# Patient Record
Sex: MALE | Race: WHITE | NOT HISPANIC OR LATINO | Employment: FULL TIME
[De-identification: names, ages, dates, MRNs, and addresses within clinical notes are randomized per-mention and may not be internally consistent; named-entity substitution may affect disease eponyms.]

---

## 2020-03-01 ENCOUNTER — HEALTH MAINTENANCE LETTER (OUTPATIENT)
Age: 45
End: 2020-03-01

## 2020-12-14 ENCOUNTER — HEALTH MAINTENANCE LETTER (OUTPATIENT)
Age: 45
End: 2020-12-14

## 2021-04-17 ENCOUNTER — HEALTH MAINTENANCE LETTER (OUTPATIENT)
Age: 46
End: 2021-04-17

## 2021-10-02 ENCOUNTER — HEALTH MAINTENANCE LETTER (OUTPATIENT)
Age: 46
End: 2021-10-02

## 2022-01-08 ENCOUNTER — HOSPITAL ENCOUNTER (INPATIENT)
Facility: CLINIC | Age: 47
LOS: 20 days | Discharge: OTHER INSTITUTION | End: 2022-01-28
Attending: EMERGENCY MEDICINE | Admitting: PSYCHIATRY & NEUROLOGY
Payer: COMMERCIAL

## 2022-01-08 ENCOUNTER — APPOINTMENT (OUTPATIENT)
Dept: CT IMAGING | Facility: CLINIC | Age: 47
End: 2022-01-08
Attending: EMERGENCY MEDICINE
Payer: COMMERCIAL

## 2022-01-08 ENCOUNTER — APPOINTMENT (OUTPATIENT)
Dept: CT IMAGING | Facility: CLINIC | Age: 47
End: 2022-01-08
Attending: STUDENT IN AN ORGANIZED HEALTH CARE EDUCATION/TRAINING PROGRAM
Payer: COMMERCIAL

## 2022-01-08 ENCOUNTER — APPOINTMENT (OUTPATIENT)
Dept: GENERAL RADIOLOGY | Facility: CLINIC | Age: 47
End: 2022-01-08
Attending: EMERGENCY MEDICINE
Payer: COMMERCIAL

## 2022-01-08 DIAGNOSIS — Z11.52 ENCOUNTER FOR SCREENING LABORATORY TESTING FOR SEVERE ACUTE RESPIRATORY SYNDROME CORONAVIRUS 2 (SARS-COV-2): ICD-10-CM

## 2022-01-08 DIAGNOSIS — I15.9 SECONDARY HYPERTENSION: ICD-10-CM

## 2022-01-08 DIAGNOSIS — I63.531 ACUTE ISCHEMIC RIGHT POSTERIOR CEREBRAL ARTERY (PCA) STROKE (H): Primary | ICD-10-CM

## 2022-01-08 DIAGNOSIS — I63.9 ISCHEMIC CEREBROVASCULAR ACCIDENT (CVA) (H): ICD-10-CM

## 2022-01-08 LAB
ALBUMIN SERPL-MCNC: 3.9 G/DL (ref 3.4–5)
ALP SERPL-CCNC: 94 U/L (ref 40–150)
ALT SERPL W P-5'-P-CCNC: 31 U/L (ref 0–70)
ANION GAP SERPL CALCULATED.3IONS-SCNC: 7 MMOL/L (ref 3–14)
APTT PPP: 28 SECONDS (ref 22–38)
AST SERPL W P-5'-P-CCNC: 26 U/L (ref 0–45)
BASOPHILS # BLD AUTO: 0 10E3/UL (ref 0–0.2)
BASOPHILS NFR BLD AUTO: 0 %
BILIRUB SERPL-MCNC: 0.8 MG/DL (ref 0.2–1.3)
BUN SERPL-MCNC: 19 MG/DL (ref 7–30)
CALCIUM SERPL-MCNC: 9.8 MG/DL (ref 8.5–10.1)
CHLORIDE BLD-SCNC: 104 MMOL/L (ref 94–109)
CO2 SERPL-SCNC: 30 MMOL/L (ref 20–32)
CREAT BLD-MCNC: 1.5 MG/DL (ref 0.7–1.3)
CREAT SERPL-MCNC: 1.37 MG/DL (ref 0.66–1.25)
EOSINOPHIL # BLD AUTO: 0.1 10E3/UL (ref 0–0.7)
EOSINOPHIL NFR BLD AUTO: 0 %
ERYTHROCYTE [DISTWIDTH] IN BLOOD BY AUTOMATED COUNT: 13.4 % (ref 10–15)
GFR SERPL CREATININE-BSD FRML MDRD: 57 ML/MIN/1.73M2
GFR SERPL CREATININE-BSD FRML MDRD: 64 ML/MIN/1.73M2
GLUCOSE BLD-MCNC: 222 MG/DL (ref 70–99)
HBA1C MFR BLD: 7.8 % (ref 0–5.6)
HCT VFR BLD AUTO: 49.6 % (ref 40–53)
HGB BLD-MCNC: 17.2 G/DL (ref 13.3–17.7)
HOLD SPECIMEN: NORMAL
HOLD SPECIMEN: NORMAL
IMM GRANULOCYTES # BLD: 0 10E3/UL
IMM GRANULOCYTES NFR BLD: 0 %
INR BLD: 1 (ref 2–3)
INR PPP: 0.95 (ref 0.85–1.15)
LYMPHOCYTES # BLD AUTO: 1 10E3/UL (ref 0.8–5.3)
LYMPHOCYTES NFR BLD AUTO: 8 %
MAGNESIUM SERPL-MCNC: 2 MG/DL (ref 1.6–2.3)
MCH RBC QN AUTO: 31 PG (ref 26.5–33)
MCHC RBC AUTO-ENTMCNC: 34.7 G/DL (ref 31.5–36.5)
MCV RBC AUTO: 90 FL (ref 78–100)
MONOCYTES # BLD AUTO: 0.5 10E3/UL (ref 0–1.3)
MONOCYTES NFR BLD AUTO: 4 %
NEUTROPHILS # BLD AUTO: 10.7 10E3/UL (ref 1.6–8.3)
NEUTROPHILS NFR BLD AUTO: 88 %
NRBC # BLD AUTO: 0 10E3/UL
NRBC BLD AUTO-RTO: 0 /100
PLATELET # BLD AUTO: 252 10E3/UL (ref 150–450)
POTASSIUM BLD-SCNC: 2.7 MMOL/L (ref 3.4–5.3)
PROT SERPL-MCNC: 8.5 G/DL (ref 6.8–8.8)
RADIOLOGIST FLAGS: ABNORMAL
RADIOLOGIST FLAGS: ABNORMAL
RBC # BLD AUTO: 5.54 10E6/UL (ref 4.4–5.9)
SARS-COV-2 RNA RESP QL NAA+PROBE: NEGATIVE
SODIUM SERPL-SCNC: 141 MMOL/L (ref 133–144)
TROPONIN I SERPL HS-MCNC: 147 NG/L
WBC # BLD AUTO: 12.4 10E3/UL (ref 4–11)

## 2022-01-08 PROCEDURE — 999N000176 HC STATISTIC STROKE CODE W/O ACCESS

## 2022-01-08 PROCEDURE — 258N000003 HC RX IP 258 OP 636: Performed by: EMERGENCY MEDICINE

## 2022-01-08 PROCEDURE — 250N000011 HC RX IP 250 OP 636: Performed by: EMERGENCY MEDICINE

## 2022-01-08 PROCEDURE — 84484 ASSAY OF TROPONIN QUANT: CPT | Performed by: EMERGENCY MEDICINE

## 2022-01-08 PROCEDURE — 99291 CRITICAL CARE FIRST HOUR: CPT | Mod: 25

## 2022-01-08 PROCEDURE — 70498 CT ANGIOGRAPHY NECK: CPT | Mod: 26 | Performed by: RADIOLOGY

## 2022-01-08 PROCEDURE — 70496 CT ANGIOGRAPHY HEAD: CPT

## 2022-01-08 PROCEDURE — C9803 HOPD COVID-19 SPEC COLLECT: HCPCS

## 2022-01-08 PROCEDURE — 85610 PROTHROMBIN TIME: CPT

## 2022-01-08 PROCEDURE — 99285 EMERGENCY DEPT VISIT HI MDM: CPT | Mod: 25

## 2022-01-08 PROCEDURE — 96361 HYDRATE IV INFUSION ADD-ON: CPT

## 2022-01-08 PROCEDURE — 96375 TX/PRO/DX INJ NEW DRUG ADDON: CPT

## 2022-01-08 PROCEDURE — 96374 THER/PROPH/DIAG INJ IV PUSH: CPT

## 2022-01-08 PROCEDURE — 83036 HEMOGLOBIN GLYCOSYLATED A1C: CPT | Performed by: STUDENT IN AN ORGANIZED HEALTH CARE EDUCATION/TRAINING PROGRAM

## 2022-01-08 PROCEDURE — 85730 THROMBOPLASTIN TIME PARTIAL: CPT | Performed by: EMERGENCY MEDICINE

## 2022-01-08 PROCEDURE — U0005 INFEC AGEN DETEC AMPLI PROBE: HCPCS | Performed by: EMERGENCY MEDICINE

## 2022-01-08 PROCEDURE — 0042T CT HEAD PERFUSION WITH CONTRAST: CPT | Mod: GC | Performed by: RADIOLOGY

## 2022-01-08 PROCEDURE — 258N000003 HC RX IP 258 OP 636: Performed by: STUDENT IN AN ORGANIZED HEALTH CARE EDUCATION/TRAINING PROGRAM

## 2022-01-08 PROCEDURE — 82040 ASSAY OF SERUM ALBUMIN: CPT | Performed by: EMERGENCY MEDICINE

## 2022-01-08 PROCEDURE — 83735 ASSAY OF MAGNESIUM: CPT | Performed by: EMERGENCY MEDICINE

## 2022-01-08 PROCEDURE — 80061 LIPID PANEL: CPT | Performed by: STUDENT IN AN ORGANIZED HEALTH CARE EDUCATION/TRAINING PROGRAM

## 2022-01-08 PROCEDURE — 82565 ASSAY OF CREATININE: CPT

## 2022-01-08 PROCEDURE — 71046 X-RAY EXAM CHEST 2 VIEWS: CPT | Mod: 26 | Performed by: STUDENT IN AN ORGANIZED HEALTH CARE EDUCATION/TRAINING PROGRAM

## 2022-01-08 PROCEDURE — 85025 COMPLETE CBC W/AUTO DIFF WBC: CPT | Performed by: EMERGENCY MEDICINE

## 2022-01-08 PROCEDURE — 36415 COLL VENOUS BLD VENIPUNCTURE: CPT | Performed by: STUDENT IN AN ORGANIZED HEALTH CARE EDUCATION/TRAINING PROGRAM

## 2022-01-08 PROCEDURE — 93010 ELECTROCARDIOGRAM REPORT: CPT | Performed by: EMERGENCY MEDICINE

## 2022-01-08 PROCEDURE — 0042T CT HEAD PERFUSION WITH CONTRAST: CPT

## 2022-01-08 PROCEDURE — 36415 COLL VENOUS BLD VENIPUNCTURE: CPT | Performed by: EMERGENCY MEDICINE

## 2022-01-08 PROCEDURE — 70496 CT ANGIOGRAPHY HEAD: CPT | Mod: 26 | Performed by: RADIOLOGY

## 2022-01-08 PROCEDURE — 85610 PROTHROMBIN TIME: CPT | Performed by: EMERGENCY MEDICINE

## 2022-01-08 PROCEDURE — 71046 X-RAY EXAM CHEST 2 VIEWS: CPT

## 2022-01-08 PROCEDURE — 70498 CT ANGIOGRAPHY NECK: CPT

## 2022-01-08 PROCEDURE — 84132 ASSAY OF SERUM POTASSIUM: CPT

## 2022-01-08 PROCEDURE — 70450 CT HEAD/BRAIN W/O DYE: CPT

## 2022-01-08 PROCEDURE — 120N000002 HC R&B MED SURG/OB UMMC

## 2022-01-08 PROCEDURE — 70450 CT HEAD/BRAIN W/O DYE: CPT | Mod: 26 | Performed by: RADIOLOGY

## 2022-01-08 PROCEDURE — 250N000013 HC RX MED GY IP 250 OP 250 PS 637: Performed by: EMERGENCY MEDICINE

## 2022-01-08 PROCEDURE — 84484 ASSAY OF TROPONIN QUANT: CPT | Performed by: STUDENT IN AN ORGANIZED HEALTH CARE EDUCATION/TRAINING PROGRAM

## 2022-01-08 PROCEDURE — 99291 CRITICAL CARE FIRST HOUR: CPT | Mod: 25 | Performed by: EMERGENCY MEDICINE

## 2022-01-08 PROCEDURE — 80053 COMPREHEN METABOLIC PANEL: CPT | Performed by: EMERGENCY MEDICINE

## 2022-01-08 PROCEDURE — 93005 ELECTROCARDIOGRAM TRACING: CPT

## 2022-01-08 PROCEDURE — 96376 TX/PRO/DX INJ SAME DRUG ADON: CPT

## 2022-01-08 RX ORDER — ACETAMINOPHEN 325 MG/1
650 TABLET ORAL EVERY 4 HOURS PRN
Status: DISCONTINUED | OUTPATIENT
Start: 2022-01-08 | End: 2022-01-28 | Stop reason: HOSPADM

## 2022-01-08 RX ORDER — LABETALOL HYDROCHLORIDE 5 MG/ML
10 INJECTION, SOLUTION INTRAVENOUS
Status: COMPLETED | OUTPATIENT
Start: 2022-01-08 | End: 2022-01-08

## 2022-01-08 RX ORDER — DEXTROSE MONOHYDRATE 25 G/50ML
25-50 INJECTION, SOLUTION INTRAVENOUS
Status: DISCONTINUED | OUTPATIENT
Start: 2022-01-08 | End: 2022-01-28 | Stop reason: HOSPADM

## 2022-01-08 RX ORDER — NICOTINE POLACRILEX 4 MG
15-30 LOZENGE BUCCAL
Status: DISCONTINUED | OUTPATIENT
Start: 2022-01-08 | End: 2022-01-28 | Stop reason: HOSPADM

## 2022-01-08 RX ORDER — LABETALOL HYDROCHLORIDE 5 MG/ML
10-20 INJECTION, SOLUTION INTRAVENOUS EVERY 10 MIN PRN
Status: DISCONTINUED | OUTPATIENT
Start: 2022-01-08 | End: 2022-01-11

## 2022-01-08 RX ORDER — ASPIRIN 81 MG/1
81 TABLET ORAL DAILY
Status: DISCONTINUED | OUTPATIENT
Start: 2022-01-09 | End: 2022-01-25

## 2022-01-08 RX ORDER — AMOXICILLIN 250 MG
1-2 CAPSULE ORAL 2 TIMES DAILY PRN
Status: DISCONTINUED | OUTPATIENT
Start: 2022-01-08 | End: 2022-01-28 | Stop reason: HOSPADM

## 2022-01-08 RX ORDER — SODIUM CHLORIDE, SODIUM LACTATE, POTASSIUM CHLORIDE, CALCIUM CHLORIDE 600; 310; 30; 20 MG/100ML; MG/100ML; MG/100ML; MG/100ML
INJECTION, SOLUTION INTRAVENOUS CONTINUOUS
Status: DISCONTINUED | OUTPATIENT
Start: 2022-01-08 | End: 2022-01-09

## 2022-01-08 RX ORDER — POLYETHYLENE GLYCOL 3350 17 G/17G
17 POWDER, FOR SOLUTION ORAL DAILY PRN
Status: DISCONTINUED | OUTPATIENT
Start: 2022-01-08 | End: 2022-01-28 | Stop reason: HOSPADM

## 2022-01-08 RX ORDER — POTASSIUM CHLORIDE 750 MG/1
40 TABLET, EXTENDED RELEASE ORAL ONCE
Status: COMPLETED | OUTPATIENT
Start: 2022-01-08 | End: 2022-01-08

## 2022-01-08 RX ORDER — SODIUM CHLORIDE 9 MG/ML
INJECTION, SOLUTION INTRAVENOUS CONTINUOUS
Status: DISCONTINUED | OUTPATIENT
Start: 2022-01-08 | End: 2022-01-16

## 2022-01-08 RX ORDER — HYDRALAZINE HYDROCHLORIDE 20 MG/ML
10 INJECTION INTRAMUSCULAR; INTRAVENOUS
Status: COMPLETED | OUTPATIENT
Start: 2022-01-08 | End: 2022-01-08

## 2022-01-08 RX ORDER — HYDRALAZINE HYDROCHLORIDE 20 MG/ML
10-20 INJECTION INTRAMUSCULAR; INTRAVENOUS
Status: DISCONTINUED | OUTPATIENT
Start: 2022-01-08 | End: 2022-01-11

## 2022-01-08 RX ORDER — POTASSIUM CHLORIDE 7.45 MG/ML
10 INJECTION INTRAVENOUS
Status: DISCONTINUED | OUTPATIENT
Start: 2022-01-08 | End: 2022-01-08

## 2022-01-08 RX ORDER — ASPIRIN 81 MG/1
81 TABLET, CHEWABLE ORAL DAILY
Status: DISCONTINUED | OUTPATIENT
Start: 2022-01-09 | End: 2022-01-25

## 2022-01-08 RX ORDER — IOPAMIDOL 755 MG/ML
115 INJECTION, SOLUTION INTRAVASCULAR ONCE
Status: COMPLETED | OUTPATIENT
Start: 2022-01-08 | End: 2022-01-08

## 2022-01-08 RX ORDER — LIDOCAINE 40 MG/G
CREAM TOPICAL
Status: DISCONTINUED | OUTPATIENT
Start: 2022-01-08 | End: 2022-01-28 | Stop reason: HOSPADM

## 2022-01-08 RX ORDER — ASPIRIN 325 MG
325 TABLET ORAL ONCE
Status: COMPLETED | OUTPATIENT
Start: 2022-01-08 | End: 2022-01-08

## 2022-01-08 RX ADMIN — SODIUM CHLORIDE, POTASSIUM CHLORIDE, SODIUM LACTATE AND CALCIUM CHLORIDE: 600; 310; 30; 20 INJECTION, SOLUTION INTRAVENOUS at 23:43

## 2022-01-08 RX ADMIN — IOPAMIDOL 115 ML: 755 INJECTION, SOLUTION INTRAVENOUS at 19:09

## 2022-01-08 RX ADMIN — SODIUM CHLORIDE: 9 INJECTION, SOLUTION INTRAVENOUS at 20:57

## 2022-01-08 RX ADMIN — POTASSIUM CHLORIDE 40 MEQ: 750 TABLET, EXTENDED RELEASE ORAL at 19:31

## 2022-01-08 RX ADMIN — HYDRALAZINE HYDROCHLORIDE 10 MG: 20 INJECTION INTRAMUSCULAR; INTRAVENOUS at 19:31

## 2022-01-08 RX ADMIN — ASPIRIN 325 MG ORAL TABLET 325 MG: 325 PILL ORAL at 19:31

## 2022-01-08 RX ADMIN — POTASSIUM CHLORIDE 10 MEQ: 7.46 INJECTION, SOLUTION INTRAVENOUS at 22:46

## 2022-01-08 RX ADMIN — SODIUM CHLORIDE 1000 ML: 9 INJECTION, SOLUTION INTRAVENOUS at 20:45

## 2022-01-08 RX ADMIN — POTASSIUM CHLORIDE 10 MEQ: 7.46 INJECTION, SOLUTION INTRAVENOUS at 20:58

## 2022-01-08 RX ADMIN — LABETALOL HYDROCHLORIDE 10 MG: 5 INJECTION, SOLUTION INTRAVENOUS at 22:46

## 2022-01-08 ASSESSMENT — ACTIVITIES OF DAILY LIVING (ADL)
ADLS_ACUITY_SCORE: 12

## 2022-01-08 ASSESSMENT — VISUAL ACUITY
OU: NORMAL ACUITY
OU: NORMAL ACUITY

## 2022-01-09 ENCOUNTER — APPOINTMENT (OUTPATIENT)
Dept: SPEECH THERAPY | Facility: CLINIC | Age: 47
End: 2022-01-09
Attending: STUDENT IN AN ORGANIZED HEALTH CARE EDUCATION/TRAINING PROGRAM
Payer: COMMERCIAL

## 2022-01-09 ENCOUNTER — APPOINTMENT (OUTPATIENT)
Dept: MRI IMAGING | Facility: CLINIC | Age: 47
End: 2022-01-09
Attending: PSYCHIATRY & NEUROLOGY
Payer: COMMERCIAL

## 2022-01-09 ENCOUNTER — APPOINTMENT (OUTPATIENT)
Dept: OCCUPATIONAL THERAPY | Facility: CLINIC | Age: 47
End: 2022-01-09
Attending: STUDENT IN AN ORGANIZED HEALTH CARE EDUCATION/TRAINING PROGRAM
Payer: COMMERCIAL

## 2022-01-09 ENCOUNTER — APPOINTMENT (OUTPATIENT)
Dept: CARDIOLOGY | Facility: CLINIC | Age: 47
End: 2022-01-09
Attending: STUDENT IN AN ORGANIZED HEALTH CARE EDUCATION/TRAINING PROGRAM
Payer: COMMERCIAL

## 2022-01-09 ENCOUNTER — APPOINTMENT (OUTPATIENT)
Dept: MRI IMAGING | Facility: CLINIC | Age: 47
End: 2022-01-09
Payer: COMMERCIAL

## 2022-01-09 LAB
ALBUMIN UR-MCNC: 20 MG/DL
ANION GAP SERPL CALCULATED.3IONS-SCNC: 9 MMOL/L (ref 3–14)
APPEARANCE UR: CLEAR
BILIRUB UR QL STRIP: NEGATIVE
BUN SERPL-MCNC: 14 MG/DL (ref 7–30)
CALCIUM SERPL-MCNC: 9 MG/DL (ref 8.5–10.1)
CHLORIDE BLD-SCNC: 107 MMOL/L (ref 94–109)
CHOLEST SERPL-MCNC: 169 MG/DL
CO2 SERPL-SCNC: 27 MMOL/L (ref 20–32)
COLOR UR AUTO: ABNORMAL
CREAT SERPL-MCNC: 1.17 MG/DL (ref 0.66–1.25)
ERYTHROCYTE [DISTWIDTH] IN BLOOD BY AUTOMATED COUNT: 14 % (ref 10–15)
GFR SERPL CREATININE-BSD FRML MDRD: 77 ML/MIN/1.73M2
GLUCOSE BLD-MCNC: 146 MG/DL (ref 70–99)
GLUCOSE BLDC GLUCOMTR-MCNC: 127 MG/DL (ref 70–99)
GLUCOSE BLDC GLUCOMTR-MCNC: 141 MG/DL (ref 70–99)
GLUCOSE BLDC GLUCOMTR-MCNC: 149 MG/DL (ref 70–99)
GLUCOSE BLDC GLUCOMTR-MCNC: 152 MG/DL (ref 70–99)
GLUCOSE BLDC GLUCOMTR-MCNC: 215 MG/DL (ref 70–99)
GLUCOSE UR STRIP-MCNC: 300 MG/DL
HCT VFR BLD AUTO: 42.5 % (ref 40–53)
HDLC SERPL-MCNC: 70 MG/DL
HGB BLD-MCNC: 14.5 G/DL (ref 13.3–17.7)
HGB UR QL STRIP: ABNORMAL
KETONES UR STRIP-MCNC: 40 MG/DL
LDLC SERPL CALC-MCNC: 75 MG/DL
LEUKOCYTE ESTERASE UR QL STRIP: NEGATIVE
LVEF ECHO: NORMAL
MCH RBC QN AUTO: 31.1 PG (ref 26.5–33)
MCHC RBC AUTO-ENTMCNC: 34.1 G/DL (ref 31.5–36.5)
MCV RBC AUTO: 91 FL (ref 78–100)
MUCOUS THREADS #/AREA URNS LPF: PRESENT /LPF
NITRATE UR QL: NEGATIVE
NONHDLC SERPL-MCNC: 99 MG/DL
PH UR STRIP: 7 [PH] (ref 5–7)
PLATELET # BLD AUTO: 232 10E3/UL (ref 150–450)
POTASSIUM BLD-SCNC: 2.6 MMOL/L (ref 3.4–5.3)
POTASSIUM BLD-SCNC: 2.7 MMOL/L (ref 3.4–5.3)
POTASSIUM BLD-SCNC: 3 MMOL/L (ref 3.4–5.3)
RBC # BLD AUTO: 4.66 10E6/UL (ref 4.4–5.9)
RBC URINE: <1 /HPF
SODIUM SERPL-SCNC: 143 MMOL/L (ref 133–144)
SP GR UR STRIP: 1.01 (ref 1–1.03)
TRIGL SERPL-MCNC: 118 MG/DL
TROPONIN I SERPL HS-MCNC: 1322 NG/L
TROPONIN I SERPL HS-MCNC: 467 NG/L
UROBILINOGEN UR STRIP-MCNC: NORMAL MG/DL
WBC # BLD AUTO: 12.2 10E3/UL (ref 4–11)
WBC URINE: 0 /HPF

## 2022-01-09 PROCEDURE — 70551 MRI BRAIN STEM W/O DYE: CPT

## 2022-01-09 PROCEDURE — 92610 EVALUATE SWALLOWING FUNCTION: CPT | Mod: GN

## 2022-01-09 PROCEDURE — 70544 MR ANGIOGRAPHY HEAD W/O DYE: CPT | Mod: 26 | Performed by: RADIOLOGY

## 2022-01-09 PROCEDURE — 250N000013 HC RX MED GY IP 250 OP 250 PS 637: Performed by: STUDENT IN AN ORGANIZED HEALTH CARE EDUCATION/TRAINING PROGRAM

## 2022-01-09 PROCEDURE — 250N000011 HC RX IP 250 OP 636: Performed by: STUDENT IN AN ORGANIZED HEALTH CARE EDUCATION/TRAINING PROGRAM

## 2022-01-09 PROCEDURE — 120N000002 HC R&B MED SURG/OB UMMC

## 2022-01-09 PROCEDURE — 36415 COLL VENOUS BLD VENIPUNCTURE: CPT | Performed by: STUDENT IN AN ORGANIZED HEALTH CARE EDUCATION/TRAINING PROGRAM

## 2022-01-09 PROCEDURE — 250N000013 HC RX MED GY IP 250 OP 250 PS 637

## 2022-01-09 PROCEDURE — 99223 1ST HOSP IP/OBS HIGH 75: CPT | Mod: GC | Performed by: PSYCHIATRY & NEUROLOGY

## 2022-01-09 PROCEDURE — 70544 MR ANGIOGRAPHY HEAD W/O DYE: CPT

## 2022-01-09 PROCEDURE — 70551 MRI BRAIN STEM W/O DYE: CPT | Mod: 26 | Performed by: RADIOLOGY

## 2022-01-09 PROCEDURE — 99356 PR PROLONGED SERV,INPATIENT,1ST HR: CPT | Performed by: PSYCHIATRY & NEUROLOGY

## 2022-01-09 PROCEDURE — 258N000003 HC RX IP 258 OP 636: Performed by: STUDENT IN AN ORGANIZED HEALTH CARE EDUCATION/TRAINING PROGRAM

## 2022-01-09 PROCEDURE — 81003 URINALYSIS AUTO W/O SCOPE: CPT | Performed by: STUDENT IN AN ORGANIZED HEALTH CARE EDUCATION/TRAINING PROGRAM

## 2022-01-09 PROCEDURE — 93306 TTE W/DOPPLER COMPLETE: CPT | Mod: 26 | Performed by: INTERNAL MEDICINE

## 2022-01-09 PROCEDURE — 36415 COLL VENOUS BLD VENIPUNCTURE: CPT

## 2022-01-09 PROCEDURE — 82374 ASSAY BLOOD CARBON DIOXIDE: CPT | Performed by: STUDENT IN AN ORGANIZED HEALTH CARE EDUCATION/TRAINING PROGRAM

## 2022-01-09 PROCEDURE — 84132 ASSAY OF SERUM POTASSIUM: CPT

## 2022-01-09 PROCEDURE — 84484 ASSAY OF TROPONIN QUANT: CPT

## 2022-01-09 PROCEDURE — 97165 OT EVAL LOW COMPLEX 30 MIN: CPT | Mod: GO | Performed by: OCCUPATIONAL THERAPIST

## 2022-01-09 PROCEDURE — 85027 COMPLETE CBC AUTOMATED: CPT | Performed by: STUDENT IN AN ORGANIZED HEALTH CARE EDUCATION/TRAINING PROGRAM

## 2022-01-09 PROCEDURE — 97530 THERAPEUTIC ACTIVITIES: CPT | Mod: GO | Performed by: OCCUPATIONAL THERAPIST

## 2022-01-09 PROCEDURE — 999N000208 ECHOCARDIOGRAM COMPLETE

## 2022-01-09 PROCEDURE — 97535 SELF CARE MNGMENT TRAINING: CPT | Mod: GO | Performed by: OCCUPATIONAL THERAPIST

## 2022-01-09 PROCEDURE — 250N000012 HC RX MED GY IP 250 OP 636 PS 637: Performed by: STUDENT IN AN ORGANIZED HEALTH CARE EDUCATION/TRAINING PROGRAM

## 2022-01-09 PROCEDURE — 93306 TTE W/DOPPLER COMPLETE: CPT

## 2022-01-09 PROCEDURE — 258N000001 HC RX 258: Performed by: INTERNAL MEDICINE

## 2022-01-09 PROCEDURE — 92526 ORAL FUNCTION THERAPY: CPT | Mod: GN

## 2022-01-09 RX ORDER — NICOTINE POLACRILEX 4 MG
15-30 LOZENGE BUCCAL
Status: DISCONTINUED | OUTPATIENT
Start: 2022-01-09 | End: 2022-01-09

## 2022-01-09 RX ORDER — POTASSIUM CHLORIDE 750 MG/1
40 TABLET, EXTENDED RELEASE ORAL ONCE
Status: COMPLETED | OUTPATIENT
Start: 2022-01-09 | End: 2022-01-09

## 2022-01-09 RX ORDER — DEXTROSE MONOHYDRATE 25 G/50ML
25-50 INJECTION, SOLUTION INTRAVENOUS
Status: DISCONTINUED | OUTPATIENT
Start: 2022-01-09 | End: 2022-01-09

## 2022-01-09 RX ORDER — ATORVASTATIN CALCIUM 40 MG/1
40 TABLET, FILM COATED ORAL EVERY EVENING
Status: DISCONTINUED | OUTPATIENT
Start: 2022-01-09 | End: 2022-01-28 | Stop reason: HOSPADM

## 2022-01-09 RX ORDER — LISINOPRIL 10 MG/1
10 TABLET ORAL DAILY
Status: DISCONTINUED | OUTPATIENT
Start: 2022-01-09 | End: 2022-01-11

## 2022-01-09 RX ORDER — ACYCLOVIR 200 MG/1
10 CAPSULE ORAL ONCE
Status: COMPLETED | OUTPATIENT
Start: 2022-01-09 | End: 2022-01-09

## 2022-01-09 RX ORDER — POTASSIUM CHLORIDE 750 MG/1
40 TABLET, EXTENDED RELEASE ORAL ONCE
Status: DISCONTINUED | OUTPATIENT
Start: 2022-01-09 | End: 2022-01-11

## 2022-01-09 RX ADMIN — SODIUM CHLORIDE 10 ML: 9 INJECTION, SOLUTION INTRAMUSCULAR; INTRAVENOUS; SUBCUTANEOUS at 09:30

## 2022-01-09 RX ADMIN — INSULIN ASPART 1 UNITS: 100 INJECTION, SOLUTION INTRAVENOUS; SUBCUTANEOUS at 13:10

## 2022-01-09 RX ADMIN — ATORVASTATIN CALCIUM 40 MG: 40 TABLET, FILM COATED ORAL at 21:19

## 2022-01-09 RX ADMIN — ACETAMINOPHEN 650 MG: 325 TABLET, FILM COATED ORAL at 13:09

## 2022-01-09 RX ADMIN — INSULIN ASPART 1 UNITS: 100 INJECTION, SOLUTION INTRAVENOUS; SUBCUTANEOUS at 09:11

## 2022-01-09 RX ADMIN — INSULIN ASPART 1 UNITS: 100 INJECTION, SOLUTION INTRAVENOUS; SUBCUTANEOUS at 16:38

## 2022-01-09 RX ADMIN — HYDRALAZINE HYDROCHLORIDE 10 MG: 20 INJECTION INTRAMUSCULAR; INTRAVENOUS at 10:59

## 2022-01-09 RX ADMIN — SODIUM CHLORIDE, POTASSIUM CHLORIDE, SODIUM LACTATE AND CALCIUM CHLORIDE: 600; 310; 30; 20 INJECTION, SOLUTION INTRAVENOUS at 10:56

## 2022-01-09 RX ADMIN — POTASSIUM CHLORIDE 40 MEQ: 750 TABLET, EXTENDED RELEASE ORAL at 09:33

## 2022-01-09 RX ADMIN — LISINOPRIL 10 MG: 5 TABLET ORAL at 15:27

## 2022-01-09 ASSESSMENT — ACTIVITIES OF DAILY LIVING (ADL)
ADLS_ACUITY_SCORE: 12
ADLS_ACUITY_SCORE: 12
ADLS_ACUITY_SCORE: 3
ADLS_ACUITY_SCORE: 12
ADLS_ACUITY_SCORE: 3
ADLS_ACUITY_SCORE: 12
ADLS_ACUITY_SCORE: 3
ADLS_ACUITY_SCORE: 12
ADLS_ACUITY_SCORE: 3
ADLS_ACUITY_SCORE: 3
ADLS_ACUITY_SCORE: 12
ADLS_ACUITY_SCORE: 3
ADLS_ACUITY_SCORE: 12
ADLS_ACUITY_SCORE: 3
ADLS_ACUITY_SCORE: 3
ADLS_ACUITY_SCORE: 12
ADLS_ACUITY_SCORE: 12
PREVIOUS_RESPONSIBILITIES: MEAL PREP;HOUSEKEEPING;LAUNDRY;SHOPPING;MEDICATION MANAGEMENT;FINANCES;DRIVING;WORK
ADLS_ACUITY_SCORE: 12
ADLS_ACUITY_SCORE: 3

## 2022-01-09 NOTE — ED PROVIDER NOTES
History     Chief Complaint   Patient presents with     Stroke Symptoms     HPI  Miriam Hall is a 46 year old male with a past medical history of hypertension who presents to the emergency department with a chief complaint of left-sided weakness.  The patient reports that his last known normal was at noon today, at which time he went to take a nap.  He reports that he woke up around 2 PM today and noticed weakness in his left arm and leg.  He has noted some associated loss of sensation as well.  No associated speech problems.  Patient's blood pressure significantly elevated per EMS. Blood glucose reportedly 190 per EMS.  The patient states he does not take any blood thinners, he occasionally takes an 81 mg aspirin, but denies having taken this today.    I have reviewed the Medications, Allergies, Past Medical and Surgical History, and Social History in the [employer] system.    Past Medical History:   Diagnosis Date     Hypertension      Past Surgical History:   Procedure Laterality Date     TONSILLECTOMY       No current facility-administered medications for this encounter.     Current Outpatient Medications   Medication     lisinopril-hydrochlorothiazide (PRINZIDE,ZESTORETIC) 20-25 MG per tablet     Allergies   Allergen Reactions     Pcn [Penicillin G] Hives and Itching     Past medical history, past surgical history, medications, and allergies were reviewed with the patient. Additional pertinent items: None    Social History     Socioeconomic History     Marital status: Unknown     Spouse name: Not on file     Number of children: Not on file     Years of education: Not on file     Highest education level: Not on file   Occupational History     Not on file   Tobacco Use     Smoking status: Never Smoker     Smokeless tobacco: Never Used   Substance and Sexual Activity     Alcohol use: Yes     Alcohol/week: 1.7 standard drinks     Types: 2 Cans of beer per week     Comment: 1 weeklyt     Drug use: No     Sexual  activity: Not on file   Other Topics Concern     Parent/sibling w/ CABG, MI or angioplasty before 65F 55M? Not Asked   Social History Narrative     Not on file     Social Determinants of Health     Financial Resource Strain: Not on file   Food Insecurity: Not on file   Transportation Needs: Not on file   Physical Activity: Not on file   Stress: Not on file   Social Connections: Not on file   Intimate Partner Violence: Not on file   Housing Stability: Not on file     Social history was reviewed with the patient. Additional pertinent items: None    Review of Systems  General: No fevers or chills  Skin: No rash or diaphoresis  Eyes: No eye redness or discharge  Ears/Nose/Throat: No rhinorrhea or nasal congestion  Respiratory: No cough or SOB  Cardiovascular: No chest pain or palpitations  Gastrointestinal: No nausea, vomiting, or diarrhea  Genitourinary: No urinary frequency, hematuria, or dysuria  Musculoskeletal: No arthralgias or myalgias  Neurologic: See HPI  Hematologic/Lymphatic/Immunologic: No leg swelling, no easy bruising/bleeding  Endocrine: No polyuria/polydypsia    A complete review of systems was performed with pertinent positives and negatives noted in the HPI, and all other systems negative.    Physical Exam   Pulse: 100  Temp: 98.8  F (37.1  C)  Resp: 24  Weight: 81.2 kg (179 lb)  SpO2: 100 %      General: Well nourished, well developed  HEENT: Anicteric. NCAT, MMM  Neck: no jugular venous distension, supple, nl ROM  Cardiac: Regular rate and rhythm. No murmurs, rubs, or gallops. Normal S1, S2.  Intact peripheral pulses  Pulm: CTAB, no stridor, wheezes, rales, rhonchi  Abd: Soft, nontender, nondistended.  No masses palpated.    Skin: Warm and dry to the touch.  No rash  Extremities: No LE edema, no cyanosis, w/w/p  Neuro: Alert and oriented x 4, no facial droop, patient with partial gaze palsy, difficulty looking to the left, strength 5/5 in right upper and lower extremities, there is left lower  extremity drift, patient with significant weakness to left arm with minimal effort against gravity, sensation intact throughout, however it is subjectively altered to the left upper and lower extremities, no nystagmus, PERRL.  Speech is clear without aphasia or dysarthria.      ED Course        Procedures               EKG Interpretation:      Interpreted by Payton Mendoza MD  Time reviewed:1914   Symptoms at time of EKG: weakness   Rhythm: Sinus tachycardia  Rate: Tachycardic, 106 bpm  Axis: Left  Ectopy: none  Conduction: normal  ST Segments/ T Waves: Lateral T wave inversions  Q Waves: none  Comparison to prior: No old EKG available    Clinical Impression: abnormal EKG       Critical Care Addendum    My initial assessment, based on my review of prehospital provider report, review of nursing observations, review of vital signs, focused history and physical exam, established that Miriam Hall has focal neurologic abnormalities and severe hypertension, which requires immediate intervention, and therefore he is critically ill.     After the initial assessment, the care team initiated multiple lab tests, initiated IV fluid administration, initiated medication therapy with Antihypertensives and consulted with Neurology/stroke team to provide stabilization care. Due to the critical nature of this patient, I reassessed nursing observations, vital signs, physical exam, review of cardiac rhythm monitor, interpretation of labs and imaging, mental status and neurologic status multiple times prior to his disposition.     Time also spent performing documentation, reviewing test results, discussion with consultants and coordination of care.     Critical care time (excluding teaching time and procedures): 35 minutes.      The patient has stroke symptoms:         ED Stroke specific documentation           NIHSS PDF     Patient last known well time: 12:00   ED Provider first to bedside at: on arrival  CT Results received  at: 1925    Thrombolytics:   Not given due to unclear or unfavorable risk-benefit profile for extended window thrombolysis beyond the conventional 4.5 hour time window.    If treating with thrombolytics: Ensure SBP<180 and DBP<105 prior to treatment with thrombolytics.  Administering thrombolytics after treatment with IV labetalol, hydralazine, or nicardipine is reasonable once BP control is established.    Endovascular Retrieval:  Proximal vessel occlusion present, but endovascular treatment not initiated due to time since symptom onset. This was d/w neurology    National Institutes of Health Stroke Scale (Baseline)  Time Performed: on arrival     Score    Level of consciousness: (0)   Alert, keenly responsive    LOC questions: (0)   Answers both questions correctly    LOC commands: (0)   Performs both tasks correctly    Best gaze: (1)   Partial gaze palsy    Visual: (1)   Partial hemianopia    Facial palsy: (0)   Normal symmetrical movements    Motor arm (left): (3)   No effort against gravity    Motor arm (right): (0)   No drift    Motor leg (left): (1)   Drift    Motor leg (right): (0)   No drift    Limb ataxia: (0)   Absent    Sensory: (1)   Mild to moderate sensory loss    Best language: (0)   Normal- no aphasia    Dysarthria: (0)   Normal    Extinction and inattention: (0)   No abnormality        Total Score:  7        Stroke Mimics were considered (including migraine headache, seizure disorder, hypoglycemia (or hyperglycemia), head or spinal trauma, CNS infection, Toxin ingestion and shock state (e.g. sepsis) .                 Labs Ordered and Resulted from Time of ED Arrival to Time of ED Departure   TROPONIN I - Abnormal       Result Value    Troponin I High Sensitivity 147 (*)    COMPREHENSIVE METABOLIC PANEL - Abnormal    Sodium 141      Potassium 2.7 (*)     Chloride 104      Carbon Dioxide (CO2) 30      Anion Gap 7      Urea Nitrogen 19      Creatinine 1.37 (*)     Calcium 9.8      Glucose 222 (*)      Alkaline Phosphatase 94      AST 26      ALT 31      Protein Total 8.5      Albumin 3.9      Bilirubin Total 0.8      GFR Estimate 64     ISTAT INR POCT - Abnormal    INR POCT 1.0 (*)    ISTAT CREATININE POCT - Abnormal    Creatinine POCT 1.5 (*)     GFR, ESTIMATED POCT 57 (*)    CBC WITH PLATELETS AND DIFFERENTIAL - Abnormal    WBC Count 12.4 (*)     RBC Count 5.54      Hemoglobin 17.2      Hematocrit 49.6      MCV 90      MCH 31.0      MCHC 34.7      RDW 13.4      Platelet Count 252      % Neutrophils 88      % Lymphocytes 8      % Monocytes 4      % Eosinophils 0      % Basophils 0      % Immature Granulocytes 0      NRBCs per 100 WBC 0      Absolute Neutrophils 10.7 (*)     Absolute Lymphocytes 1.0      Absolute Monocytes 0.5      Absolute Eosinophils 0.1      Absolute Basophils 0.0      Absolute Immature Granulocytes 0.0      Absolute NRBCs 0.0     INR - Normal    INR 0.95     PARTIAL THROMBOPLASTIN TIME - Normal    aPTT 28     MAGNESIUM - Normal    Magnesium 2.0     COVID-19 VIRUS (CORONAVIRUS) BY PCR   GLUCOSE MONITOR NURSING POCT   ROUTINE UA WITH MICROSCOPIC REFLEX TO CULTURE   COVID-19 VIRUS (CORONAVIRUS) BY PCR   ISTAT CREATININE POCT            Results for orders placed or performed during the hospital encounter of 01/08/22 (from the past 24 hour(s))   iStat INR, POCT   Result Value Ref Range    INR POCT 1.0 (L) 2.0 - 3.0   Asymptomatic COVID-19 Virus (Coronavirus) by PCR Nasopharyngeal    Specimen: Nasopharyngeal; Swab   Result Value Ref Range    SARS CoV2 PCR Negative Negative, Testing sent to reference lab. Results will be returned via unsolicited result    Narrative    Testing was performed using the Xpert Xpress SARS-CoV-2 Assay on the  Cepheid Gene-Xpert Instrument Systems. Additional information about  this Emergency Use Authorization (EUA) assay can be found via the Lab  Guide. This test should be ordered for the detection of SARS-CoV-2 in  individuals who meet SARS-CoV-2 clinical and/or  epidemiological  criteria. Test performance is unknown in asymptomatic patients. This  test is for in vitro diagnostic use under the FDA EUA for  laboratories certified under CLIA to perform high complexity testing.  This test has not been FDA cleared or approved. A negative result  does not rule out the presence of PCR inhibitors in the specimen or  target RNA in concentration below the limit of detection for the  assay. The possibility of a false negative should be considered if  the patient's recent exposure or clinical presentation suggests  COVID-19. This test was validated by the [facility]. This laboratory is certified under  the Clinical Laboratory Improvement Amendments of 1988 (CLIA-88) as  qualified to perform high complexity laboratory testing.     Creatinine POCT   Result Value Ref Range    Creatinine POCT 1.5 (H) 0.7 - 1.3 mg/dL    GFR, ESTIMATED POCT 57 (L) >60 mL/min/1.73m2   Extra Tube ([place] Draw)    Narrative    The following orders were created for panel order Extra Tube ([place] Draw).  Procedure                               Abnormality         Status                     ---------                               -----------         ------                     Extra Red Top Tube[[id number]]                               Final result               Extra Purple Top Tube[[id number]]                            Final result                 Please view results for these tests on the individual orders.   Extra Red Top Tube   Result Value Ref Range    Hold Specimen JIC    Extra Purple Top Tube   Result Value Ref Range    Hold Specimen JIC    CBC with Platelets & Differential    Narrative    The following orders were created for panel order CBC with Platelets & Differential.  Procedure                               Abnormality         Status                     ---------                               -----------         ------                     CBC with  platelets and d...[[id number]]  Abnormal            Final result                 Please view results for these tests on the individual orders.   INR   Result Value Ref Range    INR 0.95 0.85 - 1.15   Partial thromboplastin time   Result Value Ref Range    aPTT 28 22 - 38 Seconds   Troponin I   Result Value Ref Range    Troponin I High Sensitivity 147 (HH) <79 ng/L   Comprehensive metabolic panel   Result Value Ref Range    Sodium 141 133 - 144 mmol/L    Potassium 2.7 (L) 3.4 - 5.3 mmol/L    Chloride 104 94 - 109 mmol/L    Carbon Dioxide (CO2) 30 20 - 32 mmol/L    Anion Gap 7 3 - 14 mmol/L    Urea Nitrogen 19 7 - 30 mg/dL    Creatinine 1.37 (H) 0.66 - 1.25 mg/dL    Calcium 9.8 8.5 - 10.1 mg/dL    Glucose 222 (H) 70 - 99 mg/dL    Alkaline Phosphatase 94 40 - 150 U/L    AST 26 0 - 45 U/L    ALT 31 0 - 70 U/L    Protein Total 8.5 6.8 - 8.8 g/dL    Albumin 3.9 3.4 - 5.0 g/dL    Bilirubin Total 0.8 0.2 - 1.3 mg/dL    GFR Estimate 64 >60 mL/min/1.73m2   Magnesium   Result Value Ref Range    Magnesium 2.0 1.6 - 2.3 mg/dL   CBC with platelets and differential   Result Value Ref Range    WBC Count 12.4 (H) 4.0 - 11.0 10e3/uL    RBC Count 5.54 4.40 - 5.90 10e6/uL    Hemoglobin 17.2 13.3 - 17.7 g/dL    Hematocrit 49.6 40.0 - 53.0 %    MCV 90 78 - 100 fL    MCH 31.0 26.5 - 33.0 pg    MCHC 34.7 31.5 - 36.5 g/dL    RDW 13.4 10.0 - 15.0 %    Platelet Count 252 150 - 450 10e3/uL    % Neutrophils 88 %    % Lymphocytes 8 %    % Monocytes 4 %    % Eosinophils 0 %    % Basophils 0 %    % Immature Granulocytes 0 %    NRBCs per 100 WBC 0 <1 /100    Absolute Neutrophils 10.7 (H) 1.6 - 8.3 10e3/uL    Absolute Lymphocytes 1.0 0.8 - 5.3 10e3/uL    Absolute Monocytes 0.5 0.0 - 1.3 10e3/uL    Absolute Eosinophils 0.1 0.0 - 0.7 10e3/uL    Absolute Basophils 0.0 0.0 - 0.2 10e3/uL    Absolute Immature Granulocytes 0.0 <=0.4 10e3/uL    Absolute NRBCs 0.0 10e3/uL   CT Head w/o Contrast   Result Value Ref Range    Radiologist flags Right PCA stroke  (Urgent)     Narrative    CT HEAD W/O CONTRAST 1/8/2022 7:09 PM    History: Code Stroke to evaluate for potential thrombolysis and  thrombectomy.  PLEASE READ IMMEDIATELY.     Comparison: None    Technique: Using multidetector thin collimation helical acquisition  technique, axial, coronal and sagittal CT images from the skull base  to the vertex were obtained without intravenous contrast.   (topogram) image(s) also obtained and reviewed.    Findings:     Loss of gray-white differentiation in the posterior right parietal and  occipital lobes in the right posterior cerebral artery distribution..  There is also nonspecific scattered periventricular white matter  hypodensity suggestive of additional chronic small vessel ischemic  disease. There is no intracranial hemorrhage. No significant mass  effect or midline shift. The ventricles are proportionate to the  cerebral sulci. The basal cisterns are clear    No acute calvarial or skull base fracture. Polypoid thickening of the  right maxillary and left maxillary sinuses. Mastoid air cells are  clear.      Impression    Impression:   1. Loss of gray-white differentiation in the right parieto-occipital  lobes of the posterior cerebral artery circulation. This is concerning  for acute to subacute infarct.  2. Scattered additional hypodensity in the periventricular white  matter suggests chronic small vessel ischemic disease.    [Urgent Result: Right PCA stroke]    Finding was identified on 1/8/2022 7:19 PM.     Dr. Ford was contacted by Dr. Durand at 1/8/2022 7:29 PM and  verbalized understanding of the urgent finding.      I have personally reviewed the examination and initial interpretation  and I agree with the findings.    KRYSTIAN CERRATO MD         SYSTEM ID:  [id number]   CTA Head Neck with Contrast   Result Value Ref Range    Radiologist flags Right PCA occlusion (Urgent)     Narrative    CTA  HEAD NECK WITH CONTRAST 1/8/2022 7:09 PM    CT angiogram of the  neck   CT angiogram of the base of the brain with contrast  Reconstruction on the 3D workstation    Provided History:  Code Stroke to evaluate for potential thrombolysis  and thrombectomy.  PLEASE READ IMMEDIATELY.    Comparison:  Same day CT.      Technique:  HEAD and NECK CTA: During rapid bolus intravenous injection of  nonionic contrast material, axial images were obtained using thin  collimation multidetector helical technique from the base of the upper  aortic arch through the [place] of [place]. This CT angiogram data was  reconstructed at thin intervals with mild overlap. Images were sent to  the 3D workstation, and 3D reconstructions were obtained. The axial  source images, multiplanar reformations, 3D reconstructions in both  maximum intensity projection display and volume rendered models were  reviewed, with reconstructions performed by the technologist.    Contrast: ISOVUE 370    Findings:    Head CTA demonstrates occlusion of the proximal (P1) segment of the  right PCA (series 6 image 251 through 255). There is a short segment  stenotic focus within the right M1/M2 junction with attenuated flow  distal to this focus. There are otherwise no focal stenosis,  occlusion, or aneurysm of the major intracranial arteries. The  anterior commuting artery is patent. The left posterior communicating  artery appears patent although small caliber. The right posterior  communicating artery is not seen. Left dominant vertebrobasilar  system.    Neck CTA demonstrates no stenosis of the major cervical arteries. The  origins of the great vessels from the aortic arch are patent. Normal  anatomic variant common trunk of the brachiocephalic and left common  carotid arteries. The normal distal right internal carotid artery  measures 5 mm. The normal distal left internal carotid artery measures  5 mm.     No mass within the visualized portions of the cervical soft tissues or  lung apices. Patulous esophagus.    Mild degenerative  changes of the cervical spine. No high-grade spinal  canal narrowing. Polypoid thickening of the maxillary sinuses.      Impression    Impression:    1. Occlusion of the proximal right PCA.  2. High-grade stenosis of the occiput M2 branch of the right MCA with  attenuated flow distal.  3. Neck CTA demonstrates no stenosis of the major cervical arteries.    [Urgent Result: Right PCA occlusion]    Finding was identified on 1/8/2022 7:29 PM.     Dr. Mendoza was contacted by Dr. Durand at 1/8/2022 7:31 PM and  verbalized understanding of the urgent finding.     I have personally reviewed the examination and initial interpretation  and I agree with the findings.    KRYSTIAN CERRATO MD         SYSTEM ID:  [id number]   EKG 12-lead, tracing only   Result Value Ref Range    Systolic Blood Pressure  mmHg    Diastolic Blood Pressure  mmHg    Ventricular Rate 106 BPM    Atrial Rate 106 BPM    KS Interval 168 ms    QRS Duration 114 ms     ms    QTc 494 ms    P Axis 39 degrees    R AXIS -32 degrees    T Axis 101 degrees    Interpretation ECG       Sinus tachycardia  Left axis deviation  T wave abnormality, consider lateral ischemia  Abnormal ECG     CT Head Perfusion w Contrast    Narrative    CT HEAD PERFUSION WITH CONTRAST 1/8/2022 7:11 PM    CT Brain Perfusion study with contrast    History:  Evaluate mismatch between penumbra and core infarct     Comparison: Same day CT and CTA     Technique:     CT BRAIN PERFUSION: Dynamic perfusion CT of the brain was performed at  multiple levels; perfusion was measured and imaged during rapid bolus  intravenous injection of nonionic iodinated contrast medium. Images  were post-processed, reconstructed, and reviewed.     Contrast: ISOVUE 370    Findings:     In the right parieto-occipital region corresponding to hypodensity on  same day CTA head:  CBF: 33cc  Tmax: 61 cc  Mismatch 28cc  Mismatch ratio 1.8      Impression    Impression:   The right parietal occipital PCA hypodense  region shows:  1. Mismatched volume of 28 cc  2. Mismatch ratio of 1.8.    I have personally reviewed the examination and initial interpretation  and I agree with the findings.    KRYSTIAN CERRATO MD         SYSTEM ID:  [id number]   XR Chest 2 Views    Narrative    Exam: XR CHEST 2 VW, 1/8/2022 8:04 PM    Indication: neuro    Comparison: None    Findings:     Frontal and lateral view of the chest. No focal airspace opacities. No  pneumothorax. No pleural effusion. No acute osseous abnormalities.  Mild interstitial prominence. Prominence of cardiomediastinal  silhouette.      Impression    Impression: Mild interstitial prominence which may be seen with  pulmonary edema. No focal airspace opacity.     I have personally reviewed the examination and initial interpretation  and I agree with the findings.    DON ROD MD         SYSTEM ID:  [id number]       Labs, vital signs, and imaging studies were reviewed by me.    Medications   labetalol (NORMODYNE/TRANDATE) injection 10 mg (has no administration in time range)   potassium chloride 10 mEq in 100 mL sterile water intermittent infusion (premix) (10 mEq Intravenous New Bag 1/8/22 2058)   0.9% sodium chloride BOLUS (1,000 mLs Intravenous New Bag 1/8/22 2045)     Followed by   sodium chloride 0.9% infusion ( Intravenous New Bag 1/8/22 2057)   iopamidol (ISOVUE-370) solution 115 mL (115 mLs Intravenous Given 1/8/22 1909)   sodium chloride (PF) 0.9% PF flush 100 mL (100 mLs Intravenous Given 1/8/22 1910)   hydrALAZINE (APRESOLINE) injection 10 mg (10 mg Intravenous Given 1/8/22 1931)   potassium chloride ER (KLOR-CON M) CR tablet 40 mEq (40 mEq Oral Given 1/8/22 1931)   aspirin (ASA) tablet 325 mg (325 mg Oral Given 1/8/22 1931)       Assessments & Plan (with Medical Decision Making)   Miriam Hall is a 46 year old male who presents with left-sided weakness and numbness.  Concern for CVA (ischemic versus hemorrhagic), stroke code activated on arrival.  However,  differential diagnosis would also include seizure, atypical migraine, hypertensive emergency, intracranial mass.  Labs, EKG, chest x-ray, head CT, CT angiogram of the head and neck were ordered to further evaluate the patient.    Patient is outside of the window for TPA.  This was discussed with stroke team on arrival.  However, they agree with plan for stat head CT and CTA    EKG shows lateral T wave inversions    Laboratory work-up is remarkable for elevated troponin at 147, elevated creatinine at 1.37, potassium 2.7, glucose 222, white blood cell count 12.4    Head CT reveals loss of gray-white differentiation in the right parieto-occipital lobe in the posterior cerebral artery circulation area, concerning for acute to subacute infarct as well as scattered additional hypodensities in the periventricular white matter consistent with chronic small vessel ischemic disease.  CT angiogram shows occlusion of the proximal right PCA as well as high-grade stenosis of the M2 branch of the right MCA with attenuated flow distally. This was discussed with radiology.  This was also discussed with stroke team, they report that given concern for subacute stroke based on head CT, patient is outside of the window for any sort of neuro intervention.  They recommend permissive hypertension with antihypertensives for any systolic blood pressures over 220.  Patient did require antihypertensive treatment with hydralazine.  They also recommend full dose aspirin be initiated.    I have reviewed the nursing notes.    I have reviewed the findings, diagnosis, plan and need for follow up with the patient.    Patient discussed with stroke neurology team, to be admitted to their service for further management. Plan was discussed with patient who understands and agrees with plan.     New Prescriptions    No medications on file       Final diagnoses:   Ischemic cerebrovascular accident (CVA) (H)   Acute ischemic right posterior cerebral artery  (PCA) stroke (H)     Payton Mendoza MD  1/8/2022   [facility] EMERGENCY DEPARTMENT     Pyaton Mendoza MD  01/08/22 2124

## 2022-01-09 NOTE — ED NOTES
"Pt found on floor of room when I came in to change his potassium. The pt said he tried to get out of bed to use the urinal and was not aware that his left side was so weak. I assisted pt back into bed and asked him if he had any pain. The pt said he had, \"Absolutely no pain or injuries\". No trauma was visible after I examined the pt from head to toe. I reminded him that his left side was almost completely without use and he promised he would not get out of the bed again.     The cardiac monitor had been pulled off of the pt but no one was aware of the pt condition. I replaced the monitor and pulse ox once the pt was in bed.   "

## 2022-01-09 NOTE — ED TRIAGE NOTES
Arrived by [facility] after waking form nap at 1400 with left sided deficits on upper and lower extremities. Pt A nd O x 3,  on arrival.

## 2022-01-09 NOTE — PROGRESS NOTES
01/09/22 1400   Quick Adds   Type of Visit Initial Occupational Therapy Evaluation   Living Environment   People in home alone   Transportation Anticipated car, drives self   Self-Care   Usual Activity Tolerance excellent   Current Activity Tolerance poor   Equipment Currently Used at Home none   Instrumental Activities of Daily Living (IADL)   Previous Responsibilities meal prep;housekeeping;laundry;shopping;medication management;finances;driving;work   Disability/Function   Hearing Difficulty or Deaf no   Wear Glasses or Blind no   Concentrating, Remembering or Making Decisions Difficulty no   Difficulty Communicating no   Difficulty Eating/Swallowing no   Walking or Climbing Stairs Difficulty no   Dressing/Bathing Difficulty no   Toileting issues no   Doing Errands Independently Difficulty (such as shopping) no   Fall history within last six months no   Change in Functional Status Since Onset of Current Illness/Injury yes   General Information   Referring Physician Gita Blackwood   Patient/Family Therapy Goal Statement (OT) get better, return to PLOF   Additional Occupational Profile Info/Pertinent History of Current Problem Miriam Hall is a 46 year old male with PMHx HTN who presented on 1/8/2022 with symptoms concerning for stroke. The pt reports that he was in his normal state of health this morning and then decided to take a nap around noon today. However, on waking from his nap around 2:00PM, he found that he was having trouble moving and feeling the L side of his body. Unfortunately, the pt was unable to get to a phone and ended up crawling on his floor until he could finally get the attention of one of his neighbors, who then was able to call EMS   Existing Precautions/Restrictions fall   General Observations and Info pt motivated in therapy, tearing up in session today.    Cognitive Status Examination   Orientation Status orientation to person, place and time   Affect/Mental Status (Cognitive) WFL    Follows Commands WFL   Cognitive Status Comments further cognitive testing required given CVA   Visual Perception   Visual Impairment/Limitations peripheral vision impaired left  (pt with L field cut from approx 10 degrees L of midline. )   Impact of Vision Impairment on Function (Vision) pt with R sided gaze.    Sensory   Sensory Comments pt with lack of tactile, pain and proprioceptive sensation in L UE   Range of Motion Comprehensive   General Range of Motion other (see comments)   Comment, General Range of Motion AROM in L UE severely deficient, PROM L UE WFL.    Strength Comprehensive (MMT)   General Manual Muscle Testing (MMT) Assessment other (see comments)   Comment, General Manual Muscle Testing (MMT) Assessment L biceps 2/5, finger flexors 2/5, trap 2/5. all other MM groups in L UE grossly 1/5 or less, no functional strength at this time in L UE, R UE WFL.    Coordination   Coordination Comments L UE gross and fine motor control NWFL, R UE WFL.    Bed Mobility   Comment (Bed Mobility) pt able to maintain upright sitting with HOB elevated, able to reposition self with minimal assist.    Activities of Daily Living   BADL Assessment feeding   Eating/Self Feeding   [place] Level (Feeding) minimum assist (75% patient effort)   Comment (Feeding) needing assist for container opening   Clinical Impression   Criteria for Skilled Therapeutic Interventions Met (OT) yes   OT Diagnosis decreased ADL I   Assessment of Occupational Performance 5 or more Performance Deficits   Identified Performance Deficits dressing, bathing, cooking, work, leisure, medication management, driving.    Planned Therapy Interventions (OT) ADL retraining;IADL retraining;cognition;fine motor coordination training;motor coordination training;neuromuscular re-education;prosthetic fitting/training;ROM;strengthening;stretching;transfer training;visual perception;home program guidelines;progressive activity/exercise;risk factor education    Clinical Decision Making Complexity (OT) low complexity   Therapy Frequency (OT) 6x/week   Predicted Duration of Therapy 2 weeks   Risk & Benefits of therapy have been explained evaluation/treatment results reviewed;care plan/treatment goals reviewed;risks/benefits reviewed;current/potential barriers reviewed;participants voiced agreement with care plan;participants included;patient   Comment-Clinical Impression Pt presents to OT with hemiplegia, hemiopsia, decreased motor control and decreased sensorimotor input all leading to decreased ADL I. pt to benefit form skilled OT intervention to address the above problem list.    OT Discharge Planning    OT Discharge Recommendation (DC Rec) Transitional Care Facility;Acute Rehab Center-Motivated patient will benefit from intensive, interdisciplinary therapy.  Anticipate will be able to tolerate 3 hours of therapy per day   OT Rationale for DC Rec pt will require intensive rehab to regain functional I.    OT Brief overview of current status  pt dependent LE dressing today.    Total Evaluation Time (Minutes)   Total Evaluation Time (Minutes) 5

## 2022-01-09 NOTE — PROGRESS NOTES
[facility]    Stroke Progress Note    Interval Events- Regular diet  - added lisinopril and statin    HPI Summary  Miriam Hall is a 46 year old male with PMHx HTN who presented on 1/8/2022 with symptoms concerning for stroke. The pt reports that he was in his normal state of health this morning and then decided to take a nap around noon today. However, on waking from his nap around 2:00PM, he found that he was having trouble moving and feeling the L side of his body. Unfortunately, the pt was unable to get to a phone and ended up crawling on his floor until he could finally get the attention of one of his neighbors, who then was able to call EMS     EMS arrived to find the pt continuing to have decreased sensation and weakness on his L side. They noted a , /145, and . On arrival in the ED, the pt remained afebrile, but was tachycardic () and quite hypertensive (/146). A stroke code was called for initial NIHSS 9, for a partial L gaze palsy, L-sided complete hemianopia, L arm > leg weakness, decreased sensation in the L arm/leg to sensation (pinprick intact in arm but decreased in leg), mild dysarthria, L-sided extinction, and possible L neglect. The pt also seemed to be having trouble with proprioception in the L arm, though did recognize it to be his own. Pt was immediately taken to CT where he underwent a CTH, CTA Head/Neck and CT Perfusion study, revealing a well-established R parieto-occipital stroke with an LVO of the R P1 segment. As the stroke already appeared well-established and the pt was outside the time window, no tPA was given. No thrombectomy was performed due to the location of the LVO in the PCA. The pt was loaded with ASA 324mg once with plans to allow for permissive HTN and admission to the Stroke service for further work-up     The patient denies any history of smoking or drug use. Only rarely uses EtOH. He  would prefer that no one be contact at this time concerning his diagnosis or medical condition.     TPA Treatment   Not given due to established infarct on imaging and unclear or unfavorable risk-benefit profile for extended window thrombolysis beyond the conventional 4.5 hour time window.     Endovascular Treatment  Proximal vessel occlusion present, but endovascular treatment not initiated due to location of the thrombus in the posterior cerebral artery    Stroke Evaluation Summarized    MRI/Head CT MRI brain  1. Findings from CT and CTA performed yesterday are confirmed with stroke in the distribution of the right posterior cerebral artery.  This involves the medial right occipital lobe and medial right temporal lobe with extension into the body of the thalamus.  2. Additional punctate foci of diffusion restriction in the left putamen.  3. Moderate leukoaraiosis.    Intracranial Vasculature MRA  Redemonstration of occlusion of the right PCA near its  origin. Additional short segment high-grade stenosis of a right-sided M2 MCA branch. Findings are not likely not significantly changed from CT performed yesterday.   Cervical Vasculature CTA  1. Occlusion of the proximal right PCA.  2. High-grade stenosis of the occiput M2 branch of the right MCA with  attenuated flow distal.  3. Neck CTA demonstrates no stenosis of the major cervical arteries.     Echocardiogram The visual ejection fraction is 50-55%. No regional wall motion abnormalities  are seen.  Moderate to severe concentric wall thickening consistent with left ventricular  hypertrophy is present.  Global right ventricular function is normal.  There was no shunt at the atrial septal level as assessed by agitated saline  bubble study at rest and with Valsalva maneuver.  Previous study not available for comparison.   EKG/Telemetry EKG with sinus tachycardia   Other Testing Hypercoagulability profile has not been sent yet     LDL  1/8/2022: 75 mg/dL   A1C   1/8/2022: 7.8 %   Troponin 1/8/2022: 467 ng/L ()       Impression     # Acute ischemic stroke of Right P1 segement of the PCA  # R P1 occlusion and R M2 Stenosis  # Large vessel atherosclerosis vs ESUS  Miriam Hall is a 46 year old male with PMHx HTN who presented on 1/8/2022 with L gaze palsy, L complete hemianopia, mild dysarthria, L arm > leg weakness, L arm/leg decreased sensation, and L side extinction after waking up from a nap earlier this morning. On arrival in the ED, was found to have a well-established large vessel occlusion of the R P1 segment of the PCA and R M2 high grade stenosis suggesting large vessel intracranial atherosclerotic disease. MRI with medial right occipital lobe and medial right temporal lobe with extension into the body of the thalamus. However, the remaining vessels are not suggestive of Large vessel intracranial athero. ECHO  Rest of his intracranial vessels were without disease. ECHO without low EF, atrial size not concerning for afib, no valvular pathology. He does have raised blood pressure however the location is cortical. Possible to have ESUS with possible B/L infarcts pending further work up.      - Neurochecks Q 4 hours  - Permissive HTN; labetalol PRN for SBP > 220  - Avoid hypotonic IV fluids  - s/p ASA 324mg daily  - Daily aspirin 81 mg for secondary stroke prevention  - Statin: atorvastatin 40 mg daily  - MRI Brain Stroke Protocol as above  - Telemetry, EKG  - Bedside Glucose Monitoring  - A1c 7.8, LDL 75, Troponin x 3  - PT/OT/SLP  - PM&R  - Stroke Education  - Depression Screen  - Apnea Screen  - Euthermia, Euglycemia  - On regular diet with thin liquids     #HTN  - Started on lisinopril with newly diagnosed DM  - Lisinopril 10 mg daily, may up titrate    # DM  Newly diagnosed DM with Hgb A1C 7.8.  - Continue to monitor glucose  - LSSI  - Hypoglycemia protocol     #Hypokalemia   Pt presented with K 2.7, today 2.6, Mg within normal limits, replaced 40 PO  -  Replacement prn     #Possible DISHA  Pt presented with Cr 1.37 improved with IVF to 1.17  - Daily BMP     #Elevated Troponin   Pt arrives with Trop 147 and rising. EKG exhibited sinus tachycardia, but no concerning signs of ischemia  - Trend troponin to peak     #Leukocytosis  Pt presented with WBC 12.4. Most likely is a stress response given lack of fever. Will monitor for now, but will consider an infectious work-up if pt fevers or leukocytosis fails to resolve  - Daily CBC      Prophylaxis            For VTE Prevention:  - pneumatic compression device     For Acid Suppression:  - GI prophylaxis is not indicated     Code Status  Full Code    The patient was discussed with Stroke Staff Dr. Barber Anaya MD  Neurology Resident  Pager:  [phone]  ______________________________________________________    Clinically Significant Risk Factors Present on Admission        # Hypokalemia: K = 2.6 mmol/L (Ref range: 3.4 - 5.3 mmol/L) on admission, will replace as needed            Medications   Scheduled Meds    aspirin  81 mg Oral Daily    Or     aspirin  81 mg Oral or NG Tube Daily     atorvastatin  40 mg Oral QPM     insulin aspart  1-4 Units Subcutaneous Q4H     lisinopril  10 mg Oral Daily     sodium chloride (PF)  3 mL Intracatheter Q8H       Infusion Meds    lactated ringers 100 mL/hr at 01/09/22 1449     - MEDICATION INSTRUCTIONS -       - MEDICATION INSTRUCTIONS -       sodium chloride Stopped (01/08/22 8432)       PRN Meds  acetaminophen, glucose **OR** dextrose **OR** glucagon, labetalol **OR** hydrALAZINE, lidocaine 4%, lidocaine (buffered or not buffered), - MEDICATION INSTRUCTIONS -, - MEDICATION INSTRUCTIONS -, polyethylene glycol, senna-docusate, sodium chloride (PF)       PHYSICAL EXAMINATION  Temp:  [98.8  F (37.1  C)] 98.8  F (37.1  C)  Pulse:  [] 106  Resp:  [12-27] 15  BP: (163-250)/() 176/94  SpO2:  [97 %-100 %] 98 %      Neurologic  Mental Status:  alert, oriented x 3, follows  commands, speech clear and fluent, naming and repetition normal  Cranial Nerves:  PERRL, Left complete hemianopia, Left gaze palsy but able to cross the midline, facial sensation intact and symmetric, facial movements symmetric, hearing not formally tested but intact to conversation, palate elevation symmetric and uvula midline, shoulder shrug strong bilaterally, tongue protrusion midline  Motor:  normal muscle tone and bulk, no abnormal movements, able to move all limbs spontaneously, RUE and RLE are 5/5 throughout. In the LUE, has 3+/5 strength with shoulder abduction but 5/5 with elbow flexion/extension and . In LLE has only very subtle drift with 5s leg raise and does not touch the bed  Reflexes: Deferred  Sensory:  Decreased sensation to light touch in LUE and LLE with extinction on bilateral testing. Decreased sensation to pinprick in the LLE but intact in the LUE. Pt also appeared to have proprioceptive loss in the LUE, having a difficult time moving his L hand when not within his line of sight  Coordination:  normal finger-to-nose and heel-to-shin bilaterally without dysmetria  Station/Gait:  deferred    Stroke Scales    NIHSS  Interval baseline (01/08/22 2017)   Interval Comments     1a. Level of Consciousness 0-->Alert, keenly responsive   1b. LOC Questions 0-->Answers both questions correctly   1c. LOC Commands 0-->Performs both tasks correctly   2.   Best Gaze 1-->Partial gaze palsy, gaze is abnormal in one or both eyes, but forced deviation or total gaze paresis is not present   3.   Visual 2-->Complete hemianopia   4.   Facial Palsy 0-->Normal symmetrical movements   5a. Motor Arm, Left 2-->Some effort against gravity, limb cannot get to or maintain (if cued) 90 (or 45) degrees, drifts down to bed, but has some effort against gravity   5b. Motor Arm, Right 0-->No drift, limb holds 90 (or 45) degrees for full 10 secs   6a. Motor Leg, Left 2-->Some effort against gravity, leg falls to bed by 5 secs,  but has some effort against gravity   6b. Motor Leg, right 0-->No drift, leg holds 30 degree position for full 5 secs   7.   Limb Ataxia 0-->Absent   8.   Sensory 1-->Mild-to-moderate sensory loss, patient feels pinprick is less sharp or is dull on the affected side, or there is a loss of superficial pain with pinprick, but patient is aware of being touched   9.   Best Language 0-->No aphasia, normal   10. Dysarthria 0-->Normal   11. Extinction and Inattention  1-->Visual, tactile, auditory, spatial, or personal inattention or extinction to bilateral simultaneous stimulation in one of the sensory modalities   Total 9 (01/09/22 1815)       Imaging  I personally reviewed all imaging; relevant findings per HPI.     Lab Results Data   CBC  Recent Labs   Lab 01/09/22 0742 01/08/22 1843   WBC 12.2* 12.4*   RBC 4.66 5.54   HGB 14.5 17.2   HCT 42.5 49.6    252     Basic Metabolic Panel    Recent Labs   Lab 01/09/22  1637 01/09/22  1259 01/09/22  0906 01/09/22  0742 01/09/22  0653 01/08/22  2328 01/08/22  1843 01/08/22  1835   0000   NA  --   --   --  143  --   --  141  --   --    POTASSIUM  --   --   --  2.6*  --  3.0* 2.7*  --   --    CHLORIDE  --   --   --  107  --   --  104  --   --    CO2  --   --   --  27  --   --  30  --   --    BUN  --   --   --  14  --   --  19  --   --    CR  --   --   --  1.17  --   --  1.37* 1.5*  --    * 149* 152* 146*   < >  --  222*  --    < >   VALERIE  --   --   --  9.0  --   --  9.8  --   --     < > = values in this interval not displayed.     Liver Panel  Recent Labs   Lab 01/08/22 1843   PROTTOTAL 8.5   ALBUMIN 3.9   BILITOTAL 0.8   ALKPHOS 94   AST 26   ALT 31     INR    Recent Labs   Lab Test 01/08/22 1843 01/08/22  1834   INR 0.95 1.0*      Lipid Profile    Recent Labs   Lab Test 01/08/22  1843 05/16/14  1506   CHOL 169 131   HDL 70 53   LDL 75 70   TRIG 118 38     A1C    Recent Labs   Lab Test 01/08/22  1843   A1C 7.8*     Troponin I  No results for input(s): TROPONIN,  GHTROP in the last 168 hours.

## 2022-01-09 NOTE — PROGRESS NOTES
Stroke Code Nurse-Responder Note    Arrival Time to Stroke Code: 1830    Stroke Code Team interventions:   - BP management, permissive hypertension, aspirin. Outside tPA window    ED/Bedside Nurse providing handoff: CHELSEA Meredith    Time left for CT: 1837    Time arrived to next location (ED/Unit/IR): 1911    ED/Bedside Nurse given handoff (name/time): CHELSEA Meredith RN      Stroke code de-escalated at 1958.

## 2022-01-09 NOTE — PHARMACY
Pharmacy Stroke Code Response  Pharmacist responded as part of the Stroke Code Team activation to patient care area ED.  The Stroke Team determined that the patient was not a candidate for IV tenecteplase therapy and the pharmacy team was dismissed at 1841.    Ryne Dockery, PharmD, BCIDP  Ascom: [phone]

## 2022-01-09 NOTE — ED NOTES
Bed: ED03  Expected date: 1/8/22  Expected time: 6:29 PM  Means of arrival: Ambulance  Comments:  Usman 441    45 y/o Male    STROKE ALERT  Last time known well  @ 1200  LEFT upper and lower deficits  /145    O2 sat 95  Glucose 190

## 2022-01-09 NOTE — PROGRESS NOTES
01/09/22 1045   General Information   Onset of Illness/Injury or Date of Surgery 01/08/22   Referring Physician Gita Laguna MD   Patient/Family Therapy Goal Statement (SLP) None stated   Pertinent History of Current Problem Pt is a 46 year old male with PMHx HTN who presented on 1/8/2022 with L gaze palsy, L complete hemianopia, mild dysarthria, L arm > leg weakness, L arm/leg decreased sensation, and L side extinction after waking up from a nap earlier this morning. On arrival in the ED, was found to have a well-established large vessel occlusion of the R P1 segment of the PCA, producing a R parieto-occipital stroke of as yet undetermined etiology as pt has no known Afib and there is no sign of atherosclerotic disease within his imaged vessels. In terms of stroke risk factors, pt only endorses HTN. Clinical swallow eval completed at 1045 on 1/9 per MD order.    General Observations Alert, very pleasant and agreeable   Past History of Dysphagia None per pt report   Type of Evaluation   Type of Evaluation Swallow Evaluation   Oral Motor   Oral Musculature anomalies present   Structural Abnormalities none present   Mucosal Quality good   Dentition (Oral Motor)   Dentition (Oral Motor) natural dentition   Facial Symmetry (Oral Motor)   Left Side Facial Asymmetry minimal impairment   Comment, Facial Symmetry (Oral Motor) sluggish movement of L face, reduced sensation on L   Lip Function (Oral Motor)   Lip Range of Motion (Oral Motor) retraction impairment  (sluggish L movement)   Lip Sensitivity (Oral Motor) left lower lip decreased   Tongue Function (Oral Motor)   Tongue ROM (Oral Motor) WNL   Jaw Function (Oral Motor)   Jaw Function (Oral Motor) WNL   Cough/Swallow/Gag Reflex (Oral Motor)   Comment, Cough/Swallow/Gag Reflex (Oral Motor) adequate   Vocal Quality/Secretion Management (Oral Motor)   Vocal Quality (Oral Motor) WNL   Comment, Vocal Quality/Secretion Management (Oral Motor) Mild dysarthria but speech  100% intelligible to unfamiliar listener   General Swallowing Observations   Current Diet/Method of Nutritional Intake (General Swallowing Observations, NIS) NPO   Respiratory Support (General Swallowing Observations) none   Swallowing Evaluation Clinical swallow evaluation   Clinical Swallow Evaluation   Feeding Assistance frequent cues/help required   Clinical Swallow Evaluation Textures Trialed thin liquids;pureed;solid foods   Clinical Swallow Eval: Thin Liquid Texture Trial   Mode of Presentation, Thin Liquids cup;straw;self-fed   Volume of Liquid or Food Presented 6oz thin water   Oral Phase of Swallow WFL  (no anterior loss)   Pharyngeal Phase of Swallow coughing/choking  (x1 on initial sip when head was tipped back)   Diagnostic Statement Oral phase functional without anterior loss. On initial sip, pt had head tipped back and he demonstrated coughing after sip of water. When head was neutral, no s/sx of aspiration for remainder of thin liquid trials   Clinical Swallow Evaluation: Puree Solid Texture Trial   Mode of Presentation, Puree spoon;self-fed;fed by clinician   Volume of Puree Presented 3oz pudding   Oral Phase, Puree   (residue on L lip)   Pharyngeal Phase, Puree intact   Diagnostic Statement Pt did not feel residue on L lip until cued, no overt s/sx of aspiration   Clinical Swallow Evaluation: Solid Food Texture Trial   Mode of Presentation self-fed   Volume Presented 1 cracker   Oral Phase effortful AP movement  (but functional)   Pharyngeal Phase intact   Diagnostic Statement Again, pt did not feel when there was oral residue on L lip, but no s/sx of aspiration when swallowing   Esophageal Phase of Swallow   Patient reports or presents with symptoms of esophageal dysphagia No   Swallowing Recommendations   Diet Consistency Recommendations regular diet;thin liquids (level 0)   Supervision Level for Intake distant supervision needed   Mode of Delivery Recommendations bolus size, small;food  moistened;slow rate of intake;place food on right side of mouth   Monitoring/Assistance Required (Eating/Swallowing) stop eating activities when fatigue is present;monitor for cough or change in vocal quality with intake   Recommended Feeding/Eating Techniques (Swallow Eval) maintain upright sitting position for eating   Medication Administration Recommendations, Swallowing (SLP) as tolerated   Instrumental Assessment Recommendations instrumental evaluation not recommended at this time   General Therapy Interventions   Planned Therapy Interventions Dysphagia Treatment   Dysphagia treatment Instruction of safe swallow strategies;Compensatory strategies for swallowing   SLP Therapy Assessment/Plan   Criteria for Skilled Therapeutic Interventions Met (SLP Eval) yes;treatment indicated   SLP Diagnosis Mild oral dysphagia and subjective mild dysarthria s/p stroke   Rehab Potential (SLP Eval) good, to achieve stated therapy goals   Therapy Frequency (SLP Eval) 5 times/wk   Predicted Duration of Therapy Intervention (SLP Eval) 1 week   Comment, Therapy Assessment/Plan (SLP)   Clinical swallow eval completed per MD order. Pt presents with mild oral dysphagia s/p CVA. Oral mech exam remarkable for sluggish L facial movement on command, reduced sensation to L side of face, and mild dysarthria. Assessed with thin liquids, puree, and higher texture solids. Oral phase notable for residue on L lip with solids which pt required cues to clear. Pharyngeal phase characterized by coughing on initial sip of thin liquids when head was tipped back, but no further s/sx of aspiration when head was in neutral position. Recommend regular diet/thin liquids with intermittent supervision. Ensure the pt is upright for all PO. SLP will follow for diet tolerance and dysarthria eval/tx as necessary.     Therapy Plan Review/Discharge Plan (SLP)   Therapy Plan Review (SLP) evaluation/treatment results reviewed;care plan/treatment goals  reviewed;risks/benefits reviewed;current/potential barriers reviewed;participants voiced agreement with care plan;participants included;patient   Demonstrates Need for Referral to Another Service (SLP) clinical nutrition services/dietitian;occupational therapist;physical therapist   SLP Discharge Planning    SLP Discharge Recommendation (DC Rec) [facility] patient will benefit from intensive, interdisciplinary therapy.  Anticipate will be able to tolerate 3 hours of therapy per day;home with home care speech therapy   SLP Rationale for DC Rec Dysphagia, dysarthria   SLP Brief overview of current status  Recommend regular diet/thin liquids with intermittent supervision. Ensure the pt is upright for all PO. SLP will follow for diet tolerance and dysarthria eval/tx as necessary.    Total Evaluation Time   Total Evaluation Time (Minutes) 23

## 2022-01-09 NOTE — H&P
[facility]    Stroke Admission Note    Chief Complaint: Left Hemibody Weakness      HPI  Miriam Hall is a 46 year old male with PMHx HTN who presented on 1/8/2022 with symptoms concerning for stroke. The pt reports that he was in his normal state of health this morning and then decided to take a nap around noon today. However, on waking from his nap around 2:00PM, he found that he was having trouble moving and feeling the L side of his body. Unfortunately, the pt was unable to get to a phone and ended up crawling on his floor until he could finally get the attention of one of his neighbors, who then was able to call EMS    EMS arrived to find the pt continuing to have decreased sensation and weakness on his L side. They noted a , /145, and . On arrival in the ED, the pt remained afebrile, but was tachycardic () and quite hypertensive (/146). A stroke code was called for initial NIHSS 9, for a partial L gaze palsy, L-sided complete hemianopia, L arm > leg weakness, decreased sensation in the L arm/leg to sensation (pinprick intact in arm but decreased in leg), mild dysarthria, L-sided extinction, and possible L neglect. The pt also seemed to be having trouble with proprioception in the L arm, though did recognize it to be his own. Pt was immediately taken to CT where he underwent a CTH, CTA Head/Neck and CT Perfusion study, revealing a well-established R parieto-occipital stroke with an LVO of the R P1 segment. As the stroke already appeared well-established and the pt was outside the time window, no tPA was given. No thrombectomy was performed due to the location of the LVO in the PCA. The pt was loaded with ASA 324mg once with plans to allow for permissive HTN and admission to the Stroke service for further work-up    The patient denies any history of smoking or drug use. Only rarely uses EtOH. He would prefer that no one be  contact at this time concerning his diagnosis or medical condition.    TPA Treatment   Not given due to established infarct on imaging and unclear or unfavorable risk-benefit profile for extended window thrombolysis beyond the conventional 4.5 hour time window.    Endovascular Treatment  Proximal vessel occlusion present, but endovascular treatment not initiated due to location of the thrombus in the posterior cerebral artery    Impression   Acute ischemic stroke of Right P1 segement of the PCA due to undetermined etiology   Miriam Hall is a 46 year old male with PMHx HTN who presented on 1/8/2022 with L gaze palsy, L complete hemianopia, mild dysarthria, L arm > leg weakness, L arm/leg decreased sensation, and L side extinction after waking up from a nap earlier this morning. On arrival in the ED, was found to have a well-established large vessel occlusion of the R P1 segment of the PCA, producing a R parieto-occipital stroke of as yet undetermined etiology as pt has no known Afib and there is no sign of atherosclerotic disease within his imaged vessels. In terms of stroke risk factors, pt only endorses HTN.    #Right PCA Ischemic Stroke of undetermined etiology  - Admit to Neurology  - Neurochecks Q 4 hours  - Permissive HTN; labetalol PRN for SBP > 220  - Avoid hypotonic IV fluids  - s/p ASA 324mg once  - Daily aspirin 81 mg for secondary stroke prevention  - Statin: to be determined pending lipid panel  - MRI Brain Stroke Protocol  - Telemetry, EKG  - Bedside Glucose Monitoring  - A1c, Lipid Panel, Troponin x 3  - PT/OT/SLP  - PM&R  - Stroke Education  - Depression Screen  - Apnea Screen  - Euthermia, Euglycemia    #HTN  - holding PTA Lisinopril-hydrochlorothiazide (20-25mg) 1 tablet daily in setting of permissive HTN    #Hypokalemia   Pt presented with K 2.7  - Replacement prn     #Possible DISHA  Pt presented with Cr 1.37. Has limited available data, but Cr from 2014 ranges from 1.00 - 1.26. Pt may  therefore be in DISHA, but also possible his kidney function has worsened over time  - LR 100ml/hr  - Daily BNP    #Hyperglycemia  Pt arrives with Glucose 222, but has no hx of DM2  - HgbA1c  - LDSSI  - Hypoglycemia protocol    #Elevated Troponin   Pt arrives with Trop 147. EKG exhibited sinus tachycardia, but no concerning signs of ischemia  - Trend troponin to peak    #Leukocytosis  Pt presented with WBC 12.4. Most likely is a stress response given lack of fever. Will monitor for now, but will consider an infectious work-up if pt fevers or leukocytosis fails to resolve  - Daily CBC      Prophylaxis            For VTE Prevention:  - pneumatic compression device    For Acid Suppression:  - GI prophylaxis is not indicated    Code Status  Full Code    During initial physical assessment, the plan of care was discussed and developed with patient.  Plan of care includes: admission to the hospital for further stroke work-up and management.    Patient was admitted via [facility] ED ([place])    The patient will be admitted to the Stroke Neurology Service    The patient was discussed with the Stroke Staff is Dr. Blandon.    Gita Laguna MD  Neurology Resident  ___________________________________________________    Nutrition: NPO pending bedside swallow assessment  Orders Placed This Encounter      NPO for Medical/Clinical Reasons Except for: No Exceptions    Clinically Significant Risk Factors Present on Admission        # Hypokalemia: K = 2.7 mmol/L (Ref range: 3.4 - 5.3 mmol/L) on admission, will replace as needed           Past Medical History   Past Medical History:   Diagnosis Date     Hypertension      Past Surgical History   Past Surgical History:   Procedure Laterality Date     TONSILLECTOMY       Medications   Home Meds  Prior to Admission medications    Medication Sig Start Date End Date Taking? Authorizing Provider   lisinopril-hydrochlorothiazide (PRINZIDE,ZESTORETIC) 20-25 MG per tablet Take 1  tablet by mouth daily 5/21/14  Yes Duong Vigil MD       Scheduled Meds    sodium chloride 0.9%  1,000 mL Intravenous Once     potassium chloride  10 mEq Intravenous Q1H       Infusion Meds    sodium chloride 125 mL/hr at 01/08/22 2057       PRN Meds  labetalol    Allergies   Allergies   Allergen Reactions     Pcn [Penicillin G] Hives and Itching     Family History   Family History   Problem Relation Age of Onset     Breast Cancer Mother      Diabetes Mother      Lipids Mother      Hypertension Mother      Hypertension Father      Heart Disease Father      Respiratory Father      Heart Disease Maternal Grandfather         CHF     Social History   Social History     Tobacco Use     Smoking status: Never Smoker     Smokeless tobacco: Never Used   Substance Use Topics     Alcohol use: Yes     Alcohol/week: 1.7 standard drinks     Types: 2 Cans of beer per week     Comment: 1 weeklyt     Drug use: No       Review of Systems   The 10 point Review of Systems is negative other than noted in the HPI        PHYSICAL EXAMINATION  Temp:  [98.8  F (37.1  C)] 98.8  F (37.1  C)  Pulse:  [100-118] 100  Resp:  [13-27] 13  BP: (163-250)/(101-152) 163/101  SpO2:  [97 %-100 %] 98 %    General:  patient lying in bed without any acute distress    HEENT:  normocephalic/atraumatic  Cardio:  tachycardic , regular rhythm  Pulmonary:  no respiratory distress  Abdomen:  soft, non-distended  Extremities:  no edema  Skin:  intact, warm/dry     Neurologic  Mental Status:  alert, oriented x 3, follows commands, speech clear and fluent with only very subtle and occasional dysarthria (did not hear at first, but did on re-examination though possibly due to very dry mouth and possibly pt attempting to hold back tears), naming and repetition normal  Cranial Nerves:  PERRL, Left complete hemianopia, Left gaze palsy but able to cross the midline, facial sensation intact and symmetric, facial movements symmetric, hearing not formally tested but  intact to conversation, palate elevation symmetric and uvula midline, shoulder shrug strong bilaterally, tongue protrusion midline  Motor:  normal muscle tone and bulk, no abnormal movements, able to move all limbs spontaneously, RUE and RLE are 5/5 throughout. In the LUE, has 3+/5 strength with shoulder abduction but 5/5 with elbow flexion/extension and . In LLE has only very subtle drift with 5s leg raise and does not touch the bed  Reflexes: Deferred  Sensory:  Decreased sensation to light touch in LUE and LLE with extinction on bilateral testing. Decreased sensation to pinprick in the LLE but intact in the LUE. Pt also appeared to have proprioceptive loss in the LUE, having a difficult time moving his L hand when not within his line of sight  Coordination:  normal finger-to-nose and heel-to-shin bilaterally without dysmetria  Station/Gait:  deferred    Dysphagia Screen  Dysarthria or facial droop present - Maintain NPO, consult SLP    Modified Mary Score (Pre-morbid)  0-No deficits    Stroke Scales    NIHSS  Interval baseline (01/08/22 2017)   Interval Comments     1a. Level of Consciousness 0-->Alert, keenly responsive   1b. LOC Questions 0-->Answers both questions correctly   1c. LOC Commands 0-->Performs both tasks correctly   2.   Best Gaze 1-->Partial gaze palsy, gaze is abnormal in one or both eyes, but forced deviation or total gaze paresis is not present   3.   Visual 2-->Complete hemianopia   4.   Facial Palsy 0-->Normal symmetrical movements   5a. Motor Arm, Left 2-->Some effort against gravity, limb cannot get to or maintain (if cued) 90 (or 45) degrees, drifts down to bed, but has some effort against gravity   5b. Motor Arm, Right 0-->No drift, limb holds 90 (or 45) degrees for full 10 secs   6a. Motor Leg, Left 1-->Drift, leg falls by the end of the 5-sec period but does not hit bed   6b. Motor Leg, right 0-->No drift, leg holds 30 degree position for full 5 secs   7.   Limb Ataxia 0-->Absent    8.   Sensory 1-->Mild-to-moderate sensory loss, patient feels pinprick is less sharp or is dull on the affected side, or there is a loss of superficial pain with pinprick, but patient is aware of being touched   9.   Best Language 0-->No aphasia, normal   10. Dysarthria 1-->Mild-to-moderate dysarthria, patient slurs at least some words and, at worst, can be understood with some difficulty   11. Extinction and Inattention  1-->Visual, tactile, auditory, spatial, or personal inattention or extinction to bilateral simultaneous stimulation in one of the sensory modalities   Total 9 (01/08/22 2017)       Imaging  I personally reviewed all imaging; relevant findings per the HPI.    Lab Results Data   CBC  Recent Labs   Lab 01/08/22  1843   WBC 12.4*   RBC 5.54   HGB 17.2   HCT 49.6        Basic Metabolic Panel   Recent Labs   Lab 01/08/22  1843 01/08/22  1835     --    POTASSIUM 2.7*  --    CHLORIDE 104  --    CO2 30  --    BUN 19  --    CR 1.37* 1.5*   *  --    VALERIE 9.8  --      Liver Panel  Recent Labs   Lab 01/08/22  1843   PROTTOTAL 8.5   ALBUMIN 3.9   BILITOTAL 0.8   ALKPHOS 94   AST 26   ALT 31     INR    Recent Labs   Lab Test 01/08/22  1843 01/08/22  1834   INR 0.95 1.0*      Lipid Profile    Recent Labs   Lab Test 05/16/14  1506   CHOL 131   HDL 53   LDL 70   TRIG 38     A1C  No lab results found.  Troponin I  No results for input(s): TROPONIN, GHTROP in the last 168 hours.       Stroke Code / Stroke Consult Data Data    Stroke code activated 01/08/22   1827   First stroke provider response 01/08/22   1832   Last known normal 01/08/22   1200   Time of discovery   (or onset of symptoms) 01/08/22   1400   Head CT read by Stroke Neuro Dr/Provider 01/08/22   1917   Was stroke code de-escalated? Yes 01/08/22    patient is outside emergent treatment time parameters

## 2022-01-10 ENCOUNTER — APPOINTMENT (OUTPATIENT)
Dept: SPEECH THERAPY | Facility: CLINIC | Age: 47
End: 2022-01-10
Payer: COMMERCIAL

## 2022-01-10 ENCOUNTER — APPOINTMENT (OUTPATIENT)
Dept: PHYSICAL THERAPY | Facility: CLINIC | Age: 47
End: 2022-01-10
Attending: STUDENT IN AN ORGANIZED HEALTH CARE EDUCATION/TRAINING PROGRAM
Payer: COMMERCIAL

## 2022-01-10 ENCOUNTER — APPOINTMENT (OUTPATIENT)
Dept: EDUCATION SERVICES | Facility: CLINIC | Age: 47
End: 2022-01-10
Attending: STUDENT IN AN ORGANIZED HEALTH CARE EDUCATION/TRAINING PROGRAM
Payer: COMMERCIAL

## 2022-01-10 LAB
ANION GAP SERPL CALCULATED.3IONS-SCNC: 13 MMOL/L (ref 3–14)
BUN SERPL-MCNC: 16 MG/DL (ref 7–30)
CALCIUM SERPL-MCNC: 9.2 MG/DL (ref 8.5–10.1)
CHLORIDE BLD-SCNC: 107 MMOL/L (ref 94–109)
CO2 SERPL-SCNC: 24 MMOL/L (ref 20–32)
CREAT SERPL-MCNC: 1.28 MG/DL (ref 0.66–1.25)
ERYTHROCYTE [DISTWIDTH] IN BLOOD BY AUTOMATED COUNT: 14.2 % (ref 10–15)
GFR SERPL CREATININE-BSD FRML MDRD: 69 ML/MIN/1.73M2
GLUCOSE BLD-MCNC: 142 MG/DL (ref 70–99)
GLUCOSE BLDC GLUCOMTR-MCNC: 132 MG/DL (ref 70–99)
GLUCOSE BLDC GLUCOMTR-MCNC: 132 MG/DL (ref 70–99)
GLUCOSE BLDC GLUCOMTR-MCNC: 159 MG/DL (ref 70–99)
GLUCOSE BLDC GLUCOMTR-MCNC: 171 MG/DL (ref 70–99)
GLUCOSE BLDC GLUCOMTR-MCNC: 177 MG/DL (ref 70–99)
HCT VFR BLD AUTO: 46.3 % (ref 40–53)
HGB BLD-MCNC: 15.6 G/DL (ref 13.3–17.7)
LACTATE SERPL-SCNC: 1 MMOL/L (ref 0.7–2)
MCH RBC QN AUTO: 31.3 PG (ref 26.5–33)
MCHC RBC AUTO-ENTMCNC: 33.7 G/DL (ref 31.5–36.5)
MCV RBC AUTO: 93 FL (ref 78–100)
PLATELET # BLD AUTO: 243 10E3/UL (ref 150–450)
POTASSIUM BLD-SCNC: 2.8 MMOL/L (ref 3.4–5.3)
POTASSIUM BLD-SCNC: 3.2 MMOL/L (ref 3.4–5.3)
RBC # BLD AUTO: 4.99 10E6/UL (ref 4.4–5.9)
SODIUM SERPL-SCNC: 144 MMOL/L (ref 133–144)
TROPONIN I SERPL HS-MCNC: 2115 NG/L
TROPONIN I SERPL HS-MCNC: 2158 NG/L
WBC # BLD AUTO: 12.1 10E3/UL (ref 4–11)

## 2022-01-10 PROCEDURE — 92526 ORAL FUNCTION THERAPY: CPT | Mod: GN

## 2022-01-10 PROCEDURE — 97530 THERAPEUTIC ACTIVITIES: CPT | Mod: GP

## 2022-01-10 PROCEDURE — 97161 PT EVAL LOW COMPLEX 20 MIN: CPT | Mod: GP

## 2022-01-10 PROCEDURE — 80048 BASIC METABOLIC PNL TOTAL CA: CPT | Performed by: STUDENT IN AN ORGANIZED HEALTH CARE EDUCATION/TRAINING PROGRAM

## 2022-01-10 PROCEDURE — 250N000013 HC RX MED GY IP 250 OP 250 PS 637

## 2022-01-10 PROCEDURE — 83605 ASSAY OF LACTIC ACID: CPT | Performed by: PSYCHIATRY & NEUROLOGY

## 2022-01-10 PROCEDURE — 36415 COLL VENOUS BLD VENIPUNCTURE: CPT | Performed by: STUDENT IN AN ORGANIZED HEALTH CARE EDUCATION/TRAINING PROGRAM

## 2022-01-10 PROCEDURE — 84132 ASSAY OF SERUM POTASSIUM: CPT | Performed by: PSYCHIATRY & NEUROLOGY

## 2022-01-10 PROCEDURE — 99223 1ST HOSP IP/OBS HIGH 75: CPT | Mod: GC | Performed by: PHYSICAL MEDICINE & REHABILITATION

## 2022-01-10 PROCEDURE — 99207 PR NO CHARGE LOS: CPT | Performed by: INTERNAL MEDICINE

## 2022-01-10 PROCEDURE — 250N000013 HC RX MED GY IP 250 OP 250 PS 637: Performed by: STUDENT IN AN ORGANIZED HEALTH CARE EDUCATION/TRAINING PROGRAM

## 2022-01-10 PROCEDURE — 120N000002 HC R&B MED SURG/OB UMMC

## 2022-01-10 PROCEDURE — 250N000013 HC RX MED GY IP 250 OP 250 PS 637: Performed by: PSYCHIATRY & NEUROLOGY

## 2022-01-10 PROCEDURE — 999N000127 HC STATISTIC PERIPHERAL IV START W US GUIDANCE

## 2022-01-10 PROCEDURE — 85027 COMPLETE CBC AUTOMATED: CPT | Performed by: STUDENT IN AN ORGANIZED HEALTH CARE EDUCATION/TRAINING PROGRAM

## 2022-01-10 PROCEDURE — 84484 ASSAY OF TROPONIN QUANT: CPT | Performed by: STUDENT IN AN ORGANIZED HEALTH CARE EDUCATION/TRAINING PROGRAM

## 2022-01-10 PROCEDURE — 36415 COLL VENOUS BLD VENIPUNCTURE: CPT | Performed by: PSYCHIATRY & NEUROLOGY

## 2022-01-10 PROCEDURE — 99232 SBSQ HOSP IP/OBS MODERATE 35: CPT | Mod: GC | Performed by: INTERNAL MEDICINE

## 2022-01-10 PROCEDURE — 258N000003 HC RX IP 258 OP 636: Performed by: STUDENT IN AN ORGANIZED HEALTH CARE EDUCATION/TRAINING PROGRAM

## 2022-01-10 PROCEDURE — 99232 SBSQ HOSP IP/OBS MODERATE 35: CPT | Mod: GC | Performed by: PSYCHIATRY & NEUROLOGY

## 2022-01-10 RX ORDER — POTASSIUM CHLORIDE 1.5 G/1.58G
40 POWDER, FOR SOLUTION ORAL ONCE
Status: COMPLETED | OUTPATIENT
Start: 2022-01-10 | End: 2022-01-10

## 2022-01-10 RX ORDER — ASPIRIN 81 MG/1
81 TABLET ORAL EVERY 6 HOURS PRN
Status: ON HOLD | COMMUNITY
End: 2022-01-11

## 2022-01-10 RX ADMIN — ACETAMINOPHEN 650 MG: 325 TABLET, FILM COATED ORAL at 08:08

## 2022-01-10 RX ADMIN — LISINOPRIL 10 MG: 5 TABLET ORAL at 08:09

## 2022-01-10 RX ADMIN — SODIUM CHLORIDE: 9 INJECTION, SOLUTION INTRAVENOUS at 15:24

## 2022-01-10 RX ADMIN — ASPIRIN 81 MG CHEWABLE TABLET 81 MG: 81 TABLET CHEWABLE at 08:04

## 2022-01-10 RX ADMIN — POTASSIUM CHLORIDE 40 MEQ: 1.5 POWDER, FOR SOLUTION ORAL at 08:25

## 2022-01-10 RX ADMIN — ATORVASTATIN CALCIUM 40 MG: 40 TABLET, FILM COATED ORAL at 20:38

## 2022-01-10 ASSESSMENT — ACTIVITIES OF DAILY LIVING (ADL)
ADLS_ACUITY_SCORE: 3
ADLS_ACUITY_SCORE: 19
ADLS_ACUITY_SCORE: 19
ADLS_ACUITY_SCORE: 3
ADLS_ACUITY_SCORE: 19
ADLS_ACUITY_SCORE: 3
ADLS_ACUITY_SCORE: 19
ADLS_ACUITY_SCORE: 19
ADLS_ACUITY_SCORE: 3
ADLS_ACUITY_SCORE: 19
ADLS_ACUITY_SCORE: 3
ADLS_ACUITY_SCORE: 3
ADLS_ACUITY_SCORE: 19
ADLS_ACUITY_SCORE: 3
DEPENDENT_IADLS:: INDEPENDENT
ADLS_ACUITY_SCORE: 19

## 2022-01-10 NOTE — PLAN OF CARE
Status: Pt admitted 01/08 with L sided weakness and L homonymous hemiapnopsia, found to have R PCA infarct   Vitals: Permissive hypertension, tachycardia intermittent, OVSS on RA. On CCM  Neuros: A&O x4. Left complete hemianopsia, Left gaze palsy, Left neglect, slight Left facial droop. Strength R side 5/5, L side 2/5, decreased sensation to L side  IV: R PIV infusing with NS at 125mL/hr  Labs/Electrolytes: Troponin values were high in ED. Blood glucose checks  Resp/trach: LS clear  Diet: Mod consistent Carb diet. NPO at midnight  Bowel status: Last BM 01/09 per pt  : Voiding adequately via urinal, incontinence once upon arrival to unit  Skin: Left hip abrasion and bruise. Left shoulder bruise and abrasion. Right elbow abrasion. Right arm with bruise at previous IV site.  Pain: Denies pain  Activity: A2 & lift. 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 Cont with current POC      Arrived from:  ED  Belongings/meds:  Key and shirt only. No cellphone per ED RN  2 RN Skin Assessment Completed by:  CHELSEA Saleem, and CHELSEA Neri  Non-intact findings documented (yes/no/NA): Left hip abrasion and bruise. Left shoulder bruise and abrasion. Right elbow abrasion. Right arm with bruise at previous IV site

## 2022-01-10 NOTE — CONSULTS
[facility]   PM&R Inpatient Consultation  _______________________________________________________  Patient: Miriam Hall   : 1975   MRN: [id number]   _______________________________________________________    DATE OF ENCOUNTER:  1/10/2022   REFERRING PROVIDER:  Gita Laguna MD    REASON FOR CONSULT:  Evaluate rehabilitation needs and appropriateness for acute rehabilitation.    _________________________________________________    ASSESSMENT:  Miriam Hall is a 46 year old man with a PMH of hypertension who sustained a right parieto-occipital stroke on 22 currently undergoing workup.  He has left hemiparesis, dysarthria, left gaze palsy, left hemianopsia, and left neglect resulting in severe debility.  He is cognitively intact and participatory in therapies, but his limitations to rehabilitation admission include anticipated functional trajectory, limited support and safety of home environment, and workup in progress.    RECOMMENDATIONS:    # Rehabilitation Recommendations:   - Continue to monitor early participation and evolution of function in rehabilitation  - If discharge is imminent, he would need at least TCU level care  - He will likely need 24/7 supervision with eventual home discharge    We will continue to follow along with you to provide further recommendations.    Patient was staffed with Dr. Emerson.    HISTORY OF PRESENT ILLNESS:    Miriam Hall is a 46 year old male with a PMH of hypertension who presented on 22 with left sided weakness and numbness concerning for a stroke.    Last known well was before taking a 2 hour nap prior to symptom onset, though he wasn't able to contact EMS until a few hours later.  Initial exam was notable for /145 and .  Stroke code noted NIHSS 9, left gaze palsy, left hemianopia, dysarthria, possible left neglect, and L hemiparesis and decreased sensation.  CT showed a right parieto-occipital  stroke.  tPA was not given as he was outside of the time window.  Thrombectomy was not performed due to the position of the thromus in the posterior cerebral artery.  LVEF showed 50-55%, with no valvular pathology.  He was found to have newly diagnosed DM with Hgb A1C of 7.8.    Currently, he is still hypertensive.  He is aware of his visual field deficits to the left and that he tends to lean to his right side.  His left sided weakness and numbness have not changed.      # Current Function:   - PT:  Min assist standing, min assist transfers, min assist balance.  Recommend ARU.  - OT:  Mental status within functional limits.  Left field cut, right sided gaze.  Eats with min assist.  Recommends ARU / TCU.  - SLP:  Mild dysarthria, mild oral dysphagia.  Recommend regular diet / thin lquids with supervision.  Recommend ARU.    # Prior Functional History:  - Activities of daily living:  Previously independent  - Cognition:    Previously independent  - Mobility:    Previously independent  - Assistive devices:  None    Social History:  - Abode:  Lives alone in a second story apartment, stairs, no elevator  - Family support:  He has a 70 year old mother who lives nearby, but he doesn't know her living situation  - Vocational History: Working full time, walks to work  _________________________________________________  Past Medical History:  Past Medical History:   Diagnosis Date     Hypertension      Family History:  Family History   Problem Relation Age of Onset     Breast Cancer Mother      Diabetes Mother      Lipids Mother      Hypertension Mother      Hypertension Father      Heart Disease Father      Respiratory Father      Heart Disease Maternal Grandfather         CHF      Medications:  Current Outpatient Medications   Medication Instructions     aspirin 81 mg, Oral, EVERY 6 HOURS PRN     lisinopril-hydrochlorothiazide (PRINZIDE,ZESTORETIC) 20-25 MG per tablet 1 tablet, Oral, DAILY      Allergies:  Allergies  "  Allergen Reactions     Pcn [Penicillin G] Hives and Itching     Review of Systems:  Negative 10 point review of systems unless noted in the HPI.  _________________________________________________    OBJECTIVE:    Physical Exam:  BP (!) 211/107   Pulse 98   Temp 98.8  F (37.1  C)   Resp 18   Wt 81.1 kg (178 lb 12.7 oz)   SpO2 97%   BMI 27.39 kg/m    Estimated body mass index is 27.39 kg/m  as calculated from the following:    Height as of 5/13/14: 1.721 m (5' 7.75\").    Weight as of this encounter: 81.1 kg (178 lb 12.7 oz).  GEN:  Sitting upright in bed, leaning to the right  HEENT: NCAT, moist mucous membranes  CV:  Warm and well perfused extremities  PULM:  Breathing comfortably on room air  ABD:  Soft, nontender  EXT:  No edema  SKIN:  No bruising  PSYCH: Well related, normal affect, full thought content, good insight  NEURO/MSK: Eyes midline, pupils equal and reactive, bilateral left visual gaze palsy with complete left hemianopsia, facial sensation symmetric, midline palate, midline tongue.  5/5 right upper and lower extremity strength.  0/5 left shoulder flexion, 1/5 elbow flexion, 1/5 elbow extension, 1/5 wrist extension, 2/5 finger flexion.  Left lower extremity 0/5.  No hyperreflexia.  _________________________________________________  Pertinent Labs/Imaging:  MRI Brain 01/09/22  Impression:  1. Findings from CT and CTA performed yesterday are confirmed with stroke in the distribution of the right posterior cerebral artery. This involves the medial right occipital lobe and medial right temporal lobe with extension into the body of the thalamus.  2. Additional punctate foci of diffusion restriction in the left putamen.  3. Moderate leukoaraiosis.     MRA Brain 01/09/22  Impression:  Redemonstration of occlusion of the right PCA near its origin. Additional short segment high-grade stenosis of a right-sided M2 MCA branch. Findings are not likely not significantly changed from CT performed " yesterday.    --------------------------------------------------------------------------------------  Duong Sims MD, 1/10/2022  PM&R NETTIE [facility] 3  --------------------------------------------------------------------------------------

## 2022-01-10 NOTE — CONSULTS
[facility]    Cardiology Consult Note-Cards 1      Date of Admission:  1/8/2022  Consult Requested by: Neurology     Reason for Consult: elevated troponin    Assessment & Plan: HVSL   Miriam Hall is a 46 year old male admitted on 1/8/2022. He has HTN and is currently admitted for right posterior cerebral artery ischemic CVA. Cardiology consulted for rising troponin. TTE on admission showed moderate to severe LVH, low normal LV systolic function (LVEF 50-55%) with no wall motion abnormalities. SBP has been in 180-210 with MAP in 120-170 mmHg range. Rising troponin likely represents myocardial injury/demand ischemia due to hypertension and physiologic stress of CVA on top of significant LVH (greater myocardial mass).    #Hypertensive Urgency  #Demand mediated myocardial injury  #Left ventricular hypertrophy, moderate-severe    Recommendations:   - considered maintaining SBP < 180 mmHg when able, understanding that permissive HTN is part of   - trend troponin to peak  - no role for invasive cardiovascular testing at this time  - consider anatomic testing with coronary CTA +/- FFR vs functional testing with stress echo/MRI upon discharge  - to be staffed by attending in morning     The patient's care was discussed with the Primary team.    Brooks Beebe MD, MSc  Cardiovascular Disease Fellow  [facility]  Pager: [phone]      ______________________________________________________________________    Chief Complaint   weakness    History is obtained from the patient and electronic health record    History of Present Illness   Miriam Hall is a 46 year old male with HTN who presented on 1/8/2022 with symptoms concerning for stroke. The pt reports that he was in his normal state of health this morning and then decided to take a nap around noon today. However, on waking from his nap around 2:00PM, he found that he was having trouble moving  and feeling the L side of his body. Unfortunately, the pt was unable to get to a phone and ended up crawling on his floor until he could finally get the attention of one of his neighbors, who then was able to call EMS     EMS arrived to find the pt continuing to have decreased sensation and weakness on his L side. They noted a , /145, and . On arrival in the ED, the pt remained afebrile, but was tachycardic () and quite hypertensive (/146). A stroke code was called for initial NIHSS 9. Troponin obtained on admission per protocol, found to be 467 ng/L. Troponin 24 hr later found to be 1322 ng/L. Cardiology consulted for further management. Notably, SBP has been in 180's-210's with MAP in 120's-170's. No symptoms of chest pain or shortness of breath reported.     At baseline, patient able to walk up at least 2 flights of stairs without chest discomfort or significant dyspnea.  Lives in a one-story walk up.     Review of Systems   The 10 point Review of Systems is negative other than noted in the HPI or here.     Past Medical History    I have reviewed this patient's medical history and updated it with pertinent information if needed.   Past Medical History:   Diagnosis Date     Hypertension        Past Surgical History   I have reviewed this patient's surgical history and updated it with pertinent information if needed.  Past Surgical History:   Procedure Laterality Date     TONSILLECTOMY         Social History   I have reviewed this patient's social history and updated it with pertinent information if needed.  Social History     Tobacco Use     Smoking status: Never Smoker     Smokeless tobacco: Never Used   Substance Use Topics     Alcohol use: Yes     Alcohol/week: 1.7 standard drinks     Types: 2 Cans of beer per week     Comment: 1 weeklyt     Drug use: No     Family History   I have reviewed this patient's family history and updated it with pertinent information if needed.   I have  reviewed this patient's family history and updated it with pertinent information if needed.  Family History   Problem Relation Age of Onset     Breast Cancer Mother      Diabetes Mother      Lipids Mother      Hypertension Mother      Hypertension Father      Heart Disease Father      Respiratory Father      Heart Disease Maternal Grandfather         CHF       Medications   (Not in a hospital admission)    Current Facility-Administered Medications   Medication     acetaminophen (TYLENOL) tablet 650 mg     aspirin EC tablet 81 mg    Or     aspirin (ASA) chewable tablet 81 mg     atorvastatin (LIPITOR) tablet 40 mg     glucose gel 15-30 g    Or     dextrose 50 % injection 25-50 mL    Or     glucagon injection 1 mg     labetalol (NORMODYNE/TRANDATE) injection 10-20 mg    Or     hydrALAZINE (APRESOLINE) injection 10-20 mg     insulin aspart (NovoLOG) injection (RAPID ACTING)     insulin aspart (NovoLOG) injection (RAPID ACTING)     lidocaine (LMX4) cream     lidocaine 1 % 0.1-1 mL     lisinopril (ZESTRIL) tablet 10 mg     medication instruction - No oral meds if patient didn't pass dysphagia screen     Medication Instructions - Avoid dextrose in IV solutions.     polyethylene glycol (MIRALAX) Packet 17 g     potassium chloride ER (KLOR-CON M) CR tablet 40 mEq     senna-docusate (SENOKOT-S/PERICOLACE) 8.6-50 MG per tablet 1-2 tablet     sodium chloride (PF) 0.9% PF flush 3 mL     sodium chloride (PF) 0.9% PF flush 3 mL     sodium chloride 0.9% infusion     Current Outpatient Medications   Medication Sig     lisinopril-hydrochlorothiazide (PRINZIDE,ZESTORETIC) 20-25 MG per tablet Take 1 tablet by mouth daily       Allergies   Allergies   Allergen Reactions     Pcn [Penicillin G] Hives and Itching       Physical Exam   Vital Signs:     BP: (!) 184/107 Pulse: 106   Resp: 15        Weight: 179 lbs 0 oz     General Appearance: AOx3, NAD  Eyes: PERRLA EOMI  HEENT: neck supple  Respiratory: CTAB   Cardiovascular: tachycardiac,  regular rhythm  GI: soft nt/nd  Lymph/Hematologic: no petechiae  Genitourinary: deferred  Skin: warm well perfused  Musculoskeletal: LUE and LLE weakness; full range of motion in right upper extremity and right lower extremity  Neurologic: Left-sided facial droop, mild dysarthria, able to move fingers, unable to move left arm or left leg  Psychiatric: Normal mood and affect        Data   I personally reviewed the EKG tracing showing sinus tachycardia, LVH, repolarization abnormality.    Results for orders placed or performed during the hospital encounter of 22 (from the past 24 hour(s))   Glucose by meter   Result Value Ref Range    GLUCOSE BY METER POCT 141 (H) 70 - 99 mg/dL   CBC with platelets   Result Value Ref Range    WBC Count 12.2 (H) 4.0 - 11.0 10e3/uL    RBC Count 4.66 4.40 - 5.90 10e6/uL    Hemoglobin 14.5 13.3 - 17.7 g/dL    Hematocrit 42.5 40.0 - 53.0 %    MCV 91 78 - 100 fL    MCH 31.1 26.5 - 33.0 pg    MCHC 34.1 31.5 - 36.5 g/dL    RDW 14.0 10.0 - 15.0 %    Platelet Count 232 150 - 450 10e3/uL   Basic metabolic panel   Result Value Ref Range    Sodium 143 133 - 144 mmol/L    Potassium 2.6 (LL) 3.4 - 5.3 mmol/L    Chloride 107 94 - 109 mmol/L    Carbon Dioxide (CO2) 27 20 - 32 mmol/L    Anion Gap 9 3 - 14 mmol/L    Urea Nitrogen 14 7 - 30 mg/dL    Creatinine 1.17 0.66 - 1.25 mg/dL    Calcium 9.0 8.5 - 10.1 mg/dL    Glucose 146 (H) 70 - 99 mg/dL    GFR Estimate 77 >60 mL/min/1.73m2   Glucose by meter   Result Value Ref Range    GLUCOSE BY METER POCT 152 (H) 70 - 99 mg/dL   Echocardiogram Complete w Bubble Study - For age < 60 yrs   Result Value Ref Range    LVEF  50-55%     Narrative    [id number]  [id number]  [id number]  [id number]^EUGENE^THOMAS     [facility],[place]  Echocardiography Laboratory  [address] [place] [id number]     Name: CARLYLE TAVERAS  MRN: [id number]  : 1975  Study Date: 2022 09:29 AM  Age: 46 yrs  Gender: Male  Patient Location:  [facility]  Reason For Study: Cerebrovascular Incident  Ordering Physician: THOMAS KNIGHT  Performed By: Doris Marcelino RDCS     BSA: 1.9 m2  Height: 68 in  Weight: 179 lb  HR: 101  BP: 213/130 mmHg  ______________________________________________________________________________  Procedure  Bubble Echocardiogram with two-dimensional, color and spectral Doppler  performed.  ______________________________________________________________________________  Interpretation Summary  The visual ejection fraction is 50-55%. No regional wall motion abnormalities  are seen.  Moderate to severe concentric wall thickening consistent with left ventricular  hypertrophy is present.  Global right ventricular function is normal.  There was no shunt at the atrial septal level as assessed by agitated saline  bubble study at rest and with Valsalva maneuver.  Previous study not available for comparison.     ______________________________________________________________________________  Left Ventricle  Left ventricular size is normal. Moderate to severe concentric wall thickening  consistent with left ventricular hypertrophy is present. Left ventricular  diastolic function is indeterminate. The visual ejection fraction is 50-55%.  No regional wall motion abnormalities are seen.     Right Ventricle  The right ventricle is normal size. Global right ventricular function is  normal.     Atria  Both atria appear normal. There was no shunt at the atrial septal level as  assessed by agitated saline bubble study at rest and with Valsalva maneuver.     Mitral Valve  The mitral valve is normal. Trace mitral insufficiency is present.     Aortic Valve  Aortic valve is normal in structure and function.     Tricuspid Valve  The tricuspid valve is normal.     Pulmonic Valve  The pulmonic valve is normal.     Vessels  The aorta root is normal. The thoracic aorta is normal. The inferior vena cava  was normal in size with preserved respiratory variability.      Pericardium  No pericardial effusion is present.     Compared to Previous Study  Previous study not available for comparison.  ______________________________________________________________________________  MMode/2D Measurements & Calculations     IVSd: 1.4 cm  LVIDd: 5.4 cm  LVIDs: 4.0 cm  LVPWd: 1.6 cm  FS: 26.9 %  LV mass(C)d: 371.9 grams  LV mass(C)dI: 190.7 grams/m2  asc Aorta Diam: 3.7 cm  LVOT diam: 2.1 cm  LVOT area: 3.5 cm2  LA Volume (BP): 57.5 ml  LA Volume Index (BP): 29.5 ml/m2  RWT: 0.59     ______________________________________________________________________________  Report approved by: Janie HUANG 01/09/2022 11:23 AM         MR Brain w/o Contrast    Narrative    MRI brain without contrast    Provided History:  Neuro deficit, acute, stroke suspected.  ICD-10: Concern for stroke    Comparison:  CTA performed yesterday      Technique:   Axial and sagittal diffusion-weighted (with ADC map), axial FLAIR, and  axial susceptibility-weighted images of the brain were obtained  without the administration of intravenous contrast.    Findings:   Brain MRI: Axial diffusion weighted images demonstrate diffusion  restriction in the distribution of the right posterior cerebral  artery, this involves the medial occipital lobe and extends into the  medial temporal lobe as into the body of the thalamus.. Additional  punctate area of diffusion restriction is present within the left  putamen. Stroke has associated prominent T2 signal. There is scattered  nonspecific mild periventricular T2-hyperintensity, likely related to  chronic small vessel ischemic disease. Susceptibility weighted images  demonstrate a few punctate foci of blooming artifact within the deep  white matter of the left frontal lobe as well as within the medial  right thalamus..       Impression    Impression:  1. Findings from CT and CTA performed yesterday are confirmed with  stroke in the distribution of the right posterior cerebral  artery.  This involves the medial right occipital lobe and medial right  temporal lobe with extension into the body of the thalamus.  2. Additional punctate foci of diffusion restriction in the left  putamen.  3. Moderate leukoaraiosis.     I have personally reviewed the examination and initial interpretation  and I agree with the findings.    KRYSTIAN CERRATO MD         SYSTEM ID:  [id number]   MRA Brain ([place]) wo Contrast    Narrative    MRA BRAIN ([place]) WO CONTRAST 1/9/2022 12:39 PM    History: Neuro deficit, acute, stroke suspected; re-eval R M2 stenosis  vs. thrombus  ICD-10: Stroke    Comparison:  CTA 1/8/2022    Technique:    MRA Head:  Using a 3D time-of-flight image acquisition technique, MRA  of the major arteries at the base of the brain was obtained without  intravenous contrast. Three-dimensional reconstructions of the head  MRA were created, which were reviewed by the radiologist.    Findings:    Head MRA demonstrates occlusion of the origin of the right PCA similar  to CTA performed yesterday. There is short segment high-grade stenosis  of a right-sided M2 branch of the right middle cerebral artery, which  is unchanged. There is no other evidence for stenosis or aneurysm of a  major intracranial artery. Includes the right M2 segment.    Findings associated with stroke in the distribution of the right PCA  are dictated on dedicated brain MRI. Question of developing cortical  laminar necrosis posteriorly within the right occipital lobe.      Impression    Impression:  Redemonstration of occlusion of the right PCA near its  origin. Additional short segment high-grade stenosis of a right-sided  M2 MCA branch. Findings are not likely not significantly changed from  CT performed yesterday.    I have personally reviewed the examination and initial interpretation  and I agree with the findings.    KRYSTIAN CERRATO MD         SYSTEM ID:  [id number]   Glucose by meter   Result Value Ref  Range    GLUCOSE BY METER POCT 149 (H) 70 - 99 mg/dL   UA with Microscopic reflex to Culture    Specimen: Urine, Clean Catch   Result Value Ref Range    Color Urine Straw Colorless, Straw, Light Yellow, Yellow    Appearance Urine Clear Clear    Glucose Urine 300  (A) Negative mg/dL    Bilirubin Urine Negative Negative    Ketones Urine 40  (A) Negative mg/dL    Specific Gravity Urine 1.013 1.003 - 1.035    Blood Urine Small (A) Negative    pH Urine 7.0 5.0 - 7.0    Protein Albumin Urine 20  (A) Negative mg/dL    Urobilinogen Urine Normal Normal, 2.0 mg/dL    Nitrite Urine Negative Negative    Leukocyte Esterase Urine Negative Negative    Mucus Urine Present (A) None Seen /LPF    RBC Urine <1 <=2 /HPF    WBC Urine 0 <=5 /HPF    Narrative    Urine Culture not indicated   Glucose by meter   Result Value Ref Range    GLUCOSE BY METER POCT 215 (H) 70 - 99 mg/dL   Potassium   Result Value Ref Range    Potassium 2.7 (L) 3.4 - 5.3 mmol/L   Troponin I   Result Value Ref Range    Troponin I High Sensitivity 1,322 (HH) <79 ng/L   Glucose by meter   Result Value Ref Range    GLUCOSE BY METER POCT 127 (H) 70 - 99 mg/dL   EKG 12-lead, complete   Result Value Ref Range    Systolic Blood Pressure  mmHg    Diastolic Blood Pressure  mmHg    Ventricular Rate 105 BPM    Atrial Rate 105 BPM    NY Interval 172 ms    QRS Duration 120 ms     ms    QTc 478 ms    P Axis 59 degrees    R AXIS -27 degrees    T Axis 71 degrees    Interpretation ECG       Sinus tachycardia  Possible Left atrial enlargement  Non-specific intra-ventricular conduction delay  Borderline ECG

## 2022-01-10 NOTE — CONSULTS
Care Management Initial Consult    General Information  Assessment completed with: Patient,    Type of CM/SW Visit: Initial Assessment    Primary Care Provider verified and updated as needed: No   Readmission within the last 30 days: no previous admission in last 30 days      Reason for Consult: discharge planning  Advance Care Planning: Advance Care Planning Reviewed: other (comment)  Pt is receptive to receiving HCD. HCD to be provided once pt moves upstairs. Pt reports he is unable to complete it here because he does not have the contact information of the people who would be his spokesperson.       Communication Assessment  Patient's communication style:      Hearing Difficulty or Deaf: no   Wear Glasses or Blind: no    Cognitive  Cognitive/Neuro/Behavioral: WDL  Level of Consciousness: alert  Arousal Level: opens eyes spontaneously  Orientation: oriented x 4  Mood/Behavior: flat affect  Best Language: 0 - No aphasia  Speech: spontaneous,logical    Living Environment:   People in home: alone     Current living Arrangements: apartment      Able to return to prior arrangements: no       Family/Social Support:  Care provided by: self  Provides care for: no one  Marital Status: Single  Parent(s)          Description of Support System:           Current Resources:   Patient receiving home care services:       Community Resources:    Equipment currently used at home: none  Supplies currently used at home:      Employment/Financial:  Employment Status: employed full-time     Employment/ Comments: Discussed FMLA benefits. Pt will update SW if he needs help reaching out to his employer about his hospitalization.  Financial Concerns: No concerns identified   Referral to Financial Counselor: No       Lifestyle & Psychosocial Needs:  Social Determinants of Health     Tobacco Use: Not on file   Alcohol Use: Not on file   Financial Resource Strain: Not on file   Food Insecurity: Not on file   Transportation Needs: Not  on file   Physical Activity: Not on file   Stress: Not on file   Social Connections: Not on file   Intimate Partner Violence: Not on file   Depression: Not on file   Housing Stability: Not on file       Functional Status:  Prior to admission patient needed assistance:   Dependent ADLs:: Independent  Dependent IADLs:: Independent  Assesssment of Functional Status: Needs placement in a SNF/TCF for rehabilitation    Mental Health Status:  Mental Health Status: No Current Concerns       Chemical Dependency Status:  Chemical Dependency Status: No Current Concerns             Values/Beliefs:  Spiritual, Cultural Beliefs, Mosque Practices, Values that affect care: no               Additional Information:  Chart reviewed. Case discussed with bedside RN and medical provider.    Writer introduced self, discussed role and went over writer's availability. Pt lives by himself in his apartment. Pt reports he has a mother who lives in [place] and an ex-girlfriend who lives in [place]. Pt denies any financial, safety, or abuse concerns at this time.    Discussed anticipated recommendations of ARU rehab at discharge. Discussed how it is different from TCU and benefits of ARU over TCU. Discussed various ARUs in [facility]. Pt declined preference other than being in network. A preemptive referral made to  ARU with additional referrals to be made if needed.    Referrals have been made to the following facilities and their status is as follows:    [facility]  [address] [place] [address]  [id number]  P: [phone]  F: [phone]  - Writer spoke with Landon in admissions. MRASIA provided. ARU admissions to follow.  ________________    ML Adair, [facility]  ED/Observation   [facility]  Phone: [phone]  Pager: [phone]  Fax: [phone]    On-call pager, [phone], 4:00pm to midnight

## 2022-01-10 NOTE — PROGRESS NOTES
[facility]    Cardiology Progress Note- Consults        Date of Admission:  1/8/2022     Assessment & Plan: HVSL   Miriam Hall is a 46 year old male admitted on 1/8/2022. He has HTN and is currently admitted for right posterior cerebral artery ischemic CVA. Cardiology consulted for rising troponin. No chest pain. EKG with evidence of early repolarization. TTE on admission showed moderate to severe LVH, low normal LV systolic function (LVEF 50-55%) with no wall motion abnormalities. SBP has been in 180-210 with MAP in 120-170 mmHg range. Unlikely that this troponin represents ACS. Rising troponin likely represents myocardial injury/demand ischemia due to hypertension and physiologic stress of CVA on top of significant LVH (greater myocardial mass). It is expected that the troponin will be more elevated in patients with higher LV mass.    #Hypertensive Urgency  #Demand mediated myocardial injury  #Left ventricular hypertrophy, moderate-severe     Recommendations:   - considered maintaining SBP < 180 mmHg when able, understanding that permissive HTN is part of stroke management  - trend troponin to peak  - no role for invasive cardiovascular testing at this time  - Please recheck EKG with any new chest pain  - consider anatomic testing with coronary CTA +/- FFR vs functional testing with stress echo/MRI upon discharge    The patient's care was discussed with the Attending Physician, Dr. Castro.    Supa Evans MD  [facility]  Please see sign in/sign out for up to date coverage information  ______________________________________________________________________    Interval History   No events. Denies any chest pain. No shortness of breath or orthopnea    Data reviewed today: I reviewed all medications, new labs and imaging results over the last 24 hours. I personally reviewed the EKG tracing showing LVH with early  repolarization     Physical Exam   Vital Signs:     BP: (!) 211/107 Pulse: 98   Resp: 18 SpO2: 97 %      Weight: 178 lbs 12.69 oz  General Appearance: NAD  Respiratory: CTAB  Cardiovascular: RRR, no m/r/g  GI: Soft, non-tender, non-distended  Skin: No rashes visualized  Other: NA    Data   Recent Labs   Lab 01/10/22  0747 01/10/22  0637 01/09/22  2119 01/09/22  1854 01/09/22  0906 01/09/22  0742 01/08/22  2328 01/08/22  1843 01/08/22  1835 01/08/22  1834   WBC  --  12.1*  --   --   --  12.2*  --  12.4*  --   --    HGB  --  15.6  --   --   --  14.5  --  17.2  --   --    MCV  --  93  --   --   --  91  --  90  --   --    PLT  --  243  --   --   --  232  --  252  --   --    INR  --   --   --   --   --   --   --  0.95  --  1.0*   NA  --  144  --   --   --  143  --  141  --   --    POTASSIUM  --  2.8*  --  2.7*  --  2.6*   < > 2.7*  --   --    CHLORIDE  --  107  --   --   --  107  --  104  --   --    CO2  --  24  --   --   --  27  --  30  --   --    BUN  --  16  --   --   --  14  --  19  --   --    CR  --  1.28*  --   --   --  1.17  --  1.37*   < >  --    ANIONGAP  --  13  --   --   --  9  --  7  --   --    VALERIE  --  9.2  --   --   --  9.0  --  9.8  --   --    * 142* 127*  --    < > 146*   < > 222*   < >  --    ALBUMIN  --   --   --   --   --   --   --  3.9  --   --    PROTTOTAL  --   --   --   --   --   --   --  8.5  --   --    BILITOTAL  --   --   --   --   --   --   --  0.8  --   --    ALKPHOS  --   --   --   --   --   --   --  94  --   --    ALT  --   --   --   --   --   --   --  31  --   --    AST  --   --   --   --   --   --   --  26  --   --     < > = values in this interval not displayed.     Attending Attestation: Patient seen and examined with Dr Hima Sahni MD. The history and physical findings are accurate as recorded. My additional findings, if any, have been incorporated into the body of the note. All labs, imaging studies, ECG and telemetry data have been reviewed personally. The assessment and plan  outlined reflect our joint decision making.

## 2022-01-10 NOTE — CONSULTS
01/10/22 0954 Klisch, Christine M, RN     Stroke Education Note     The following information has been reviewed with the patient:     1. Warning signs of stroke     2. Calling 911 if having warning signs of stroke     3. All modifiable risk factors: hypertension, CAD, atrial fib, diabetes, hypercholesterolemia, smoking, substance abuse, diet, physical inactivity, obesity, sleep apnea.     4. Patient's risk factors for stroke which include: HTN     5. Follow-up plan for after discharge     6. Discharge medications which include: aspirin, lipitor     In addition, the above information was given to the patient in writing as a part of the [facility] Stroke Class Handout.     Learner's response to risk factors / lifestyle modification education: Taking steps      Christine M Klisch, RN

## 2022-01-10 NOTE — PROGRESS NOTES
"ED PT Eval     01/10/22 1300   Quick Adds   Type of Visit Initial PT Evaluation   Living Environment   People in home alone   Current Living Arrangements apartment   Home Accessibility stairs to enter home   Number of Stairs, Main Entrance other (see comments)  (flight to second level)   Stair Railings, Main Entrance railings safe and in good condition   Transportation Anticipated car, drives self   Living Environment Comments Patient lives alone in second story apartment. Once inside apt no further stairs.    Self-Care   Usual Activity Tolerance excellent   Current Activity Tolerance fair   Equipment Currently Used at Home none   Activity/Exercise/Self-Care Comment Patient fully IND prior to admission working full time    Disability/Function   Hearing Difficulty or Deaf no   Wear Glasses or Blind no   Concentrating, Remembering or Making Decisions Difficulty no   Difficulty Communicating no   Difficulty Eating/Swallowing no   Walking or Climbing Stairs Difficulty no   Dressing/Bathing Difficulty no   Toileting issues no   Doing Errands Independently Difficulty (such as shopping) no   Fall history within last six months no   Change in Functional Status Since Onset of Current Illness/Injury yes   General Information   Onset of Illness/Injury or Date of Surgery 01/08/22   Referring Physician Gita Laguna MD   Patient/Family Therapy Goals Statement (PT) To maximize IND   Pertinent History of Current Problem (include personal factors and/or comorbidities that impact the POC) Per EMR \"Miriam Hall is a 46 year old male with PMHx HTN who presented on 1/8/2022 with L gaze palsy, L complete hemianopia, mild dysarthria, L arm > leg weakness, L arm/leg decreased sensation, and L side extinction after waking up from a nap earlier this morning. On arrival in the ED, was found to have a well-established large vessel occlusion of the R P1 segment of the PCA and R M2 high grade stenosis suggesting large vessel intracranial " "atherosclerotic disease\"   Existing Precautions/Restrictions fall   General Observations activity: up with assist   Cognition   Affect/Mental Status (Cognition) WFL   Follows Commands (Cognition) WFL   Posture    Posture Comments R lateral lean in sitting   Range of Motion (ROM)   ROM Quick Adds ROM WFL   Strength   Manual Muscle Testing Quick Adds MMT: Hip;MMT: Knee;MMT: Ankle   MMT: Hip, Rehab Eval   Hip Flexion - Left Side (1/5) trace, left   Hip Flexion - Right Side (5/5) normal,right   Hip Extension - Left Side (1/5) trace, left   Hip Extension - Right Side (5/5) normal,right   MMT: Knee, Rehab Eval   Knee Flexion - Left Side (0/5) zero, left   Knee Extension - Left Side (0/5) zero, left   Knee Flexion - Right Side (5/5) normal,right   Knee Extension - Right Side (5/5) normal,right   MMT: Ankle, Rehab Eval   Ankle Plantarflexion - Right Side 5/5) normal, right   Ankle Plantarflexion - Left Side (0/5) zero, left   Ankle Dorsiflexion - Left Side (0/5) zero, left   Ankle Dorsiflexion - Right Side 5/5) normal,right   Bed Mobility   Bed Mobility supine-sit;sit-supine   Supine-Sit [place] (Bed Mobility) minimum assist (75% patient effort)   Sit-Supine [place] (Bed Mobility) moderate assist (50% patient effort)   Comment (Bed Mobility) assist for L LE management   Transfers   Transfers sit-stand transfer   Sit-Stand Transfer   Sit-Stand [place] (Transfers) minimum assist (75% patient effort)   Sit/Stand Transfer Comments min A for balance support, UE assist on bedrail on first trial   Balance   Balance Comments min A static stance, unable to progress to dynamic standing balance. R lateral lean in sitting   Sensory Examination   Sensory Perception Comments L LE sensation grossly impaired, patient identifies light touch but states that it causes entire LE \"tingling\"   Muscle Tone   Muscle Tone Comments L UE/LE hypotonic   Clinical Impression   Criteria for Skilled Therapeutic Intervention yes, treatment " indicated   PT Diagnosis (PT) impaired functional mobility   Influenced by the following impairments decreased activity tolerance, impaired L sided strength   Functional limitations due to impairments bed mobility, transfers, gait, stairs   Clinical Presentation Stable/Uncomplicated   Clinical Presentation Rationale clinical judgement   Clinical Decision Making (Complexity) low complexity   Therapy Frequency (PT) Daily   Predicted Duration of Therapy Intervention (days/wks) 2 weeks   Planned Therapy Interventions (PT) balance training;bed mobility training;gait training;ROM (range of motion);stair training;strengthening;stretching;transfer training   Risk & Benefits of therapy have been explained evaluation/treatment results reviewed;care plan/treatment goals reviewed;risks/benefits reviewed;current/potential barriers reviewed;participants voiced agreement with care plan;participants included;patient   PT Discharge Planning    PT Discharge Recommendation (DC Rec) [facility]-Mot[facility] patient will benefit from intensive, interdisciplinary therapy.  Anticipate will be able to tolerate 3 hours of therapy per day   PT Rationale for DC Rec At this time, patient highly motivated and with significant functional impairments. Recommend ARU to maximize functional IND. PT to continue to follow while in house.   PT Brief overview of current status  Ax2 for all mobility, poor safety insight, significant R sided weakness   Total Evaluation Time   Total Evaluation Time (Minutes) 10     Vision: Bilateral L visual field loss, impaired tracking to L side (R side intact) but able to gaze R when performing VOR testing although VOR slightly saccadic.         Escobar Hodge, PT, DPT  Pager #[phone]

## 2022-01-10 NOTE — PHARMACY-CONSULT NOTE
Pharmacy Consult to evaluate for medication related stroke core measures    Miriam Hall, 46 year old male admitted for stroke workup on 1/8/2022.    Thrombolytic was not given because of Time from onset contraindications    VTE Prophylaxis SCDs /PCDs placed on 1/8/22, as appropriate prior to end of hospital day 2.    Antithrombotic: aspirin started on 1/8/22, as appropriate by end of hospital day 2. Continue antithrombotic therapy on discharge to meet quality measures, unless contraindicated.    Anticoagulation if history of A-fib/flutter: Patient does not have history of A-fib/flutter - anticoagulation not required for medication related stroke core measures.     LDL Cholesterol Calculated   Date Value Ref Range Status   01/08/2022 75 <=100 mg/dL Final   05/16/2014 70 0 - 99 mg/dL Final       Patient currently receiving Lipitor (atorvastatin) continue statin on discharge to meet quality measures, unless contraindicated.    Recommendations: None at this time    Thank you for the consult.    Michael Ceballos [facility] 1/10/2022 3:27 PM

## 2022-01-10 NOTE — PROGRESS NOTES
[facility]    Stroke Progress Note    Interval Events- CARISA ordered for tomorrow AM  - NPO at midnight    HPI Summary  Miriam Hall is a 46 year old male with PMHx HTN who presented on 1/8/2022 with symptoms concerning for stroke. The pt reports that he was in his normal state of health this morning and then decided to take a nap around noon today. However, on waking from his nap around 2:00PM, he found that he was having trouble moving and feeling the L side of his body. Unfortunately, the pt was unable to get to a phone and ended up crawling on his floor until he could finally get the attention of one of his neighbors, who then was able to call EMS     EMS arrived to find the pt continuing to have decreased sensation and weakness on his L side. They noted a , /145, and . On arrival in the ED, the pt remained afebrile, but was tachycardic () and quite hypertensive (/146). A stroke code was called for initial NIHSS 9, for a partial L gaze palsy, L-sided complete hemianopia, L arm > leg weakness, decreased sensation in the L arm/leg to sensation (pinprick intact in arm but decreased in leg), mild dysarthria, L-sided extinction, and possible L neglect. The pt also seemed to be having trouble with proprioception in the L arm, though did recognize it to be his own. Pt was immediately taken to CT where he underwent a CTH, CTA Head/Neck and CT Perfusion study, revealing a well-established R parieto-occipital stroke with an LVO of the R P1 segment. As the stroke already appeared well-established and the pt was outside the time window, no tPA was given. No thrombectomy was performed due to the location of the LVO in the PCA. The pt was loaded with ASA 324mg once with plans to allow for permissive HTN and admission to the Stroke service for further work-up     The patient denies any history of smoking or drug use. Only rarely uses EtOH. He  would prefer that no one be contact at this time concerning his diagnosis or medical condition.     TPA Treatment   Not given due to established infarct on imaging and unclear or unfavorable risk-benefit profile for extended window thrombolysis beyond the conventional 4.5 hour time window.     Endovascular Treatment  Proximal vessel occlusion present, but endovascular treatment not initiated due to location of the thrombus in the posterior cerebral artery    Stroke Evaluation Summarized    MRI/Head CT MRI brain  1. Findings from CT and CTA performed yesterday are confirmed with stroke in the distribution of the right posterior cerebral artery.  This involves the medial right occipital lobe and medial right temporal lobe with extension into the body of the thalamus.  2. Additional punctate foci of diffusion restriction in the left putamen.  3. Moderate leukoaraiosis.    Intracranial Vasculature MRA  Redemonstration of occlusion of the right PCA near its  origin. Additional short segment high-grade stenosis of a right-sided M2 MCA branch. Findings are not likely not significantly changed from CT performed yesterday.   Cervical Vasculature CTA  1. Occlusion of the proximal right PCA.  2. High-grade stenosis of the occiput M2 branch of the right MCA with  attenuated flow distal.  3. Neck CTA demonstrates no stenosis of the major cervical arteries.     Echocardiogram The visual ejection fraction is 50-55%.   No regional wall motion abnormalities are seen.  Moderate to severe concentric wall thickening consistent with left ventricular hypertrophy is present.  Global right ventricular function is normal.  There was no shunt at the atrial septal level as assessed by agitated saline bubble study at rest and with Valsalva maneuver.  Previous study not available for comparison.   EKG/Telemetry EKG with sinus tachycardia   Other Testing Hypercoagulability profile has not been sent yet     LDL  1/8/2022: 75 mg/dL   A1C   1/8/2022: 7.8 %   Troponin 1/10/2022: 2,115 ng/L ()       Impression     # Acute ischemic stroke of Right P1 segement of the PCA  # R P1 occlusion and R M2 Stenosis  # Large vessel atherosclerosis vs ESUS  Miriam Hall is a 46 year old male with PMHx HTN who presented on 1/8/2022 with L gaze palsy, L complete hemianopia, mild dysarthria, L arm > leg weakness, L arm/leg decreased sensation, and L side extinction after waking up from a nap earlier this morning. On arrival in the ED, was found to have a well-established large vessel occlusion of the R P1 segment of the PCA and R M2 high grade stenosis suggesting large vessel intracranial atherosclerotic disease. MRI with medial right occipital lobe and medial right temporal lobe with extension into the body of the thalamus. However, the remaining vessels are not suggestive of Large vessel intracranial athero. Rest of his intracranial vessels were without disease. ECHO without low EF, atrial size not concerning for afib, no valvular pathology. He does have raised blood pressure however the location is cortical. Possible to have ESUS with possible B/L infarcts pending further work up.   - Permissive HTN; labetalol PRN for SBP > 220  - Avoid hypotonic IV fluids  - s/p ASA 324mg daily  - Aspirin 81 mg PO daily for secondary stroke prevention  - Atorvastatin 40 mg daily for HLD  - Bedside Glucose Monitoring  - A1c 7.8, LDL 75  - PT/OT/SLP  - PM&R  - Stroke Education  - Depression Screen  - Apnea Screen  - Euthermia, Euglycemia  - On regular diet with thin liquids     #HTN  - Started on lisinopril with newly diagnosed DM  - Lisinopril 10 mg daily, may up titrate    # DM  Newly diagnosed DM with Hgb A1C 7.8.  - Continue to monitor glucose  - LSSI  - Hypoglycemia protocol     #Hypokalemia   Pt presented with K 2.7, today 2.6, Mg within normal limits, replaced 40 PO  - Replacement prn     #Possible DISHA  Pt presented with Cr 1.37 improved with IVF to 1.17  - Daily  BMP     #Elevated Troponin   Pt arrives with Trop 147 and rising. EKG exhibited sinus tachycardia, but no concerning signs of ischemia  - Trend troponin to peak     #Leukocytosis  Pt presented with WBC 12.4. Most likely is a stress response given lack of fever. Will monitor for now, but will consider an infectious work-up if pt fevers or leukocytosis fails to resolve  - Daily CBC      Prophylaxis            For VTE Prevention:  - pneumatic compression device     For Acid Suppression:  - GI prophylaxis is not indicated     Code Status  Full Code    The patient was discussed with Stroke Staff Dr. Emmett Laura,   Neurology Resident PGY1  Pager:  [phone]  ______________________________________________________    Clinically Significant Risk Factors Present on Admission                 Medications   Scheduled Meds    aspirin  81 mg Oral Daily    Or     aspirin  81 mg Oral or NG Tube Daily     atorvastatin  40 mg Oral QPM     insulin aspart  1-3 Units Subcutaneous TID AC     insulin aspart  1-3 Units Subcutaneous At Bedtime     lisinopril  10 mg Oral Daily     potassium chloride  40 mEq Oral Once     sodium chloride (PF)  3 mL Intracatheter Q8H       Infusion Meds    - MEDICATION INSTRUCTIONS -       - MEDICATION INSTRUCTIONS -       sodium chloride 125 mL/hr at 01/10/22 1524       PRN Meds  acetaminophen, glucose **OR** dextrose **OR** glucagon, labetalol **OR** hydrALAZINE, lidocaine 4%, lidocaine (buffered or not buffered), - MEDICATION INSTRUCTIONS -, - MEDICATION INSTRUCTIONS -, polyethylene glycol, senna-docusate, sodium chloride (PF)       PHYSICAL EXAMINATION  Temp:  [99  F (37.2  C)-99.2  F (37.3  C)] 99.2  F (37.3  C)  Pulse:  [] 97  Resp:  [16-18] 18  BP: (184-215)/(107-128) 205/118  SpO2:  [97 %-98 %] 98 %      Neurologic  Mental Status:  alert, oriented x 3, follows commands, speech clear and fluent, naming and repetition normal  Cranial Nerves:  PERRL, Left complete hemianopia, Left gaze palsy  but able to cross the midline, facial sensation intact and symmetric, facial movements symmetric, hearing not formally tested but intact to conversation, palate elevation symmetric and uvula midline, shoulder shrug strong bilaterally, tongue protrusion midline  Motor:  normal muscle tone and bulk, no abnormal movements, able to move all limbs spontaneously, RUE and RLE are 5/5 throughout. In the LUE, has 3+/5 strength with shoulder abduction but 5/5 with elbow flexion/extension and . In LLE has only very subtle drift with 5s leg raise and does not touch the bed  Reflexes: Deferred  Sensory:  Decreased sensation to light touch in LUE and LLE with extinction on bilateral testing. Decreased sensation to pinprick in the LLE but intact in the LUE. Pt also appeared to have proprioceptive loss in the LUE, having a difficult time moving his L hand when not within his line of sight  Coordination:  normal finger-to-nose and heel-to-shin bilaterally without dysmetria  Station/Gait:  deferred    Stroke Scales    NIHSS  Interval baseline (01/08/22 2017)   Interval Comments     1a. Level of Consciousness 0-->Alert, keenly responsive   1b. LOC Questions 0-->Answers both questions correctly   1c. LOC Commands 0-->Performs both tasks correctly   2.   Best Gaze 1-->Partial gaze palsy, gaze is abnormal in one or both eyes, but forced deviation or total gaze paresis is not present   3.   Visual 2-->Complete hemianopia   4.   Facial Palsy 0-->Normal symmetrical movements   5a. Motor Arm, Left 2-->Some effort against gravity, limb cannot get to or maintain (if cued) 90 (or 45) degrees, drifts down to bed, but has some effort against gravity   5b. Motor Arm, Right 0-->No drift, limb holds 90 (or 45) degrees for full 10 secs   6a. Motor Leg, Left 2-->Some effort against gravity, leg falls to bed by 5 secs, but has some effort against gravity   6b. Motor Leg, right 0-->No drift, leg holds 30 degree position for full 5 secs   7.   Limb  Ataxia 0-->Absent   8.   Sensory 1-->Mild-to-moderate sensory loss, patient feels pinprick is less sharp or is dull on the affected side, or there is a loss of superficial pain with pinprick, but patient is aware of being touched   9.   Best Language 0-->No aphasia, normal   10. Dysarthria 0-->Normal   11. Extinction and Inattention  1-->Visual, tactile, auditory, spatial, or personal inattention or extinction to bilateral simultaneous stimulation in one of the sensory modalities   Total 9 (01/09/22 1815)       Imaging  I personally reviewed all imaging; relevant findings per HPI.     Lab Results Data   CBC  Recent Labs   Lab 01/10/22  0637 01/09/22  0742 01/08/22  1843   WBC 12.1* 12.2* 12.4*   RBC 4.99 4.66 5.54   HGB 15.6 14.5 17.2   HCT 46.3 42.5 49.6    232 252     Basic Metabolic Panel    Recent Labs   Lab 01/10/22  1658 01/10/22  1545 01/10/22  1203 01/10/22  1119 01/10/22  0747 01/10/22  0637 01/09/22  2119 01/09/22  1854 01/09/22  0906 01/09/22  0742 01/08/22  2328 01/08/22  1843   NA  --   --   --   --   --  144  --   --   --  143  --  141   POTASSIUM  --   --  3.2*  --   --  2.8*  --  2.7*  --  2.6*   < > 2.7*   CHLORIDE  --   --   --   --   --  107  --   --   --  107  --  104   CO2  --   --   --   --   --  24  --   --   --  27  --  30   BUN  --   --   --   --   --  16  --   --   --  14  --  19   CR  --   --   --   --   --  1.28*  --   --   --  1.17  --  1.37*   * 159*  --  177*   < > 142*   < >  --    < > 146*   < > 222*   VALERIE  --   --   --   --   --  9.2  --   --   --  9.0  --  9.8    < > = values in this interval not displayed.     Liver Panel  Recent Labs   Lab 01/08/22  1843   PROTTOTAL 8.5   ALBUMIN 3.9   BILITOTAL 0.8   ALKPHOS 94   AST 26   ALT 31     INR    Recent Labs   Lab Test 01/08/22  1843 01/08/22  1834   INR 0.95 1.0*      Lipid Profile    Recent Labs   Lab Test 01/08/22  1843 05/16/14  1506   CHOL 169 131   HDL 70 53   LDL 75 70   TRIG 118 38     A1C    Recent Labs   Lab  Test 01/08/22  1843   A1C 7.8*     Troponin I  No results for input(s): TROPONIN, GHTROP in the last 168 hours.

## 2022-01-11 LAB
ANION GAP SERPL CALCULATED.3IONS-SCNC: 8 MMOL/L (ref 3–14)
ATRIAL RATE - MUSE: 105 BPM
ATRIAL RATE - MUSE: 106 BPM
ATRIAL RATE - MUSE: 91 BPM
BUN SERPL-MCNC: 20 MG/DL (ref 7–30)
CALCIUM SERPL-MCNC: 9.1 MG/DL (ref 8.5–10.1)
CHLORIDE BLD-SCNC: 107 MMOL/L (ref 94–109)
CO2 SERPL-SCNC: 27 MMOL/L (ref 20–32)
CREAT SERPL-MCNC: 1.3 MG/DL (ref 0.66–1.25)
DIASTOLIC BLOOD PRESSURE - MUSE: NORMAL MMHG
ERYTHROCYTE [DISTWIDTH] IN BLOOD BY AUTOMATED COUNT: 14 % (ref 10–15)
GFR SERPL CREATININE-BSD FRML MDRD: 68 ML/MIN/1.73M2
GLUCOSE BLD-MCNC: 128 MG/DL (ref 70–99)
GLUCOSE BLDC GLUCOMTR-MCNC: 126 MG/DL (ref 70–99)
GLUCOSE BLDC GLUCOMTR-MCNC: 136 MG/DL (ref 70–99)
GLUCOSE BLDC GLUCOMTR-MCNC: 160 MG/DL (ref 70–99)
GLUCOSE BLDC GLUCOMTR-MCNC: 169 MG/DL (ref 70–99)
GLUCOSE BLDC GLUCOMTR-MCNC: 232 MG/DL (ref 70–99)
HCT VFR BLD AUTO: 44.8 % (ref 40–53)
HGB BLD-MCNC: 14.9 G/DL (ref 13.3–17.7)
INTERPRETATION ECG - MUSE: NORMAL
MCH RBC QN AUTO: 30.7 PG (ref 26.5–33)
MCHC RBC AUTO-ENTMCNC: 33.3 G/DL (ref 31.5–36.5)
MCV RBC AUTO: 92 FL (ref 78–100)
P AXIS - MUSE: 39 DEGREES
P AXIS - MUSE: 53 DEGREES
P AXIS - MUSE: 59 DEGREES
PLATELET # BLD AUTO: 221 10E3/UL (ref 150–450)
POTASSIUM BLD-SCNC: 2.9 MMOL/L (ref 3.4–5.3)
PR INTERVAL - MUSE: 148 MS
PR INTERVAL - MUSE: 168 MS
PR INTERVAL - MUSE: 172 MS
QRS DURATION - MUSE: 102 MS
QRS DURATION - MUSE: 114 MS
QRS DURATION - MUSE: 120 MS
QT - MUSE: 362 MS
QT - MUSE: 372 MS
QT - MUSE: 384 MS
QTC - MUSE: 472 MS
QTC - MUSE: 478 MS
QTC - MUSE: 494 MS
R AXIS - MUSE: -27 DEGREES
R AXIS - MUSE: -32 DEGREES
R AXIS - MUSE: -56 DEGREES
RBC # BLD AUTO: 4.85 10E6/UL (ref 4.4–5.9)
SODIUM SERPL-SCNC: 142 MMOL/L (ref 133–144)
SYSTOLIC BLOOD PRESSURE - MUSE: NORMAL MMHG
T AXIS - MUSE: 101 DEGREES
T AXIS - MUSE: 71 DEGREES
T AXIS - MUSE: 98 DEGREES
VENTRICULAR RATE- MUSE: 105 BPM
VENTRICULAR RATE- MUSE: 106 BPM
VENTRICULAR RATE- MUSE: 91 BPM
WBC # BLD AUTO: 10 10E3/UL (ref 4–11)

## 2022-01-11 PROCEDURE — 120N000002 HC R&B MED SURG/OB UMMC

## 2022-01-11 PROCEDURE — 80048 BASIC METABOLIC PNL TOTAL CA: CPT | Performed by: STUDENT IN AN ORGANIZED HEALTH CARE EDUCATION/TRAINING PROGRAM

## 2022-01-11 PROCEDURE — 250N000011 HC RX IP 250 OP 636: Performed by: STUDENT IN AN ORGANIZED HEALTH CARE EDUCATION/TRAINING PROGRAM

## 2022-01-11 PROCEDURE — 250N000013 HC RX MED GY IP 250 OP 250 PS 637: Performed by: PSYCHIATRY & NEUROLOGY

## 2022-01-11 PROCEDURE — 999N000128 HC STATISTIC PERIPHERAL IV START W/O US GUIDANCE

## 2022-01-11 PROCEDURE — 250N000013 HC RX MED GY IP 250 OP 250 PS 637

## 2022-01-11 PROCEDURE — 36415 COLL VENOUS BLD VENIPUNCTURE: CPT | Performed by: STUDENT IN AN ORGANIZED HEALTH CARE EDUCATION/TRAINING PROGRAM

## 2022-01-11 PROCEDURE — 99232 SBSQ HOSP IP/OBS MODERATE 35: CPT | Mod: GC | Performed by: PSYCHIATRY & NEUROLOGY

## 2022-01-11 PROCEDURE — 250N000013 HC RX MED GY IP 250 OP 250 PS 637: Performed by: STUDENT IN AN ORGANIZED HEALTH CARE EDUCATION/TRAINING PROGRAM

## 2022-01-11 PROCEDURE — 250N000011 HC RX IP 250 OP 636

## 2022-01-11 PROCEDURE — 258N000003 HC RX IP 258 OP 636: Performed by: STUDENT IN AN ORGANIZED HEALTH CARE EDUCATION/TRAINING PROGRAM

## 2022-01-11 PROCEDURE — 85027 COMPLETE CBC AUTOMATED: CPT | Performed by: STUDENT IN AN ORGANIZED HEALTH CARE EDUCATION/TRAINING PROGRAM

## 2022-01-11 RX ORDER — LABETALOL HYDROCHLORIDE 5 MG/ML
10-20 INJECTION, SOLUTION INTRAVENOUS EVERY 4 HOURS PRN
Status: DISCONTINUED | OUTPATIENT
Start: 2022-01-11 | End: 2022-01-11

## 2022-01-11 RX ORDER — POTASSIUM CHLORIDE 1.5 G/1.58G
40 POWDER, FOR SOLUTION ORAL ONCE
Status: DISCONTINUED | OUTPATIENT
Start: 2022-01-11 | End: 2022-01-11

## 2022-01-11 RX ORDER — AMLODIPINE BESYLATE 5 MG/1
5 TABLET ORAL DAILY
Status: DISCONTINUED | OUTPATIENT
Start: 2022-01-11 | End: 2022-01-11

## 2022-01-11 RX ORDER — LABETALOL HYDROCHLORIDE 5 MG/ML
10-20 INJECTION, SOLUTION INTRAVENOUS EVERY 4 HOURS PRN
Status: DISCONTINUED | OUTPATIENT
Start: 2022-01-11 | End: 2022-01-23

## 2022-01-11 RX ORDER — AMLODIPINE BESYLATE 10 MG/1
10 TABLET ORAL DAILY
Status: DISCONTINUED | OUTPATIENT
Start: 2022-01-12 | End: 2022-01-17

## 2022-01-11 RX ORDER — HYDRALAZINE HYDROCHLORIDE 20 MG/ML
10-20 INJECTION INTRAMUSCULAR; INTRAVENOUS EVERY 4 HOURS PRN
Status: DISCONTINUED | OUTPATIENT
Start: 2022-01-11 | End: 2022-01-23

## 2022-01-11 RX ORDER — CARBOXYMETHYLCELLULOSE SODIUM 5 MG/ML
1 SOLUTION/ DROPS OPHTHALMIC
Status: DISCONTINUED | OUTPATIENT
Start: 2022-01-11 | End: 2022-01-28 | Stop reason: HOSPADM

## 2022-01-11 RX ORDER — POTASSIUM CHLORIDE 750 MG/1
40 TABLET, EXTENDED RELEASE ORAL ONCE
Status: COMPLETED | OUTPATIENT
Start: 2022-01-11 | End: 2022-01-11

## 2022-01-11 RX ORDER — LISINOPRIL 20 MG/1
20 TABLET ORAL DAILY
Status: DISCONTINUED | OUTPATIENT
Start: 2022-01-11 | End: 2022-01-11 | Stop reason: SINTOL

## 2022-01-11 RX ORDER — HYDRALAZINE HYDROCHLORIDE 20 MG/ML
10-20 INJECTION INTRAMUSCULAR; INTRAVENOUS
Status: DISCONTINUED | OUTPATIENT
Start: 2022-01-11 | End: 2022-01-11

## 2022-01-11 RX ORDER — HYDRALAZINE HYDROCHLORIDE 20 MG/ML
10-20 INJECTION INTRAMUSCULAR; INTRAVENOUS EVERY 4 HOURS PRN
Status: DISCONTINUED | OUTPATIENT
Start: 2022-01-11 | End: 2022-01-11

## 2022-01-11 RX ORDER — LABETALOL HYDROCHLORIDE 5 MG/ML
10-20 INJECTION, SOLUTION INTRAVENOUS EVERY 10 MIN PRN
Status: DISCONTINUED | OUTPATIENT
Start: 2022-01-11 | End: 2022-01-11

## 2022-01-11 RX ADMIN — ATORVASTATIN CALCIUM 40 MG: 40 TABLET, FILM COATED ORAL at 21:02

## 2022-01-11 RX ADMIN — LABETALOL HYDROCHLORIDE 20 MG: 5 INJECTION, SOLUTION INTRAVENOUS at 18:13

## 2022-01-11 RX ADMIN — ACETAMINOPHEN 650 MG: 325 TABLET, FILM COATED ORAL at 00:36

## 2022-01-11 RX ADMIN — LABETALOL HYDROCHLORIDE 20 MG: 5 INJECTION, SOLUTION INTRAVENOUS at 11:17

## 2022-01-11 RX ADMIN — HYDRALAZINE HYDROCHLORIDE 10 MG: 20 INJECTION INTRAMUSCULAR; INTRAVENOUS at 22:33

## 2022-01-11 RX ADMIN — SODIUM CHLORIDE: 9 INJECTION, SOLUTION INTRAVENOUS at 16:09

## 2022-01-11 RX ADMIN — LABETALOL HYDROCHLORIDE 10 MG: 5 INJECTION, SOLUTION INTRAVENOUS at 00:31

## 2022-01-11 RX ADMIN — AMLODIPINE BESYLATE 5 MG: 5 TABLET ORAL at 09:37

## 2022-01-11 RX ADMIN — LISINOPRIL 20 MG: 20 TABLET ORAL at 08:08

## 2022-01-11 RX ADMIN — ACETAMINOPHEN 650 MG: 325 TABLET, FILM COATED ORAL at 12:14

## 2022-01-11 RX ADMIN — HYDRALAZINE HYDROCHLORIDE 20 MG: 20 INJECTION INTRAMUSCULAR; INTRAVENOUS at 23:19

## 2022-01-11 RX ADMIN — POTASSIUM CHLORIDE 40 MEQ: 750 TABLET, EXTENDED RELEASE ORAL at 08:18

## 2022-01-11 RX ADMIN — SODIUM CHLORIDE: 9 INJECTION, SOLUTION INTRAVENOUS at 08:30

## 2022-01-11 RX ADMIN — ACETAMINOPHEN 650 MG: 325 TABLET, FILM COATED ORAL at 08:09

## 2022-01-11 RX ADMIN — HYDRALAZINE HYDROCHLORIDE 20 MG: 20 INJECTION INTRAMUSCULAR; INTRAVENOUS at 13:24

## 2022-01-11 RX ADMIN — ACETAMINOPHEN 650 MG: 325 TABLET, FILM COATED ORAL at 17:55

## 2022-01-11 RX ADMIN — ASPIRIN 81 MG CHEWABLE TABLET 81 MG: 81 TABLET CHEWABLE at 08:08

## 2022-01-11 ASSESSMENT — ACTIVITIES OF DAILY LIVING (ADL)
ADLS_ACUITY_SCORE: 19

## 2022-01-11 NOTE — PROGRESS NOTES
Care Management Follow Up    Length of Stay (days): 3    Expected Discharge Date: 01/14/2022     Concerns to be Addressed: Discharge planning      Patient plan of care discussed at interdisciplinary rounds: Yes    Anticipated Discharge Disposition:  ARU      Anticipated Discharge Services: Transportation services   Anticipated Discharge DME:  NA    Patient/family educated on Medicare website which has current facility and service quality ratings:  Yes  Education Provided on the Discharge Plan:  Yes  Patient/Family in Agreement with the Plan:  Yes    Referrals Placed by CM/SW: See below   Private pay costs discussed: Not applicable    Additional Information:  Per team rounds, pt will be ready for discharge to ARU by the end of the week (1/14). MARLENA called and spoke to Octavia in admissions at [facility] and updated her. Octavia will review pt then update SW on acceptance.      Addendum: Per previous MARLENA note, pt interested in filling out a HCD. MARLENA met w/ pt in his room and provided him w/ a HCD short and long version. SW provided education about HCDs and how to get them notarized here at the hospital. Pt voiced understanding. MARLENA updated pt about his discharge plan and informed him that [facility] is reviewing him. SW provided education about ARU. Pt asked about transportation to ARU. MARLENA shared that [place]. Pt was receptive and did not have any further questions for MARLENA at this time.     Debra BULL, GEGE Barrientos   Phone: [phone]  Pager: [phone]

## 2022-01-11 NOTE — PLAN OF CARE
Status: Pt admitted 01/08 with L sided weakness and L homonymous hemiapnopsia, found to have R PCA infarct   Vitals: Permissive hypertension, tachycardia intermittent, OVSS on RA. On CCM. Labetalol given x1 for SBP >220  Neuros: A&O x4. Complete left field hemianopsia, L eye field near complete cut, Left gaze palsy, Left side neglect, slight Left facial droop. Strength R side 5/5, L side 1/5, absent pain sensation to LLE and LUE, decreased sensation to left face  IV: R PIV SL  Labs/Electrolytes: Troponin values were high in ED. Blood glucose checks   Resp/trach: LS clear  Diet: Mod consistent Carb diet. NPO at midnight  Bowel status: Last BM 01/09 per pt  : Voiding adequately via urinal  Skin: Left hip abrasion and bruise. Left shoulder bruise and abrasion. Right elbow abrasion. Right arm with bruise at previous IV site.  Pain: c/o HA x1  Activity: A2 & lift. T&R  Plan: NPO for CARISA around 10:30 AM. Cont with current POC

## 2022-01-11 NOTE — PROGRESS NOTES
[facility]    Stroke Progress Note    Interval Events  - CARISA canceled due to SBP > 200's  - Started Amlodipine 5 mg PO daily  - Decreased PRN Labetalol/Hydralazine threshold/goal from <220 to <180- Stopped Lisinopril due to increased Crt  - Repleted K with KCl 40 mEq PO once    HPI Summary  Miriam Hall is a 46 year old male with PMHx HTN who presented on 1/8/2022 with symptoms concerning for stroke. The pt reports that he was in his normal state of health that morning and then decided to take a nap around noon. On waking from his nap around 2:00PM, he found that he was having trouble moving and feeling the L side of his body. Unfortunately, the pt was unable to get to a phone and ended up crawling on his floor until he could finally get the attention of one of his neighbors, who then was able to call EMS     EMS arrived to find the pt continuing to have decreased sensation and weakness on his L side. They noted a , /145, and . On arrival in the ED, the pt remained afebrile, but was tachycardic () and quite hypertensive (/146). A stroke code was called for initial NIHSS 9, for a partial L gaze palsy, L-sided complete hemianopia, L arm > leg weakness, decreased sensation in the L arm/leg to sensation (pinprick intact in arm but decreased in leg), mild dysarthria, L-sided extinction, and possible L neglect. The pt also seemed to be having trouble with proprioception in the L arm, though did recognize it to be his own. Pt was immediately taken to CT where he underwent a CTH, CTA Head/Neck and CT Perfusion study, revealing a well-established R parieto-occipital stroke with an LVO of the R P1 segment. As the stroke already appeared well-established and the pt was outside the time window, no tPA was given. No thrombectomy was performed due to the location of the LVO in the PCA. The pt was loaded with ASA 324mg once with plans to allow  for permissive HTN and admission to the Stroke service for further work-up     The patient denies any history of smoking or drug use. Only rarely uses EtOH. He would prefer that no one be contact at this time concerning his diagnosis or medical condition.     TPA Treatment   Not given due to established infarct on imaging and unclear or unfavorable risk-benefit profile for extended window thrombolysis beyond the conventional 4.5 hour time window.     Endovascular Treatment  Proximal vessel occlusion present, but endovascular treatment not initiated due to location of the thrombus in the posterior cerebral artery    Stroke Evaluation Summarized    MRI/Head CT MRI brain  1. Findings from CT and CTA performed yesterday are confirmed with stroke in the distribution of the right posterior cerebral artery.  This involves the medial right occipital lobe and medial right temporal lobe with extension into the body of the thalamus.  2. Additional punctate foci of diffusion restriction in the left putamen.  3. Moderate leukoaraiosis.    Intracranial Vasculature MRA  Redemonstration of occlusion of the right PCA near its  origin. Additional short segment high-grade stenosis of a right-sided M2 MCA branch. Findings are not likely not significantly changed from CT performed yesterday.   Cervical Vasculature CTA  1. Occlusion of the proximal right PCA.  2. High-grade stenosis of the occiput M2 branch of the right MCA with  attenuated flow distal.  3. Neck CTA demonstrates no stenosis of the major cervical arteries.     Echocardiogram The visual ejection fraction is 50-55%.   No regional wall motion abnormalities are seen.  Moderate to severe concentric wall thickening consistent with left ventricular hypertrophy is present.  Global right ventricular function is normal.  There was no shunt at the atrial septal level as assessed by agitated saline bubble study at rest and with Valsalva maneuver.  Previous study not available for  comparison.   EKG/Telemetry EKG with sinus tachycardia   Other Testing Hypercoagulability profile has not been sent yet     LDL  1/8/2022: 75 mg/dL   A1C  1/8/2022: 7.8 %   Troponin 1/10/2022: 2,115 ng/L ()       Impression   # Acute ischemic stroke of Right P1 segement of the PCA  # R P1 occlusion and R M2 Stenosis  # Large vessel atherosclerosis vs ESUS  Miriam Hall is a 46 year old male with PMHx HTN who presented on 1/8/2022 with L gaze palsy, L complete hemianopia, mild dysarthria, L arm > leg weakness, L arm/leg decreased sensation, and L side extinction after waking up from a nap earlier this morning. On arrival in the ED, was found to have a well-established large vessel occlusion of the R P1 segment of the PCA and R M2 high grade stenosis suggesting large vessel intracranial atherosclerotic disease. MRI with medial right occipital lobe and medial right temporal lobe with extension into the body of the thalamus. However, the remaining vessels are not suggestive of Large vessel intracranial athero. Rest of his intracranial vessels were without disease. ECHO without low EF, atrial size not concerning for afib, no valvular pathology. He does have raised blood pressure however the location is cortical. Possible to have ESUS with possible B/L infarcts pending further work up.   - Aspirin 81 mg PO daily for secondary stroke prevention  - PT/OT/SLP Rec: ARU  - PM&R  - Stroke Education  - Depression Screen  - Apnea Screen  - Euthermia, Euglycemia  - On regular diet with thin liquids    #HLD  LDL 75. Long-term Goal b/w 40-70.   - Atorvastatin 40 mg PO daily    #HTN  Inpatient BP Goal < 180 per Cardiology recs (1/10). Stopped Lisinopril and started Amlodipine due to increasing Crt on Lisinopril.  - Amlodipine 5 mg PO daily  - Hydralazine/Labetalol 10-20 mg IV Q1H PRN for BP > 180    # DM  Newly diagnosed DM with Hgb A1C 7.8.  - Continue to monitor glucose  - LSSI  - Hypoglycemia protocol     #Hypokalemia   Pt  presented with K 2.7, today 2.6, Mg within normal limits, replaced 40 PO  - Replacement prn     #Possible DISHA  Pt presented with Cr 1.37 improved with IVF to 1.17  - Daily BMP     #Leukocytosis (resolved)  Pt presented with WBC 12.4. Most likely is a stress response given lack of fever. Mild fever overnight but WBC decreasing. Will continue to monitor.  - Daily CBC    #Elevated Troponin (resolved)  Pt arrives with Trop 147 and rising. EKG exhibited sinus tachycardia, but no concerning signs of ischemia. Troponin peaked 1/10.     Prophylaxis            For VTE Prevention:  - SCD     For Acid Suppression:  - GI prophylaxis is not indicated     Code Status  Full Code    The patient was discussed with Stroke Staff Dr. Emmett Laura,   Neurology Resident PGY1  Pager:  [phone]  ______________________________________________________    Clinically Significant Risk Factors Present on Admission                 Medications   Scheduled Meds    aspirin  81 mg Oral Daily    Or     aspirin  81 mg Oral or NG Tube Daily     atorvastatin  40 mg Oral QPM     insulin aspart  1-3 Units Subcutaneous TID AC     insulin aspart  1-3 Units Subcutaneous At Bedtime     lisinopril  10 mg Oral Daily     potassium chloride  40 mEq Oral Once     sodium chloride (PF)  3 mL Intracatheter Q8H       Infusion Meds    - MEDICATION INSTRUCTIONS -       - MEDICATION INSTRUCTIONS -       sodium chloride 125 mL/hr at 01/10/22 1524       PRN Meds  acetaminophen, glucose **OR** dextrose **OR** glucagon, labetalol **OR** hydrALAZINE, lidocaine 4%, lidocaine (buffered or not buffered), - MEDICATION INSTRUCTIONS -, - MEDICATION INSTRUCTIONS -, polyethylene glycol, senna-docusate, sodium chloride (PF)       PHYSICAL EXAMINATION  Temp:  [97.6  F (36.4  C)-100.2  F (37.9  C)] 97.6  F (36.4  C)  Pulse:  [] 87  Resp:  [12-18] 18  BP: (187-221)/() 187/113  SpO2:  [97 %-99 %] 97 %      Neurologic  Mental Status:  alert, oriented x 3, follows  commands, speech clear and fluent, naming and repetition normal  Cranial Nerves:  PERRL, Left complete hemianopia, Left gaze palsy but able to cross the midline, facial sensation intact and symmetric, facial movements symmetric, hearing not formally tested but intact to conversation, palate elevation symmetric and uvula midline, shoulder shrug strong bilaterally, tongue protrusion midline  Motor:  normal muscle tone and bulk, no abnormal movements, able to move all limbs spontaneously, RUE and RLE are 5/5 throughout. In the LUE, has 3+/5 strength with shoulder abduction but 5/5 with elbow flexion/extension and . In LLE has only very subtle drift with 5s leg raise and does not touch the bed  Reflexes: Deferred  Sensory:  Decreased sensation to light touch in LUE and LLE with extinction on bilateral testing. Decreased sensation to pinprick in the LLE but intact in the LUE. Pt also appeared to have proprioceptive loss in the LUE, having a difficult time moving his L hand when not within his line of sight  Coordination:  normal finger-to-nose and heel-to-shin bilaterally without dysmetria  Station/Gait:  deferred    Stroke Scales    NIHSS  Interval baseline (01/08/22 2017)   Interval Comments     1a. Level of Consciousness 0-->Alert, keenly responsive   1b. LOC Questions 0-->Answers both questions correctly   1c. LOC Commands 0-->Performs both tasks correctly   2.   Best Gaze 1-->Partial gaze palsy, gaze is abnormal in one or both eyes, but forced deviation or total gaze paresis is not present   3.   Visual 2-->Complete hemianopia   4.   Facial Palsy 0-->Normal symmetrical movements   5a. Motor Arm, Left 2-->Some effort against gravity, limb cannot get to or maintain (if cued) 90 (or 45) degrees, drifts down to bed, but has some effort against gravity   5b. Motor Arm, Right 0-->No drift, limb holds 90 (or 45) degrees for full 10 secs   6a. Motor Leg, Left 2-->Some effort against gravity, leg falls to bed by 5 secs,  but has some effort against gravity   6b. Motor Leg, right 0-->No drift, leg holds 30 degree position for full 5 secs   7.   Limb Ataxia 0-->Absent   8.   Sensory 1-->Mild-to-moderate sensory loss, patient feels pinprick is less sharp or is dull on the affected side, or there is a loss of superficial pain with pinprick, but patient is aware of being touched   9.   Best Language 0-->No aphasia, normal   10. Dysarthria 0-->Normal   11. Extinction and Inattention  1-->Visual, tactile, auditory, spatial, or personal inattention or extinction to bilateral simultaneous stimulation in one of the sensory modalities   Total 9 (01/09/22 1815)       Imaging  I personally reviewed all imaging; relevant findings per HPI.     Lab Results Data   CBC  Recent Labs   Lab 01/11/22  0636 01/10/22  0637 01/09/22  0742   WBC 10.0 12.1* 12.2*   RBC 4.85 4.99 4.66   HGB 14.9 15.6 14.5   HCT 44.8 46.3 42.5    243 232     Basic Metabolic Panel    Recent Labs   Lab 01/11/22  0636 01/10/22  2314 01/10/22  1658 01/10/22  1545 01/10/22  1203 01/10/22  0747 01/10/22  0637 01/09/22  0906 01/09/22  0742     --   --   --   --   --  144  --  143   POTASSIUM 2.9*  --   --   --  3.2*  --  2.8*   < > 2.6*   CHLORIDE 107  --   --   --   --   --  107  --  107   CO2 27  --   --   --   --   --  24  --  27   BUN 20  --   --   --   --   --  16  --  14   CR 1.30*  --   --   --   --   --  1.28*  --  1.17   * 171* 132*   < >  --    < > 142*   < > 146*   VALERIE 9.1  --   --   --   --   --  9.2  --  9.0    < > = values in this interval not displayed.     Liver Panel  Recent Labs   Lab 01/08/22  1843   PROTTOTAL 8.5   ALBUMIN 3.9   BILITOTAL 0.8   ALKPHOS 94   AST 26   ALT 31     INR    Recent Labs   Lab Test 01/08/22  1843 01/08/22  1834   INR 0.95 1.0*      Lipid Profile    Recent Labs   Lab Test 01/08/22  1843 05/16/14  1506   CHOL 169 131   HDL 70 53   LDL 75 70   TRIG 118 38     A1C    Recent Labs   Lab Test 01/08/22  1843   A1C 7.8*      Troponin I  No results for input(s): TROPONIN, GHTROP in the last 168 hours.

## 2022-01-11 NOTE — PLAN OF CARE
"BP (!) 160/86   Pulse 79   Temp 97.7  F (36.5  C) (Axillary)   Resp 18   Ht 1.702 m (5' 7\")   Wt 81.1 kg (178 lb 12.7 oz)   SpO2 97%   BMI 28.00 kg/m      Status: Pt admitted 01/08 with L sided weakness and L homonymous hemiapnopsia, found to have R PCA infarct   Vitals: Hypertensive /127 this morning, gave labetalol with no effect. Gave hydralazine and BP came down to 160/86, OVSS on RA. On tele monitoring- SR  Neuros: A&O x4. Left complete hemianopsia, Left gaze palsy, Left neglect, slight left facial droop. Strength R side 5/5, L side 2/5, decreased sensation to L side  IV: R PIV infusing NS at 125mL/hr  Resp/trach: LS clear  Diet: Mod CHO diet   Bowel status: No BM  : Voiding in bedside urinal- adequate amount   Skin: Left hip bruise. Left shoulder bruise and abrasion. Right elbow abrasion.  Pain: Tylenol given x2 for back pain   Activity: Turns and repositions with A2  "

## 2022-01-12 ENCOUNTER — APPOINTMENT (OUTPATIENT)
Dept: CT IMAGING | Facility: CLINIC | Age: 47
End: 2022-01-12
Attending: COUNSELOR
Payer: COMMERCIAL

## 2022-01-12 ENCOUNTER — APPOINTMENT (OUTPATIENT)
Dept: NEUROLOGY | Facility: CLINIC | Age: 47
End: 2022-01-12
Payer: COMMERCIAL

## 2022-01-12 ENCOUNTER — APPOINTMENT (OUTPATIENT)
Dept: GENERAL RADIOLOGY | Facility: CLINIC | Age: 47
End: 2022-01-12
Payer: COMMERCIAL

## 2022-01-12 ENCOUNTER — APPOINTMENT (OUTPATIENT)
Dept: MRI IMAGING | Facility: CLINIC | Age: 47
End: 2022-01-12
Payer: COMMERCIAL

## 2022-01-12 LAB
ANION GAP SERPL CALCULATED.3IONS-SCNC: 10 MMOL/L (ref 3–14)
ANION GAP SERPL CALCULATED.3IONS-SCNC: 7 MMOL/L (ref 3–14)
BASE EXCESS BLDV CALC-SCNC: 1.9 MMOL/L (ref -7.7–1.9)
BUN SERPL-MCNC: 15 MG/DL (ref 7–30)
BUN SERPL-MCNC: 15 MG/DL (ref 7–30)
CALCIUM SERPL-MCNC: 8.7 MG/DL (ref 8.5–10.1)
CALCIUM SERPL-MCNC: 8.7 MG/DL (ref 8.5–10.1)
CHLORIDE BLD-SCNC: 106 MMOL/L (ref 94–109)
CHLORIDE BLD-SCNC: 109 MMOL/L (ref 94–109)
CO2 SERPL-SCNC: 23 MMOL/L (ref 20–32)
CO2 SERPL-SCNC: 26 MMOL/L (ref 20–32)
CREAT SERPL-MCNC: 1.09 MG/DL (ref 0.66–1.25)
CREAT SERPL-MCNC: 1.15 MG/DL (ref 0.66–1.25)
ERYTHROCYTE [DISTWIDTH] IN BLOOD BY AUTOMATED COUNT: 14.1 % (ref 10–15)
ERYTHROCYTE [DISTWIDTH] IN BLOOD BY AUTOMATED COUNT: 14.4 % (ref 10–15)
GFR SERPL CREATININE-BSD FRML MDRD: 79 ML/MIN/1.73M2
GFR SERPL CREATININE-BSD FRML MDRD: 84 ML/MIN/1.73M2
GLUCOSE BLD-MCNC: 188 MG/DL (ref 70–99)
GLUCOSE BLD-MCNC: 205 MG/DL (ref 70–99)
GLUCOSE BLDC GLUCOMTR-MCNC: 130 MG/DL (ref 70–99)
GLUCOSE BLDC GLUCOMTR-MCNC: 149 MG/DL (ref 70–99)
GLUCOSE BLDC GLUCOMTR-MCNC: 152 MG/DL (ref 70–99)
GLUCOSE BLDC GLUCOMTR-MCNC: 166 MG/DL (ref 70–99)
GLUCOSE BLDC GLUCOMTR-MCNC: 191 MG/DL (ref 70–99)
HCO3 BLDV-SCNC: 27 MMOL/L (ref 21–28)
HCT VFR BLD AUTO: 45.5 % (ref 40–53)
HCT VFR BLD AUTO: 46.5 % (ref 40–53)
HGB BLD-MCNC: 15.2 G/DL (ref 13.3–17.7)
HGB BLD-MCNC: 15.5 G/DL (ref 13.3–17.7)
LACTATE SERPL-SCNC: 1.4 MMOL/L (ref 0.7–2)
MAGNESIUM SERPL-MCNC: 1.8 MG/DL (ref 1.6–2.3)
MCH RBC QN AUTO: 30.8 PG (ref 26.5–33)
MCH RBC QN AUTO: 31 PG (ref 26.5–33)
MCHC RBC AUTO-ENTMCNC: 33.3 G/DL (ref 31.5–36.5)
MCHC RBC AUTO-ENTMCNC: 33.4 G/DL (ref 31.5–36.5)
MCV RBC AUTO: 92 FL (ref 78–100)
MCV RBC AUTO: 93 FL (ref 78–100)
O2/TOTAL GAS SETTING VFR VENT: 96 %
PCO2 BLDV: 41 MM HG (ref 40–50)
PH BLDV: 7.42 [PH] (ref 7.32–7.43)
PLATELET # BLD AUTO: 234 10E3/UL (ref 150–450)
PLATELET # BLD AUTO: 249 10E3/UL (ref 150–450)
PO2 BLDV: 52 MM HG (ref 25–47)
POTASSIUM BLD-SCNC: 3 MMOL/L (ref 3.4–5.3)
POTASSIUM BLD-SCNC: 3 MMOL/L (ref 3.4–5.3)
POTASSIUM BLD-SCNC: 3.1 MMOL/L (ref 3.4–5.3)
POTASSIUM BLD-SCNC: 3.6 MMOL/L (ref 3.4–5.3)
RBC # BLD AUTO: 4.9 10E6/UL (ref 4.4–5.9)
RBC # BLD AUTO: 5.04 10E6/UL (ref 4.4–5.9)
SODIUM SERPL-SCNC: 139 MMOL/L (ref 133–144)
SODIUM SERPL-SCNC: 142 MMOL/L (ref 133–144)
WBC # BLD AUTO: 12.3 10E3/UL (ref 4–11)
WBC # BLD AUTO: 13.9 10E3/UL (ref 4–11)

## 2022-01-12 PROCEDURE — 250N000011 HC RX IP 250 OP 636: Performed by: COUNSELOR

## 2022-01-12 PROCEDURE — 85027 COMPLETE CBC AUTOMATED: CPT | Performed by: COUNSELOR

## 2022-01-12 PROCEDURE — 70498 CT ANGIOGRAPHY NECK: CPT | Mod: 26 | Performed by: RADIOLOGY

## 2022-01-12 PROCEDURE — 84132 ASSAY OF SERUM POTASSIUM: CPT

## 2022-01-12 PROCEDURE — 84132 ASSAY OF SERUM POTASSIUM: CPT | Performed by: PSYCHIATRY & NEUROLOGY

## 2022-01-12 PROCEDURE — 70450 CT HEAD/BRAIN W/O DYE: CPT | Mod: 26 | Performed by: RADIOLOGY

## 2022-01-12 PROCEDURE — 36415 COLL VENOUS BLD VENIPUNCTURE: CPT | Performed by: STUDENT IN AN ORGANIZED HEALTH CARE EDUCATION/TRAINING PROGRAM

## 2022-01-12 PROCEDURE — 71045 X-RAY EXAM CHEST 1 VIEW: CPT

## 2022-01-12 PROCEDURE — 0042T CT HEAD PERFUSION WITH CONTRAST: CPT

## 2022-01-12 PROCEDURE — 36415 COLL VENOUS BLD VENIPUNCTURE: CPT | Performed by: COUNSELOR

## 2022-01-12 PROCEDURE — 250N000009 HC RX 250: Performed by: COUNSELOR

## 2022-01-12 PROCEDURE — 82803 BLOOD GASES ANY COMBINATION: CPT | Performed by: COUNSELOR

## 2022-01-12 PROCEDURE — 0042T CT HEAD PERFUSION WITH CONTRAST: CPT | Performed by: RADIOLOGY

## 2022-01-12 PROCEDURE — 999N000127 HC STATISTIC PERIPHERAL IV START W US GUIDANCE

## 2022-01-12 PROCEDURE — 258N000003 HC RX IP 258 OP 636: Performed by: STUDENT IN AN ORGANIZED HEALTH CARE EDUCATION/TRAINING PROGRAM

## 2022-01-12 PROCEDURE — 95711 VEEG 2-12 HR UNMONITORED: CPT

## 2022-01-12 PROCEDURE — 999N000175 HC STATISTIC STROKE CODE W/ ACCESS

## 2022-01-12 PROCEDURE — 70544 MR ANGIOGRAPHY HEAD W/O DYE: CPT

## 2022-01-12 PROCEDURE — 99233 SBSQ HOSP IP/OBS HIGH 50: CPT | Mod: GC | Performed by: PSYCHIATRY & NEUROLOGY

## 2022-01-12 PROCEDURE — 70496 CT ANGIOGRAPHY HEAD: CPT | Mod: 26 | Performed by: RADIOLOGY

## 2022-01-12 PROCEDURE — 87040 BLOOD CULTURE FOR BACTERIA: CPT

## 2022-01-12 PROCEDURE — 95720 EEG PHY/QHP EA INCR W/VEEG: CPT | Mod: GC | Performed by: PSYCHIATRY & NEUROLOGY

## 2022-01-12 PROCEDURE — 85027 COMPLETE CBC AUTOMATED: CPT | Performed by: STUDENT IN AN ORGANIZED HEALTH CARE EDUCATION/TRAINING PROGRAM

## 2022-01-12 PROCEDURE — 36415 COLL VENOUS BLD VENIPUNCTURE: CPT

## 2022-01-12 PROCEDURE — 82310 ASSAY OF CALCIUM: CPT | Performed by: STUDENT IN AN ORGANIZED HEALTH CARE EDUCATION/TRAINING PROGRAM

## 2022-01-12 PROCEDURE — 70551 MRI BRAIN STEM W/O DYE: CPT | Mod: 26 | Performed by: RADIOLOGY

## 2022-01-12 PROCEDURE — 71045 X-RAY EXAM CHEST 1 VIEW: CPT | Mod: 26

## 2022-01-12 PROCEDURE — 70544 MR ANGIOGRAPHY HEAD W/O DYE: CPT | Mod: 26 | Performed by: RADIOLOGY

## 2022-01-12 PROCEDURE — 250N000013 HC RX MED GY IP 250 OP 250 PS 637: Performed by: STUDENT IN AN ORGANIZED HEALTH CARE EDUCATION/TRAINING PROGRAM

## 2022-01-12 PROCEDURE — 70498 CT ANGIOGRAPHY NECK: CPT

## 2022-01-12 PROCEDURE — 70551 MRI BRAIN STEM W/O DYE: CPT

## 2022-01-12 PROCEDURE — 36415 COLL VENOUS BLD VENIPUNCTURE: CPT | Performed by: PSYCHIATRY & NEUROLOGY

## 2022-01-12 PROCEDURE — 250N000011 HC RX IP 250 OP 636: Performed by: PSYCHIATRY & NEUROLOGY

## 2022-01-12 PROCEDURE — 250N000013 HC RX MED GY IP 250 OP 250 PS 637: Performed by: PSYCHIATRY & NEUROLOGY

## 2022-01-12 PROCEDURE — 83605 ASSAY OF LACTIC ACID: CPT | Performed by: PSYCHIATRY & NEUROLOGY

## 2022-01-12 PROCEDURE — 82374 ASSAY BLOOD CARBON DIOXIDE: CPT | Performed by: COUNSELOR

## 2022-01-12 PROCEDURE — 120N000002 HC R&B MED SURG/OB UMMC

## 2022-01-12 PROCEDURE — 250N000009 HC RX 250: Performed by: PSYCHIATRY & NEUROLOGY

## 2022-01-12 PROCEDURE — 83735 ASSAY OF MAGNESIUM: CPT

## 2022-01-12 PROCEDURE — 70450 CT HEAD/BRAIN W/O DYE: CPT

## 2022-01-12 PROCEDURE — 70496 CT ANGIOGRAPHY HEAD: CPT

## 2022-01-12 PROCEDURE — 95714 VEEG EA 12-26 HR UNMNTR: CPT

## 2022-01-12 PROCEDURE — 250N000013 HC RX MED GY IP 250 OP 250 PS 637

## 2022-01-12 PROCEDURE — 99207 PR NO CHARGE LOS: CPT | Performed by: INTERNAL MEDICINE

## 2022-01-12 PROCEDURE — 250N000013 HC RX MED GY IP 250 OP 250 PS 637: Performed by: COUNSELOR

## 2022-01-12 RX ORDER — POTASSIUM CHLORIDE 750 MG/1
40 TABLET, EXTENDED RELEASE ORAL ONCE
Status: DISCONTINUED | OUTPATIENT
Start: 2022-01-12 | End: 2022-01-14

## 2022-01-12 RX ORDER — LEVETIRACETAM 5 MG/ML
500 INJECTION INTRAVASCULAR EVERY 12 HOURS
Status: DISCONTINUED | OUTPATIENT
Start: 2022-01-12 | End: 2022-01-12

## 2022-01-12 RX ORDER — POTASSIUM CHLORIDE 7.45 MG/ML
10 INJECTION INTRAVENOUS
Status: COMPLETED | OUTPATIENT
Start: 2022-01-12 | End: 2022-01-12

## 2022-01-12 RX ORDER — CARVEDILOL 6.25 MG/1
6.25 TABLET ORAL ONCE
Status: DISCONTINUED | OUTPATIENT
Start: 2022-01-12 | End: 2022-01-15

## 2022-01-12 RX ORDER — METOPROLOL TARTRATE 1 MG/ML
5 INJECTION, SOLUTION INTRAVENOUS EVERY 5 MIN PRN
Status: COMPLETED | OUTPATIENT
Start: 2022-01-12 | End: 2022-01-12

## 2022-01-12 RX ORDER — POTASSIUM CHLORIDE 750 MG/1
20 TABLET, EXTENDED RELEASE ORAL ONCE
Status: COMPLETED | OUTPATIENT
Start: 2022-01-13 | End: 2022-01-13

## 2022-01-12 RX ORDER — LABETALOL HYDROCHLORIDE 5 MG/ML
20 INJECTION, SOLUTION INTRAVENOUS ONCE
Status: COMPLETED | OUTPATIENT
Start: 2022-01-12 | End: 2022-01-12

## 2022-01-12 RX ORDER — POTASSIUM CHLORIDE 750 MG/1
40 TABLET, EXTENDED RELEASE ORAL ONCE
Status: COMPLETED | OUTPATIENT
Start: 2022-01-12 | End: 2022-01-12

## 2022-01-12 RX ORDER — LACOSAMIDE 50 MG/1
100 TABLET ORAL 2 TIMES DAILY
Status: DISCONTINUED | OUTPATIENT
Start: 2022-01-12 | End: 2022-01-12

## 2022-01-12 RX ORDER — LEVETIRACETAM 15 MG/ML
1500 INJECTION INTRAVASCULAR ONCE
Status: COMPLETED | OUTPATIENT
Start: 2022-01-12 | End: 2022-01-12

## 2022-01-12 RX ORDER — IOPAMIDOL 755 MG/ML
116 INJECTION, SOLUTION INTRAVASCULAR ONCE
Status: COMPLETED | OUTPATIENT
Start: 2022-01-12 | End: 2022-01-12

## 2022-01-12 RX ORDER — CARVEDILOL 6.25 MG/1
6.25 TABLET ORAL 2 TIMES DAILY WITH MEALS
Status: DISCONTINUED | OUTPATIENT
Start: 2022-01-12 | End: 2022-01-18

## 2022-01-12 RX ADMIN — SODIUM CHLORIDE, PRESERVATIVE FREE 100 ML: 5 INJECTION INTRAVENOUS at 01:20

## 2022-01-12 RX ADMIN — HYDRALAZINE HYDROCHLORIDE 10 MG: 20 INJECTION INTRAMUSCULAR; INTRAVENOUS at 05:21

## 2022-01-12 RX ADMIN — ASPIRIN 81 MG CHEWABLE TABLET 81 MG: 81 TABLET CHEWABLE at 10:32

## 2022-01-12 RX ADMIN — SODIUM CHLORIDE: 9 INJECTION, SOLUTION INTRAVENOUS at 09:26

## 2022-01-12 RX ADMIN — POTASSIUM CHLORIDE 40 MEQ: 750 TABLET, EXTENDED RELEASE ORAL at 22:11

## 2022-01-12 RX ADMIN — LABETALOL HYDROCHLORIDE 10 MG: 5 INJECTION, SOLUTION INTRAVENOUS at 06:07

## 2022-01-12 RX ADMIN — IOPAMIDOL 116 ML: 755 INJECTION, SOLUTION INTRAVENOUS at 01:20

## 2022-01-12 RX ADMIN — METOPROLOL TARTRATE 5 MG: 5 INJECTION INTRAVENOUS at 03:14

## 2022-01-12 RX ADMIN — CARVEDILOL 6.25 MG: 3.12 TABLET, FILM COATED ORAL at 19:11

## 2022-01-12 RX ADMIN — POTASSIUM CHLORIDE 10 MEQ: 7.46 INJECTION, SOLUTION INTRAVENOUS at 12:51

## 2022-01-12 RX ADMIN — LABETALOL HYDROCHLORIDE 20 MG: 5 INJECTION, SOLUTION INTRAVENOUS at 00:56

## 2022-01-12 RX ADMIN — HYDRALAZINE HYDROCHLORIDE 20 MG: 20 INJECTION INTRAMUSCULAR; INTRAVENOUS at 01:26

## 2022-01-12 RX ADMIN — POTASSIUM CHLORIDE 10 MEQ: 7.46 INJECTION, SOLUTION INTRAVENOUS at 11:32

## 2022-01-12 RX ADMIN — LEVETIRACETAM 1500 MG: 15 INJECTION INTRAVENOUS at 02:07

## 2022-01-12 RX ADMIN — ATORVASTATIN CALCIUM 40 MG: 40 TABLET, FILM COATED ORAL at 19:11

## 2022-01-12 RX ADMIN — POTASSIUM CHLORIDE 10 MEQ: 7.46 INJECTION, SOLUTION INTRAVENOUS at 13:53

## 2022-01-12 RX ADMIN — HYDRALAZINE HYDROCHLORIDE 20 MG: 20 INJECTION INTRAMUSCULAR; INTRAVENOUS at 05:41

## 2022-01-12 RX ADMIN — LEVETIRACETAM 500 MG: 5 INJECTION INTRAVENOUS at 09:26

## 2022-01-12 RX ADMIN — LABETALOL HYDROCHLORIDE 20 MG: 5 INJECTION, SOLUTION INTRAVENOUS at 00:15

## 2022-01-12 RX ADMIN — POTASSIUM CHLORIDE 10 MEQ: 7.46 INJECTION, SOLUTION INTRAVENOUS at 16:55

## 2022-01-12 RX ADMIN — HYDRALAZINE HYDROCHLORIDE 10 MG: 20 INJECTION INTRAMUSCULAR; INTRAVENOUS at 01:15

## 2022-01-12 RX ADMIN — AMLODIPINE BESYLATE 10 MG: 10 TABLET ORAL at 10:32

## 2022-01-12 RX ADMIN — METOPROLOL TARTRATE 5 MG: 5 INJECTION INTRAVENOUS at 04:43

## 2022-01-12 ASSESSMENT — ACTIVITIES OF DAILY LIVING (ADL)
ADLS_ACUITY_SCORE: 19
ADLS_ACUITY_SCORE: 22
ADLS_ACUITY_SCORE: 19
ADLS_ACUITY_SCORE: 22
ADLS_ACUITY_SCORE: 21
ADLS_ACUITY_SCORE: 22
ADLS_ACUITY_SCORE: 19
ADLS_ACUITY_SCORE: 23
ADLS_ACUITY_SCORE: 17
ADLS_ACUITY_SCORE: 19
ADLS_ACUITY_SCORE: 17
ADLS_ACUITY_SCORE: 19
ADLS_ACUITY_SCORE: 19
ADLS_ACUITY_SCORE: 22
ADLS_ACUITY_SCORE: 22
ADLS_ACUITY_SCORE: 19
ADLS_ACUITY_SCORE: 22
ADLS_ACUITY_SCORE: 22
ADLS_ACUITY_SCORE: 21
ADLS_ACUITY_SCORE: 17
ADLS_ACUITY_SCORE: 19
ADLS_ACUITY_SCORE: 19

## 2022-01-12 ASSESSMENT — VISUAL ACUITY
OU: NORMAL ACUITY

## 2022-01-12 NOTE — PLAN OF CARE
Pt here for L PCA stroke, vs ex HTN within parameters, neuros include: difficult to assess and he waxes and wanes, @ start of shift he was somnolent, but later became more awake but still needing constant stiumulation to do assessment. Pt participation is limited, only participates intermittently, L side 1/5, R side 5/5, sluggish pupils, didn't do well with taking pills whole. IV running NS @ 125ml/hr, NPO, K+ replaced, up AO2 w/lift, no BM this shift, male external cathter in place. Pt on EEG for less than 2hrs, pt pulled leads off, MRI sent down for later this evening, on CCM with NSR. Last repositioned @ 1445. Continue to care per orders.        MRI called and said they can take him now, they set up transport for 1500. K+ was running but stopped, all EKG leads and external catheters were disconnected in preparation, pt on hovermat.

## 2022-01-12 NOTE — PROGRESS NOTES
[facility]    Stroke Progress Note    Interval Events  - CARISA canceled due to overnight changes  - Today, increased weakness in L-side extremities. Mental status waxing/waning.  - Started overnight EEG due to concern for seizures  - Repeat MRI & MRA Brain  - Added Coreg 6.25 mg PO BID      HPI Summary  Miriam Hall is a 46 year old male with PMHx HTN who presented on 1/8/2022 with symptoms concerning for stroke. The pt reports that he was in his normal state of health that morning and then decided to take a nap around noon. On waking from his nap around 2:00PM, he found that he was having trouble moving and feeling the L side of his body. Unfortunately, the pt was unable to get to a phone and ended up crawling on his floor until he could finally get the attention of one of his neighbors, who then was able to call EMS     EMS arrived to find the pt continuing to have decreased sensation and weakness on his L side. They noted a , /145, and . On arrival in the ED, the pt remained afebrile, but was tachycardic () and quite hypertensive (/146). A stroke code was called for initial NIHSS 9, for a partial L gaze palsy, L-sided complete hemianopia, L arm > leg weakness, decreased sensation in the L arm/leg to sensation (pinprick intact in arm but decreased in leg), mild dysarthria, L-sided extinction, and possible L neglect. The pt also seemed to be having trouble with proprioception in the L arm, though did recognize it to be his own. Pt was immediately taken to CT where he underwent a CTH, CTA Head/Neck and CT Perfusion study, revealing a well-established R parieto-occipital stroke with an LVO of the R P1 segment. As the stroke already appeared well-established and the pt was outside the time window, no tPA was given. No thrombectomy was performed due to the location of the LVO in the PCA. The pt was loaded with ASA 324mg once with plans  to allow for permissive HTN and admission to the Stroke service for further work-up     The patient denies any history of smoking or drug use. Only rarely uses EtOH. He would prefer that no one be contact at this time concerning his diagnosis or medical condition.     TPA Treatment   Not given due to established infarct on imaging and unclear or unfavorable risk-benefit profile for extended window thrombolysis beyond the conventional 4.5 hour time window.     Endovascular Treatment  Proximal vessel occlusion present, but endovascular treatment not initiated due to location of the thrombus in the posterior cerebral artery    Stroke Evaluation Summarized    MRI/Head CT MRI brain  1. Findings from CT and CTA performed yesterday are confirmed with stroke in the distribution of the right posterior cerebral artery.  This involves the medial right occipital lobe and medial right temporal lobe with extension into the body of the thalamus.  2. Additional punctate foci of diffusion restriction in the left putamen.  3. Moderate leukoaraiosis.    Intracranial Vasculature MRA  Redemonstration of occlusion of the right PCA near its  origin. Additional short segment high-grade stenosis of a right-sided M2 MCA branch. Findings are not likely not significantly changed from CT performed yesterday.   Cervical Vasculature CTA  1. Occlusion of the proximal right PCA.  2. High-grade stenosis of the occiput M2 branch of the right MCA with  attenuated flow distal.  3. Neck CTA demonstrates no stenosis of the major cervical arteries.     Echocardiogram The visual ejection fraction is 50-55%.   No regional wall motion abnormalities are seen.  Moderate to severe concentric wall thickening consistent with left ventricular hypertrophy is present.  Global right ventricular function is normal.  There was no shunt at the atrial septal level as assessed by agitated saline bubble study at rest and with Valsalva maneuver.  Previous study not  available for comparison.   EKG/Telemetry EKG with sinus tachycardia   Other Testing Hypercoagulability profile has not been sent yet     LDL  1/8/2022: 75 mg/dL   A1C  1/8/2022: 7.8 %   Troponin 1/10/2022: 2,115 ng/L ()       Impression   # Acute ischemic stroke of Right P1 segement of the PCA  # R P1 occlusion and R M2 Stenosis  # Large vessel atherosclerosis vs ESUS  Miriam Hall is a 46 year old male with PMHx HTN who presented on 1/8/2022 with L gaze palsy, L complete hemianopia, mild dysarthria, L arm > leg weakness, L arm/leg decreased sensation, and L side extinction after waking up from a nap earlier this morning. On arrival in the ED, was found to have a well-established large vessel occlusion of the R P1 segment of the PCA and R M2 high grade stenosis suggesting large vessel intracranial atherosclerotic disease. MRI with medial right occipital lobe and medial right temporal lobe with extension into the body of the thalamus. However, the remaining vessels are not suggestive of Large vessel intracranial athero. Rest of his intracranial vessels were without disease. ECHO without low EF, atrial size not concerning for afib, no valvular pathology. He does have raised blood pressure however the location is cortical. Possible to have ESUS with possible B/L infarcts pending further work up.   - Aspirin 81 mg PO daily for secondary stroke prevention  - PT/OT/SLP Rec: ARU  - PM&R  - Stroke Education  - Depression Screen  - Apnea Screen  - Euthermia, Euglycemia  - On regular diet with thin liquids    #HLD  LDL 75. Long-term Goal b/w 40-70.   - Atorvastatin 40 mg PO daily    #HTN  Inpatient BP Goal < 180 per Cardiology recs (1/10). Stopped Lisinopril and started Amlodipine due to increasing Crt on Lisinopril.  - Amlodipine 5 mg PO daily  - Hydralazine/Labetalol 10-20 mg IV Q1H PRN for BP > 180    # DM  Newly diagnosed DM with Hgb A1C 7.8.  - Continue to monitor glucose  - LSSI  - Hypoglycemia  protocol     #Hypokalemia   Pt presented with K 2.7, today 2.6, Mg within normal limits, replaced 40 PO  - Replacement prn     #Possible DISHA  Pt presented with Cr 1.37 improved with IVF to 1.17  - Daily BMP     #Leukocytosis (resolved)  Pt presented with WBC 12.4. Most likely is a stress response given lack of fever. Mild fever overnight but WBC decreasing. Will continue to monitor.  - Daily CBC    #Elevated Troponin (resolved)  Pt arrives with Trop 147 and rising. EKG exhibited sinus tachycardia, but no concerning signs of ischemia. Troponin peaked 1/10.     Prophylaxis            For VTE Prevention:  - SCD     For Acid Suppression:  - GI prophylaxis is not indicated     Code Status  Full Code    The patient was discussed with Stroke Staff, Dr. Blandon.    Nelson Laura DO  Neurology Resident PGY1  Pager:  [phone]  ______________________________________________________    Clinically Significant Risk Factors Present on Admission                 Medications   Scheduled Meds    amLODIPine  10 mg Oral Daily     aspirin  81 mg Oral Daily    Or     aspirin  81 mg Oral or NG Tube Daily     atorvastatin  40 mg Oral QPM     carvedilol  6.25 mg Oral Once     carvedilol  6.25 mg Oral BID w/meals     insulin aspart  1-3 Units Subcutaneous TID AC     insulin aspart  1-3 Units Subcutaneous At Bedtime     levETIRAcetam  500 mg Intravenous Q12H     sodium chloride (PF)  3 mL Intracatheter Q8H       Infusion Meds    - MEDICATION INSTRUCTIONS -       - MEDICATION INSTRUCTIONS -       sodium chloride 125 mL/hr at 01/12/22 0100       PRN Meds  acetaminophen, artificial tears ophthalmic solution, glucose **OR** dextrose **OR** glucagon, hydrALAZINE, labetalol, lidocaine 4%, lidocaine (buffered or not buffered), - MEDICATION INSTRUCTIONS -, - MEDICATION INSTRUCTIONS -, polyethylene glycol, senna-docusate, sodium chloride (PF)       PHYSICAL EXAMINATION  Temp:  [97.7  F (36.5  C)-99.8  F (37.7  C)] 99.8  F (37.7  C)  Pulse:  []  95  Resp:  [16-20] 18  BP: (160-219)/() 176/90  Cuff Mean (mmHg):  [119-140] 124  SpO2:  [93 %-98 %] 95 %      Neurologic  Mental Status:  waxing/waning alert & orientation; In AM, very somnolent requiring repeated chest rubs & shouting to awaken, oriented to self only with heavily slurred speech & word-finding difficulty; later that same AM he was awake, alert & oriented x 3, following all commands, speech clear and fluent  Cranial Nerves:  PERRL, Left complete hemianopia, Left gaze palsy but able to cross the midline, facial sensation intact and symmetric, facial movements symmetric, hearing not formally tested but intact to conversation, palate elevation symmetric and uvula midline, shoulder shrug strong bilaterally, tongue protrusion midline  Motor:  normal muscle tone and bulk, no abnormal movements, able to move all limbs spontaneously, RUE and RLE are 5/5 throughout. In the LUE & LLE, 0/5 strength Reflexes: Deferred  Sensory:  Decreased sensation to light touch in LUE and LLE with extinction on bilateral testing. Decreased sensation to pinprick in the LLE but intact in the LUE. Difficult time moving his L hand when not within his line of sight. On hand-clapping, he reaches and touches his left hand with his right.  Coordination:  normal finger-to-nose with RUE; unable to assess LUE  Station/Gait:  deferred    Stroke Scales    NIHSS  Interval baseline (01/12/22 0324)   Interval Comments     1a. Level of Consciousness 1-->Not alert, but arousable by minor stimulation to obey, answer, or respond   1b. LOC Questions 2-->Answers neither question correctly   1c. LOC Commands 1-->Performs one task correctly   2.   Best Gaze 1-->Partial gaze palsy, gaze is abnormal in one or both eyes, but forced deviation or total gaze paresis is not present   3.   Visual 2-->Complete hemianopia   4.   Facial Palsy 1-->Minor paralysis (flattened nasolabial fold, asymmetry on smiling)   5a. Motor Arm, Left 4-->No movement   5b.  Motor Arm, Right 0-->No drift, limb holds 90 (or 45) degrees for full 10 secs   6a. Motor Leg, Left 4-->No movement   6b. Motor Leg, right 1-->Drift, leg falls by the end of the 5-sec period but does not hit bed   7.   Limb Ataxia 0-->Absent   8.   Sensory 1-->Mild-to-moderate sensory loss, patient feels pinprick is less sharp or is dull on the affected side, or there is a loss of superficial pain with pinprick, but patient is aware of being touched   9.   Best Language 2-->Severe aphasia, all communication is through fragmentary expression, great need for inference, questioning, and guessing by the listener. Range of information that can be exchanged is limited, listener carries burden of. . . (see row details)   10. Dysarthria 0-->Normal   11. Extinction and Inattention  1-->Visual, tactile, auditory, spatial, or personal inattention or extinction to bilateral simultaneous stimulation in one of the sensory modalities   Total 21 (01/12/22 0324)       Imaging  I personally reviewed all imaging; relevant findings per HPI.     Lab Results Data   CBC  Recent Labs   Lab 01/12/22  0714 01/12/22  0132 01/11/22  0636   WBC 12.3* 13.9* 10.0   RBC 4.90 5.04 4.85   HGB 15.2 15.5 14.9   HCT 45.5 46.5 44.8    234 221     Basic Metabolic Panel    Recent Labs   Lab 01/12/22  0136 01/12/22  0132 01/11/22  2225 01/11/22  0806 01/11/22  0636 01/10/22  1545 01/10/22  1203 01/10/22  0747 01/10/22  0637   NA  --  139  --   --  142  --   --   --  144   POTASSIUM  --  3.0*  --   --  2.9*  --  3.2*  --  2.8*   CHLORIDE  --  106  --   --  107  --   --   --  107   CO2  --  26  --   --  27  --   --   --  24   BUN  --  15  --   --  20  --   --   --  16   CR  --  1.09  --   --  1.30*  --   --   --  1.28*   * 205* 136*   < > 128*   < >  --    < > 142*   VALERIE  --  8.7  --   --  9.1  --   --   --  9.2    < > = values in this interval not displayed.     Liver Panel  Recent Labs   Lab 01/08/22  1843   PROTTOTAL 8.5   ALBUMIN 3.9    BILITOTAL 0.8   ALKPHOS 94   AST 26   ALT 31     INR    Recent Labs   Lab Test 01/08/22  1843 01/08/22  1834   INR 0.95 1.0*      Lipid Profile    Recent Labs   Lab Test 01/08/22  1843 05/16/14  1506   CHOL 169 131   HDL 70 53   LDL 75 70   TRIG 118 38     A1C    Recent Labs   Lab Test 01/08/22  1843   A1C 7.8*     Troponin I  No results for input(s): TROPONIN, GHTROP in the last 168 hours.

## 2022-01-12 NOTE — PROGRESS NOTES
Stroke Code Nurse-Responder Note    Arrival Time to Stroke Code: 0030    Stroke Code Team interventions: CT/CTA, antihypertensives (de-escalated at 0201 by Noemí Adorno)    ED/Bedside Nurse providing handoff: Marycarmen Orr ([id number])    Time left for CT: 0035    Time arrived to next location (ED/Unit/IR): 0054    ED/Bedside Nurse given handoff (name/time): Marycarmen Orr ([id number])    Ernesto Daly, RN

## 2022-01-12 NOTE — SIGNIFICANT EVENT
At 2350, RN found patient with the following neuro changes: disoriented to time, place, and situation. He had mild-mod aphasia with WF. Increased weakness on left side, reactive only to pressure pain. Patient was confused, arousable to voice and touch. Difficulty following commands. Provider was notified in person at 2353 and at bedside by 2354. Last Known Well was 2319, time of hydralalize administration as charted in MAR. Provider placed order for STAT Head CT and STAT dose of labetalol to help control BP. Patient was placed on hovermat, CT was called to confirm receipt and readiness, and labetalol was given. Float float RN was called to bring pt to CT with ZOLL for cardiac monitoring - bedside RN was informed by him that patient could not go to CT with BP above set parameters, which was SBP <180, and that he would come up to bedside when parameters were met. STAT IV was ordered for contrast. BP not reactive to labetalol. At this time, bedside RN and Dr. Adorno agreed to call stroke code to speed up the process. Stroke code was called at 0025. 18g IV was placed, patient was sent to CT. When patient returned, additional doses of labetalol and hydralazine were given to keep patient under parameters. Stroke code was deescalated when CT scans came back showing no change in previous stroke. Keppra load administered for possible seizure. First dose of IV metoprolol given at 0314, vitals taken q15x2, on CCM. BP minimally responsive after 30 minutes, end /103. 2nd dose of IV metoprolol given at 0443. Patients neuros remain unchanged ex increased lethargy and refusing to participate in most assessments.

## 2022-01-12 NOTE — CONSULTS
"[facility]    Stroke Consult Note    Reason for Consult: Stroke Code     Chief Complaint: Stroke Symptoms      HPI  Miriam Hall is a 46 year old male with past medical history of HTN who is here since 1/8/22 with R PCA infarct with left sided weakness and neglect. Around 2350 he had significant change in mental status he was disoriented with concern for aphasia. His lkn was 2320 when getting prn blood pressure medication, on recent neuro checks he has been alert and oriented x 4/4 and able to follow commands.     When called to bedside patient was markedly different than previously documented exam, oriented to self only. When asked other questions, says \"why can't I say\", \"why don't I know.\" Appearing like he is trying to respond and frustrated that he is unable to.  When given options still unable to state hospital or 2022. Unable to name simple objects. He was able to follow some simple commands like wiggle toes and give a thumbs up, then more difficulty with some commands like show me 2 fingers or touch your right ear. He also had 0/5 strength in his left arm and left leg (in contrast to drift as noted per day team.) He grimaced to noxious stim only on the left while still with antigravity strength in the right.     Imaging Findings  CT, CTA, CTP were complete which showed expected evolution of right PCA infarct. No large vessel occlusion or new thrombus to explain change in mental status.     Thrombolytic Treatment   Not likely a new stroke, although distal left MCA infarct is not ruled out based on CT alone     Endovascular Treatment  Not initiated due to absence of proximal vessel occlusion    Impression   Patient with change in neuro exam, disoriented with expressive aphasia and worsened left sided weakness. CT with just mild petechial hemorrhage in Rt PCA infarct. Differential is broad including hypertensive encephalopathy or press. Small left mca " infarct is possible although no occlusion seen on CTA. Seizure is possible, other possibilities delirium related to recent stroke or infectious/metabolic encephalopathy.     Recommendations  -loaded with keppra 1500 mg and started 500 mg keppra bid   -repeat MRI in AM if symptoms persist   -broad infectious/metabolic work up sent with UA, cbc, bmp, vbg, lactate sent   -multiple additional labetalol, and hydralazine doses given. Plan to start coreg after SLP eval.   -too sleepy to take PO meds, will need SLP eval       Discussed with stroke fellow Dr. Ruelas , staff stroke attending is Dr. Kay. To be staffed with Dr. Christine in the AM.     Noemí Adorno,   Neurology PGY-4   [id number]    ______________________________________________________     Past Medical History   Past Medical History:   Diagnosis Date     Hypertension      Past Surgical History   Past Surgical History:   Procedure Laterality Date     TONSILLECTOMY       Medications   Home Meds  Prior to Admission medications    Medication Sig Start Date End Date Taking? Authorizing Provider   lisinopril-hydrochlorothiazide (PRINZIDE,ZESTORETIC) 20-25 MG per tablet Take 1 tablet by mouth daily  Patient not taking: Reported on 1/10/2022 5/21/14   Duong Vigil MD       Scheduled Meds    amLODIPine  10 mg Oral Daily     aspirin  81 mg Oral Daily    Or     aspirin  81 mg Oral or NG Tube Daily     atorvastatin  40 mg Oral QPM     carvedilol  6.25 mg Oral Once     carvedilol  6.25 mg Oral BID w/meals     insulin aspart  1-3 Units Subcutaneous TID AC     insulin aspart  1-3 Units Subcutaneous At Bedtime     levETIRAcetam  1,500 mg Intravenous Once     levETIRAcetam  500 mg Intravenous Q12H     sodium chloride (PF)  3 mL Intracatheter Q8H       Infusion Meds    - MEDICATION INSTRUCTIONS -       - MEDICATION INSTRUCTIONS -       sodium chloride Stopped (01/12/22 0022)       PRN Meds  acetaminophen, artificial tears ophthalmic solution, glucose **OR** dextrose  "**OR** glucagon, hydrALAZINE, labetalol, lidocaine 4%, lidocaine (buffered or not buffered), - MEDICATION INSTRUCTIONS -, - MEDICATION INSTRUCTIONS -, polyethylene glycol, senna-docusate, sodium chloride (PF)    Allergies   Allergies   Allergen Reactions     Pcn [Penicillin G] Hives and Itching     Family History   Family History   Problem Relation Age of Onset     Breast Cancer Mother      Diabetes Mother      Lipids Mother      Hypertension Mother      Hypertension Father      Heart Disease Father      Respiratory Father      Heart Disease Maternal Grandfather         CHF     Social History   Social History     Tobacco Use     Smoking status: Never Smoker     Smokeless tobacco: Never Used   Substance Use Topics     Alcohol use: Yes     Alcohol/week: 1.7 standard drinks     Types: 2 Cans of beer per week     Comment: 1 weeklyt     Drug use: No       Review of Systems   Review of systems not obtained due to patient factors - mental status       PHYSICAL EXAMINATION  .Physical Examination   Vitals: BP (!) 174/101 (BP Location: Left arm)   Pulse 98   Temp 99.2  F (37.3  C) (Axillary)   Resp 18   Ht 1.702 m (5' 7\")   Wt 81.1 kg (178 lb 12.7 oz)   SpO2 97%   BMI 28.00 kg/m    General: Patient lying in bed, NAD  Mental status: Sleepy, oriented to self but not place, time, year, or situation. Language is limited with short sentence responses, naming impaired and repetition impaired.  Cranial nerves: PERRL, conjugate gaze, EOMI with difficulty with left gaze, visual fields with left field cut, face with new left droop, tongue protrusion/uvula midline, no dysarthria.   Motor: Normal muscle bulk and tone. No abnormal movements. 0/5 in left arm and left leg. Full strength in right arm and right leg.   Sensory: Intact to only noxious on the left  Gait: deferred         Dysphagia Screen  Dysarthria or facial droop present - Maintain NPO, consult SLP    Stroke Scales    NIHSS  Interval baseline (01/12/22 0324)   Interval " Comments     1a. Level of Consciousness 1-->Not alert, but arousable by minor stimulation to obey, answer, or respond   1b. LOC Questions 2-->Answers neither question correctly   1c. LOC Commands 1-->Performs one task correctly   2.   Best Gaze 1-->Partial gaze palsy, gaze is abnormal in one or both eyes, but forced deviation or total gaze paresis is not present   3.   Visual 2-->Complete hemianopia   4.   Facial Palsy 1-->Minor paralysis (flattened nasolabial fold, asymmetry on smiling)   5a. Motor Arm, Left 4-->No movement   5b. Motor Arm, Right 0-->No drift, limb holds 90 (or 45) degrees for full 10 secs   6a. Motor Leg, Left 4-->No movement   6b. Motor Leg, right 1-->Drift, leg falls by the end of the 5-sec period but does not hit bed   7.   Limb Ataxia 0-->Absent   8.   Sensory 1-->Mild-to-moderate sensory loss, patient feels pinprick is less sharp or is dull on the affected side, or there is a loss of superficial pain with pinprick, but patient is aware of being touched   9.   Best Language 2-->Severe aphasia, all communication is through fragmentary expression, great need for inference, questioning, and guessing by the listener. Range of information that can be exchanged is limited, listener carries burden of. . . (see row details)   10. Dysarthria 0-->Normal   11. Extinction and Inattention  1-->Visual, tactile, auditory, spatial, or personal inattention or extinction to bilateral simultaneous stimulation in one of the sensory modalities   Total 21 (01/12/22 0324)       Modified Mary Score (Pre-morbid)  2-Slight disability; unable to carry out all previous activities, but able to look after own affairs    Imaging  I personally reviewed all imaging; relevant findings per HPI.     Lab Results Data   CBC  Recent Labs   Lab 01/12/22  0132 01/11/22  0636 01/10/22  0637   WBC 13.9* 10.0 12.1*   RBC 5.04 4.85 4.99   HGB 15.5 14.9 15.6   HCT 46.5 44.8 46.3    221 243     Basic Metabolic Panel    Recent Labs    Lab 01/12/22  0136 01/12/22  0132 01/11/22  2225 01/11/22  0806 01/11/22  0636 01/10/22  1545 01/10/22  1203 01/10/22  0747 01/10/22  0637   NA  --  139  --   --  142  --   --   --  144   POTASSIUM  --  3.0*  --   --  2.9*  --  3.2*  --  2.8*   CHLORIDE  --  106  --   --  107  --   --   --  107   CO2  --  26  --   --  27  --   --   --  24   BUN  --  15  --   --  20  --   --   --  16   CR  --  1.09  --   --  1.30*  --   --   --  1.28*   * 205* 136*   < > 128*   < >  --    < > 142*   VALERIE  --  8.7  --   --  9.1  --   --   --  9.2    < > = values in this interval not displayed.     Liver Panel  Recent Labs   Lab 01/08/22  1843   PROTTOTAL 8.5   ALBUMIN 3.9   BILITOTAL 0.8   ALKPHOS 94   AST 26   ALT 31     INR    Recent Labs   Lab Test 01/08/22  1843 01/08/22  1834   INR 0.95 1.0*      Lipid Profile    Recent Labs   Lab Test 01/08/22  1843 05/16/14  1506   CHOL 169 131   HDL 70 53   LDL 75 70   TRIG 118 38     A1C    Recent Labs   Lab Test 01/08/22  1843   A1C 7.8*     Troponin I  No results for input(s): TROPONIN, GHTROP in the last 168 hours.       Stroke Code Data Data   Stroke Code Data  (for stroke code without tele)  Stroke code activated 01/12/22   0026   First stroke provider response 01/12/22   0027   Last known normal 01/11/22   2320   Time of discovery   (or onset of symptoms) 01/08/22   1400   Head CT read by Stroke Neuro Dr/Provider 01/12/22   0130 (there was delay in CTA processing, images were not uploaded over a longer time period than normal)   Was stroke code de-escalated? Yes 01/12/22 0201  symptoms not likely caused by stroke

## 2022-01-12 NOTE — PROGRESS NOTES
Preliminary EEG report:    First hour of Video EEG was reviewed.  Background activity consists of diffuse theta-delta slowing.  There is more prominent delta slowing with moderate to high amplitude sharply contoured delta activity over the right hemisphere.  At times delta activity becomes rhythmic and evolves and spread to the left hemisphere, concerning for possible subclinical seiuzres.      Full report to come.     Leyla Laird MD  [phone]

## 2022-01-13 ENCOUNTER — APPOINTMENT (OUTPATIENT)
Dept: SPEECH THERAPY | Facility: CLINIC | Age: 47
End: 2022-01-13
Attending: COUNSELOR
Payer: COMMERCIAL

## 2022-01-13 ENCOUNTER — APPOINTMENT (OUTPATIENT)
Dept: NEUROLOGY | Facility: CLINIC | Age: 47
End: 2022-01-13
Payer: COMMERCIAL

## 2022-01-13 ENCOUNTER — TELEPHONE (OUTPATIENT)
Dept: NEUROLOGY | Facility: CLINIC | Age: 47
End: 2022-01-13

## 2022-01-13 LAB
ALBUMIN UR-MCNC: 20 MG/DL
ANION GAP SERPL CALCULATED.3IONS-SCNC: 10 MMOL/L (ref 3–14)
APPEARANCE UR: CLEAR
BACTERIA #/AREA URNS HPF: ABNORMAL /HPF
BILIRUB UR QL STRIP: NEGATIVE
BUN SERPL-MCNC: 19 MG/DL (ref 7–30)
CALCIUM SERPL-MCNC: 8.8 MG/DL (ref 8.5–10.1)
CHLORIDE BLD-SCNC: 111 MMOL/L (ref 94–109)
CO2 SERPL-SCNC: 21 MMOL/L (ref 20–32)
COLOR UR AUTO: ABNORMAL
CREAT SERPL-MCNC: 1.09 MG/DL (ref 0.66–1.25)
ERYTHROCYTE [DISTWIDTH] IN BLOOD BY AUTOMATED COUNT: 14.6 % (ref 10–15)
GFR SERPL CREATININE-BSD FRML MDRD: 84 ML/MIN/1.73M2
GLUCOSE BLD-MCNC: 162 MG/DL (ref 70–99)
GLUCOSE BLDC GLUCOMTR-MCNC: 142 MG/DL (ref 70–99)
GLUCOSE BLDC GLUCOMTR-MCNC: 143 MG/DL (ref 70–99)
GLUCOSE BLDC GLUCOMTR-MCNC: 148 MG/DL (ref 70–99)
GLUCOSE BLDC GLUCOMTR-MCNC: 155 MG/DL (ref 70–99)
GLUCOSE BLDC GLUCOMTR-MCNC: 180 MG/DL (ref 70–99)
GLUCOSE UR STRIP-MCNC: 500 MG/DL
HCT VFR BLD AUTO: 47 % (ref 40–53)
HGB BLD-MCNC: 15.2 G/DL (ref 13.3–17.7)
HGB UR QL STRIP: NEGATIVE
KETONES UR STRIP-MCNC: 40 MG/DL
LACTATE SERPL-SCNC: 1.4 MMOL/L (ref 0.7–2)
LEUKOCYTE ESTERASE UR QL STRIP: NEGATIVE
MCH RBC QN AUTO: 30.4 PG (ref 26.5–33)
MCHC RBC AUTO-ENTMCNC: 32.3 G/DL (ref 31.5–36.5)
MCV RBC AUTO: 94 FL (ref 78–100)
NITRATE UR QL: NEGATIVE
PH UR STRIP: 5.5 [PH] (ref 5–7)
PLATELET # BLD AUTO: 234 10E3/UL (ref 150–450)
POTASSIUM BLD-SCNC: 3.4 MMOL/L (ref 3.4–5.3)
POTASSIUM BLD-SCNC: 3.6 MMOL/L (ref 3.4–5.3)
RBC # BLD AUTO: 5 10E6/UL (ref 4.4–5.9)
RBC URINE: 0 /HPF
SODIUM SERPL-SCNC: 142 MMOL/L (ref 133–144)
SP GR UR STRIP: 1.01 (ref 1–1.03)
UROBILINOGEN UR STRIP-MCNC: NORMAL MG/DL
WBC # BLD AUTO: 11.4 10E3/UL (ref 4–11)
WBC URINE: 0 /HPF

## 2022-01-13 PROCEDURE — 85027 COMPLETE CBC AUTOMATED: CPT | Performed by: STUDENT IN AN ORGANIZED HEALTH CARE EDUCATION/TRAINING PROGRAM

## 2022-01-13 PROCEDURE — 84132 ASSAY OF SERUM POTASSIUM: CPT | Performed by: PSYCHIATRY & NEUROLOGY

## 2022-01-13 PROCEDURE — 95718 EEG PHYS/QHP 2-12 HR W/VEEG: CPT | Mod: GC | Performed by: PSYCHIATRY & NEUROLOGY

## 2022-01-13 PROCEDURE — 250N000013 HC RX MED GY IP 250 OP 250 PS 637: Performed by: STUDENT IN AN ORGANIZED HEALTH CARE EDUCATION/TRAINING PROGRAM

## 2022-01-13 PROCEDURE — 250N000013 HC RX MED GY IP 250 OP 250 PS 637: Performed by: COUNSELOR

## 2022-01-13 PROCEDURE — 99232 SBSQ HOSP IP/OBS MODERATE 35: CPT | Mod: 25 | Performed by: PSYCHIATRY & NEUROLOGY

## 2022-01-13 PROCEDURE — 36415 COLL VENOUS BLD VENIPUNCTURE: CPT | Performed by: STUDENT IN AN ORGANIZED HEALTH CARE EDUCATION/TRAINING PROGRAM

## 2022-01-13 PROCEDURE — 92526 ORAL FUNCTION THERAPY: CPT | Mod: GN

## 2022-01-13 PROCEDURE — 258N000003 HC RX IP 258 OP 636: Performed by: STUDENT IN AN ORGANIZED HEALTH CARE EDUCATION/TRAINING PROGRAM

## 2022-01-13 PROCEDURE — 82374 ASSAY BLOOD CARBON DIOXIDE: CPT | Performed by: STUDENT IN AN ORGANIZED HEALTH CARE EDUCATION/TRAINING PROGRAM

## 2022-01-13 PROCEDURE — 250N000013 HC RX MED GY IP 250 OP 250 PS 637: Performed by: PSYCHIATRY & NEUROLOGY

## 2022-01-13 PROCEDURE — 250N000009 HC RX 250: Performed by: STUDENT IN AN ORGANIZED HEALTH CARE EDUCATION/TRAINING PROGRAM

## 2022-01-13 PROCEDURE — 120N000003 HC R&B IMCU UMMC

## 2022-01-13 PROCEDURE — 81001 URINALYSIS AUTO W/SCOPE: CPT | Performed by: COUNSELOR

## 2022-01-13 PROCEDURE — 36415 COLL VENOUS BLD VENIPUNCTURE: CPT | Performed by: PSYCHIATRY & NEUROLOGY

## 2022-01-13 PROCEDURE — 83605 ASSAY OF LACTIC ACID: CPT | Performed by: COUNSELOR

## 2022-01-13 PROCEDURE — 250N000013 HC RX MED GY IP 250 OP 250 PS 637

## 2022-01-13 PROCEDURE — 250N000011 HC RX IP 250 OP 636: Performed by: COUNSELOR

## 2022-01-13 PROCEDURE — 95711 VEEG 2-12 HR UNMONITORED: CPT

## 2022-01-13 RX ORDER — LISINOPRIL 10 MG/1
10 TABLET ORAL DAILY
Status: DISCONTINUED | OUTPATIENT
Start: 2022-01-13 | End: 2022-01-17

## 2022-01-13 RX ORDER — NYSTATIN 100000 U/G
CREAM TOPICAL 2 TIMES DAILY
Status: DISCONTINUED | OUTPATIENT
Start: 2022-01-14 | End: 2022-01-28 | Stop reason: HOSPADM

## 2022-01-13 RX ADMIN — SODIUM CHLORIDE: 9 INJECTION, SOLUTION INTRAVENOUS at 13:52

## 2022-01-13 RX ADMIN — LABETALOL HYDROCHLORIDE 20 MG: 5 INJECTION, SOLUTION INTRAVENOUS at 17:20

## 2022-01-13 RX ADMIN — SODIUM CHLORIDE: 9 INJECTION, SOLUTION INTRAVENOUS at 06:01

## 2022-01-13 RX ADMIN — LISINOPRIL 10 MG: 10 TABLET ORAL at 12:00

## 2022-01-13 RX ADMIN — AMLODIPINE BESYLATE 10 MG: 10 TABLET ORAL at 08:53

## 2022-01-13 RX ADMIN — LABETALOL HYDROCHLORIDE 10 MG: 5 INJECTION, SOLUTION INTRAVENOUS at 04:35

## 2022-01-13 RX ADMIN — NICARDIPINE HYDROCHLORIDE 0.5 MG/HR: 0.2 INJECTION, SOLUTION INTRAVENOUS at 19:19

## 2022-01-13 RX ADMIN — CARVEDILOL 6.25 MG: 3.12 TABLET, FILM COATED ORAL at 17:23

## 2022-01-13 RX ADMIN — LABETALOL HYDROCHLORIDE 20 MG: 5 INJECTION, SOLUTION INTRAVENOUS at 04:55

## 2022-01-13 RX ADMIN — LABETALOL HYDROCHLORIDE 10 MG: 5 INJECTION, SOLUTION INTRAVENOUS at 09:31

## 2022-01-13 RX ADMIN — HYDRALAZINE HYDROCHLORIDE 20 MG: 20 INJECTION INTRAMUSCULAR; INTRAVENOUS at 17:35

## 2022-01-13 RX ADMIN — LABETALOL HYDROCHLORIDE 20 MG: 5 INJECTION, SOLUTION INTRAVENOUS at 09:43

## 2022-01-13 RX ADMIN — POTASSIUM CHLORIDE 20 MEQ: 750 TABLET, EXTENDED RELEASE ORAL at 00:17

## 2022-01-13 RX ADMIN — VALPROATE SODIUM 500 MG: 100 INJECTION, SOLUTION INTRAVENOUS at 17:23

## 2022-01-13 RX ADMIN — ASPIRIN 81 MG CHEWABLE TABLET 81 MG: 81 TABLET CHEWABLE at 08:53

## 2022-01-13 RX ADMIN — VALPROATE SODIUM 500 MG: 100 INJECTION, SOLUTION INTRAVENOUS at 11:57

## 2022-01-13 RX ADMIN — CARVEDILOL 6.25 MG: 3.12 TABLET, FILM COATED ORAL at 08:54

## 2022-01-13 ASSESSMENT — ACTIVITIES OF DAILY LIVING (ADL)
ADLS_ACUITY_SCORE: 23
ADLS_ACUITY_SCORE: 25
ADLS_ACUITY_SCORE: 25
ADLS_ACUITY_SCORE: 23
ADLS_ACUITY_SCORE: 25
ADLS_ACUITY_SCORE: 23
ADLS_ACUITY_SCORE: 25
ADLS_ACUITY_SCORE: 23
ADLS_ACUITY_SCORE: 23
ADLS_ACUITY_SCORE: 25
ADLS_ACUITY_SCORE: 23
ADLS_ACUITY_SCORE: 25
ADLS_ACUITY_SCORE: 17
ADLS_ACUITY_SCORE: 25
ADLS_ACUITY_SCORE: 17
ADLS_ACUITY_SCORE: 23
ADLS_ACUITY_SCORE: 23
ADLS_ACUITY_SCORE: 17

## 2022-01-13 NOTE — PLAN OF CARE
Status: Patient here due to R PCA infarct  Vitals: HTN within parameters, on CCM  Neuros: Lethargic to somnolent and oriented x4 at start of shift. Alert and oriented x4 at 2000. Slow to respond. L side 1/5, R side 5/5, sluggish pupils, does not track. L field cut. N/T to L side  IV: PIV infusing NS @ 125mL/hr  Labs/Electrolytes: Potassium replaced today, K+ was 3.0 @ 1920. Replacement protocol continued, redraw at 0200  Resp/trach: WNL  Diet: NPO, able to swallow sips of water and take PO meds as was more alert this evening  Bowel status: No BM this shift  : Voiding with male external catheter in place  Skin: No new deficits. EEG leads in place  Pain: Denied  Activity: Up A2/lift. Turn and repo Q2h  Plan: Continue to monitor and follow POC  Updates this shift: MRI completed this shift

## 2022-01-13 NOTE — PROGRESS NOTES
Plan to transfer pt to higher level care (6B) d/t HTN and pt requiring cardiac drip. Continue to care per orders.

## 2022-01-13 NOTE — TELEPHONE ENCOUNTER
Referred through [facility] text. Following for SHADI. Waiting until out pt because he is currently medically sick. Study has not been introduced.

## 2022-01-13 NOTE — PLAN OF CARE
Status: Patient here due to R PCA infarct  Vitals: VSS on RA ex HTN above parameters of  and . Labetolol 10 mg x1 and 20 mg x1 given overnight to maintain blood pressure parameters. Last BP @ 179/102. CCM  Neuros: Alert to Lethargic overnight. A&Ox4. Slow to respond at times. L side 1/5, R side 4/5, sluggish pupils, does not track. L field cut. N/T to L side  IV: PIV infusing NS @ 125mL/hr.  Labs/Electrolytes: Potassium replaced overnight. Redraw @ 0134 for 3.6.  Resp/trach: WNL  Diet: NPO, able to swallow sips of water and take PO meds as was more alert  Bowel status: BS+, No BM this shift.  : Voiding with male external catheter in place.  Skin: No new deficits. Some EEG leads coming off, needing to be put back on in AM.  Pain: Denied  Activity: Up A2/lift. Turn and repo Q2h.  Plan: Continue to monitor and follow POC. Need UA.

## 2022-01-13 NOTE — PROVIDER NOTIFICATION
RN paged neuro stroke team (spoke w/) regarding pt's HTN. RN has exhausted all PRN meds and with the addition of scheduled lisinopril pt's BP remains outside of parameters. RN also addressed that PRNs have been exhausted for 2 full shifts, pt remains calm and still and denies pain. Dr Puente says he'll address the issue with attending and determine if transferring to higher level of care is necessary. Continue to care per orders.

## 2022-01-13 NOTE — PLAN OF CARE
Pt here with R PCA infarct, vs ex HTN, neuros include: waxes and wanes with LOC, L side 1/5, R side 4/5, pt intermittently follows commands, soft but clear/logical speech, sluggish pupils, L droop. PIV running NS @ 125ml/hr, external male catheter continued to be removed by pt so RN kept it off, large incontinent BM. Pt up AO2 w/lift, last repositioned @ 1400 and cleaned up. UA sent down this afternoon, pt on level 5 diet with thins and 1:1 but pt not appropriate after multiple attempts, however takes his pills whole with water. On CCM with NSR, no indications of pain, no family or updates to family made this shift. Continue to care per orders.

## 2022-01-13 NOTE — PROGRESS NOTES
[facility]    Stroke Progress Note    Interval Events  - Patient still very hypertensive overnight, requiring a total of 30 mg of labetalol.  - Pulled EEG leads overnight.  - Did not get CARISA.       HPI Summary  Miriam Hall is a 46 year old male with PMHx HTN who presented on 1/8/2022 with symptoms concerning for stroke. The pt reports that he was in his normal state of health that morning and then decided to take a nap around noon. On waking from his nap around 2:00PM, he found that he was having trouble moving and feeling the L side of his body. Unfortunately, the pt was unable to get to a phone and ended up crawling on his floor until he could finally get the attention of one of his neighbors, who then was able to call EMS     EMS arrived to find the pt continuing to have decreased sensation and weakness on his L side. They noted a , /145, and . On arrival in the ED, the pt remained afebrile, but was tachycardic () and quite hypertensive (/146). A stroke code was called for initial NIHSS 9, for a partial L gaze palsy, L-sided complete hemianopia, L arm > leg weakness, decreased sensation in the L arm/leg to sensation (pinprick intact in arm but decreased in leg), mild dysarthria, L-sided extinction, and possible L neglect. The pt also seemed to be having trouble with proprioception in the L arm, though did recognize it to be his own. Pt was immediately taken to CT where he underwent a CTH, CTA Head/Neck and CT Perfusion study, revealing a well-established R parieto-occipital stroke with an LVO of the R P1 segment. As the stroke already appeared well-established and the pt was outside the time window, no tPA was given. No thrombectomy was performed due to the location of the LVO in the PCA. The pt was loaded with ASA 324mg once with plans to allow for permissive HTN and admission to the Stroke service for further work-up     The  patient denies any history of smoking or drug use. Only rarely uses EtOH. He would prefer that no one be contact at this time concerning his diagnosis or medical condition.     TPA Treatment   Not given due to established infarct on imaging and unclear or unfavorable risk-benefit profile for extended window thrombolysis beyond the conventional 4.5 hour time window.     Endovascular Treatment  Proximal vessel occlusion present, but endovascular treatment not initiated due to location of the thrombus in the posterior cerebral artery    Stroke Evaluation Summarized    MRI/Head CT MRI brain  1. Findings from CT and CTA performed yesterday are confirmed with stroke in the distribution of the right posterior cerebral artery.  This involves the medial right occipital lobe and medial right temporal lobe with extension into the body of the thalamus.  2. Additional punctate foci of diffusion restriction in the left putamen.  3. Moderate leukoaraiosis.    Intracranial Vasculature MRA  Redemonstration of occlusion of the right PCA near its  origin. Additional short segment high-grade stenosis of a right-sided M2 MCA branch. Findings are not likely not significantly changed from CT performed yesterday.   Cervical Vasculature CTA  1. Occlusion of the proximal right PCA.  2. High-grade stenosis of the occiput M2 branch of the right MCA with  attenuated flow distal.  3. Neck CTA demonstrates no stenosis of the major cervical arteries.     Echocardiogram The visual ejection fraction is 50-55%.   No regional wall motion abnormalities are seen.  Moderate to severe concentric wall thickening consistent with left ventricular hypertrophy is present.  Global right ventricular function is normal.  There was no shunt at the atrial septal level as assessed by agitated saline bubble study at rest and with Valsalva maneuver.  Previous study not available for comparison.   EKG/Telemetry EKG with sinus tachycardia   Other Testing  Hypercoagulability profile has not been sent yet     LDL  1/8/2022: 75 mg/dL   A1C  1/8/2022: 7.8 %   Troponin No lab value available in past 48 hrs       Impression   # Acute ischemic stroke of Right P1 segement of the PCA  # R P1 occlusion and R M2 Stenosis  # Large vessel atherosclerosis vs ESUS  Miriam Hall is a 46 year old male with PMHx HTN who presented on 1/8/2022 with L gaze palsy, L complete hemianopia, mild dysarthria, L arm > leg weakness, L arm/leg decreased sensation, and L side extinction after waking up from a nap earlier this morning. On arrival in the ED, was found to have a well-established large vessel occlusion of the R P1 segment of the PCA and R M2 high grade stenosis suggesting large vessel intracranial atherosclerotic disease. MRI with medial right occipital lobe and medial right temporal lobe with extension into the body of the thalamus. However, the remaining vessels are not suggestive of Large vessel intracranial athero. Rest of his intracranial vessels were without disease. ECHO without low EF, atrial size not concerning for afib, no valvular pathology. Possible to have ESUS with possible B/L infarcts pending further work up.   - Aspirin 81 mg PO daily for secondary stroke prevention  - PT/OT/SLP Rec: ARU  - PM&R  - Stroke Education  - Depression Screen  - Apnea Screen  - Euthermia, Euglycemia  - On regular diet with thin liquids  - CARISA once BP under better control     #Concern for seizures  Patient with ongoing waxing and waning level of consciousness over the past few days. Hooked up to EEG on 1/12 with no evidence of seizures per report from Dr. Porter and pt pulling off leads overnight so EEG disconnected on 1/13.   - Start valproic acid 500 mg q8h IV -- this can be changed in the coming days to PO once patient's level of consciousness improves     #HLD  LDL 75. Long-term Goal b/w 40-70.   - Atorvastatin 40 mg PO daily    #HTN, uncontrolled   Inpatient BP Goal < 180 per  Cardiology recs (1/10). Stopped Lisinopril and started Amlodipine due to increasing Crt on Lisinopril. Now that creatinine is back at baseline, will resume lisinopril.   - Amlodipine 5 mg PO daily  - Restart lisinopril 10 mg PO daily   - Hydralazine/Labetalol 10-20 mg IV Q1H PRN for BP > 180    # DM  Newly diagnosed DM with Hgb A1C 7.8.  - Continue to monitor glucose  - LSSI  - Hypoglycemia protocol     #Hypokalemia   - Replacement prn     #DISHA, resolved  Pt presented with Cr 1.37 improved with IVF to 1.17. Now at baseline.   - Daily BMP     #Leukocytosis, resolved  Pt presented with WBC 12.4. Most likely is a stress response given lack of fever. Mild fever overnight but WBC decreasing. Will continue to monitor.  - Daily CBC    #Elevated troponin, resolved  Pt arrives with Trop 147 and rising. EKG exhibited sinus tachycardia, but no concerning signs of ischemia. Troponin peaked 1/10.     Prophylaxis            For VTE Prevention:  - SCD     For Acid Suppression:  - GI prophylaxis is not indicated     Code Status  Full Code    The patient was discussed with Stroke Staff, Dr. Christine.    Jose Puente MD  Neurology Resident PGY2  Pager:  [phone]  ______________________________________________________    Clinically Significant Risk Factors Present on Admission                 Medications   Scheduled Meds    amLODIPine  10 mg Oral Daily     aspirin  81 mg Oral Daily    Or     aspirin  81 mg Oral or NG Tube Daily     atorvastatin  40 mg Oral QPM     carvedilol  6.25 mg Oral Once     carvedilol  6.25 mg Oral BID w/meals     insulin aspart  1-3 Units Subcutaneous TID AC     insulin aspart  1-3 Units Subcutaneous At Bedtime     potassium chloride  40 mEq Oral Once     sodium chloride (PF)  3 mL Intracatheter Q8H       Infusion Meds    - MEDICATION INSTRUCTIONS -       - MEDICATION INSTRUCTIONS -       sodium chloride 125 mL/hr at 01/13/22 0601       PRN Meds  acetaminophen, artificial tears ophthalmic solution, glucose **OR**  dextrose **OR** glucagon, hydrALAZINE, labetalol, lidocaine 4%, lidocaine (buffered or not buffered), - MEDICATION INSTRUCTIONS -, - MEDICATION INSTRUCTIONS -, melatonin, polyethylene glycol, senna-docusate, sodium chloride (PF)       PHYSICAL EXAMINATION  Temp:  [98.2  F (36.8  C)-100.4  F (38  C)] 99.5  F (37.5  C)  Pulse:  [] 91  Resp:  [16-18] 18  BP: (162-247)/() 198/108  SpO2:  [91 %-98 %] 95 %      Neurologic  Mental Status:  lethargic, arouses minimally to sternal rub and does not respond to questions or follow commands.  Cranial Nerves:  PER, left gaze preference but able to cross the midline, L sided facial droop, remainder unable to be tested due to his lethargy  Motor:  no abnormal movements. No spontaneous movement of L hemibody. RUE and RLE moving freely.   Sensory:  localizes noxious in 4/4 extremities  Coordination:  deferred  Station/Gait:  deferred    Stroke Scales    NIHSS  Interval baseline (01/12/22 0324)   Interval Comments     1a. Level of Consciousness 1-->Not alert, but arousable by minor stimulation to obey, answer, or respond   1b. LOC Questions 2-->Answers neither question correctly   1c. LOC Commands 1-->Performs one task correctly   2.   Best Gaze 1-->Partial gaze palsy, gaze is abnormal in one or both eyes, but forced deviation or total gaze paresis is not present   3.   Visual 2-->Complete hemianopia   4.   Facial Palsy 1-->Minor paralysis (flattened nasolabial fold, asymmetry on smiling)   5a. Motor Arm, Left 4-->No movement   5b. Motor Arm, Right 0-->No drift, limb holds 90 (or 45) degrees for full 10 secs   6a. Motor Leg, Left 4-->No movement   6b. Motor Leg, right 1-->Drift, leg falls by the end of the 5-sec period but does not hit bed   7.   Limb Ataxia 0-->Absent   8.   Sensory 1-->Mild-to-moderate sensory loss, patient feels pinprick is less sharp or is dull on the affected side, or there is a loss of superficial pain with pinprick, but patient is aware of being  touched   9.   Best Language 2-->Severe aphasia, all communication is through fragmentary expression, great need for inference, questioning, and guessing by the listener. Range of information that can be exchanged is limited, listener carries burden of. . . (see row details)   10. Dysarthria 0-->Normal   11. Extinction and Inattention  1-->Visual, tactile, auditory, spatial, or personal inattention or extinction to bilateral simultaneous stimulation in one of the sensory modalities   Total 21 (01/12/22 0324)       Imaging  I personally reviewed all imaging; relevant findings per HPI.     Lab Results Data   CBC  Recent Labs   Lab 01/12/22  0714 01/12/22  0132 01/11/22  0636   WBC 12.3* 13.9* 10.0   RBC 4.90 5.04 4.85   HGB 15.2 15.5 14.9   HCT 45.5 46.5 44.8    234 221     Basic Metabolic Panel    Recent Labs   Lab 01/13/22  0600 01/13/22  0224 01/13/22  0134 01/12/22  1929 01/12/22  1920 01/12/22  1137 01/12/22  1023 01/12/22  0753 01/12/22  0714 01/12/22  0136 01/12/22  0132 01/11/22  0806 01/11/22  0636   NA  --   --   --   --   --   --   --   --  142  --  139  --  142   POTASSIUM  --   --  3.6  --  3.0*  --  3.6  --  3.1*  --  3.0*  --  2.9*   CHLORIDE  --   --   --   --   --   --   --   --  109  --  106  --  107   CO2  --   --   --   --   --   --   --   --  23  --  26  --  27   BUN  --   --   --   --   --   --   --   --  15  --  15  --  20   CR  --   --   --   --   --   --   --   --  1.15  --  1.09  --  1.30*   * 155*  --  130*  --    < >  --    < > 188*   < > 205*   < > 128*   VALERIE  --   --   --   --   --   --   --   --  8.7  --  8.7  --  9.1    < > = values in this interval not displayed.     Liver Panel  Recent Labs   Lab 01/08/22 1843   PROTTOTAL 8.5   ALBUMIN 3.9   BILITOTAL 0.8   ALKPHOS 94   AST 26   ALT 31     INR    Recent Labs   Lab Test 01/08/22 1843 01/08/22  1834   INR 0.95 1.0*      Lipid Profile    Recent Labs   Lab Test 01/08/22 1843 05/16/14  1506   CHOL 169 131   HDL 70 53   LDL  75 70   TRIG 118 38     A1C    Recent Labs   Lab Test 01/08/22  1843   A1C 7.8*     Troponin I  No results for input(s): TROPONIN, GHTROP in the last 168 hours.

## 2022-01-14 ENCOUNTER — APPOINTMENT (OUTPATIENT)
Dept: PHYSICAL THERAPY | Facility: CLINIC | Age: 47
End: 2022-01-14
Payer: COMMERCIAL

## 2022-01-14 LAB
ANION GAP SERPL CALCULATED.3IONS-SCNC: 10 MMOL/L (ref 3–14)
BUN SERPL-MCNC: 20 MG/DL (ref 7–30)
CALCIUM SERPL-MCNC: 8.5 MG/DL (ref 8.5–10.1)
CHLORIDE BLD-SCNC: 109 MMOL/L (ref 94–109)
CO2 SERPL-SCNC: 22 MMOL/L (ref 20–32)
CREAT SERPL-MCNC: 0.98 MG/DL (ref 0.66–1.25)
ERYTHROCYTE [DISTWIDTH] IN BLOOD BY AUTOMATED COUNT: 14.3 % (ref 10–15)
GFR SERPL CREATININE-BSD FRML MDRD: >90 ML/MIN/1.73M2
GLUCOSE BLD-MCNC: 165 MG/DL (ref 70–99)
GLUCOSE BLDC GLUCOMTR-MCNC: 138 MG/DL (ref 70–99)
GLUCOSE BLDC GLUCOMTR-MCNC: 142 MG/DL (ref 70–99)
GLUCOSE BLDC GLUCOMTR-MCNC: 160 MG/DL (ref 70–99)
GLUCOSE BLDC GLUCOMTR-MCNC: 166 MG/DL (ref 70–99)
GLUCOSE BLDC GLUCOMTR-MCNC: 167 MG/DL (ref 70–99)
HCT VFR BLD AUTO: 46 % (ref 40–53)
HGB BLD-MCNC: 15.4 G/DL (ref 13.3–17.7)
LACTATE SERPL-SCNC: 0.9 MMOL/L (ref 0.7–2)
MCH RBC QN AUTO: 30.7 PG (ref 26.5–33)
MCHC RBC AUTO-ENTMCNC: 33.5 G/DL (ref 31.5–36.5)
MCV RBC AUTO: 92 FL (ref 78–100)
PLATELET # BLD AUTO: 247 10E3/UL (ref 150–450)
POTASSIUM BLD-SCNC: 3 MMOL/L (ref 3.4–5.3)
POTASSIUM BLD-SCNC: 3.1 MMOL/L (ref 3.4–5.3)
RBC # BLD AUTO: 5.01 10E6/UL (ref 4.4–5.9)
SODIUM SERPL-SCNC: 141 MMOL/L (ref 133–144)
WBC # BLD AUTO: 12.8 10E3/UL (ref 4–11)

## 2022-01-14 PROCEDURE — 83605 ASSAY OF LACTIC ACID: CPT | Performed by: PSYCHIATRY & NEUROLOGY

## 2022-01-14 PROCEDURE — 97530 THERAPEUTIC ACTIVITIES: CPT | Mod: GP | Performed by: PHYSICAL THERAPIST

## 2022-01-14 PROCEDURE — 258N000003 HC RX IP 258 OP 636: Performed by: STUDENT IN AN ORGANIZED HEALTH CARE EDUCATION/TRAINING PROGRAM

## 2022-01-14 PROCEDURE — 999N000127 HC STATISTIC PERIPHERAL IV START W US GUIDANCE

## 2022-01-14 PROCEDURE — 99232 SBSQ HOSP IP/OBS MODERATE 35: CPT | Mod: GC | Performed by: PSYCHIATRY & NEUROLOGY

## 2022-01-14 PROCEDURE — 250N000013 HC RX MED GY IP 250 OP 250 PS 637: Performed by: PSYCHIATRY & NEUROLOGY

## 2022-01-14 PROCEDURE — 120N000003 HC R&B IMCU UMMC

## 2022-01-14 PROCEDURE — 250N000013 HC RX MED GY IP 250 OP 250 PS 637: Performed by: COUNSELOR

## 2022-01-14 PROCEDURE — 250N000013 HC RX MED GY IP 250 OP 250 PS 637: Performed by: STUDENT IN AN ORGANIZED HEALTH CARE EDUCATION/TRAINING PROGRAM

## 2022-01-14 PROCEDURE — 80048 BASIC METABOLIC PNL TOTAL CA: CPT | Performed by: STUDENT IN AN ORGANIZED HEALTH CARE EDUCATION/TRAINING PROGRAM

## 2022-01-14 PROCEDURE — 84132 ASSAY OF SERUM POTASSIUM: CPT | Performed by: PSYCHIATRY & NEUROLOGY

## 2022-01-14 PROCEDURE — 250N000013 HC RX MED GY IP 250 OP 250 PS 637

## 2022-01-14 PROCEDURE — 250N000009 HC RX 250: Performed by: STUDENT IN AN ORGANIZED HEALTH CARE EDUCATION/TRAINING PROGRAM

## 2022-01-14 PROCEDURE — 36415 COLL VENOUS BLD VENIPUNCTURE: CPT | Performed by: STUDENT IN AN ORGANIZED HEALTH CARE EDUCATION/TRAINING PROGRAM

## 2022-01-14 PROCEDURE — 36415 COLL VENOUS BLD VENIPUNCTURE: CPT | Performed by: PSYCHIATRY & NEUROLOGY

## 2022-01-14 PROCEDURE — 85027 COMPLETE CBC AUTOMATED: CPT | Performed by: STUDENT IN AN ORGANIZED HEALTH CARE EDUCATION/TRAINING PROGRAM

## 2022-01-14 PROCEDURE — 250N000011 HC RX IP 250 OP 636: Performed by: PSYCHIATRY & NEUROLOGY

## 2022-01-14 RX ORDER — POTASSIUM CHLORIDE 750 MG/1
40 TABLET, EXTENDED RELEASE ORAL ONCE
Status: COMPLETED | OUTPATIENT
Start: 2022-01-14 | End: 2022-01-14

## 2022-01-14 RX ORDER — POTASSIUM CHLORIDE 7.45 MG/ML
10 INJECTION INTRAVENOUS
Status: COMPLETED | OUTPATIENT
Start: 2022-01-14 | End: 2022-01-14

## 2022-01-14 RX ADMIN — Medication 12.5 MG: at 17:36

## 2022-01-14 RX ADMIN — NYSTATIN: 100000 CREAM TOPICAL at 02:21

## 2022-01-14 RX ADMIN — NYSTATIN: 100000 CREAM TOPICAL at 21:09

## 2022-01-14 RX ADMIN — VALPROATE SODIUM 500 MG: 100 INJECTION, SOLUTION INTRAVENOUS at 17:37

## 2022-01-14 RX ADMIN — NICARDIPINE HYDROCHLORIDE 4.5 MG/HR: 0.2 INJECTION, SOLUTION INTRAVENOUS at 20:05

## 2022-01-14 RX ADMIN — POTASSIUM CHLORIDE 10 MEQ: 7.46 INJECTION, SOLUTION INTRAVENOUS at 17:03

## 2022-01-14 RX ADMIN — POTASSIUM CHLORIDE 10 MEQ: 7.46 INJECTION, SOLUTION INTRAVENOUS at 16:03

## 2022-01-14 RX ADMIN — LISINOPRIL 10 MG: 10 TABLET ORAL at 09:54

## 2022-01-14 RX ADMIN — AMLODIPINE BESYLATE 10 MG: 10 TABLET ORAL at 09:54

## 2022-01-14 RX ADMIN — POTASSIUM CHLORIDE 10 MEQ: 7.46 INJECTION, SOLUTION INTRAVENOUS at 11:24

## 2022-01-14 RX ADMIN — POTASSIUM CHLORIDE 40 MEQ: 750 TABLET, EXTENDED RELEASE ORAL at 20:55

## 2022-01-14 RX ADMIN — SODIUM CHLORIDE: 9 INJECTION, SOLUTION INTRAVENOUS at 21:23

## 2022-01-14 RX ADMIN — ASPIRIN 81 MG CHEWABLE TABLET 81 MG: 81 TABLET CHEWABLE at 09:55

## 2022-01-14 RX ADMIN — MICONAZOLE NITRATE: 2 POWDER TOPICAL at 08:28

## 2022-01-14 RX ADMIN — CARVEDILOL 6.25 MG: 3.12 TABLET, FILM COATED ORAL at 17:36

## 2022-01-14 RX ADMIN — NICARDIPINE HYDROCHLORIDE 7 MG/HR: 0.2 INJECTION, SOLUTION INTRAVENOUS at 04:23

## 2022-01-14 RX ADMIN — MICONAZOLE NITRATE: 2 POWDER TOPICAL at 21:09

## 2022-01-14 RX ADMIN — POTASSIUM CHLORIDE 10 MEQ: 7.46 INJECTION, SOLUTION INTRAVENOUS at 18:06

## 2022-01-14 RX ADMIN — VALPROATE SODIUM 500 MG: 100 INJECTION, SOLUTION INTRAVENOUS at 10:51

## 2022-01-14 RX ADMIN — CARVEDILOL 6.25 MG: 3.12 TABLET, FILM COATED ORAL at 09:54

## 2022-01-14 RX ADMIN — VALPROATE SODIUM 500 MG: 100 INJECTION, SOLUTION INTRAVENOUS at 02:23

## 2022-01-14 RX ADMIN — POTASSIUM CHLORIDE 10 MEQ: 7.46 INJECTION, SOLUTION INTRAVENOUS at 14:09

## 2022-01-14 RX ADMIN — MICONAZOLE NITRATE: 2 POWDER TOPICAL at 02:21

## 2022-01-14 RX ADMIN — NICARDIPINE HYDROCHLORIDE 4.5 MG/HR: 0.2 INJECTION, SOLUTION INTRAVENOUS at 11:29

## 2022-01-14 RX ADMIN — NYSTATIN: 100000 CREAM TOPICAL at 08:28

## 2022-01-14 RX ADMIN — POTASSIUM CHLORIDE 10 MEQ: 7.46 INJECTION, SOLUTION INTRAVENOUS at 15:07

## 2022-01-14 ASSESSMENT — MIFFLIN-ST. JEOR: SCORE: 1658.27

## 2022-01-14 ASSESSMENT — ACTIVITIES OF DAILY LIVING (ADL)
ADLS_ACUITY_SCORE: 23
ADLS_ACUITY_SCORE: 19
ADLS_ACUITY_SCORE: 19
ADLS_ACUITY_SCORE: 25
ADLS_ACUITY_SCORE: 23
ADLS_ACUITY_SCORE: 25
ADLS_ACUITY_SCORE: 19
ADLS_ACUITY_SCORE: 23
ADLS_ACUITY_SCORE: 25
ADLS_ACUITY_SCORE: 23
ADLS_ACUITY_SCORE: 19
ADLS_ACUITY_SCORE: 25
ADLS_ACUITY_SCORE: 25
ADLS_ACUITY_SCORE: 23
ADLS_ACUITY_SCORE: 25

## 2022-01-14 NOTE — PLAN OF CARE
Neuro: Lethargic, oriented to self and situation. Slow to respond. Left-sided deficit (0/5). Sluggish pupils, does not track. L field cut. Follows commands intermittently.    Cardiac: SR 70-80s. BP 170s most of the shift, titrated nicardipine to achieve SBP <180.  Respiratory: Sating >98% on RA.  GI/: Incontinent of bowel and bladder. Adequate urine output with primofit. No BM this shift.   Diet/appetite: Poor appetite. Level V minced and moist diet.  Activity:  Assist of 2 with lift.   Pain: At acceptable level on current regimen.   Skin: Redness in groin.   LDA's:  -L Upper forearm PIV, Nicardipine at 7mg/hr.   -L Lower forearm PIV    Plan: Titrate SBP to <180.  Continue with POC. Notify primary team with changes.

## 2022-01-14 NOTE — PROGRESS NOTES
Transfer  Transferred from: 6A  Via:bed  Reason for transfer: Pt appropriate for 6B- worsened patient condition  Family: Aware of transfer  Belongings: Received with pt  Chart: Received with pt  Medications: Meds received from old unit with pt  Code Status verified on armband: yes  2 RN Skin Assessment Completed By: Aide ANDERSON RN, Stephania HERNANDEZ RN - redness and moisture in groin. Small scab right elbow.   Med rec completed: yes  Bed surface reassessed with algorithm and charted: yes  New bed surface ordered: no  Suction/Ambu bag/Flowmeter at bedside: yes    Report received from: MARLINE Del Angel RN  Pt status: BP within parameters. Other VSS.

## 2022-01-14 NOTE — PROGRESS NOTES
"CLINICAL NUTRITION SERVICES - ASSESSMENT NOTE     Nutrition Prescription    RECOMMENDATIONS FOR MDs/PROVIDERS TO ORDER:  Consider FT placement given minimal nutrition since admit. RD starting sonu cts, minimal goals 1065 Kcals, ~55 gm protein to meet 60% of low end nutrition goals.      Malnutrition Status:    Patient does not meet two of the established criteria necessary for diagnosing malnutrition but is at risk for malnutrition    Recommendations already ordered by Registered Dietitian (RD):  1. Sonu cts 1/15-1/17  2. Ensure Enlive TID    Future/Additional Recommendations:  If patient requires FT placement for enteral nutrition support, see recs below:  Use dosing weight 71 kg  Formula: Osmolite 1.5 Sonu @ goal of  55ml/hr  (1320ml/day)  will provide: 1980 kcals, 83 g PRO, 1005 ml free H20, 268 g CHO, and 0 g fiber daily.  Modular: 2 packet Prosource modular, daily for total provisions of 2060 Kcals (29 Kcal/kg), 105 gm pro (1.5 gm/kg)  - Initiate @ 10 ml/hr and advance by 10 ml q8hr pending pt's tolerance.  - Do not start or advance TF rate unless Mg++ >1.5, K+ is >3, and phos >1.9  - Recommend 30 ml q4hr fluid flushes for tube patency. Additional fluids and/or adjustments per MD.    - Order multivitamin/mineral (15 ml/day via FT) to help ensure micronutrient needs being met with suspected hypermetabolic demands and potential interruptions to TF infusions.   - Consider additional 100 mg Thiamine x 5-7 days to prevent lyte depletion with risk for refeeding syndrome    - HOB >30-45 degrees if FT is gastric.              -Send a nutrition consult for \"Registered Dietitian to Order TF per Medical Nutrition Therapy Guidelines\" if desire RD to order TFs.       REASON FOR ASSESSMENT  Miriam Hall is a/an 46 year old male assessed by the dietitian for Nutrition Risk Monitoring \"Unsure weight loss\" per MST screen 1/13    CLINICAL HISTORY  Hx of HTN. Presents to [facility] 1/8 with concern for stroke; found to have a R PCA " "infarct. Also noted newly dx T2DM this admit with A1C 7.8%. [place] [facility] [facility] due to worsened pt condition.     NUTRITION HISTORY  Limited nutrition hx obtained d/t pt condition. Lethargic, slow to answer questions. Regular diet PTA. Reports planned wt loss after reaching wt of 205 lbs.     CURRENT NUTRITION ORDERS  Diet: Level 5: Minced & Moist Dysphagia Diet   Intake/Tolerance: Intakes documented as ~50% of meals. Lethargic and limited ability to take meals PO per RN report. Pt reports no food intake yet today. Does not feel hungry. No recent room-service orders per review.     LABS    Na 141 (WNL) but monitor trends    K+ 3 (L), variable    Glucose trends 138-180 (H)    Urinalysis positive for ketones but likely 2/2 poor glycemic control, new dx DM noted     MEDICATIONS    Low sliding scale insulin     ANTHROPOMETRICS  Height: 170.2 cm (5' 7\")  Admit wt 81.2 kg (179 lbs) 1/8  Most Recent Weight: 82.5 kg (181 lb 12.8 oz)  IBW: 67.3 kg  123%IBW   BMI: Overweight BMI 25-29.9    Weight History:   No recent wt hx available. Current wt stable from admit wt.  Wt Readings from Last 15 Encounters:   01/14/22 82.5 kg (181 lb 12.8 oz)   05/13/14 86.2 kg (190 lb)   04/25/14 89.5 kg (197 lb 6.4 oz)   10/18/11 89.8 kg (198 lb)   07/29/11 91.9 kg (202 lb 9.6 oz)   04/22/11 88 kg (194 lb)       Dosing Weight: 71 kg [adjusted based on lowest wt 81.1 kg on 1/10 and IBW 67.3 kg]    ASSESSED NUTRITION NEEDS  Estimated Energy Needs: 4824-0691 kcals/day (25 - 30 kcals/kg)  Justification: Maintenance  Estimated Protein Needs:  grams protein/day (1.2 - 1.5 grams of pro/kg)  Justification: Hypercatabolism with acute illness  Estimated Fluid Needs: 3141-9698 mL/day (1 mL/kcal)   Justification: Maintenance    PHYSICAL FINDINGS  See malnutrition section below.    MALNUTRITION  % Intake: </= 50% for >/= 5 days (severe)  % Weight Loss: None noted  Subcutaneous Fat Loss: None observed  Muscle Loss: None observed  Fluid " Accumulation/Edema: None noted  Malnutrition Diagnosis: Patient does not meet two of the established criteria necessary for diagnosing malnutrition but is at risk for malnutrition    NUTRITION DIAGNOSIS  Inadequate protein-energy intake related to decreased PO intake with AMS and dysphagia s/p CVA as evidenced by 50% intake per food record flowsheet, multiple missed meals.    INTERVENTIONS  Implementation  Collaboration with other nutrition professionals - Sonu cts 1/15-1/17  Collaboration with other providers - Bedside RN  Medical food supplement therapy - Begin ONS     Goals  Patient to consume % of nutritionally adequate meal trays TID, or the equivalent with supplements/snacks.     Monitoring/Evaluation  Progress toward goals will be monitored and evaluated per protocol.  Navi Gordon RDN, LD, CNSC  6B RD pager: [phone] [Preferred]  6B work-room RD phone: *[phone]

## 2022-01-14 NOTE — PLAN OF CARE
Status: Patient here due to R PCA infarct  Vitals: HTN, PRN labetolol given x1, hydralazine given x1. CCM  Neuros: Lethargic to somnolent and oriented x4. Slow to respond. L side 0/5, R side 5/5, sluggish pupils, does not track. L field cut. N/T to L side  IV: PIV infusing NS @ 125mL/hr  Labs/Electrolytes: WNL  Resp/trach: WNL  Diet: Level 5 minced and moist with thin liquids and 1:1 assistance, able to take PO meds whole  Bowel status: No BM this shift  : Voiding with incontinence  Skin: No new deficits  Pain: Denied  Activity: Up A2/lift. Turn and repo Q2h  Updates this shift: Transferred to  at approximately 1845

## 2022-01-14 NOTE — PROGRESS NOTES
[facility]    Stroke Progress Note    Interval Events  - Patient still very hypertensive overnight, requiring a total of 30 mg of labetalol.  - Re-consulted Cardiology to help manage BP.  - Started Chlorthalidone 12.5 mg PO daily per Cardiology.    HPI Summary  Miriam Hall is a 46 year old male with PMHx HTN who presented on 1/8/2022 with symptoms concerning for stroke. The pt reports that he was in his normal state of health that morning and then decided to take a nap around noon. On waking from his nap around 2:00PM, he found that he was having trouble moving and feeling the L side of his body. Unfortunately, the pt was unable to get to a phone and ended up crawling on his floor until he could finally get the attention of one of his neighbors, who then was able to call EMS     EMS arrived to find the pt continuing to have decreased sensation and weakness on his L side. They noted a , /145, and . On arrival in the ED, the pt remained afebrile, but was tachycardic () and quite hypertensive (/146). A stroke code was called for initial NIHSS 9, for a partial L gaze palsy, L-sided complete hemianopia, L arm > leg weakness, decreased sensation in the L arm/leg to sensation (pinprick intact in arm but decreased in leg), mild dysarthria, L-sided extinction, and possible L neglect. The pt also seemed to be having trouble with proprioception in the L arm, though did recognize it to be his own. Pt was immediately taken to CT where he underwent a CTH, CTA Head/Neck and CT Perfusion study, revealing a well-established R parieto-occipital stroke with an LVO of the R P1 segment. As the stroke already appeared well-established and the pt was outside the time window, no tPA was given. No thrombectomy was performed due to the location of the LVO in the PCA. The pt was loaded with ASA 324mg once with plans to allow for permissive HTN and admission  to the Stroke service for further work-up     The patient denies any history of smoking or drug use. Only rarely uses EtOH. He would prefer that no one be contact at this time concerning his diagnosis or medical condition.     TPA Treatment   Not given due to established infarct on imaging and unclear or unfavorable risk-benefit profile for extended window thrombolysis beyond the conventional 4.5 hour time window.     Endovascular Treatment  Proximal vessel occlusion present, but endovascular treatment not initiated due to location of the thrombus in the posterior cerebral artery    Stroke Evaluation Summarized    MRI/Head CT MRI brain  1. Findings from CT and CTA performed yesterday are confirmed with stroke in the distribution of the right posterior cerebral artery.  This involves the medial right occipital lobe and medial right temporal lobe with extension into the body of the thalamus.  2. Additional punctate foci of diffusion restriction in the left putamen.  3. Moderate leukoaraiosis.    Intracranial Vasculature MRA  Redemonstration of occlusion of the right PCA near its  origin. Additional short segment high-grade stenosis of a right-sided M2 MCA branch. Findings are not likely not significantly changed from CT performed yesterday.   Cervical Vasculature CTA  1. Occlusion of the proximal right PCA.  2. High-grade stenosis of the occiput M2 branch of the right MCA with  attenuated flow distal.  3. Neck CTA demonstrates no stenosis of the major cervical arteries.     Echocardiogram The visual ejection fraction is 50-55%.   No regional wall motion abnormalities are seen.  Moderate to severe concentric wall thickening consistent with left ventricular hypertrophy is present.  Global right ventricular function is normal.  There was no shunt at the atrial septal level as assessed by agitated saline bubble study at rest and with Valsalva maneuver.  Previous study not available for comparison.   EKG/Telemetry EKG with  sinus tachycardia   Other Testing Hypercoagulability profile has not been sent yet     LDL  1/8/2022: 75 mg/dL   A1C  1/8/2022: 7.8 %   Troponin No lab value available in past 48 hrs       Impression   # Acute ischemic stroke of Right P1 segement of the PCA  # R P1 occlusion and R M2 Stenosis  # Large vessel atherosclerosis vs ESUS  Miriam Hall is a 46 year old male with PMHx HTN who presented on 1/8/2022 with L gaze palsy, L complete hemianopia, mild dysarthria, L arm > leg weakness, L arm/leg decreased sensation, and L side extinction after waking up from a nap earlier this morning. On arrival in the ED, was found to have a well-established large vessel occlusion of the R P1 segment of the PCA and R M2 high grade stenosis suggesting large vessel intracranial atherosclerotic disease. MRI with medial right occipital lobe and medial right temporal lobe with extension into the body of the thalamus. However, the remaining vessels are not suggestive of Large vessel intracranial athero. Rest of his intracranial vessels were without disease. ECHO without low EF, atrial size not concerning for afib, no valvular pathology. Possible to have ESUS with possible B/L infarcts pending further work up.   - Aspirin 81 mg PO daily for secondary stroke prevention  - PT/OT/SLP Rec: ARU  - PM&R  - Stroke Education  - Depression Screen  - Apnea Screen  - Euthermia, Euglycemia  - On regular diet with thin liquids  - CARISA once BP under better control     #Concern for seizures  Patient with ongoing waxing and waning level of consciousness over the past few days. Hooked up to EEG on 1/12 with no evidence of seizures per report from Dr. Porter and pt pulling off leads overnight so EEG disconnected on 1/13.   - Valproic acid 500 mg q8h IV -- this can be changed in the coming days to PO once patient's level of consciousness improves     #HLD  LDL 75. Long-term Goal b/w 40-70.   - Atorvastatin 40 mg PO daily    #HTN, uncontrolled   Inpatient  BP Goal < 180 per Cardiology recs (1/10). Stopped Lisinopril and started Amlodipine due to increasing Crt on Lisinopril. Now that creatinine is back at baseline, will resume lisinopril.   - Amlodipine 5 mg PO daily  - Restart lisinopril 10 mg PO daily   - Hydralazine/Labetalol 10-20 mg IV Q1H PRN for BP > 180  - Re-consulted Cardiology, appreciated recs  - Per phone con w/Cardiology, started Chlorthalidone 12.5 mg PO daily    # DM  Newly diagnosed DM with Hgb A1C 7.8.  - Continue to monitor glucose  - LSSI  - Hypoglycemia protocol    #Hypokalemia   - RN Managed Standard Replacement protocol    Chronic/Resolved Issues:      #DISHA, resolved  Pt presented with Cr 1.37 improved with IVF to 1.17. Now at baseline.   - Daily BMP     #Leukocytosis, resolved  Pt presented with WBC 12.4. Most likely is a stress response given lack of fever. Mild fever overnight but WBC decreasing. Will continue to monitor.  - Daily CBC    #Elevated troponin, resolved  Pt arrives with Trop 147 and rising. EKG exhibited sinus tachycardia, but no concerning signs of ischemia. Troponin peaked 1/10.     Prophylaxis            For VTE Prevention:  - SCD     For Acid Suppression:  - GI prophylaxis is not indicated     Code Status  Full Code    The patient was discussed with Stroke Staff, Dr. Christine.    Nelson Laura DO  Neurology Resident PGY1  ASCOM: *[id number]  ______________________________________________________    Clinically Significant Risk Factors Present on Admission                 Medications   Scheduled Meds    amLODIPine  10 mg Oral Daily     aspirin  81 mg Oral Daily    Or     aspirin  81 mg Oral or NG Tube Daily     atorvastatin  40 mg Oral QPM     carvedilol  6.25 mg Oral Once     carvedilol  6.25 mg Oral BID w/meals     insulin aspart  1-3 Units Subcutaneous TID AC     insulin aspart  1-3 Units Subcutaneous At Bedtime     lisinopril  10 mg Oral or NG Tube Daily     miconazole   Topical BID     nystatin   Topical BID     potassium chloride   40 mEq Oral Once     sodium chloride (PF)  3 mL Intracatheter Q8H     valproate (DEPACON) in NS intermittent infusion  500 mg Intravenous Q8H       Infusion Meds    - MEDICATION INSTRUCTIONS -       - MEDICATION INSTRUCTIONS -       niCARdipine 4.5 mg/hr (01/14/22 0552)     sodium chloride 125 mL/hr at 01/13/22 1352       PRN Meds  acetaminophen, artificial tears ophthalmic solution, glucose **OR** dextrose **OR** glucagon, hydrALAZINE, labetalol, lidocaine 4%, lidocaine (buffered or not buffered), - MEDICATION INSTRUCTIONS -, - MEDICATION INSTRUCTIONS -, melatonin, polyethylene glycol, senna-docusate, sodium chloride (PF)       PHYSICAL EXAMINATION  Temp:  [97.8  F (36.6  C)-100  F (37.8  C)] 98.5  F (36.9  C)  Pulse:  [] 97  Resp:  [16-18] 18  BP: (130-200)/() 167/86  Cuff Mean (mmHg):  [113-119] 113  SpO2:  [94 %-98 %] 95 %      Neurologic  Mental Status:  lethargic, arouses minimally to sternal rub and does not respond to questions or follow commands, waxing/waning wakefulness & cooperation  Cranial Nerves:  PER, left gaze preference but able to cross the midline, L sided facial droop, remainder unable to be tested due to his lethargy  Motor:  no abnormal movements. No spontaneous movement of L hemibody. RUE and RLE moving freely.   Sensory:  localizes noxious in 4/4 extremities  Coordination:  deferred  Station/Gait:  deferred    Stroke Scales    NIHSS  Interval baseline (01/12/22 0324)   Interval Comments     1a. Level of Consciousness 1-->Not alert, but arousable by minor stimulation to obey, answer, or respond   1b. LOC Questions 2-->Answers neither question correctly   1c. LOC Commands 1-->Performs one task correctly   2.   Best Gaze 1-->Partial gaze palsy, gaze is abnormal in one or both eyes, but forced deviation or total gaze paresis is not present   3.   Visual 2-->Complete hemianopia   4.   Facial Palsy 1-->Minor paralysis (flattened nasolabial fold, asymmetry on smiling)   5a. Motor Arm,  Left 4-->No movement   5b. Motor Arm, Right 0-->No drift, limb holds 90 (or 45) degrees for full 10 secs   6a. Motor Leg, Left 4-->No movement   6b. Motor Leg, right 1-->Drift, leg falls by the end of the 5-sec period but does not hit bed   7.   Limb Ataxia 0-->Absent   8.   Sensory 1-->Mild-to-moderate sensory loss, patient feels pinprick is less sharp or is dull on the affected side, or there is a loss of superficial pain with pinprick, but patient is aware of being touched   9.   Best Language 2-->Severe aphasia, all communication is through fragmentary expression, great need for inference, questioning, and guessing by the listener. Range of information that can be exchanged is limited, listener carries burden of. . . (see row details)   10. Dysarthria 0-->Normal   11. Extinction and Inattention  1-->Visual, tactile, auditory, spatial, or personal inattention or extinction to bilateral simultaneous stimulation in one of the sensory modalities   Total 21 (01/12/22 0324)       Imaging  I personally reviewed all imaging; relevant findings per HPI.     Lab Results Data   CBC  Recent Labs   Lab 01/14/22  0542 01/13/22  0812 01/12/22  0714   WBC 12.8* 11.4* 12.3*   RBC 5.01 5.00 4.90   HGB 15.4 15.2 15.2   HCT 46.0 47.0 45.5    234 249     Basic Metabolic Panel    Recent Labs   Lab 01/14/22  0542 01/14/22  0234 01/13/22  2254 01/13/22  1143 01/13/22  0812 01/13/22  0224 01/13/22  0134 01/12/22  0753 01/12/22  0714     --   --   --  142  --   --   --  142   POTASSIUM 3.0*  --   --   --  3.4  --  3.6   < > 3.1*   CHLORIDE 109  --   --   --  111*  --   --   --  109   CO2 22  --   --   --  21  --   --   --  23   BUN 20  --   --   --  19  --   --   --  15   CR 0.98  --   --   --  1.09  --   --   --  1.15   * 167* 180*   < > 162*   < >  --    < > 188*   VALERIE 8.5  --   --   --  8.8  --   --   --  8.7    < > = values in this interval not displayed.     Liver Panel  Recent Labs   Lab 01/08/22  1238    PROTTOTAL 8.5   ALBUMIN 3.9   BILITOTAL 0.8   ALKPHOS 94   AST 26   ALT 31     INR    Recent Labs   Lab Test 01/08/22  1843 01/08/22  1834   INR 0.95 1.0*      Lipid Profile    Recent Labs   Lab Test 01/08/22  1843 05/16/14  1506   CHOL 169 131   HDL 70 53   LDL 75 70   TRIG 118 38     A1C    Recent Labs   Lab Test 01/08/22  1843   A1C 7.8*     Troponin I  No results for input(s): TROPONIN, GHTROP in the last 168 hours.

## 2022-01-14 NOTE — PLAN OF CARE
"Assumed care 1900 from to 0730.    Vital signs:  Temp: 97.8  F (36.6  C) Temp src: Axillary BP: (!) 162/95 Pulse: 103   Resp: 18 SpO2: 97 % O2 Device: None (Room air)   Height: 170.2 cm (5' 7\") Weight: 82.5 kg (181 lb 12.8 oz)  Estimated body mass index is 28.47 kg/m  as calculated from the following:    Height as of this encounter: 1.702 m (5' 7\").    Weight as of this encounter: 82.5 kg (181 lb 12.8 oz).    Pain: At an acceptable level on current regimen. Denying pain.  Cardiovascular: NSR. Nicardipine drip running at 4.5 mg/hr. BPs in the 160s/90s. Goal SBP < 180.  Neuro: Oriented to self only. Waxing and waning arousability. Intermittently obeys commands. Left sided hemiparesis and facial droop. Sluggish pupils, not able to track.   Respiratory: Sating > 95% on RA.  GI: BM x 1. Tolerating minced moist level 5 diet with no nausea/vomiting.   : Adequate UO via condom cath.   Activity: Up with lift assist. Frequent repositioning.  Skin: No new deficits noted. Miconazole and nystatin ordered for redness in groin.  LDAs:   Lower R PIV is running nicardipine at 4.5 mg/hr; checked q1/2 hours, blood return noted  Upper R PIV is running NS at 125 mL/hr   Condom cath    Triggered sepsis protocol, lactic was 0.9.    Plan: Continue with POC. Notify primary team with changes.     "

## 2022-01-15 ENCOUNTER — APPOINTMENT (OUTPATIENT)
Dept: NEUROLOGY | Facility: CLINIC | Age: 47
End: 2022-01-15
Payer: COMMERCIAL

## 2022-01-15 ENCOUNTER — APPOINTMENT (OUTPATIENT)
Dept: GENERAL RADIOLOGY | Facility: CLINIC | Age: 47
End: 2022-01-15
Payer: COMMERCIAL

## 2022-01-15 LAB
ANION GAP SERPL CALCULATED.3IONS-SCNC: 8 MMOL/L (ref 3–14)
BUN SERPL-MCNC: 23 MG/DL (ref 7–30)
CALCIUM SERPL-MCNC: 8.8 MG/DL (ref 8.5–10.1)
CHLORIDE BLD-SCNC: 111 MMOL/L (ref 94–109)
CO2 SERPL-SCNC: 23 MMOL/L (ref 20–32)
CREAT SERPL-MCNC: 1.11 MG/DL (ref 0.66–1.25)
ERYTHROCYTE [DISTWIDTH] IN BLOOD BY AUTOMATED COUNT: 14 % (ref 10–15)
GFR SERPL CREATININE-BSD FRML MDRD: 82 ML/MIN/1.73M2
GLUCOSE BLD-MCNC: 135 MG/DL (ref 70–99)
GLUCOSE BLDC GLUCOMTR-MCNC: 126 MG/DL (ref 70–99)
GLUCOSE BLDC GLUCOMTR-MCNC: 128 MG/DL (ref 70–99)
GLUCOSE BLDC GLUCOMTR-MCNC: 128 MG/DL (ref 70–99)
GLUCOSE BLDC GLUCOMTR-MCNC: 129 MG/DL (ref 70–99)
GLUCOSE BLDC GLUCOMTR-MCNC: 141 MG/DL (ref 70–99)
HCT VFR BLD AUTO: 43.7 % (ref 40–53)
HGB BLD-MCNC: 14.4 G/DL (ref 13.3–17.7)
MCH RBC QN AUTO: 30.4 PG (ref 26.5–33)
MCHC RBC AUTO-ENTMCNC: 33 G/DL (ref 31.5–36.5)
MCV RBC AUTO: 92 FL (ref 78–100)
PLATELET # BLD AUTO: 253 10E3/UL (ref 150–450)
POTASSIUM BLD-SCNC: 3 MMOL/L (ref 3.4–5.3)
POTASSIUM BLD-SCNC: 3.4 MMOL/L (ref 3.4–5.3)
RBC # BLD AUTO: 4.74 10E6/UL (ref 4.4–5.9)
SODIUM SERPL-SCNC: 142 MMOL/L (ref 133–144)
WBC # BLD AUTO: 9.1 10E3/UL (ref 4–11)

## 2022-01-15 PROCEDURE — 36415 COLL VENOUS BLD VENIPUNCTURE: CPT | Performed by: PSYCHIATRY & NEUROLOGY

## 2022-01-15 PROCEDURE — 95720 EEG PHY/QHP EA INCR W/VEEG: CPT | Performed by: PSYCHIATRY & NEUROLOGY

## 2022-01-15 PROCEDURE — 250N000013 HC RX MED GY IP 250 OP 250 PS 637

## 2022-01-15 PROCEDURE — 258N000003 HC RX IP 258 OP 636: Performed by: STUDENT IN AN ORGANIZED HEALTH CARE EDUCATION/TRAINING PROGRAM

## 2022-01-15 PROCEDURE — 999N000065 XR ABDOMEN PORT 1 VIEWS

## 2022-01-15 PROCEDURE — 84132 ASSAY OF SERUM POTASSIUM: CPT | Performed by: PSYCHIATRY & NEUROLOGY

## 2022-01-15 PROCEDURE — 999N000128 HC STATISTIC PERIPHERAL IV START W/O US GUIDANCE

## 2022-01-15 PROCEDURE — 250N000009 HC RX 250: Performed by: PSYCHIATRY & NEUROLOGY

## 2022-01-15 PROCEDURE — 250N000009 HC RX 250: Performed by: STUDENT IN AN ORGANIZED HEALTH CARE EDUCATION/TRAINING PROGRAM

## 2022-01-15 PROCEDURE — 250N000013 HC RX MED GY IP 250 OP 250 PS 637: Performed by: STUDENT IN AN ORGANIZED HEALTH CARE EDUCATION/TRAINING PROGRAM

## 2022-01-15 PROCEDURE — 120N000003 HC R&B IMCU UMMC

## 2022-01-15 PROCEDURE — 250N000013 HC RX MED GY IP 250 OP 250 PS 637: Performed by: COUNSELOR

## 2022-01-15 PROCEDURE — 99232 SBSQ HOSP IP/OBS MODERATE 35: CPT | Mod: GC | Performed by: INTERNAL MEDICINE

## 2022-01-15 PROCEDURE — 99232 SBSQ HOSP IP/OBS MODERATE 35: CPT | Mod: 25 | Performed by: PSYCHIATRY & NEUROLOGY

## 2022-01-15 PROCEDURE — 250N000013 HC RX MED GY IP 250 OP 250 PS 637: Performed by: PSYCHIATRY & NEUROLOGY

## 2022-01-15 PROCEDURE — 36415 COLL VENOUS BLD VENIPUNCTURE: CPT | Performed by: STUDENT IN AN ORGANIZED HEALTH CARE EDUCATION/TRAINING PROGRAM

## 2022-01-15 PROCEDURE — 85027 COMPLETE CBC AUTOMATED: CPT | Performed by: STUDENT IN AN ORGANIZED HEALTH CARE EDUCATION/TRAINING PROGRAM

## 2022-01-15 PROCEDURE — 74018 RADEX ABDOMEN 1 VIEW: CPT | Mod: 26 | Performed by: RADIOLOGY

## 2022-01-15 PROCEDURE — 95714 VEEG EA 12-26 HR UNMNTR: CPT

## 2022-01-15 PROCEDURE — 80048 BASIC METABOLIC PNL TOTAL CA: CPT | Performed by: STUDENT IN AN ORGANIZED HEALTH CARE EDUCATION/TRAINING PROGRAM

## 2022-01-15 RX ORDER — POTASSIUM CHLORIDE 20MEQ/15ML
40 LIQUID (ML) ORAL ONCE
Status: COMPLETED | OUTPATIENT
Start: 2022-01-15 | End: 2022-01-15

## 2022-01-15 RX ORDER — POTASSIUM CHLORIDE 20MEQ/15ML
20 LIQUID (ML) ORAL ONCE
Status: COMPLETED | OUTPATIENT
Start: 2022-01-15 | End: 2022-01-15

## 2022-01-15 RX ORDER — CHLORTHALIDONE 25 MG/1
25 TABLET ORAL DAILY
Status: DISCONTINUED | OUTPATIENT
Start: 2022-01-16 | End: 2022-01-28 | Stop reason: HOSPADM

## 2022-01-15 RX ADMIN — POTASSIUM CHLORIDE 40 MEQ: 40 SOLUTION ORAL at 15:12

## 2022-01-15 RX ADMIN — ASPIRIN 81 MG CHEWABLE TABLET 81 MG: 81 TABLET CHEWABLE at 07:54

## 2022-01-15 RX ADMIN — Medication 12.5 MG: at 07:54

## 2022-01-15 RX ADMIN — ATORVASTATIN CALCIUM 40 MG: 40 TABLET, FILM COATED ORAL at 20:44

## 2022-01-15 RX ADMIN — VALPROATE SODIUM 500 MG: 100 INJECTION, SOLUTION INTRAVENOUS at 01:14

## 2022-01-15 RX ADMIN — MICONAZOLE NITRATE: 2 POWDER TOPICAL at 07:55

## 2022-01-15 RX ADMIN — VALPROATE SODIUM 500 MG: 100 INJECTION, SOLUTION INTRAVENOUS at 09:14

## 2022-01-15 RX ADMIN — POTASSIUM CHLORIDE 20 MEQ: 20 SOLUTION ORAL at 16:30

## 2022-01-15 RX ADMIN — NYSTATIN: 100000 CREAM TOPICAL at 20:44

## 2022-01-15 RX ADMIN — MICONAZOLE NITRATE: 2 POWDER TOPICAL at 20:44

## 2022-01-15 RX ADMIN — LISINOPRIL 10 MG: 10 TABLET ORAL at 07:54

## 2022-01-15 RX ADMIN — NYSTATIN: 100000 CREAM TOPICAL at 07:55

## 2022-01-15 RX ADMIN — CARVEDILOL 6.25 MG: 3.12 TABLET, FILM COATED ORAL at 17:40

## 2022-01-15 RX ADMIN — SODIUM CHLORIDE: 9 INJECTION, SOLUTION INTRAVENOUS at 06:38

## 2022-01-15 RX ADMIN — SODIUM CHLORIDE: 9 INJECTION, SOLUTION INTRAVENOUS at 16:34

## 2022-01-15 RX ADMIN — CARVEDILOL 6.25 MG: 3.12 TABLET, FILM COATED ORAL at 07:54

## 2022-01-15 RX ADMIN — VALPROATE SODIUM 500 MG: 100 INJECTION, SOLUTION INTRAVENOUS at 16:30

## 2022-01-15 RX ADMIN — NICARDIPINE HYDROCHLORIDE 2.5 MG/HR: 0.2 INJECTION, SOLUTION INTRAVENOUS at 21:09

## 2022-01-15 RX ADMIN — AMLODIPINE BESYLATE 10 MG: 10 TABLET ORAL at 07:54

## 2022-01-15 RX ADMIN — NICARDIPINE HYDROCHLORIDE 3.5 MG/HR: 0.2 INJECTION, SOLUTION INTRAVENOUS at 05:15

## 2022-01-15 ASSESSMENT — ACTIVITIES OF DAILY LIVING (ADL)
ADLS_ACUITY_SCORE: 17
ADLS_ACUITY_SCORE: 23
ADLS_ACUITY_SCORE: 17
ADLS_ACUITY_SCORE: 23
ADLS_ACUITY_SCORE: 23
ADLS_ACUITY_SCORE: 17
ADLS_ACUITY_SCORE: 25
ADLS_ACUITY_SCORE: 23
ADLS_ACUITY_SCORE: 17
ADLS_ACUITY_SCORE: 23
ADLS_ACUITY_SCORE: 17
ADLS_ACUITY_SCORE: 23
ADLS_ACUITY_SCORE: 25

## 2022-01-15 ASSESSMENT — MIFFLIN-ST. JEOR: SCORE: 1654.64

## 2022-01-15 NOTE — PLAN OF CARE
Neuro: Drowsy for majority of this shift. Intermittently alert and opening eyes spontaneously, otherwise arouses to voice and/or touch. Mentation waxes & wanes. LAUREANO orientation. Inconsistently follows commands. L-sided hemiparesis. Not withdrawing to painful stimuli on L side, MD made aware overnight. L pupil sluggish, R pupil brisk. Pupils equal and round.    Cardiac: SR/ST. Nicardipine gtt titrated to keep SBP <180. Blood return checked Q2hrs. -160's. VSS.   Respiratory: RA. Oxygen saturation >95%.   GI/: Adequate urine output with primofit. No BM overnight. Incontinent of urine and stool.   Diet/appetite: Minced and moist diet with thin liquids. Calorie counts. No appetite this evening.   Activity:  Lift assist. Reposition T7olidn overnight.   Pain: At acceptable level on current regimen.   Skin: No new deficits noted.  LDA's: PIVx2 and primofit.     Plan: Continue with POC. Notify primary team with changes.

## 2022-01-15 NOTE — PLAN OF CARE
Neuro: pts mentation status waxes and wanes. Lethargic this am, LAUREANO orientation. This evening patient more alert, aware he is at [facility] and the year 2022. Intermittently following commands. Left sided hemiparesis and facial droop. Sluggish pupils, not able to track.   Cardiac: SR/ST. SBP's 140-160s this shift, titrated nicardipine gtt for goal SBP<180. Nicardipine gtt infusing at 4.5 mg/hr through right PIV; checked Q2Hrs for blood return.    Respiratory: Sating > 94% on RA.  GI/: Adequate urine output per condom cath. Incontinent of large loose BM X1  Diet/appetite: moist and minced diet. Poor appetite. Sonu counts to start 1/15 0000.   Activity:  Assist of 2 w/ lift.  Pain: At acceptable level on current regimen. No s/s of pain.   Skin: No new deficits noted.  LDA's: two right pivs.     Plan: spoke with Dr brandt regarding patients nutrition since pt mentation waxes/ wanes. At times difficult to get PO meds down. Pt w/ poor intake. Recommended NG or Nj to be placed. Continue with POC. Notify primary team with changes.

## 2022-01-15 NOTE — PROGRESS NOTES
"CLINICAL NUTRITION SERVICES - BRIEF NOTE      Nutrition Prescription     RECOMMENDATIONS FOR MDs/PROVIDERS TO ORDER:  If team would like to start tube feeding- please consult RD with order \"RD to assess and order TF per MNT guidelines\"      Recommendations already ordered by Registered Dietitian (RD):  Calorie count started today  Patient is receiving supplements     Future/Additional Recommendations:  If patient requires FT placement for enteral nutrition support, see recs below:  Use dosing weight 71 kg  Formula: Osmolite 1.5 Sonu @ goal of  55ml/hr  (1320ml/day)  will provide: 1980 kcals, 83 g PRO, 1005 ml free H20, 268 g CHO, and 0 g fiber daily.  Modular: 2 packet Prosource modular, daily for total provisions of 2060 Kcals (29 Kcal/kg), 105 gm pro (1.5 gm/kg)  - Initiate @ 10 ml/hr and advance by 10 ml q8hr pending pt's tolerance.  - Do not start or advance TF rate unless Mg++ >1.5, K+ is >3, and phos >1.9  - Recommend 30 ml q4hr fluid flushes for tube patency. Additional fluids and/or adjustments per MD.    - Order multivitamin/mineral (15 ml/day via FT) to help ensure micronutrient needs being met with suspected hypermetabolic demands and potential interruptions to TF infusions.   - Consider additional 100 mg Thiamine x 5-7 days to prevent lyte depletion with risk for refeeding syndrome    - HOB >30-45 degrees if FT is gastric.       *Please see full assessment note from 1/14/22    New Findings:  Received consult: Concern for malnutrition due to patient refusing PO meds/intake 2/2 encephalopathy.    Patient fully assessed on 1/14/22, did not meet two criteria for the diagnosis of malnutrition but is at risk.     Diet order Minced and moist level 5    Interventions  Collaboration with other providers- paged team     RD to follow per protocol.  Tasha Webb RD, LD  Weekend pager: [phone]              "

## 2022-01-15 NOTE — PROGRESS NOTES
[facility]    Stroke Progress Note    Interval Events  - NAEO/N  - Patient still confused with waxing/waning alertness.  - BP improving. Cardiology following for management.  - Re-ordered EEG due to persistent encephalopathy.  - Re-ordered NG tube at bedside due to refusing PO mends/intake    HPI Summary  Miriam Hall is a 46 year old male with PMHx HTN who presented on 1/8/2022 with symptoms concerning for stroke. The pt reports that he was in his normal state of health that morning and then decided to take a nap around noon. On waking from his nap around 2:00PM, he found that he was having trouble moving and feeling the L side of his body. Unfortunately, the pt was unable to get to a phone and ended up crawling on his floor until he could finally get the attention of one of his neighbors, who then was able to call EMS     EMS arrived to find the pt continuing to have decreased sensation and weakness on his L side. They noted a , /145, and . On arrival in the ED, the pt remained afebrile, but was tachycardic () and quite hypertensive (/146). A stroke code was called for initial NIHSS 9, for a partial L gaze palsy, L-sided complete hemianopia, L arm > leg weakness, decreased sensation in the L arm/leg to sensation (pinprick intact in arm but decreased in leg), mild dysarthria, L-sided extinction, and possible L neglect. The pt also seemed to be having trouble with proprioception in the L arm, though did recognize it to be his own. Pt was immediately taken to CT where he underwent a CTH, CTA Head/Neck and CT Perfusion study, revealing a well-established R parieto-occipital stroke with an LVO of the R P1 segment. As the stroke already appeared well-established and the pt was outside the time window, no tPA was given. No thrombectomy was performed due to the location of the LVO in the PCA. The pt was loaded with ASA 324mg once with  plans to allow for permissive HTN and admission to the Stroke service for further work-up     The patient denies any history of smoking or drug use. Only rarely uses EtOH. He would prefer that no one be contact at this time concerning his diagnosis or medical condition.     TPA Treatment   Not given due to established infarct on imaging and unclear or unfavorable risk-benefit profile for extended window thrombolysis beyond the conventional 4.5 hour time window.     Endovascular Treatment  Proximal vessel occlusion present, but endovascular treatment not initiated due to location of the thrombus in the posterior cerebral artery    Stroke Evaluation Summarized    MRI/Head CT MRI brain  1. Findings from CT and CTA performed yesterday are confirmed with stroke in the distribution of the right posterior cerebral artery.  This involves the medial right occipital lobe and medial right temporal lobe with extension into the body of the thalamus.  2. Additional punctate foci of diffusion restriction in the left putamen.  3. Moderate leukoaraiosis.    Intracranial Vasculature MRA  Redemonstration of occlusion of the right PCA near its  origin. Additional short segment high-grade stenosis of a right-sided M2 MCA branch. Findings are not likely not significantly changed from CT performed yesterday.   Cervical Vasculature CTA  1. Occlusion of the proximal right PCA.  2. High-grade stenosis of the occiput M2 branch of the right MCA with  attenuated flow distal.  3. Neck CTA demonstrates no stenosis of the major cervical arteries.     Echocardiogram The visual ejection fraction is 50-55%.   No regional wall motion abnormalities are seen.  Moderate to severe concentric wall thickening consistent with left ventricular hypertrophy is present.  Global right ventricular function is normal.  There was no shunt at the atrial septal level as assessed by agitated saline bubble study at rest and with Valsalva maneuver.  Previous study not  available for comparison.   EKG/Telemetry EKG with sinus tachycardia   Other Testing Hypercoagulability profile has not been sent yet     LDL  1/8/2022: 75 mg/dL   A1C  1/8/2022: 7.8 %   Troponin No lab value available in past 48 hrs       Impression   # Acute ischemic stroke of Right P1 segement of the PCA  # R P1 occlusion and R M2 Stenosis  # Large vessel atherosclerosis vs ESUS  Miriam Hall is a 46 year old male with PMHx HTN who presented on 1/8/2022 with L gaze palsy, L complete hemianopia, mild dysarthria, L arm > leg weakness, L arm/leg decreased sensation, and L side extinction after waking up from a nap earlier this morning. On arrival in the ED, was found to have a well-established large vessel occlusion of the R P1 segment of the PCA and R M2 high grade stenosis suggesting large vessel intracranial atherosclerotic disease. MRI with medial right occipital lobe and medial right temporal lobe with extension into the body of the thalamus. However, the remaining vessels are not suggestive of Large vessel intracranial athero. Rest of his intracranial vessels were without disease. ECHO without low EF, atrial size not concerning for afib, no valvular pathology. Possible to have ESUS with possible B/L infarcts pending further work up.   - Aspirin 81 mg PO daily for secondary stroke prevention  - PT/OT/SLP Rec: ARU  - PM&R  - Stroke Education  - Depression Screen  - Apnea Screen  - Euthermia, Euglycemia  - On regular diet with thin liquids  - CARISA once BP under better control     #Concern for seizures  Patient with ongoing waxing and waning level of consciousness over the past few days. Hooked up to EEG on 1/12 with no evidence of seizures per report from Dr. Porter and pt pulling off leads overnight so EEG disconnected on 1/13.   - Valproic acid 500 mg q8h IV -- this can be changed in the coming days to PO once patient's level of consciousness improves   - Repeat EEG    #HLD  LDL 75. Long-term Goal b/w 40-70.    - Atorvastatin 40 mg PO daily    #HTN, uncontrolled   Inpatient BP Goal < 180 per Cardiology recs (1/10). Stopped Lisinopril and started Amlodipine due to increasing Crt on Lisinopril. Now that creatinine is back at baseline, will resume lisinopril.   - PTA Lisinopril 10 mg PO daily   - Amlodipine 10 mg PO daily  - Carvedilol 6.25 mg PO BID  - Chlorthalidone 25 mg PO daily  - PRN Nicardipine 40 mg / 200 mL cIV for SBP > 180  - PRN Hydralazine/Labetalol 10-20 mg IV Q1H for SBP > 180  - Cardiology Recs (1/15):   -- Continue current blood pressure medications, as BP is under better control this morning and patient is off of Nicardipine.   -- Recommend increasing Chlorthalidone to 25 mg daily.   -- If BP is not within goal range in the next 24-48 hours, will consider changing Amlodipine to Nifedipine 30mg BID for better BP control.     #DM  Newly diagnosed DM with Hgb A1C 7.8.  - Continue to monitor glucose  - LSSI  - Hypoglycemia protocol    #Nutrition  Per Dietician, Patient does not meet two of the established criteria necessary for diagnosing malnutrition but is at risk for malnutrition.  - Reordered NG tube    #Hypokalemia   - RN Managed Standard Replacement protocol    Chronic/Resolved Issues:      #DISHA, resolved  Pt presented with Cr 1.37 improved with IVF to 1.17. Now at baseline.   - Daily BMP     #Leukocytosis, resolved  Pt presented with WBC 12.4. Most likely is a stress response given lack of fever. Mild fever overnight but WBC decreasing. Will continue to monitor.  - Daily CBC    #Elevated troponin, resolved  Pt arrives with Trop 147 and rising. EKG exhibited sinus tachycardia, but no concerning signs of ischemia. Troponin peaked 1/10.     Prophylaxis            For VTE Prevention:  - SCD     For Acid Suppression:  - GI prophylaxis is not indicated     Code Status  Full Code    The patient was discussed with Stroke Staff, Dr. Christine.    Nelson Laura DO  Neurology Resident PGY1  ASCOM:  *[id number]  ______________________________________________________    Clinically Significant Risk Factors Present on Admission                 Medications   Scheduled Meds    amLODIPine  10 mg Oral Daily     aspirin  81 mg Oral Daily    Or     aspirin  81 mg Oral or NG Tube Daily     atorvastatin  40 mg Oral QPM     carvedilol  6.25 mg Oral Once     carvedilol  6.25 mg Oral BID w/meals     chlorthalidone  12.5 mg Oral Daily     insulin aspart  1-3 Units Subcutaneous TID AC     insulin aspart  1-3 Units Subcutaneous At Bedtime     lisinopril  10 mg Oral or NG Tube Daily     miconazole   Topical BID     nystatin   Topical BID     sodium chloride (PF)  3 mL Intracatheter Q8H     valproate (DEPACON) in NS intermittent infusion  500 mg Intravenous Q8H       Infusion Meds    - MEDICATION INSTRUCTIONS -       - MEDICATION INSTRUCTIONS -       niCARdipine 1.5 mg/hr (01/15/22 0635)     sodium chloride 125 mL/hr at 01/15/22 0750       PRN Meds  acetaminophen, artificial tears ophthalmic solution, glucose **OR** dextrose **OR** glucagon, hydrALAZINE, labetalol, lidocaine 4%, lidocaine (buffered or not buffered), - MEDICATION INSTRUCTIONS -, - MEDICATION INSTRUCTIONS -, melatonin, polyethylene glycol, senna-docusate, sodium chloride (PF)       PHYSICAL EXAMINATION  Temp:  [97.7  F (36.5  C)-98.9  F (37.2  C)] 98.9  F (37.2  C)  Pulse:  [] 94  Resp:  [16-18] 18  BP: (131-165)/(71-93) 165/85  Cuff Mean (mmHg):  [107] 107  SpO2:  [94 %-98 %] 97 %      Neurologic  Mental Status:  lethargic, arouses minimally to sternal rub and does not respond to questions or follow commands, waxing/waning wakefulness & cooperation  Cranial Nerves:  PER, left gaze preference but able to cross the midline, L sided facial droop, remainder unable to be tested due to his lethargy  Motor:  no abnormal movements. No spontaneous movement of L hemibody. RUE and RLE moving freely.   Sensory:  localizes noxious in 4/4 extremities  Coordination:   deferred  Station/Gait:  deferred    Stroke Scales    NIHSS  Interval baseline (01/12/22 0324)   Interval Comments     1a. Level of Consciousness 1-->Not alert, but arousable by minor stimulation to obey, answer, or respond   1b. LOC Questions 2-->Answers neither question correctly   1c. LOC Commands 1-->Performs one task correctly   2.   Best Gaze 1-->Partial gaze palsy, gaze is abnormal in one or both eyes, but forced deviation or total gaze paresis is not present   3.   Visual 2-->Complete hemianopia   4.   Facial Palsy 1-->Minor paralysis (flattened nasolabial fold, asymmetry on smiling)   5a. Motor Arm, Left 4-->No movement   5b. Motor Arm, Right 0-->No drift, limb holds 90 (or 45) degrees for full 10 secs   6a. Motor Leg, Left 4-->No movement   6b. Motor Leg, right 1-->Drift, leg falls by the end of the 5-sec period but does not hit bed   7.   Limb Ataxia 0-->Absent   8.   Sensory 1-->Mild-to-moderate sensory loss, patient feels pinprick is less sharp or is dull on the affected side, or there is a loss of superficial pain with pinprick, but patient is aware of being touched   9.   Best Language 2-->Severe aphasia, all communication is through fragmentary expression, great need for inference, questioning, and guessing by the listener. Range of information that can be exchanged is limited, listener carries burden of. . . (see row details)   10. Dysarthria 0-->Normal   11. Extinction and Inattention  1-->Visual, tactile, auditory, spatial, or personal inattention or extinction to bilateral simultaneous stimulation in one of the sensory modalities   Total 21 (01/12/22 0324)       Imaging  I personally reviewed all imaging; relevant findings per HPI.     Lab Results Data   CBC  Recent Labs   Lab 01/15/22  0455 01/14/22  0542 01/13/22  0812   WBC 9.1 12.8* 11.4*   RBC 4.74 5.01 5.00   HGB 14.4 15.4 15.2   HCT 43.7 46.0 47.0    247 234     Basic Metabolic Panel    Recent Labs   Lab 01/15/22  045  01/15/22  0305 01/14/22  2217 01/14/22  1941 01/14/22  0930 01/14/22  0542 01/13/22  1143 01/13/22  0812     --   --   --   --  141  --  142   POTASSIUM 3.0*  --   --  3.1*  --  3.0*  --  3.4   CHLORIDE 111*  --   --   --   --  109  --  111*   CO2 23  --   --   --   --  22  --  21   BUN 23  --   --   --   --  20  --  19   CR 1.11  --   --   --   --  0.98  --  1.09   * 128* 166*  --    < > 165*   < > 162*   VALERIE 8.8  --   --   --   --  8.5  --  8.8    < > = values in this interval not displayed.     Liver Panel  Recent Labs   Lab 01/08/22  1843   PROTTOTAL 8.5   ALBUMIN 3.9   BILITOTAL 0.8   ALKPHOS 94   AST 26   ALT 31     INR    Recent Labs   Lab Test 01/08/22  1843 01/08/22  1834   INR 0.95 1.0*      Lipid Profile    Recent Labs   Lab Test 01/08/22  1843 05/16/14  1506   CHOL 169 131   HDL 70 53   LDL 75 70   TRIG 118 38     A1C    Recent Labs   Lab Test 01/08/22  1843   A1C 7.8*     Troponin I  No results for input(s): TROPONIN, GHTROP in the last 168 hours.

## 2022-01-15 NOTE — PROGRESS NOTES
[facility]    Cardiology Progress Note- Cards 1        Date of Admission:  1/8/2022     Assessment & Plan: HVSL   46 year old male admitted on 1/8/2022. He has HTN and is currently admitted for right posterior cerebral artery ischemic CVA. Cardiology consulted for high blood pressure.    # Hypertension  - Continue current blood pressure medications, as BP is under better control this morning and patient is off of Nicardipine.  - Recommend increasing Chlorthalidone to 25 mg daily.  - If BP is not within goal range in the next 24-48 hours, will consider changing Amlodipine to Nifedipine 30mg BID for better BP control.       Diet:   Per primary team   DVT Prophylaxis: Defer to primary service  Mendoza Catheter: Not present      Entered: Dinah Jaeger MD 01/15/2022, 8:48 AM       The patient's care was discussed with the Attending Physician, Dr. Castro.    Dinah Jaeger MD  Cardiology fellow  ______________________________________________________________________    Interval History   Off nicardipine this morning. Patient is asleep during morning rounds.     Data reviewed today: I reviewed all medications, new labs and imaging results over the last 24 hours.     Physical Exam   Vital Signs: Temp: 98.9  F (37.2  C) Temp src: Axillary BP: (!) 165/85 Pulse: 94   Resp: 18 SpO2: 97 % O2 Device: None (Room air)    Weight: 181 lbs 0 oz  General Appearance:  NAD. Laying in bed comfortably.  Respiratory: CTAB  Cardiovascular: RRR, no m/r/g  GI: Soft, non-tender, non-distended  Skin: No rashes visualized  Other:  NA    Data   Recent Labs   Lab 01/15/22  0455 01/15/22  0305 01/14/22  2217 01/14/22  1941 01/14/22  0930 01/14/22  0542 01/13/22  1143 01/13/22  0812 01/08/22  2328 01/08/22  1843 01/08/22  1835 01/08/22  1834   WBC 9.1  --   --   --   --  12.8*  --  11.4*   < > 12.4*  --   --    HGB 14.4  --   --   --   --  15.4  --  15.2   < > 17.2  --   --    MCV 92  --   --    --   --  92  --  94   < > 90  --   --      --   --   --   --  247  --  234   < > 252  --   --    INR  --   --   --   --   --   --   --   --   --  0.95  --  1.0*     --   --   --   --  141  --  142   < > 141  --   --    POTASSIUM 3.0*  --   --  3.1*  --  3.0*  --  3.4   < > 2.7*  --   --    CHLORIDE 111*  --   --   --   --  109  --  111*   < > 104  --   --    CO2 23  --   --   --   --  22  --  21   < > 30  --   --    BUN 23  --   --   --   --  20  --  19   < > 19  --   --    CR 1.11  --   --   --   --  0.98  --  1.09   < > 1.37*   < >  --    ANIONGAP 8  --   --   --   --  10  --  10   < > 7  --   --    VALERIE 8.8  --   --   --   --  8.5  --  8.8   < > 9.8  --   --    * 128* 166*  --    < > 165*   < > 162*   < > 222*  --   --    ALBUMIN  --   --   --   --   --   --   --   --   --  3.9  --   --    PROTTOTAL  --   --   --   --   --   --   --   --   --  8.5  --   --    BILITOTAL  --   --   --   --   --   --   --   --   --  0.8  --   --    ALKPHOS  --   --   --   --   --   --   --   --   --  94  --   --    ALT  --   --   --   --   --   --   --   --   --  31  --   --    AST  --   --   --   --   --   --   --   --   --  26  --   --     < > = values in this interval not displayed.       Attending Attestation: I saw, examined and evaluated the patient and reviewed all relevant data with Dinah Jaeger MD. I agree with the findings, assessment and plan of care documented in the edited note.

## 2022-01-15 NOTE — PROGRESS NOTES
PRELIMINARY EEG REPORT:    The first hr of vEEG was reviewed.  There is intermittent generalized delta-theta slowing with more prominent and persistent delta slowing in the right hemisphere.  No epileptiform discharges or seizures recorded. Findings are consistent with moderate diffuse encephalopathy and an additional structural abnormality over the right hemisphere.       Leyla Laird MD

## 2022-01-16 ENCOUNTER — APPOINTMENT (OUTPATIENT)
Dept: NEUROLOGY | Facility: CLINIC | Age: 47
End: 2022-01-16
Payer: COMMERCIAL

## 2022-01-16 ENCOUNTER — APPOINTMENT (OUTPATIENT)
Dept: CT IMAGING | Facility: CLINIC | Age: 47
End: 2022-01-16
Attending: PSYCHIATRY & NEUROLOGY
Payer: COMMERCIAL

## 2022-01-16 ENCOUNTER — APPOINTMENT (OUTPATIENT)
Dept: GENERAL RADIOLOGY | Facility: CLINIC | Age: 47
End: 2022-01-16
Payer: COMMERCIAL

## 2022-01-16 LAB
AMMONIA PLAS-SCNC: 23 UMOL/L (ref 10–50)
ANION GAP SERPL CALCULATED.3IONS-SCNC: 10 MMOL/L (ref 3–14)
BASE EXCESS BLDV CALC-SCNC: 2.9 MMOL/L (ref -7.7–1.9)
BUN SERPL-MCNC: 28 MG/DL (ref 7–30)
CALCIUM SERPL-MCNC: 9.2 MG/DL (ref 8.5–10.1)
CHLORIDE BLD-SCNC: 109 MMOL/L (ref 94–109)
CO2 SERPL-SCNC: 23 MMOL/L (ref 20–32)
CREAT SERPL-MCNC: 1.03 MG/DL (ref 0.66–1.25)
ERYTHROCYTE [DISTWIDTH] IN BLOOD BY AUTOMATED COUNT: 13.6 % (ref 10–15)
GFR SERPL CREATININE-BSD FRML MDRD: 90 ML/MIN/1.73M2
GLUCOSE BLD-MCNC: 172 MG/DL (ref 70–99)
GLUCOSE BLDC GLUCOMTR-MCNC: 126 MG/DL (ref 70–99)
GLUCOSE BLDC GLUCOMTR-MCNC: 136 MG/DL (ref 70–99)
GLUCOSE BLDC GLUCOMTR-MCNC: 141 MG/DL (ref 70–99)
GLUCOSE BLDC GLUCOMTR-MCNC: 141 MG/DL (ref 70–99)
GLUCOSE BLDC GLUCOMTR-MCNC: 155 MG/DL (ref 70–99)
HCO3 BLDV-SCNC: 29 MMOL/L (ref 21–28)
HCT VFR BLD AUTO: 48.6 % (ref 40–53)
HGB BLD-MCNC: 16.4 G/DL (ref 13.3–17.7)
MCH RBC QN AUTO: 30.3 PG (ref 26.5–33)
MCHC RBC AUTO-ENTMCNC: 33.7 G/DL (ref 31.5–36.5)
MCV RBC AUTO: 90 FL (ref 78–100)
O2/TOTAL GAS SETTING VFR VENT: 3 %
PCO2 BLDV: 47 MM HG (ref 40–50)
PH BLDV: 7.4 [PH] (ref 7.32–7.43)
PLATELET # BLD AUTO: 290 10E3/UL (ref 150–450)
PO2 BLDV: 66 MM HG (ref 25–47)
POTASSIUM BLD-SCNC: 3.5 MMOL/L (ref 3.4–5.3)
RBC # BLD AUTO: 5.41 10E6/UL (ref 4.4–5.9)
SARS-COV-2 RNA RESP QL NAA+PROBE: NEGATIVE
SODIUM SERPL-SCNC: 142 MMOL/L (ref 133–144)
WBC # BLD AUTO: 12.2 10E3/UL (ref 4–11)

## 2022-01-16 PROCEDURE — 70450 CT HEAD/BRAIN W/O DYE: CPT | Mod: 26 | Performed by: RADIOLOGY

## 2022-01-16 PROCEDURE — 250N000009 HC RX 250: Performed by: STUDENT IN AN ORGANIZED HEALTH CARE EDUCATION/TRAINING PROGRAM

## 2022-01-16 PROCEDURE — 250N000013 HC RX MED GY IP 250 OP 250 PS 637: Performed by: STUDENT IN AN ORGANIZED HEALTH CARE EDUCATION/TRAINING PROGRAM

## 2022-01-16 PROCEDURE — U0003 INFECTIOUS AGENT DETECTION BY NUCLEIC ACID (DNA OR RNA); SEVERE ACUTE RESPIRATORY SYNDROME CORONAVIRUS 2 (SARS-COV-2) (CORONAVIRUS DISEASE [COVID-19]), AMPLIFIED PROBE TECHNIQUE, MAKING USE OF HIGH THROUGHPUT TECHNOLOGIES AS DESCRIBED BY CMS-2020-01-R: HCPCS | Performed by: STUDENT IN AN ORGANIZED HEALTH CARE EDUCATION/TRAINING PROGRAM

## 2022-01-16 PROCEDURE — 250N000013 HC RX MED GY IP 250 OP 250 PS 637

## 2022-01-16 PROCEDURE — 95718 EEG PHYS/QHP 2-12 HR W/VEEG: CPT | Performed by: PSYCHIATRY & NEUROLOGY

## 2022-01-16 PROCEDURE — 82803 BLOOD GASES ANY COMBINATION: CPT | Performed by: PSYCHIATRY & NEUROLOGY

## 2022-01-16 PROCEDURE — 95711 VEEG 2-12 HR UNMONITORED: CPT

## 2022-01-16 PROCEDURE — 85027 COMPLETE CBC AUTOMATED: CPT | Performed by: STUDENT IN AN ORGANIZED HEALTH CARE EDUCATION/TRAINING PROGRAM

## 2022-01-16 PROCEDURE — 82140 ASSAY OF AMMONIA: CPT

## 2022-01-16 PROCEDURE — 74018 RADEX ABDOMEN 1 VIEW: CPT | Mod: 26 | Performed by: RADIOLOGY

## 2022-01-16 PROCEDURE — 36415 COLL VENOUS BLD VENIPUNCTURE: CPT | Performed by: PSYCHIATRY & NEUROLOGY

## 2022-01-16 PROCEDURE — 999N000128 HC STATISTIC PERIPHERAL IV START W/O US GUIDANCE

## 2022-01-16 PROCEDURE — 99232 SBSQ HOSP IP/OBS MODERATE 35: CPT | Mod: GC | Performed by: PSYCHIATRY & NEUROLOGY

## 2022-01-16 PROCEDURE — 250N000013 HC RX MED GY IP 250 OP 250 PS 637: Performed by: INTERNAL MEDICINE

## 2022-01-16 PROCEDURE — 250N000013 HC RX MED GY IP 250 OP 250 PS 637: Performed by: COUNSELOR

## 2022-01-16 PROCEDURE — 70450 CT HEAD/BRAIN W/O DYE: CPT

## 2022-01-16 PROCEDURE — 250N000011 HC RX IP 250 OP 636: Performed by: COUNSELOR

## 2022-01-16 PROCEDURE — 999N000065 XR ABDOMEN PORT 1 VIEWS

## 2022-01-16 PROCEDURE — 258N000003 HC RX IP 258 OP 636: Performed by: STUDENT IN AN ORGANIZED HEALTH CARE EDUCATION/TRAINING PROGRAM

## 2022-01-16 PROCEDURE — 95714 VEEG EA 12-26 HR UNMNTR: CPT

## 2022-01-16 PROCEDURE — 80048 BASIC METABOLIC PNL TOTAL CA: CPT | Performed by: STUDENT IN AN ORGANIZED HEALTH CARE EDUCATION/TRAINING PROGRAM

## 2022-01-16 PROCEDURE — 36415 COLL VENOUS BLD VENIPUNCTURE: CPT | Performed by: STUDENT IN AN ORGANIZED HEALTH CARE EDUCATION/TRAINING PROGRAM

## 2022-01-16 PROCEDURE — 36415 COLL VENOUS BLD VENIPUNCTURE: CPT

## 2022-01-16 PROCEDURE — 250N000009 HC RX 250: Performed by: PSYCHIATRY & NEUROLOGY

## 2022-01-16 PROCEDURE — 120N000003 HC R&B IMCU UMMC

## 2022-01-16 RX ADMIN — NYSTATIN: 100000 CREAM TOPICAL at 09:24

## 2022-01-16 RX ADMIN — NYSTATIN: 100000 CREAM TOPICAL at 19:10

## 2022-01-16 RX ADMIN — CHLORTHALIDONE 25 MG: 25 TABLET ORAL at 09:22

## 2022-01-16 RX ADMIN — HYDRALAZINE HYDROCHLORIDE 20 MG: 20 INJECTION INTRAMUSCULAR; INTRAVENOUS at 17:25

## 2022-01-16 RX ADMIN — VALPROATE SODIUM 500 MG: 100 INJECTION, SOLUTION INTRAVENOUS at 01:23

## 2022-01-16 RX ADMIN — MICONAZOLE NITRATE: 2 POWDER TOPICAL at 19:10

## 2022-01-16 RX ADMIN — LISINOPRIL 10 MG: 10 TABLET ORAL at 09:23

## 2022-01-16 RX ADMIN — CARVEDILOL 6.25 MG: 3.12 TABLET, FILM COATED ORAL at 09:23

## 2022-01-16 RX ADMIN — ASPIRIN 81 MG CHEWABLE TABLET 81 MG: 81 TABLET CHEWABLE at 09:22

## 2022-01-16 RX ADMIN — NICARDIPINE HYDROCHLORIDE 7.5 MG/HR: 0.2 INJECTION, SOLUTION INTRAVENOUS at 04:30

## 2022-01-16 RX ADMIN — SODIUM CHLORIDE: 9 INJECTION, SOLUTION INTRAVENOUS at 01:23

## 2022-01-16 RX ADMIN — VALPROATE SODIUM 500 MG: 100 INJECTION, SOLUTION INTRAVENOUS at 17:47

## 2022-01-16 RX ADMIN — MICONAZOLE NITRATE: 2 POWDER TOPICAL at 09:24

## 2022-01-16 RX ADMIN — ATORVASTATIN CALCIUM 40 MG: 40 TABLET, FILM COATED ORAL at 19:09

## 2022-01-16 RX ADMIN — LABETALOL HYDROCHLORIDE 20 MG: 5 INJECTION, SOLUTION INTRAVENOUS at 21:12

## 2022-01-16 RX ADMIN — CARVEDILOL 6.25 MG: 3.12 TABLET, FILM COATED ORAL at 17:47

## 2022-01-16 RX ADMIN — VALPROATE SODIUM 500 MG: 100 INJECTION, SOLUTION INTRAVENOUS at 09:24

## 2022-01-16 RX ADMIN — AMLODIPINE BESYLATE 10 MG: 10 TABLET ORAL at 09:23

## 2022-01-16 ASSESSMENT — ACTIVITIES OF DAILY LIVING (ADL)
ADLS_ACUITY_SCORE: 21
ADLS_ACUITY_SCORE: 17
ADLS_ACUITY_SCORE: 17
ADLS_ACUITY_SCORE: 19
ADLS_ACUITY_SCORE: 17
ADLS_ACUITY_SCORE: 19
ADLS_ACUITY_SCORE: 21
ADLS_ACUITY_SCORE: 19
ADLS_ACUITY_SCORE: 17
ADLS_ACUITY_SCORE: 19
ADLS_ACUITY_SCORE: 17
ADLS_ACUITY_SCORE: 21
ADLS_ACUITY_SCORE: 19
ADLS_ACUITY_SCORE: 17
ADLS_ACUITY_SCORE: 17
ADLS_ACUITY_SCORE: 19
ADLS_ACUITY_SCORE: 17
ADLS_ACUITY_SCORE: 19
ADLS_ACUITY_SCORE: 19
ADLS_ACUITY_SCORE: 21
ADLS_ACUITY_SCORE: 19
ADLS_ACUITY_SCORE: 17

## 2022-01-16 ASSESSMENT — MIFFLIN-ST. JEOR: SCORE: 1647.84

## 2022-01-16 NOTE — PROVIDER NOTIFICATION
"Text paged Neurology  01:11AM 1/16/22  \"pt pulled NG tube. Had it in at 00:00, checked at 1 and was pulled out. Trialed mitt off at 00:00 during assessment, wasnt touching NG so left it off. Should I place another in overnight?\"    04:12AM 1/16/22  \"pt seems to be more lethargic, harder to wake up. Has like this apneic breathing that is new. Also intermittently desats to mid 70% for like 30 seconds. Threw 1 NC on him. Wonder if had seizure?\"    04:55AM 1/16/22  \"Just wanted to update. Pt much more arousable after NG placement. No other neuro changes. HAve him on RA currently, and sating >95%.\"  "

## 2022-01-16 NOTE — PLAN OF CARE
Neuro: pts mentation status waxes and wanes. Lethargic this am, LAUREANO orientation. This afternoon patient more alert. Intermittently following commands. Left sided hemiparesis and facial droop.   Cardiac: SR/ST. SBP's 160s this shift,nicardipine gtt off. Orders to restart if SBP >180.   Respiratory: Sating > 94% on RA.  GI/: Adequate urine output primofit. No bm this shift.  Diet/appetite: moist and minced diet. Poor appetite. Sonu counts. NG tube placed, nutrition consulted.  Activity:  Assist of 2 w/ lift.  Pain: At acceptable level on current regimen. No s/s of pain.   Skin: No new deficits noted.   LDA's: two right pivs. NS at 125 ml/hr.      Plan: Continue with POC. Notify primary team with changes.

## 2022-01-16 NOTE — PROGRESS NOTES
[facility]    Stroke Progress Note    Interval Events  - NAEO/N  - Patient still confused with waxing/waning alertness.  - BP improving. Cardiology following for management.  - NGT placed successfully  - vEEG = negative for seizures  - CTH = negative for new acute changes; noted decreased mass effect from original infarction    HPI Summary  Miriam Hall is a 46 year old male with PMHx HTN who presented on 1/8/2022 with symptoms concerning for stroke. The pt reports that he was in his normal state of health that morning and then decided to take a nap around noon. On waking from his nap around 2:00PM, he found that he was having trouble moving and feeling the L side of his body. Unfortunately, the pt was unable to get to a phone and ended up crawling on his floor until he could finally get the attention of one of his neighbors, who then was able to call EMS     EMS arrived to find the pt continuing to have decreased sensation and weakness on his L side. They noted a , /145, and . On arrival in the ED, the pt remained afebrile, but was tachycardic () and quite hypertensive (/146). A stroke code was called for initial NIHSS 9, for a partial L gaze palsy, L-sided complete hemianopia, L arm > leg weakness, decreased sensation in the L arm/leg to sensation (pinprick intact in arm but decreased in leg), mild dysarthria, L-sided extinction, and possible L neglect. The pt also seemed to be having trouble with proprioception in the L arm, though did recognize it to be his own. Pt was immediately taken to CT where he underwent a CTH, CTA Head/Neck and CT Perfusion study, revealing a well-established R parieto-occipital stroke with an LVO of the R P1 segment. As the stroke already appeared well-established and the pt was outside the time window, no tPA was given. No thrombectomy was performed due to the location of the LVO in the PCA. The pt  was loaded with ASA 324mg once with plans to allow for permissive HTN and admission to the Stroke service for further work-up     The patient denies any history of smoking or drug use. Only rarely uses EtOH. He would prefer that no one be contact at this time concerning his diagnosis or medical condition.     TPA Treatment   Not given due to established infarct on imaging and unclear or unfavorable risk-benefit profile for extended window thrombolysis beyond the conventional 4.5 hour time window.     Endovascular Treatment  Proximal vessel occlusion present, but endovascular treatment not initiated due to location of the thrombus in the posterior cerebral artery    Stroke Evaluation Summarized    MRI/Head CT MRI Brain (1/12)  1. Findings from CT and CTA performed yesterday are confirmed with stroke in the distribution of the right posterior cerebral artery. This involves the medial right occipital lobe and medial right temporal lobe with extension into the body of the thalamus.  2. Additional punctate foci of diffusion restriction in the left putamen.  3. Moderate leukoaraiosis.     CT Head (1/16)  1. Continued evolution of large right posterior cerebral artery territory infarct. With similar mass effect and 4 mm of leftward midline shift, previously 5 mm.  2. Subtle hyperdensities in the right basal ganglia consistent with petechial hemorrhages seen on previous MRI. No new intracranial hemorrhage or new loss of gray-white matter differentiation.   Intracranial Vasculature MRA (1/12)  Redemonstration of occlusion of the right PCA near its  origin. Additional short segment high-grade stenosis of a right-sided M2 MCA branch. Findings are not likely not significantly changed from CT performed yesterday.   Cervical Vasculature CTA (1/12)  1. Occlusion of the proximal right PCA.  2. High-grade stenosis of the occiput M2 branch of the right MCA with  attenuated flow distal.  3. Neck CTA demonstrates no stenosis of the  major cervical arteries.     Echocardiogram The visual ejection fraction is 50-55%.   No regional wall motion abnormalities are seen.  Moderate to severe concentric wall thickening consistent with left ventricular hypertrophy is present.  Global right ventricular function is normal.  There was no shunt at the atrial septal level as assessed by agitated saline bubble study at rest and with Valsalva maneuver.  Previous study not available for comparison.   EKG/Telemetry EKG with sinus tachycardia   Other Testing Hypercoagulability profile has not been sent yet     LDL  1/8/2022: 75 mg/dL   A1C  1/8/2022: 7.8 %   Troponin No lab value available in past 48 hrs       Impression   # Acute ischemic stroke of Right P1 segement of the PCA  # R P1 occlusion and R M2 Stenosis  # Large vessel atherosclerosis vs ESUS  Miriam Hall is a 46 year old male with PMHx HTN who presented on 1/8/2022 with L gaze palsy, L complete hemianopia, mild dysarthria, L arm > leg weakness, L arm/leg decreased sensation, and L side extinction after waking up from a nap earlier this morning. On arrival in the ED, was found to have a well-established large vessel occlusion of the R P1 segment of the PCA and R M2 high grade stenosis suggesting large vessel intracranial atherosclerotic disease. MRI with medial right occipital lobe and medial right temporal lobe with extension into the body of the thalamus. However, the remaining vessels are not suggestive of Large vessel intracranial athero. Rest of his intracranial vessels were without disease. ECHO without low EF, atrial size not concerning for afib, no valvular pathology. Possible to have ESUS with possible B/L infarcts pending further work up.   - Aspirin 81 mg PO daily for secondary stroke prevention  - PT/OT/SLP Rec: ARU  - PM&R  - On regular diet with thin liquids  - CARISA once BP under better control     #Concern for seizures  Patient with ongoing waxing and waning level of consciousness over  the past few days. Hooked up to EEG on 1/12 with no evidence of seizures per report from Dr. Porter and pt pulling off leads overnight so EEG disconnected on 1/13. Repeat EEG on 1/16 completed and negative for seizure activity. Will continue depakote due to his high-seizure risk given recent stroke.  - Valproic acid 500 mg q8h IV -- this can be changed in the coming days to PO once patient's level of consciousness improves     #HLD  LDL 75. Long-term Goal b/w 40-70.   - Atorvastatin 40 mg PO daily    #HTN, uncontrolled   Inpatient BP Goal < 180 per Cardiology recs (1/10). Stopped Lisinopril and started Amlodipine due to increasing Crt on Lisinopril. Now that creatinine is back at baseline, will resume lisinopril.   - PTA Lisinopril 10 mg PO daily   - Amlodipine 10 mg PO daily  - Carvedilol 6.25 mg PO BID  - Chlorthalidone 25 mg PO daily  - PRN Nicardipine 40 mg / 200 mL cIV for SBP > 180  - PRN Hydralazine/Labetalol 10-20 mg IV Q1H for SBP > 180  - Cardiology Recs (1/15):   -- Continue current blood pressure medications, as BP is under better control this morning and patient is off of Nicardipine.   -- Recommend increasing Chlorthalidone to 25 mg daily.   -- If BP is not within goal range in the next 24-48 hours, will consider changing Amlodipine to Nifedipine 30mg BID for better BP control.     #DM  Newly diagnosed DM with Hgb A1C 7.8.  - Continue to monitor glucose  - LSSI  - Hypoglycemia protocol    #Nutrition  Patient does not meet two of the established criteria necessary for diagnosing malnutrition but is at risk for malnutrition.  - NG tube placed  - Nutrition consulted  - Dietician consulted     #Hypokalemia   - RN Managed Standard Replacement protocol    Chronic/Resolved Issues:  #DISHA, resolved  Pt presented with Cr 1.37 improved with IVF to 1.17. Now at baseline.   - Daily BMP     #Leukocytosis, resolved  Pt presented with WBC 12.4. Most likely is a stress response given lack of fever. Mild fever overnight  but WBC decreasing. Will continue to monitor.  - Daily CBC    #Elevated troponin, resolved  Pt arrives with Trop 147 and rising. EKG exhibited sinus tachycardia, but no concerning signs of ischemia. Troponin peaked 1/10.     Prophylaxis            For VTE Prevention:  - SCD  For Acid Suppression:  - GI prophylaxis is not indicated    Code Status  Full Code    The patient was discussed with Stroke Staff, Dr. Christine.    Nelson Laura, DO  Neurology Resident PGY1  ASCOM: *[id number]  ______________________________________________________    Clinically Significant Risk Factors Present on Admission                 Medications   Scheduled Meds    amLODIPine  10 mg Oral Daily     aspirin  81 mg Oral Daily    Or     aspirin  81 mg Oral or NG Tube Daily     atorvastatin  40 mg Oral QPM     carvedilol  6.25 mg Oral BID w/meals     chlorthalidone  12.5 mg Oral Once     chlorthalidone  25 mg Oral Daily     insulin aspart  1-3 Units Subcutaneous TID AC     insulin aspart  1-3 Units Subcutaneous At Bedtime     lisinopril  10 mg Oral or NG Tube Daily     miconazole   Topical BID     nystatin   Topical BID     sodium chloride (PF)  3 mL Intracatheter Q8H     valproate (DEPACON) in NS intermittent infusion  500 mg Intravenous Q8H       Infusion Meds    - MEDICATION INSTRUCTIONS -       - MEDICATION INSTRUCTIONS -       niCARdipine Stopped (01/16/22 0820)     sodium chloride 125 mL/hr at 01/16/22 0800       PRN Meds  acetaminophen, artificial tears ophthalmic solution, glucose **OR** dextrose **OR** glucagon, hydrALAZINE, labetalol, lidocaine 4%, lidocaine (buffered or not buffered), - MEDICATION INSTRUCTIONS -, - MEDICATION INSTRUCTIONS -, melatonin, polyethylene glycol, senna-docusate, sodium chloride (PF)       PHYSICAL EXAMINATION  Temp:  [97.8  F (36.6  C)-98.8  F (37.1  C)] 98.1  F (36.7  C)  Pulse:  [] 99  Resp:  [16-18] 18  BP: (145-181)/() 155/94  Cuff Mean (mmHg):  [112-132] 112  SpO2:  [95 %-99 %] 96 %       Neurologic  Mental Status:  lethargic, arouses minimally to sternal rub and does not respond to questions or follow commands, waxing/waning wakefulness & cooperation  Cranial Nerves:  PER, left gaze preference but able to cross the midline, L sided facial droop, remainder unable to be tested due to his lethargy  Motor:  no abnormal movements. No spontaneous movement of L hemibody. RUE and RLE moving freely.   Sensory:  localizes noxious in 4/4 extremities  Coordination:  deferred  Station/Gait:  deferred    Stroke Scales    NIHSS  Interval baseline (01/12/22 0324)   Interval Comments     1a. Level of Consciousness 1-->Not alert, but arousable by minor stimulation to obey, answer, or respond   1b. LOC Questions 2-->Answers neither question correctly   1c. LOC Commands 1-->Performs one task correctly   2.   Best Gaze 1-->Partial gaze palsy, gaze is abnormal in one or both eyes, but forced deviation or total gaze paresis is not present   3.   Visual 2-->Complete hemianopia   4.   Facial Palsy 1-->Minor paralysis (flattened nasolabial fold, asymmetry on smiling)   5a. Motor Arm, Left 4-->No movement   5b. Motor Arm, Right 0-->No drift, limb holds 90 (or 45) degrees for full 10 secs   6a. Motor Leg, Left 4-->No movement   6b. Motor Leg, right 1-->Drift, leg falls by the end of the 5-sec period but does not hit bed   7.   Limb Ataxia 0-->Absent   8.   Sensory 1-->Mild-to-moderate sensory loss, patient feels pinprick is less sharp or is dull on the affected side, or there is a loss of superficial pain with pinprick, but patient is aware of being touched   9.   Best Language 2-->Severe aphasia, all communication is through fragmentary expression, great need for inference, questioning, and guessing by the listener. Range of information that can be exchanged is limited, listener carries burden of. . . (see row details)   10. Dysarthria 0-->Normal   11. Extinction and Inattention  1-->Visual, tactile, auditory, spatial, or  personal inattention or extinction to bilateral simultaneous stimulation in one of the sensory modalities   Total 21 (01/12/22 0324)       Imaging  I personally reviewed all imaging; relevant findings per HPI.     Lab Results Data   CBC  Recent Labs   Lab 01/16/22  0622 01/15/22  0455 01/14/22  0542   WBC 12.2* 9.1 12.8*   RBC 5.41 4.74 5.01   HGB 16.4 14.4 15.4   HCT 48.6 43.7 46.0    253 247     Basic Metabolic Panel    Recent Labs   Lab 01/16/22  0622 01/16/22  0304 01/15/22  2206 01/15/22  2057 01/15/22  0852 01/15/22  0455 01/14/22  0930 01/14/22  0542     --   --   --   --  142  --  141   POTASSIUM 3.5  --   --  3.4  --  3.0*   < > 3.0*   CHLORIDE 109  --   --   --   --  111*  --  109   CO2 23  --   --   --   --  23  --  22   BUN 28  --   --   --   --  23  --  20   CR 1.03  --   --   --   --  1.11  --  0.98   * 141* 126*  --    < > 135*   < > 165*   VALERIE 9.2  --   --   --   --  8.8  --  8.5    < > = values in this interval not displayed.     Liver Panel  No results for input(s): PROTTOTAL, ALBUMIN, BILITOTAL, ALKPHOS, AST, ALT, BILIDIRECT in the last 168 hours.  INR    Recent Labs   Lab Test 01/08/22 1843 01/08/22  1834   INR 0.95 1.0*      Lipid Profile    Recent Labs   Lab Test 01/08/22  1843 05/16/14  1506   CHOL 169 131   HDL 70 53   LDL 75 70   TRIG 118 38     A1C    Recent Labs   Lab Test 01/08/22 1843   A1C 7.8*     Troponin I  No results for input(s): TROPONIN, GHTROP in the last 168 hours.

## 2022-01-16 NOTE — PLAN OF CARE
Neuro: Drowsy for majority of this shift. Intermittently alert and opening eyes spontaneously, otherwise arouses to voice and/or touch. Mentation waxes & wanes. LAUREANO orientation. Inconsistently follows commands.  Cardiac: SR/ST. Nicardipine gtt titrated to keep SBP <180. Blood return checked Q2hrs. VSS.   Respiratory: RA. Oxygen saturation >95%.    GI/: Adequate urine output with primofit. No BM overnight.   Diet/appetite: Minced and moist diet with thin liquids. Calorie counts. New NG placed, pending confirmation by XRAY  Activity:  Lift assist. Reposition G2sfanl overnight. Pt able to brush teeth self in the evening.   Pain: At acceptable level on current regimen.   Skin: No new deficits noted.  LDA's: PIVx2, NG tube, and primofit.     Increased lethargy for last assessment, requiring frequent sternal rub to awaken pt, had apneic breathing and intermittently desat to 75-85% for 30 seconds to about a minute. Applied 1L NC. MD made aware, see provider notification note. Became more alert after NG placement, no longer requiring 1L NC. Other neuro assessments unchanged.     Plan: Continue with POC. Notify primary team with changes.

## 2022-01-16 NOTE — PROGRESS NOTES
Calorie Count  Intake recorded for: 1/15  Total Kcals: 0 Total Protein: 0g  Kcals from Hospital Food: 0   Protein: 0g  Kcals from Outside Food (average):0 Protein: 0g  # Meals Recorded: no meals ordered from kitchen, no intake recorded.   # Supplements Recorded: no intake recorded.

## 2022-01-17 ENCOUNTER — APPOINTMENT (OUTPATIENT)
Dept: PHYSICAL THERAPY | Facility: CLINIC | Age: 47
End: 2022-01-17
Payer: COMMERCIAL

## 2022-01-17 ENCOUNTER — APPOINTMENT (OUTPATIENT)
Dept: OCCUPATIONAL THERAPY | Facility: CLINIC | Age: 47
End: 2022-01-17
Payer: COMMERCIAL

## 2022-01-17 ENCOUNTER — APPOINTMENT (OUTPATIENT)
Dept: GENERAL RADIOLOGY | Facility: CLINIC | Age: 47
End: 2022-01-17
Attending: PSYCHIATRY & NEUROLOGY
Payer: COMMERCIAL

## 2022-01-17 ENCOUNTER — APPOINTMENT (OUTPATIENT)
Dept: ULTRASOUND IMAGING | Facility: CLINIC | Age: 47
End: 2022-01-17
Payer: COMMERCIAL

## 2022-01-17 LAB
ALBUMIN SERPL-MCNC: 2.9 G/DL (ref 3.4–5)
ALP SERPL-CCNC: 98 U/L (ref 40–150)
ALT SERPL W P-5'-P-CCNC: 20 U/L (ref 0–70)
ANION GAP SERPL CALCULATED.3IONS-SCNC: 12 MMOL/L (ref 3–14)
AST SERPL W P-5'-P-CCNC: 22 U/L (ref 0–45)
BACTERIA BLD CULT: NO GROWTH
BACTERIA BLD CULT: NO GROWTH
BILIRUB DIRECT SERPL-MCNC: 0.2 MG/DL (ref 0–0.2)
BILIRUB SERPL-MCNC: 0.7 MG/DL (ref 0.2–1.3)
BUN SERPL-MCNC: 30 MG/DL (ref 7–30)
CALCIUM SERPL-MCNC: 9.5 MG/DL (ref 8.5–10.1)
CHLORIDE BLD-SCNC: 104 MMOL/L (ref 94–109)
CO2 SERPL-SCNC: 26 MMOL/L (ref 20–32)
CREAT SERPL-MCNC: 1.01 MG/DL (ref 0.66–1.25)
ERYTHROCYTE [DISTWIDTH] IN BLOOD BY AUTOMATED COUNT: 13.7 % (ref 10–15)
GFR SERPL CREATININE-BSD FRML MDRD: >90 ML/MIN/1.73M2
GLUCOSE BLD-MCNC: 149 MG/DL (ref 70–99)
GLUCOSE BLDC GLUCOMTR-MCNC: 147 MG/DL (ref 70–99)
GLUCOSE BLDC GLUCOMTR-MCNC: 160 MG/DL (ref 70–99)
GLUCOSE BLDC GLUCOMTR-MCNC: 163 MG/DL (ref 70–99)
GLUCOSE BLDC GLUCOMTR-MCNC: 188 MG/DL (ref 70–99)
GLUCOSE BLDC GLUCOMTR-MCNC: 192 MG/DL (ref 70–99)
GLUCOSE BLDC GLUCOMTR-MCNC: 203 MG/DL (ref 70–99)
HCT VFR BLD AUTO: 50 % (ref 40–53)
HGB BLD-MCNC: 16.8 G/DL (ref 13.3–17.7)
MAGNESIUM SERPL-MCNC: 2.3 MG/DL (ref 1.6–2.3)
MCH RBC QN AUTO: 30.5 PG (ref 26.5–33)
MCHC RBC AUTO-ENTMCNC: 33.6 G/DL (ref 31.5–36.5)
MCV RBC AUTO: 91 FL (ref 78–100)
PHOSPHATE SERPL-MCNC: 4.8 MG/DL (ref 2.5–4.5)
PLATELET # BLD AUTO: 335 10E3/UL (ref 150–450)
POTASSIUM BLD-SCNC: 3.2 MMOL/L (ref 3.4–5.3)
PROT SERPL-MCNC: 7.8 G/DL (ref 6.8–8.8)
RBC # BLD AUTO: 5.51 10E6/UL (ref 4.4–5.9)
SODIUM SERPL-SCNC: 142 MMOL/L (ref 133–144)
WBC # BLD AUTO: 13 10E3/UL (ref 4–11)

## 2022-01-17 PROCEDURE — 97110 THERAPEUTIC EXERCISES: CPT | Mod: GO | Performed by: OCCUPATIONAL THERAPIST

## 2022-01-17 PROCEDURE — 258N000003 HC RX IP 258 OP 636: Performed by: STUDENT IN AN ORGANIZED HEALTH CARE EDUCATION/TRAINING PROGRAM

## 2022-01-17 PROCEDURE — 250N000013 HC RX MED GY IP 250 OP 250 PS 637: Performed by: INTERNAL MEDICINE

## 2022-01-17 PROCEDURE — 250N000013 HC RX MED GY IP 250 OP 250 PS 637: Performed by: STUDENT IN AN ORGANIZED HEALTH CARE EDUCATION/TRAINING PROGRAM

## 2022-01-17 PROCEDURE — 99233 SBSQ HOSP IP/OBS HIGH 50: CPT | Mod: GC | Performed by: PSYCHIATRY & NEUROLOGY

## 2022-01-17 PROCEDURE — 120N000003 HC R&B IMCU UMMC

## 2022-01-17 PROCEDURE — 83735 ASSAY OF MAGNESIUM: CPT

## 2022-01-17 PROCEDURE — 85027 COMPLETE CBC AUTOMATED: CPT | Performed by: STUDENT IN AN ORGANIZED HEALTH CARE EDUCATION/TRAINING PROGRAM

## 2022-01-17 PROCEDURE — 250N000013 HC RX MED GY IP 250 OP 250 PS 637

## 2022-01-17 PROCEDURE — 93970 EXTREMITY STUDY: CPT

## 2022-01-17 PROCEDURE — 36415 COLL VENOUS BLD VENIPUNCTURE: CPT | Performed by: STUDENT IN AN ORGANIZED HEALTH CARE EDUCATION/TRAINING PROGRAM

## 2022-01-17 PROCEDURE — 97530 THERAPEUTIC ACTIVITIES: CPT | Mod: GP | Performed by: PHYSICAL THERAPIST

## 2022-01-17 PROCEDURE — 250N000009 HC RX 250: Performed by: STUDENT IN AN ORGANIZED HEALTH CARE EDUCATION/TRAINING PROGRAM

## 2022-01-17 PROCEDURE — 82310 ASSAY OF CALCIUM: CPT | Performed by: STUDENT IN AN ORGANIZED HEALTH CARE EDUCATION/TRAINING PROGRAM

## 2022-01-17 PROCEDURE — 250N000013 HC RX MED GY IP 250 OP 250 PS 637: Performed by: PSYCHIATRY & NEUROLOGY

## 2022-01-17 PROCEDURE — 999N000127 HC STATISTIC PERIPHERAL IV START W US GUIDANCE

## 2022-01-17 PROCEDURE — 97535 SELF CARE MNGMENT TRAINING: CPT | Mod: GO | Performed by: OCCUPATIONAL THERAPIST

## 2022-01-17 PROCEDURE — 250N000011 HC RX IP 250 OP 636: Performed by: COUNSELOR

## 2022-01-17 PROCEDURE — 82248 BILIRUBIN DIRECT: CPT

## 2022-01-17 PROCEDURE — 999N000065 XR ABDOMEN PORT 1 VIEWS

## 2022-01-17 PROCEDURE — 74018 RADEX ABDOMEN 1 VIEW: CPT | Mod: 26 | Performed by: RADIOLOGY

## 2022-01-17 PROCEDURE — 84100 ASSAY OF PHOSPHORUS: CPT

## 2022-01-17 PROCEDURE — 93970 EXTREMITY STUDY: CPT | Mod: 26 | Performed by: STUDENT IN AN ORGANIZED HEALTH CARE EDUCATION/TRAINING PROGRAM

## 2022-01-17 PROCEDURE — 250N000013 HC RX MED GY IP 250 OP 250 PS 637: Performed by: COUNSELOR

## 2022-01-17 RX ORDER — GUAIFENESIN 600 MG/1
15 TABLET, EXTENDED RELEASE ORAL DAILY
Status: DISCONTINUED | OUTPATIENT
Start: 2022-01-17 | End: 2022-01-20

## 2022-01-17 RX ORDER — POTASSIUM CHLORIDE 1.5 G/1.58G
40 POWDER, FOR SOLUTION ORAL ONCE
Status: COMPLETED | OUTPATIENT
Start: 2022-01-17 | End: 2022-01-17

## 2022-01-17 RX ORDER — DEXTROSE MONOHYDRATE 100 MG/ML
INJECTION, SOLUTION INTRAVENOUS CONTINUOUS PRN
Status: DISCONTINUED | OUTPATIENT
Start: 2022-01-17 | End: 2022-01-28 | Stop reason: HOSPADM

## 2022-01-17 RX ORDER — LISINOPRIL 20 MG/1
20 TABLET ORAL DAILY
Status: DISCONTINUED | OUTPATIENT
Start: 2022-01-18 | End: 2022-01-23

## 2022-01-17 RX ORDER — NIFEDIPINE 30 MG/1
30 TABLET, EXTENDED RELEASE ORAL 2 TIMES DAILY
Status: DISCONTINUED | OUTPATIENT
Start: 2022-01-17 | End: 2022-01-18

## 2022-01-17 RX ORDER — SODIUM CHLORIDE 9 MG/ML
INJECTION, SOLUTION INTRAVENOUS CONTINUOUS
Status: DISCONTINUED | OUTPATIENT
Start: 2022-01-17 | End: 2022-01-19

## 2022-01-17 RX ORDER — AMINO AC/PROTEIN HYDR/WHEY PRO 10G-100/30
1 LIQUID (ML) ORAL 2 TIMES DAILY
Status: DISCONTINUED | OUTPATIENT
Start: 2022-01-17 | End: 2022-01-21 | Stop reason: CLARIF

## 2022-01-17 RX ADMIN — LISINOPRIL 10 MG: 10 TABLET ORAL at 08:11

## 2022-01-17 RX ADMIN — THIAMINE HCL TAB 100 MG 100 MG: 100 TAB at 13:00

## 2022-01-17 RX ADMIN — ATORVASTATIN CALCIUM 40 MG: 40 TABLET, FILM COATED ORAL at 20:55

## 2022-01-17 RX ADMIN — HYDRALAZINE HYDROCHLORIDE 10 MG: 20 INJECTION INTRAMUSCULAR; INTRAVENOUS at 17:29

## 2022-01-17 RX ADMIN — LABETALOL HYDROCHLORIDE 20 MG: 5 INJECTION, SOLUTION INTRAVENOUS at 04:47

## 2022-01-17 RX ADMIN — HYDRALAZINE HYDROCHLORIDE 10 MG: 20 INJECTION INTRAMUSCULAR; INTRAVENOUS at 00:52

## 2022-01-17 RX ADMIN — POTASSIUM CHLORIDE 40 MEQ: 1.5 POWDER, FOR SOLUTION ORAL at 08:12

## 2022-01-17 RX ADMIN — CARVEDILOL 6.25 MG: 3.12 TABLET, FILM COATED ORAL at 08:11

## 2022-01-17 RX ADMIN — NYSTATIN: 100000 CREAM TOPICAL at 21:00

## 2022-01-17 RX ADMIN — Medication 1 PACKET: at 20:56

## 2022-01-17 RX ADMIN — CARVEDILOL 6.25 MG: 3.12 TABLET, FILM COATED ORAL at 17:29

## 2022-01-17 RX ADMIN — LABETALOL HYDROCHLORIDE 20 MG: 5 INJECTION, SOLUTION INTRAVENOUS at 21:20

## 2022-01-17 RX ADMIN — VALPROATE SODIUM 500 MG: 100 INJECTION, SOLUTION INTRAVENOUS at 10:28

## 2022-01-17 RX ADMIN — Medication 15 ML: at 13:00

## 2022-01-17 RX ADMIN — HYDRALAZINE HYDROCHLORIDE 10 MG: 20 INJECTION INTRAMUSCULAR; INTRAVENOUS at 00:41

## 2022-01-17 RX ADMIN — AMLODIPINE BESYLATE 10 MG: 10 TABLET ORAL at 08:11

## 2022-01-17 RX ADMIN — MICONAZOLE NITRATE: 2 POWDER TOPICAL at 21:00

## 2022-01-17 RX ADMIN — NYSTATIN: 100000 CREAM TOPICAL at 08:14

## 2022-01-17 RX ADMIN — Medication 1 PACKET: at 13:00

## 2022-01-17 RX ADMIN — CHLORTHALIDONE 25 MG: 25 TABLET ORAL at 08:11

## 2022-01-17 RX ADMIN — ASPIRIN 81 MG CHEWABLE TABLET 81 MG: 81 TABLET CHEWABLE at 08:12

## 2022-01-17 RX ADMIN — MICONAZOLE NITRATE: 2 POWDER TOPICAL at 08:14

## 2022-01-17 RX ADMIN — LABETALOL HYDROCHLORIDE 10 MG: 5 INJECTION, SOLUTION INTRAVENOUS at 04:28

## 2022-01-17 RX ADMIN — VALPROATE SODIUM 500 MG: 100 INJECTION, SOLUTION INTRAVENOUS at 17:29

## 2022-01-17 RX ADMIN — VALPROATE SODIUM 500 MG: 100 INJECTION, SOLUTION INTRAVENOUS at 02:06

## 2022-01-17 RX ADMIN — SODIUM CHLORIDE: 9 INJECTION, SOLUTION INTRAVENOUS at 12:56

## 2022-01-17 ASSESSMENT — ACTIVITIES OF DAILY LIVING (ADL)
ADLS_ACUITY_SCORE: 19
ADLS_ACUITY_SCORE: 17
ADLS_ACUITY_SCORE: 19

## 2022-01-17 ASSESSMENT — MIFFLIN-ST. JEOR: SCORE: 1641.03

## 2022-01-17 NOTE — PROGRESS NOTES
Brief Cardiology Note:    Charted reviewed this AM. Remains quite hypertensive. For better blood pressure control would suggest the following:    - Discontinue Amlodipine  - Start Nifedipine 30mg BID tonight  - Increase lisinopril to 20mg daily    Discussed with Dr. Castro.     Supa Sahni MD  Cardiology Fellow  [phone]    Attending Attestation: I agree with the plan.

## 2022-01-17 NOTE — PROGRESS NOTES
Calorie Count  Intake recorded for: 1/16  Total Kcals: 0 Total Protein: 0g  Kcals from Hospital Food: 0   Protein: 0g  Kcals from Outside Food (average):0 Protein: 0g  # Meals Ordered from Kitchen: 0 meals  # Meals Recorded: No food Intake recorded  # Supplements Recorded: No food intake recorded

## 2022-01-17 NOTE — PROGRESS NOTES
Care Management Follow Up    Length of Stay (days): 9    Expected Discharge Date: 01/21/2022     Concerns to be Addressed:  Disposition planning  Patient plan of care discussed at interdisciplinary rounds: Yes    Anticipated Discharge Disposition:  Rehab placement     Anticipated Discharge Services:  Rehab placement  Anticipated Discharge DME:  Not applicable at this time    Patient/family educated on Medicare website which has current facility and service quality ratings:  yes  Education Provided on the Discharge Plan: yes  Patient/Family in Agreement with the Plan:  yes    Referrals Placed by CM/SW:  Post acute care facility's  Private pay costs discussed: Not applicable at this time    Additional Information:  SW is following pt for discharge planning.   Rehab placement is anticipated upon discharge from acute hospitalization.  Per chart review, pt has a NG for TF and is using O2.  Pt has mitts in place which is a barrier to placement.  SW will continue to follow for discharge planning.    KELLIE Finnegan  Social Work, 6A  Phone:  [phone]  Pager:  [phone]  1/17/2022          YUMIKO Laurent

## 2022-01-17 NOTE — PLAN OF CARE
Neuro: disoriented to time. Able to follow most commands. L sided and L vision field neglect. Only able to do shoulder shrug on R shoulder. Pt allowed for oral care to be done. Pt alert and opens eyes spontaneously. Pt impulsive-pulled off mitt on R hand (which was reinforced by coban) and  pulled out NG this AM. Reinserted NG. Explained the use of NG to the pt. Explained plan of care  Cardiac: SR w/ HR in 80-90s. -180s on average. PRN labetalol given x1 and PRN hydralazine given x1 this shift to keep SBP less than 180.   Respiratory: pt occasionally desaturates when going through episodes of   GI/: incontinent of urine. Condom cath in place. No BM this shift.  Diet/appetite: no oral intake for this shift.     Activity:  Not oob this shift. Pt does not assist w/ turns. Turn pt q2h  Pain: pt stated mild pain in L arm. Elevated arm w/ relief.  Skin: No new deficits noted. Turning pt q2h.    LDA's: NG, PIV    Plan: Continue with POC. Notify primary team with changes.

## 2022-01-17 NOTE — PHARMACY-CONSULT NOTE
Pharmacy Tube Feeding Consult    Medication reviewed for administration by feeding tube and for potential food/drug interactions.    Recommendations:  --The following medications should not be crushed: nifedipine ER and aspirin EC    Pharmacy will continue to follow as new medications are ordered.    Emelina Kang Pharm.D., [facility]  Pager [phone]

## 2022-01-17 NOTE — PROGRESS NOTES
[facility]    Stroke Progress Note    Interval Events  - Patient pulled out his NG overnight but nursing was able to re-place it.   - Otherwise, NAEO. Pt remains hypertensive.     HPI Summary  Miriam Hall is a 46 year old male with PMHx HTN who presented on 1/8/2022 with symptoms concerning for stroke. The pt reports that he was in his normal state of health that morning and then decided to take a nap around noon. On waking from his nap around 2:00PM, he found that he was having trouble moving and feeling the L side of his body. Unfortunately, the pt was unable to get to a phone and ended up crawling on his floor until he could finally get the attention of one of his neighbors, who then was able to call EMS.     EMS arrived to find the pt continuing to have decreased sensation and weakness on his L side. They noted a , /145, and . On arrival in the ED, the pt remained afebrile, but was tachycardic () and quite hypertensive (/146). A stroke code was called for initial NIHSS 9, for a partial L gaze palsy, L-sided complete hemianopia, L arm > leg weakness, decreased sensation in the L arm/leg to sensation (pinprick intact in arm but decreased in leg), mild dysarthria, L-sided extinction, and possible L neglect. The pt also seemed to be having trouble with proprioception in the L arm, though did recognize it to be his own. Pt was immediately taken to CT where he underwent a CTH, CTA Head/Neck and CT Perfusion study, revealing a well-established R parieto-occipital stroke with an LVO of the R P1 segment. As the stroke already appeared well-established and the pt was outside the time window, no tPA was given. No thrombectomy was performed due to the location of the LVO in the PCA. The pt was loaded with ASA 324mg once with plans to allow for permissive HTN and admission to the stroke service for further work-up     The patient denies  any history of smoking or drug use. Does drink EtOH, unclear amount.      TPA Treatment   Not given due to established infarct on imaging and unclear or unfavorable risk-benefit profile for extended window thrombolysis beyond the conventional 4.5 hour time window.     Endovascular Treatment  Proximal vessel occlusion present, but endovascular treatment not initiated due to location of the thrombus in the posterior cerebral artery    Stroke Evaluation Summarized    MRI/Head CT MRI Brain (1/12)  1. Findings from CT and CTA performed yesterday are confirmed with stroke in the distribution of the right posterior cerebral artery. This involves the medial right occipital lobe and medial right temporal lobe with extension into the body of the thalamus.  2. Additional punctate foci of diffusion restriction in the left putamen.  3. Moderate leukoaraiosis.     CT Head (1/16)  1. Continued evolution of large right posterior cerebral artery territory infarct. With similar mass effect and 4 mm of leftward midline shift, previously 5 mm.  2. Subtle hyperdensities in the right basal ganglia consistent with petechial hemorrhages seen on previous MRI. No new intracranial hemorrhage or new loss of gray-white matter differentiation.   Intracranial Vasculature MRA (1/12)  Redemonstration of occlusion of the right PCA near its  origin. Additional short segment high-grade stenosis of a right-sided M2 MCA branch. Findings are not likely not significantly changed from CT performed yesterday.   Cervical Vasculature CTA (1/12)  1. Occlusion of the proximal right PCA.  2. High-grade stenosis of the occiput M2 branch of the right MCA with  attenuated flow distal.  3. Neck CTA demonstrates no stenosis of the major cervical arteries.     Echocardiogram The visual ejection fraction is 50-55%.   No regional wall motion abnormalities are seen.  Moderate to severe concentric wall thickening consistent with left ventricular hypertrophy is  present.  Global right ventricular function is normal.  There was no shunt at the atrial septal level as assessed by agitated saline bubble study at rest and with Valsalva maneuver.  Previous study not available for comparison.   EKG/Telemetry EKG with sinus tachycardia   Other Testing Hypercoagulability profile has not been sent yet     LDL  1/8/2022: 75 mg/dL   A1C  1/8/2022: 7.8 %   Troponin No lab value available in past 48 hrs       Impression   # Acute ischemic stroke of Right P1 segement of the PCA  # R P1 occlusion and R M2 Stenosis  # Large vessel atherosclerosis vs ESUS  Miriam Hall is a 46 year old male with PMHx HTN who presented on 1/8/2022 with L gaze palsy, L complete hemianopia, mild dysarthria, L arm > leg weakness, L arm/leg decreased sensation, and L side extinction after waking up from a nap. FTH an occlusion of the P1 segment of the R PCA with associated established infarct as well as high grade stenosis of the M2 segment of the R MCA. TTE with EF of 50-55%, no WMA or atrial enlargement. Etiology undetermined at this time.   - Aspirin 81 mg PO daily for secondary stroke prevention  - CARISA once BP under better control   - PT/OT/SLP - ARU appropriate    #Concern for seizures  Patient with ongoing waxing and waning level of consciousness over the past few days. Hooked up to EEG on 1/12 with no evidence of seizures per report from Dr. Porter and pt pulling off leads overnight so EEG disconnected on 1/13. Repeat EEG on 1/16 completed and negative for seizure activity. Will continue depakote due to his high-seizure risk given location of recent stroke.  - Valproic acid 500 mg q8h IV -- this can be changed in the coming days to PO once patient's level of consciousness improves     #HLD  LDL 75. Long-term LDL goal 40-70.   - Atorvastatin 40 mg PO daily    #HTN, uncontrolled   - Cardiology consulted, appreciate assistance  - PTA Lisinopril 10 mg PO daily   - Amlodipine 10 mg PO daily  - Carvedilol  6.25 mg PO BID  - Chlorthalidone 25 mg PO daily  - Nicardipine gtt prn SBP > 180  - Hydralazine/Labetalol prn SBP > 180  - Consider exchanging amlodipine for nifedipine if BP not within goal per Cards    #DM  Newly diagnosed DM with Hgb A1C 7.8.  - Continue to monitor glucose  - LSSI  - Hypoglycemia protocol    #Nutrition  Patient does not meet two of the established criteria necessary for diagnosing malnutrition but is at risk for malnutrition.  - NG tube in place  - Nutrition consulted  - Minced and moist diet with thin liquids - patient has been taking in very little PO  - Start tube feeds today per nutrition     #Hypokalemia   - RN Managed Standard Replacement protocol    Chronic/Resolved Issues:  #DISHA, resolved  Pt presented with Cr 1.37 improved with IVF to 1.17. Now at baseline.   - Daily BMP     #Leukocytosis, resolved  Pt presented with WBC 12.4. Most likely is a stress response given lack of fever. Mild fever overnight but WBC decreasing. Will continue to monitor.  - Daily CBC    #Elevated troponin, resolved  Pt arrives with Trop 147 and rising. EKG exhibited sinus tachycardia, but no concerning signs of ischemia. Troponin peaked 1/10.     Prophylaxis            For VTE Prevention:  - SCD  For Acid Suppression:  - GI prophylaxis is not indicated  FEN:  - NS mIVF @ 125/hr    Code Status  Full Code    The patient was discussed with Stroke Staff, Dr. Yousif.    Jose Puente MD  Neurology Resident PGY-2  ASCOM: *[id number]  ______________________________________________________    Clinically Significant Risk Factors Present on Admission                 Medications   Scheduled Meds    amLODIPine  10 mg Oral Daily     aspirin  81 mg Oral Daily    Or     aspirin  81 mg Oral or NG Tube Daily     atorvastatin  40 mg Oral QPM     carvedilol  6.25 mg Oral BID w/meals     chlorthalidone  25 mg Oral Daily     insulin aspart  1-3 Units Subcutaneous TID AC     insulin aspart  1-3 Units Subcutaneous At Bedtime     lisinopril   10 mg Oral or NG Tube Daily     miconazole   Topical BID     multivitamins w/minerals  15 mL Per Feeding Tube Daily     nystatin   Topical BID     protein modular  1 packet Per Feeding Tube BID     sodium chloride (PF)  3 mL Intracatheter Q8H     thiamine  100 mg Per Feeding Tube Daily     valproate (DEPACON) in NS intermittent infusion  500 mg Intravenous Q8H       Infusion Meds    dextrose       - MEDICATION INSTRUCTIONS -       - MEDICATION INSTRUCTIONS -       niCARdipine Stopped (01/16/22 0820)     sodium chloride 125 mL/hr at 01/17/22 1256       PRN Meds  acetaminophen, artificial tears ophthalmic solution, dextrose, glucose **OR** dextrose **OR** glucagon, hydrALAZINE, labetalol, lidocaine 4%, lidocaine (buffered or not buffered), - MEDICATION INSTRUCTIONS -, - MEDICATION INSTRUCTIONS -, melatonin, polyethylene glycol, senna-docusate, sodium chloride (PF)       PHYSICAL EXAMINATION  Temp:  [97.6  F (36.4  C)-98.6  F (37  C)] 97.9  F (36.6  C)  Pulse:  [74-90] 76  Resp:  [16-20] 20  BP: (157-202)/() 170/103  SpO2:  [90 %-100 %] 99 %      Neurologic  Mental Status:  lethargic, waxing/waning wakefulness & cooperation, oriented to self, month and year but not place.  Cranial Nerves:  PER, left gaze preference but able to slightly cross the midline, L sided facial droop, shoulder shrug strong on the R  Motor:  no abnormal movements. No spontaneous movement of L hemibody. RUE and RLE moving freely.   Sensory:  localizes noxious in 4/4 extremities  Coordination:  deferred  Station/Gait:  deferred    Stroke Scales    NIHSS  Interval baseline (01/12/22 0324)   Interval Comments     1a. Level of Consciousness 1-->Not alert, but arousable by minor stimulation to obey, answer, or respond   1b. LOC Questions 2-->Answers neither question correctly   1c. LOC Commands 1-->Performs one task correctly   2.   Best Gaze 1-->Partial gaze palsy, gaze is abnormal in one or both eyes, but forced deviation or total gaze paresis  is not present   3.   Visual 2-->Complete hemianopia   4.   Facial Palsy 1-->Minor paralysis (flattened nasolabial fold, asymmetry on smiling)   5a. Motor Arm, Left 4-->No movement   5b. Motor Arm, Right 0-->No drift, limb holds 90 (or 45) degrees for full 10 secs   6a. Motor Leg, Left 4-->No movement   6b. Motor Leg, right 1-->Drift, leg falls by the end of the 5-sec period but does not hit bed   7.   Limb Ataxia 0-->Absent   8.   Sensory 1-->Mild-to-moderate sensory loss, patient feels pinprick is less sharp or is dull on the affected side, or there is a loss of superficial pain with pinprick, but patient is aware of being touched   9.   Best Language 2-->Severe aphasia, all communication is through fragmentary expression, great need for inference, questioning, and guessing by the listener. Range of information that can be exchanged is limited, listener carries burden of. . . (see row details)   10. Dysarthria 0-->Normal   11. Extinction and Inattention  1-->Visual, tactile, auditory, spatial, or personal inattention or extinction to bilateral simultaneous stimulation in one of the sensory modalities   Total 21 (01/12/22 0324)       Imaging  I personally reviewed all imaging; relevant findings per HPI.     Lab Results Data   CBC  Recent Labs   Lab 01/17/22  0433 01/16/22  0622 01/15/22  0455   WBC 13.0* 12.2* 9.1   RBC 5.51 5.41 4.74   HGB 16.8 16.4 14.4   HCT 50.0 48.6 43.7    290 253     Basic Metabolic Panel    Recent Labs   Lab 01/17/22  1321 01/17/22  0813 01/17/22  0433 01/16/22  0928 01/16/22  0622 01/15/22  2206 01/15/22  2057 01/15/22  0852 01/15/22  0455   NA  --   --  142  --  142  --   --   --  142   POTASSIUM  --   --  3.2*  --  3.5  --  3.4  --  3.0*   CHLORIDE  --   --  104  --  109  --   --   --  111*   CO2  --   --  26  --  23  --   --   --  23   BUN  --   --  30  --  28  --   --   --  23   CR  --   --  1.01  --  1.03  --   --   --  1.11   * 163* 149*   < > 172*   < >  --    < > 135*    VALERIE  --   --  9.5  --  9.2  --   --   --  8.8    < > = values in this interval not displayed.     Liver Panel  Recent Labs   Lab 01/17/22  0433   PROTTOTAL 7.8   ALBUMIN 2.9*   BILITOTAL 0.7   ALKPHOS 98   AST 22   ALT 20     INR    Recent Labs   Lab Test 01/08/22  1843 01/08/22  1834   INR 0.95 1.0*      Lipid Profile    Recent Labs   Lab Test 01/08/22  1843 05/16/14  1506   CHOL 169 131   HDL 70 53   LDL 75 70   TRIG 118 38     A1C    Recent Labs   Lab Test 01/08/22  1843   A1C 7.8*     Troponin I  No results for input(s): TROPONIN, GHTROP in the last 168 hours.

## 2022-01-17 NOTE — PLAN OF CARE
Neuro: pts mentation status waxes and wanes. Lethargic most of shift, LAUREANO orientation. This am pt able to squeeze right hand and move right foot on demand. Sensation absent on left side. Otherwise not following commands. Left sided hemiparesis and facial droop.   Cardiac: SR/ST w/ occasional PVC's. SBP's 160-170s this shift,nicardipine gtt off. PRN Hydralazine and labetalol given x1 each for SBP >180.   Respiratory: Sating > 94% on RA.  Apneic at times and will desat to lo 70's for few seconds then back up to 95%, 2L NC placed for apneic episodes. Shallow breathing.  GI/: Adequate urine output primofit. No bm this shift.  Diet/appetite: moist and minced diet. nothing by mouth d/t mentation.  NG tube patent. nutrition consulted. Patient refusing oral cares, bites down on swab.  Activity:  Assist of 2 w/ lift.  Pain: At acceptable level on current regimen. No s/s of pain.   Skin: No new deficits noted.   LDA's: two right pivs.      Plan: discontinued EEG monitoring. PT had repeat head CT. Continue with POC. Notify primary team with changes.

## 2022-01-17 NOTE — PROGRESS NOTES
CLINICAL NUTRITION SERVICES - BRIEF NOTE     Nutrition Prescription    Recommendations already ordered by Registered Dietitian (RD):  1. Initiate enteral nutrition support as below:     Use dosing weight 71 kg  Formula: Osmolite 1.5 Sonu @ goal of  55ml/hr  (1320ml/day)  will provide: 1980 kcals, 83 g PRO, 1005 ml free H20, 268 g CHO, and 0 g fiber daily.  Modular: 2 packet Prosource modular, daily for total provisions of 2060 Kcals (29 Kcal/kg), 105 gm pro (1.5 gm/kg)    - Initiate @ 15 ml/hr and advance by 10 ml q8hr pending pt's tolerance.    - Do not start or advance TF rate unless Mg++ >1.5, K+ is >3, and phos >1.9  -->Ordered baseline/add-on Mg/Phos as we have no current results for these.     - Recommend 60 ml q4hr fluid flushes for tube patency. Additional fluids and/or adjustments per MD.      - Order multivitamin/mineral (15 ml/day via FT) to help ensure micronutrient needs being met with suspected hypermetabolic demands and potential interruptions to TF infusions.     - Consider additional 100 mg Thiamine x 5-7 days to prevent lyte depletion with risk for refeeding syndrome      - HOB >30-45 degrees             2. Discontinuing calorie counts; no meaningful data, not ordering meals.     3. Discontinuing ONS for same reason as above. Will monitor for appropriate time to restart order.    Future/Additional Recommendations:  --Monitor K/Mg/Phos with pt likely at at least mild risk for refeeding syndrome per minimal PO since admit; replete aggressively PRN as TF initiates and advances.   --Monitor TF tolerance, stooling trends, glucose trends, lytes  --Monitor mentation for improvement/stability for future appropriateness to restart ONS/sonu cts for EN weaning   For last full RD assessment, see note dated 1/14/22.      NEW FINDINGS   --Pt continues with mentation that waxes and wanes. Calorie counts indicating 0 Kcal/0gm protein consumed; reviewed room-service orders and has not placed any orders over  weekend.    --NGT placed by provider with plan to initiate enteral nutrition support. Pt has already pulled the NGT x 1 but it was replaced this AM and confirmed on AXR.    --Monitor tolerance, lytes, glucose with start of nutrition support. Monitor mentation, will restart ONS/sonu cts when mentation improves and pt starts to eat PO.       INTERVENTIONS  Implementation  Collaboration with other nutrition professionals - Cancel Sonu cts (NSA)  Collaboration with other providers - Bedside RN, MD STANTON page  Enteral Nutrition - Initiate  Feeding tube flush - Initiate   Medical food supplement therapy - Discontinue   Multivitamin/mineral supplement therapy - MVI, Thiamine     Monitoring/Evaluation  Will continue to monitor and evaluate per protocol.    Navi Gordon RDN, LD, CNSC  [address] pager: [phone]   ASCOM: [id number]  [address] Phone: *[phone]

## 2022-01-18 ENCOUNTER — APPOINTMENT (OUTPATIENT)
Dept: GENERAL RADIOLOGY | Facility: CLINIC | Age: 47
End: 2022-01-18
Payer: COMMERCIAL

## 2022-01-18 ENCOUNTER — APPOINTMENT (OUTPATIENT)
Dept: MRI IMAGING | Facility: CLINIC | Age: 47
End: 2022-01-18
Payer: COMMERCIAL

## 2022-01-18 ENCOUNTER — APPOINTMENT (OUTPATIENT)
Dept: GENERAL RADIOLOGY | Facility: CLINIC | Age: 47
End: 2022-01-18
Attending: COUNSELOR
Payer: COMMERCIAL

## 2022-01-18 ENCOUNTER — APPOINTMENT (OUTPATIENT)
Dept: CT IMAGING | Facility: CLINIC | Age: 47
End: 2022-01-18
Payer: COMMERCIAL

## 2022-01-18 ENCOUNTER — TELEPHONE (OUTPATIENT)
Dept: NEUROLOGY | Facility: CLINIC | Age: 47
End: 2022-01-18

## 2022-01-18 ENCOUNTER — APPOINTMENT (OUTPATIENT)
Dept: NEUROLOGY | Facility: CLINIC | Age: 47
End: 2022-01-18
Payer: COMMERCIAL

## 2022-01-18 LAB
ALBUMIN UR-MCNC: NEGATIVE MG/DL
ALDOST SERPL-MCNC: 11.5 NG/DL (ref 0–31)
ANION GAP SERPL CALCULATED.3IONS-SCNC: 10 MMOL/L (ref 3–14)
APPEARANCE UR: CLEAR
BILIRUB UR QL STRIP: NEGATIVE
BUN SERPL-MCNC: 34 MG/DL (ref 7–30)
CALCIUM SERPL-MCNC: 9.5 MG/DL (ref 8.5–10.1)
CHLORIDE BLD-SCNC: 107 MMOL/L (ref 94–109)
CO2 SERPL-SCNC: 28 MMOL/L (ref 20–32)
COLOR UR AUTO: ABNORMAL
CREAT SERPL-MCNC: 1.09 MG/DL (ref 0.66–1.25)
CRP SERPL-MCNC: 44 MG/L (ref 0–8)
ERYTHROCYTE [DISTWIDTH] IN BLOOD BY AUTOMATED COUNT: 13.7 % (ref 10–15)
ERYTHROCYTE [SEDIMENTATION RATE] IN BLOOD BY WESTERGREN METHOD: 34 MM/HR (ref 0–15)
FACTOR 2 INTERPRETATION: NORMAL
FACTOR V INTERPRETATION: NORMAL
GFR SERPL CREATININE-BSD FRML MDRD: 84 ML/MIN/1.73M2
GLUCOSE BLD-MCNC: 181 MG/DL (ref 70–99)
GLUCOSE BLDC GLUCOMTR-MCNC: 154 MG/DL (ref 70–99)
GLUCOSE BLDC GLUCOMTR-MCNC: 167 MG/DL (ref 70–99)
GLUCOSE BLDC GLUCOMTR-MCNC: 170 MG/DL (ref 70–99)
GLUCOSE BLDC GLUCOMTR-MCNC: 224 MG/DL (ref 70–99)
GLUCOSE BLDC GLUCOMTR-MCNC: 228 MG/DL (ref 70–99)
GLUCOSE BLDC GLUCOMTR-MCNC: 241 MG/DL (ref 70–99)
GLUCOSE BLDC GLUCOMTR-MCNC: 282 MG/DL (ref 70–99)
GLUCOSE UR STRIP-MCNC: 30 MG/DL
HCT VFR BLD AUTO: 47.4 % (ref 40–53)
HGB BLD-MCNC: 15.8 G/DL (ref 13.3–17.7)
HGB UR QL STRIP: NEGATIVE
HOLD SPECIMEN: NORMAL
KETONES UR STRIP-MCNC: ABNORMAL MG/DL
LAB DIRECTOR COMMENTS: NORMAL
LAB DIRECTOR DISCLAIMER: NORMAL
LAB DIRECTOR INTERPRETATION: NORMAL
LAB DIRECTOR METHODOLOGY: NORMAL
LAB DIRECTOR RESULTS: NORMAL
LEUKOCYTE ESTERASE UR QL STRIP: NEGATIVE
MAGNESIUM SERPL-MCNC: 2.4 MG/DL (ref 1.6–2.3)
MCH RBC QN AUTO: 30.5 PG (ref 26.5–33)
MCHC RBC AUTO-ENTMCNC: 33.3 G/DL (ref 31.5–36.5)
MCV RBC AUTO: 92 FL (ref 78–100)
NITRATE UR QL: NEGATIVE
PH UR STRIP: 5.5 [PH] (ref 5–7)
PHOSPHATE SERPL-MCNC: 3.5 MG/DL (ref 2.5–4.5)
PLATELET # BLD AUTO: 318 10E3/UL (ref 150–450)
POTASSIUM BLD-SCNC: 3.1 MMOL/L (ref 3.4–5.3)
PROCALCITONIN SERPL-MCNC: 0.06 NG/ML
RBC # BLD AUTO: 5.18 10E6/UL (ref 4.4–5.9)
SODIUM SERPL-SCNC: 145 MMOL/L (ref 133–144)
SP GR UR STRIP: 1.04 (ref 1–1.03)
SPECIMEN DESCRIPTION: NORMAL
UROBILINOGEN UR STRIP-MCNC: NORMAL MG/DL
VALPROATE SERPL-MCNC: 100 MG/L
WBC # BLD AUTO: 12.3 10E3/UL (ref 4–11)

## 2022-01-18 PROCEDURE — 85730 THROMBOPLASTIN TIME PARTIAL: CPT | Performed by: STUDENT IN AN ORGANIZED HEALTH CARE EDUCATION/TRAINING PROGRAM

## 2022-01-18 PROCEDURE — 258N000003 HC RX IP 258 OP 636: Performed by: STUDENT IN AN ORGANIZED HEALTH CARE EDUCATION/TRAINING PROGRAM

## 2022-01-18 PROCEDURE — A9585 GADOBUTROL INJECTION: HCPCS | Performed by: PSYCHIATRY & NEUROLOGY

## 2022-01-18 PROCEDURE — 86225 DNA ANTIBODY NATIVE: CPT | Performed by: STUDENT IN AN ORGANIZED HEALTH CARE EDUCATION/TRAINING PROGRAM

## 2022-01-18 PROCEDURE — 250N000009 HC RX 250: Performed by: STUDENT IN AN ORGANIZED HEALTH CARE EDUCATION/TRAINING PROGRAM

## 2022-01-18 PROCEDURE — 71045 X-RAY EXAM CHEST 1 VIEW: CPT | Mod: 26 | Performed by: RADIOLOGY

## 2022-01-18 PROCEDURE — 70546 MR ANGIOGRAPH HEAD W/O&W/DYE: CPT

## 2022-01-18 PROCEDURE — 81240 F2 GENE: CPT | Performed by: STUDENT IN AN ORGANIZED HEALTH CARE EDUCATION/TRAINING PROGRAM

## 2022-01-18 PROCEDURE — 74177 CT ABD & PELVIS W/CONTRAST: CPT | Mod: 26 | Performed by: RADIOLOGY

## 2022-01-18 PROCEDURE — 95711 VEEG 2-12 HR UNMONITORED: CPT

## 2022-01-18 PROCEDURE — 250N000011 HC RX IP 250 OP 636: Performed by: PSYCHIATRY & NEUROLOGY

## 2022-01-18 PROCEDURE — 86036 ANCA SCREEN EACH ANTIBODY: CPT | Performed by: STUDENT IN AN ORGANIZED HEALTH CARE EDUCATION/TRAINING PROGRAM

## 2022-01-18 PROCEDURE — 250N000011 HC RX IP 250 OP 636: Performed by: COUNSELOR

## 2022-01-18 PROCEDURE — 85300 ANTITHROMBIN III ACTIVITY: CPT | Performed by: STUDENT IN AN ORGANIZED HEALTH CARE EDUCATION/TRAINING PROGRAM

## 2022-01-18 PROCEDURE — 86140 C-REACTIVE PROTEIN: CPT | Performed by: STUDENT IN AN ORGANIZED HEALTH CARE EDUCATION/TRAINING PROGRAM

## 2022-01-18 PROCEDURE — G0452 MOLECULAR PATHOLOGY INTERPR: HCPCS | Mod: 26 | Performed by: PATHOLOGY

## 2022-01-18 PROCEDURE — 84100 ASSAY OF PHOSPHORUS: CPT

## 2022-01-18 PROCEDURE — 84244 ASSAY OF RENIN: CPT | Performed by: STUDENT IN AN ORGANIZED HEALTH CARE EDUCATION/TRAINING PROGRAM

## 2022-01-18 PROCEDURE — 74177 CT ABD & PELVIS W/CONTRAST: CPT

## 2022-01-18 PROCEDURE — 250N000013 HC RX MED GY IP 250 OP 250 PS 637

## 2022-01-18 PROCEDURE — 86038 ANTINUCLEAR ANTIBODIES: CPT | Performed by: STUDENT IN AN ORGANIZED HEALTH CARE EDUCATION/TRAINING PROGRAM

## 2022-01-18 PROCEDURE — 80165 DIPROPYLACETIC ACID FREE: CPT | Performed by: STUDENT IN AN ORGANIZED HEALTH CARE EDUCATION/TRAINING PROGRAM

## 2022-01-18 PROCEDURE — 255N000002 HC RX 255 OP 636: Performed by: PSYCHIATRY & NEUROLOGY

## 2022-01-18 PROCEDURE — 84145 PROCALCITONIN (PCT): CPT | Performed by: COUNSELOR

## 2022-01-18 PROCEDURE — 70553 MRI BRAIN STEM W/O & W/DYE: CPT

## 2022-01-18 PROCEDURE — 83835 ASSAY OF METANEPHRINES: CPT

## 2022-01-18 PROCEDURE — 99233 SBSQ HOSP IP/OBS HIGH 50: CPT | Mod: 25 | Performed by: PSYCHIATRY & NEUROLOGY

## 2022-01-18 PROCEDURE — 74018 RADEX ABDOMEN 1 VIEW: CPT

## 2022-01-18 PROCEDURE — 86147 CARDIOLIPIN ANTIBODY EA IG: CPT | Performed by: STUDENT IN AN ORGANIZED HEALTH CARE EDUCATION/TRAINING PROGRAM

## 2022-01-18 PROCEDURE — 81003 URINALYSIS AUTO W/O SCOPE: CPT | Performed by: STUDENT IN AN ORGANIZED HEALTH CARE EDUCATION/TRAINING PROGRAM

## 2022-01-18 PROCEDURE — 82310 ASSAY OF CALCIUM: CPT | Performed by: STUDENT IN AN ORGANIZED HEALTH CARE EDUCATION/TRAINING PROGRAM

## 2022-01-18 PROCEDURE — 250N000013 HC RX MED GY IP 250 OP 250 PS 637: Performed by: COUNSELOR

## 2022-01-18 PROCEDURE — 82088 ASSAY OF ALDOSTERONE: CPT | Performed by: STUDENT IN AN ORGANIZED HEALTH CARE EDUCATION/TRAINING PROGRAM

## 2022-01-18 PROCEDURE — 250N000013 HC RX MED GY IP 250 OP 250 PS 637: Performed by: INTERNAL MEDICINE

## 2022-01-18 PROCEDURE — 85390 FIBRINOLYSINS SCREEN I&R: CPT | Mod: 26 | Performed by: PATHOLOGY

## 2022-01-18 PROCEDURE — 87040 BLOOD CULTURE FOR BACTERIA: CPT | Performed by: STUDENT IN AN ORGANIZED HEALTH CARE EDUCATION/TRAINING PROGRAM

## 2022-01-18 PROCEDURE — 86235 NUCLEAR ANTIGEN ANTIBODY: CPT | Performed by: STUDENT IN AN ORGANIZED HEALTH CARE EDUCATION/TRAINING PROGRAM

## 2022-01-18 PROCEDURE — 36415 COLL VENOUS BLD VENIPUNCTURE: CPT | Performed by: STUDENT IN AN ORGANIZED HEALTH CARE EDUCATION/TRAINING PROGRAM

## 2022-01-18 PROCEDURE — 999N000065 XR ABDOMEN PORT 1 VIEWS

## 2022-01-18 PROCEDURE — 85306 CLOT INHIBIT PROT S FREE: CPT | Performed by: STUDENT IN AN ORGANIZED HEALTH CARE EDUCATION/TRAINING PROGRAM

## 2022-01-18 PROCEDURE — 250N000013 HC RX MED GY IP 250 OP 250 PS 637: Performed by: STUDENT IN AN ORGANIZED HEALTH CARE EDUCATION/TRAINING PROGRAM

## 2022-01-18 PROCEDURE — 99233 SBSQ HOSP IP/OBS HIGH 50: CPT | Mod: GC | Performed by: STUDENT IN AN ORGANIZED HEALTH CARE EDUCATION/TRAINING PROGRAM

## 2022-01-18 PROCEDURE — 120N000003 HC R&B IMCU UMMC

## 2022-01-18 PROCEDURE — 71260 CT THORAX DX C+: CPT | Mod: 26 | Performed by: RADIOLOGY

## 2022-01-18 PROCEDURE — 86146 BETA-2 GLYCOPROTEIN ANTIBODY: CPT | Performed by: STUDENT IN AN ORGANIZED HEALTH CARE EDUCATION/TRAINING PROGRAM

## 2022-01-18 PROCEDURE — 95718 EEG PHYS/QHP 2-12 HR W/VEEG: CPT | Performed by: PSYCHIATRY & NEUROLOGY

## 2022-01-18 PROCEDURE — 85027 COMPLETE CBC AUTOMATED: CPT | Performed by: STUDENT IN AN ORGANIZED HEALTH CARE EDUCATION/TRAINING PROGRAM

## 2022-01-18 PROCEDURE — 250N000013 HC RX MED GY IP 250 OP 250 PS 637: Performed by: PSYCHIATRY & NEUROLOGY

## 2022-01-18 PROCEDURE — 74018 RADEX ABDOMEN 1 VIEW: CPT | Mod: 26 | Performed by: STUDENT IN AN ORGANIZED HEALTH CARE EDUCATION/TRAINING PROGRAM

## 2022-01-18 PROCEDURE — 83735 ASSAY OF MAGNESIUM: CPT

## 2022-01-18 PROCEDURE — 71045 X-RAY EXAM CHEST 1 VIEW: CPT

## 2022-01-18 PROCEDURE — 80164 ASSAY DIPROPYLACETIC ACD TOT: CPT | Performed by: STUDENT IN AN ORGANIZED HEALTH CARE EDUCATION/TRAINING PROGRAM

## 2022-01-18 PROCEDURE — 85303 CLOT INHIBIT PROT C ACTIVITY: CPT | Performed by: STUDENT IN AN ORGANIZED HEALTH CARE EDUCATION/TRAINING PROGRAM

## 2022-01-18 PROCEDURE — 70546 MR ANGIOGRAPH HEAD W/O&W/DYE: CPT | Mod: 26 | Performed by: RADIOLOGY

## 2022-01-18 PROCEDURE — 85652 RBC SED RATE AUTOMATED: CPT | Performed by: STUDENT IN AN ORGANIZED HEALTH CARE EDUCATION/TRAINING PROGRAM

## 2022-01-18 RX ORDER — CARVEDILOL 12.5 MG/1
12.5 TABLET ORAL 2 TIMES DAILY WITH MEALS
Status: DISCONTINUED | OUTPATIENT
Start: 2022-01-18 | End: 2022-01-19

## 2022-01-18 RX ORDER — GADOBUTROL 604.72 MG/ML
0.1 INJECTION INTRAVENOUS ONCE
Status: COMPLETED | OUTPATIENT
Start: 2022-01-18 | End: 2022-01-18

## 2022-01-18 RX ORDER — VALPROIC ACID 250 MG/1
500 CAPSULE, LIQUID FILLED ORAL EVERY 8 HOURS
Status: DISCONTINUED | OUTPATIENT
Start: 2022-01-18 | End: 2022-01-18

## 2022-01-18 RX ORDER — NIFEDIPINE 30 MG/1
30 TABLET, EXTENDED RELEASE ORAL
Status: DISCONTINUED | OUTPATIENT
Start: 2022-01-18 | End: 2022-01-25

## 2022-01-18 RX ORDER — IOPAMIDOL 755 MG/ML
109 INJECTION, SOLUTION INTRAVASCULAR ONCE
Status: COMPLETED | OUTPATIENT
Start: 2022-01-18 | End: 2022-01-18

## 2022-01-18 RX ORDER — POTASSIUM CHLORIDE 20MEQ/15ML
40 LIQUID (ML) ORAL ONCE
Status: COMPLETED | OUTPATIENT
Start: 2022-01-18 | End: 2022-01-18

## 2022-01-18 RX ADMIN — HYDRALAZINE HYDROCHLORIDE 10 MG: 20 INJECTION INTRAMUSCULAR; INTRAVENOUS at 14:42

## 2022-01-18 RX ADMIN — HYDRALAZINE HYDROCHLORIDE 20 MG: 20 INJECTION INTRAMUSCULAR; INTRAVENOUS at 05:00

## 2022-01-18 RX ADMIN — ASPIRIN 81 MG CHEWABLE TABLET 81 MG: 81 TABLET CHEWABLE at 09:06

## 2022-01-18 RX ADMIN — ATORVASTATIN CALCIUM 40 MG: 40 TABLET, FILM COATED ORAL at 20:56

## 2022-01-18 RX ADMIN — POTASSIUM CHLORIDE 40 MEQ: 40 SOLUTION ORAL at 09:06

## 2022-01-18 RX ADMIN — MICONAZOLE NITRATE: 2 POWDER TOPICAL at 20:57

## 2022-01-18 RX ADMIN — SODIUM CHLORIDE: 9 INJECTION, SOLUTION INTRAVENOUS at 18:50

## 2022-01-18 RX ADMIN — NYSTATIN: 100000 CREAM TOPICAL at 20:57

## 2022-01-18 RX ADMIN — CARVEDILOL 6.25 MG: 3.12 TABLET, FILM COATED ORAL at 18:24

## 2022-01-18 RX ADMIN — LABETALOL HYDROCHLORIDE 20 MG: 5 INJECTION, SOLUTION INTRAVENOUS at 19:48

## 2022-01-18 RX ADMIN — IOPAMIDOL 109 ML: 755 INJECTION, SOLUTION INTRAVENOUS at 15:45

## 2022-01-18 RX ADMIN — SODIUM CHLORIDE: 9 INJECTION, SOLUTION INTRAVENOUS at 00:54

## 2022-01-18 RX ADMIN — VALPROATE SODIUM 500 MG: 100 INJECTION, SOLUTION INTRAVENOUS at 02:06

## 2022-01-18 RX ADMIN — LABETALOL HYDROCHLORIDE 20 MG: 5 INJECTION, SOLUTION INTRAVENOUS at 04:13

## 2022-01-18 RX ADMIN — VALPROATE SODIUM 500 MG: 100 INJECTION, SOLUTION INTRAVENOUS at 09:06

## 2022-01-18 RX ADMIN — ACETAMINOPHEN 650 MG: 325 TABLET, FILM COATED ORAL at 14:30

## 2022-01-18 RX ADMIN — VALPROIC ACID 500 MG: 250 SOLUTION ORAL at 21:21

## 2022-01-18 RX ADMIN — CARVEDILOL 6.25 MG: 3.12 TABLET, FILM COATED ORAL at 06:15

## 2022-01-18 RX ADMIN — HYDRALAZINE HYDROCHLORIDE 20 MG: 20 INJECTION INTRAMUSCULAR; INTRAVENOUS at 22:21

## 2022-01-18 RX ADMIN — CHLORTHALIDONE 25 MG: 25 TABLET ORAL at 06:14

## 2022-01-18 RX ADMIN — Medication 1 PACKET: at 11:36

## 2022-01-18 RX ADMIN — HYDRALAZINE HYDROCHLORIDE 20 MG: 20 INJECTION INTRAMUSCULAR; INTRAVENOUS at 01:14

## 2022-01-18 RX ADMIN — LISINOPRIL 20 MG: 20 TABLET ORAL at 09:06

## 2022-01-18 RX ADMIN — CARVEDILOL 12.5 MG: 12.5 TABLET, FILM COATED ORAL at 21:20

## 2022-01-18 RX ADMIN — LABETALOL HYDROCHLORIDE 10 MG: 5 INJECTION, SOLUTION INTRAVENOUS at 09:34

## 2022-01-18 RX ADMIN — LABETALOL HYDROCHLORIDE 10 MG: 5 INJECTION, SOLUTION INTRAVENOUS at 15:06

## 2022-01-18 RX ADMIN — MICONAZOLE NITRATE: 2 POWDER TOPICAL at 09:08

## 2022-01-18 RX ADMIN — Medication 15 ML: at 09:06

## 2022-01-18 RX ADMIN — THIAMINE HCL TAB 100 MG 100 MG: 100 TAB at 09:06

## 2022-01-18 RX ADMIN — HYDRALAZINE HYDROCHLORIDE 10 MG: 20 INJECTION INTRAMUSCULAR; INTRAVENOUS at 14:52

## 2022-01-18 RX ADMIN — GADOBUTROL 8 ML: 604.72 INJECTION INTRAVENOUS at 17:05

## 2022-01-18 RX ADMIN — NYSTATIN: 100000 CREAM TOPICAL at 09:08

## 2022-01-18 RX ADMIN — Medication 1 PACKET: at 20:57

## 2022-01-18 ASSESSMENT — ACTIVITIES OF DAILY LIVING (ADL)
ADLS_ACUITY_SCORE: 17
ADLS_ACUITY_SCORE: 19
ADLS_ACUITY_SCORE: 17

## 2022-01-18 ASSESSMENT — MIFFLIN-ST. JEOR
SCORE: 1649.2
SCORE: 2661.63

## 2022-01-18 NOTE — PROVIDER NOTIFICATION
Time of notification: 2107  Provider notified: Neuro stroke # [id number]  Patient status: First dose of Nifedipine ER was ordered for 2000. Patient was initially not alert enough to take med. LOC increased after speaking with provider. Tried bedside swallow study and patient failed with incessant coughing. Do not feel comfortable giving an oral med (can't be crushed) at this time.   Temp:  [97.1  F (36.2  C)-98.3  F (36.8  C)] 97.3  F (36.3  C)  Pulse:  [69-89] 75  Resp:  [16-20] 18  BP: (157-202)/() 171/94  SpO2:  [98 %-100 %] 99 %  Orders received: Per provider, give med if LOC increases/patient is able to swallow. For now, give PRN hydralazine/labetalol. Will continue to monitor BP and control with PRNs.

## 2022-01-18 NOTE — PLAN OF CARE
"Assumed care 1900 from to 0730.    Vital signs:  Temp: 97  F (36.1  C) Temp src: Oral BP: (!) 194/100 Pulse: 85   Resp: 18 SpO2: 95 % O2 Device: Nasal cannula Oxygen Delivery: 1 LPM Height: 170.2 cm (5' 7\") Weight: (!) 179.8 kg (396 lb 6.2 oz)  Estimated body mass index is 62.08 kg/m  as calculated from the following:    Height as of this encounter: 1.702 m (5' 7\").    Weight as of this encounter: 179.8 kg (396 lb 6.2 oz).    Pain: At an acceptable level on current regimen. Denying pain.   Cardiovascular: NSR. Hypertensive. See provider notification note regarding hold of nifedipine due to failed swallow study. Not sufficiently controlled with PRN hydralazine and labetalol. See provider notification note regarding sustained SBPs>180. Gave coreg and hygroton early.   Neuro: Oriented to self only. Waxing and waning arousability. Intermittently obeys commands. Left sided hemiparesis and facial droop. Sluggish pupils, not able to track. R mitt in place to prevent pulling lines.   Respiratory: Sating > 95% on 1 L NC.  GI: No BM this shift. Tolerating TF running at 35 mL/hr (goal of 55, advance by 10 mL q8) with 60 mL FWF q4. See provider notification note about dislodged NG.   : Adequate UO via condom cath.   Activity: Up with lift assist. Frequent repositioning.   Skin: No new deficits noted.   LDAs:   R PIV is SL  L PIV is running NS at 75 mL/hr  NG for TF/meds  Condom cath    Plan: Continue with POC. Notify primary team with changes.     "

## 2022-01-18 NOTE — PROVIDER NOTIFICATION
Time of notification: 12:15 AM  Provider notified: [id number]  Patient status: Patient removed mitt and dislodged NG from 65 cm to 40 cm.   Temp:  [97.1  F (36.2  C)-98.5  F (36.9  C)] 98.5  F (36.9  C)  Pulse:  [69-89] 79  Resp:  [16-20] 16  BP: (157-202)/() 174/101  SpO2:  [98 %-100 %] 99 %  Orders received: Provider ordered to advance NG and confirm with abdominal xray. Per provider at 0300, NG is OK to use.

## 2022-01-18 NOTE — PROGRESS NOTES
[facility]    Stroke Progress Note    Interval Events  - Patient remains quite hypertensive overnight and into this morning. Was given his BP meds early due to him not being able to safely take his nifedipine (this medication cannot be crushed and given via FT).  - Patient reports feeling exhausted this morning. Reports some headache over R temporal region.     HPI Summary  Miriam Hall is a 46 year old male with PMHx HTN who presented on 1/8/2022 with symptoms concerning for stroke. The pt reports that he was in his normal state of health that morning and then decided to take a nap around noon. On waking from his nap around 2:00PM, he found that he was having trouble moving and feeling the L side of his body. Unfortunately, the pt was unable to get to a phone and ended up crawling on his floor until he could finally get the attention of one of his neighbors, who then was able to call EMS.     EMS arrived to find the pt continuing to have decreased sensation and weakness on his L side. They noted a , /145, and . On arrival in the ED, the pt remained afebrile, but was tachycardic () and quite hypertensive (/146). A stroke code was called for initial NIHSS 9, for a partial L gaze palsy, L-sided complete hemianopia, L arm > leg weakness, decreased sensation in the L arm/leg to sensation (pinprick intact in arm but decreased in leg), mild dysarthria, L-sided extinction, and possible L neglect. The pt also seemed to be having trouble with proprioception in the L arm, though did recognize it to be his own. Pt was immediately taken to CT where he underwent a CTH, CTA Head/Neck and CT Perfusion study, revealing a well-established R parieto-occipital stroke with an LVO of the R P1 segment. As the stroke already appeared well-established and the pt was outside the time window, no tPA was given. No thrombectomy was performed due to the  location of the LVO in the PCA. The pt was loaded with ASA 324mg once with plans to allow for permissive HTN and admission to the stroke service for further work-up     The patient denies any history of smoking or drug use. Does drink EtOH, unclear amount.      TPA Treatment   Not given due to established infarct on imaging and unclear or unfavorable risk-benefit profile for extended window thrombolysis beyond the conventional 4.5 hour time window.     Endovascular Treatment  Proximal vessel occlusion present, but endovascular treatment not initiated due to location of the thrombus in the posterior cerebral artery    Stroke Evaluation Summarized    MRI/Head CT MRI Brain (1/12)  1. Findings from CT and CTA performed yesterday are confirmed with stroke in the distribution of the right posterior cerebral artery. This involves the medial right occipital lobe and medial right temporal lobe with extension into the body of the thalamus.  2. Additional punctate foci of diffusion restriction in the left putamen.  3. Moderate leukoaraiosis.     CT Head (1/16)  1. Continued evolution of large right posterior cerebral artery territory infarct. With similar mass effect and 4 mm of leftward midline shift, previously 5 mm.  2. Subtle hyperdensities in the right basal ganglia consistent with petechial hemorrhages seen on previous MRI. No new intracranial hemorrhage or new loss of gray-white matter differentiation.   Intracranial Vasculature MRA (1/12)  Redemonstration of occlusion of the right PCA near its  origin. Additional short segment high-grade stenosis of a right-sided M2 MCA branch. Findings are not likely not significantly changed from CT performed yesterday.   Cervical Vasculature CTA (1/12)  1. Occlusion of the proximal right PCA.  2. High-grade stenosis of the occiput M2 branch of the right MCA with  attenuated flow distal.  3. Neck CTA demonstrates no stenosis of the major cervical arteries.     Echocardiogram The  visual ejection fraction is 50-55%.   No regional wall motion abnormalities are seen.  Moderate to severe concentric wall thickening consistent with left ventricular hypertrophy is present.  Global right ventricular function is normal.  There was no shunt at the atrial septal level as assessed by agitated saline bubble study at rest and with Valsalva maneuver.  Previous study not available for comparison.   EKG/Telemetry EKG with sinus tachycardia   Other Testing Hypercoagulability profile has not been sent yet     LDL  1/8/2022: 75 mg/dL   A1C  1/8/2022: 7.8 %   Troponin No lab value available in past 48 hrs       Impression   # Acute ischemic stroke of Right P1 segement of the PCA  # R P1 occlusion and R M2 Stenosis  # Large vessel atherosclerosis vs ESUS  # Cerebral edema   # Hemorrhagic transformation of ischemic infarct  Miriam Hall is a 46 year old male with PMHx HTN who presented on 1/8/2022 with L gaze palsy, L complete hemianopia, mild dysarthria, L arm > leg weakness, L arm/leg decreased sensation, and L side extinction after waking up from a nap. FTH an occlusion of the P1 segment of the R PCA with associated established infarct as well as high grade stenosis of the M2 segment of the R MCA. TTE with EF of 50-55%, no WMA or atrial enlargement. Etiology undetermined at this time.   - Aspirin 81 mg PO daily for secondary stroke prevention  - CARISA once BP under better control   - PT/OT/SLP - ARU appropriate  - CT C/A/P today to screen for malignancy   - Hypercoag panel sent today  - Screen for vasculitis -- ESR, CRP, THA, dsDNA, SSA & SSB, ANCA    #Encephalopathy  #Leukocytosis  #Concern for seizures  Patient with ongoing waxing and waning level of consciousness over the past few days. Hooked up to EEG on 1/12 with no evidence of seizures per report from Dr. Porter and pt pulling off leads overnight so EEG disconnected on 1/13. Repeat EEG on 1/16 completed and negative for seizure activity. No fevers  documented in chart but patient does have a mild leukocytosis. No alarming electrolyte derangements. BP has been persistently elevated and resistant to medications, so his confusion could represent hypertensive encephalopathy.   - Change route of VPA to 500 mg PO q8h   - Check VPA total and free trough levels today  - Reconnect EEG for spot study  - Blood cultures  - UA and UCx  - CXR  - MRI brain w/wo contrast today  - MRA head today as well    #HLD  LDL 75. Long-term LDL goal 40-70.   - Atorvastatin 40 mg PO daily    #Resistant HTN  - Cardiology consulted, appreciate assistance  - Checking aldosterone/renin ratio --> if c/w hyperaldosteronism, will start Aldosterone 12.5 mg daily and consult endocrinology   - PTA Lisinopril 10 mg PO daily   - Amlodipine 10 mg PO daily  - Carvedilol 6.25 mg PO BID  - Chlorthalidone 25 mg PO daily  - Nifedipine 30 mg BID   - Hydralazine/Labetalol prn SBP > 180    #DM  Newly diagnosed DM with Hgb A1C 7.8.  - Continue to monitor glucose  - Increase sliding scale insulin to medium intensity today   - Hypoglycemia protocol    #Nutrition  Patient does not meet two of the established criteria necessary for diagnosing malnutrition but is at risk for malnutrition.  - NG tube in place with TF started  - Nutrition consulted  - Minced and moist diet with thin liquids - patient has been taking in very little PO    #Hypokalemia   - RN Managed Standard Replacement protocol    #Superficial RUE thrombus  US completed 1/17 negative for DVT but positive for thrombus in the R basilic and cephalic veins from the antecubital fossa to the wrist.     Chronic/Resolved Issues:  #DISHA, resolved  Pt presented with Cr 1.37 improved with IVF to 1.17. Now at baseline.   - Daily BMP    #Elevated troponin, resolved  Pt arrives with Trop 147 and rising. EKG exhibited sinus tachycardia, but no concerning signs of ischemia. Troponin peaked 1/10. Unclear what caused this elevation; unlikely to be MI given no sx or EKG  changes.  - Coronary CTA as outpatient per Cards recs      Prophylaxis            For VTE Prevention:  - SCD  For Acid Suppression:  - GI prophylaxis is not indicated  FEN:  - NS mIVF @ 75/hr    Code Status  Full Code    The patient was discussed with Stroke Staff, Dr. Yousif.    Jose Puente MD  Neurology Resident PGY-2  ASCOM: *[id number]  ______________________________________________________    Clinically Significant Risk Factors Present on Admission                 Medications   Scheduled Meds    aspirin  81 mg Oral Daily    Or     aspirin  81 mg Oral or NG Tube Daily     atorvastatin  40 mg Oral QPM     carvedilol  6.25 mg Oral BID w/meals     chlorthalidone  25 mg Oral Daily     insulin aspart  1-7 Units Subcutaneous TID AC     insulin aspart  1-5 Units Subcutaneous At Bedtime     lisinopril  20 mg Oral or NG Tube Daily     miconazole   Topical BID     multivitamins w/minerals  15 mL Per Feeding Tube Daily     NIFEdipine ER OSMOTIC  30 mg Oral BID     nystatin   Topical BID     protein modular  1 packet Per Feeding Tube BID     sodium chloride (PF)  3 mL Intracatheter Q8H     thiamine  100 mg Per Feeding Tube Daily     valproic acid  500 mg Oral Q8H       Infusion Meds    dextrose       - MEDICATION INSTRUCTIONS -       - MEDICATION INSTRUCTIONS -       sodium chloride 75 mL/hr at 01/18/22 0349       PRN Meds  acetaminophen, artificial tears ophthalmic solution, dextrose, glucose **OR** dextrose **OR** glucagon, hydrALAZINE, labetalol, lidocaine 4%, lidocaine (buffered or not buffered), - MEDICATION INSTRUCTIONS -, - MEDICATION INSTRUCTIONS -, melatonin, polyethylene glycol, senna-docusate, sodium chloride (PF)       PHYSICAL EXAMINATION  Temp:  [97  F (36.1  C)-99.5  F (37.5  C)] 99.5  F (37.5  C)  Pulse:  [] 96  Resp:  [16-18] 17  BP: (168-213)/() 176/104  SpO2:  [95 %-99 %] 96 %      Neurologic  Mental Status:  lethargic, waxing/waning wakefulness & cooperation, oriented to self, month and year but  not place.  Cranial Nerves:  PER, right gaze preference but able to slightly cross the midline, L sided facial droop, shoulder shrug strong on the R  Motor:  no abnormal movements. No spontaneous movement of L hemibody. RUE and RLE moving freely.   Sensory:  localizes noxious in 4/4 extremities  Coordination:  deferred  Station/Gait:  deferred    Stroke Scales    NIHSS  Interval baseline (01/12/22 0324)   Interval Comments     1a. Level of Consciousness 1-->Not alert, but arousable by minor stimulation to obey, answer, or respond   1b. LOC Questions 2-->Answers neither question correctly   1c. LOC Commands 1-->Performs one task correctly   2.   Best Gaze 1-->Partial gaze palsy, gaze is abnormal in one or both eyes, but forced deviation or total gaze paresis is not present   3.   Visual 2-->Complete hemianopia   4.   Facial Palsy 1-->Minor paralysis (flattened nasolabial fold, asymmetry on smiling)   5a. Motor Arm, Left 4-->No movement   5b. Motor Arm, Right 0-->No drift, limb holds 90 (or 45) degrees for full 10 secs   6a. Motor Leg, Left 4-->No movement   6b. Motor Leg, right 1-->Drift, leg falls by the end of the 5-sec period but does not hit bed   7.   Limb Ataxia 0-->Absent   8.   Sensory 1-->Mild-to-moderate sensory loss, patient feels pinprick is less sharp or is dull on the affected side, or there is a loss of superficial pain with pinprick, but patient is aware of being touched   9.   Best Language 2-->Severe aphasia, all communication is through fragmentary expression, great need for inference, questioning, and guessing by the listener. Range of information that can be exchanged is limited, listener carries burden of. . . (see row details)   10. Dysarthria 0-->Normal   11. Extinction and Inattention  1-->Visual, tactile, auditory, spatial, or personal inattention or extinction to bilateral simultaneous stimulation in one of the sensory modalities   Total 21 (01/12/22 0324)       Imaging  I personally  reviewed all imaging; relevant findings per HPI.     Lab Results Data   CBC  Recent Labs   Lab 01/18/22  0502 01/17/22 0433 01/16/22 0622   WBC 12.3* 13.0* 12.2*   RBC 5.18 5.51 5.41   HGB 15.8 16.8 16.4   HCT 47.4 50.0 48.6    335 290     Basic Metabolic Panel    Recent Labs   Lab 01/18/22  1519 01/18/22  1107 01/18/22  0917 01/18/22  0502 01/17/22  0813 01/17/22  0433 01/16/22  0928 01/16/22 0622   NA  --   --   --  145*  --  142  --  142   POTASSIUM  --   --   --  3.1*  --  3.2*  --  3.5   CHLORIDE  --   --   --  107  --  104  --  109   CO2  --   --   --  28  --  26  --  23   BUN  --   --   --  34*  --  30  --  28   CR  --   --   --  1.09  --  1.01  --  1.03   * 224* 241* 181*   < > 149*   < > 172*   VALERIE  --   --   --  9.5  --  9.5  --  9.2    < > = values in this interval not displayed.     Liver Panel  Recent Labs   Lab 01/17/22 0433   PROTTOTAL 7.8   ALBUMIN 2.9*   BILITOTAL 0.7   ALKPHOS 98   AST 22   ALT 20     INR    Recent Labs   Lab Test 01/08/22 1843 01/08/22  1834   INR 0.95 1.0*      Lipid Profile    Recent Labs   Lab Test 01/08/22  1843 05/16/14  1506   CHOL 169 131   HDL 70 53   LDL 75 70   TRIG 118 38     A1C    Recent Labs   Lab Test 01/08/22  1843   A1C 7.8*     Troponin I  No results for input(s): TROPONIN, GHTROP in the last 168 hours.

## 2022-01-18 NOTE — PLAN OF CARE
"Neuro: Fluctuating mentation. Lethargic most of day, disoriented to time, place, and situation. Able to recall month and year. Occasionally recalls place. Pupils slugglish, L facial droop. L sided deficits/hemiparesis. Sensation absent in LUE and LLE. Mitt on R hand due to pulling at lines.   Cardiac: SR. Hypertensive, hydralazine given x1.   Respiratory: Sating >93 on 1L. Apneic at times.  GI/: Adequate urine output via external catheter. No BM today, abd soft and nontender.  Diet/appetite: Minced and moist diet but not eating. TF initiated today at 15ml/hr. Goal of 55ml/hr with FWF of 60ml Q4.  Activity:  Assist of mechanical lift, up to chair with PT.  Pain: At acceptable level on current regimen.   Skin: No new deficits noted.   LDA's: PIVx2, external catheter, NG    BP (!) 168/96   Pulse 69   Temp 97.1  F (36.2  C) (Axillary)   Resp 20   Ht 1.702 m (5' 7\")   Wt 80.7 kg (178 lb)   SpO2 99%   BMI 27.88 kg/m      Plan: Continue with POC. Notify primary team with changes.    "

## 2022-01-18 NOTE — PROVIDER NOTIFICATION
Time of notification: 6:07 PM  Provider notified: Neuro stroke on call  Patient status: RUE swelling and painful per pt report, wondering if we should eval for DVT.   Temp:  [97.1  F (36.2  C)-98.6  F (37  C)] 97.1  F (36.2  C)  Pulse:  [69-89] 69  Resp:  [16-20] 20  BP: (157-202)/() 167/102  SpO2:  [98 %-100 %] 99 %  Orders received: Discussed at bedside. U/S to r/o DVT. Also discussed fluctuating mentation.

## 2022-01-18 NOTE — PROVIDER NOTIFICATION
Time of notification: 6:12 AM  Provider notified: Neuro stroke  Patient status: BPs still > 180 despite PRN hydralazine and labetalol. All other VSS.   Temp:  [97  F (36.1  C)-98.5  F (36.9  C)] 97  F (36.1  C)  Pulse:  [] 100  Resp:  [16-20] 18  BP: (166-213)/() 177/90  SpO2:  [95 %-99 %] 95 %  Orders received: Ordered to give AM antihypertensives (coreg and hygroton) early. Will continue to monitor.

## 2022-01-18 NOTE — PROGRESS NOTES
[facility]    Cardiology Consult Progress Note        Date of Admission:  1/8/2022     Assessment & Plan: SL   Miriam Hall is a 46 year old male admitted on 1/8/2022. He has HTN and is currently admitted for right posterior cerebral artery ischemic CVA. Found to have an elevated troponin.  TTE on admission showed moderate to severe LVH, low normal LV systolic function (LVEF 50-55%) with no wall motion abnormalities. SBP have persistently been ~180 despite significant escalation in blood pressure medications. His potassium remains low which suggests he may have a secondary cause such as hyperaldosteronism.     #Resistent hypertension  - Continue current medications  - Check aldosterone/renin ratio (ordered for you)  - If labs are consistent with hyperaldosteronism, would recommend initiating Spironolactone 12.5 mg daily   - Recommend endocrine involvement if labs are consistent with hyperaldosteronism    #Elevated Troponin  Unclear why his troponin was elevated. Unlikely to be an acute MI given lack of symptoms or EKG changes. He would probably benefit from a non-urgent ischemic workup  - Recommend  coronary CTA as an outpatient    The patient's care was discussed with the Attending Physician, Dr. Parnell.    Supa Evans MD  [facility]  Pager: [phone]  ______________________________________________________________________    Interval History   BEL overnight. Continues to be very hypertensive.    Data reviewed today: I reviewed all medications, new labs and imaging results over the last 24 hours. I personally reviewed no imaging or EKG's to review for today.     Physical Exam   Vital Signs: Temp: 98.2  F (36.8  C) Temp src: Axillary BP: (!) 181/101 Pulse: 89   Resp: 18 SpO2: 97 % O2 Device: Nasal cannula Oxygen Delivery: 1 LPM  Weight: 179 lbs 12.8 oz  General Appearance: Laying in bed, no acute distress.  Awake but not responding to questions  Respiratory: CTAB anteriorly  Cardiovascular: RRR, no m/r/g  GI: Soft, non-tender, non-distended  Skin: No rashes  Ext: WWP. No edema    Data   Recent Labs   Lab 01/18/22  0917 01/18/22  0502 01/18/22  0401 01/17/22  0813 01/17/22  0433 01/16/22  0928 01/16/22  0622   WBC  --  12.3*  --   --  13.0*  --  12.2*   HGB  --  15.8  --   --  16.8  --  16.4   MCV  --  92  --   --  91  --  90   PLT  --  318  --   --  335  --  290   NA  --  145*  --   --  142  --  142   POTASSIUM  --  3.1*  --   --  3.2*  --  3.5   CHLORIDE  --  107  --   --  104  --  109   CO2  --  28  --   --  26  --  23   BUN  --  34*  --   --  30  --  28   CR  --  1.09  --   --  1.01  --  1.03   ANIONGAP  --  10  --   --  12  --  10   VALERIE  --  9.5  --   --  9.5  --  9.2   * 181* 167*   < > 149*   < > 172*   ALBUMIN  --   --   --   --  2.9*  --   --    PROTTOTAL  --   --   --   --  7.8  --   --    BILITOTAL  --   --   --   --  0.7  --   --    ALKPHOS  --   --   --   --  98  --   --    ALT  --   --   --   --  20  --   --    AST  --   --   --   --  22  --   --     < > = values in this interval not displayed.

## 2022-01-19 ENCOUNTER — APPOINTMENT (OUTPATIENT)
Dept: SPEECH THERAPY | Facility: CLINIC | Age: 47
End: 2022-01-19
Payer: COMMERCIAL

## 2022-01-19 ENCOUNTER — APPOINTMENT (OUTPATIENT)
Dept: GENERAL RADIOLOGY | Facility: CLINIC | Age: 47
End: 2022-01-19
Payer: COMMERCIAL

## 2022-01-19 LAB
ANA SER QL IF: NEGATIVE
ANCA AB PATTERN SER IF-IMP: NORMAL
ANION GAP SERPL CALCULATED.3IONS-SCNC: 5 MMOL/L (ref 3–14)
AT III ACT/NOR PPP CHRO: 109 % (ref 85–135)
B2 GLYCOPROT1 IGG SERPL IA-ACNC: 1 U/ML
B2 GLYCOPROT1 IGM SERPL IA-ACNC: <2.4 U/ML
BUN SERPL-MCNC: 32 MG/DL (ref 7–30)
C-ANCA TITR SER IF: NORMAL {TITER}
CALCIUM SERPL-MCNC: 9.6 MG/DL (ref 8.5–10.1)
CARDIOLIPIN IGG SER IA-ACNC: 2 GPL-U/ML
CARDIOLIPIN IGG SER IA-ACNC: NEGATIVE
CARDIOLIPIN IGM SER IA-ACNC: <2 MPL-U/ML
CARDIOLIPIN IGM SER IA-ACNC: NEGATIVE
CHLORIDE BLD-SCNC: 106 MMOL/L (ref 94–109)
CO2 SERPL-SCNC: 32 MMOL/L (ref 20–32)
CORTIS SERPL-MCNC: 20.6 UG/DL (ref 4–22)
CREAT SERPL-MCNC: 1.12 MG/DL (ref 0.66–1.25)
DRVVT SCREEN RATIO: 1.04
DSDNA AB SER-ACNC: 0.9 IU/ML
ENA SS-A AB SER IA-ACNC: 0.6 U/ML
ENA SS-A AB SER IA-ACNC: NEGATIVE
ENA SS-B IGG SER IA-ACNC: <0.6 U/ML
ENA SS-B IGG SER IA-ACNC: NEGATIVE
ERYTHROCYTE [DISTWIDTH] IN BLOOD BY AUTOMATED COUNT: 13.7 % (ref 10–15)
GFR SERPL CREATININE-BSD FRML MDRD: 82 ML/MIN/1.73M2
GLUCOSE BLD-MCNC: 298 MG/DL (ref 70–99)
GLUCOSE BLDC GLUCOMTR-MCNC: 166 MG/DL (ref 70–99)
GLUCOSE BLDC GLUCOMTR-MCNC: 193 MG/DL (ref 70–99)
GLUCOSE BLDC GLUCOMTR-MCNC: 253 MG/DL (ref 70–99)
GLUCOSE BLDC GLUCOMTR-MCNC: 260 MG/DL (ref 70–99)
GLUCOSE BLDC GLUCOMTR-MCNC: 272 MG/DL (ref 70–99)
GLUCOSE BLDC GLUCOMTR-MCNC: 272 MG/DL (ref 70–99)
HCT VFR BLD AUTO: 50.8 % (ref 40–53)
HGB BLD-MCNC: 16.9 G/DL (ref 13.3–17.7)
INR PPP: 1.06 (ref 0.85–1.15)
LA PPP-IMP: NEGATIVE
LUPUS INTERPRETATION: NORMAL
MAGNESIUM SERPL-MCNC: 2.3 MG/DL (ref 1.6–2.3)
MCH RBC QN AUTO: 30.4 PG (ref 26.5–33)
MCHC RBC AUTO-ENTMCNC: 33.3 G/DL (ref 31.5–36.5)
MCV RBC AUTO: 91 FL (ref 78–100)
PHOSPHATE SERPL-MCNC: 2.4 MG/DL (ref 2.5–4.5)
PHOSPHATE SERPL-MCNC: 2.9 MG/DL (ref 2.5–4.5)
PLATELET # BLD AUTO: 324 10E3/UL (ref 150–450)
POTASSIUM BLD-SCNC: 3.1 MMOL/L (ref 3.4–5.3)
POTASSIUM BLD-SCNC: 3.1 MMOL/L (ref 3.4–5.3)
PROT C ACT/NOR PPP CHRO: 80 % (ref 70–170)
PROT S FREE AG ACT/NOR PPP IA: 138 % (ref 70–148)
PTT RATIO: 1.03
RBC # BLD AUTO: 5.56 10E6/UL (ref 4.4–5.9)
SODIUM SERPL-SCNC: 143 MMOL/L (ref 133–144)
THROMBIN TIME: 17.7 SECONDS (ref 13–19)
VALPROATE FREE SERPL-MCNC: 19.8 UG/ML
WBC # BLD AUTO: 10.7 10E3/UL (ref 4–11)

## 2022-01-19 PROCEDURE — 258N000003 HC RX IP 258 OP 636: Performed by: STUDENT IN AN ORGANIZED HEALTH CARE EDUCATION/TRAINING PROGRAM

## 2022-01-19 PROCEDURE — 74018 RADEX ABDOMEN 1 VIEW: CPT | Mod: 26

## 2022-01-19 PROCEDURE — 84100 ASSAY OF PHOSPHORUS: CPT

## 2022-01-19 PROCEDURE — 250N000013 HC RX MED GY IP 250 OP 250 PS 637: Performed by: COUNSELOR

## 2022-01-19 PROCEDURE — 250N000009 HC RX 250

## 2022-01-19 PROCEDURE — 83735 ASSAY OF MAGNESIUM: CPT

## 2022-01-19 PROCEDURE — 258N000003 HC RX IP 258 OP 636

## 2022-01-19 PROCEDURE — 92526 ORAL FUNCTION THERAPY: CPT | Mod: GN

## 2022-01-19 PROCEDURE — 99233 SBSQ HOSP IP/OBS HIGH 50: CPT | Mod: GC | Performed by: STUDENT IN AN ORGANIZED HEALTH CARE EDUCATION/TRAINING PROGRAM

## 2022-01-19 PROCEDURE — 120N000003 HC R&B IMCU UMMC

## 2022-01-19 PROCEDURE — 36415 COLL VENOUS BLD VENIPUNCTURE: CPT

## 2022-01-19 PROCEDURE — 999N000065 XR ABDOMEN PORT 1 VIEWS

## 2022-01-19 PROCEDURE — 99233 SBSQ HOSP IP/OBS HIGH 50: CPT | Mod: GC | Performed by: PSYCHIATRY & NEUROLOGY

## 2022-01-19 PROCEDURE — 250N000013 HC RX MED GY IP 250 OP 250 PS 637: Performed by: INTERNAL MEDICINE

## 2022-01-19 PROCEDURE — 250N000013 HC RX MED GY IP 250 OP 250 PS 637: Performed by: PSYCHIATRY & NEUROLOGY

## 2022-01-19 PROCEDURE — 250N000011 HC RX IP 250 OP 636: Performed by: COUNSELOR

## 2022-01-19 PROCEDURE — 99223 1ST HOSP IP/OBS HIGH 75: CPT | Mod: GC | Performed by: INTERNAL MEDICINE

## 2022-01-19 PROCEDURE — 250N000013 HC RX MED GY IP 250 OP 250 PS 637

## 2022-01-19 PROCEDURE — 82310 ASSAY OF CALCIUM: CPT | Performed by: STUDENT IN AN ORGANIZED HEALTH CARE EDUCATION/TRAINING PROGRAM

## 2022-01-19 PROCEDURE — 250N000013 HC RX MED GY IP 250 OP 250 PS 637: Performed by: STUDENT IN AN ORGANIZED HEALTH CARE EDUCATION/TRAINING PROGRAM

## 2022-01-19 PROCEDURE — 84132 ASSAY OF SERUM POTASSIUM: CPT | Performed by: PSYCHIATRY & NEUROLOGY

## 2022-01-19 PROCEDURE — 99233 SBSQ HOSP IP/OBS HIGH 50: CPT | Mod: GC | Performed by: PHYSICAL MEDICINE & REHABILITATION

## 2022-01-19 PROCEDURE — 36415 COLL VENOUS BLD VENIPUNCTURE: CPT | Performed by: STUDENT IN AN ORGANIZED HEALTH CARE EDUCATION/TRAINING PROGRAM

## 2022-01-19 PROCEDURE — 82533 TOTAL CORTISOL: CPT

## 2022-01-19 PROCEDURE — 85018 HEMOGLOBIN: CPT | Performed by: STUDENT IN AN ORGANIZED HEALTH CARE EDUCATION/TRAINING PROGRAM

## 2022-01-19 RX ORDER — POTASSIUM CHLORIDE 20MEQ/15ML
40 LIQUID (ML) ORAL 3 TIMES DAILY
Status: DISCONTINUED | OUTPATIENT
Start: 2022-01-19 | End: 2022-01-19

## 2022-01-19 RX ORDER — POTASSIUM CHLORIDE 1.5 G/1.58G
40 POWDER, FOR SOLUTION ORAL ONCE
Status: COMPLETED | OUTPATIENT
Start: 2022-01-19 | End: 2022-01-19

## 2022-01-19 RX ORDER — CLONIDINE HYDROCHLORIDE 0.1 MG/1
0.1 TABLET ORAL ONCE
Status: COMPLETED | OUTPATIENT
Start: 2022-01-19 | End: 2022-01-19

## 2022-01-19 RX ORDER — HYDRALAZINE HYDROCHLORIDE 50 MG/1
50 TABLET, FILM COATED ORAL EVERY 8 HOURS SCHEDULED
Status: DISCONTINUED | OUTPATIENT
Start: 2022-01-19 | End: 2022-01-28 | Stop reason: HOSPADM

## 2022-01-19 RX ORDER — CLONIDINE HYDROCHLORIDE 0.1 MG/1
0.1 TABLET ORAL ONCE
Status: DISCONTINUED | OUTPATIENT
Start: 2022-01-19 | End: 2022-01-19

## 2022-01-19 RX ORDER — POTASSIUM CHLORIDE 1.5 G/1.58G
40 POWDER, FOR SOLUTION ORAL ONCE
Status: DISCONTINUED | OUTPATIENT
Start: 2022-01-19 | End: 2022-01-19

## 2022-01-19 RX ORDER — CARVEDILOL 25 MG/1
25 TABLET ORAL 2 TIMES DAILY WITH MEALS
Status: DISCONTINUED | OUTPATIENT
Start: 2022-01-19 | End: 2022-01-19

## 2022-01-19 RX ORDER — CLONIDINE HYDROCHLORIDE 0.1 MG/1
0.1 TABLET ORAL 2 TIMES DAILY
Status: DISCONTINUED | OUTPATIENT
Start: 2022-01-19 | End: 2022-01-28 | Stop reason: HOSPADM

## 2022-01-19 RX ORDER — HYDRALAZINE HYDROCHLORIDE 20 MG/ML
10 INJECTION INTRAMUSCULAR; INTRAVENOUS ONCE
Status: COMPLETED | OUTPATIENT
Start: 2022-01-19 | End: 2022-01-19

## 2022-01-19 RX ORDER — POTASSIUM CHLORIDE 20MEQ/15ML
40 LIQUID (ML) ORAL 3 TIMES DAILY
Status: ACTIVE | OUTPATIENT
Start: 2022-01-19 | End: 2022-01-20

## 2022-01-19 RX ORDER — CARVEDILOL 25 MG/1
25 TABLET ORAL 2 TIMES DAILY WITH MEALS
Status: DISCONTINUED | OUTPATIENT
Start: 2022-01-19 | End: 2022-01-21

## 2022-01-19 RX ORDER — POTASSIUM CHLORIDE 7.45 MG/ML
10 INJECTION INTRAVENOUS
Status: COMPLETED | OUTPATIENT
Start: 2022-01-19 | End: 2022-01-20

## 2022-01-19 RX ADMIN — MICONAZOLE NITRATE: 2 POWDER TOPICAL at 21:13

## 2022-01-19 RX ADMIN — CLONIDINE HYDROCHLORIDE 0.1 MG: 0.1 TABLET ORAL at 06:25

## 2022-01-19 RX ADMIN — VALPROIC ACID 500 MG: 250 SOLUTION ORAL at 18:42

## 2022-01-19 RX ADMIN — SODIUM CHLORIDE: 9 INJECTION, SOLUTION INTRAVENOUS at 08:23

## 2022-01-19 RX ADMIN — Medication 1 PACKET: at 08:27

## 2022-01-19 RX ADMIN — HYDRALAZINE HYDROCHLORIDE 10 MG: 20 INJECTION INTRAMUSCULAR; INTRAVENOUS at 01:36

## 2022-01-19 RX ADMIN — CLONIDINE HYDROCHLORIDE 0.1 MG: 0.1 TABLET ORAL at 04:17

## 2022-01-19 RX ADMIN — VALPROIC ACID 500 MG: 250 SOLUTION ORAL at 10:54

## 2022-01-19 RX ADMIN — LABETALOL HYDROCHLORIDE 20 MG: 5 INJECTION, SOLUTION INTRAVENOUS at 22:17

## 2022-01-19 RX ADMIN — POTASSIUM CHLORIDE 10 MEQ: 7.46 INJECTION, SOLUTION INTRAVENOUS at 21:13

## 2022-01-19 RX ADMIN — CHLORTHALIDONE 25 MG: 25 TABLET ORAL at 08:21

## 2022-01-19 RX ADMIN — HYDRALAZINE HYDROCHLORIDE 20 MG: 20 INJECTION INTRAMUSCULAR; INTRAVENOUS at 02:44

## 2022-01-19 RX ADMIN — CARVEDILOL 25 MG: 25 TABLET, FILM COATED ORAL at 17:32

## 2022-01-19 RX ADMIN — SODIUM PHOSPHATE, MONOBASIC, MONOHYDRATE AND SODIUM PHOSPHATE, DIBASIC, ANHYDROUS 15 MMOL: 276; 142 INJECTION, SOLUTION INTRAVENOUS at 15:51

## 2022-01-19 RX ADMIN — POTASSIUM CHLORIDE 40 MEQ: 1.5 POWDER, FOR SOLUTION ORAL at 10:53

## 2022-01-19 RX ADMIN — VALPROIC ACID 500 MG: 250 SOLUTION ORAL at 02:52

## 2022-01-19 RX ADMIN — POTASSIUM CHLORIDE 10 MEQ: 7.46 INJECTION, SOLUTION INTRAVENOUS at 23:13

## 2022-01-19 RX ADMIN — POTASSIUM CHLORIDE 40 MEQ: 1.5 POWDER, FOR SOLUTION ORAL at 18:42

## 2022-01-19 RX ADMIN — LISINOPRIL 20 MG: 20 TABLET ORAL at 08:22

## 2022-01-19 RX ADMIN — POTASSIUM CHLORIDE 10 MEQ: 7.46 INJECTION, SOLUTION INTRAVENOUS at 22:13

## 2022-01-19 RX ADMIN — THIAMINE HCL TAB 100 MG 100 MG: 100 TAB at 08:22

## 2022-01-19 RX ADMIN — HYDRALAZINE HYDROCHLORIDE 50 MG: 50 TABLET, FILM COATED ORAL at 17:32

## 2022-01-19 RX ADMIN — LABETALOL HYDROCHLORIDE 20 MG: 5 INJECTION, SOLUTION INTRAVENOUS at 00:45

## 2022-01-19 RX ADMIN — ASPIRIN 81 MG CHEWABLE TABLET 81 MG: 81 TABLET CHEWABLE at 08:22

## 2022-01-19 RX ADMIN — CARVEDILOL 25 MG: 25 TABLET, FILM COATED ORAL at 06:25

## 2022-01-19 RX ADMIN — Medication 15 ML: at 08:22

## 2022-01-19 RX ADMIN — LABETALOL HYDROCHLORIDE 20 MG: 5 INJECTION, SOLUTION INTRAVENOUS at 04:49

## 2022-01-19 RX ADMIN — NYSTATIN: 100000 CREAM TOPICAL at 21:13

## 2022-01-19 ASSESSMENT — ACTIVITIES OF DAILY LIVING (ADL)
ADLS_ACUITY_SCORE: 19
ADLS_ACUITY_SCORE: 21
ADLS_ACUITY_SCORE: 19
ADLS_ACUITY_SCORE: 19
ADLS_ACUITY_SCORE: 21
ADLS_ACUITY_SCORE: 19
ADLS_ACUITY_SCORE: 21
ADLS_ACUITY_SCORE: 19
ADLS_ACUITY_SCORE: 21
ADLS_ACUITY_SCORE: 19
ADLS_ACUITY_SCORE: 21
ADLS_ACUITY_SCORE: 19
ADLS_ACUITY_SCORE: 21

## 2022-01-19 ASSESSMENT — MIFFLIN-ST. JEOR: SCORE: 1648.29

## 2022-01-19 NOTE — PLAN OF CARE
Neuro: Mentation fluctuates, lethargic most of shift. Consistently oriented to self. Able to recall month, year, and knows that he's in the hospital. Forgetful. Sluggish pupils. L facial droop and absent ROM and sensation in L extremities. Mitt on R hand for pulling at lines. EEG in place.  Cardiac: SR. Hypertensive, prn hydralazine and labetalol given per MAR.  Respiratory: Sating >93 on RA.  GI/: Adequate urine output via condom cath. Smear BM X1  Diet/appetite: Tolerating TF diet at 45ml hr. Goal of 55ml/hr. FWF 60 Q4h.  [facility]  [facility] Q2h..  Pain: At acceptable level on current regimen.   Skin: No new deficits noted.  LDA's: NG, PIVx2, NS @ 75ml/hr, external cath  CT, MRI, CXR, labs, EEG, UA pending      Plan: Continue with POC. Notify primary team with changes.

## 2022-01-19 NOTE — PROVIDER NOTIFICATION
"Time of notification: 9:29 PM  Provider notified: Neurostroke #[id number]  Patient status: \"KW, 6236-1. Failed another swallow study. Do you want to update diet order to NPO officially until speech sees him?\"  Temp:  [97  F (36.1  C)-99.5  F (37.5  C)] 98.8  F (37.1  C)  Pulse:  [] 83  Resp:  [16-18] 16  BP: (164-213)/() 181/102  SpO2:  [95 %-99 %] 97 %  Orders received: Provider changed diet order to NPO. Oral nifedipine was discontinued. Gave additional dose of coreg. Will continue to treat elevated BPs with hydralazine and labetalol.   "

## 2022-01-19 NOTE — CONSULTS
Physician Attestation   I, Karoline Hall, saw and evaluated Miriam Hall with Resident/Fellow. I have reviewed and discussed with the Resident/Fellow their history, physical and plan.    I personally reviewed the vital signs, medications, labs, and imaging.    In brief, the patient is a 46-year-old male with history of chronic uncontrolled hypertension and hypokalemia who was admitted at [facility] on 1/8/2022 for left hemiparesis.  The patient noticed left-sided weakness after he woke up from the nap around 2 PM of the admission day.  Upon presentation he was noted to have blood pressure of 243/145.  His creatinine was 1.37 from baseline of 1-1.2.  He has CT scan done which showed well-established right parieto-occipital stroke.  The patient did not get tPA due to outside window..  No trump ectomy was performed due to location of the thrombus.  He was started on high-dose aspirin and was admitted to stroke service.  Initially he was allowed permissive hypertension and given recent stroke.  On admission, his potassium was only 2.7.  He was being repleted but never been which normal level.  He has been taking lisinopril 20 - hydrochlorothiazide 25, 1 tab daily but that does not seem to help.  Previously the patient would like to seek second opinion but since COVID came he was unable to get a referral.  His blood pressure medication was resumed on 1/11 with amlodipine 5 mg which was increased to 10 but was stopped on 1/18.  Subsequently other medication was added.  As of today he is on Coreg 25 mg twice daily, Critelli on 25 mg daily, clonidine 0.1 mg 3 times daily (started today) and lisinopril 20 mg daily.  He has been given labetalol IV as needed as well as hydralazine IV as needed as well.  Aldosterone and renin was checked and aldosterone was 11.4 but there was checked when the patient has hypokalemia.  Renin still pending.  Plasma metanephrine is also pending.  He has CT abdomen and pelvis which does not show  any abnormal adrenal glands.     Upon interview, the patient is alert and awake.  The patient has family history of hypertension and stroke in his father.  However, his father has hypertension and stroke when he was elderly.  He was unsure whether he has hypokalemia in the past but from chart review has been having hypokalemia at least in 2014.  Patient denies chewing licorice or taking herbal medication.  The patient may have snore but he does not have diagnosis of NORA.  His weight has been stable.    On exam, his blood pressure is 187/98, he has dense left hemiparesis.  He does not have any aortic bruit or renal artery bruit.  He does not have any cushingoid appearance.  He has no lower extremity edema.  He has some slurred speech with left facial paresis.    # Severe resistant hypertension, suspect hyperaldosteronism  # Hypokalemia; refractory  # Recent ischemic stroke with left hemiparesis  # Family history of stroke and hypertension  We are strongly suspicious for hyperaldosteronism.  Although aldosterone level is only 11.4, this was checked when the patient has hypokalemia which can suppress aldosterone level.  Regardless, in the patient with hypokalemia Cerone should be suppressed.  He also has marginally hypernatremia which is one of the hallmark of hyperaldosteronism.  We are waiting for Prema level to guide us as to whether the patient has primary or secondary hyperaldosteronism.    In the meantime, I would recommend checking 24-hour urine aldosterone with urine sodium.  Please do not start spironolactone prior to checking 24-hour urine aldosterone.  Once urine aldosterone is collected we can start spironolactone 50 mg daily.  Agree with checking plasma metanephrine.  We recommend checking ultrasound renal with Doppler to rule out renal artery stenosis.  In the meantime, we would recommend starting scheduled hydralazine 50 mg every 6 hour to help with blood pressure.    Our impression and plan are being  discussed with the patient and his friend.  He understand and concur with the plan.    Regarding hypokalemia, please replace potassium until it is above 3.4.    Rest per the resident/fellow's note.   Total time spent: 60 minutes.    Karoline Hall  Date of Service (when I saw the patient): January 19, 2022

## 2022-01-19 NOTE — PLAN OF CARE
"BP (!) 162/90   Pulse 75   Temp 97.4  F (36.3  C) (Axillary)   Resp 20   Ht 1.702 m (5' 7\")   Wt 81.5 kg (179 lb 9.6 oz)   SpO2 95%   BMI 28.13 kg/m      BPs slightly elevated SBP 150s-170s. SR. RA. Denies pain. Intermittently lethargic. Oriented x2-3. Neuros unchanged from prior assessments, L hemiparesis continues. NG with TF @ 55/hr. Ok for ice chips. Condom cath with good urine output. No BM this shift. NS @ 75/hr. BS elevated. Turn and reposition. Potassium replaced. Mag and phos protocols ordered. Continue to monitor.   "

## 2022-01-19 NOTE — PROVIDER NOTIFICATION
"Time of notification: 0303; 0340  Provider notified: [facility] #[id number]  Patient status: After one time dose of hydralazine and additional dose of PRN hydralazine, SBPs still > 180. Paged again 30 minutes later after no call back received: \"Have given coreg, 3x hydralazine, and 2x labetalol. BP is 204/95. No PRNs available until 0445.\"  Temp:  [98.2  F (36.8  C)-99.5  F (37.5  C)] 98.6  F (37  C)  Pulse:  [] 88  Resp:  [16-18] 18  BP: (164-213)/() 198/95  SpO2:  [80 %-97 %] 97 %  Orders received: Provider ordered 1x dose of clonidine. Will monitor BP and give labetalol after 0445 if necessary. Per provider, will notify again for a second dose of clonidine if SBP is still > 180 at 0600.   "

## 2022-01-19 NOTE — PROVIDER NOTIFICATION
Time of notification: 1:21 AM  Provider notified: [facility] #[id number]  Patient status: SBPs> 180 with coreg, labetalol, and hydralazine on board.   Temp:  [97  F (36.1  C)-99.5  F (37.5  C)] 98.6  F (37  C)  Pulse:  [] 85  Resp:  [16-18] 18  BP: (164-213)/() 171/94  SpO2:  [80 %-97 %] 97 %  Orders received: Provider to order additional one time dose of hydralazine.

## 2022-01-19 NOTE — PROGRESS NOTES
[facility]   Acute Rehabilitation Unit  Daily progress note    INTERVAL HISTORY  Miriam Hall was seen and examined at bedside.     He continues to be hypertensive.  The primary team is starting on clonidine.  CARISA is pending better blood pressure control.  A hypercoagulability panel was sent yesterday.    He was seen at the bedside, accompanied with a friend who is his former significant other.  His participation in the conversation was limit.  He endorses being comfortable.    Functional status:  PT: High blood pressure during session, 161/100.  Supine / sit with max asist.  Sits EOB with max assist.  Impulsively tries to lie down, requiring max assist.  Sit / stand mod assist.  Stands for 25 seconds.  OT: Somnolent throughout session.  Mod assist for sitting balance.  Squeezes fingers in left hand minimally to command.  SLP: Fatigued.  Tolerates puree and thin liquids.    MEDICATIONS  Scheduled meds    aspirin  81 mg Oral Daily    Or     aspirin  81 mg Oral or NG Tube Daily     atorvastatin  40 mg Oral QPM     carvedilol  25 mg Oral BID w/meals     chlorthalidone  25 mg Oral Daily     cloNIDine  0.1 mg Oral BID     insulin aspart  1-7 Units Subcutaneous TID AC     insulin aspart  1-5 Units Subcutaneous At Bedtime     lisinopril  20 mg Oral or NG Tube Daily     miconazole   Topical BID     multivitamins w/minerals  15 mL Per Feeding Tube Daily     nystatin   Topical BID     protein modular  1 packet Per Feeding Tube BID     sodium chloride (PF)  3 mL Intracatheter Q8H     thiamine  100 mg Per Feeding Tube Daily     valproic acid  500 mg Oral or Feeding Tube Q8H       PRN meds:  acetaminophen, artificial tears ophthalmic solution, dextrose, glucose **OR** dextrose **OR** glucagon, hydrALAZINE, labetalol, lidocaine 4%, lidocaine (buffered or not buffered), - MEDICATION INSTRUCTIONS -, - MEDICATION INSTRUCTIONS -, melatonin, NIFEdipine ER OSMOTIC, polyethylene glycol,  "senna-docusate, sodium chloride (PF)    PHYSICAL EXAM  BP (!) 158/90   Pulse 78   Temp 97.4  F (36.3  C) (Axillary)   Resp 20   Ht 1.702 m (5' 7\")   Wt 81.5 kg (179 lb 9.6 oz)   SpO2 95%   BMI 28.13 kg/m    Gen: no acute distress  HEENT: no conjunctival injection, normocephalic, no rhinorrhea, neck supple.  Cardio: warm and well perfused extremities  Pulm: breathes comfortably on room air  Abd: soft, nondistended  Ext: warm and well perfused  Neuro/MSK:  Alert, expressive aphasia that is tangential  conjugate gaze, left facial droop  Right upper and lower extremity moves against gravity and resistance.  0/5 strength in left upper and lower extremity  Severe left neglect - unable to recognize his left arm    LABS  CBC:   Recent Labs   Lab Test 01/19/22  0435   WBC 10.7   RBC 5.56   HGB 16.9   MCV 91   MCH 30.4   MCHC 33.3   RDW 13.7        BMP:   Recent Labs   Lab Test 01/19/22  1138 01/19/22  0826 01/19/22  0435   NA  --   --  143   POTASSIUM  --   --  3.1*   CHLORIDE  --   --  106   CO2  --   --  32   BUN  --   --  32*   CR  --   --  1.12   *   < > 298*    < > = values in this interval not displayed.     Other labs:   Negative urinalysis 01/18/22    EEG 01/18/22  This is a abnormal EEG due to the presence of moderate generalized slowing with focal slowing over the right hemisphere.  Findings are consistent with the history of right hemisphere stroke. No seizures were observed.    MRI Brain 01/18/22  Impression:  1. Exam sensitivity is severely diminished due to considerable motion  artifact.  2. No definite abnormal enhancement to suggest infection on the motion  degraded postcontrast images.  3. Continued evolution of right posterior cerebral territory infarct  and punctate infarct involving the left putamen. Associated petechial  hemorrhage and developing cortical laminar necrosis.    ASSESSMENT AND PLAN    Miriam Hall is a 46 year old man with a PMH of hypertension who sustained a right " parieto-occipital stroke on 1/8/22 currently undergoing workup.  He has left hemiparesis, dysarthria, left gaze palsy, left hemianopsia, and left neglect resulting in severe debility.  He is cognitively intact and participatory in therapies, but his limitations to rehabilitation admission include anticipated functional trajectory, limited support and safety of home environment, and workup in progress.  His slow recovery is concerning for an anticipated poor and limited recovery course, and not much progress has been made for safe disposition to home. His blood pressure is still poorly controlled, and he still has tests pending as a result.    - Continue to monitor blood pressure control and evolution of neglect and hemiparesis  - Not yet appropriate for acute inpatient rehabilitation    Duong Sims MD  Physical Medicine & Rehabilitation    Staffed with Dr. Emerson

## 2022-01-19 NOTE — PLAN OF CARE
"Assumed care 1900 from to 0730.    Vital signs:  Temp: 98.3  F (36.8  C) Temp src: Axillary BP: (!) 165/107 Pulse: 75   Resp: 16 SpO2: 98 % O2 Device: None (Room air) Oxygen Delivery: 1 LPM Height: 170.2 cm (5' 7\") Weight: 81.5 kg (179 lb 9.6 oz)  Estimated body mass index is 28.13 kg/m  as calculated from the following:    Height as of this encounter: 1.702 m (5' 7\").    Weight as of this encounter: 81.5 kg (179 lb 9.6 oz).      Pain: At an acceptable level on current regimen. Denying pain.   Cardiovascular: NSR. Hypertensive. Not sufficiently controlled with PRN hydralazine and labetalol. See multiple provider notification note regarding sustained SBPs>180 and 14 beat run of SVT at 0330. Gave coreg and clonidine x2.   Neuro: Oriented to self and time only. Waxing and waning arousability. Intermittently obeys commands. Left sided hemiparesis and facial droop. Sluggish pupils, not able to track. R mitt in place to prevent pulling lines.   Respiratory: Sating > 95% on RA. Occasional apneic periods, desats to mid 80s, but recovers rapidly.   GI: No BM this shift. Tolerating TF running at goal of 55 mL/hr with 60 mL FWF q4.   : Adequate UO via condom cath.   Activity: Up with lift assist. Frequent repositioning.   Skin: No new deficits noted.   LDAs:   R PIV is SL  L PIV is running NS at 75 mL/hr  NG for TF/meds  Condom cath    Plan: Continue with POC. Notify primary team with changes.     "

## 2022-01-19 NOTE — PROGRESS NOTES
[facility]    Stroke Progress Note    Interval Events  - NAEON  - Per Nephrology (1/19):   - Replete K   - Ordered Renal artery U/S for resistant HTN workup   - 24 hour Urine Aldosterone & Na   - Dexamethasone Suppression Test   - Do not start Spironolactone   - Start Hydralazine 50 mg PO TID for BP control  - Per Cardiology (1/19):   - Testing Urine Metanephrines  - Consulted Endocrinology for T2DM management  - Reordered CARISA    HPI Summary  Miriam Hall is a 46 year old male with PMHx HTN who presented on 1/8/2022 with symptoms concerning for stroke. The pt reports that he was in his normal state of health that morning and then decided to take a nap around noon. On waking from his nap around 2:00PM, he found that he was having trouble moving and feeling the L side of his body. Unfortunately, the pt was unable to get to a phone and ended up crawling on his floor until he could finally get the attention of one of his neighbors, who then was able to call EMS.     EMS arrived to find the pt continuing to have decreased sensation and weakness on his L side. They noted a , /145, and . On arrival in the ED, the pt remained afebrile, but was tachycardic () and quite hypertensive (/146). A stroke code was called for initial NIHSS 9, for a partial L gaze palsy, L-sided complete hemianopia, L arm > leg weakness, decreased sensation in the L arm/leg to sensation (pinprick intact in arm but decreased in leg), mild dysarthria, L-sided extinction, and possible L neglect. The pt also seemed to be having trouble with proprioception in the L arm, though did recognize it to be his own. Pt was immediately taken to CT where he underwent a CTH, CTA Head/Neck and CT Perfusion study, revealing a well-established R parieto-occipital stroke with an LVO of the R P1 segment. As the stroke already appeared well-established and the pt was outside the time  window, no tPA was given. No thrombectomy was performed due to the location of the LVO in the PCA. The pt was loaded with ASA 324mg once with plans to allow for permissive HTN and admission to the stroke service for further work-up     The patient denies any history of smoking or drug use. Does drink EtOH, unclear amount.      TPA Treatment   Not given due to established infarct on imaging and unclear or unfavorable risk-benefit profile for extended window thrombolysis beyond the conventional 4.5 hour time window.     Endovascular Treatment  Proximal vessel occlusion present, but endovascular treatment not initiated due to location of the thrombus in the posterior cerebral artery    Stroke Evaluation Summarized    MRI/Head CT MRI Brain (1/12)  1. Findings from CT and CTA performed yesterday are confirmed with stroke in the distribution of the right posterior cerebral artery. This involves the medial right occipital lobe and medial right temporal lobe with extension into the body of the thalamus.  2. Additional punctate foci of diffusion restriction in the left putamen.  3. Moderate leukoaraiosis.     CT Head (1/16)  1. Continued evolution of large right posterior cerebral artery territory infarct. With similar mass effect and 4 mm of leftward midline shift, previously 5 mm.  2. Subtle hyperdensities in the right basal ganglia consistent with petechial hemorrhages seen on previous MRI. No new intracranial hemorrhage or new loss of gray-white matter differentiation.    MRI Brain (1/18)  1. Exam sensitivity is severely diminished due to considerable motion artifact.  2. No definite abnormal enhancement to suggest infection on the motion degraded postcontrast images.  3. Continued evolution of right posterior cerebral territory infarct and punctate infarct involving the left putamen. Associated petechial hemorrhage and developing cortical laminar necrosis.   Intracranial Vasculature MRA (1/12)  Redemonstration of  occlusion of the right PCA near its  origin. Additional short segment high-grade stenosis of a right-sided M2 MCA branch. Findings are not likely not significantly changed from CT performed yesterday.   Cervical Vasculature CTA (1/12)  1. Occlusion of the proximal right PCA.  2. High-grade stenosis of the occiput M2 branch of the right MCA with  attenuated flow distal.  3. Neck CTA demonstrates no stenosis of the major cervical arteries.     Echocardiogram The visual ejection fraction is 50-55%.   No regional wall motion abnormalities are seen.  Moderate to severe concentric wall thickening consistent with left ventricular hypertrophy is present.  Global right ventricular function is normal.  There was no shunt at the atrial septal level as assessed by agitated saline bubble study at rest and with Valsalva maneuver.  Previous study not available for comparison.   EKG/Telemetry EKG with sinus tachycardia   Other Testing Hypercoagulability profile has not been sent yet     LDL  1/8/2022: 75 mg/dL   A1C  1/8/2022: 7.8 %   Troponin No lab value available in past 48 hrs       Impression   # Acute ischemic stroke of Right P1 segement of the PCA  # R P1 occlusion and R M2 Stenosis  # Large vessel atherosclerosis vs ESUS  # Cerebral edema   # Hemorrhagic transformation of ischemic infarct  Miriam Hall is a 46 year old male with PMHx HTN who presented on 1/8/2022 with L gaze palsy, L complete hemianopia, mild dysarthria, L arm > leg weakness, L arm/leg decreased sensation, and L side extinction after waking up from a nap. FTH an occlusion of the P1 segment of the R PCA with associated established infarct as well as high grade stenosis of the M2 segment of the R MCA. TTE with EF of 50-55%, no WMA or atrial enlargement. Etiology undetermined at this time.   - Aspirin 81 mg PO daily for secondary stroke prevention  - CARISA once BP under better control   - PT/OT/SLP: ARU appropriate  - CT C/A/P (1/18): Negative  - Hypercoag  panel (1/18): Negative  - Vasculitis panel (1/18): Negative    #Encephalopathy  #Leukocytosis  #Concern for seizures  Patient with ongoing waxing and waning level of consciousness over the past few days. Hooked up to EEG on 1/12 with no evidence of seizures per report from Dr. Porter and pt pulling off leads overnight so EEG disconnected on 1/13. Repeat EEG on 1/16 completed and negative for seizure activity. No fevers documented in chart but patient does have a mild leukocytosis. No alarming electrolyte derangements. BP has been persistently elevated and resistant to medications, so his confusion could represent hypertensive encephalopathy.   -  mg PO Q8H   - Check VPA total/free trough levels (1/18): 100/19.8  - EEG spot study (1/18): Negative seizures  - Blood cultures (1/18): NGTD  - UA & UCx (1/18): Negative  - CXR (1/18): No focal pulmonary opacity    #HLD  LDL 75. Long-term LDL goal 40-70.   - Atorvastatin 40 mg PO daily    #Resistant HTN   Difficult to control HTN, unable to sustain BP goal < 160. Now up to 5 anti-hypertensives without a clear etiology.  - Checking aldosterone/renin ratio: Aldosterone normal; Renin pending  - PTA Lisinopril 10 mg PO daily   - Amlodipine 10 mg PO daily  - Carvedilol 6.25 mg PO BID  - Chlorthalidone 25 mg PO daily  - Hydralazine/Labetalol PRN for SBP > 180  - Cardiology Recs (1/19):   - Consider checking for pheochromocytoma with 24hr urine metanephrines and a renal ultrasound looking for renal artery stenosis.    - Recommend starting spironolactone 25mg daily   - Would be reasonable to ask nephrology to weigh in given his resistant hypertension.     #DM  Newly diagnosed DM with Hgb A1C 7.8.  - Continue to monitor glucose  - Increase sliding scale insulin to medium intensity today   - Hypoglycemia protocol  - Consulted Endocrinology, appreciate recs    #Nutrition  Patient does not meet two of the established criteria necessary for diagnosing malnutrition but is at risk  for malnutrition.  - NG tube in place with TF started  - Nutrition consulted  - Minced and moist diet with thin liquids - patient has been taking in very little PO    #Hypokalemia   - RN Managed Standard Replacement protocol    #Superficial RUE thrombus  US completed 1/17 negative for DVT but positive for thrombus in the R basilic and cephalic veins from the antecubital fossa to the wrist.     Chronic/Resolved Issues:  #DISHA, resolved  Pt presented with Cr 1.37 improved with IVF to 1.17. Now at baseline.   - Daily BMP    #Elevated troponin, resolved  Pt arrives with Trop 147 and rising. EKG exhibited sinus tachycardia, but no concerning signs of ischemia. Troponin peaked 1/10. Unclear what caused this elevation; unlikely to be MI given no sx or EKG changes.  - Coronary CTA as outpatient per Cards recs      Prophylaxis            For VTE Prevention:  - SCD  For Acid Suppression:  - GI prophylaxis is not indicated  FEN:  - NS mIVF @ 75/hr    Code Status  Full Code    The patient was discussed with Stroke Staff, Dr. Yousif.    Nelson Laura DO  Neurology Resident PGY1  ASCOM: *[id number]  ______________________________________________________    Clinically Significant Risk Factors Present on Admission                 Medications   Scheduled Meds    aspirin  81 mg Oral Daily    Or     aspirin  81 mg Oral or NG Tube Daily     atorvastatin  40 mg Oral QPM     carvedilol  25 mg Oral BID w/meals     chlorthalidone  25 mg Oral Daily     cloNIDine  0.1 mg Oral BID     insulin aspart  1-7 Units Subcutaneous TID AC     insulin aspart  1-5 Units Subcutaneous At Bedtime     lisinopril  20 mg Oral or NG Tube Daily     miconazole   Topical BID     multivitamins w/minerals  15 mL Per Feeding Tube Daily     nystatin   Topical BID     protein modular  1 packet Per Feeding Tube BID     sodium chloride (PF)  3 mL Intracatheter Q8H     sodium phosphate  15 mmol Intravenous Once     thiamine  100 mg Per Feeding Tube Daily     valproic acid   500 mg Oral or Feeding Tube Q8H       Infusion Meds    dextrose       - MEDICATION INSTRUCTIONS -       - MEDICATION INSTRUCTIONS -       sodium chloride 75 mL/hr at 01/19/22 0823       PRN Meds  acetaminophen, artificial tears ophthalmic solution, dextrose, glucose **OR** dextrose **OR** glucagon, hydrALAZINE, labetalol, lidocaine 4%, lidocaine (buffered or not buffered), - MEDICATION INSTRUCTIONS -, - MEDICATION INSTRUCTIONS -, melatonin, NIFEdipine ER OSMOTIC, polyethylene glycol, senna-docusate, sodium chloride (PF)       PHYSICAL EXAMINATION  Temp:  [97.4  F (36.3  C)-98.8  F (37.1  C)] 97.4  F (36.3  C)  Pulse:  [71-89] 75  Resp:  [16-20] 20  BP: (158-214)/() 162/90  SpO2:  [80 %-99 %] 95 %      Neurologic  Mental Status:  lethargic, waxing/waning wakefulness & cooperation, oriented to self, month and year but not place.  Cranial Nerves:  PER, right gaze preference but able to slightly cross the midline, L sided facial droop, shoulder shrug strong on the R  Motor:  no abnormal movements. No spontaneous movement of L hemibody. RUE and RLE moving freely.   Sensory:  localizes noxious in 4/4 extremities  Coordination:  deferred  Station/Gait:  deferred    Stroke Scales    NIHSS  Interval baseline (01/12/22 0324)   Interval Comments     1a. Level of Consciousness 1-->Not alert, but arousable by minor stimulation to obey, answer, or respond   1b. LOC Questions 2-->Answers neither question correctly   1c. LOC Commands 1-->Performs one task correctly   2.   Best Gaze 1-->Partial gaze palsy, gaze is abnormal in one or both eyes, but forced deviation or total gaze paresis is not present   3.   Visual 2-->Complete hemianopia   4.   Facial Palsy 1-->Minor paralysis (flattened nasolabial fold, asymmetry on smiling)   5a. Motor Arm, Left 4-->No movement   5b. Motor Arm, Right 0-->No drift, limb holds 90 (or 45) degrees for full 10 secs   6a. Motor Leg, Left 4-->No movement   6b. Motor Leg, right 1-->Drift, leg falls  by the end of the 5-sec period but does not hit bed   7.   Limb Ataxia 0-->Absent   8.   Sensory 1-->Mild-to-moderate sensory loss, patient feels pinprick is less sharp or is dull on the affected side, or there is a loss of superficial pain with pinprick, but patient is aware of being touched   9.   Best Language 2-->Severe aphasia, all communication is through fragmentary expression, great need for inference, questioning, and guessing by the listener. Range of information that can be exchanged is limited, listener carries burden of. . . (see row details)   10. Dysarthria 0-->Normal   11. Extinction and Inattention  1-->Visual, tactile, auditory, spatial, or personal inattention or extinction to bilateral simultaneous stimulation in one of the sensory modalities   Total 21 (01/12/22 0324)       Imaging  I personally reviewed all imaging; relevant findings per HPI.     Lab Results Data   CBC  Recent Labs   Lab 01/19/22  0435 01/18/22  0502 01/17/22 0433   WBC 10.7 12.3* 13.0*   RBC 5.56 5.18 5.51   HGB 16.9 15.8 16.8   HCT 50.8 47.4 50.0    318 335     Basic Metabolic Panel    Recent Labs   Lab 01/19/22  1138 01/19/22  0826 01/19/22  0435 01/18/22  0917 01/18/22  0502 01/17/22  0813 01/17/22  0433   NA  --   --  143  --  145*  --  142   POTASSIUM  --   --  3.1*  --  3.1*  --  3.2*   CHLORIDE  --   --  106  --  107  --  104   CO2  --   --  32  --  28  --  26   BUN  --   --  32*  --  34*  --  30   CR  --   --  1.12  --  1.09  --  1.01   * 272* 298*   < > 181*   < > 149*   VALERIE  --   --  9.6  --  9.5  --  9.5    < > = values in this interval not displayed.     Liver Panel  Recent Labs   Lab 01/17/22  0433   PROTTOTAL 7.8   ALBUMIN 2.9*   BILITOTAL 0.7   ALKPHOS 98   AST 22   ALT 20     INR    Recent Labs   Lab Test 01/18/22  1405 01/08/22  1843 01/08/22  1834   INR 1.06 0.95 1.0*      Lipid Profile    Recent Labs   Lab Test 01/08/22  1843 05/16/14  1506   CHOL 169 131   HDL 70 53   LDL 75 70   TRIG 118 38      A1C    Recent Labs   Lab Test 01/08/22  1843   A1C 7.8*     Troponin I  No results for input(s): TROPONIN, GHTROP in the last 168 hours.

## 2022-01-19 NOTE — PROGRESS NOTES
[facility]    Cardiology Consult Progress Note        Date of Admission:  1/8/2022     Assessment & Plan: SL   Miriam Hall is a 46 year old male admitted on 1/8/2022. He has HTN and is currently admitted for right posterior cerebral artery ischemic CVA. Found to have an elevated troponin.  TTE on admission showed moderate to severe LVH, low normal LV systolic function (LVEF 50-55%) with no wall motion abnormalities. SBP have persistently been ~180 despite significant escalation in blood pressure medications. Aldosterone level normal, renin level pending. Could consider workup for other causes of resistant hypertension     #Resistent hypertension  - Continue current medications  - Consider checking for pheochromocytoma with 24hr urine metanephrines and a renal ultrasound looking for renal artery stenosis.   - Recommend starting spironolactone 25mg daily  - Would be reasonable to ask nephrology to weigh in given his resistant hypertension.      #Elevated Troponin  Unclear why his troponin was elevated. Unlikely to be an acute MI given lack of symptoms or EKG changes. He would probably benefit from a non-urgent ischemic workup  - Recommend  coronary CTA as an outpatient     The patient's care was discussed with the Attending Physician, Dr. Parnell.       Supa Evans MD  [facility]  Please see sign in/sign out for up to date coverage information  ______________________________________________________________________    Interval History   Continues to be hypertensive. Started on clonidine overnight.     Data reviewed today: I reviewed all medications, new labs and imaging results over the last 24 hours.     Physical Exam   Vital Signs: Temp: 97.6  F (36.4  C) Temp src: Axillary BP: (!) 162/92 Pulse: 77   Resp: 16 SpO2: 99 % O2 Device: None (Room air)    Weight: 179 lbs 9.6 oz  General Appearance:  Laying in  bed, no acute distress.   Respiratory: Breathing comfortably  Cardiovascular: RRR, no m/r/g  GI: Soft, non-tender, non-distended  Skin: No rashes  Ext: No edema    Data   Recent Labs   Lab 01/19/22  0826 01/19/22  0435 01/18/22  2335 01/18/22  0917 01/18/22  0502 01/17/22  0813 01/17/22  0433   WBC  --  10.7  --   --  12.3*  --  13.0*   HGB  --  16.9  --   --  15.8  --  16.8   MCV  --  91  --   --  92  --  91   PLT  --  324  --   --  318  --  335   NA  --  143  --   --  145*  --  142   POTASSIUM  --  3.1*  --   --  3.1*  --  3.2*   CHLORIDE  --  106  --   --  107  --  104   CO2  --  32  --   --  28  --  26   BUN  --  32*  --   --  34*  --  30   CR  --  1.12  --   --  1.09  --  1.01   ANIONGAP  --  5  --   --  10  --  12   VALERIE  --  9.6  --   --  9.5  --  9.5   * 298* 282*   < > 181*   < > 149*   ALBUMIN  --   --   --   --   --   --  2.9*   PROTTOTAL  --   --   --   --   --   --  7.8   BILITOTAL  --   --   --   --   --   --  0.7   ALKPHOS  --   --   --   --   --   --  98   ALT  --   --   --   --   --   --  20   AST  --   --   --   --   --   --  22    < > = values in this interval not displayed.

## 2022-01-19 NOTE — CONSULTS
Nephrology Initial Consult  January 19, 2022      Miriam Hall MRN:[id number] YOB: 1975  Date of Admission:1/8/2022  Primary care provider: No Ref-Primary, Physician  Requesting physician: Lupe Yousif MD    ASSESSMENT AND RECOMMENDATIONS:     #Resistant hypertension with concerns for primary hyperaldosteronism  #Hypokalemia  #Moderate to severe left ventricular hypertrophy    Plan:  -Presence of resistant hypertension, hypokalemia and metabolic alkalosis is suggestive of primary hyperaldosteronism. He also has moderate to severe LVH on TTE suggesting longstanding uncontrolled hypertension.  - Start Hydralazine 50 mg TID  - Continue antihypertensives at current dose -currently on coreg 25 mg twice daily, clonidine 0.2 mg twice daily, lisinopril 20 mg daily, chlorthalidone 25 mg daily  -Continue as needed IV hydralazine and labetalol for blood pressure control  -Replace potassium.  Aldosterone likely in normal range due to hypokalemia  -We will follow-up on renin level  -Check 24-hour urine aldosterone and urine sodium  -Please do not start spironolactone until 24-hour urine collection is complete.  Once complete plan to start on spironolactone  -Renal ultrasound with Dopplers  -Overnight dexamethasone suppression test  -We will follow-up on urine metanephrines    #Acute ischemic stroke of right P1 segment of PCA with subsequent hemorrhagic transformation of ischemic infarct    Recommendations were communicated to primary team via note and verbally    Seen and discussed with Dr. Isabel Irene MD   [phone]      REASON FOR CONSULT: Resistant hypertension    HISTORY OF PRESENT ILLNESS:  Admitting provider and nursing notes reviewed  Miriam Hall is a 46 year old male with history of hypertension who was admitted on 1/8/2022 with symptoms concerning for stroke.  Did not receive tPA due to established infarct on imaging.  Proximal vessel occlusion was present but endovascular  treatment not initiated due to location of thrombus in the posterior cerebral artery.  On admission he had high blood pressures-198/123.  Given acute ischemic stroke did not receive blood pressure medications for permissive hypertension.  Labs on admission showed potassium of 3.0, bicarb 30, creatinine 1.37.  During his stay he continued to have uncontrolled higher blood pressure.  Nephrology has been consulted to assist with resistant hypertension.    Prior to admission blood pressure medications included lisinopril hydrochlorothiazide (20-25)    PAST MEDICAL HISTORY:  Reviewed with patient on 01/19/2022     Past Medical History:   Diagnosis Date     Hypertension        Past Surgical History:   Procedure Laterality Date     TONSILLECTOMY          MEDICATIONS:  PTA Meds  Prior to Admission medications    Medication Sig Last Dose Taking? Auth Provider   lisinopril-hydrochlorothiazide (PRINZIDE,ZESTORETIC) 20-25 MG per tablet Take 1 tablet by mouth daily  Patient not taking: Reported on 1/10/2022 Not Taking at Unknown time  Duong Vigil MD      Current Meds    aspirin  81 mg Oral Daily    Or     aspirin  81 mg Oral or NG Tube Daily     atorvastatin  40 mg Oral QPM     carvedilol  25 mg Oral BID w/meals     chlorthalidone  25 mg Oral Daily     cloNIDine  0.1 mg Oral BID     hydrALAZINE  50 mg Oral Q8H CARLOS A     insulin aspart  1-7 Units Subcutaneous TID AC     insulin aspart  1-5 Units Subcutaneous At Bedtime     lisinopril  20 mg Oral or NG Tube Daily     miconazole   Topical BID     multivitamins w/minerals  15 mL Per Feeding Tube Daily     nystatin   Topical BID     potassium chloride  40 mEq Oral TID     protein modular  1 packet Per Feeding Tube BID     sodium chloride (PF)  3 mL Intracatheter Q8H     sodium phosphate  15 mmol Intravenous Once     thiamine  100 mg Per Feeding Tube Daily     valproic acid  500 mg Oral or Feeding Tube Q8H     Infusion Meds    dextrose       - MEDICATION INSTRUCTIONS -       -  "MEDICATION INSTRUCTIONS -         ALLERGIES:    Allergies   Allergen Reactions     Pcn [Penicillin G] Hives and Itching       REVIEW OF SYSTEMS:  A comprehensive of systems was negative except as noted above.    SOCIAL HISTORY:   Social History     Socioeconomic History     Marital status: Single     Spouse name: Not on file     Number of children: Not on file     Years of education: Not on file     Highest education level: Not on file   Occupational History     Not on file   Tobacco Use     Smoking status: Never Smoker     Smokeless tobacco: Never Used   Substance and Sexual Activity     Alcohol use: Yes     Alcohol/week: 1.7 standard drinks     Types: 2 Cans of beer per week     Comment: 1 weeklyt     Drug use: No     Sexual activity: Not on file   Other Topics Concern     Parent/sibling w/ CABG, MI or angioplasty before 65F 55M? Not Asked   Social History Narrative     Not on file     Social Determinants of Health     Financial Resource Strain: Not on file   Food Insecurity: Not on file   Transportation Needs: Not on file   Physical Activity: Not on file   Stress: Not on file   Social Connections: Not on file   Intimate Partner Violence: Not on file   Housing Stability: Not on file     Reviewed with patient   FAMILY MEDICAL HISTORY:   Family History   Problem Relation Age of Onset     Breast Cancer Mother      Diabetes Mother      Lipids Mother      Hypertension Mother      Hypertension Father      Heart Disease Father      Respiratory Father      Heart Disease Maternal Grandfather         CHF     PHYSICAL EXAM:   Temp  Av.5  F (36.9  C)  Min: 97  F (36.1  C)  Max: 100.4  F (38  C)      Pulse  Av.1  Min: 69  Max: 118 Resp  Av.4  Min: 12  Max: 27  SpO2  Av.8 %  Min: 80 %  Max: 100 %       BP (!) 187/98 (BP Location: Left arm)   Pulse 82   Temp 98.3  F (36.8  C) (Oral)   Resp 16   Ht 1.702 m (5' 7\")   Wt 81.5 kg (179 lb 9.6 oz)   SpO2 98%   BMI 28.13 kg/m     Date 22 0700 - 22 " 0659   Shift 2897-4004 3677-2086 6206-2179 24 Hour Total   INTAKE   I.V. 375   375   NG/   270   Enteral 440   440   Shift Total(mL/kg) 1085(13.32)   1085(13.32)   OUTPUT   Urine 775   775   Shift Total(mL/kg) 775(9.51)   775(9.51)   Weight (kg) 81.47 81.47 81.47 81.47      Admit Weight: 81.2 kg (179 lb)     GENERAL APPEARANCE: Not in acute distress, alert and awake  EYES: no scleral icterus, pupils equal  Endo: no goiter, no moon facies  Lymphatics: no cervical or supraclavicular LAD  Pulmonary: lungs clear to auscultation with equal breath sounds bilaterally, no clubbing  CV: regular rhythm, normal rate, no rub   - JVD not distended   - Edema absent  GI: soft, nontender, normal bowel sounds  MS: no evidence of inflammation in joints, no muscle tenderness  : no l[place]  SKIN: no rash, warm, dry, no cyanosis  NEURO: face symmetric, no asterixis     LABS:   CMP  Recent Labs   Lab 01/19/22  1138 01/19/22  0826 01/19/22  0435 01/18/22  2335 01/18/22  0917 01/18/22  0502 01/17/22  0813 01/17/22  0433 01/16/22  0928 01/16/22  0622   NA  --   --  143  --   --  145*  --  142  --  142   POTASSIUM  --   --  3.1*  --   --  3.1*  --  3.2*  --  3.5   CHLORIDE  --   --  106  --   --  107  --  104  --  109   CO2  --   --  32  --   --  28  --  26  --  23   ANIONGAP  --   --  5  --   --  10  --  12  --  10   * 272* 298* 282*   < > 181*   < > 149*   < > 172*   BUN  --   --  32*  --   --  34*  --  30  --  28   CR  --   --  1.12  --   --  1.09  --  1.01  --  1.03   GFRESTIMATED  --   --  82  --   --  84  --  >90  --  90   VALERIE  --   --  9.6  --   --  9.5  --  9.5  --  9.2   MAG  --   --  2.3  --   --  2.4*  --  2.3  --   --    PHOS  --   --  2.4*  --   --  3.5  --  4.8*  --   --    PROTTOTAL  --   --   --   --   --   --   --  7.8  --   --    ALBUMIN  --   --   --   --   --   --   --  2.9*  --   --    BILITOTAL  --   --   --   --   --   --   --  0.7  --   --    ALKPHOS  --   --   --   --   --   --   --  98  --   --    AST   --   --   --   --   --   --   --  22  --   --    ALT  --   --   --   --   --   --   --  20  --   --     < > = values in this interval not displayed.     CBC  Recent Labs   Lab 01/19/22  0435 01/18/22  0502 01/17/22  0433 01/16/22  0622   HGB 16.9 15.8 16.8 16.4   WBC 10.7 12.3* 13.0* 12.2*   RBC 5.56 5.18 5.51 5.41   HCT 50.8 47.4 50.0 48.6   MCV 91 92 91 90   MCH 30.4 30.5 30.5 30.3   MCHC 33.3 33.3 33.6 33.7   RDW 13.7 13.7 13.7 13.6    318 335 290     INR  Recent Labs   Lab 01/18/22  1405   INR 1.06     ABG  Recent Labs   Lab 01/16/22  1256   O2PER 3      URINE STUDIES  Recent Labs   Lab Test 01/18/22  2130 01/13/22  1148 01/09/22  1452 05/13/14  1025   COLOR Light Yellow Straw Straw Yellow   APPEARANCE Clear Clear Clear  --    URINEGLC 30 * 500 * 300 *  --    URINEBILI Negative Negative Negative  --    URINEKETONE Trace* 40 * 40 *  --    SG 1.038* 1.014 1.013 1.015   UBLD Negative Negative Small*  --    URINEPH 5.5 5.5 7.0 7.0   PROTEIN Negative 20 * 20 *  --    UROBILINOGEN  --   --   --  0.2   NITRITE Negative Negative Negative Neg   LEUKEST Negative Negative Negative  --    RBCU  --  0 <1  --    WBCU  --  0 0  --      No lab results found.  PTH  No lab results found.  IRON STUDIES  No lab results found.    IMAGING:  All imaging studies reviewed by me.     Andrew Irene MD

## 2022-01-20 ENCOUNTER — APPOINTMENT (OUTPATIENT)
Dept: SPEECH THERAPY | Facility: CLINIC | Age: 47
End: 2022-01-20
Payer: COMMERCIAL

## 2022-01-20 ENCOUNTER — APPOINTMENT (OUTPATIENT)
Dept: PHYSICAL THERAPY | Facility: CLINIC | Age: 47
End: 2022-01-20
Payer: COMMERCIAL

## 2022-01-20 ENCOUNTER — APPOINTMENT (OUTPATIENT)
Dept: ULTRASOUND IMAGING | Facility: CLINIC | Age: 47
End: 2022-01-20
Payer: COMMERCIAL

## 2022-01-20 LAB
ANION GAP SERPL CALCULATED.3IONS-SCNC: 6 MMOL/L (ref 3–14)
BUN SERPL-MCNC: 26 MG/DL (ref 7–30)
CALCIUM SERPL-MCNC: 9.5 MG/DL (ref 8.5–10.1)
CHLORIDE BLD-SCNC: 105 MMOL/L (ref 94–109)
CO2 SERPL-SCNC: 30 MMOL/L (ref 20–32)
CREAT SERPL-MCNC: 0.94 MG/DL (ref 0.66–1.25)
ERYTHROCYTE [DISTWIDTH] IN BLOOD BY AUTOMATED COUNT: 13.8 % (ref 10–15)
GFR SERPL CREATININE-BSD FRML MDRD: >90 ML/MIN/1.73M2
GLUCOSE BLD-MCNC: 195 MG/DL (ref 70–99)
GLUCOSE BLDC GLUCOMTR-MCNC: 157 MG/DL (ref 70–99)
GLUCOSE BLDC GLUCOMTR-MCNC: 168 MG/DL (ref 70–99)
GLUCOSE BLDC GLUCOMTR-MCNC: 206 MG/DL (ref 70–99)
GLUCOSE BLDC GLUCOMTR-MCNC: 215 MG/DL (ref 70–99)
GLUCOSE BLDC GLUCOMTR-MCNC: 295 MG/DL (ref 70–99)
HCT VFR BLD AUTO: 51.4 % (ref 40–53)
HGB BLD-MCNC: 16.9 G/DL (ref 13.3–17.7)
MAGNESIUM SERPL-MCNC: 1.9 MG/DL (ref 1.6–2.3)
MCH RBC QN AUTO: 30.1 PG (ref 26.5–33)
MCHC RBC AUTO-ENTMCNC: 32.9 G/DL (ref 31.5–36.5)
MCV RBC AUTO: 92 FL (ref 78–100)
PHOSPHATE SERPL-MCNC: 2.6 MG/DL (ref 2.5–4.5)
PLATELET # BLD AUTO: 310 10E3/UL (ref 150–450)
POTASSIUM BLD-SCNC: 3.6 MMOL/L (ref 3.4–5.3)
RBC # BLD AUTO: 5.61 10E6/UL (ref 4.4–5.9)
SODIUM SERPL-SCNC: 141 MMOL/L (ref 133–144)
WBC # BLD AUTO: 11.6 10E3/UL (ref 4–11)

## 2022-01-20 PROCEDURE — 250N000013 HC RX MED GY IP 250 OP 250 PS 637: Performed by: COUNSELOR

## 2022-01-20 PROCEDURE — 85027 COMPLETE CBC AUTOMATED: CPT | Performed by: STUDENT IN AN ORGANIZED HEALTH CARE EDUCATION/TRAINING PROGRAM

## 2022-01-20 PROCEDURE — 250N000009 HC RX 250: Performed by: COUNSELOR

## 2022-01-20 PROCEDURE — 250N000013 HC RX MED GY IP 250 OP 250 PS 637: Performed by: STUDENT IN AN ORGANIZED HEALTH CARE EDUCATION/TRAINING PROGRAM

## 2022-01-20 PROCEDURE — 83735 ASSAY OF MAGNESIUM: CPT

## 2022-01-20 PROCEDURE — 250N000011 HC RX IP 250 OP 636: Performed by: COUNSELOR

## 2022-01-20 PROCEDURE — 93975 VASCULAR STUDY: CPT | Mod: 26 | Performed by: RADIOLOGY

## 2022-01-20 PROCEDURE — 120N000003 HC R&B IMCU UMMC

## 2022-01-20 PROCEDURE — 84100 ASSAY OF PHOSPHORUS: CPT

## 2022-01-20 PROCEDURE — 76770 US EXAM ABDO BACK WALL COMP: CPT

## 2022-01-20 PROCEDURE — 93975 VASCULAR STUDY: CPT

## 2022-01-20 PROCEDURE — 258N000003 HC RX IP 258 OP 636: Performed by: COUNSELOR

## 2022-01-20 PROCEDURE — 250N000013 HC RX MED GY IP 250 OP 250 PS 637: Performed by: INTERNAL MEDICINE

## 2022-01-20 PROCEDURE — 80048 BASIC METABOLIC PNL TOTAL CA: CPT | Performed by: STUDENT IN AN ORGANIZED HEALTH CARE EDUCATION/TRAINING PROGRAM

## 2022-01-20 PROCEDURE — 36415 COLL VENOUS BLD VENIPUNCTURE: CPT | Performed by: STUDENT IN AN ORGANIZED HEALTH CARE EDUCATION/TRAINING PROGRAM

## 2022-01-20 PROCEDURE — 250N000013 HC RX MED GY IP 250 OP 250 PS 637

## 2022-01-20 PROCEDURE — 99233 SBSQ HOSP IP/OBS HIGH 50: CPT | Mod: GC | Performed by: PSYCHIATRY & NEUROLOGY

## 2022-01-20 PROCEDURE — 99222 1ST HOSP IP/OBS MODERATE 55: CPT | Performed by: NURSE PRACTITIONER

## 2022-01-20 PROCEDURE — 99232 SBSQ HOSP IP/OBS MODERATE 35: CPT | Mod: GC | Performed by: INTERNAL MEDICINE

## 2022-01-20 PROCEDURE — 97530 THERAPEUTIC ACTIVITIES: CPT | Mod: GP | Performed by: PHYSICAL THERAPIST

## 2022-01-20 PROCEDURE — 250N000012 HC RX MED GY IP 250 OP 636 PS 637: Performed by: NURSE PRACTITIONER

## 2022-01-20 PROCEDURE — 92526 ORAL FUNCTION THERAPY: CPT | Mod: GN

## 2022-01-20 RX ORDER — MULTIVITAMIN,THERAPEUTIC
1 TABLET ORAL DAILY
Status: DISCONTINUED | OUTPATIENT
Start: 2022-01-21 | End: 2022-01-28 | Stop reason: HOSPADM

## 2022-01-20 RX ORDER — LABETALOL HYDROCHLORIDE 5 MG/ML
10 INJECTION, SOLUTION INTRAVENOUS ONCE
Status: COMPLETED | OUTPATIENT
Start: 2022-01-20 | End: 2022-01-20

## 2022-01-20 RX ADMIN — CHLORTHALIDONE 25 MG: 25 TABLET ORAL at 08:08

## 2022-01-20 RX ADMIN — SODIUM CHLORIDE 500 MG: 9 INJECTION, SOLUTION INTRAVENOUS at 21:06

## 2022-01-20 RX ADMIN — MICONAZOLE NITRATE: 2 POWDER TOPICAL at 20:10

## 2022-01-20 RX ADMIN — CLONIDINE HYDROCHLORIDE 0.1 MG: 0.1 TABLET ORAL at 07:58

## 2022-01-20 RX ADMIN — HYDRALAZINE HYDROCHLORIDE 10 MG: 20 INJECTION INTRAMUSCULAR; INTRAVENOUS at 00:02

## 2022-01-20 RX ADMIN — HYDRALAZINE HYDROCHLORIDE 20 MG: 20 INJECTION INTRAMUSCULAR; INTRAVENOUS at 06:38

## 2022-01-20 RX ADMIN — CARVEDILOL 25 MG: 25 TABLET, FILM COATED ORAL at 10:24

## 2022-01-20 RX ADMIN — LABETALOL HYDROCHLORIDE 10 MG: 5 INJECTION, SOLUTION INTRAVENOUS at 05:12

## 2022-01-20 RX ADMIN — CARVEDILOL 25 MG: 25 TABLET, FILM COATED ORAL at 17:56

## 2022-01-20 RX ADMIN — THIAMINE HCL TAB 100 MG 100 MG: 100 TAB at 12:00

## 2022-01-20 RX ADMIN — CLONIDINE HYDROCHLORIDE 0.1 MG: 0.1 TABLET ORAL at 20:10

## 2022-01-20 RX ADMIN — NYSTATIN: 100000 CREAM TOPICAL at 20:10

## 2022-01-20 RX ADMIN — ASPIRIN 81 MG CHEWABLE TABLET 81 MG: 81 TABLET CHEWABLE at 10:24

## 2022-01-20 RX ADMIN — POTASSIUM CHLORIDE 10 MEQ: 7.46 INJECTION, SOLUTION INTRAVENOUS at 00:19

## 2022-01-20 RX ADMIN — LISINOPRIL 20 MG: 20 TABLET ORAL at 08:02

## 2022-01-20 RX ADMIN — LABETALOL HYDROCHLORIDE 10 MG: 5 INJECTION, SOLUTION INTRAVENOUS at 02:30

## 2022-01-20 RX ADMIN — HYDRALAZINE HYDROCHLORIDE 50 MG: 50 TABLET, FILM COATED ORAL at 13:35

## 2022-01-20 RX ADMIN — INSULIN GLARGINE 8 UNITS: 100 INJECTION, SOLUTION SUBCUTANEOUS at 16:55

## 2022-01-20 RX ADMIN — NICARDIPINE HYDROCHLORIDE 2.5 MG/HR: 0.2 INJECTION, SOLUTION INTRAVENOUS at 07:46

## 2022-01-20 RX ADMIN — HYDRALAZINE HYDROCHLORIDE 10 MG: 20 INJECTION INTRAMUSCULAR; INTRAVENOUS at 01:56

## 2022-01-20 RX ADMIN — HYDRALAZINE HYDROCHLORIDE 50 MG: 50 TABLET, FILM COATED ORAL at 21:05

## 2022-01-20 RX ADMIN — SODIUM CHLORIDE 500 MG: 9 INJECTION, SOLUTION INTRAVENOUS at 06:21

## 2022-01-20 RX ADMIN — LABETALOL HYDROCHLORIDE 10 MG: 5 INJECTION, SOLUTION INTRAVENOUS at 03:14

## 2022-01-20 RX ADMIN — SODIUM CHLORIDE 500 MG: 9 INJECTION, SOLUTION INTRAVENOUS at 13:35

## 2022-01-20 RX ADMIN — ATORVASTATIN CALCIUM 40 MG: 40 TABLET, FILM COATED ORAL at 20:09

## 2022-01-20 ASSESSMENT — MIFFLIN-ST. JEOR: SCORE: 1660.08

## 2022-01-20 ASSESSMENT — ACTIVITIES OF DAILY LIVING (ADL)
ADLS_ACUITY_SCORE: 17
ADLS_ACUITY_SCORE: 23
ADLS_ACUITY_SCORE: 17
ADLS_ACUITY_SCORE: 23
ADLS_ACUITY_SCORE: 17
ADLS_ACUITY_SCORE: 21
ADLS_ACUITY_SCORE: 21
ADLS_ACUITY_SCORE: 17
ADLS_ACUITY_SCORE: 23
ADLS_ACUITY_SCORE: 21
ADLS_ACUITY_SCORE: 19
ADLS_ACUITY_SCORE: 21
ADLS_ACUITY_SCORE: 23
ADLS_ACUITY_SCORE: 19
ADLS_ACUITY_SCORE: 23
ADLS_ACUITY_SCORE: 17
ADLS_ACUITY_SCORE: 23
ADLS_ACUITY_SCORE: 21
ADLS_ACUITY_SCORE: 21
ADLS_ACUITY_SCORE: 17
ADLS_ACUITY_SCORE: 23
ADLS_ACUITY_SCORE: 21

## 2022-01-20 NOTE — PROGRESS NOTES
[facility]    Stroke Progress Note    Interval Events  - NAEON  - Patient pulled NGT but passed SLP exam. Resumed PO diet & meds.  - Awaiting 24 hour urine collection studies per Nephrology    HPI Summary  Miriam Hall is a 46 year old male with PMHx HTN who presented on 1/8/2022 with symptoms concerning for stroke. The pt reports that he was in his normal state of health that morning and then decided to take a nap around noon. On waking from his nap around 2:00PM, he found that he was having trouble moving and feeling the L side of his body. Unfortunately, the pt was unable to get to a phone and ended up crawling on his floor until he could finally get the attention of one of his neighbors, who then was able to call EMS.     EMS arrived to find the pt continuing to have decreased sensation and weakness on his L side. They noted a , /145, and . On arrival in the ED, the pt remained afebrile, but was tachycardic () and quite hypertensive (/146). A stroke code was called for initial NIHSS 9, for a partial L gaze palsy, L-sided complete hemianopia, L arm > leg weakness, decreased sensation in the L arm/leg to sensation (pinprick intact in arm but decreased in leg), mild dysarthria, L-sided extinction, and possible L neglect. The pt also seemed to be having trouble with proprioception in the L arm, though did recognize it to be his own. Pt was immediately taken to CT where he underwent a CTH, CTA Head/Neck and CT Perfusion study, revealing a well-established R parieto-occipital stroke with an LVO of the R P1 segment. As the stroke already appeared well-established and the pt was outside the time window, no tPA was given. No thrombectomy was performed due to the location of the LVO in the PCA. The pt was loaded with ASA 324mg once with plans to allow for permissive HTN and admission to the stroke service for further work-up     The  patient denies any history of smoking or drug use. Does drink EtOH, unclear amount.      TPA Treatment   Not given due to established infarct on imaging and unclear or unfavorable risk-benefit profile for extended window thrombolysis beyond the conventional 4.5 hour time window.     Endovascular Treatment  Proximal vessel occlusion present, but endovascular treatment not initiated due to location of the thrombus in the posterior cerebral artery    Stroke Evaluation Summarized    MRI/Head CT MRI Brain (1/12)  1. Findings from CT and CTA performed yesterday are confirmed with stroke in the distribution of the right posterior cerebral artery. This involves the medial right occipital lobe and medial right temporal lobe with extension into the body of the thalamus.  2. Additional punctate foci of diffusion restriction in the left putamen.  3. Moderate leukoaraiosis.     CT Head (1/16)  1. Continued evolution of large right posterior cerebral artery territory infarct. With similar mass effect and 4 mm of leftward midline shift, previously 5 mm.  2. Subtle hyperdensities in the right basal ganglia consistent with petechial hemorrhages seen on previous MRI. No new intracranial hemorrhage or new loss of gray-white matter differentiation.    MRI Brain (1/18)  1. Exam sensitivity is severely diminished due to considerable motion artifact.  2. No definite abnormal enhancement to suggest infection on the motion degraded postcontrast images.  3. Continued evolution of right posterior cerebral territory infarct and punctate infarct involving the left putamen. Associated petechial hemorrhage and developing cortical laminar necrosis.   Intracranial Vasculature MRA (1/12)  Redemonstration of occlusion of the right PCA near its  origin. Additional short segment high-grade stenosis of a right-sided M2 MCA branch. Findings are not likely not significantly changed from CT performed yesterday.   Cervical Vasculature CTA (1/12)  1.  Occlusion of the proximal right PCA.  2. High-grade stenosis of the occiput M2 branch of the right MCA with  attenuated flow distal.  3. Neck CTA demonstrates no stenosis of the major cervical arteries.     Echocardiogram The visual ejection fraction is 50-55%.   No regional wall motion abnormalities are seen.  Moderate to severe concentric wall thickening consistent with left ventricular hypertrophy is present.  Global right ventricular function is normal.  There was no shunt at the atrial septal level as assessed by agitated saline bubble study at rest and with Valsalva maneuver.  Previous study not available for comparison.   EKG/Telemetry EKG with sinus tachycardia   Other Testing Hypercoagulability profile has not been sent yet     LDL  1/8/2022: 75 mg/dL   A1C  1/8/2022: 7.8 %   Troponin No lab value available in past 48 hrs       Impression   # Acute ischemic stroke of Right P1 segement of the PCA  # R P1 occlusion and R M2 Stenosis  # Large vessel atherosclerosis vs ESUS  # Cerebral edema   # Hemorrhagic transformation of ischemic infarct  Miriam Hall is a 46 year old male with PMHx HTN who presented on 1/8/2022 with L gaze palsy, L complete hemianopia, mild dysarthria, L arm > leg weakness, L arm/leg decreased sensation, and L side extinction after waking up from a nap. FTH an occlusion of the P1 segment of the R PCA with associated established infarct as well as high grade stenosis of the M2 segment of the R MCA. TTE with EF of 50-55%, no WMA or atrial enlargement. Etiology undetermined at this time.   - Aspirin 81 mg PO daily for secondary stroke prevention  - CARISA once BP under better control   - PT/OT/SLP: ARU appropriate  - CT C/A/P (1/18): Negative  - Hypercoag panel (1/18): Negative  - Vasculitis panel (1/18): Negative    #Encephalopathy  #Leukocytosis  #Concern for seizures  Patient with ongoing waxing and waning level of consciousness over the past few days. Hooked up to EEG on 1/12 with no  evidence of seizures per report from Dr. Porter and pt pulling off leads overnight so EEG disconnected on 1/13. Repeat EEG on 1/16 completed and negative for seizure activity. No fevers documented in chart but patient does have a mild leukocytosis. No alarming electrolyte derangements. BP has been persistently elevated and resistant to medications, so his confusion could represent hypertensive encephalopathy.   -  mg PO Q8H   - Check VPA total/free trough levels (1/18): 100/19.8  - EEG spot study (1/18): Negative seizures  - Blood cultures (1/18): NGTD  - UA & UCx (1/18): Negative  - CXR (1/18): No focal pulmonary opacity    #HLD  LDL 75. Long-term LDL goal 40-70.   - Atorvastatin 40 mg PO daily    #Resistant HTN   Difficult to control HTN, unable to sustain BP goal < 160. Now up to 5 anti-hypertensives without a clear etiology.  - Checking aldosterone/renin ratio: Aldosterone normal; Renin pending  - PTA Lisinopril 10 mg PO daily   - Amlodipine 10 mg PO daily  - Carvedilol 6.25 mg PO BID  - Chlorthalidone 25 mg PO daily  - Hydralazine 50 mg PO TID  - Hydralazine/Labetalol PRN for SBP > 180  - Nephrology consulted, appreciate recs    #DM  Newly diagnosed DM with Hgb A1C 7.8.  - Continue to monitor glucose  - Increase sliding scale insulin to medium intensity today   - Hypoglycemia protocol  - Endocrinology consulted, appreciate recs    #Nutrition  Patient does not meet two of the established criteria necessary for diagnosing malnutrition but is at risk for malnutrition.  - NG tube in place with TF started  - Nutrition consulted  - Minced and moist diet with thin liquids - patient has been taking in very little PO    #Hypokalemia   - RN Managed Standard Replacement protocol    #Superficial RUE thrombus  US completed 1/17 negative for DVT but positive for thrombus in the R basilic and cephalic veins from the antecubital fossa to the wrist.     Chronic/Resolved Issues:  #DISHA, resolved  Pt presented with Cr 1.37  improved with IVF to 1.17. Now at baseline.   - Daily BMP    #Elevated troponin, resolved  Pt arrives with Trop 147 and rising. EKG exhibited sinus tachycardia, but no concerning signs of ischemia. Troponin peaked 1/10. Unclear what caused this elevation; unlikely to be MI given no sx or EKG changes.  - Coronary CTA as outpatient per Cards recs      Prophylaxis            For VTE Prevention:  - SCD  For Acid Suppression:  - GI prophylaxis is not indicated  FEN:  - NS mIVF @ 75/hr    Code Status  Full Code    The patient was discussed with Stroke Staff, Dr. Yousif.    Nelson Laura DO  Neurology Resident PGY1  ASCOM: *[id number]  ______________________________________________________    Clinically Significant Risk Factors Present on Admission                 Medications   Scheduled Meds    aspirin  81 mg Oral Daily    Or     aspirin  81 mg Oral or NG Tube Daily     atorvastatin  40 mg Oral QPM     carvedilol  25 mg Oral BID w/meals     chlorthalidone  25 mg Oral Daily     cloNIDine  0.1 mg Oral BID     hydrALAZINE  50 mg Oral Q8H CARLOS A     insulin aspart  1-7 Units Subcutaneous Q4H     insulin glargine  8 Units Subcutaneous Q24H     lisinopril  20 mg Oral or NG Tube Daily     miconazole   Topical BID     [START ON 1/21/2022] multivitamin, therapeutic  1 tablet Oral Daily     nystatin   Topical BID     [Held by provider] potassium chloride  40 mEq Oral TID     protein modular  1 packet Per Feeding Tube BID     sodium chloride (PF)  3 mL Intracatheter Q8H     thiamine  100 mg Per Feeding Tube Daily     valproate (DEPACON) in NS intermittent infusion  500 mg Intravenous Q8H     [Held by provider] valproic acid  500 mg Oral or Feeding Tube Q8H       Infusion Meds    dextrose       - MEDICATION INSTRUCTIONS -       - MEDICATION INSTRUCTIONS -       niCARdipine Stopped (01/20/22 0945)       PRN Meds  acetaminophen, artificial tears ophthalmic solution, dextrose, glucose **OR** dextrose **OR** glucagon, hydrALAZINE, labetalol,  lidocaine 4%, lidocaine (buffered or not buffered), - MEDICATION INSTRUCTIONS -, - MEDICATION INSTRUCTIONS -, melatonin, NIFEdipine ER OSMOTIC, polyethylene glycol, senna-docusate, sodium chloride (PF)       PHYSICAL EXAMINATION  Temp:  [97.1  F (36.2  C)-98.7  F (37.1  C)] 97.1  F (36.2  C)  Pulse:  [71-89] 71  Resp:  [16-18] 16  BP: (137-200)/() 163/90  SpO2:  [96 %-99 %] 98 %      Neurologic  Mental Status:  lethargic, waxing/waning wakefulness & cooperation, oriented to self, month and year but not place.  Cranial Nerves:  PER, right gaze preference but able to slightly cross the midline, L sided facial droop, shoulder shrug strong on the R  Motor:  no abnormal movements. No spontaneous movement of L hemibody. RUE and RLE moving freely.   Sensory:  localizes noxious in 4/4 extremities  Coordination:  deferred  Station/Gait:  deferred    Stroke Scales    NIHSS  Interval baseline (01/12/22 0324)   Interval Comments     1a. Level of Consciousness 1-->Not alert, but arousable by minor stimulation to obey, answer, or respond   1b. LOC Questions 2-->Answers neither question correctly   1c. LOC Commands 1-->Performs one task correctly   2.   Best Gaze 1-->Partial gaze palsy, gaze is abnormal in one or both eyes, but forced deviation or total gaze paresis is not present   3.   Visual 2-->Complete hemianopia   4.   Facial Palsy 1-->Minor paralysis (flattened nasolabial fold, asymmetry on smiling)   5a. Motor Arm, Left 4-->No movement   5b. Motor Arm, Right 0-->No drift, limb holds 90 (or 45) degrees for full 10 secs   6a. Motor Leg, Left 4-->No movement   6b. Motor Leg, right 1-->Drift, leg falls by the end of the 5-sec period but does not hit bed   7.   Limb Ataxia 0-->Absent   8.   Sensory 1-->Mild-to-moderate sensory loss, patient feels pinprick is less sharp or is dull on the affected side, or there is a loss of superficial pain with pinprick, but patient is aware of being touched   9.   Best Language  2-->Severe aphasia, all communication is through fragmentary expression, great need for inference, questioning, and guessing by the listener. Range of information that can be exchanged is limited, listener carries burden of. . . (see row details)   10. Dysarthria 0-->Normal   11. Extinction and Inattention  1-->Visual, tactile, auditory, spatial, or personal inattention or extinction to bilateral simultaneous stimulation in one of the sensory modalities   Total 21 (01/12/22 0324)       Imaging  I personally reviewed all imaging; relevant findings per HPI.     Lab Results Data   CBC  Recent Labs   Lab 01/20/22  0439 01/19/22  0435 01/18/22  0502   WBC 11.6* 10.7 12.3*   RBC 5.61 5.56 5.18   HGB 16.9 16.9 15.8   HCT 51.4 50.8 47.4    324 318     Basic Metabolic Panel    Recent Labs   Lab 01/20/22  1635 01/20/22  1203 01/20/22  0806 01/20/22  0439 01/19/22  1735 01/19/22  1540 01/19/22  0826 01/19/22  0435 01/18/22  0917 01/18/22  0502   NA  --   --   --  141  --   --   --  143  --  145*   POTASSIUM  --   --   --  3.6  --  3.1*  --  3.1*  --  3.1*   CHLORIDE  --   --   --  105  --   --   --  106  --  107   CO2  --   --   --  30  --   --   --  32  --  28   BUN  --   --   --  26  --   --   --  32*  --  34*   CR  --   --   --  0.94  --   --   --  1.12  --  1.09   * 206* 157* 195*   < >  --    < > 298*   < > 181*   VALERIE  --   --   --  9.5  --   --   --  9.6  --  9.5    < > = values in this interval not displayed.     Liver Panel  Recent Labs   Lab 01/17/22  0433   PROTTOTAL 7.8   ALBUMIN 2.9*   BILITOTAL 0.7   ALKPHOS 98   AST 22   ALT 20     INR    Recent Labs   Lab Test 01/18/22  1405 01/08/22  1843 01/08/22  1834   INR 1.06 0.95 1.0*      Lipid Profile    Recent Labs   Lab Test 01/08/22  1843 05/16/14  1506   CHOL 169 131   HDL 70 53   LDL 75 70   TRIG 118 38     A1C    Recent Labs   Lab Test 01/08/22  1843   A1C 7.8*     Troponin I  No results for input(s): TROPONIN, GHTROP in the last 168 hours.

## 2022-01-20 NOTE — PROVIDER NOTIFICATION
"06:10AM 1/20  Text paged neuro:  \"SBP >190, . BPs are still not controlled, wondering if would be appropriate to restart nicardipine gtt? NG pulled out numerous times. Pt refusing all PO meds at this time & also NPO\"  - Pt refusing NG tube placement & currently NPO for AM tests. Nicardipine gtt ordered for this AM.   "

## 2022-01-20 NOTE — PROVIDER NOTIFICATION
"03:10AM 1/20  Text paged neuro:   \"(FYI) have given patient 20mg of hydralazine, 10 mg of labetalol. About to give additional 10mg labetalol. Unable to get his SBP to go below 190.\"  "

## 2022-01-20 NOTE — PROGRESS NOTES
Brief Cardiology Progress Note:     Chart reviewed this AM. Lost enteral access so he was restarted on Nicardipine gtt. Blood pressures better controlled this afternoon. Nephrology now involved and guiding the workup for secondary causes of HTN. Appreciate the assistance. No further recommendations from a cardiology standpoint. The general cardiology service will sign off. Please feel free to reach out with any questions or concerns.     Discussed with staff cardiologist, Dr. Hernandez.    Supa Sahni MD  Cardiology Fellow  [phone]

## 2022-01-20 NOTE — PROGRESS NOTES
Patient pulled out his NG tube on day shift. When xray came to verify placement the tube was coiled. Writer attempted to replace NG tube and was unsuccessful. Patient is alert and oriented at this time and is refusing NG tube placement. Writer informed him that he would miss out on his night time meds without his NG which includes medications to control his blood pressure and he said he understood. Writer paged physician to make them aware of situation and to change potassium replacement to IV.

## 2022-01-20 NOTE — PLAN OF CARE
Neuro: A&Ox4. L sided hemiparesis. L sided facial droop. Pt flat affect, pt appears withdrawn.   Cardiac: SR. Hypertensive for majority of this shift. Labetalol administered x3 & hydralazine administered x3 with little improvement. See eMAR.  Respiratory: RA. Oxygen saturation >95%.  GI/: Incontinent of urine. Primofit attempted, pt continues to remove self.   Diet/appetite: NPO.   Activity:  Assist of 2 - lift. Repositioning [id number].  Pain: Denies.  Skin: No new deficits noted.  LDA's: PIVx2 & primofit.     Plan: Patient refusing NG tube. Restart nicardipine gtt this AM. Continue with POC. Notify primary team with changes.

## 2022-01-20 NOTE — PROGRESS NOTES
Physician Attestation   I, Karoline Hall, saw and evaluated Miriam Hall with Resident/Fellow. I have reviewed and discussed with the Resident/Fellow their history, physical and plan.    I personally reviewed the vital signs, medications, labs, and imaging.    In brief, the patient is a 46-year-old male with history of chronic uncontrolled hypertension and hypokalemia who was admitted at [facility] on 1/8/2022 for left hemiparesis.  Nephrology team is consulted for resistant hypertension in the setting of hypokalemia.  # Severe resistant hypertension, suspect hyperaldosteronism  # Hypokalemia; refractory  # Recent ischemic stroke with left hemiparesis  # Family history of stroke and hypertension  This morning, the patient has refused p.o. medications.  And he was started on a Cardizem drip.  His blood pressure has improved with nicardipine drip.  Upon examination, the patient is asleep and does not respond to verbal commands.  He does not appear in acute distress and he does not have any lower extremity edema. His labs this morning show stable creatinine function with normal potassium at 3.6 after replacement.  Ultrasound renal with artery Doppler showed no evidence of renal artery stenosis.  24-hour urine collection for sodium and aldosterone is pending still has plasma renin.  Plasma metanephrine is also pending as well.  Cortisol level (at 4 PM) is 20.5.  After 24-hour urine aldosterone is completely collected, we can start the patient on spironolactone 50 mg daily.    Rest per the resident/fellow's note.   Total time spent: 25 minutes.    Karoline Hall  Date of Service (when I saw the patient): January 20, 2022

## 2022-01-20 NOTE — PLAN OF CARE
"BP (!) 142/83   Pulse 71   Temp 97.5  F (36.4  C) (Oral)   Resp 16   Ht 1.702 m (5' 7\")   Wt 82.6 kg (182 lb 3.2 oz)   SpO2 96%   BMI 28.54 kg/m      SR. BPs improved with oral meds. RA. Denies pain. Alert this AM, more lethargic this afternoon. Oriented x4. Neuros unchanged. L sided hemiparesis. Speech cleared for soft diet, needs assistance with feeding. Ok for small pills orally. Large incontinent void today. Condom cath placed and 24 hr urine started at 1400. Lift assist to chair. NPO at midnight for CARISA in AM. Continue to monitor.   "

## 2022-01-20 NOTE — CONSULTS
NEW INPATIENT DIABETES MANAGEMENT CONSULT  Miriam Hall  Age: 46 year old  MRN # [id number]   YOB: 1975    Chief Complaint: stroke  Reason for Consult: Diabetes management & BG control  Consulting Provider: Nelson Laura MD    History of Present Illness: Miriam Hall is a 46 year old male with PMHx HTN who presented on 1/8/2022 with L gaze palsy, L complete hemianopia, mild dysarthria, L arm > leg weakness, L arm/leg decreased sensation, and L side extinction after waking up from a nap. FTH an occlusion of the P1 segment of the R PCA with associated established infarct as well as high grade stenosis of the M2 segment of the R MCA. TTE with EF of 50-55%, no WMA or atrial enlargement. Etiology undetermined at this time.     TF held overnight into this AM, was initially NPO for CARISA today, but that got moved to tomorrow 1/21/22.  Patient pulled NG tube today, so no TF currently.  Per neurology, speech cleared patient for a soft and bite sized diet w/thin liquids.  Neurology will not replace the NG tube and plans to just let him eat.      Through chart review, Rohan saw provider from  in 2017 and was diagnosed with DM.  He was given recommendations for lifestyle changes and he was given a glucometer, but it appears he was not prescribed any DM medications.  A1C 6.5 at that time.    Patient denies taking DM medications.  He is minimally interactive/talkative this AM.        Other Active Medical Problems: encephalopathy, concern for seizures, HLD, HTN. Superficial RUE thrombus  Diabetes Mellitus Type: likely type 2  Duration:  New diagnosis  Diabetic Complications: stroke  Prior to Admission Diabetes Regimen: none  BG monitor: N/A  Frequency of checks: N/A  Diet: unable to obtain information about diet prior to admission  Usual BG control PTA:  uncontrolled, with A1c 7.8 on 1/8/22  History of DKA:  no  Able to Detect Hypoglycemia: N/A  Usual Diabetes Care Provider: N/A  Primary Care Provider:  "Physician No Ref-Primary  Factors Impacting IP Glucose Control: stroke, tube feeding  Current Diet: Osmolite 1.5 at 55 ml/hour, NPO    10 point ROS completed with pertinent positives and negatives noted in the HPI  Past medical, family and social histories are reviewed and updated.    Social History    Tobacco: denies    Alcohol: 2 beers weekly    Marital Status: single    Place of Residence: [place], [place]       Family History   patient not cooperative    Physical Exam   BP (!) 154/90   Pulse 79   Temp 98.3  F (36.8  C) (Oral)   Resp 16   Ht 1.702 m (5' 7\")   Wt 82.6 kg (182 lb 3.2 oz)   SpO2 97%   BMI 28.54 kg/m    General: lethargic  HEENT: normocephalic, atraumatic. Oral mucous membranes moist.   Lungs: unlabored respiration, no cough  ABD: rounded, nondistended  Skin: warm and dry, no obvious lesions  Mental status:  sleepy, unable to assess mental status  Psych:   calm and appropriate interaction     Most Recent Laboratory Tests:  Recent Labs   Lab 01/20/22  0439   HGB 16.9     No results for input(s): A1C in the last 168 hours.  Recent Labs   Lab 01/20/22  0439   CR 0.94     Recent Labs   Lab 01/20/22  0806 01/20/22  0439 01/20/22  0355 01/19/22  2348 01/19/22  2123 01/19/22  1948   * 195* 168* 166* 193* 272*       Assessment:   1) DM 2, new diagnosis (A1C) 7.8)  2) tube feeding induced hyperglycemia    Plan:    -start Lantus 8 units   -continue novolog medium resistance sliding scale insulin Q 4 hours given minimal PO intake and upcoming NPO status   -will assess for CHO coverage tomorrow AM pending trends   -BG monitoring Q 4 hours while minimal PO intake and upcoming NPO status   -hypoglycemia protocol   -diabetes education needs will be assessed closer to discharge   -on discharge, will recommend outpatient follow up with [facility] Endocrinology service     Discussed plan of care with patient, nursing, and primary team.   Thank you for this consult; Inpatient Diabetes will continue to follow. "     To contact Endocrine Diabetes service:   From 8AM-4PM: page inpatient diabetes provider that is following the patient  For questions or updates from 4PM-8AM: page the diabetes job code for on call fellow: [phone]      80 minutes spent on the date of the encounter doing chart review, history and exam, documentation and further activities per the note      Over 50% of my time on the unit was spent counseling the patient and/or coordinating care regarding acute hyperglycemia management.  See note for details.    MARBELLA Haywood, CNP  Inpatient Diabetes Management Service  Pager [phone]

## 2022-01-20 NOTE — PLAN OF CARE
Neuro: Arouses to voice. Disoriented to place, situation. Mild aphasia Pupils equal and reactive. L visual field cut. Hemiparesis w/lack of movement and sensation L side. L sided facial droop  Cardiac: SR. SBPs 180s. Goal SBP < 180.  Respiratory: Sating > 92% on RA.  GI/: Adequate urine output via purewick.   Diet/appetite: NPO. Tolerating TF @ 55 mL/hr  Activity:  Ax2 lift assist  Pain: At acceptable level on current regimen.   Skin: No new deficits noted.  LDA's: 2x PIV RUE, LUE    Mg protocol: Standard. WDL Recheck in AM.  K+ protocol: Standard. Replaced @ 30320. Recheck pending.  Phos protocol: Standard. Replaced. Recheck in am    Plan: Continue with POC. Notify primary team with changes.

## 2022-01-20 NOTE — PROGRESS NOTES
"  Nephrology Progress Note  01/20/2022         Assessment & Recommendations:   Miriam Hall is a 46 year old year old male      #Severe resistant hypertension, suspect hyperaldosteronism  #Hypokalemia  #Moderate to severe left ventricular hypertrophy    Plan:  -Presence of resistant hypertension, hypokalemia and metabolic alkalosis is suggestive of hyperaldosteronism. He also has moderate to severe LVH on TTE suggesting longstanding uncontrolled hypertension.  - Required Nicardipine drip this morning. Continue Coreg 25, chlorthalidone 25 mg, clonidine 0.1 mg, lisinopril and hydralazine 50 mg Q6 hrs.   -Continue as needed IV hydralazine and labetalol for blood pressure control  - Once 24 hr urine aldosterone collection is complete, please start Spironolactone 50 mg daily  Investigations pending:  - Renin level, 24 hour urine aldosterone and 24 hr urine sodium, urine metanephrine    Investigations:  - Renal US - Right kidney 10.2 cm in length, left kidney 10.4 cm in length; normal parenchymal thickness and echogenicity, no obstruction. No evidence of left or right renal artery stenosis.  - Cortisol level at 4 pm is 20.5     #Acute ischemic stroke of right P1 segment of PCA with subsequent hemorrhagic transformation of ischemic infarct    Recommendations were communicated to primary team via note    Seen and discussed with Dr. Isabel Irene MD   [phone]    Interval History :   Nursing and provider notes from last 24 hours reviewed.    Elevated blood pressures this morning, required initiation of Nicardipine drip.     Review of Systems:   Unable to obtain, patient drowsy    Physical Exam:   I/O last 3 completed shifts:  In: 1395 [I.V.:375; NG/GT:360]  Out: 775 [Urine:775]   BP (!) 142/83   Pulse 71   Temp 97.5  F (36.4  C) (Oral)   Resp 16   Ht 1.702 m (5' 7\")   Wt 82.6 kg (182 lb 3.2 oz)   SpO2 96%   BMI 28.54 kg/m       GENERAL APPEARANCE: drowsy  HENT: mouth without ulcers or lesions  PULM: " lungs clear to auscultation bilaterally, equal air movement, no clubbing  CV: regular rhythm, normal rate, no rub     -JVD not distended     -edema absent   GI: soft, non-tender, not distended, bowel sounds are +  INTEGUMENT: no cyanosis, no rash    Labs:   All labs reviewed by me  Electrolytes/Renal - Recent Labs   Lab Test 01/20/22  1203 01/20/22  0806 01/20/22  0439 01/19/22  1735 01/19/22  1540 01/19/22  0826 01/19/22  0435 01/18/22  0917 01/18/22  0502   NA  --   --  141  --   --   --  143  --  145*   POTASSIUM  --   --  3.6  --  3.1*  --  3.1*  --  3.1*   CHLORIDE  --   --  105  --   --   --  106  --  107   CO2  --   --  30  --   --   --  32  --  28   BUN  --   --  26  --   --   --  32*  --  34*   CR  --   --  0.94  --   --   --  1.12  --  1.09   * 157* 195*   < >  --    < > 298*   < > 181*   VALERIE  --   --  9.5  --   --   --  9.6  --  9.5   MAG  --   --  1.9  --   --   --  2.3  --  2.4*   PHOS  --   --  2.6  --  2.9  --  2.4*  --  3.5    < > = values in this interval not displayed.       CBC -   Recent Labs   Lab Test 01/20/22  0439 01/19/22  0435 01/18/22  0502   WBC 11.6* 10.7 12.3*   HGB 16.9 16.9 15.8    324 318       LFTs -   Recent Labs   Lab Test 01/17/22  0433 01/08/22  1843 04/25/14  1530   ALKPHOS 98 94 66   BILITOTAL 0.7 0.8 0.7   ALT 20 31 50*   AST 22 26 26   PROTTOTAL 7.8 8.5 7.1   ALBUMIN 2.9* 3.9 4.3       Iron Panel - No lab results found.      Imaging:  All imaging studies reviewed by me.     Current Medications:    aspirin  81 mg Oral Daily    Or     aspirin  81 mg Oral or NG Tube Daily     atorvastatin  40 mg Oral QPM     carvedilol  25 mg Oral BID w/meals     chlorthalidone  25 mg Oral Daily     cloNIDine  0.1 mg Oral BID     hydrALAZINE  50 mg Oral Q8H [facility]     insulin aspart  1-7 Units Subcutaneous Q4H     insulin glargine  8 Units Subcutaneous Q24H     lisinopril  20 mg Oral or NG Tube Daily     miconazole   Topical BID     [START ON 1/21/2022] multivitamin, therapeutic  1  tablet Oral Daily     nystatin   Topical BID     [Held by provider] potassium chloride  40 mEq Oral TID     protein modular  1 packet Per Feeding Tube BID     sodium chloride (PF)  3 mL Intracatheter Q8H     thiamine  100 mg Per Feeding Tube Daily     valproate (DEPACON) in NS intermittent infusion  500 mg Intravenous Q8H     [Held by provider] valproic acid  500 mg Oral or Feeding Tube Q8H       dextrose       - MEDICATION INSTRUCTIONS -       - MEDICATION INSTRUCTIONS -       niCARdipine Stopped (01/20/22 0945)     Andrew Irene MD

## 2022-01-21 ENCOUNTER — APPOINTMENT (OUTPATIENT)
Dept: PHYSICAL THERAPY | Facility: CLINIC | Age: 47
End: 2022-01-21
Payer: COMMERCIAL

## 2022-01-21 ENCOUNTER — APPOINTMENT (OUTPATIENT)
Dept: CARDIOLOGY | Facility: CLINIC | Age: 47
End: 2022-01-21
Payer: COMMERCIAL

## 2022-01-21 LAB
ANION GAP SERPL CALCULATED.3IONS-SCNC: 6 MMOL/L (ref 3–14)
BUN SERPL-MCNC: 27 MG/DL (ref 7–30)
CALCIUM SERPL-MCNC: 9.3 MG/DL (ref 8.5–10.1)
CHLORIDE BLD-SCNC: 103 MMOL/L (ref 94–109)
CO2 SERPL-SCNC: 32 MMOL/L (ref 20–32)
CREAT SERPL-MCNC: 1.03 MG/DL (ref 0.66–1.25)
ERYTHROCYTE [DISTWIDTH] IN BLOOD BY AUTOMATED COUNT: 13.5 % (ref 10–15)
GFR SERPL CREATININE-BSD FRML MDRD: 90 ML/MIN/1.73M2
GLUCOSE BLD-MCNC: 141 MG/DL (ref 70–99)
GLUCOSE BLDC GLUCOMTR-MCNC: 118 MG/DL (ref 70–99)
GLUCOSE BLDC GLUCOMTR-MCNC: 121 MG/DL (ref 70–99)
GLUCOSE BLDC GLUCOMTR-MCNC: 150 MG/DL (ref 70–99)
GLUCOSE BLDC GLUCOMTR-MCNC: 154 MG/DL (ref 70–99)
GLUCOSE BLDC GLUCOMTR-MCNC: 157 MG/DL (ref 70–99)
GLUCOSE BLDC GLUCOMTR-MCNC: 166 MG/DL (ref 70–99)
GLUCOSE BLDC GLUCOMTR-MCNC: 184 MG/DL (ref 70–99)
HCT VFR BLD AUTO: 49 % (ref 40–53)
HGB BLD-MCNC: 16.4 G/DL (ref 13.3–17.7)
LVEF ECHO: NORMAL
MAGNESIUM SERPL-MCNC: 2 MG/DL (ref 1.6–2.3)
MCH RBC QN AUTO: 30.4 PG (ref 26.5–33)
MCHC RBC AUTO-ENTMCNC: 33.5 G/DL (ref 31.5–36.5)
MCV RBC AUTO: 91 FL (ref 78–100)
PHOSPHATE SERPL-MCNC: 2.9 MG/DL (ref 2.5–4.5)
PLATELET # BLD AUTO: 347 10E3/UL (ref 150–450)
POTASSIUM BLD-SCNC: 2.9 MMOL/L (ref 3.4–5.3)
POTASSIUM BLD-SCNC: 3.7 MMOL/L (ref 3.4–5.3)
RBC # BLD AUTO: 5.4 10E6/UL (ref 4.4–5.9)
SODIUM SERPL-SCNC: 141 MMOL/L (ref 133–144)
WBC # BLD AUTO: 9.9 10E3/UL (ref 4–11)

## 2022-01-21 PROCEDURE — 85027 COMPLETE CBC AUTOMATED: CPT | Performed by: STUDENT IN AN ORGANIZED HEALTH CARE EDUCATION/TRAINING PROGRAM

## 2022-01-21 PROCEDURE — 93312 ECHO TRANSESOPHAGEAL: CPT | Mod: 26 | Performed by: INTERNAL MEDICINE

## 2022-01-21 PROCEDURE — 80048 BASIC METABOLIC PNL TOTAL CA: CPT | Performed by: STUDENT IN AN ORGANIZED HEALTH CARE EDUCATION/TRAINING PROGRAM

## 2022-01-21 PROCEDURE — 258N000003 HC RX IP 258 OP 636: Performed by: COUNSELOR

## 2022-01-21 PROCEDURE — 250N000011 HC RX IP 250 OP 636: Performed by: INTERNAL MEDICINE

## 2022-01-21 PROCEDURE — 93325 DOPPLER ECHO COLOR FLOW MAPG: CPT

## 2022-01-21 PROCEDURE — 250N000009 HC RX 250: Performed by: COUNSELOR

## 2022-01-21 PROCEDURE — 36415 COLL VENOUS BLD VENIPUNCTURE: CPT | Performed by: STUDENT IN AN ORGANIZED HEALTH CARE EDUCATION/TRAINING PROGRAM

## 2022-01-21 PROCEDURE — 83735 ASSAY OF MAGNESIUM: CPT | Performed by: PSYCHIATRY & NEUROLOGY

## 2022-01-21 PROCEDURE — 250N000011 HC RX IP 250 OP 636: Performed by: COUNSELOR

## 2022-01-21 PROCEDURE — 250N000013 HC RX MED GY IP 250 OP 250 PS 637: Performed by: PSYCHIATRY & NEUROLOGY

## 2022-01-21 PROCEDURE — 84132 ASSAY OF SERUM POTASSIUM: CPT | Performed by: PSYCHIATRY & NEUROLOGY

## 2022-01-21 PROCEDURE — 97530 THERAPEUTIC ACTIVITIES: CPT | Mod: GP | Performed by: PHYSICAL THERAPIST

## 2022-01-21 PROCEDURE — 93320 DOPPLER ECHO COMPLETE: CPT | Mod: 26 | Performed by: INTERNAL MEDICINE

## 2022-01-21 PROCEDURE — 250N000013 HC RX MED GY IP 250 OP 250 PS 637

## 2022-01-21 PROCEDURE — 93325 DOPPLER ECHO COLOR FLOW MAPG: CPT | Mod: 26 | Performed by: INTERNAL MEDICINE

## 2022-01-21 PROCEDURE — 250N000009 HC RX 250: Performed by: INTERNAL MEDICINE

## 2022-01-21 PROCEDURE — 36415 COLL VENOUS BLD VENIPUNCTURE: CPT | Performed by: PSYCHIATRY & NEUROLOGY

## 2022-01-21 PROCEDURE — 250N000013 HC RX MED GY IP 250 OP 250 PS 637: Performed by: STUDENT IN AN ORGANIZED HEALTH CARE EDUCATION/TRAINING PROGRAM

## 2022-01-21 PROCEDURE — 99152 MOD SED SAME PHYS/QHP 5/>YRS: CPT | Performed by: INTERNAL MEDICINE

## 2022-01-21 PROCEDURE — 99207 PR SATISFY VISIT NUMBER: CPT

## 2022-01-21 PROCEDURE — 120N000003 HC R&B IMCU UMMC

## 2022-01-21 PROCEDURE — 99232 SBSQ HOSP IP/OBS MODERATE 35: CPT | Mod: GC | Performed by: PSYCHIATRY & NEUROLOGY

## 2022-01-21 PROCEDURE — 84100 ASSAY OF PHOSPHORUS: CPT | Performed by: PSYCHIATRY & NEUROLOGY

## 2022-01-21 PROCEDURE — 250N000013 HC RX MED GY IP 250 OP 250 PS 637: Performed by: INTERNAL MEDICINE

## 2022-01-21 PROCEDURE — 99233 SBSQ HOSP IP/OBS HIGH 50: CPT | Performed by: NURSE PRACTITIONER

## 2022-01-21 PROCEDURE — 250N000013 HC RX MED GY IP 250 OP 250 PS 637: Performed by: COUNSELOR

## 2022-01-21 PROCEDURE — 258N000001 HC RX 258: Performed by: INTERNAL MEDICINE

## 2022-01-21 RX ORDER — ACYCLOVIR 200 MG/1
9.5 CAPSULE ORAL
Status: DISCONTINUED | OUTPATIENT
Start: 2022-01-21 | End: 2022-01-24

## 2022-01-21 RX ORDER — VALPROIC ACID 250 MG/1
500 CAPSULE, LIQUID FILLED ORAL EVERY 8 HOURS SCHEDULED
Status: DISCONTINUED | OUTPATIENT
Start: 2022-01-21 | End: 2022-01-22

## 2022-01-21 RX ORDER — FENTANYL CITRATE 50 UG/ML
25 INJECTION, SOLUTION INTRAMUSCULAR; INTRAVENOUS
Status: DISCONTINUED | OUTPATIENT
Start: 2022-01-21 | End: 2022-01-24

## 2022-01-21 RX ORDER — FLUMAZENIL 0.1 MG/ML
0.2 INJECTION, SOLUTION INTRAVENOUS
Status: ACTIVE | OUTPATIENT
Start: 2022-01-21 | End: 2022-01-23

## 2022-01-21 RX ORDER — FENTANYL CITRATE 50 UG/ML
50 INJECTION, SOLUTION INTRAMUSCULAR; INTRAVENOUS ONCE
Status: DISCONTINUED | OUTPATIENT
Start: 2022-01-21 | End: 2022-01-24 | Stop reason: CLARIF

## 2022-01-21 RX ORDER — LIDOCAINE 40 MG/G
CREAM TOPICAL
Status: DISCONTINUED | OUTPATIENT
Start: 2022-01-21 | End: 2022-01-24

## 2022-01-21 RX ORDER — POTASSIUM CHLORIDE 1.5 G/1.58G
40 POWDER, FOR SOLUTION ORAL ONCE
Status: COMPLETED | OUTPATIENT
Start: 2022-01-21 | End: 2022-01-21

## 2022-01-21 RX ORDER — POTASSIUM CHLORIDE 1.5 G/1.58G
20 POWDER, FOR SOLUTION ORAL ONCE
Status: DISCONTINUED | OUTPATIENT
Start: 2022-01-21 | End: 2022-01-21

## 2022-01-21 RX ORDER — NALOXONE HYDROCHLORIDE 0.4 MG/ML
0.2 INJECTION, SOLUTION INTRAMUSCULAR; INTRAVENOUS; SUBCUTANEOUS
Status: ACTIVE | OUTPATIENT
Start: 2022-01-21 | End: 2022-01-23

## 2022-01-21 RX ORDER — SODIUM CHLORIDE 9 MG/ML
INJECTION, SOLUTION INTRAVENOUS CONTINUOUS PRN
Status: DISCONTINUED | OUTPATIENT
Start: 2022-01-21 | End: 2022-01-24

## 2022-01-21 RX ORDER — NALOXONE HYDROCHLORIDE 0.4 MG/ML
0.4 INJECTION, SOLUTION INTRAMUSCULAR; INTRAVENOUS; SUBCUTANEOUS
Status: ACTIVE | OUTPATIENT
Start: 2022-01-21 | End: 2022-01-23

## 2022-01-21 RX ORDER — POTASSIUM CHLORIDE 750 MG/1
20 TABLET, EXTENDED RELEASE ORAL ONCE
Status: COMPLETED | OUTPATIENT
Start: 2022-01-21 | End: 2022-01-21

## 2022-01-21 RX ORDER — LIDOCAINE HYDROCHLORIDE 20 MG/ML
15 SOLUTION OROPHARYNGEAL ONCE
Status: COMPLETED | OUTPATIENT
Start: 2022-01-21 | End: 2022-01-21

## 2022-01-21 RX ADMIN — CHLORTHALIDONE 25 MG: 25 TABLET ORAL at 07:56

## 2022-01-21 RX ADMIN — INSULIN GLARGINE 8 UNITS: 100 INJECTION, SOLUTION SUBCUTANEOUS at 20:49

## 2022-01-21 RX ADMIN — MIDAZOLAM 1 MG: 1 INJECTION INTRAMUSCULAR; INTRAVENOUS at 14:30

## 2022-01-21 RX ADMIN — THIAMINE HCL TAB 100 MG 100 MG: 100 TAB at 11:48

## 2022-01-21 RX ADMIN — ATORVASTATIN CALCIUM 40 MG: 40 TABLET, FILM COATED ORAL at 20:52

## 2022-01-21 RX ADMIN — THERA TABS 1 TABLET: TAB at 11:48

## 2022-01-21 RX ADMIN — MIDAZOLAM 1 MG: 1 INJECTION INTRAMUSCULAR; INTRAVENOUS at 14:31

## 2022-01-21 RX ADMIN — VALPROIC ACID 500 MG: 250 CAPSULE, LIQUID FILLED ORAL at 11:47

## 2022-01-21 RX ADMIN — TOPICAL ANESTHETIC 0.5 ML: 200 SPRAY DENTAL; PERIODONTAL at 14:22

## 2022-01-21 RX ADMIN — POTASSIUM CHLORIDE 20 MEQ: 750 TABLET, EXTENDED RELEASE ORAL at 11:47

## 2022-01-21 RX ADMIN — FENTANYL CITRATE 25 MCG: 50 INJECTION INTRAMUSCULAR; INTRAVENOUS at 14:31

## 2022-01-21 RX ADMIN — VALPROIC ACID 500 MG: 250 CAPSULE, LIQUID FILLED ORAL at 21:56

## 2022-01-21 RX ADMIN — LIDOCAINE HYDROCHLORIDE 15 ML: 20 SOLUTION ORAL; TOPICAL at 14:22

## 2022-01-21 RX ADMIN — HYDRALAZINE HYDROCHLORIDE 50 MG: 50 TABLET, FILM COATED ORAL at 05:14

## 2022-01-21 RX ADMIN — HYDRALAZINE HYDROCHLORIDE 20 MG: 20 INJECTION INTRAMUSCULAR; INTRAVENOUS at 00:38

## 2022-01-21 RX ADMIN — CARVEDILOL 25 MG: 25 TABLET, FILM COATED ORAL at 07:54

## 2022-01-21 RX ADMIN — SODIUM CHLORIDE 500 MG: 9 INJECTION, SOLUTION INTRAVENOUS at 04:41

## 2022-01-21 RX ADMIN — LISINOPRIL 20 MG: 20 TABLET ORAL at 07:54

## 2022-01-21 RX ADMIN — FENTANYL CITRATE 25 MCG: 50 INJECTION INTRAMUSCULAR; INTRAVENOUS at 14:30

## 2022-01-21 RX ADMIN — ASPIRIN 81 MG CHEWABLE TABLET 81 MG: 81 TABLET CHEWABLE at 07:58

## 2022-01-21 RX ADMIN — SODIUM CHLORIDE 9.5 ML: 9 INJECTION, SOLUTION INTRAMUSCULAR; INTRAVENOUS; SUBCUTANEOUS at 14:48

## 2022-01-21 RX ADMIN — CARVEDILOL 37.5 MG: 25 TABLET, FILM COATED ORAL at 19:29

## 2022-01-21 RX ADMIN — NICARDIPINE HYDROCHLORIDE 2.5 MG/HR: 0.2 INJECTION, SOLUTION INTRAVENOUS at 04:28

## 2022-01-21 RX ADMIN — CLONIDINE HYDROCHLORIDE 0.1 MG: 0.1 TABLET ORAL at 07:54

## 2022-01-21 RX ADMIN — CLONIDINE HYDROCHLORIDE 0.1 MG: 0.1 TABLET ORAL at 20:52

## 2022-01-21 RX ADMIN — POTASSIUM CHLORIDE 40 MEQ: 1.5 POWDER, FOR SOLUTION ORAL at 07:51

## 2022-01-21 RX ADMIN — HYDRALAZINE HYDROCHLORIDE 50 MG: 50 TABLET, FILM COATED ORAL at 18:02

## 2022-01-21 ASSESSMENT — ACTIVITIES OF DAILY LIVING (ADL)
ADLS_ACUITY_SCORE: 17

## 2022-01-21 ASSESSMENT — MIFFLIN-ST. JEOR: SCORE: 1654.19

## 2022-01-21 NOTE — PROGRESS NOTES
"Vascular neurology note     Cardiology MD called me. Patient is too sleepy to give informed consent for CARISA. No family is available. There is medical necessity for this procedure because if he has an intra-cardiac thrombus then the risk of recurrent embolism is very high and he will need to be started on heparin IV infusion. Given the medical necessity, I think we should proceed with the CARISA.     Lupe Yousif MD, Msc, VIKY, FAAN   of Neurology  [facility]     01/21/2022 2:20 PM  To page me or covering stroke neurology team member, click here: AMCOM  Choose \"On Call\" tab at top, then search dropdown box for \"Neurology Adult\" & press Enter, look for Neuro ICU/Stroke    "

## 2022-01-21 NOTE — PROGRESS NOTES
CLINICAL NUTRITION SERVICES - REASSESSMENT NOTE     Nutrition Prescription    RECOMMENDATIONS FOR MDs/PROVIDERS TO ORDER:  Encourage PO intake when diet order resumed after procedure    Malnutrition Status:    Unable to determine due to unable to repeat NFPA today with pt out of room for procedure    Recommendations already ordered by Registered Dietitian (RD):  -Discontinued TF/flushes/thiamine per FT removal and no replacement  -No additional interventions at this time as pt is NPO    Future/Additional Recommendations:  -Monitor PO intakes when diet order re-advanced following procedure. If intake trending <50%, consider sonu cts  -If intakes <50%, consider ONS such as Ensure Enlive  -Monitor wt trends, labs, overall POC     EVALUATION OF THE PROGRESS TOWARD GOALS   Diet: NPO d/t procedure, but had been upgraded to level-6 soft-and-bite-sized 1/20    PO Intake: Limited intake records since diet advancement yesterday; 25% of 1 meal recorded.      Nutrition Support: 1/17-1/20: Osmolite 1.5 Sonu @ goal of  55ml/hr  (1320ml/day)  will provide: 1980 kcals, 83 g PRO, 1005 ml free H20, 268 g CHO, and 0 g fiber daily.   +2 packet Prosource modular, daily for total provisions of 2060 Kcals (29 Kcal/kg), 105 gm pro (1.5 gm/kg) per dosing wt 71 kg.     EN Intake: Average 3-day TF intakes: 668 mL formula, 1.7 packets Prosource =  1070 Kcals (15 Kcal/kg), and 61 g pro (0.9 g/kg). This is meeting 60% of low-end est Kcal needs, and 72% of low-end est protein needs.  --Note time frame only 3 days of EN, so overall not sufficient when looking at past week as a whole.      NEW FINDINGS   General:    Currently out of room for procedure.     Nutrition/GI:    EN 1/17-1/20 as above. Pt self-removed FT 1/20; refused replacement. Was able to advance diet order after SLP assessment.    Weights:    Current weight up from admit wt. Monitor trends as available; overall fairly stable.     Labs:    Reviewed available labs    Glucose trends  "118-166     Medications:    Medium sliding scale insulin and Lantus    MVI with minerals    Thiamine, 100 mg/day (can discontinue with FT removal)     MALNUTRITION  % Intake: < 75% for > 7 days (moderate) --with consideration of EN x3 days of week  % Weight Loss: None noted  Subcutaneous Fat Loss: Unable to assess --none upon admit; unable to reassess today  Muscle Loss: Unable to assess  Fluid Accumulation/Edema: None noted  Malnutrition Diagnosis: Unable to determine due to unable to repeat NFPA today with pt out of room for procedure    Previous Goals   Patient to consume % of nutritionally adequate meal trays TID, or the equivalent with supplements/snacks.  Evaluation: Not met    Previous Nutrition Diagnosis  Inadequate protein-energy intake related to decreased PO intake with AMS and dysphagia s/p CVA as evidenced by 50% intake per food record flowsheet, multiple missed meals.  Evaluation: Minimal change; see new dx below     CURRENT NUTRITION DIAGNOSIS  Inadequate protein-energy intake related to AMS, dysphagia, self-removal of FT as evidenced by EN only in place x 3 days, self-removed FT and no replacement, eating 25% of 1 meal since diet order advanced yesterday.      INTERVENTIONS  Implementation  Enteral Nutrition - Discontinued order per FT removal  Feeding tube flush \" \" ^^  Vitamins/minerals - Thiamine - Discontinue order per FT removal (was for risk of refeeding)    Goals  Patient to consume % of nutritionally adequate meal trays TID, or the equivalent with supplements/snacks.    Monitoring/Evaluation  Progress toward goals will be monitored and evaluated per protocol.    Navi Gordon RDN, LD, CNSC  [address] RD pager: [phone]   ASCOM: [id number]  [address] Phone: [phone]       "

## 2022-01-21 NOTE — PROGRESS NOTES
[facility]    Stroke Progress Note    Interval Events  - NAEON  - BP required Nicardipine gtt overnight  - No complaints or difficulty swallowing  - Awaiting 24 hour urine collection studies per Nephrology (had to restart yesterday due to incontinence; condom catheter placed & collection restarted)  - Nephrology following  - CARISA scheduled today  - Increased Coreg to 37.5 mg PO BID    HPI Summary  Miriam Hall is a 46 year old male with PMHx HTN who presented on 1/8/2022 with symptoms concerning for stroke. The pt reports that he was in his normal state of health that morning and then decided to take a nap around noon. On waking from his nap around 2:00PM, he found that he was having trouble moving and feeling the L side of his body. Unfortunately, the pt was unable to get to a phone and ended up crawling on his floor until he could finally get the attention of one of his neighbors, who then was able to call EMS.     EMS arrived to find the pt continuing to have decreased sensation and weakness on his L side. They noted a , /145, and . On arrival in the ED, the pt remained afebrile, but was tachycardic () and quite hypertensive (/146). A stroke code was called for initial NIHSS 9, for a partial L gaze palsy, L-sided complete hemianopia, L arm > leg weakness, decreased sensation in the L arm/leg to sensation (pinprick intact in arm but decreased in leg), mild dysarthria, L-sided extinction, and possible L neglect. The pt also seemed to be having trouble with proprioception in the L arm, though did recognize it to be his own. Pt was immediately taken to CT where he underwent a CTH, CTA Head/Neck and CT Perfusion study, revealing a well-established R parieto-occipital stroke with an LVO of the R P1 segment. As the stroke already appeared well-established and the pt was outside the time window, no tPA was given. No thrombectomy was  performed due to the location of the LVO in the PCA. The pt was loaded with ASA 324mg once with plans to allow for permissive HTN and admission to the stroke service for further work-up     The patient denies any history of smoking or drug use. Does drink EtOH, unclear amount.      TPA Treatment   Not given due to established infarct on imaging and unclear or unfavorable risk-benefit profile for extended window thrombolysis beyond the conventional 4.5 hour time window.     Endovascular Treatment  Proximal vessel occlusion present, but endovascular treatment not initiated due to location of the thrombus in the posterior cerebral artery    Stroke Evaluation Summarized    MRI/Head CT MRI Brain (1/12)  1. Findings from CT and CTA performed yesterday are confirmed with stroke in the distribution of the right posterior cerebral artery. This involves the medial right occipital lobe and medial right temporal lobe with extension into the body of the thalamus.  2. Additional punctate foci of diffusion restriction in the left putamen.  3. Moderate leukoaraiosis.     CT Head (1/16)  1. Continued evolution of large right posterior cerebral artery territory infarct. With similar mass effect and 4 mm of leftward midline shift, previously 5 mm.  2. Subtle hyperdensities in the right basal ganglia consistent with petechial hemorrhages seen on previous MRI. No new intracranial hemorrhage or new loss of gray-white matter differentiation.    MRI Brain (1/18)  1. Exam sensitivity is severely diminished due to considerable motion artifact.  2. No definite abnormal enhancement to suggest infection on the motion degraded postcontrast images.  3. Continued evolution of right posterior cerebral territory infarct and punctate infarct involving the left putamen. Associated petechial hemorrhage and developing cortical laminar necrosis.   Intracranial Vasculature MRA (1/12)  Redemonstration of occlusion of the right PCA near its  origin.  Additional short segment high-grade stenosis of a right-sided M2 MCA branch. Findings are not likely not significantly changed from CT performed yesterday.   Cervical Vasculature CTA (1/12)  1. Occlusion of the proximal right PCA.  2. High-grade stenosis of the occiput M2 branch of the right MCA with  attenuated flow distal.  3. Neck CTA demonstrates no stenosis of the major cervical arteries.     Echocardiogram The visual ejection fraction is 50-55%.   No regional wall motion abnormalities are seen.  Moderate to severe concentric wall thickening consistent with left ventricular hypertrophy is present.  Global right ventricular function is normal.  There was no shunt at the atrial septal level as assessed by agitated saline bubble study at rest and with Valsalva maneuver.  Previous study not available for comparison.   EKG/Telemetry EKG with sinus tachycardia   Other Testing Hypercoagulability profile has not been sent yet     LDL  1/8/2022: 75 mg/dL   A1C  1/8/2022: 7.8 %   Troponin No lab value available in past 48 hrs       Impression   # Acute ischemic stroke of Right P1 segement of the PCA  # R P1 occlusion and R M2 Stenosis  # Large vessel atherosclerosis vs ESUS  # Cerebral edema   # Hemorrhagic transformation of ischemic infarct  Miriam Hall is a 46 year old male with PMHx HTN who presented on 1/8/2022 with L gaze palsy, L complete hemianopia, mild dysarthria, L arm > leg weakness, L arm/leg decreased sensation, and L side extinction after waking up from a nap. FTH an occlusion of the P1 segment of the R PCA with associated established infarct as well as high grade stenosis of the M2 segment of the R MCA. TTE with EF of 50-55%, no WMA or atrial enlargement. Etiology undetermined at this time.   - Aspirin 81 mg PO daily for secondary stroke prevention  - CARISA once BP under better control   - PT/OT/SLP: ARU appropriate  - CT C/A/P (1/18): Negative  - Hypercoag panel (1/18): Negative  - Vasculitis panel  (1/18): Negative    #Encephalopathy  #Leukocytosis  #Concern for seizures  Patient with ongoing waxing and waning level of consciousness over the past few days. Hooked up to EEG on 1/12 with no evidence of seizures per report from Dr. Porter and pt pulling off leads overnight so EEG disconnected on 1/13. Repeat EEG on 1/16 completed and negative for seizure activity. No fevers documented in chart but patient does have a mild leukocytosis. No alarming electrolyte derangements. BP has been persistently elevated and resistant to medications, so his confusion could represent hypertensive encephalopathy.   -  mg PO Q8H   - Check VPA total/free trough levels (1/18): 100/19.8  - EEG spot study (1/18): Negative seizures  - Blood cultures (1/18): NGTD  - UA & UCx (1/18): Negative  - CXR (1/18): No focal pulmonary opacity    #HLD  LDL 75. Long-term LDL goal 40-70.   - Atorvastatin 40 mg PO daily    #Resistant HTN   Difficult to control HTN, unable to sustain BP goal < 160. Now up to 5 anti-hypertensives without a clear etiology.  - Checking aldosterone/renin ratio: Aldosterone normal; Renin pending  - PTA Lisinopril 10 mg PO daily   - Amlodipine 10 mg PO daily  - Carvedilol 6.25 mg PO BID  - Chlorthalidone 25 mg PO daily  - Hydralazine 50 mg PO TID  - Hydralazine/Labetalol PRN for SBP > 180  - Nephrology consulted, appreciate recs    #DM  Newly diagnosed DM with Hgb A1C 7.8.  - Continue to monitor glucose  - Increase sliding scale insulin to medium intensity today   - Hypoglycemia protocol  - Endocrinology consulted, appreciate recs    #Nutrition  Patient does not meet two of the established criteria necessary for diagnosing malnutrition but is at risk for malnutrition.  - NG tube in place with TF started  - Nutrition consulted  - Minced and moist diet with thin liquids - patient has been taking in very little PO    #Hypokalemia   - RN Managed Standard Replacement protocol    #Superficial RUE thrombus  US completed 1/17  negative for DVT but positive for thrombus in the R basilic and cephalic veins from the antecubital fossa to the wrist.     Chronic/Resolved Issues:  #DISHA (resolved)  Pt presented with Cr 1.37 improved with IVF to 1.17. Now at baseline.   - Daily BMP    #Elevated troponin (resolved)  Pt arrives with Trop 147 and rising. EKG exhibited sinus tachycardia, but no concerning signs of ischemia. Troponin peaked 1/10. Unclear what caused this elevation; unlikely to be MI given no sx or EKG changes.  - Coronary CTA as outpatient per Cards recs      Prophylaxis            For VTE Prevention:  - SCD  For Acid Suppression:  - GI prophylaxis is not indicated  FEN:  - NS mIVF @ 75/hr    Code Status  Full Code    The patient was discussed with Stroke Staff, Dr. Yousif.    Nelson Laura DO  Neurology Resident PGY1  ASCOM: *[phone]  ______________________________________________________    Clinically Significant Risk Factors Present on Admission                 Medications   Scheduled Meds    aspirin  81 mg Oral Daily    Or     aspirin  81 mg Oral or NG Tube Daily     atorvastatin  40 mg Oral QPM     carvedilol  25 mg Oral BID w/meals     chlorthalidone  25 mg Oral Daily     cloNIDine  0.1 mg Oral BID     hydrALAZINE  50 mg Oral Q8H CARLOS A     insulin aspart  1-7 Units Subcutaneous Q4H     insulin glargine  8 Units Subcutaneous Q24H     lisinopril  20 mg Oral or NG Tube Daily     miconazole   Topical BID     multivitamin, therapeutic  1 tablet Oral Daily     nystatin   Topical BID     potassium chloride  20 mEq Oral or Feeding Tube Once     protein modular  1 packet Per Feeding Tube BID     sodium chloride (PF)  3 mL Intravenous Q8H     sodium chloride (PF)  3 mL Intracatheter Q8H     thiamine  100 mg Per Feeding Tube Daily     valproate (DEPACON) in NS intermittent infusion  500 mg Intravenous Q8H     [Held by provider] valproic acid  500 mg Oral or Feeding Tube Q8H       Infusion Meds    dextrose       - MEDICATION INSTRUCTIONS -        - MEDICATION INSTRUCTIONS -       - MEDICATION INSTRUCTIONS -       - MEDICATION INSTRUCTIONS -       niCARdipine Stopped (01/21/22 0614)       PRN Meds  acetaminophen, artificial tears ophthalmic solution, dextrose, glucose **OR** dextrose **OR** glucagon, - MEDICATION INSTRUCTIONS -, HOLD MEDICATION, HOLD MEDICATION, HOLD MEDICATION, hydrALAZINE, labetalol, lidocaine 4%, lidocaine (buffered or not buffered), - MEDICATION INSTRUCTIONS -, - MEDICATION INSTRUCTIONS -, - MEDICATION INSTRUCTIONS -, melatonin, NIFEdipine ER OSMOTIC, polyethylene glycol, senna-docusate, sodium chloride (PF), sodium chloride (PF)       PHYSICAL EXAMINATION  Temp:  [97.1  F (36.2  C)-98.6  F (37  C)] 97.3  F (36.3  C)  Pulse:  [69-83] 69  Resp:  [16-18] 16  BP: (132-180)/() 161/89  SpO2:  [96 %-99 %] 99 %      Neurologic  Mental Status: alert & oriented to self, location, situation & medical condition; named age, month, year, president &  correctly; spelled WORLD & SCHOOL forwards correctly but not backward; counted 20 to 1 correctly; could not recite months or days of week backwards  Cranial Nerves:  PER, right gaze preference but able to slightly cross the midline, oculocephalic reflex intact bilaterally, L sided facial droop, shoulder shrug strong on the R  Motor:  no abnormal movements. 5/5 RUE and RLE; 0/5 LUE & 2/5 LLE   Sensory:  Intact to noxious sensation in 4/4 extremities, decreased sensation on L side  Coordination:  deferred  Station/Gait:  deferred    Stroke Scales    NIHSS  Interval baseline (01/12/22 0324)   Interval Comments     1a. Level of Consciousness 1-->Not alert, but arousable by minor stimulation to obey, answer, or respond   1b. LOC Questions 2-->Answers neither question correctly   1c. LOC Commands 1-->Performs one task correctly   2.   Best Gaze 1-->Partial gaze palsy, gaze is abnormal in one or both eyes, but forced deviation or total gaze paresis is not present   3.   Visual 2-->Complete hemianopia    4.   Facial Palsy 1-->Minor paralysis (flattened nasolabial fold, asymmetry on smiling)   5a. Motor Arm, Left 4-->No movement   5b. Motor Arm, Right 0-->No drift, limb holds 90 (or 45) degrees for full 10 secs   6a. Motor Leg, Left 4-->No movement   6b. Motor Leg, right 1-->Drift, leg falls by the end of the 5-sec period but does not hit bed   7.   Limb Ataxia 0-->Absent   8.   Sensory 1-->Mild-to-moderate sensory loss, patient feels pinprick is less sharp or is dull on the affected side, or there is a loss of superficial pain with pinprick, but patient is aware of being touched   9.   Best Language 2-->Severe aphasia, all communication is through fragmentary expression, great need for inference, questioning, and guessing by the listener. Range of information that can be exchanged is limited, listener carries burden of. . . (see row details)   10. Dysarthria 0-->Normal   11. Extinction and Inattention  1-->Visual, tactile, auditory, spatial, or personal inattention or extinction to bilateral simultaneous stimulation in one of the sensory modalities   Total 21 (01/12/22 0324)       Imaging  I personally reviewed all imaging; relevant findings per HPI.     Lab Results Data   CBC  Recent Labs   Lab 01/21/22  0521 01/20/22  0439 01/19/22  0435   WBC 9.9 11.6* 10.7   RBC 5.40 5.61 5.56   HGB 16.4 16.9 16.9   HCT 49.0 51.4 50.8    310 324     Basic Metabolic Panel    Recent Labs   Lab 01/21/22  0736 01/21/22  0521 01/21/22  0438 01/20/22  0806 01/20/22  0439 01/19/22  1735 01/19/22  1540 01/19/22  0826 01/19/22  0435   NA  --  141  --   --  141  --   --   --  143   POTASSIUM  --  2.9*  --   --  3.6  --  3.1*  --  3.1*   CHLORIDE  --  103  --   --  105  --   --   --  106   CO2  --  32  --   --  30  --   --   --  32   BUN  --  27  --   --  26  --   --   --  32*   CR  --  1.03  --   --  0.94  --   --   --  1.12   * 141* 150*   < > 195*   < >  --    < > 298*   VALERIE  --  9.3  --   --  9.5  --   --   --  9.6    <  > = values in this interval not displayed.     Liver Panel  Recent Labs   Lab 01/17/22  0433   PROTTOTAL 7.8   ALBUMIN 2.9*   BILITOTAL 0.7   ALKPHOS 98   AST 22   ALT 20     INR    Recent Labs   Lab Test 01/18/22  1405 01/08/22  1843 01/08/22  1834   INR 1.06 0.95 1.0*      Lipid Profile    Recent Labs   Lab Test 01/08/22  1843 05/16/14  1506   CHOL 169 131   HDL 70 53   LDL 75 70   TRIG 118 38     A1C    Recent Labs   Lab Test 01/08/22  1843   A1C 7.8*     Troponin I  No results for input(s): TROPONIN, GHTROP in the last 168 hours.

## 2022-01-21 NOTE — PLAN OF CARE
"BP (!) 143/92 (BP Location: Right arm)   Pulse 67   Temp (!) 96.7  F (35.9  C) (Axillary)   Resp 16   Ht 1.702 m (5' 7\")   Wt 82.1 kg (180 lb 14.4 oz)   SpO2 100%   BMI 28.33 kg/m      VSS. SR. BPs within limits. RA. Denies pain. Alert this morning, more lethargic in afternoon. Oriented x3-4. Other neuros unchanged. L hemiparesis. Able to take PO pills this morning, NPO for CARISA. Large incontinent void, condom cath placed to restart 24 hr urine collection at 10am. No BM this shift. Up to chair with lift assist. Continue to monitor.   "

## 2022-01-21 NOTE — PLAN OF CARE
"BP (!) 167/90 (BP Location: Right arm, Patient Position: Supine)   Pulse 79   Temp 98.6  F (37  C) (Oral)   Resp 18   Ht 1.702 m (5' 7\")   Wt 82.1 kg (180 lb 14.4 oz)   SpO2 97%   BMI 28.33 kg/m      Time: 2553-1389  Status:Admitted on 01/08/21 due to stroke. Hx of uncontrolled HTN.   Neuro/Pain:  A&Ox4, slurred speech, RUE and RLE 4/5, LUE and LLE 0/5. Denied pain.   Activity: lift device for transfer. Repo q2h.   Cardiac/vs: hypertensive, BP up to 180s, PRN Hydralazine didn't help much. Nicardipine restarted at 2.5 mg/hr continuous at 0430 unitl 0614 after 3 consecutive SBP below 160.   Respiratory: room air sating >92%, LS clear, denied SOB or coughing.   GI/: Incontinent of bowel and bladder. No bm this shift. Condom cath didn't work. Male external cath replaced.   Diet: soft & bite diet, 1:1 assistance for feeding.   Skin: no skin deficit. Pulsate mattress in place.   LDAs: PIV x 2 tko and saline locked.   Labs/Imaging: blood glucose q 4 hrs.   Change this shift: Elevated BP, Nicardipine gtt restarted  Plan: 24 hr urine collection started at 1400.       "

## 2022-01-21 NOTE — PROGRESS NOTES
IP Diabetes Management  Daily Note           Assessment and Plan:   HPI: Miriam Hall is a 46 year old male with PMHx HTN who presented on 1/8/2022 with L gaze palsy, L complete hemianopia, mild dysarthria, L arm > leg weakness, L arm/leg decreased sensation, and L side extinction after waking up from a nap. FTH an occlusion of the P1 segment of the R PCA with associated established infarct as well as high grade stenosis of the M2 segment of the R MCA. TTE with EF of 50-55%, no WMA or atrial enlargement. Etiology undetermined at this time.     Diagnosed with DM 2 in 2017 and lifestyle changes recommended, never on DM medication.      Assessment:   1)Type 2 Diabetes Mellitus, uncontrolled (A1c 7.8), with hyperglycemia  2)acute ischemic stroke w/encephelopathy    Plan:    -continue Lantus 8 units daily at 1600   -holding on CHO coverage-will consider if PO intake upticks   -Novolog  Medium intensity sliding scale Q 4 hours until PO intake adequate   -BG monitoring TID AC, HS, 0200   -hypoglycemia protocol   -carb counting protocol   -diabetes education needs will be assessed closer to discharge.     Outpatient follow up: TBD  Plan discussed with patient, bedside RN, and primary team through this note.         Interval History and Assessment: interval glucose trend reviewed:         BG stable and improved with Lantus 8 units daily.  NPO today for CARISA this afternoon.  He did eat a small dinner last night and an Ensure, per bedside RN.      Patient sleepy and minimally communicative to me this AM.      Current nutritional intake and type: Orders Placed This Encounter      NPO per Anesthesia Guidelines for Procedure/Surgery Except for: Meds      Planned Procedures/surgeries: CARISA today  Steroid planning: none  D5W-containing solutions/medications: none    PTA Diabetes Regimen: none    Discharge Planning: TBD           Diabetes History:   Type of Diabetes: Type 2 Diabetes Mellitus  Lab Results   Component Value Date     A1C 7.8 01/08/2022              Review of Systems:     The Review of Systems is negative other than noted in the Interval History.           Medications:     Current Facility-Administered Medications   Medication     acetaminophen (TYLENOL) tablet 650 mg     aspirin EC tablet 81 mg    Or     aspirin (ASA) chewable tablet 81 mg     atorvastatin (LIPITOR) tablet 40 mg     carboxymethylcellulose PF (REFRESH PLUS) 0.5 % ophthalmic solution 1 drop     carvedilol (COREG) tablet 25 mg     chlorthalidone (HYGROTON) tablet 25 mg     cloNIDine (CATAPRES) tablet 0.1 mg     dextrose 10% infusion     glucose gel 15-30 g    Or     dextrose 50 % injection 25-50 mL    Or     glucagon injection 1 mg     Give   of usual dose of LONG ACTING insulin AM of procedure IF diabetic     HOLD: Insulin - RAPID/SHORT acting AM of procedure IF diabetic     HOLD: Insulin - REGULAR AM of procedure IF diabetic     HOLD: Oral hypoglycemics AM of procedure IF diabetic     hydrALAZINE (APRESOLINE) injection 10-20 mg     hydrALAZINE (APRESOLINE) tablet 50 mg     insulin aspart (NovoLOG) injection (RAPID ACTING)     insulin glargine (LANTUS PEN) injection 8 Units     labetalol (NORMODYNE/TRANDATE) injection 10-20 mg     lidocaine (LMX4) cream     lidocaine 1 % 0.1-1 mL     lisinopril (ZESTRIL) tablet 20 mg     May take oral meds with a sip of water, the morning of CARISA procedure.     medication instruction - No oral meds if patient didn't pass dysphagia screen     Medication Instructions - Avoid dextrose in IV solutions.     melatonin tablet 5 mg     miconazole (MICATIN) 2 % powder     multivitamin, therapeutic (THERA-VIT) tablet 1 tablet     niCARdipine 40 mg in 200 mL 0.83% NaCl (CARDENE) infusion     NIFEdipine ER OSMOTIC (PROCARDIA XL) 24 hr tablet 30 mg     nystatin (MYCOSTATIN) cream     polyethylene glycol (MIRALAX) Packet 17 g     potassium chloride (KLOR-CON) Packet 20 mEq     protein modular (PROSOURCE TF) 1 packet     senna-docusate  "(SENOKOT-S/PERICOLACE) 8.6-50 MG per tablet 1-2 tablet     sodium chloride (PF) 0.9% PF flush 3 mL     sodium chloride (PF) 0.9% PF flush 3 mL     sodium chloride (PF) 0.9% PF flush 3 mL     sodium chloride (PF) 0.9% PF flush 3 mL     thiamine (B-1) tablet 100 mg     valproate (DEPACON) 500 mg in sodium chloride 0.9 % 50 mL intermittent infusion     [Held by provider] valproic acid (DEPAKENE) solution 500 mg            Physical Exam:    BP (!) 161/89 (BP Location: Right arm)   Pulse 69   Temp 97.3  F (36.3  C) (Axillary)   Resp 16   Ht 1.702 m (5' 7\")   Wt 82.1 kg (180 lb 14.4 oz)   SpO2 99%   BMI 28.33 kg/m    General: lethargic  HEENT: normocephalic, atraumatic. Oral mucous membranes moist.   Lungs: unlabored respiration, no cough  ABD: rounded, nondistended  Skin: warm and dry, no obvious lesions  Mental status:  sleepy, will not answer questions  Psych:  calm, sleepy.            Data:     Recent Labs   Lab 01/21/22  0736 01/21/22  0521 01/21/22  0438 01/21/22  0006 01/20/22  1925 01/20/22  1635   * 141* 150* 118* 215* 295*     Lab Results   Component Value Date    WBC 9.9 01/21/2022    WBC 11.6 (H) 01/20/2022    WBC 10.7 01/19/2022    HGB 16.4 01/21/2022    HGB 16.9 01/20/2022    HGB 16.9 01/19/2022    HCT 49.0 01/21/2022    HCT 51.4 01/20/2022    HCT 50.8 01/19/2022    MCV 91 01/21/2022    MCV 92 01/20/2022    MCV 91 01/19/2022     01/21/2022     01/20/2022     01/19/2022     Lab Results   Component Value Date     01/21/2022     01/20/2022     01/19/2022    POTASSIUM 2.9 (L) 01/21/2022    POTASSIUM 3.6 01/20/2022    POTASSIUM 3.1 (L) 01/19/2022    CHLORIDE 103 01/21/2022    CHLORIDE 105 01/20/2022    CHLORIDE 106 01/19/2022    CO2 32 01/21/2022    CO2 30 01/20/2022    CO2 32 01/19/2022     (H) 01/21/2022     (H) 01/21/2022     (H) 01/21/2022     Lab Results   Component Value Date    BUN 27 01/21/2022    BUN 26 01/20/2022    BUN 32 (H) " 01/19/2022     No results found for: TSH  Lab Results   Component Value Date    AST 22 01/17/2022    AST 26 01/08/2022    AST 26 04/25/2014    ALT 20 01/17/2022    ALT 31 01/08/2022    ALT 50 (H) 04/25/2014    ALKPHOS 98 01/17/2022    ALKPHOS 94 01/08/2022    ALKPHOS 66 04/25/2014           35 minutes spent on the date of the encounter doing chart review, review of test results, patient visit, documentation and discussion with other provider(s)       Over 50% of my time on the unit was spent counseling the patient and/or coordinating care regarding acute hyperglycemia management.  See note for details.    To contact Endocrine Diabetes service:   From 8AM-4PM: page inpatient diabetes provider that is following the patient  For questions or updates from 4PM-8AM: page the diabetes job code for on call fellow: [phone]    MARBELLA Haywood, CNP  Inpatient Diabetes Management Service  Pager [phone]

## 2022-01-21 NOTE — PROGRESS NOTES
Pt arrived in ECHO department  [facility].   Procedure explained, questions answered. Patient was unable to sign the consent and it is unknown who his next of kin is. Per Dr. Lupe Yousif, CARISA is medically necessary and we are proceeding as emergent. No consent signed.   Pt's throat sprayed at 1420, therefore pt will not be able to eat or drink until 2 hours after at 1620.  Informed pt of this time and encouraged to start with warm fluids and soft foods.    Pt tolerated procedure well, and was given a total of 50 mcg IV fentanyl and 2 mg IV versed for conscious sedation.  Pt denied throat or chest pain after CARISA complete.   CARISA probe 61 used for procedure.  Patient monitored closely until awake and VSS. Patient transported back to 6B in bed via stretcher.   Report given to 6B RN.

## 2022-01-22 LAB
ANION GAP SERPL CALCULATED.3IONS-SCNC: 6 MMOL/L (ref 3–14)
BUN SERPL-MCNC: 37 MG/DL (ref 7–30)
CALCIUM SERPL-MCNC: 8.8 MG/DL (ref 8.5–10.1)
CHLORIDE BLD-SCNC: 102 MMOL/L (ref 94–109)
CO2 SERPL-SCNC: 30 MMOL/L (ref 20–32)
COLLECT DURATION TIME UR: 24 H
CREAT SERPL-MCNC: 1.39 MG/DL (ref 0.66–1.25)
ERYTHROCYTE [DISTWIDTH] IN BLOOD BY AUTOMATED COUNT: 13.4 % (ref 10–15)
GFR SERPL CREATININE-BSD FRML MDRD: 63 ML/MIN/1.73M2
GLUCOSE BLD-MCNC: 100 MG/DL (ref 70–99)
GLUCOSE BLDC GLUCOMTR-MCNC: 142 MG/DL (ref 70–99)
GLUCOSE BLDC GLUCOMTR-MCNC: 167 MG/DL (ref 70–99)
GLUCOSE BLDC GLUCOMTR-MCNC: 246 MG/DL (ref 70–99)
GLUCOSE BLDC GLUCOMTR-MCNC: 257 MG/DL (ref 70–99)
GLUCOSE BLDC GLUCOMTR-MCNC: 93 MG/DL (ref 70–99)
GLUCOSE BLDC GLUCOMTR-MCNC: 93 MG/DL (ref 70–99)
GLUCOSE BLDC GLUCOMTR-MCNC: 99 MG/DL (ref 70–99)
HCT VFR BLD AUTO: 45 % (ref 40–53)
HGB BLD-MCNC: 15.1 G/DL (ref 13.3–17.7)
MAGNESIUM SERPL-MCNC: 2 MG/DL (ref 1.6–2.3)
MCH RBC QN AUTO: 30.5 PG (ref 26.5–33)
MCHC RBC AUTO-ENTMCNC: 33.6 G/DL (ref 31.5–36.5)
MCV RBC AUTO: 91 FL (ref 78–100)
PHOSPHATE SERPL-MCNC: 4 MG/DL (ref 2.5–4.5)
PLATELET # BLD AUTO: 329 10E3/UL (ref 150–450)
POTASSIUM BLD-SCNC: 3.3 MMOL/L (ref 3.4–5.3)
POTASSIUM BLD-SCNC: 4 MMOL/L (ref 3.4–5.3)
RBC # BLD AUTO: 4.95 10E6/UL (ref 4.4–5.9)
SODIUM 24H UR-SRATE: 47 MMOL/SPEC (ref 40–220)
SODIUM SERPL-SCNC: 138 MMOL/L (ref 133–144)
SODIUM UR-SCNC: 52 MMOL/L
SPECIMEN VOL UR: 900 ML
WBC # BLD AUTO: 11.3 10E3/UL (ref 4–11)

## 2022-01-22 PROCEDURE — 85014 HEMATOCRIT: CPT | Performed by: STUDENT IN AN ORGANIZED HEALTH CARE EDUCATION/TRAINING PROGRAM

## 2022-01-22 PROCEDURE — 250N000013 HC RX MED GY IP 250 OP 250 PS 637: Performed by: COUNSELOR

## 2022-01-22 PROCEDURE — 36415 COLL VENOUS BLD VENIPUNCTURE: CPT | Performed by: INTERNAL MEDICINE

## 2022-01-22 PROCEDURE — 82310 ASSAY OF CALCIUM: CPT | Performed by: STUDENT IN AN ORGANIZED HEALTH CARE EDUCATION/TRAINING PROGRAM

## 2022-01-22 PROCEDURE — 250N000013 HC RX MED GY IP 250 OP 250 PS 637

## 2022-01-22 PROCEDURE — 84132 ASSAY OF SERUM POTASSIUM: CPT | Performed by: INTERNAL MEDICINE

## 2022-01-22 PROCEDURE — 250N000013 HC RX MED GY IP 250 OP 250 PS 637: Performed by: PSYCHIATRY & NEUROLOGY

## 2022-01-22 PROCEDURE — 36415 COLL VENOUS BLD VENIPUNCTURE: CPT | Performed by: STUDENT IN AN ORGANIZED HEALTH CARE EDUCATION/TRAINING PROGRAM

## 2022-01-22 PROCEDURE — 120N000003 HC R&B IMCU UMMC

## 2022-01-22 PROCEDURE — 250N000013 HC RX MED GY IP 250 OP 250 PS 637: Performed by: STUDENT IN AN ORGANIZED HEALTH CARE EDUCATION/TRAINING PROGRAM

## 2022-01-22 PROCEDURE — 83735 ASSAY OF MAGNESIUM: CPT | Performed by: PSYCHIATRY & NEUROLOGY

## 2022-01-22 PROCEDURE — 250N000013 HC RX MED GY IP 250 OP 250 PS 637: Performed by: INTERNAL MEDICINE

## 2022-01-22 PROCEDURE — 82088 ASSAY OF ALDOSTERONE: CPT

## 2022-01-22 PROCEDURE — 84100 ASSAY OF PHOSPHORUS: CPT | Performed by: PSYCHIATRY & NEUROLOGY

## 2022-01-22 PROCEDURE — 81050 URINALYSIS VOLUME MEASURE: CPT

## 2022-01-22 PROCEDURE — 99232 SBSQ HOSP IP/OBS MODERATE 35: CPT | Mod: GC | Performed by: PSYCHIATRY & NEUROLOGY

## 2022-01-22 RX ORDER — DIVALPROEX SODIUM 250 MG/1
500 TABLET, DELAYED RELEASE ORAL EVERY 12 HOURS SCHEDULED
Status: COMPLETED | OUTPATIENT
Start: 2022-01-22 | End: 2022-01-24

## 2022-01-22 RX ORDER — POTASSIUM CHLORIDE 1.5 G/1.58G
20 POWDER, FOR SOLUTION ORAL 2 TIMES DAILY
Status: DISCONTINUED | OUTPATIENT
Start: 2022-01-22 | End: 2022-01-28 | Stop reason: HOSPADM

## 2022-01-22 RX ORDER — POTASSIUM CHLORIDE 20MEQ/15ML
40 LIQUID (ML) ORAL ONCE
Status: COMPLETED | OUTPATIENT
Start: 2022-01-22 | End: 2022-01-22

## 2022-01-22 RX ADMIN — HYDRALAZINE HYDROCHLORIDE 50 MG: 50 TABLET, FILM COATED ORAL at 01:59

## 2022-01-22 RX ADMIN — CLONIDINE HYDROCHLORIDE 0.1 MG: 0.1 TABLET ORAL at 08:17

## 2022-01-22 RX ADMIN — ATORVASTATIN CALCIUM 40 MG: 40 TABLET, FILM COATED ORAL at 20:57

## 2022-01-22 RX ADMIN — VALPROIC ACID 500 MG: 250 CAPSULE, LIQUID FILLED ORAL at 05:29

## 2022-01-22 RX ADMIN — LISINOPRIL 20 MG: 20 TABLET ORAL at 08:18

## 2022-01-22 RX ADMIN — DIVALPROEX SODIUM 500 MG: 250 TABLET, DELAYED RELEASE ORAL at 17:44

## 2022-01-22 RX ADMIN — POTASSIUM CHLORIDE 20 MEQ: 1.5 POWDER, FOR SOLUTION ORAL at 20:56

## 2022-01-22 RX ADMIN — INSULIN GLARGINE 8 UNITS: 100 INJECTION, SOLUTION SUBCUTANEOUS at 15:34

## 2022-01-22 RX ADMIN — THERA TABS 1 TABLET: TAB at 10:36

## 2022-01-22 RX ADMIN — POTASSIUM CHLORIDE 20 MEQ: 1.5 POWDER, FOR SOLUTION ORAL at 10:37

## 2022-01-22 RX ADMIN — CARVEDILOL 37.5 MG: 25 TABLET, FILM COATED ORAL at 18:36

## 2022-01-22 RX ADMIN — CARVEDILOL 37.5 MG: 25 TABLET, FILM COATED ORAL at 08:17

## 2022-01-22 RX ADMIN — ASPIRIN 81 MG CHEWABLE TABLET 81 MG: 81 TABLET CHEWABLE at 08:17

## 2022-01-22 RX ADMIN — POTASSIUM CHLORIDE 40 MEQ: 40 SOLUTION ORAL at 08:18

## 2022-01-22 RX ADMIN — CLONIDINE HYDROCHLORIDE 0.1 MG: 0.1 TABLET ORAL at 20:55

## 2022-01-22 RX ADMIN — HYDRALAZINE HYDROCHLORIDE 50 MG: 50 TABLET, FILM COATED ORAL at 17:45

## 2022-01-22 RX ADMIN — HYDRALAZINE HYDROCHLORIDE 50 MG: 50 TABLET, FILM COATED ORAL at 10:37

## 2022-01-22 RX ADMIN — THIAMINE HCL TAB 100 MG 100 MG: 100 TAB at 10:36

## 2022-01-22 RX ADMIN — CHLORTHALIDONE 25 MG: 25 TABLET ORAL at 08:18

## 2022-01-22 ASSESSMENT — ACTIVITIES OF DAILY LIVING (ADL)
ADLS_ACUITY_SCORE: 17
ADLS_ACUITY_SCORE: 15
ADLS_ACUITY_SCORE: 17
ADLS_ACUITY_SCORE: 15

## 2022-01-22 ASSESSMENT — MIFFLIN-ST. JEOR: SCORE: 1644.63

## 2022-01-22 NOTE — PROGRESS NOTES
[facility]    Stroke Progress Note    Interval Events  - NAEON  - CARISA completed, no thrombus  - Increased Coreg to 37.5 mg  - Switched Depakene to Depakote  mg PO BID  - Encourage PO hydration due to mild Crt elevation  - 24 hour urine collection expected to complete today at 10 AM  - Started KCl 20 mEq PO BID    HPI Summary  Miriam Hall is a 46 year old male with PMHx HTN who presented on 1/8/2022 with symptoms concerning for stroke. The pt reports that he was in his normal state of health that morning and then decided to take a nap around noon. On waking from his nap around 2:00PM, he found that he was having trouble moving and feeling the L side of his body. Unfortunately, the pt was unable to get to a phone and ended up crawling on his floor until he could finally get the attention of one of his neighbors, who then was able to call EMS.     EMS arrived to find the pt continuing to have decreased sensation and weakness on his L side. They noted a , /145, and . On arrival in the ED, the pt remained afebrile, but was tachycardic () and quite hypertensive (/146). A stroke code was called for initial NIHSS 9, for a partial L gaze palsy, L-sided complete hemianopia, L arm > leg weakness, decreased sensation in the L arm/leg to sensation (pinprick intact in arm but decreased in leg), mild dysarthria, L-sided extinction, and possible L neglect. The pt also seemed to be having trouble with proprioception in the L arm, though did recognize it to be his own. Pt was immediately taken to CT where he underwent a CTH, CTA Head/Neck and CT Perfusion study, revealing a well-established R parieto-occipital stroke with an LVO of the R P1 segment. As the stroke already appeared well-established and the pt was outside the time window, no tPA was given. No thrombectomy was performed due to the location of the LVO in the PCA. The pt was loaded  with ASA 324mg once with plans to allow for permissive HTN and admission to the stroke service for further work-up     The patient denies any history of smoking or drug use. Does drink EtOH, unclear amount.      TPA Treatment   Not given due to established infarct on imaging and unclear or unfavorable risk-benefit profile for extended window thrombolysis beyond the conventional 4.5 hour time window.     Endovascular Treatment  Proximal vessel occlusion present, but endovascular treatment not initiated due to location of the thrombus in the posterior cerebral artery    Stroke Evaluation Summarized    MRI/Head CT MRI Brain (1/12)  1. Findings from CT and CTA performed yesterday are confirmed with stroke in the distribution of the right posterior cerebral artery. This involves the medial right occipital lobe and medial right temporal lobe with extension into the body of the thalamus.  2. Additional punctate foci of diffusion restriction in the left putamen.  3. Moderate leukoaraiosis.     CT Head (1/16)  1. Continued evolution of large right posterior cerebral artery territory infarct. With similar mass effect and 4 mm of leftward midline shift, previously 5 mm.  2. Subtle hyperdensities in the right basal ganglia consistent with petechial hemorrhages seen on previous MRI. No new intracranial hemorrhage or new loss of gray-white matter differentiation.    MRI Brain (1/18)  1. Exam sensitivity is severely diminished due to considerable motion artifact.  2. No definite abnormal enhancement to suggest infection on the motion degraded postcontrast images.  3. Continued evolution of right posterior cerebral territory infarct and punctate infarct involving the left putamen. Associated petechial hemorrhage and developing cortical laminar necrosis.   Intracranial Vasculature MRA (1/12)  Redemonstration of occlusion of the right PCA near its  origin. Additional short segment high-grade stenosis of a right-sided M2 MCA branch.  Findings are not likely not significantly changed from CT performed yesterday.   Cervical Vasculature CTA (1/12)  1. Occlusion of the proximal right PCA.  2. High-grade stenosis of the occiput M2 branch of the right MCA with  attenuated flow distal.  3. Neck CTA demonstrates no stenosis of the major cervical arteries.     Echocardiogram The visual ejection fraction is 50-55%.   No regional wall motion abnormalities are seen.  Moderate to severe concentric wall thickening consistent with left ventricular hypertrophy is present.  Global right ventricular function is normal.  There was no shunt at the atrial septal level as assessed by agitated saline bubble study at rest and with Valsalva maneuver.  Previous study not available for comparison.   EKG/Telemetry EKG with sinus tachycardia   Other Testing Hypercoagulability profile has not been sent yet     LDL  1/8/2022: 75 mg/dL   A1C  1/8/2022: 7.8 %   Troponin No lab value available in past 48 hrs       Impression   # Acute ischemic stroke of Right P1 segement of the PCA  # R P1 occlusion and R M2 Stenosis  # Large vessel atherosclerosis vs ESUS  # Cerebral edema   # Hemorrhagic transformation of ischemic infarct  Miriam Hall is a 46 year old male with PMHx HTN who presented on 1/8/2022 with L gaze palsy, L complete hemianopia, mild dysarthria, L arm > leg weakness, L arm/leg decreased sensation, and L side extinction after waking up from a nap. FTH an occlusion of the P1 segment of the R PCA with associated established infarct as well as high grade stenosis of the M2 segment of the R MCA. TTE with EF of 50-55%, no WMA or atrial enlargement. Etiology undetermined at this time.   - Aspirin 81 mg PO daily for secondary stroke prevention  - CARISA once BP under better control   - PT/OT/SLP: ARU appropriate  - CT C/A/P (1/18): Negative  - Hypercoag panel (1/18): Negative  - Vasculitis panel (1/18): Negative    #Encephalopathy  #Leukocytosis  #Concern for  seizures  Patient with ongoing waxing and waning level of consciousness over the past few days. Hooked up to EEG on 1/12 with no evidence of seizures per report from Dr. Porter and pt pulling off leads overnight so EEG disconnected on 1/13. Repeat EEG on 1/16 completed and negative for seizure activity. No fevers documented in chart but patient does have a mild leukocytosis. No alarming electrolyte derangements. BP has been persistently elevated and resistant to medications, so his confusion could represent hypertensive encephalopathy.   -  mg PO Q8H   - Check VPA total/free trough levels (1/18): 100/19.8  - EEG spot study (1/18): Negative seizures  - Blood cultures (1/18): NGTD  - UA & UCx (1/18): Negative  - CXR (1/18): No focal pulmonary opacity    #HLD  LDL 75. Long-term LDL goal 40-70.   - Atorvastatin 40 mg PO daily    #Resistant HTN   Difficult to control HTN, unable to sustain BP goal < 160. Now up to 5 anti-hypertensives without a clear etiology.  - Lisinopril 20 mg PO daily   - Amlodipine 10 mg PO daily  - Carvedilol 37.5 mg PO BID  - Chlorthalidone 25 mg PO daily  - Clonidine 0.1 mg PO daily  - Hydralazine 50 mg PO TID  - Hydralazine/Labetalol PRN for SBP > 180  - Nephrology consulted, appreciate recs    #DM  Newly diagnosed DM with Hgb A1C 7.8.  - Continue to monitor glucose  - Increase sliding scale insulin to medium intensity today   - Hypoglycemia protocol  - Endocrinology consulted, appreciate recs    #Nutrition  Patient does not meet two of the established criteria necessary for diagnosing malnutrition but is at risk for malnutrition.  - NG tube in place with TF started  - Nutrition consulted  - Minced and moist diet with thin liquids - patient has been taking in very little PO    #Hypokalemia   - RN Managed Standard Replacement protocol    #Superficial RUE thrombus  US completed 1/17 negative for DVT but positive for thrombus in the R basilic and cephalic veins from the antecubital fossa to  the wrist.     Chronic/Resolved Issues:  #DISHA (resolved)  Pt presented with Cr 1.37 improved with IVF to 1.17. Now at baseline.   - Daily BMP    #Elevated troponin (resolved)  Pt arrives with Trop 147 and rising. EKG exhibited sinus tachycardia, but no concerning signs of ischemia. Troponin peaked 1/10. Unclear what caused this elevation; unlikely to be MI given no sx or EKG changes.  - Coronary CTA as outpatient per Cards recs      Prophylaxis            For VTE Prevention:  - SCD  For Acid Suppression:  - GI prophylaxis is not indicated  FEN:  - NS mIVF @ 75/hr    Code Status  Full Code    The patient was discussed with Stroke Staff, Dr. Yousif.    Nelson Laura DO  Neurology Resident PGY1  ASCOM: *[id number]  ______________________________________________________    Clinically Significant Risk Factors Present on Admission                 Medications   Scheduled Meds    aspirin  81 mg Oral Daily    Or     aspirin  81 mg Oral or NG Tube Daily     atorvastatin  40 mg Oral QPM     carvedilol  37.5 mg Oral BID w/meals     chlorthalidone  25 mg Oral Daily     cloNIDine  0.1 mg Oral BID     fentaNYL  50 mcg Intravenous Once     hydrALAZINE  50 mg Oral Q8H CARLOS A     insulin aspart  1-7 Units Subcutaneous Q4H     insulin glargine  8 Units Subcutaneous Q24H     lisinopril  20 mg Oral or NG Tube Daily     miconazole   Topical BID     multivitamin, therapeutic  1 tablet Oral Daily     nystatin   Topical BID     potassium chloride  40 mEq Oral or Feeding Tube Once     sodium chloride (PF)  3 mL Intravenous Q8H     sodium chloride (PF)  3 mL Intracatheter Q8H     sodium chloride (PF)  3 mL Intracatheter Q8H     thiamine  100 mg Per Feeding Tube Daily     valproic acid  500 mg Oral Q8H CARLOS A       Infusion Meds    dextrose       - MEDICATION INSTRUCTIONS -       - MEDICATION INSTRUCTIONS -       - MEDICATION INSTRUCTIONS -       - MEDICATION INSTRUCTIONS -       niCARdipine Stopped (01/21/22 0614)     sodium chloride         PRN  Meds  acetaminophen, artificial tears ophthalmic solution, dextrose, glucose **OR** dextrose **OR** glucagon, fentaNYL, flumazenil, - MEDICATION INSTRUCTIONS -, HOLD MEDICATION, HOLD MEDICATION, HOLD MEDICATION, hydrALAZINE, labetalol, lidocaine 4%, lidocaine 4%, lidocaine (buffered or not buffered), lidocaine (buffered or not buffered), - MEDICATION INSTRUCTIONS -, - MEDICATION INSTRUCTIONS -, - MEDICATION INSTRUCTIONS -, melatonin, midazolam, naloxone, naloxone, naloxone, naloxone, NIFEdipine ER OSMOTIC, polyethylene glycol, senna-docusate, sodium chloride (PF), sodium chloride (PF), sodium chloride (PF), sodium chloride, sodium chloride bacteriostatic       PHYSICAL EXAMINATION  Temp:  [96.7  F (35.9  C)-98.7  F (37.1  C)] 98.4  F (36.9  C)  Pulse:  [66-81] 78  Resp:  [14-18] 18  BP: (120-179)/() 144/84  SpO2:  [96 %-100 %] 96 %      Neurologic  Mental Status: alert & oriented to self, location, situation & medical condition; named age, month, year; spelled WORLD forwards & backward, spelled SCHOOL forwards but not backwards  Cranial Nerves:  PER, right gaze preference but able to slightly cross the midline, oculocephalic reflex intact bilaterally, L sided facial droop, shoulder shrug strong on the R  Motor:  no abnormal movements. 5/5 RUE and RLE; 1/5 LUE & 2/5 LLE   Sensory:  Intact to noxious sensation in 4/4 extremities, decreased sensation on L side, can detect pressure/cold  Coordination:  deferred  Station/Gait:  deferred    Stroke Scales    NIHSS  Interval baseline (01/12/22 0324)   Interval Comments     1a. Level of Consciousness 1-->Not alert, but arousable by minor stimulation to obey, answer, or respond   1b. LOC Questions 2-->Answers neither question correctly   1c. LOC Commands 1-->Performs one task correctly   2.   Best Gaze 1-->Partial gaze palsy, gaze is abnormal in one or both eyes, but forced deviation or total gaze paresis is not present   3.   Visual 2-->Complete hemianopia   4.    Facial Palsy 1-->Minor paralysis (flattened nasolabial fold, asymmetry on smiling)   5a. Motor Arm, Left 4-->No movement   5b. Motor Arm, Right 0-->No drift, limb holds 90 (or 45) degrees for full 10 secs   6a. Motor Leg, Left 4-->No movement   6b. Motor Leg, right 1-->Drift, leg falls by the end of the 5-sec period but does not hit bed   7.   Limb Ataxia 0-->Absent   8.   Sensory 1-->Mild-to-moderate sensory loss, patient feels pinprick is less sharp or is dull on the affected side, or there is a loss of superficial pain with pinprick, but patient is aware of being touched   9.   Best Language 2-->Severe aphasia, all communication is through fragmentary expression, great need for inference, questioning, and guessing by the listener. Range of information that can be exchanged is limited, listener carries burden of. . . (see row details)   10. Dysarthria 0-->Normal   11. Extinction and Inattention  1-->Visual, tactile, auditory, spatial, or personal inattention or extinction to bilateral simultaneous stimulation in one of the sensory modalities   Total 21 (01/12/22 0324)       Imaging  I personally reviewed all imaging; relevant findings per HPI.     Lab Results Data   CBC  Recent Labs   Lab 01/22/22  0457 01/21/22  0521 01/20/22  0439   WBC 11.3* 9.9 11.6*   RBC 4.95 5.40 5.61   HGB 15.1 16.4 16.9   HCT 45.0 49.0 51.4    347 310     Basic Metabolic Panel    Recent Labs   Lab 01/22/22  0756 01/22/22  0457 01/22/22  0334 01/21/22  2048 01/21/22  1837 01/21/22  0736 01/21/22  0521 01/20/22  0806 01/20/22  0439   NA  --  138  --   --   --   --  141  --  141   POTASSIUM  --  3.3*  --   --  3.7  --  2.9*  --  3.6   CHLORIDE  --  102  --   --   --   --  103  --  105   CO2  --  30  --   --   --   --  32  --  30   BUN  --  37*  --   --   --   --  27  --  26   CR  --  1.39*  --   --   --   --  1.03  --  0.94   GLC 93 100* 93   < >  --    < > 141*   < > 195*   VALERIE  --  8.8  --   --   --   --  9.3  --  9.5    < > =  values in this interval not displayed.     Liver Panel  Recent Labs   Lab 01/17/22  0433   PROTTOTAL 7.8   ALBUMIN 2.9*   BILITOTAL 0.7   ALKPHOS 98   AST 22   ALT 20     INR    Recent Labs   Lab Test 01/18/22  1405 01/08/22  1843 01/08/22  1834   INR 1.06 0.95 1.0*      Lipid Profile    Recent Labs   Lab Test 01/08/22  1843 05/16/14  1506   CHOL 169 131   HDL 70 53   LDL 75 70   TRIG 118 38     A1C    Recent Labs   Lab Test 01/08/22  1843   A1C 7.8*     Troponin I  No results for input(s): TROPONIN, GHTROP in the last 168 hours.

## 2022-01-22 NOTE — PLAN OF CARE
"Blood pressure 132/82, pulse 77, temperature 98.8  F (37.1  C), temperature source Axillary, resp. rate 20, height 1.702 m (5' 7\"), weight 81.1 kg (178 lb 12.7 oz), SpO2 97 %.  Pt VSS, on RA.  Pt A& O x 4, neuro's unchanged.  L side flaccid.  Denies any pain.  T/R q 2 hours.  Up to chair with lift.  Soft and bite sized diet, eating small amounts.  Trying to encourage PO intake.  Condom catheter in place with adequate output.  No BM this shift.  K+ 3.3, replaced per protocol.  Started on scheduled dosing as well, recheck ordered.  BG q 4 hours.  Call light within reach.  Chair alarm on.  Continue to monitor.   "

## 2022-01-22 NOTE — PLAN OF CARE
Neuro: A&Ox4. Can be forgetful at times, especially upon waking. Afebrile. Lethargic post CARISA this afternoon, alert throughout the evening hours. LUE, LLE weakness, L facial droop and visual field cut.    Cardiac: SR, 70-80s. SBP 140s-150s, maintained within parameters.    Respiratory: Sating % on RA.  GI/: Adequate urine output via condom cath. 24-hour urine collection ends 1/22 at 1000. No BM this shift.  Diet/appetite: Tolerating soft diet. Fair appetite. Total feed.   Activity:  Up with lift to chair.   Pain: At acceptable level on current regimen.   Skin: No new deficits noted.  LDA's:  -L PIV  -R PIV    Plan: Continue with POC. Notify primary team with changes.

## 2022-01-22 NOTE — PROGRESS NOTES
Nephrology Brief Note    Chart reviewed. BPs at goal set by Neuro. Pending screening renin activity and marychuy level along with 24hr confirmatory studies (and the 24hr urine sodium to ensure patient not hypovolemic / renin is suppressed). Urine metanephrines also pending.     Continue to replete potassium. Continue to avoid spironolactone while testing for primary hyperaldosteronism is ongoing.     Arden Reese MD  Renal   [phone]

## 2022-01-22 NOTE — PROGRESS NOTES
Brief Diabetes Note      Reviewed the charts , overnight events, BG values , requiring minimal aspart on sliding scale with BG well controlled on lantus 8 units, s/p CARISA , restarted on diet yesterday evening, would continue the same management for now, will consider adding carb average for meals if his BG uptrend or his intake increases    Nurses to document amount of carbs consumed by patient in I& O chart    Maria Del Carmen Salas MD.  Endocrinology fellow      Plan of care d/w Dr Jacinto    Attending tie-in note  I reviewed the data about the patient with endocrine felllow  Agree above note and plan.       Indu Jacinto MD  Staff Physician  Endocrinology and Metabolism  [facility]  License: [id number]  Pager: [phone]

## 2022-01-22 NOTE — PLAN OF CARE
"BP (!) 144/81 (BP Location: Right arm)   Pulse 76   Temp 98.5  F (36.9  C) (Oral)   Resp 16   Ht 1.702 m (5' 7\")   Wt 81.1 kg (178 lb 12.7 oz)   SpO2 99%   BMI 28.00 kg/m      Neuro: A&Ox4. Can be forgetful at times, especially upon waking. Afebrile. Left upper and lower extremity weakness, L facial droop and visual field cut. Other neuro's intact.  Cardiac: SR, 70-80s. SBP 140s. Goal SBP <160.  Respiratory: RA.  GI/: Adequate urine output per condom cath. 24-hour urine collection ends 1/22 at 1000. No BM this shift.  Diet/appetite: Tolerating soft diet. Denies n/v.  Activity:  Up with lift to chair.   Pain: Denies pain.  Skin: No new deficits noted.  LDA's: L and R PIV, both SL.    Potassium 3.3 this AM. 40 MeQ potassium solution ordered from pharmacy. Will give once here.    "

## 2022-01-22 NOTE — PROGRESS NOTES
"  Nephrology Progress Note  01/21/2022         Assessment & Recommendations:     #Severe resistant hypertension, suspect hyperaldosteronism  #Hypokalemia  #Moderate to severe left ventricular hypertrophy    Plan:  -Presence of resistant hypertension, hypokalemia and metabolic alkalosis is suggestive of hyperaldosteronism. He also has moderate to severe LVH on TTE suggesting longstanding uncontrolled hypertension.  - Required Nicardipine drip this morning. Continue Coreg 25, chlorthalidone 25 mg, clonidine 0.1 mg, lisinopril and hydralazine 50 mg Q6 hrs.   -Continue as needed IV hydralazine and labetalol for blood pressure control  - Once 24 hr urine aldosterone collection is complete, please start Spironolactone 50 mg daily  Investigations pending:  - Renin level, 24 hour urine aldosterone and 24 hr urine sodium, urine metanephrine    Investigations:  - Renal US - Right kidney 10.2 cm in length, left kidney 10.4 cm in length; normal parenchymal thickness and echogenicity, no obstruction. No evidence of left or right renal artery stenosis.  - Cortisol level at 4 pm is 20.5     #Acute ischemic stroke of right P1 segment of PCA with subsequent hemorrhagic transformation of ischemic infarct    Recommendations were communicated to primary team via note    Seen and discussed with Dr. Isabel Irene MD   [phone]    Interval History :   Nursing and provider notes from last 24 hours reviewed.    Elevated blood pressures this morning, required initiation of Nicardipine drip.     Review of Systems:   Unable to obtain, patient drowsy    Physical Exam:   I/O last 3 completed shifts:  In: 310 [P.O.:250; I.V.:60]  Out: 425 [Urine:425]   BP (!) 146/92 (BP Location: Right arm)   Pulse 66   Temp 97.2  F (36.2  C) (Axillary)   Resp 16   Ht 1.702 m (5' 7\")   Wt 82.1 kg (180 lb 14.4 oz)   SpO2 99%   BMI 28.33 kg/m       GENERAL APPEARANCE: drowsy  HENT: mouth without ulcers or lesions  PULM: lungs clear to auscultation " bilaterally, equal air movement, no clubbing  CV: regular rhythm, normal rate, no rub     -JVD not distended     -edema absent   GI: soft, non-tender, not distended, bowel sounds are +  INTEGUMENT: no cyanosis, no rash    Labs:   All labs reviewed by me  Electrolytes/Renal -   Recent Labs   Lab Test 01/21/22  1833 01/21/22  1145 01/21/22  0736 01/21/22  0521 01/20/22  0806 01/20/22  0439 01/19/22  1735 01/19/22  1540 01/19/22  0826 01/19/22  0435   NA  --   --   --  141  --  141  --   --   --  143   POTASSIUM  --   --   --  2.9*  --  3.6  --  3.1*  --  3.1*   CHLORIDE  --   --   --  103  --  105  --   --   --  106   CO2  --   --   --  32  --  30  --   --   --  32   BUN  --   --   --  27  --  26  --   --   --  32*   CR  --   --   --  1.03  --  0.94  --   --   --  1.12   * 166* 157* 141*   < > 195*   < >  --    < > 298*   VALERIE  --   --   --  9.3  --  9.5  --   --   --  9.6   MAG  --   --   --  2.0  --  1.9  --   --   --  2.3   PHOS  --   --   --  2.9  --  2.6  --  2.9  --  2.4*    < > = values in this interval not displayed.       CBC -   Recent Labs   Lab Test 01/21/22  0521 01/20/22  0439 01/19/22  0435   WBC 9.9 11.6* 10.7   HGB 16.4 16.9 16.9    310 324       LFTs -   Recent Labs   Lab Test 01/17/22  0433 01/08/22  1843 04/25/14  1530   ALKPHOS 98 94 66   BILITOTAL 0.7 0.8 0.7   ALT 20 31 50*   AST 22 26 26   PROTTOTAL 7.8 8.5 7.1   ALBUMIN 2.9* 3.9 4.3       Iron Panel - No lab results found.      Imaging:  All imaging studies reviewed by me.     Current Medications:    aspirin  81 mg Oral Daily    Or     aspirin  81 mg Oral or NG Tube Daily     atorvastatin  40 mg Oral QPM     carvedilol  37.5 mg Oral BID w/meals     chlorthalidone  25 mg Oral Daily     cloNIDine  0.1 mg Oral BID     fentaNYL  50 mcg Intravenous Once     hydrALAZINE  50 mg Oral Q8H CARLOS A     insulin aspart  1-7 Units Subcutaneous Q4H     insulin glargine  8 Units Subcutaneous Q24H     lisinopril  20 mg Oral or NG Tube Daily      miconazole   Topical BID     multivitamin, therapeutic  1 tablet Oral Daily     nystatin   Topical BID     sodium chloride (PF)  3 mL Intravenous Q8H     sodium chloride (PF)  3 mL Intracatheter Q8H     sodium chloride (PF)  3 mL Intracatheter Q8H     thiamine  100 mg Per Feeding Tube Daily     valproic acid  500 mg Oral Q8H CARLOS A       dextrose       - MEDICATION INSTRUCTIONS -       - MEDICATION INSTRUCTIONS -       - MEDICATION INSTRUCTIONS -       - MEDICATION INSTRUCTIONS -       niCARdipine Stopped (01/21/22 0614)     sodium chloride       Andrew Irene MD

## 2022-01-23 ENCOUNTER — APPOINTMENT (OUTPATIENT)
Dept: SPEECH THERAPY | Facility: CLINIC | Age: 47
End: 2022-01-23
Payer: COMMERCIAL

## 2022-01-23 LAB
ANION GAP SERPL CALCULATED.3IONS-SCNC: 8 MMOL/L (ref 3–14)
BACTERIA BLD CULT: NO GROWTH
BACTERIA BLD CULT: NO GROWTH
BUN SERPL-MCNC: 36 MG/DL (ref 7–30)
CALCIUM SERPL-MCNC: 8.6 MG/DL (ref 8.5–10.1)
CHLORIDE BLD-SCNC: 102 MMOL/L (ref 94–109)
CO2 SERPL-SCNC: 30 MMOL/L (ref 20–32)
CREAT 24H UR-MRATE: ABNORMAL MG/D
CREAT SERPL-MCNC: 1.38 MG/DL (ref 0.66–1.25)
CREAT UR-MCNC: 41 MG/DL
ERYTHROCYTE [DISTWIDTH] IN BLOOD BY AUTOMATED COUNT: 13.3 % (ref 10–15)
GFR SERPL CREATININE-BSD FRML MDRD: 63 ML/MIN/1.73M2
GLUCOSE BLD-MCNC: 93 MG/DL (ref 70–99)
GLUCOSE BLDC GLUCOMTR-MCNC: 100 MG/DL (ref 70–99)
GLUCOSE BLDC GLUCOMTR-MCNC: 139 MG/DL (ref 70–99)
GLUCOSE BLDC GLUCOMTR-MCNC: 152 MG/DL (ref 70–99)
GLUCOSE BLDC GLUCOMTR-MCNC: 185 MG/DL (ref 70–99)
GLUCOSE BLDC GLUCOMTR-MCNC: 83 MG/DL (ref 70–99)
HCT VFR BLD AUTO: 43.3 % (ref 40–53)
HGB BLD-MCNC: 14.5 G/DL (ref 13.3–17.7)
MAGNESIUM SERPL-MCNC: 2 MG/DL (ref 1.6–2.3)
MCH RBC QN AUTO: 30.5 PG (ref 26.5–33)
MCHC RBC AUTO-ENTMCNC: 33.5 G/DL (ref 31.5–36.5)
MCV RBC AUTO: 91 FL (ref 78–100)
METANEPH 24H UR-MCNC: 69 UG/L
METANEPH 24H UR-MRATE: ABNORMAL UG/D
METANEPH+NORMETANEPH UR-IMP: ABNORMAL
METANEPH/CREAT 24H UR: 168 UG/G CRT
NORMETANEPHRINE 24H UR-MCNC: 200 UG/L
NORMETANEPHRINE 24H UR-MRATE: ABNORMAL UG/D
NORMETANEPHRINE/CREAT 24H UR: 488 UG/G CRT
PHOSPHATE SERPL-MCNC: 3.4 MG/DL (ref 2.5–4.5)
PLATELET # BLD AUTO: 300 10E3/UL (ref 150–450)
POTASSIUM BLD-SCNC: 3.3 MMOL/L (ref 3.4–5.3)
POTASSIUM BLD-SCNC: 3.9 MMOL/L (ref 3.4–5.3)
RBC # BLD AUTO: 4.75 10E6/UL (ref 4.4–5.9)
SARS-COV-2 RNA RESP QL NAA+PROBE: NEGATIVE
SODIUM SERPL-SCNC: 140 MMOL/L (ref 133–144)
WBC # BLD AUTO: 8.7 10E3/UL (ref 4–11)

## 2022-01-23 PROCEDURE — 250N000013 HC RX MED GY IP 250 OP 250 PS 637

## 2022-01-23 PROCEDURE — 250N000013 HC RX MED GY IP 250 OP 250 PS 637: Performed by: STUDENT IN AN ORGANIZED HEALTH CARE EDUCATION/TRAINING PROGRAM

## 2022-01-23 PROCEDURE — U0005 INFEC AGEN DETEC AMPLI PROBE: HCPCS

## 2022-01-23 PROCEDURE — 99233 SBSQ HOSP IP/OBS HIGH 50: CPT | Performed by: STUDENT IN AN ORGANIZED HEALTH CARE EDUCATION/TRAINING PROGRAM

## 2022-01-23 PROCEDURE — 36415 COLL VENOUS BLD VENIPUNCTURE: CPT

## 2022-01-23 PROCEDURE — 36415 COLL VENOUS BLD VENIPUNCTURE: CPT | Performed by: STUDENT IN AN ORGANIZED HEALTH CARE EDUCATION/TRAINING PROGRAM

## 2022-01-23 PROCEDURE — 99232 SBSQ HOSP IP/OBS MODERATE 35: CPT | Mod: GC | Performed by: INTERNAL MEDICINE

## 2022-01-23 PROCEDURE — 85027 COMPLETE CBC AUTOMATED: CPT | Performed by: STUDENT IN AN ORGANIZED HEALTH CARE EDUCATION/TRAINING PROGRAM

## 2022-01-23 PROCEDURE — 84132 ASSAY OF SERUM POTASSIUM: CPT

## 2022-01-23 PROCEDURE — 83735 ASSAY OF MAGNESIUM: CPT | Performed by: INTERNAL MEDICINE

## 2022-01-23 PROCEDURE — 92526 ORAL FUNCTION THERAPY: CPT | Mod: GN

## 2022-01-23 PROCEDURE — 250N000013 HC RX MED GY IP 250 OP 250 PS 637: Performed by: PSYCHIATRY & NEUROLOGY

## 2022-01-23 PROCEDURE — 120N000003 HC R&B IMCU UMMC

## 2022-01-23 PROCEDURE — 80048 BASIC METABOLIC PNL TOTAL CA: CPT | Performed by: STUDENT IN AN ORGANIZED HEALTH CARE EDUCATION/TRAINING PROGRAM

## 2022-01-23 PROCEDURE — 84100 ASSAY OF PHOSPHORUS: CPT | Performed by: INTERNAL MEDICINE

## 2022-01-23 PROCEDURE — 250N000013 HC RX MED GY IP 250 OP 250 PS 637: Performed by: INTERNAL MEDICINE

## 2022-01-23 PROCEDURE — 99233 SBSQ HOSP IP/OBS HIGH 50: CPT | Mod: GC | Performed by: PSYCHIATRY & NEUROLOGY

## 2022-01-23 PROCEDURE — 250N000013 HC RX MED GY IP 250 OP 250 PS 637: Performed by: COUNSELOR

## 2022-01-23 RX ORDER — POTASSIUM CHLORIDE 1.5 G/1.58G
40 POWDER, FOR SOLUTION ORAL ONCE
Status: COMPLETED | OUTPATIENT
Start: 2022-01-23 | End: 2022-01-23

## 2022-01-23 RX ORDER — LABETALOL HYDROCHLORIDE 5 MG/ML
10-20 INJECTION, SOLUTION INTRAVENOUS EVERY 4 HOURS PRN
Status: DISCONTINUED | OUTPATIENT
Start: 2022-01-23 | End: 2022-01-25

## 2022-01-23 RX ORDER — HYDRALAZINE HYDROCHLORIDE 20 MG/ML
10-20 INJECTION INTRAMUSCULAR; INTRAVENOUS EVERY 4 HOURS PRN
Status: DISCONTINUED | OUTPATIENT
Start: 2022-01-23 | End: 2022-01-25

## 2022-01-23 RX ORDER — LISINOPRIL 20 MG/1
40 TABLET ORAL DAILY
Status: DISCONTINUED | OUTPATIENT
Start: 2022-01-24 | End: 2022-01-28 | Stop reason: HOSPADM

## 2022-01-23 RX ORDER — LISINOPRIL 20 MG/1
20 TABLET ORAL ONCE
Status: COMPLETED | OUTPATIENT
Start: 2022-01-23 | End: 2022-01-23

## 2022-01-23 RX ADMIN — ASPIRIN 81 MG CHEWABLE TABLET 81 MG: 81 TABLET CHEWABLE at 07:39

## 2022-01-23 RX ADMIN — CHLORTHALIDONE 25 MG: 25 TABLET ORAL at 07:40

## 2022-01-23 RX ADMIN — CARVEDILOL 37.5 MG: 25 TABLET, FILM COATED ORAL at 07:39

## 2022-01-23 RX ADMIN — ACETAMINOPHEN 650 MG: 325 TABLET, FILM COATED ORAL at 08:27

## 2022-01-23 RX ADMIN — HYDRALAZINE HYDROCHLORIDE 50 MG: 50 TABLET, FILM COATED ORAL at 10:35

## 2022-01-23 RX ADMIN — POTASSIUM CHLORIDE 20 MEQ: 1.5 POWDER, FOR SOLUTION ORAL at 07:39

## 2022-01-23 RX ADMIN — HYDRALAZINE HYDROCHLORIDE 50 MG: 50 TABLET, FILM COATED ORAL at 01:42

## 2022-01-23 RX ADMIN — HYDRALAZINE HYDROCHLORIDE 50 MG: 50 TABLET, FILM COATED ORAL at 17:47

## 2022-01-23 RX ADMIN — CLONIDINE HYDROCHLORIDE 0.1 MG: 0.1 TABLET ORAL at 20:00

## 2022-01-23 RX ADMIN — CARVEDILOL 37.5 MG: 25 TABLET, FILM COATED ORAL at 17:47

## 2022-01-23 RX ADMIN — ATORVASTATIN CALCIUM 40 MG: 40 TABLET, FILM COATED ORAL at 20:01

## 2022-01-23 RX ADMIN — POTASSIUM CHLORIDE 20 MEQ: 1.5 POWDER, FOR SOLUTION ORAL at 20:00

## 2022-01-23 RX ADMIN — DIVALPROEX SODIUM 500 MG: 250 TABLET, DELAYED RELEASE ORAL at 20:00

## 2022-01-23 RX ADMIN — POTASSIUM CHLORIDE 40 MEQ: 1.5 POWDER, FOR SOLUTION ORAL at 11:11

## 2022-01-23 RX ADMIN — CLONIDINE HYDROCHLORIDE 0.1 MG: 0.1 TABLET ORAL at 07:38

## 2022-01-23 RX ADMIN — THERA TABS 1 TABLET: TAB at 07:40

## 2022-01-23 RX ADMIN — DIVALPROEX SODIUM 500 MG: 250 TABLET, DELAYED RELEASE ORAL at 07:39

## 2022-01-23 RX ADMIN — INSULIN GLARGINE 8 UNITS: 100 INJECTION, SOLUTION SUBCUTANEOUS at 16:26

## 2022-01-23 RX ADMIN — THIAMINE HCL TAB 100 MG 100 MG: 100 TAB at 07:39

## 2022-01-23 RX ADMIN — LISINOPRIL 20 MG: 20 TABLET ORAL at 12:16

## 2022-01-23 RX ADMIN — LISINOPRIL 20 MG: 20 TABLET ORAL at 07:39

## 2022-01-23 ASSESSMENT — ACTIVITIES OF DAILY LIVING (ADL)
ADLS_ACUITY_SCORE: 15
ADLS_ACUITY_SCORE: 13
ADLS_ACUITY_SCORE: 15
ADLS_ACUITY_SCORE: 13
ADLS_ACUITY_SCORE: 15
ADLS_ACUITY_SCORE: 13
ADLS_ACUITY_SCORE: 15
ADLS_ACUITY_SCORE: 15

## 2022-01-23 ASSESSMENT — MIFFLIN-ST. JEOR: SCORE: 1638.63

## 2022-01-23 NOTE — PROGRESS NOTES
INPATIENT DIABETES PROGRESS NOTE  Miriam Hall  Age: 46 year old  MRN # [id number]   YOB: 1975    INTERVAL HISTORY  Patient tolerating PO diet better - will benefit from carb coverage    BG at goal, has post prandial hyper glycemia, discussed the plan with patient.        History of Present Illness: Miriam Hall is a 46 year old male with PMHx HTN who presented on 1/8/2022 with L gaze palsy, L complete hemianopia, mild dysarthria, L arm > leg weakness, L arm/leg decreased sensation, and L side extinction after waking up from a nap. FTH an occlusion of the P1 segment of the R PCA with associated established infarct as well as high grade stenosis of the M2 segment of the R MCA. TTE with EF of 50-55%, no WMA or atrial enlargement. Etiology undetermined at this time.     TF held overnight into this AM, was initially NPO for CARISA today, but that got moved to tomorrow 1/21/22.  Patient pulled NG tube today, so no TF currently.  Per neurology, speech cleared patient for a soft and bite sized diet w/thin liquids.  Neurology will not replace the NG tube and plans to just let him eat.      Through chart review, Rohan saw provider from  in 2017 and was diagnosed with DM.  He was given recommendations for lifestyle changes and he was given a glucometer, but it appears he was not prescribed any DM medications.  A1C 6.5 at that time.    Patient denies taking DM medications.  He is minimally interactive/talkative this AM.        Prior to Admission Diabetes Regimen: none  BG monitor: N/A  Frequency of checks: N/A  Diet: unable to obtain information about diet prior to admission  Usual BG control PTA:  uncontrolled, with A1c 7.8 on 1/8/22  Primary Care Provider: Physician No Ref-Primary  Factors Impacting IP Glucose Control: stroke, tube feeding  Current Diet: Soft diet , off TF      Physical Exam   BP (!) 150/97 (BP Location: Right arm)   Pulse 72   Temp 98.2  F (36.8  C) (Oral)   Resp 18   Ht 1.702 m (5'  "7\")   Wt 80.5 kg (177 lb 7.5 oz)   SpO2 96%   BMI 27.80 kg/m    General: Alert and oriented  HEENT: normocephalic, atraumatic. Oral mucous membranes moist.   Lungs: unlabored respiration, no cough  ABD: rounded, nondistended  Skin: warm and dry, no obvious lesions  Mental status:  Alert and oriented  Psych:   calm and appropriate interaction     Most Recent Laboratory Tests:  Recent Labs   Lab 01/23/22  0447   HGB 14.5     No results for input(s): A1C in the last 168 hours.  Recent Labs   Lab 01/23/22  0447   CR 1.38*     Recent Labs   Lab 01/23/22  0752 01/23/22  0447 01/23/22  0308 01/22/22  2351 01/22/22 2059 01/22/22 1952   * 93 83 167* 257* 246*       Assessment:   1) DM 2, new diagnosis (A1C) 7.8)  2) tube feeding induced hyperglycemia    Plan:    -Continue  Lantus 8 units   - Carb coverage with meals aspart 1U per 25 gms CHO with meals and snacks PRN                  - BG checks TID AC and at bedtime, 0200                  - Aspart MDSSI TID AC And QHS   -hypoglycemia protocol   -diabetes education needs will be assessed closer to discharge   -on discharge, will recommend outpatient follow up with [facility] Endocrinology service     Discussed plan of care with patient, nursing, and primary team.   .     To contact Endocrine Diabetes service:   From 8AM-4PM: page inpatient diabetes provider that is following the patient  For questions or updates from 4PM-8AM: page the diabetes job code for on call fellow: [phone]    Plan of care d/w Dr Ophelia Salas MD.      Attending tie-in note  I saw the patient with  endocrine fellow Dr. Salas and directly examined patient and discussed. Agree above note and plan.     Indu Jacinto MD  Staff Physician  Endocrinology and Metabolism  [facility]  License: [place] [id number]  Pager: [phone]     "

## 2022-01-23 NOTE — PLAN OF CARE
"BP (!) 159/94 (BP Location: Right arm)   Pulse 75   Temp 98.2  F (36.8  C) (Oral)   Resp 16   Ht 1.702 m (5' 7\")   Wt 80.5 kg (177 lb 7.5 oz)   SpO2 97%   BMI 27.80 kg/m      VSS. SBP <160 per goal (after scheduled hydralazine given at 2AM. SBP before that 162.). Afebrile. Room air. Alert and oriented x 4. Neuro's unchanged. Left side extremities flaccid. Left facial droop. Denies pain. Tolerating diet with no n/v. Blood sugars q 4. Refusing most repositionings. No BM overnight. Condom cath in place with adequate UOP. Right and left PIV SL. Continue to monitor.  "

## 2022-01-23 NOTE — PLAN OF CARE
Neuro: A&Ox4. Can be forgetful at times, especially upon waking. Afebrile. LUE, LLE weakness, L facial droop and visual field cut.    Cardiac: SR, 70s. SBP 140s-150s, maintained within parameters. Other VSS.          Respiratory: Sating % on RA.  GI/: Adequate urine output via condom cath. BM x1 this shift.   Diet/appetite: Tolerating soft diet. Fair appetite. Total feed.   Activity:  Up with lift to chair.   Pain: At acceptable level on current regimen.   Skin: No new deficits noted.  LDA's:  -L PIV  -R PIV     Plan: Continue with POC. Notify primary team with changes.

## 2022-01-23 NOTE — PLAN OF CARE
Major Shift Events:  Blood pressure well controlled today without use of prn medications. Diet advanced to regular diet with 1:1 assist. Up in chair with lift assist. Neuro remains unchanged; lethargic at times, left hemiparesis and visual cut. Transfer order to  and awaiting bed.   Plan: Transfer to   For vital signs and complete assessments, please see documentation flowsheets.

## 2022-01-23 NOTE — PROGRESS NOTES
"  Nephrology Progress Note  01/23/2022         Assessment & Recommendations:   Miriam Hall is a 46 year old year old male with h/o HTN and admitted with CVA found to have hypokalemia and elevated bicarb, raising c/f primary aldosteronism.     # HTN, hypokalemia, metabolic alkalosis  # CVA, LVH  BPs now controlled on oral agents without spironolactone. Renin and marychuy pending for aldosteronism, including urinary marychuy (and urine metanephrines pending in a separate pheochromocytoma workup), but his urine sodium is a mere 47 mmol (likely needs to be 200+ mmol to ensure renin suppression). Furthermore, his K remains low. We may need to repeat confirmatory testing (assuming the still pending screening is positive), though this could be done outpt if pt is nearing discharge. While we await results, do not start spironolactone, increasing lisinopril to 40mg daily (and if BP drops, can decrease one of his other oral agents), and CTM serum K BID and replete frequently to remove potassium-drive aldosterone secretion.     # small creatinine rise  Likely hemodynamic from treating what appears to be longstanding HTN. CTM and note it may rise further with increasing ACEi.      Recommendations were communicated to primary team via phone call.    Vinny Reese MD   [phone]    Interval History :   Nursing and provider notes from last 24 hours reviewed.  In the last 24 hours Miriam Hall feels well, though he notes he is eating very poorly. He endorses having mild edema.     Review of Systems:   I reviewed the following systems:  GI: as above  Neuro:  denies confusion  Constitutional:  no fever or chills  CV: no chest pain.    Physical Exam:   I/O last 3 completed shifts:  In: 560 [P.O.:560]  Out: 1100 [Urine:1100]   /76 (BP Location: Right arm)   Pulse 73   Temp 98.2  F (36.8  C) (Oral)   Resp 18   Ht 1.702 m (5' 7\")   Wt 80.5 kg (177 lb 7.5 oz)   SpO2 96%   BMI 27.80 kg/m       GENERAL APPEARANCE: " NAD  EYES:  no scleral icterus  HENT: MMM  PULM: CTAB  CV: regular rhythm, normal rate, no rub     -edema - trace LE bilat  GI: soft, NTND  Skin: no rashes appreciated    Labs:   All labs reviewed by me  Electrolytes/Renal - Recent Labs   Lab Test 01/23/22  1149 01/23/22  0752 01/23/22  0447 01/22/22  1532 01/22/22  1505 01/22/22  0756 01/22/22  0457 01/21/22  0736 01/21/22  0521   NA  --   --  140  --   --   --  138  --  141   POTASSIUM  --   --  3.3*  --  4.0  --  3.3*   < > 2.9*   CHLORIDE  --   --  102  --   --   --  102  --  103   CO2  --   --  30  --   --   --  30  --  32   BUN  --   --  36*  --   --   --  37*  --  27   CR  --   --  1.38*  --   --   --  1.39*  --  1.03   * 100* 93   < >  --    < > 100*   < > 141*   VALERIE  --   --  8.6  --   --   --  8.8  --  9.3   MAG  --   --  2.0  --   --   --  2.0  --  2.0   PHOS  --   --  3.4  --   --   --  4.0  --  2.9    < > = values in this interval not displayed.       CBC -   Recent Labs   Lab Test 01/23/22  0447 01/22/22  0457 01/21/22  0521   WBC 8.7 11.3* 9.9   HGB 14.5 15.1 16.4    329 347       LFTs -   Recent Labs   Lab Test 01/17/22  0433 01/08/22  1843 04/25/14  1530   ALKPHOS 98 94 66   BILITOTAL 0.7 0.8 0.7   ALT 20 31 50*   AST 22 26 26   PROTTOTAL 7.8 8.5 7.1   ALBUMIN 2.9* 3.9 4.3       Iron Panel - No lab results found.      Imaging:  All imaging studies reviewed by me.     Current Medications:    aspirin  81 mg Oral Daily    Or     aspirin  81 mg Oral or NG Tube Daily     atorvastatin  40 mg Oral QPM     carvedilol  37.5 mg Oral BID w/meals     chlorthalidone  25 mg Oral Daily     cloNIDine  0.1 mg Oral BID     divalproex sodium delayed-release  500 mg Oral Q12H CARLOS A     fentaNYL  50 mcg Intravenous Once     hydrALAZINE  50 mg Oral Q8H CARLOS A     insulin aspart   Subcutaneous TID w/meals     insulin aspart  1-7 Units Subcutaneous 4x Daily AC & HS     insulin glargine  8 Units Subcutaneous Q24H     [START ON 1/24/2022] lisinopril  40 mg Oral  Daily     miconazole   Topical BID     multivitamin, therapeutic  1 tablet Oral Daily     nystatin   Topical BID     potassium chloride  20 mEq Oral BID     sodium chloride (PF)  3 mL Intravenous Q8H     sodium chloride (PF)  3 mL Intracatheter Q8H     sodium chloride (PF)  3 mL Intracatheter Q8H     thiamine  100 mg Per Feeding Tube Daily       dextrose       - MEDICATION INSTRUCTIONS -       - MEDICATION INSTRUCTIONS -       - MEDICATION INSTRUCTIONS -       - MEDICATION INSTRUCTIONS -       niCARdipine Stopped (01/21/22 0614)     sodium chloride       Vinny Reese MD

## 2022-01-23 NOTE — PROGRESS NOTES
[facility]    Stroke Progress Note    Interval Events  - NAEON  - 24 hour urine collection completed  - BP at goal < 160 last 24 hours  - Per SLP, advanced to regular diet (carb restricted)    HPI Summary  Miriam Hall is a 46 year old male with PMHx HTN who presented on 1/8/2022 with symptoms concerning for stroke. The pt reports that he was in his normal state of health that morning and then decided to take a nap around noon. On waking from his nap around 2:00PM, he found that he was having trouble moving and feeling the L side of his body. Unfortunately, the pt was unable to get to a phone and ended up crawling on his floor until he could finally get the attention of one of his neighbors, who then was able to call EMS.     EMS arrived to find the pt continuing to have decreased sensation and weakness on his L side. They noted a , /145, and . On arrival in the ED, the pt remained afebrile, but was tachycardic () and quite hypertensive (/146). A stroke code was called for initial NIHSS 9, for a partial L gaze palsy, L-sided complete hemianopia, L arm > leg weakness, decreased sensation in the L arm/leg to sensation (pinprick intact in arm but decreased in leg), mild dysarthria, L-sided extinction, and possible L neglect. The pt also seemed to be having trouble with proprioception in the L arm, though did recognize it to be his own. Pt was immediately taken to CT where he underwent a CTH, CTA Head/Neck and CT Perfusion study, revealing a well-established R parieto-occipital stroke with an LVO of the R P1 segment. As the stroke already appeared well-established and the pt was outside the time window, no tPA was given. No thrombectomy was performed due to the location of the LVO in the PCA. The pt was loaded with ASA 324mg once with plans to allow for permissive HTN and admission to the stroke service for further work-up     The  patient denies any history of smoking or drug use. Does drink EtOH, unclear amount.      TPA Treatment   Not given due to established infarct on imaging and unclear or unfavorable risk-benefit profile for extended window thrombolysis beyond the conventional 4.5 hour time window.     Endovascular Treatment  Proximal vessel occlusion present, but endovascular treatment not initiated due to location of the thrombus in the posterior cerebral artery    Stroke Evaluation Summarized    MRI/Head CT MRI Brain (1/12)  1. Findings from CT and CTA performed yesterday are confirmed with stroke in the distribution of the right posterior cerebral artery. This involves the medial right occipital lobe and medial right temporal lobe with extension into the body of the thalamus.  2. Additional punctate foci of diffusion restriction in the left putamen.  3. Moderate leukoaraiosis.     CT Head (1/16)  1. Continued evolution of large right posterior cerebral artery territory infarct. With similar mass effect and 4 mm of leftward midline shift, previously 5 mm.  2. Subtle hyperdensities in the right basal ganglia consistent with petechial hemorrhages seen on previous MRI. No new intracranial hemorrhage or new loss of gray-white matter differentiation.    MRI Brain (1/18)  1. Exam sensitivity is severely diminished due to considerable motion artifact.  2. No definite abnormal enhancement to suggest infection on the motion degraded postcontrast images.  3. Continued evolution of right posterior cerebral territory infarct and punctate infarct involving the left putamen. Associated petechial hemorrhage and developing cortical laminar necrosis.   Intracranial Vasculature MRA (1/12)  Redemonstration of occlusion of the right PCA near its  origin. Additional short segment high-grade stenosis of a right-sided M2 MCA branch. Findings are not likely not significantly changed from CT performed yesterday.   Cervical Vasculature CTA (1/12)  1.  Occlusion of the proximal right PCA.  2. High-grade stenosis of the occiput M2 branch of the right MCA with  attenuated flow distal.  3. Neck CTA demonstrates no stenosis of the major cervical arteries.     Echocardiogram The visual ejection fraction is 50-55%.   No regional wall motion abnormalities are seen.  Moderate to severe concentric wall thickening consistent with left ventricular hypertrophy is present.  Global right ventricular function is normal.  There was no shunt at the atrial septal level as assessed by agitated saline bubble study at rest and with Valsalva maneuver.  Previous study not available for comparison.   EKG/Telemetry EKG with sinus tachycardia   Other Testing Hypercoagulability profile has not been sent yet     LDL  1/8/2022: 75 mg/dL   A1C  1/8/2022: 7.8 %   Troponin No lab value available in past 48 hrs       Impression   # Acute ischemic stroke of Right P1 segement of the PCA  # R P1 occlusion and R M2 Stenosis  # Large vessel atherosclerosis vs ESUS  # Cerebral edema   # Hemorrhagic transformation of ischemic infarct  Miriam Hall is a 46 year old male with PMHx HTN who presented on 1/8/2022 with L gaze palsy, L complete hemianopia, mild dysarthria, L arm > leg weakness, L arm/leg decreased sensation, and L side extinction after waking up from a nap. FTH an occlusion of the P1 segment of the R PCA with associated established infarct as well as high grade stenosis of the M2 segment of the R MCA. TTE with EF of 50-55%, no WMA or atrial enlargement. Etiology undetermined at this time.   - Aspirin 81 mg PO daily for secondary stroke prevention  - CARISA once BP under better control   - PT/OT/SLP: ARU appropriate  - CT C/A/P (1/18): Negative  - Hypercoag panel (1/18): Negative  - Vasculitis panel (1/18): Negative    #Encephalopathy  #Leukocytosis  #Concern for seizures  Patient with ongoing waxing and waning level of consciousness over the past few days. Hooked up to EEG on 1/12 with no  evidence of seizures per report from Dr. Porter and pt pulling off leads overnight so EEG disconnected on 1/13. Repeat EEG on 1/16 completed and negative for seizure activity. No fevers documented in chart but patient does have a mild leukocytosis. No alarming electrolyte derangements. BP has been persistently elevated and resistant to medications, so his confusion could represent hypertensive encephalopathy.   -  mg PO BID   - VPA total/free trough levels (1/18): 100/19.8  - EEG spot study (1/18): Negative seizures  - Blood cultures (1/18): NGTD  - UA & UCx (1/18): Negative  - CXR (1/18): No focal pulmonary opacity    #HLD  LDL 75. Long-term LDL goal 40-70.   - Atorvastatin 40 mg PO daily    #Resistant HTN   Difficult to control HTN, unable to sustain BP goal < 160. Now up to 5 anti-hypertensives without a clear etiology.  - Lisinopril 20 mg PO daily   - Amlodipine 10 mg PO daily  - Carvedilol 37.5 mg PO BID  - Chlorthalidone 25 mg PO daily  - Clonidine 0.1 mg PO daily  - Hydralazine 50 mg PO TID  - Hydralazine/Labetalol PRN for SBP > 180  - Nephrology consulted, appreciate recs    #DM  Newly diagnosed DM with Hgb A1C 7.8.  - Continue to monitor glucose  - Increase sliding scale insulin to medium intensity today   - Hypoglycemia protocol  - Endocrinology consulted, appreciate recs    #Nutrition  Patient does not meet two of the established criteria necessary for diagnosing malnutrition but is at risk for malnutrition.  - NG tube in place with TF started  - Nutrition consulted  - Minced and moist diet with thin liquids - patient has been taking in very little PO    #Hypokalemia   - RN Managed Standard Replacement protocol    #Superficial RUE thrombus  US completed 1/17 negative for DVT but positive for thrombus in the R basilic and cephalic veins from the antecubital fossa to the wrist.     Chronic/Resolved Issues:  #DISHA (resolved)  Pt presented with Cr 1.37 improved with IVF to 1.17. Now at baseline.   -  Daily BMP    #Elevated troponin (resolved)  Pt arrives with Trop 147 and rising. EKG exhibited sinus tachycardia, but no concerning signs of ischemia. Troponin peaked 1/10. Unclear what caused this elevation; unlikely to be MI given no sx or EKG changes.  - Coronary CTA as outpatient per Cards recs      Prophylaxis            For VTE Prevention:  - SCD  For Acid Suppression:  - GI prophylaxis is not indicated  FEN:  - NS mIVF @ 75/hr    Code Status  Full Code    The patient was discussed with Stroke Staff, Dr. Yousif.    Nelson Laura,   Neurology Resident PGY1  ASCOM: *[id number]  ______________________________________________________    Clinically Significant Risk Factors Present on Admission                 Medications   Scheduled Meds    aspirin  81 mg Oral Daily    Or     aspirin  81 mg Oral or NG Tube Daily     atorvastatin  40 mg Oral QPM     carvedilol  37.5 mg Oral BID w/meals     chlorthalidone  25 mg Oral Daily     cloNIDine  0.1 mg Oral BID     divalproex sodium delayed-release  500 mg Oral Q12H CARLOS A     fentaNYL  50 mcg Intravenous Once     hydrALAZINE  50 mg Oral Q8H CARLOS A     insulin aspart  1-7 Units Subcutaneous Q4H     insulin glargine  8 Units Subcutaneous Q24H     lisinopril  20 mg Oral or NG Tube Daily     miconazole   Topical BID     multivitamin, therapeutic  1 tablet Oral Daily     nystatin   Topical BID     potassium chloride  20 mEq Oral BID     sodium chloride (PF)  3 mL Intravenous Q8H     sodium chloride (PF)  3 mL Intracatheter Q8H     sodium chloride (PF)  3 mL Intracatheter Q8H     thiamine  100 mg Per Feeding Tube Daily       Infusion Meds    dextrose       - MEDICATION INSTRUCTIONS -       - MEDICATION INSTRUCTIONS -       - MEDICATION INSTRUCTIONS -       - MEDICATION INSTRUCTIONS -       niCARdipine Stopped (01/21/22 0614)     sodium chloride         PRN Meds  acetaminophen, artificial tears ophthalmic solution, dextrose, glucose **OR** dextrose **OR** glucagon, fentaNYL, flumazenil, -  MEDICATION INSTRUCTIONS -, HOLD MEDICATION, HOLD MEDICATION, HOLD MEDICATION, hydrALAZINE, labetalol, lidocaine 4%, lidocaine 4%, lidocaine (buffered or not buffered), lidocaine (buffered or not buffered), - MEDICATION INSTRUCTIONS -, - MEDICATION INSTRUCTIONS -, - MEDICATION INSTRUCTIONS -, melatonin, midazolam, naloxone, naloxone, naloxone, naloxone, NIFEdipine ER OSMOTIC, polyethylene glycol, senna-docusate, sodium chloride (PF), sodium chloride (PF), sodium chloride (PF), sodium chloride, sodium chloride bacteriostatic       PHYSICAL EXAMINATION  Temp:  [98.2  F (36.8  C)-98.8  F (37.1  C)] 98.2  F (36.8  C)  Pulse:  [72-77] 75  Resp:  [16-20] 16  BP: (132-162)/(82-94) 159/94  Cuff Mean (mmHg):  [102] 102  SpO2:  [94 %-99 %] 97 %      Neurologic  Mental Status: lethargic but awakens to voice, oriented to self, location, situation & medical condition; named age, month, year; spelled WORLD [employer] SCHOOL forwards but not backwards  Cranial Nerves:  PER, right gaze preference but able to slightly cross the midline, oculocephalic reflex intact bilaterally, L sided facial droop, shoulder shrug strong on the R  Motor:  no abnormal movements. 5/5 RUE and RLE; 1/5 LUE & 2/5 LLE   Sensory:  Intact to noxious sensation in 4/4 extremities, decreased sensation on L side, can detect pressure/cold  Coordination:  deferred  Station/Gait:  deferred    Stroke Scales    NIHSS  Interval baseline (01/12/22 0324)   Interval Comments     1a. Level of Consciousness 1-->Not alert, but arousable by minor stimulation to obey, answer, or respond   1b. LOC Questions 2-->Answers neither question correctly   1c. LOC Commands 1-->Performs one task correctly   2.   Best Gaze 1-->Partial gaze palsy, gaze is abnormal in one or both eyes, but forced deviation or total gaze paresis is not present   3.   Visual 2-->Complete hemianopia   4.   Facial Palsy 1-->Minor paralysis (flattened nasolabial fold, asymmetry on smiling)   5a. Motor Arm, Left 4-->No  movement   5b. Motor Arm, Right 0-->No drift, limb holds 90 (or 45) degrees for full 10 secs   6a. Motor Leg, Left 4-->No movement   6b. Motor Leg, right 1-->Drift, leg falls by the end of the 5-sec period but does not hit bed   7.   Limb Ataxia 0-->Absent   8.   Sensory 1-->Mild-to-moderate sensory loss, patient feels pinprick is less sharp or is dull on the affected side, or there is a loss of superficial pain with pinprick, but patient is aware of being touched   9.   Best Language 2-->Severe aphasia, all communication is through fragmentary expression, great need for inference, questioning, and guessing by the listener. Range of information that can be exchanged is limited, listener carries burden of. . . (see row details)   10. Dysarthria 0-->Normal   11. Extinction and Inattention  1-->Visual, tactile, auditory, spatial, or personal inattention or extinction to bilateral simultaneous stimulation in one of the sensory modalities   Total 21 (01/12/22 0324)       Imaging  I personally reviewed all imaging; relevant findings per HPI.     Lab Results Data   CBC  Recent Labs   Lab 01/23/22 0447 01/22/22  0457 01/21/22  0521   WBC 8.7 11.3* 9.9   RBC 4.75 4.95 5.40   HGB 14.5 15.1 16.4   HCT 43.3 45.0 49.0    329 347     Basic Metabolic Panel    Recent Labs   Lab 01/23/22  0447 01/23/22  0308 01/22/22  2351 01/22/22  1532 01/22/22  1505 01/22/22  0756 01/22/22  0457 01/21/22  0736 01/21/22  0521     --   --   --   --   --  138  --  141   POTASSIUM 3.3*  --   --   --  4.0  --  3.3*   < > 2.9*   CHLORIDE 102  --   --   --   --   --  102  --  103   CO2 30  --   --   --   --   --  30  --  32   BUN 36*  --   --   --   --   --  37*  --  27   CR 1.38*  --   --   --   --   --  1.39*  --  1.03   GLC 93 83 167*   < >  --    < > 100*   < > 141*   VAELRIE 8.6  --   --   --   --   --  8.8  --  9.3    < > = values in this interval not displayed.     Liver Panel  Recent Labs   Lab 01/17/22  3373   PROTTOTAL 7.8   ALBUMIN  2.9*   BILITOTAL 0.7   ALKPHOS 98   AST 22   ALT 20     INR    Recent Labs   Lab Test 01/18/22  1405 01/08/22  1843 01/08/22  1834   INR 1.06 0.95 1.0*      Lipid Profile    Recent Labs   Lab Test 01/08/22  1843 05/16/14  1506   CHOL 169 131   HDL 70 53   LDL 75 70   TRIG 118 38     A1C    Recent Labs   Lab Test 01/08/22  1843   A1C 7.8*     Troponin I  No results for input(s): TROPONIN, GHTROP in the last 168 hours.

## 2022-01-24 ENCOUNTER — APPOINTMENT (OUTPATIENT)
Dept: GENERAL RADIOLOGY | Facility: CLINIC | Age: 47
End: 2022-01-24
Payer: COMMERCIAL

## 2022-01-24 ENCOUNTER — APPOINTMENT (OUTPATIENT)
Dept: OCCUPATIONAL THERAPY | Facility: CLINIC | Age: 47
End: 2022-01-24
Payer: COMMERCIAL

## 2022-01-24 ENCOUNTER — APPOINTMENT (OUTPATIENT)
Dept: PHYSICAL THERAPY | Facility: CLINIC | Age: 47
End: 2022-01-24
Payer: COMMERCIAL

## 2022-01-24 LAB
ANION GAP SERPL CALCULATED.3IONS-SCNC: 8 MMOL/L (ref 3–14)
BUN SERPL-MCNC: 34 MG/DL (ref 7–30)
CALCIUM SERPL-MCNC: 9.2 MG/DL (ref 8.5–10.1)
CHLORIDE BLD-SCNC: 101 MMOL/L (ref 94–109)
CO2 SERPL-SCNC: 27 MMOL/L (ref 20–32)
CREAT SERPL-MCNC: 1.38 MG/DL (ref 0.66–1.25)
ERYTHROCYTE [DISTWIDTH] IN BLOOD BY AUTOMATED COUNT: 13.3 % (ref 10–15)
GFR SERPL CREATININE-BSD FRML MDRD: 63 ML/MIN/1.73M2
GLUCOSE BLD-MCNC: 106 MG/DL (ref 70–99)
GLUCOSE BLDC GLUCOMTR-MCNC: 112 MG/DL (ref 70–99)
GLUCOSE BLDC GLUCOMTR-MCNC: 151 MG/DL (ref 70–99)
GLUCOSE BLDC GLUCOMTR-MCNC: 152 MG/DL (ref 70–99)
GLUCOSE BLDC GLUCOMTR-MCNC: 178 MG/DL (ref 70–99)
HCT VFR BLD AUTO: 43.1 % (ref 40–53)
HGB BLD-MCNC: 14.4 G/DL (ref 13.3–17.7)
MCH RBC QN AUTO: 30.4 PG (ref 26.5–33)
MCHC RBC AUTO-ENTMCNC: 33.4 G/DL (ref 31.5–36.5)
MCV RBC AUTO: 91 FL (ref 78–100)
PLATELET # BLD AUTO: 292 10E3/UL (ref 150–450)
POTASSIUM BLD-SCNC: 3.3 MMOL/L (ref 3.4–5.3)
POTASSIUM BLD-SCNC: 3.3 MMOL/L (ref 3.4–5.3)
POTASSIUM BLD-SCNC: 3.8 MMOL/L (ref 3.4–5.3)
RBC # BLD AUTO: 4.74 10E6/UL (ref 4.4–5.9)
SODIUM SERPL-SCNC: 136 MMOL/L (ref 133–144)
WBC # BLD AUTO: 8.7 10E3/UL (ref 4–11)

## 2022-01-24 PROCEDURE — 84132 ASSAY OF SERUM POTASSIUM: CPT

## 2022-01-24 PROCEDURE — 36415 COLL VENOUS BLD VENIPUNCTURE: CPT | Performed by: STUDENT IN AN ORGANIZED HEALTH CARE EDUCATION/TRAINING PROGRAM

## 2022-01-24 PROCEDURE — 73502 X-RAY EXAM HIP UNI 2-3 VIEWS: CPT

## 2022-01-24 PROCEDURE — 80048 BASIC METABOLIC PNL TOTAL CA: CPT | Performed by: STUDENT IN AN ORGANIZED HEALTH CARE EDUCATION/TRAINING PROGRAM

## 2022-01-24 PROCEDURE — 99232 SBSQ HOSP IP/OBS MODERATE 35: CPT | Performed by: CLINICAL NURSE SPECIALIST

## 2022-01-24 PROCEDURE — 250N000013 HC RX MED GY IP 250 OP 250 PS 637: Performed by: COUNSELOR

## 2022-01-24 PROCEDURE — 250N000013 HC RX MED GY IP 250 OP 250 PS 637: Performed by: INTERNAL MEDICINE

## 2022-01-24 PROCEDURE — 97112 NEUROMUSCULAR REEDUCATION: CPT | Mod: GO | Performed by: OCCUPATIONAL THERAPIST

## 2022-01-24 PROCEDURE — 85027 COMPLETE CBC AUTOMATED: CPT | Performed by: STUDENT IN AN ORGANIZED HEALTH CARE EDUCATION/TRAINING PROGRAM

## 2022-01-24 PROCEDURE — 250N000013 HC RX MED GY IP 250 OP 250 PS 637: Performed by: PSYCHIATRY & NEUROLOGY

## 2022-01-24 PROCEDURE — 99233 SBSQ HOSP IP/OBS HIGH 50: CPT | Mod: GC | Performed by: INTERNAL MEDICINE

## 2022-01-24 PROCEDURE — 99232 SBSQ HOSP IP/OBS MODERATE 35: CPT | Mod: GC | Performed by: PSYCHIATRY & NEUROLOGY

## 2022-01-24 PROCEDURE — 97535 SELF CARE MNGMENT TRAINING: CPT | Mod: GO | Performed by: OCCUPATIONAL THERAPIST

## 2022-01-24 PROCEDURE — 97110 THERAPEUTIC EXERCISES: CPT | Mod: GP | Performed by: PHYSICAL THERAPIST

## 2022-01-24 PROCEDURE — 120N000003 HC R&B IMCU UMMC

## 2022-01-24 PROCEDURE — 97112 NEUROMUSCULAR REEDUCATION: CPT | Mod: GP | Performed by: PHYSICAL THERAPIST

## 2022-01-24 PROCEDURE — 250N000013 HC RX MED GY IP 250 OP 250 PS 637: Performed by: HOSPITALIST

## 2022-01-24 PROCEDURE — 73502 X-RAY EXAM HIP UNI 2-3 VIEWS: CPT | Mod: 26 | Performed by: RADIOLOGY

## 2022-01-24 PROCEDURE — 36415 COLL VENOUS BLD VENIPUNCTURE: CPT

## 2022-01-24 PROCEDURE — 99233 SBSQ HOSP IP/OBS HIGH 50: CPT | Mod: GC | Performed by: PHYSICAL MEDICINE & REHABILITATION

## 2022-01-24 PROCEDURE — 250N000013 HC RX MED GY IP 250 OP 250 PS 637

## 2022-01-24 PROCEDURE — 250N000011 HC RX IP 250 OP 636

## 2022-01-24 PROCEDURE — 250N000013 HC RX MED GY IP 250 OP 250 PS 637: Performed by: STUDENT IN AN ORGANIZED HEALTH CARE EDUCATION/TRAINING PROGRAM

## 2022-01-24 PROCEDURE — 97530 THERAPEUTIC ACTIVITIES: CPT | Mod: GP | Performed by: PHYSICAL THERAPIST

## 2022-01-24 PROCEDURE — 99356 PR PROLONGED SERV,INPATIENT,1ST HR: CPT | Mod: GC | Performed by: PHYSICAL MEDICINE & REHABILITATION

## 2022-01-24 RX ORDER — POTASSIUM CHLORIDE 750 MG/1
40 TABLET, EXTENDED RELEASE ORAL ONCE
Status: COMPLETED | OUTPATIENT
Start: 2022-01-24 | End: 2022-01-24

## 2022-01-24 RX ORDER — DIVALPROEX SODIUM 500 MG/1
1000 TABLET, EXTENDED RELEASE ORAL DAILY
Status: DISCONTINUED | OUTPATIENT
Start: 2022-01-25 | End: 2022-01-28 | Stop reason: HOSPADM

## 2022-01-24 RX ORDER — KETOROLAC TROMETHAMINE 10 MG/1
20 TABLET, FILM COATED ORAL ONCE
Status: DISCONTINUED | OUTPATIENT
Start: 2022-01-24 | End: 2022-01-25

## 2022-01-24 RX ADMIN — INSULIN GLARGINE 8 UNITS: 100 INJECTION, SOLUTION SUBCUTANEOUS at 17:13

## 2022-01-24 RX ADMIN — LABETALOL HYDROCHLORIDE 10 MG: 5 INJECTION, SOLUTION INTRAVENOUS at 02:19

## 2022-01-24 RX ADMIN — MICONAZOLE NITRATE: 2 POWDER TOPICAL at 08:17

## 2022-01-24 RX ADMIN — LISINOPRIL 40 MG: 20 TABLET ORAL at 08:19

## 2022-01-24 RX ADMIN — ASPIRIN 81 MG CHEWABLE TABLET 81 MG: 81 TABLET CHEWABLE at 08:17

## 2022-01-24 RX ADMIN — POTASSIUM CHLORIDE 40 MEQ: 750 TABLET, EXTENDED RELEASE ORAL at 08:17

## 2022-01-24 RX ADMIN — NYSTATIN: 100000 CREAM TOPICAL at 20:17

## 2022-01-24 RX ADMIN — THIAMINE HCL TAB 100 MG 100 MG: 100 TAB at 08:20

## 2022-01-24 RX ADMIN — DIVALPROEX SODIUM 500 MG: 250 TABLET, DELAYED RELEASE ORAL at 08:19

## 2022-01-24 RX ADMIN — THERA TABS 1 TABLET: TAB at 08:19

## 2022-01-24 RX ADMIN — CHLORTHALIDONE 25 MG: 25 TABLET ORAL at 08:18

## 2022-01-24 RX ADMIN — HYDRALAZINE HYDROCHLORIDE 50 MG: 50 TABLET, FILM COATED ORAL at 01:42

## 2022-01-24 RX ADMIN — HYDRALAZINE HYDROCHLORIDE 20 MG: 20 INJECTION INTRAMUSCULAR; INTRAVENOUS at 04:00

## 2022-01-24 RX ADMIN — POTASSIUM CHLORIDE 40 MEQ: 750 TABLET, EXTENDED RELEASE ORAL at 05:52

## 2022-01-24 RX ADMIN — DIVALPROEX SODIUM 500 MG: 250 TABLET, DELAYED RELEASE ORAL at 20:10

## 2022-01-24 RX ADMIN — CLONIDINE HYDROCHLORIDE 0.1 MG: 0.1 TABLET ORAL at 20:10

## 2022-01-24 RX ADMIN — POTASSIUM CHLORIDE 20 MEQ: 1.5 POWDER, FOR SOLUTION ORAL at 20:10

## 2022-01-24 RX ADMIN — POTASSIUM CHLORIDE 20 MEQ: 1.5 POWDER, FOR SOLUTION ORAL at 08:20

## 2022-01-24 RX ADMIN — ACETAMINOPHEN 650 MG: 325 TABLET, FILM COATED ORAL at 08:16

## 2022-01-24 RX ADMIN — ACETAMINOPHEN 650 MG: 325 TABLET, FILM COATED ORAL at 18:07

## 2022-01-24 RX ADMIN — CARVEDILOL 37.5 MG: 25 TABLET, FILM COATED ORAL at 08:19

## 2022-01-24 RX ADMIN — ACETAMINOPHEN 650 MG: 325 TABLET, FILM COATED ORAL at 03:33

## 2022-01-24 RX ADMIN — HYDRALAZINE HYDROCHLORIDE 50 MG: 50 TABLET, FILM COATED ORAL at 08:57

## 2022-01-24 RX ADMIN — CLONIDINE HYDROCHLORIDE 0.1 MG: 0.1 TABLET ORAL at 08:18

## 2022-01-24 RX ADMIN — ATORVASTATIN CALCIUM 40 MG: 40 TABLET, FILM COATED ORAL at 20:09

## 2022-01-24 RX ADMIN — MICONAZOLE NITRATE: 2 POWDER TOPICAL at 20:17

## 2022-01-24 RX ADMIN — CARVEDILOL 37.5 MG: 25 TABLET, FILM COATED ORAL at 17:13

## 2022-01-24 RX ADMIN — HYDRALAZINE HYDROCHLORIDE 50 MG: 50 TABLET, FILM COATED ORAL at 17:13

## 2022-01-24 RX ADMIN — NYSTATIN: 100000 CREAM TOPICAL at 08:20

## 2022-01-24 ASSESSMENT — ACTIVITIES OF DAILY LIVING (ADL)
ADLS_ACUITY_SCORE: 13
ADLS_ACUITY_SCORE: 13
ADLS_ACUITY_SCORE: 19
ADLS_ACUITY_SCORE: 13
ADLS_ACUITY_SCORE: 19
ADLS_ACUITY_SCORE: 19
ADLS_ACUITY_SCORE: 13
ADLS_ACUITY_SCORE: 19
ADLS_ACUITY_SCORE: 13
ADLS_ACUITY_SCORE: 19
ADLS_ACUITY_SCORE: 19
ADLS_ACUITY_SCORE: 13
ADLS_ACUITY_SCORE: 21

## 2022-01-24 NOTE — PROGRESS NOTES
[facility]   Acute Rehabilitation Unit  Daily progress note    INTERVAL HISTORY  Miriam Hall was seen and examined at bedside.     He feels better than last week and cognitively closer to his normal self, subjectively.  He isn't sure if therapies are improving his function.  When asked about his disposition, he may be able to switch rooms to the first floor of his apartment and have some of his other friends provide home support.    Functional status:  PT: Bed mobility mod assist.  Bed to recliner with overhead lift, mod assist.  OT: Feeding min assist.   SLP: Soft and bite sized food with supervision recommended    MEDICATIONS  Scheduled meds    aspirin  81 mg Oral Daily    Or     aspirin  81 mg Oral or NG Tube Daily     atorvastatin  40 mg Oral QPM     carvedilol  37.5 mg Oral BID w/meals     chlorthalidone  25 mg Oral Daily     cloNIDine  0.1 mg Oral BID     divalproex sodium delayed-release  500 mg Oral Q12H CARLOS A     [START ON 1/25/2022] divalproex sodium extended-release  1,000 mg Oral Daily     hydrALAZINE  50 mg Oral Q8H CARLOS A     insulin aspart   Subcutaneous TID w/meals     insulin aspart  1-7 Units Subcutaneous 4x Daily AC & HS     insulin glargine  8 Units Subcutaneous Q24H     ketorolac  20 mg Oral Once     lisinopril  40 mg Oral Daily     miconazole   Topical BID     multivitamin, therapeutic  1 tablet Oral Daily     nystatin   Topical BID     potassium chloride  20 mEq Oral BID     sodium chloride (PF)  3 mL Intracatheter Q8H     sodium chloride (PF)  3 mL Intracatheter Q8H     thiamine  100 mg Per Feeding Tube Daily       PRN meds:  acetaminophen, artificial tears ophthalmic solution, dextrose, glucose **OR** dextrose **OR** glucagon, hydrALAZINE, insulin aspart, labetalol, lidocaine 4%, lidocaine (buffered or not buffered), - MEDICATION INSTRUCTIONS -, - MEDICATION INSTRUCTIONS -, melatonin, NIFEdipine ER OSMOTIC, polyethylene glycol, senna-docusate, sodium  "chloride (PF)    PHYSICAL EXAM  BP (!) 160/94 (BP Location: Right arm)   Pulse 75   Temp 98  F (36.7  C) (Oral)   Resp 18   Ht 1.702 m (5' 7\")   Wt 80.5 kg (177 lb 7.5 oz)   SpO2 99%   BMI 27.80 kg/m    Gen: no acute distress  HEENT: NCAT  Cardio: warm and well perfused extremities  Pulm: breathes comfortably on room air  Abd: soft, nondistended  Ext: warm and well perfused  Neuro/MSK:  Alert, mild expressive aphasia but mostly goal oriented speech  conjugate gaze, left facial droop  Right upper and lower extremity moves against gravity and resistance.  0/5 strength in left upper and lower extremity  Severe left neglect - unable to recognize his left arm without verbal cues    LABS  CBC:   Recent Labs   Lab Test 01/19/22  0435   WBC 10.7   RBC 5.56   HGB 16.9   MCV 91   MCH 30.4   MCHC 33.3   RDW 13.7        BMP:   Recent Labs   Lab Test 01/19/22  1138 01/19/22  0826 01/19/22  0435   NA  --   --  143   POTASSIUM  --   --  3.1*   CHLORIDE  --   --  106   CO2  --   --  32   BUN  --   --  32*   CR  --   --  1.12   *   < > 298*    < > = values in this interval not displayed.       ASSESSMENT AND PLAN    Miraim Hall is a 46 year old man with a PMH of hypertension who sustained a right parieto-occipital stroke on 1/8/22.  He has left hemiparesis, dysarthria, left gaze palsy, left hemianopsia, and left neglect which is still stable and debilitating.  His primary limitations to rehabilitation admission is his home support, which may have social solutions.    -  to clarify veracity of plans to move into the first floor of his apartment and recruit friends for supervision  - If discharge is imminent, he requires 24/7 home support, home PT / OT / Speech, and wheelchair access to his domicile  - If he cannot discharge home safely after an anticipated 3-4 week ARU stay, consider alternatives    Duong Sims MD  Physical Medicine & Rehabilitation    Staffed with Dr. Emerson  "

## 2022-01-24 NOTE — PROGRESS NOTES
IP Diabetes Management  Daily Note           Assessment and Plan:   HPI: Miriam Hall is a 46 year old male with PMHx HTN who presented on 1/8/2022 with L gaze palsy, L complete hemianopia, mild dysarthria, L arm > leg weakness, L arm/leg decreased sensation, and L side extinction after waking up from a nap. FTH an occlusion of the P1 segment of the R PCA with associated established infarct as well as high grade stenosis of the M2 segment of the R MCA. TTE with EF of 50-55%, no WMA or atrial enlargement. Etiology undetermined at this time.     Diagnosed with DM 2 in 2017 and lifestyle changes recommended, never on DM medication.      Assessment:   1)Type 2 Diabetes Mellitus, uncontrolled (A1c 7.8), with hyperglycemia  2)acute ischemic stroke w/encephelopathy    Plan:    - Lantus 8 units daily at 1600   -CHO coverage-1 units per 25 grams carb   -Novolog  Medium intensity sliding scale AC, HS >/140 at all points   -BG monitoring TID AC, HS, 0200   -hypoglycemia protocol   -carb counting protocol   -diabetes education needs will be assessed closer to discharge.     Outpatient follow up: TBD      Interval History and Assessment: interval glucose trend reviewed:         Glucose control improved with addition basal and now prandial.  PO is not robust.  eGFR in 60s last three days.  Hopeful for improvement, or stability at least-- and consideration of metformin.    DPP4 might provide sufficient coverage.      Current nutritional intake and type: Orders Placed This Encounter      Consistent Carbohydrate Diet Moderate Consistent Carb (60 g CHO per Meal) Diet      Planned Procedures/surgeries: none  Steroid planning: none  D5W-containing solutions/medications: none    PTA Diabetes Regimen: none    Discharge Planning: TBD, pt hopeful for rehab, but timing not clear           Diabetes History:   Type of Diabetes: Type 2 Diabetes Mellitus  Lab Results   Component Value Date    A1C 7.8 01/08/2022              Review of  Systems:     The Review of Systems is negative other than noted in the Interval History.           Medications:     Current Facility-Administered Medications   Medication     acetaminophen (TYLENOL) tablet 650 mg     aspirin EC tablet 81 mg    Or     aspirin (ASA) chewable tablet 81 mg     atorvastatin (LIPITOR) tablet 40 mg     carboxymethylcellulose PF (REFRESH PLUS) 0.5 % ophthalmic solution 1 drop     carvedilol (COREG) tablet 37.5 mg     chlorthalidone (HYGROTON) tablet 25 mg     cloNIDine (CATAPRES) tablet 0.1 mg     dextrose 10% infusion     glucose gel 15-30 g    Or     dextrose 50 % injection 25-50 mL    Or     glucagon injection 1 mg     divalproex sodium delayed-release (DEPAKOTE) DR tablet 500 mg     fentaNYL (PF) (SUBLIMAZE) injection 25 mcg     fentaNYL (PF) (SUBLIMAZE) injection 50 mcg     Give   of usual dose of LONG ACTING insulin AM of procedure IF diabetic     HOLD: Insulin - RAPID/SHORT acting AM of procedure IF diabetic     HOLD: Insulin - REGULAR AM of procedure IF diabetic     HOLD: Oral hypoglycemics AM of procedure IF diabetic     hydrALAZINE (APRESOLINE) injection 10-20 mg     hydrALAZINE (APRESOLINE) tablet 50 mg     insulin aspart (NovoLOG) injection (RAPID ACTING)     insulin aspart (NovoLOG) injection (RAPID ACTING)     insulin aspart (NovoLOG) injection (RAPID ACTING)     insulin glargine (LANTUS PEN) injection 8 Units     labetalol (NORMODYNE/TRANDATE) injection 10-20 mg     lidocaine (LMX4) cream     lidocaine (LMX4) cream     lidocaine 1 % 0.1-1 mL     lidocaine 1 % 1 mL     lisinopril (ZESTRIL) tablet 40 mg     May take oral meds with a sip of water, the morning of CARISA procedure.     medication instruction - No oral meds if patient didn't pass dysphagia screen     Medication Instructions - Avoid dextrose in IV solutions.     melatonin tablet 5 mg     miconazole (MICATIN) 2 % powder     midazolam (VERSED) injection 1 mg     multivitamin, therapeutic (THERA-VIT) tablet 1 tablet      "niCARdipine 40 mg in 200 mL 0.83% NaCl (CARDENE) infusion     NIFEdipine ER OSMOTIC (PROCARDIA XL) 24 hr tablet 30 mg     nystatin (MYCOSTATIN) cream     polyethylene glycol (MIRALAX) Packet 17 g     potassium chloride (KLOR-CON) Packet 20 mEq     senna-docusate (SENOKOT-S/PERICOLACE) 8.6-50 MG per tablet 1-2 tablet     sodium chloride (PF) 0.9% PF flush 3 mL     sodium chloride (PF) 0.9% PF flush 3 mL     sodium chloride (PF) 0.9% PF flush 3 mL     sodium chloride (PF) 0.9% PF flush 3 mL     sodium chloride (PF) 0.9% PF flush 3 mL     sodium chloride (PF) 0.9% PF flush 3 mL     sodium chloride 0.9% infusion     sodium chloride bacteriostatic 0.9 % flush 9.5 mL     thiamine (B-1) tablet 100 mg            Physical Exam:    /87 (BP Location: Right arm)   Pulse 84   Temp 98.1  F (36.7  C) (Oral)   Resp 18   Ht 1.702 m (5' 7\")   Wt 80.5 kg (177 lb 7.5 oz)   SpO2 97%   BMI 27.80 kg/m    General:in recliner dozing, slumped left  HEENT: normocephalic, atraumatic. Oral mucous membranes moist.   Lungs: unlabored respiration, no cough  ABD:   Skin: dry, no obvious lesions  Mental status:  not easily aroused to verbal  Psych:  calm            Data:     Recent Labs   Lab 01/24/22  0414 01/23/22  2230 01/23/22  1509 01/23/22  1149 01/23/22  0752 01/23/22  0447   * 185* 152* 139* 100* 93     Lab Results   Component Value Date    WBC 8.7 01/24/2022    WBC 8.7 01/23/2022    WBC 11.3 (H) 01/22/2022    HGB 14.4 01/24/2022    HGB 14.5 01/23/2022    HGB 15.1 01/22/2022    HCT 43.1 01/24/2022    HCT 43.3 01/23/2022    HCT 45.0 01/22/2022    MCV 91 01/24/2022    MCV 91 01/23/2022    MCV 91 01/22/2022     01/24/2022     01/23/2022     01/22/2022     Lab Results   Component Value Date     01/24/2022     01/23/2022     01/22/2022    POTASSIUM 3.3 (L) 01/24/2022    POTASSIUM 3.3 (L) 01/24/2022    POTASSIUM 3.9 01/23/2022    CHLORIDE 101 01/24/2022    CHLORIDE 102 01/23/2022    " CHLORIDE 102 01/22/2022    CO2 27 01/24/2022    CO2 30 01/23/2022    CO2 30 01/22/2022     (H) 01/24/2022     (H) 01/23/2022     (H) 01/23/2022     Lab Results   Component Value Date    BUN 34 (H) 01/24/2022    BUN 36 (H) 01/23/2022    BUN 37 (H) 01/22/2022     No results found for: TSH  Lab Results   Component Value Date    AST 22 01/17/2022    AST 26 01/08/2022    AST 26 04/25/2014    ALT 20 01/17/2022    ALT 31 01/08/2022    ALT 50 (H) 04/25/2014    ALKPHOS 98 01/17/2022    ALKPHOS 94 01/08/2022    ALKPHOS 66 04/25/2014           I spent a total of 25 minutes bedside and on the inpatient unit managing the glycemic care of Miriam Hall. Over 50% of my time on the unit was spent counseling the patient  and/or coordinating care regarding acute glycemic management.  See note for details.  Shanae Antoine APRN -[phone]      To contact Endocrine Diabetes service:   From 8AM-4PM: page inpatient diabetes provider that is following the patient  For questions or updates from 4PM-8AM: page the diabetes job code for on call fellow: [phone]

## 2022-01-24 NOTE — PROGRESS NOTES
[facility]    Stroke Progress Note    Interval Events  - NAEON  - Patient c/o Left hip pain attributed to fall from original ER visit  - Switched Depakote  mg BID to ER 1000 mg daily  - XR Left Hip    HPI Summary  Miriam Hall is a 46 year old male with PMHx HTN who presented on 1/8/2022 with symptoms concerning for stroke. The pt reports that he was in his normal state of health that morning and then decided to take a nap around noon. On waking from his nap around 2:00PM, he found that he was having trouble moving and feeling the L side of his body. Unfortunately, the pt was unable to get to a phone and ended up crawling on his floor until he could finally get the attention of one of his neighbors, who then was able to call EMS.     EMS arrived to find the pt continuing to have decreased sensation and weakness on his L side. They noted a , /145, and . On arrival in the ED, the pt remained afebrile, but was tachycardic () and quite hypertensive (/146). A stroke code was called for initial NIHSS 9, for a partial L gaze palsy, L-sided complete hemianopia, L arm > leg weakness, decreased sensation in the L arm/leg to sensation (pinprick intact in arm but decreased in leg), mild dysarthria, L-sided extinction, and possible L neglect. The pt also seemed to be having trouble with proprioception in the L arm, though did recognize it to be his own. Pt was immediately taken to CT where he underwent a CTH, CTA Head/Neck and CT Perfusion study, revealing a well-established R parieto-occipital stroke with an LVO of the R P1 segment. As the stroke already appeared well-established and the pt was outside the time window, no tPA was given. No thrombectomy was performed due to the location of the LVO in the PCA. The pt was loaded with ASA 324mg once with plans to allow for permissive HTN and admission to the stroke service for further  work-up     The patient denies any history of smoking or drug use. Does drink EtOH, unclear amount.      TPA Treatment   Not given due to established infarct on imaging and unclear or unfavorable risk-benefit profile for extended window thrombolysis beyond the conventional 4.5 hour time window.     Endovascular Treatment  Proximal vessel occlusion present, but endovascular treatment not initiated due to location of the thrombus in the posterior cerebral artery    Stroke Evaluation Summarized    MRI/Head CT MRI Brain (1/12)  1. Findings from CT and CTA performed yesterday are confirmed with stroke in the distribution of the right posterior cerebral artery. This involves the medial right occipital lobe and medial right temporal lobe with extension into the body of the thalamus.  2. Additional punctate foci of diffusion restriction in the left putamen.  3. Moderate leukoaraiosis.     CT Head (1/16)  1. Continued evolution of large right posterior cerebral artery territory infarct. With similar mass effect and 4 mm of leftward midline shift, previously 5 mm.  2. Subtle hyperdensities in the right basal ganglia consistent with petechial hemorrhages seen on previous MRI. No new intracranial hemorrhage or new loss of gray-white matter differentiation.    MRI Brain (1/18)  1. Exam sensitivity is severely diminished due to considerable motion artifact.  2. No definite abnormal enhancement to suggest infection on the motion degraded postcontrast images.  3. Continued evolution of right posterior cerebral territory infarct and punctate infarct involving the left putamen. Associated petechial hemorrhage and developing cortical laminar necrosis.   Intracranial Vasculature MRA (1/12)  Redemonstration of occlusion of the right PCA near its  origin. Additional short segment high-grade stenosis of a right-sided M2 MCA branch. Findings are not likely not significantly changed from CT performed yesterday.   Cervical Vasculature CTA  (1/12)  1. Occlusion of the proximal right PCA.  2. High-grade stenosis of the occiput M2 branch of the right MCA with  attenuated flow distal.  3. Neck CTA demonstrates no stenosis of the major cervical arteries.     Echocardiogram The visual ejection fraction is 50-55%.   No regional wall motion abnormalities are seen.  Moderate to severe concentric wall thickening consistent with left ventricular hypertrophy is present.  Global right ventricular function is normal.  There was no shunt at the atrial septal level as assessed by agitated saline bubble study at rest and with Valsalva maneuver.  Previous study not available for comparison.   EKG/Telemetry EKG with sinus tachycardia   Other Testing Hypercoagulability profile has not been sent yet     LDL  1/8/2022: 75 mg/dL   A1C  1/8/2022: 7.8 %   Troponin No lab value available in past 48 hrs       Impression   # Acute ischemic stroke of Right P1 segement of the PCA  # R P1 occlusion and R M2 Stenosis  # Large vessel atherosclerosis vs ESUS  # Cerebral edema   # Hemorrhagic transformation of ischemic infarct  Miriam Hall is a 46 year old male with PMHx HTN who presented on 1/8/2022 with L gaze palsy, L complete hemianopia, mild dysarthria, L arm > leg weakness, L arm/leg decreased sensation, and L side extinction after waking up from a nap. FTH an occlusion of the P1 segment of the R PCA with associated established infarct as well as high grade stenosis of the M2 segment of the R MCA. TTE with EF of 50-55%, no WMA or atrial enlargement. Etiology undetermined at this time.   - Aspirin 81 mg PO daily for secondary stroke prevention  - CARISA once BP under better control   - PT/OT/SLP: ARU appropriate  - CT C/A/P (1/18): Negative  - Hypercoag panel (1/18): Negative  - Vasculitis panel (1/18): Negative    #Encephalopathy  #Leukocytosis  #Concern for seizures  Patient with ongoing waxing and waning level of consciousness over the past few days. Hooked up to EEG on 1/12  with no evidence of seizures per report from Dr. Porter and pt pulling off leads overnight so EEG disconnected on 1/13. Repeat EEG on 1/16 completed and negative for seizure activity. No fevers documented in chart but patient does have a mild leukocytosis. No alarming electrolyte derangements. BP has been persistently elevated and resistant to medications, so his confusion could represent hypertensive encephalopathy.   - Depakote ER 1000 mg PO daily   - VPA total/free trough levels (1/18): 100/19.8  - EEG spot study (1/18): Negative seizures  - Blood cultures (1/18): NGTD  - UA & UCx (1/18): Negative  - CXR (1/18): No focal pulmonary opacity    #HLD  LDL 75. Long-term LDL goal 40-70.   - Atorvastatin 40 mg PO daily    #Resistant HTN   Sustained mostly at BP goal < 160. Now up to 5 anti-hypertensives without a clear etiology.  - Lisinopril 40 mg PO daily   - Amlodipine 10 mg PO daily  - Carvedilol 37.5 mg PO BID  - Chlorthalidone 25 mg PO daily  - Clonidine 0.1 mg PO daily  - Hydralazine 50 mg PO TID  - Hydralazine/Labetalol PRN for SBP > 180  - Nephrology consulted, appreciate recs    #DM  Newly diagnosed DM with Hgb A1C 7.8.  - Continue to monitor glucose  - MDSSI  - Hypoglycemia protocol  - Endocrinology consulted, appreciate recs    #Nutrition  Patient does not meet two of the established criteria necessary for diagnosing malnutrition but is at risk for malnutrition.  - NG tube in place with TF started  - Nutrition consulted  - Minced and moist diet with thin liquids - patient has been taking in very little PO    #Hypokalemia   - RN Managed Standard Replacement protocol    #Superficial RUE thrombus  US completed 1/17 negative for DVT but positive for thrombus in the R basilic and cephalic veins from the antecubital fossa to the wrist.     Chronic/Resolved Issues:  #DISHA (resolved)  Pt presented with Cr 1.37 improved with IVF to 1.17. Now at baseline.   - Daily BMP    #Elevated troponin (resolved)  Pt arrives with  Trop 147 and rising. EKG exhibited sinus tachycardia, but no concerning signs of ischemia. Troponin peaked 1/10. Unclear what caused this elevation; unlikely to be MI given no sx or EKG changes.  - Coronary CTA as outpatient per Cards recs      Prophylaxis            For VTE Prevention:  - SCD  For Acid Suppression:  - GI prophylaxis is not indicated  FEN:  - NS mIVF @ 75/hr    Code Status  Full Code    The patient was discussed with Stroke Staff, Dr. Yousif.    Nelson Laura DO  Neurology Resident PGY1  ASCOM: *[id number]  ______________________________________________________    Clinically Significant Risk Factors Present on Admission                 Medications   Scheduled Meds    aspirin  81 mg Oral Daily    Or     aspirin  81 mg Oral or NG Tube Daily     atorvastatin  40 mg Oral QPM     carvedilol  37.5 mg Oral BID w/meals     chlorthalidone  25 mg Oral Daily     cloNIDine  0.1 mg Oral BID     divalproex sodium delayed-release  500 mg Oral Q12H CARLOS A     fentaNYL  50 mcg Intravenous Once     hydrALAZINE  50 mg Oral Q8H CARLOS A     insulin aspart   Subcutaneous TID w/meals     insulin aspart  1-7 Units Subcutaneous 4x Daily AC & HS     insulin glargine  8 Units Subcutaneous Q24H     lisinopril  40 mg Oral Daily     miconazole   Topical BID     multivitamin, therapeutic  1 tablet Oral Daily     nystatin   Topical BID     potassium chloride  20 mEq Oral BID     sodium chloride (PF)  3 mL Intravenous Q8H     sodium chloride (PF)  3 mL Intracatheter Q8H     sodium chloride (PF)  3 mL Intracatheter Q8H     thiamine  100 mg Per Feeding Tube Daily       Infusion Meds    dextrose       - MEDICATION INSTRUCTIONS -       - MEDICATION INSTRUCTIONS -       - MEDICATION INSTRUCTIONS -       - MEDICATION INSTRUCTIONS -       niCARdipine Stopped (01/21/22 0614)     sodium chloride         PRN Meds  acetaminophen, artificial tears ophthalmic solution, dextrose, glucose **OR** dextrose **OR** glucagon, fentaNYL, - MEDICATION INSTRUCTIONS  -, HOLD MEDICATION, HOLD MEDICATION, HOLD MEDICATION, hydrALAZINE, insulin aspart, labetalol, lidocaine 4%, lidocaine 4%, lidocaine (buffered or not buffered), lidocaine (buffered or not buffered), - MEDICATION INSTRUCTIONS -, - MEDICATION INSTRUCTIONS -, - MEDICATION INSTRUCTIONS -, melatonin, midazolam, NIFEdipine ER OSMOTIC, polyethylene glycol, senna-docusate, sodium chloride (PF), sodium chloride (PF), sodium chloride (PF), sodium chloride, sodium chloride bacteriostatic       PHYSICAL EXAMINATION  Temp:  [97.5  F (36.4  C)-98.2  F (36.8  C)] 98.1  F (36.7  C)  Pulse:  [64-84] 84  Resp:  [18] 18  BP: (110-186)/() 139/87  Cuff Mean (mmHg):  [91] 91  SpO2:  [93 %-99 %] 97 %      Neurologic  Mental Status: lethargic but awakens to voice, oriented to self, location, situation & medical condition; named age, month, year; spelled [employer] forwards  Cranial Nerves:  PERRL, right gaze preference but able to slightly cross the midline, oculocephalic reflex intact bilaterally, no facial droop, shoulder shrug strong on the R  Motor:  no abnormal movements. 5/5 RUE and RLE; 1/5 LUE & 2/5 LLE, more movement of digits of L hand   Sensory:  Intact to noxious sensation in 4/4 extremities, decreased sensation on L side, can detect pressure/cold  Coordination:  deferred  Station/Gait:  deferred    Stroke Scales    NIHSS  Interval baseline (01/12/22 0324)   Interval Comments     1a. Level of Consciousness 1-->Not alert, but arousable by minor stimulation to obey, answer, or respond   1b. LOC Questions 2-->Answers neither question correctly   1c. LOC Commands 1-->Performs one task correctly   2.   Best Gaze 1-->Partial gaze palsy, gaze is abnormal in one or both eyes, but forced deviation or total gaze paresis is not present   3.   Visual 2-->Complete hemianopia   4.   Facial Palsy 1-->Minor paralysis (flattened nasolabial fold, asymmetry on smiling)   5a. Motor Arm, Left 4-->No movement   5b. Motor Arm, Right 0-->No  drift, limb holds 90 (or 45) degrees for full 10 secs   6a. Motor Leg, Left 4-->No movement   6b. Motor Leg, right 1-->Drift, leg falls by the end of the 5-sec period but does not hit bed   7.   Limb Ataxia 0-->Absent   8.   Sensory 1-->Mild-to-moderate sensory loss, patient feels pinprick is less sharp or is dull on the affected side, or there is a loss of superficial pain with pinprick, but patient is aware of being touched   9.   Best Language 2-->Severe aphasia, all communication is through fragmentary expression, great need for inference, questioning, and guessing by the listener. Range of information that can be exchanged is limited, listener carries burden of. . . (see row details)   10. Dysarthria 0-->Normal   11. Extinction and Inattention  1-->Visual, tactile, auditory, spatial, or personal inattention or extinction to bilateral simultaneous stimulation in one of the sensory modalities   Total 21 (01/12/22 0324)       Imaging  I personally reviewed all imaging; relevant findings per HPI.     Lab Results Data   CBC  Recent Labs   Lab 01/24/22  0414 01/23/22 0447 01/22/22  0457   WBC 8.7 8.7 11.3*   RBC 4.74 4.75 4.95   HGB 14.4 14.5 15.1   HCT 43.1 43.3 45.0    300 329     Basic Metabolic Panel    Recent Labs   Lab 01/24/22  0414 01/23/22  2230 01/23/22  1509 01/23/22  1412 01/23/22  0752 01/23/22  0447 01/22/22  0756 01/22/22  0457     --   --   --   --  140  --  138   POTASSIUM 3.3*  3.3*  --   --  3.9  --  3.3*   < > 3.3*   CHLORIDE 101  --   --   --   --  102  --  102   CO2 27  --   --   --   --  30  --  30   BUN 34*  --   --   --   --  36*  --  37*   CR 1.38*  --   --   --   --  1.38*  --  1.39*   * 185* 152*  --    < > 93   < > 100*   VALERIE 9.2  --   --   --   --  8.6  --  8.8    < > = values in this interval not displayed.     Liver Panel  No results for input(s): PROTTOTAL, ALBUMIN, BILITOTAL, ALKPHOS, AST, ALT, BILIDIRECT in the last 168 hours.  INR    Recent Labs   Lab Test  01/18/22  1405 01/08/22  1843 01/08/22  1834   INR 1.06 0.95 1.0*      Lipid Profile    Recent Labs   Lab Test 01/08/22  1843 05/16/14  1506   CHOL 169 131   HDL 70 53   LDL 75 70   TRIG 118 38     A1C    Recent Labs   Lab Test 01/08/22  1843   A1C 7.8*     Troponin I  No results for input(s): TROPONIN, GHTROP in the last 168 hours.

## 2022-01-24 NOTE — PROGRESS NOTES
Nephrology Progress Note  01/24/2022         Assessment & Recommendations:   Miriam Hall is a 46 year old year old male      #Severe resistant hypertension, c/f hyperaldosteronism  #Hypokalemia  #Metabolic alkalosis, improved   #Acute ischemic stroke w/ L sided hemiparesis, c/b hemorrhagic transformation   #Moderate to severe left ventricular hypertrophy    Investigations:  - Renal US - Right kidney 10.2 cm in length, left kidney 10.4 cm in length; normal parenchymal thickness and echogenicity, no obstruction. No evidence of left or right renal artery stenosis.  - Cortisol level at 4 pm is 20.5  - Serum aldosterone 11.5  - Serum renin and 24H urine aldosterone pending     Pt presents with refractory HTN, hypokalemia and mild metabolic alkalosis initially c/f hyperaldosteronism. On recent CT adrenal glands wnl, no e/o renal artery stenosis. Serum aldosterone wnl at 11.5. This makes suspicion for primary hyperaldosteronism less, and may limit the efficacy of aldosterone antagonists such as Spironolactone or Eplerenone. With normal serum aldosterone concern is raised for possible ENAC channelopathy, such as Liddle syndrome. Pt denies licorice use. Given we have pending serum renin and 24H urine aldosterone levels would trial empiric addition of Amiloride as below.     RECOMMENDATIONS:  - Start Amiloride 10mg daily   - Continue potassium supplementation   - Continue Lisinopril 40mg daily   - Titrate other BP agents as needed   - Nephrology will continue to follow       Recommendations were communicated to primary team via this note    Seen and discussed with Dr. Florecita Nolasco MD   PGY3 Internal Medicine   [phone]    Interval History :   Nursing and provider notes from last 24 hours reviewed.    HTN overnight. This morning patient very withdrawn, not that interactive with interview. Denies pain, SOB, abdominal pain/distension, LE edema. Denies urinary sx. Ongoing L sided hemiparesis.  "    Review of Systems:   4 point ROS is conducted and is negative apart from above     Physical Exam:   I/O last 3 completed shifts:  In: 1160 [P.O.:1160]  Out: 675 [Urine:675]   BP (!) 89/53 (BP Location: Right arm)   Pulse 75   Temp 98.1  F (36.7  C) (Oral)   Resp 18   Ht 1.702 m (5' 7\")   Wt 80.5 kg (177 lb 7.5 oz)   SpO2 98%   BMI 27.80 kg/m       GENERAL APPEARANCE: drowsy  HENT: mouth without ulcers or lesions  PULM: lungs clear to auscultation bilaterally, equal air movement, no increased WOB on RA   CV: regular rhythm, normal rate, no appreciated murmurs/rubs, no peripheral edema   GI: soft, non-tender, not distended, bowel sounds are +  INTEGUMENT: no cyanosis, no rash    Labs:   All labs reviewed by me  Electrolytes/Renal -   Recent Labs   Lab Test 01/24/22  1006 01/24/22  0414 01/23/22  2230 01/23/22  1509 01/23/22  1412 01/23/22  0752 01/23/22  0447 01/22/22  0756 01/22/22  0457 01/21/22  0736 01/21/22  0521   NA  --  136  --   --   --   --  140  --  138  --  141   POTASSIUM  --  3.3*  3.3*  --   --  3.9  --  3.3*   < > 3.3*   < > 2.9*   CHLORIDE  --  101  --   --   --   --  102  --  102  --  103   CO2  --  27  --   --   --   --  30  --  30  --  32   BUN  --  34*  --   --   --   --  36*  --  37*  --  27   CR  --  1.38*  --   --   --   --  1.38*  --  1.39*  --  1.03   * 106* 185*   < >  --    < > 93   < > 100*   < > 141*   VALERIE  --  9.2  --   --   --   --  8.6  --  8.8  --  9.3   MAG  --   --   --   --   --   --  2.0  --  2.0  --  2.0   PHOS  --   --   --   --   --   --  3.4  --  4.0  --  2.9    < > = values in this interval not displayed.     CBC -   Recent Labs   Lab Test 01/24/22  0414 01/23/22  0447 01/22/22  0457   WBC 8.7 8.7 11.3*   HGB 14.4 14.5 15.1    300 329     LFTs -   Recent Labs   Lab Test 01/17/22  0433 01/08/22  1843 04/25/14  1530   ALKPHOS 98 94 66   BILITOTAL 0.7 0.8 0.7   ALT 20 31 50*   AST 22 26 26   PROTTOTAL 7.8 8.5 7.1   ALBUMIN 2.9* 3.9 4.3     Iron Panel - " No lab results found.    Imaging:  All imaging studies reviewed by me.     1/20/2022 RENAL US   Impression:  1. Right kidney:  1a. Renal parenchyma: Unremarkable renal parenchyma.  1b. Renal artery: No evidence of renal artery stenosis.     2. Left kidney:  2a. Renal parenchyma: Renal parenchyma is difficult to visualize  though appears grossly unremarkable.  2b. Renal artery: No evidence of renal artery stenosis.    1/18/2022 CT CAP W CONTRAST   ABDOMEN/PELVIS:  LIVER: Geographic hypoattenuation along the falciform ligament,  consistent with focal fat deposition. No suspicious hepatic mass.  GALLBLADDER: Within normal limits.  PANCREAS: Within normal limits.  SPLEEN: Within normal limits.  ADRENAL GLANDS: Within normal limits.  URINARY TRACT: Symmetric cortical enhancement bilaterally. No  hydronephrosis, nephrolithiasis, or suspicious renal mass. Urinary  bladder is unremarkable.  REPRODUCTIVE ORGANS: Prostatomegaly.  BOWEL: Gastric tube tip terminates in the stomach. Normal caliber of  the small and large bowel. Appendix is within normal limits. No  abnormal wall thickening or inflammatory change.  PERITONEUM/FLUID: No free fluid or air in the abdomen or pelvis.     VESSELS: No aneurysmal dilatation of the abdominal aorta. The portal,  splenic, and superior mesenteric veins are patent. The origins of the  celiac and superior mesenteric arteries are patent.     LYMPH NODES: No lymphadenopathy in the abdomen or pelvis.     BONES/SOFT TISSUES: No suspicious osseous lesions.                                                        IMPRESSION:   No CT evidence of malignancy in the chest, abdomen or pelvis.    Current Medications:    aspirin  81 mg Oral Daily    Or     aspirin  81 mg Oral or NG Tube Daily     atorvastatin  40 mg Oral QPM     carvedilol  37.5 mg Oral BID w/meals     chlorthalidone  25 mg Oral Daily     cloNIDine  0.1 mg Oral BID     divalproex sodium delayed-release  500 mg Oral Q12H CARLOS A     [START ON  1/25/2022] divalproex sodium extended-release  1,000 mg Oral Daily     hydrALAZINE  50 mg Oral Q8H CARLOS A     insulin aspart   Subcutaneous TID w/meals     insulin aspart  1-7 Units Subcutaneous 4x Daily AC & HS     insulin glargine  8 Units Subcutaneous Q24H     ketorolac  20 mg Oral Once     lisinopril  40 mg Oral Daily     miconazole   Topical BID     multivitamin, therapeutic  1 tablet Oral Daily     nystatin   Topical BID     potassium chloride  20 mEq Oral BID     sodium chloride (PF)  3 mL Intracatheter Q8H     sodium chloride (PF)  3 mL Intracatheter Q8H     thiamine  100 mg Per Feeding Tube Daily       dextrose       - MEDICATION INSTRUCTIONS -       - MEDICATION INSTRUCTIONS -       niCARdipine Stopped (01/21/22 0614)

## 2022-01-24 NOTE — PLAN OF CARE
Neuro: A&Ox4. Left side weakness, new twitch in right hand attempts squeeze, no movement in left lower leg, tongue deviation to left, lists to left side at times. Pt sad and frustrated with diagnosis  Cardiac: SR. VSS afebrile  Respiratory: Sating mid 90's on RA.  GI/: Adequate urine output; large incontinent episode and 180cc in canister. No bm    Diet/appetite: Tolerating regular diet with 1:1 supervision, pockets pills occasionally in left cheek, better with applesauce. Aspiration precautions. Eating well. Carb counting.   Activity:  Assist of 2, up to chair for approx 3 hours today with OT, back to bed for hip xray and pt asleep upon returning from xray, has toradol ordered for hip pain but wasn't available before xray.   Pain: Tylenol given this am for left hip pain with improvement, md ordered toradol for pain pt was napping, passed to next RN.  Skin: No new deficits noted.  LDA's: piv saline locked.     Plan: Hip xray results ?, potassium replaced and recheck 3.8. Continue with POC. Notify primary team with changes.

## 2022-01-24 NOTE — PROGRESS NOTES
Care Management Follow Up    Length of Stay (days): 16    Expected Discharge Date: 01/26/2022     Concerns to be Addressed: discharge planning       Patient plan of care discussed at interdisciplinary rounds: Yes    Anticipated Discharge Disposition: ARU vs TCU   Anticipated Discharge Services: TBD   Anticipated Discharge DME: TBD    Patient/family educated on Medicare website which has current facility and service quality ratings: yes  Education Provided on the Discharge Plan: yes  Patient/Family in Agreement with the Plan: yes    Referrals Placed by CM/SW:    [facility]/ [facility]CU  *[id number]  - SW spoke with Mony and updated admissions that per team, pt may be med ready on Wednesday.     Private pay costs discussed: Not applicable    Additional Information:  SW following for dispo needs- per pt's med team, pt may be med ready mid-week. SW spoke with provider and he shared that team is working on managing pt's blood pressure and will discuss with nephrology on determining other needs.    SW requesting PM&R consult to assist with dispo planning. SW to continue following for support and dispo needs.     ML Araujo, LGSW  6B   [facility]  Phone: [phone]  Pager: [phone]

## 2022-01-24 NOTE — PROGRESS NOTES
Rohan remains alert and oriented. Neuro exams have remained consistent on this shift. Full range of motion and strength on his right side. Left side remains flaccid. He has some slight movement in his left hand- he was able to give a slight squeeze and thumbs up with his left hand. No contraction noted in left lower extremity. Left visual cut remains the same. Rohan is able to make his needs known for the most. He will occasionally get words mixed up. He will consistently refer to his right side as his left side, and his left as his right side.   Rohan as complained of hip pain several times, however he has said his right, and also his left- he has not been consistent with what side is painful.     Blood pressure- systolic elevated over 160 x2- PRN IV hydralazine and labetalol given with good results.     Rohan expressed his frustration with everything that has happened to him. He can seem rather discouraged and down at times. He has asked about when he can get started with rehab to be able to get back home- this is something he is looking forward to.

## 2022-01-25 ENCOUNTER — APPOINTMENT (OUTPATIENT)
Dept: OCCUPATIONAL THERAPY | Facility: CLINIC | Age: 47
End: 2022-01-25
Payer: COMMERCIAL

## 2022-01-25 ENCOUNTER — APPOINTMENT (OUTPATIENT)
Dept: PHYSICAL THERAPY | Facility: CLINIC | Age: 47
End: 2022-01-25
Payer: COMMERCIAL

## 2022-01-25 LAB
ANION GAP SERPL CALCULATED.3IONS-SCNC: 4 MMOL/L (ref 3–14)
BUN SERPL-MCNC: 33 MG/DL (ref 7–30)
CALCIUM SERPL-MCNC: 9.1 MG/DL (ref 8.5–10.1)
CHLORIDE BLD-SCNC: 104 MMOL/L (ref 94–109)
CO2 SERPL-SCNC: 31 MMOL/L (ref 20–32)
CREAT SERPL-MCNC: 1.32 MG/DL (ref 0.66–1.25)
ERYTHROCYTE [DISTWIDTH] IN BLOOD BY AUTOMATED COUNT: 13.4 % (ref 10–15)
GFR SERPL CREATININE-BSD FRML MDRD: 67 ML/MIN/1.73M2
GLUCOSE BLD-MCNC: 106 MG/DL (ref 70–99)
GLUCOSE BLDC GLUCOMTR-MCNC: 101 MG/DL (ref 70–99)
GLUCOSE BLDC GLUCOMTR-MCNC: 104 MG/DL (ref 70–99)
GLUCOSE BLDC GLUCOMTR-MCNC: 131 MG/DL (ref 70–99)
GLUCOSE BLDC GLUCOMTR-MCNC: 135 MG/DL (ref 70–99)
GLUCOSE BLDC GLUCOMTR-MCNC: 180 MG/DL (ref 70–99)
GLUCOSE BLDC GLUCOMTR-MCNC: 222 MG/DL (ref 70–99)
HCT VFR BLD AUTO: 43.7 % (ref 40–53)
HGB BLD-MCNC: 14.5 G/DL (ref 13.3–17.7)
MAGNESIUM SERPL-MCNC: 2.1 MG/DL (ref 1.6–2.3)
MCH RBC QN AUTO: 30.5 PG (ref 26.5–33)
MCHC RBC AUTO-ENTMCNC: 33.2 G/DL (ref 31.5–36.5)
MCV RBC AUTO: 92 FL (ref 78–100)
PHOSPHATE SERPL-MCNC: 3.7 MG/DL (ref 2.5–4.5)
PLATELET # BLD AUTO: 288 10E3/UL (ref 150–450)
POTASSIUM BLD-SCNC: 3.3 MMOL/L (ref 3.4–5.3)
POTASSIUM BLD-SCNC: 3.3 MMOL/L (ref 3.4–5.3)
POTASSIUM BLD-SCNC: 3.8 MMOL/L (ref 3.4–5.3)
RBC # BLD AUTO: 4.76 10E6/UL (ref 4.4–5.9)
RENIN PLAS-CCNC: 0.2 NG/ML/HR
SODIUM SERPL-SCNC: 139 MMOL/L (ref 133–144)
WBC # BLD AUTO: 9 10E3/UL (ref 4–11)

## 2022-01-25 PROCEDURE — 36415 COLL VENOUS BLD VENIPUNCTURE: CPT

## 2022-01-25 PROCEDURE — 97530 THERAPEUTIC ACTIVITIES: CPT | Mod: GP

## 2022-01-25 PROCEDURE — 250N000013 HC RX MED GY IP 250 OP 250 PS 637: Performed by: COUNSELOR

## 2022-01-25 PROCEDURE — 99233 SBSQ HOSP IP/OBS HIGH 50: CPT | Performed by: CLINICAL NURSE SPECIALIST

## 2022-01-25 PROCEDURE — 36415 COLL VENOUS BLD VENIPUNCTURE: CPT | Performed by: STUDENT IN AN ORGANIZED HEALTH CARE EDUCATION/TRAINING PROGRAM

## 2022-01-25 PROCEDURE — 97112 NEUROMUSCULAR REEDUCATION: CPT | Mod: GP

## 2022-01-25 PROCEDURE — 250N000013 HC RX MED GY IP 250 OP 250 PS 637

## 2022-01-25 PROCEDURE — 83735 ASSAY OF MAGNESIUM: CPT | Performed by: PSYCHIATRY & NEUROLOGY

## 2022-01-25 PROCEDURE — 85027 COMPLETE CBC AUTOMATED: CPT | Performed by: STUDENT IN AN ORGANIZED HEALTH CARE EDUCATION/TRAINING PROGRAM

## 2022-01-25 PROCEDURE — 80048 BASIC METABOLIC PNL TOTAL CA: CPT | Performed by: STUDENT IN AN ORGANIZED HEALTH CARE EDUCATION/TRAINING PROGRAM

## 2022-01-25 PROCEDURE — 120N000003 HC R&B IMCU UMMC

## 2022-01-25 PROCEDURE — 97535 SELF CARE MNGMENT TRAINING: CPT | Mod: GO | Performed by: OCCUPATIONAL THERAPIST

## 2022-01-25 PROCEDURE — 250N000013 HC RX MED GY IP 250 OP 250 PS 637: Performed by: PSYCHIATRY & NEUROLOGY

## 2022-01-25 PROCEDURE — 99233 SBSQ HOSP IP/OBS HIGH 50: CPT | Mod: GC | Performed by: INTERNAL MEDICINE

## 2022-01-25 PROCEDURE — 84132 ASSAY OF SERUM POTASSIUM: CPT

## 2022-01-25 PROCEDURE — 99232 SBSQ HOSP IP/OBS MODERATE 35: CPT | Mod: GC | Performed by: PSYCHIATRY & NEUROLOGY

## 2022-01-25 PROCEDURE — 250N000013 HC RX MED GY IP 250 OP 250 PS 637: Performed by: INTERNAL MEDICINE

## 2022-01-25 PROCEDURE — 84100 ASSAY OF PHOSPHORUS: CPT | Performed by: PSYCHIATRY & NEUROLOGY

## 2022-01-25 PROCEDURE — 97112 NEUROMUSCULAR REEDUCATION: CPT | Mod: GO | Performed by: OCCUPATIONAL THERAPIST

## 2022-01-25 PROCEDURE — 250N000013 HC RX MED GY IP 250 OP 250 PS 637: Performed by: STUDENT IN AN ORGANIZED HEALTH CARE EDUCATION/TRAINING PROGRAM

## 2022-01-25 RX ORDER — ASPIRIN 81 MG/1
324 TABLET, CHEWABLE ORAL DAILY
Status: DISCONTINUED | OUTPATIENT
Start: 2022-01-26 | End: 2022-01-28 | Stop reason: HOSPADM

## 2022-01-25 RX ORDER — POTASSIUM CHLORIDE 20MEQ/15ML
40 LIQUID (ML) ORAL ONCE
Status: COMPLETED | OUTPATIENT
Start: 2022-01-25 | End: 2022-01-25

## 2022-01-25 RX ORDER — AMILORIDE HYDROCHLORIDE 5 MG/1
5 TABLET ORAL DAILY
Status: DISCONTINUED | OUTPATIENT
Start: 2022-01-25 | End: 2022-01-26

## 2022-01-25 RX ADMIN — INSULIN GLARGINE 8 UNITS: 100 INJECTION, SOLUTION SUBCUTANEOUS at 16:44

## 2022-01-25 RX ADMIN — THERA TABS 1 TABLET: TAB at 08:48

## 2022-01-25 RX ADMIN — ASPIRIN 81 MG CHEWABLE TABLET 81 MG: 81 TABLET CHEWABLE at 08:47

## 2022-01-25 RX ADMIN — LISINOPRIL 40 MG: 20 TABLET ORAL at 08:48

## 2022-01-25 RX ADMIN — POTASSIUM CHLORIDE 20 MEQ: 1.5 POWDER, FOR SOLUTION ORAL at 08:48

## 2022-01-25 RX ADMIN — ATORVASTATIN CALCIUM 40 MG: 40 TABLET, FILM COATED ORAL at 20:47

## 2022-01-25 RX ADMIN — THIAMINE HCL TAB 100 MG 100 MG: 100 TAB at 08:48

## 2022-01-25 RX ADMIN — HYDRALAZINE HYDROCHLORIDE 50 MG: 50 TABLET, FILM COATED ORAL at 09:30

## 2022-01-25 RX ADMIN — HYDRALAZINE HYDROCHLORIDE 50 MG: 50 TABLET, FILM COATED ORAL at 18:22

## 2022-01-25 RX ADMIN — ACETAMINOPHEN 650 MG: 325 TABLET, FILM COATED ORAL at 02:26

## 2022-01-25 RX ADMIN — CLONIDINE HYDROCHLORIDE 0.1 MG: 0.1 TABLET ORAL at 08:47

## 2022-01-25 RX ADMIN — CARVEDILOL 37.5 MG: 25 TABLET, FILM COATED ORAL at 08:47

## 2022-01-25 RX ADMIN — CARVEDILOL 37.5 MG: 25 TABLET, FILM COATED ORAL at 18:22

## 2022-01-25 RX ADMIN — NYSTATIN: 100000 CREAM TOPICAL at 08:49

## 2022-01-25 RX ADMIN — HYDRALAZINE HYDROCHLORIDE 50 MG: 50 TABLET, FILM COATED ORAL at 02:26

## 2022-01-25 RX ADMIN — CLONIDINE HYDROCHLORIDE 0.1 MG: 0.1 TABLET ORAL at 20:47

## 2022-01-25 RX ADMIN — DIVALPROEX SODIUM 1000 MG: 500 TABLET, FILM COATED, EXTENDED RELEASE ORAL at 08:48

## 2022-01-25 RX ADMIN — POTASSIUM CHLORIDE 40 MEQ: 40 SOLUTION ORAL at 08:48

## 2022-01-25 RX ADMIN — MICONAZOLE NITRATE: 2 POWDER TOPICAL at 08:49

## 2022-01-25 RX ADMIN — CHLORTHALIDONE 25 MG: 25 TABLET ORAL at 08:48

## 2022-01-25 RX ADMIN — AMILORIDE HYDROCLORIDE 5 MG: 5 TABLET ORAL at 12:24

## 2022-01-25 RX ADMIN — POTASSIUM CHLORIDE 20 MEQ: 1.5 POWDER, FOR SOLUTION ORAL at 20:47

## 2022-01-25 ASSESSMENT — ACTIVITIES OF DAILY LIVING (ADL)
ADLS_ACUITY_SCORE: 23
ADLS_ACUITY_SCORE: 21
ADLS_ACUITY_SCORE: 23
ADLS_ACUITY_SCORE: 23
ADLS_ACUITY_SCORE: 21
ADLS_ACUITY_SCORE: 23
ADLS_ACUITY_SCORE: 23
ADLS_ACUITY_SCORE: 21
ADLS_ACUITY_SCORE: 23
ADLS_ACUITY_SCORE: 21
ADLS_ACUITY_SCORE: 21
ADLS_ACUITY_SCORE: 23
ADLS_ACUITY_SCORE: 21
ADLS_ACUITY_SCORE: 23
ADLS_ACUITY_SCORE: 21
ADLS_ACUITY_SCORE: 23
ADLS_ACUITY_SCORE: 23
ADLS_ACUITY_SCORE: 21
ADLS_ACUITY_SCORE: 25
ADLS_ACUITY_SCORE: 23

## 2022-01-25 ASSESSMENT — MIFFLIN-ST. JEOR: SCORE: 1639.63

## 2022-01-25 NOTE — PLAN OF CARE
Neuro: A/Ox4, intermittently forgetful/with word-finding difficulty. Pleasant and cooperative. Able to follow commands, but with hemiparesis of L side (only slight contraction of LUE) Left visual field cut. PERRLA.  Cardiac: SR with HR 60-70s. VSS. Goal SBP < 160.  Respiratory: O2 sats stable on RA most of night, 2L intermittently for desat to 80s (seeming to be apnea?)  GI/: Voiding adequately with primofit in place. Incontinent of bowel/bladder.  Diet/appetite: Consistent carb diet/carb counts. Taking pills well with applesauce. 1:1 supervision for all meals/intake.   Activity:  Ax lift. Turning pt frequently.   Pain: At acceptable level on current regimen. Denies pain.  Skin: Intact, no new deficits noted.  LDA's: PIV wdl, saline locked.    Plan: Continue with POC. Notify primary team with changes.

## 2022-01-25 NOTE — PROGRESS NOTES
"IP Diabetes Management  Daily Note           Assessment and Plan:   HPI: Miriam Hall is a 46 year old male with PMHx HTN who presented on 1/8/2022 with L gaze palsy, L complete hemianopia, mild dysarthria, L arm > leg weakness, L arm/leg decreased sensation, and L side extinction after waking up from a nap. FTH an occlusion of the P1 segment of the R PCA with associated established infarct as well as high grade stenosis of the M2 segment of the R MCA. TTE with EF of 50-55%, no WMA or atrial enlargement. Etiology undetermined at this time.     Diagnosed with DM 2 in 2017 and lifestyle changes recommended, never on DM medication.      Assessment:   1)Type 2 Diabetes Mellitus, uncontrolled (A1c 7.8), with hyperglycemia  2)acute ischemic stroke w/encephelopathy    Plan:    - Lantus 8 units daily at 1600   -CHO coverage-1 units per 25 grams carb   -Novolog  Medium intensity sliding scale AC, HS >/140 at all points   -BG monitoring TID AC, HS, 0200   -hypoglycemia protocol   -carb counting protocol   -diabetes education needs will be assessed closer to discharge.     Outpatient follow up: TBD      Interval History and Assessment: interval glucose trend reviewed:     Only one meal covered with aspart yesterday.  Patient notes that he has been trying to keep his carbohydrates low (he skipped mashed potatoes last night, for example) given the need for insulin.   Explained to him that we want his calorie intake to increase, and reaching that goal should include carbohydrates.   He seems to take this \"permission\" to heart.  Note diet is now regular iwht thin liquids.      Glucose control improved with addition basal and now prandial.    eGFR in 60s lastfour days.  Hopeful for improvement, or stability at least-- and consideration of metformin.    DPP4 might provide sufficient coverage.      Current nutritional intake and type: Orders Placed This Encounter      Consistent Carbohydrate Diet Moderate Consistent Carb (60 g " CHO per Meal) Diet      Planned Procedures/surgeries: none  Steroid planning: none  D5W-containing solutions/medications: none    PTA Diabetes Regimen: none    Discharge Planning: TBD, pt hopeful for rehab, but timing not clear, maybe 1/26           Diabetes History:   Type of Diabetes: Type 2 Diabetes Mellitus  Lab Results   Component Value Date    A1C 7.8 01/08/2022              Review of Systems:     The Review of Systems is negative other than noted in the Interval History.           Medications:     Current Facility-Administered Medications   Medication     acetaminophen (TYLENOL) tablet 650 mg     aspirin EC tablet 81 mg    Or     aspirin (ASA) chewable tablet 81 mg     atorvastatin (LIPITOR) tablet 40 mg     carboxymethylcellulose PF (REFRESH PLUS) 0.5 % ophthalmic solution 1 drop     carvedilol (COREG) tablet 37.5 mg     chlorthalidone (HYGROTON) tablet 25 mg     cloNIDine (CATAPRES) tablet 0.1 mg     dextrose 10% infusion     glucose gel 15-30 g    Or     dextrose 50 % injection 25-50 mL    Or     glucagon injection 1 mg     divalproex sodium extended-release (DEPAKOTE ER) 24 hr tablet 1,000 mg     hydrALAZINE (APRESOLINE) injection 10-20 mg     hydrALAZINE (APRESOLINE) tablet 50 mg     insulin aspart (NovoLOG) injection (RAPID ACTING)     insulin aspart (NovoLOG) injection (RAPID ACTING)     insulin aspart (NovoLOG) injection (RAPID ACTING)     insulin glargine (LANTUS PEN) injection 8 Units     ketorolac (TORADOL) tablet 20 mg     labetalol (NORMODYNE/TRANDATE) injection 10-20 mg     lidocaine (LMX4) cream     lidocaine 1 % 0.1-1 mL     lisinopril (ZESTRIL) tablet 40 mg     medication instruction - No oral meds if patient didn't pass dysphagia screen     Medication Instructions - Avoid dextrose in IV solutions.     melatonin tablet 5 mg     miconazole (MICATIN) 2 % powder     multivitamin, therapeutic (THERA-VIT) tablet 1 tablet     niCARdipine 40 mg in 200 mL 0.83% NaCl (CARDENE) infusion     NIFEdipine  "ER OSMOTIC (PROCARDIA XL) 24 hr tablet 30 mg     nystatin (MYCOSTATIN) cream     polyethylene glycol (MIRALAX) Packet 17 g     potassium chloride (KLOR-CON) Packet 20 mEq     senna-docusate (SENOKOT-S/PERICOLACE) 8.6-50 MG per tablet 1-2 tablet     sodium chloride (PF) 0.9% PF flush 3 mL     sodium chloride (PF) 0.9% PF flush 3 mL     sodium chloride (PF) 0.9% PF flush 3 mL     thiamine (B-1) tablet 100 mg            Physical Exam:    BP (!) 145/84 (BP Location: Right arm)   Pulse 67   Temp 97.7  F (36.5  C) (Oral)   Resp 18   Ht 1.702 m (5' 7\")   Wt 80.6 kg (177 lb 11.1 oz)   SpO2 98%   BMI 27.83 kg/m    General:dozing in bed, leaning right  HEENT: normocephalic, atraumatic. Oral mucous membranes moist.   Lungs: unlabored respiration, no cough  ABD:   Skin: dry, no obvious lesions  Mental status:  easily aroused to verbal, engaged clear speech  Psych:  calm            Data:     Recent Labs   Lab 01/25/22  0713 01/25/22  0425 01/25/22  0225 01/24/22  2221 01/24/22  1615 01/24/22  1202   * 106* 101* 152* 112* 178*     Lab Results   Component Value Date    WBC 9.0 01/25/2022    WBC 8.7 01/24/2022    WBC 8.7 01/23/2022    HGB 14.5 01/25/2022    HGB 14.4 01/24/2022    HGB 14.5 01/23/2022    HCT 43.7 01/25/2022    HCT 43.1 01/24/2022    HCT 43.3 01/23/2022    MCV 92 01/25/2022    MCV 91 01/24/2022    MCV 91 01/23/2022     01/25/2022     01/24/2022     01/23/2022     Lab Results   Component Value Date     01/25/2022     01/24/2022     01/23/2022    POTASSIUM 3.3 (L) 01/25/2022    POTASSIUM 3.3 (L) 01/25/2022    POTASSIUM 3.8 01/24/2022    CHLORIDE 104 01/25/2022    CHLORIDE 101 01/24/2022    CHLORIDE 102 01/23/2022    CO2 31 01/25/2022    CO2 27 01/24/2022    CO2 30 01/23/2022     (H) 01/25/2022     (H) 01/25/2022     (H) 01/25/2022     Lab Results   Component Value Date    BUN 33 (H) 01/25/2022    BUN 34 (H) 01/24/2022    BUN 36 (H) 01/23/2022 "     No results found for: TSH  Lab Results   Component Value Date    AST 22 01/17/2022    AST 26 01/08/2022    AST 26 04/25/2014    ALT 20 01/17/2022    ALT 31 01/08/2022    ALT 50 (H) 04/25/2014    ALKPHOS 98 01/17/2022    ALKPHOS 94 01/08/2022    ALKPHOS 66 04/25/2014           I spent a total of 35 minutes bedside and on the inpatient unit managing the glycemic care of Miriam Wysinger. Over 50% of my time on the unit was spent counseling the patient  and/or coordinating care regarding acute glycemic management.  See note for details.  Shnaaedavid Antoine APRN -[phone]      To contact Endocrine Diabetes service:   From 8AM-4PM: page inpatient diabetes provider that is following the patient  For questions or updates from 4PM-8AM: page the diabetes job code for on call fellow: [phone]

## 2022-01-25 NOTE — PLAN OF CARE
Speech Language Therapy Discharge Summary    Reason for therapy discharge:    All goals and outcomes met, no further needs identified.    Progress towards therapy goal(s). See goals on Care Plan in [facility] electronic health record for goal details.  Goals met    Therapy recommendation(s):    No further therapy is recommended.  Recommend regular diet and thin liquids with intermittent supervision and assistance. Pt must only consume PO when alert and seated upright, small bites/sips, slow rate of intake, and alternate solids and liquids. Nursing to ensure oral cavity is clear after PO intake. RN without c/f aspiration, pt tolerating diet.  No further SLP needs identified, SLP to complete order.

## 2022-01-25 NOTE — PROGRESS NOTES
[facility]    Stroke Progress Note    Interval Events  - NAEON  - XR Left Hip = no dislocation or fracture  - Started Amiloride 5 mg PO daily  - Increased ASA from 81 to 325 mg for pain  - PCO: Avoid interruptions at night  - Patient ready for discharge    HPI Summary  Miriam Hall is a 46 year old male with PMHx HTN who presented on 1/8/2022 with symptoms concerning for stroke. The pt reports that he was in his normal state of health that morning and then decided to take a nap around noon. On waking from his nap around 2:00PM, he found that he was having trouble moving and feeling the L side of his body. Unfortunately, the pt was unable to get to a phone and ended up crawling on his floor until he could finally get the attention of one of his neighbors, who then was able to call EMS.     EMS arrived to find the pt continuing to have decreased sensation and weakness on his L side. They noted a , /145, and . On arrival in the ED, the pt remained afebrile, but was tachycardic () and quite hypertensive (/146). A stroke code was called for initial NIHSS 9, for a partial L gaze palsy, L-sided complete hemianopia, L arm > leg weakness, decreased sensation in the L arm/leg to sensation (pinprick intact in arm but decreased in leg), mild dysarthria, L-sided extinction, and possible L neglect. The pt also seemed to be having trouble with proprioception in the L arm, though did recognize it to be his own. Pt was immediately taken to CT where he underwent a CTH, CTA Head/Neck and CT Perfusion study, revealing a well-established R parieto-occipital stroke with an LVO of the R P1 segment. As the stroke already appeared well-established and the pt was outside the time window, no tPA was given. No thrombectomy was performed due to the location of the LVO in the PCA. The pt was loaded with ASA 324mg once with plans to allow for permissive HTN and  admission to the stroke service for further work-up     The patient denies any history of smoking or drug use. Does drink EtOH, unclear amount.      TPA Treatment   Not given due to established infarct on imaging and unclear or unfavorable risk-benefit profile for extended window thrombolysis beyond the conventional 4.5 hour time window.     Endovascular Treatment  Proximal vessel occlusion present, but endovascular treatment not initiated due to location of the thrombus in the posterior cerebral artery    Stroke Evaluation Summarized    MRI/Head CT MRI Brain (1/12)  1. Findings from CT and CTA performed yesterday are confirmed with stroke in the distribution of the right posterior cerebral artery. This involves the medial right occipital lobe and medial right temporal lobe with extension into the body of the thalamus.  2. Additional punctate foci of diffusion restriction in the left putamen.  3. Moderate leukoaraiosis.     CT Head (1/16)  1. Continued evolution of large right posterior cerebral artery territory infarct. With similar mass effect and 4 mm of leftward midline shift, previously 5 mm.  2. Subtle hyperdensities in the right basal ganglia consistent with petechial hemorrhages seen on previous MRI. No new intracranial hemorrhage or new loss of gray-white matter differentiation.    MRI Brain (1/18)  1. Exam sensitivity is severely diminished due to considerable motion artifact.  2. No definite abnormal enhancement to suggest infection on the motion degraded postcontrast images.  3. Continued evolution of right posterior cerebral territory infarct and punctate infarct involving the left putamen. Associated petechial hemorrhage and developing cortical laminar necrosis.   Intracranial Vasculature MRA (1/12)  Redemonstration of occlusion of the right PCA near its  origin. Additional short segment high-grade stenosis of a right-sided M2 MCA branch. Findings are not likely not significantly changed from CT  performed yesterday.   Cervical Vasculature CTA (1/12)  1. Occlusion of the proximal right PCA.  2. High-grade stenosis of the occiput M2 branch of the right MCA with  attenuated flow distal.  3. Neck CTA demonstrates no stenosis of the major cervical arteries.     Echocardiogram The visual ejection fraction is 50-55%.   No regional wall motion abnormalities are seen.  Moderate to severe concentric wall thickening consistent with left ventricular hypertrophy is present.  Global right ventricular function is normal.  There was no shunt at the atrial septal level as assessed by agitated saline bubble study at rest and with Valsalva maneuver.  Previous study not available for comparison.   EKG/Telemetry EKG with sinus tachycardia   Other Testing Hypercoagulability profile has not been sent yet     LDL  1/8/2022: 75 mg/dL   A1C  1/8/2022: 7.8 %   Troponin No lab value available in past 48 hrs       Impression   # Acute ischemic stroke of Right P1 segement of the PCA  # R P1 occlusion and R M2 Stenosis  # Large vessel atherosclerosis vs ESUS  # Cerebral edema   # Hemorrhagic transformation of ischemic infarct  Miriam Hall is a 46 year old male with PMHx HTN who presented on 1/8/2022 with L gaze palsy, L complete hemianopia, mild dysarthria, L arm > leg weakness, L arm/leg decreased sensation, and L side extinction after waking up from a nap. FTH an occlusion of the P1 segment of the R PCA with associated established infarct as well as high grade stenosis of the M2 segment of the R MCA. TTE with EF of 50-55%, no WMA or atrial enlargement. Etiology undetermined at this time.   - Aspirin 325 mg PO daily for secondary stroke prevention & mild pain relief  - CARISA once BP under better control   - PT/OT/SLP: ARU appropriate  - CT C/A/P (1/18): Negative  - Hypercoag panel (1/18): Negative  - Vasculitis panel (1/18): Negative    #Encephalopathy  #Leukocytosis  #Concern for seizures  Patient with ongoing waxing and waning  level of consciousness over the past few days. Hooked up to EEG on 1/12 with no evidence of seizures per report from Dr. Porter and pt pulling off leads overnight so EEG disconnected on 1/13. Repeat EEG on 1/16 completed and negative for seizure activity. No fevers documented in chart but patient does have a mild leukocytosis. No alarming electrolyte derangements. BP has been persistently elevated and resistant to medications, so his confusion could represent hypertensive encephalopathy.   - Depakote ER 1000 mg PO daily   - VPA total/free trough levels (1/18): 100/19.8  - EEG spot study (1/18): Negative seizures  - Blood cultures (1/18): NGTD  - UA & UCx (1/18): Negative  - CXR (1/18): No focal pulmonary opacity    #HLD  LDL 75. Long-term LDL goal 40-70.   - Atorvastatin 40 mg PO daily    #Resistant HTN   Sustained mostly at BP goal < 160. Now up to 5 anti-hypertensives without a clear etiology.  - Lisinopril 40 mg PO daily   - Amlodipine 10 mg PO daily  - Carvedilol 37.5 mg PO BID  - Chlorthalidone 25 mg PO daily  - Clonidine 0.1 mg PO daily  - Hydralazine 50 mg PO TID  - Hydralazine/Labetalol PRN for SBP > 180  - Nephrology consulted, appreciate recs    #DM  Newly diagnosed DM with Hgb A1C 7.8.  - Continue to monitor glucose  - MDSSI  - Hypoglycemia protocol  - Endocrinology consulted, appreciate recs    #Nutrition  Patient does not meet two of the established criteria necessary for diagnosing malnutrition but is at risk for malnutrition.  - NG tube in place with TF started  - Nutrition consulted  - Minced and moist diet with thin liquids - patient has been taking in very little PO    #Hypokalemia   - RN Managed Standard Replacement protocol    #Superficial RUE thrombus  US completed 1/17 negative for DVT but positive for thrombus in the R basilic and cephalic veins from the antecubital fossa to the wrist.     Chronic/Resolved Issues:  #DISHA (resolved)  Pt presented with Cr 1.37 improved with IVF to 1.17. Now at  baseline.   - Daily BMP    #Elevated troponin (resolved)  Pt arrives with Trop 147 and rising. EKG exhibited sinus tachycardia, but no concerning signs of ischemia. Troponin peaked 1/10. Unclear what caused this elevation; unlikely to be MI given no sx or EKG changes.  - Coronary CTA as outpatient per Cards recs      Prophylaxis            For VTE Prevention:  - SCD  For Acid Suppression:  - GI prophylaxis is not indicated  FEN:  - NS mIVF @ 75/hr    Code Status: Full Code    Dispo: ARU    The patient was discussed with Stroke Staff, Dr. Yousif.    Nelson Laura DO  Neurology Resident PGY1  ASCOM: *[id number]  ______________________________________________________    Clinically Significant Risk Factors Present on Admission                 Medications   Scheduled Meds    aspirin  81 mg Oral Daily    Or     aspirin  81 mg Oral or NG Tube Daily     atorvastatin  40 mg Oral QPM     carvedilol  37.5 mg Oral BID w/meals     chlorthalidone  25 mg Oral Daily     cloNIDine  0.1 mg Oral BID     divalproex sodium extended-release  1,000 mg Oral Daily     hydrALAZINE  50 mg Oral Q8H CARLOS A     insulin aspart   Subcutaneous TID w/meals     insulin aspart  1-7 Units Subcutaneous 4x Daily AC & HS     insulin glargine  8 Units Subcutaneous Q24H     ketorolac  20 mg Oral Once     lisinopril  40 mg Oral Daily     miconazole   Topical BID     multivitamin, therapeutic  1 tablet Oral Daily     nystatin   Topical BID     potassium chloride  40 mEq Oral or Feeding Tube Once     potassium chloride  20 mEq Oral BID     sodium chloride (PF)  3 mL Intracatheter Q8H     sodium chloride (PF)  3 mL Intracatheter Q8H     thiamine  100 mg Per Feeding Tube Daily       Infusion Meds    dextrose       - MEDICATION INSTRUCTIONS -       - MEDICATION INSTRUCTIONS -       niCARdipine Stopped (01/21/22 0614)       PRN Meds  acetaminophen, artificial tears ophthalmic solution, dextrose, glucose **OR** dextrose **OR** glucagon, hydrALAZINE, insulin aspart,  labetalol, lidocaine 4%, lidocaine (buffered or not buffered), - MEDICATION INSTRUCTIONS -, - MEDICATION INSTRUCTIONS -, melatonin, NIFEdipine ER OSMOTIC, polyethylene glycol, senna-docusate, sodium chloride (PF)       PHYSICAL EXAMINATION  Temp:  [97.4  F (36.3  C)-98.8  F (37.1  C)] 97.7  F (36.5  C)  Pulse:  [67-86] 67  Resp:  [16-18] 18  BP: ()/() 145/84  SpO2:  [94 %-100 %] 97 %      Neurologic  Mental Status: lethargic but awakens to voice, oriented to self, location, situation & medical condition; named age, month, year; spelled WORLD [employer] SCHOOL forwards  Cranial Nerves:  PERRL, right gaze preference but able to slightly cross the midline, oculocephalic reflex intact bilaterally, no facial droop, shoulder shrug strong on the R  Motor:  no abnormal movements. 5/5 RUE and RLE; 1/5 LUE & 2/5 LLE, more movement of digits of L hand   Sensory:  Intact to noxious sensation in 4/4 extremities, decreased sensation on L side, can detect pressure/cold  Coordination:  deferred  Station/Gait:  deferred    Stroke Scales    NIHSS  Interval baseline (01/12/22 0324)   Interval Comments     1a. Level of Consciousness 1-->Not alert, but arousable by minor stimulation to obey, answer, or respond   1b. LOC Questions 2-->Answers neither question correctly   1c. LOC Commands 1-->Performs one task correctly   2.   Best Gaze 1-->Partial gaze palsy, gaze is abnormal in one or both eyes, but forced deviation or total gaze paresis is not present   3.   Visual 2-->Complete hemianopia   4.   Facial Palsy 1-->Minor paralysis (flattened nasolabial fold, asymmetry on smiling)   5a. Motor Arm, Left 4-->No movement   5b. Motor Arm, Right 0-->No drift, limb holds 90 (or 45) degrees for full 10 secs   6a. Motor Leg, Left 4-->No movement   6b. Motor Leg, right 1-->Drift, leg falls by the end of the 5-sec period but does not hit bed   7.   Limb Ataxia 0-->Absent   8.   Sensory 1-->Mild-to-moderate sensory loss, patient feels pinprick is  less sharp or is dull on the affected side, or there is a loss of superficial pain with pinprick, but patient is aware of being touched   9.   Best Language 2-->Severe aphasia, all communication is through fragmentary expression, great need for inference, questioning, and guessing by the listener. Range of information that can be exchanged is limited, listener carries burden of. . . (see row details)   10. Dysarthria 0-->Normal   11. Extinction and Inattention  1-->Visual, tactile, auditory, spatial, or personal inattention or extinction to bilateral simultaneous stimulation in one of the sensory modalities   Total 21 (01/12/22 0324)       Imaging  I personally reviewed all imaging; relevant findings per HPI.     Lab Results Data   CBC  Recent Labs   Lab 01/25/22  0425 01/24/22  0414 01/23/22  0447   WBC 9.0 8.7 8.7   RBC 4.76 4.74 4.75   HGB 14.5 14.4 14.5   HCT 43.7 43.1 43.3    292 300     Basic Metabolic Panel    Recent Labs   Lab 01/25/22  0713 01/25/22  0425 01/25/22  0225 01/24/22  1615 01/24/22  1342 01/24/22  1006 01/24/22  0414 01/23/22  0752 01/23/22  0447   NA  --  139  --   --   --   --  136  --  140   POTASSIUM  --  3.3*  3.3*  --   --  3.8  --  3.3*  3.3*   < > 3.3*   CHLORIDE  --  104  --   --   --   --  101  --  102   CO2  --  31  --   --   --   --  27  --  30   BUN  --  33*  --   --   --   --  34*  --  36*   CR  --  1.32*  --   --   --   --  1.38*  --  1.38*   * 106* 101*   < >  --    < > 106*   < > 93   VALERIE  --  9.1  --   --   --   --  9.2  --  8.6    < > = values in this interval not displayed.     Liver Panel  No results for input(s): PROTTOTAL, ALBUMIN, BILITOTAL, ALKPHOS, AST, ALT, BILIDIRECT in the last 168 hours.  INR    Recent Labs   Lab Test 01/18/22  1405 01/08/22  1843 01/08/22  1834   INR 1.06 0.95 1.0*      Lipid Profile    Recent Labs   Lab Test 01/08/22  1843 05/16/14  1506   CHOL 169 131   HDL 70 53   LDL 75 70   TRIG 118 38     A1C    Recent Labs   Lab Test  01/08/22  1843   A1C 7.8*     Troponin I  No results for input(s): TROPONIN, GHTROP in the last 168 hours.          no palpable lymph nodes

## 2022-01-25 NOTE — PROGRESS NOTES
Nephrology Progress Note  01/25/2022         Assessment & Recommendations:   Miriam Hall is a 46 year old year old male      #Severe resistant hypertension, c/f hyperaldosteronism  #Hypokalemia  #Metabolic alkalosis, improved   #Acute ischemic stroke w/ L sided hemiparesis, c/b hemorrhagic transformation   #Moderate to severe left ventricular hypertrophy    Investigations:  - Renal US - Right kidney 10.2 cm in length, left kidney 10.4 cm in length; normal parenchymal thickness and echogenicity, no obstruction. No evidence of left or right renal artery stenosis.  - Cortisol level at 4 pm is 20.5  - Serum aldosterone 11.5  - Serum renin and 24H urine aldosterone pending   - Urine cortisol, cortisone pending     Pt presents with refractory HTN, hypokalemia and mild metabolic alkalosis initially c/f hyperaldosteronism. On recent CT adrenal glands wnl, no e/o renal artery stenosis. Serum aldosterone wnl at 11.5. This makes suspicion for primary hyperaldosteronism less, and may limit the efficacy of aldosterone antagonists such as Spironolactone or Eplerenone. With normal serum aldosterone concern is raised for possible ENAC channelopathy, such as Liddle syndrome. Pt denies licorice use. Given we have pending serum renin and 24H urine aldosterone levels would trial empiric addition of Amiloride as below. Will evaluate for relative mineralocorticoid excess and Cushing's with urine cortisol / cortisone levels     RECOMMENDATIONS:  - Start Amiloride 5mg daily and titrate up as tolerated  - Urine cortisol and cortisone levels (ordered for you)   - Continue potassium supplementation   - Continue Lisinopril 40mg daily   - Titrate other BP agents as needed, if able to come off agents with addition of Amiloride would prioritize discontinuation of Hydralazine followed by possibly Clonidine    - Nephrology will continue to follow       Recommendations were communicated to primary team in person and via this note    Seen and  "discussed with Dr. Florecita Nolasco MD   PGY3 Internal Medicine   [phone]    Interval History :   Nursing and provider notes from last 24 hours reviewed.    NAEO. Up to chair this morning. Denies fevers, SOB, abdominal pain/distension, urinary sx, LE edema. Expresses frustration over recent events and prolonged hospital stay. Stated goal of returning home eventually.     Review of Systems:   4 point ROS is conducted and is negative apart from above     Physical Exam:   I/O last 3 completed shifts:  In: 880 [P.O.:880]  Out: 1100 [Urine:1100]   BP 90/70 (BP Location: Right arm)   Pulse 78   Temp 97.6  F (36.4  C) (Oral)   Resp 18   Ht 1.702 m (5' 7\")   Wt 80.6 kg (177 lb 11.1 oz)   SpO2 95%   BMI 27.83 kg/m       GENERAL APPEARANCE: nontoxic appearing man in NAD, sitting up comfortably in chair   HENT: mouth without ulcers or lesions  PULM: lungs clear to auscultation bilaterally, equal air movement, no increased WOB on RA   CV: regular rhythm, normal rate, no appreciated murmurs/rubs, no peripheral edema   GI: soft, non-tender, not distended, bowel sounds are +  INTEGUMENT: no cyanosis, no rash on areas of exposed skin     Labs:   All labs reviewed by me  Electrolytes/Renal -   Recent Labs   Lab Test 01/25/22  0713 01/25/22  0425 01/25/22  0225 01/24/22  1615 01/24/22  1342 01/24/22  1006 01/24/22  0414 01/23/22  0752 01/23/22  0447 01/22/22  0756 01/22/22  0457   NA  --  139  --   --   --   --  136  --  140  --  138   POTASSIUM  --  3.3*  3.3*  --   --  3.8  --  3.3*  3.3*   < > 3.3*   < > 3.3*   CHLORIDE  --  104  --   --   --   --  101  --  102  --  102   CO2  --  31  --   --   --   --  27  --  30  --  30   BUN  --  33*  --   --   --   --  34*  --  36*  --  37*   CR  --  1.32*  --   --   --   --  1.38*  --  1.38*  --  1.39*   * 106* 101*   < >  --    < > 106*   < > 93   < > 100*   VALERIE  --  9.1  --   --   --   --  9.2  --  8.6  --  8.8   MAG  --  2.1  --   --   --   --   --   --  " 2.0  --  2.0   PHOS  --  3.7  --   --   --   --   --   --  3.4  --  4.0    < > = values in this interval not displayed.     CBC -   Recent Labs   Lab Test 01/25/22  0425 01/24/22  0414 01/23/22  0447   WBC 9.0 8.7 8.7   HGB 14.5 14.4 14.5    292 300     LFTs -   Recent Labs   Lab Test 01/17/22  0433 01/08/22  1843 04/25/14  1530   ALKPHOS 98 94 66   BILITOTAL 0.7 0.8 0.7   ALT 20 31 50*   AST 22 26 26   PROTTOTAL 7.8 8.5 7.1   ALBUMIN 2.9* 3.9 4.3     Iron Panel - No lab results found.    Imaging:  All imaging studies reviewed by me.     1/20/2022 RENAL US   Impression:  1. Right kidney:  1a. Renal parenchyma: Unremarkable renal parenchyma.  1b. Renal artery: No evidence of renal artery stenosis.     2. Left kidney:  2a. Renal parenchyma: Renal parenchyma is difficult to visualize  though appears grossly unremarkable.  2b. Renal artery: No evidence of renal artery stenosis.    1/18/2022 CT CAP W CONTRAST   ABDOMEN/PELVIS:  LIVER: Geographic hypoattenuation along the falciform ligament,  consistent with focal fat deposition. No suspicious hepatic mass.  GALLBLADDER: Within normal limits.  PANCREAS: Within normal limits.  SPLEEN: Within normal limits.  ADRENAL GLANDS: Within normal limits.  URINARY TRACT: Symmetric cortical enhancement bilaterally. No  hydronephrosis, nephrolithiasis, or suspicious renal mass. Urinary  bladder is unremarkable.  REPRODUCTIVE ORGANS: Prostatomegaly.  BOWEL: Gastric tube tip terminates in the stomach. Normal caliber of  the small and large bowel. Appendix is within normal limits. No  abnormal wall thickening or inflammatory change.  PERITONEUM/FLUID: No free fluid or air in the abdomen or pelvis.     VESSELS: No aneurysmal dilatation of the abdominal aorta. The portal,  splenic, and superior mesenteric veins are patent. The origins of the  celiac and superior mesenteric arteries are patent.     LYMPH NODES: No lymphadenopathy in the abdomen or pelvis.     BONES/SOFT TISSUES: No  suspicious osseous lesions.                                                        IMPRESSION:   No CT evidence of malignancy in the chest, abdomen or pelvis.    Current Medications:    aMILoride  5 mg Oral Daily     [START ON 1/26/2022] aspirin  324 mg Oral or NG Tube Daily    Or     [START ON 1/26/2022] aspirin  325 mg Oral Daily     atorvastatin  40 mg Oral QPM     carvedilol  37.5 mg Oral BID w/meals     chlorthalidone  25 mg Oral Daily     cloNIDine  0.1 mg Oral BID     divalproex sodium extended-release  1,000 mg Oral Daily     hydrALAZINE  50 mg Oral Q8H CARLOS A     insulin aspart   Subcutaneous TID w/meals     insulin aspart  1-7 Units Subcutaneous 4x Daily AC & HS     insulin glargine  8 Units Subcutaneous Q24H     ketorolac  20 mg Oral Once     lisinopril  40 mg Oral Daily     miconazole   Topical BID     multivitamin, therapeutic  1 tablet Oral Daily     nystatin   Topical BID     potassium chloride  20 mEq Oral BID     sodium chloride (PF)  3 mL Intracatheter Q8H     sodium chloride (PF)  3 mL Intracatheter Q8H     thiamine  100 mg Per Feeding Tube Daily       dextrose       - MEDICATION INSTRUCTIONS -       - MEDICATION INSTRUCTIONS -

## 2022-01-25 NOTE — PROGRESS NOTES
Care Management Follow Up    Length of Stay (days): 17    Expected Discharge Date: 01/26/2022     Concerns to be Addressed: Discharge Planning     Patient plan of care discussed at interdisciplinary rounds: Yes    Anticipated Discharge Disposition: [facility]  Anticipated Discharge Services: Transportation, rehab  Anticipated Discharge DME: TBD    Patient/family educated on Medicare website which has current facility and service quality ratings: yes   Education Provided on the Discharge Plan: yes   Patient/Family in Agreement with the Plan: yes    Referrals Placed by CM/SW:    [facility]  *[id number]  - Per Mony in admissions, pt is appropriate for ARU. They will continue to follow and add pt to waitlist when he is med ready.     Private pay costs discussed: Not applicable    Additional Information:  SW following for dispo needs- per pt's med team, pt may be med ready for discharge in the next day or so pending blood pressure.    SW received message from Mony in admissions at [facility] that pt is appropriate for facility. They will continue to follow and SW will continue to update admissions on pt's medical stability.    ADDENDUM: [id number]  MARLENA spoke with Mony in admissions and she will plan on submitting auth. Per Mony, [facility] does not anticipate any open beds tomorrow but once pt is med ready, they will add him onto list.    SW to keep Mony updated on pt's medical readiness.    ML Araujo, LGSW  6B   [facility]  Phone: [phone]  Pager: [phone]

## 2022-01-25 NOTE — INTERIM SUMMARY
[facility] Pre-Admission Screen    Referral Source:  [facility] [address] [facility] 6236-01  Admit date to referring facility: 1/8/2022    Physical Medicine and Rehab Consult Completed: Tom Emerson recommending acute rehab on 1/24/22    Rehab Diagnosis:    Stroke Ischemic 01.1 (L) Body Involvement (R) Brain: R PCA ischemic stroke w/ hemorrhagic transformation noted    Justification for Acute Inpatient Rehabilitation  Miriam Hall is a 46 year old male w/ PMH of HTN who presented via EMS w/ L gaze palsy, L complete hemianopia, mild dysarthria, L arm > leg weakness, L arm/leg decreased sensation, and L side extinction after waking up from a nap. On arrival in the ED, the pt was found to have a well-established large vessel occlusion of the R P1 segment of the PCA, producing a R parieto-occipital stroke. EMS noted /145 and HR 113bmp. The patient's hospitalization was c/b hemorrhagic transformation of ischemic infarct, as well as encephalopathy c/b hypertensive encephalopathy.  He has also required medical mgmt of his severe resistant HTN (requiring 5 anti-hypertensives, as well as nephrology and cardiology consults to aid in mgmt of HTN), diabetes mellitus, DISHA (resolved), dsyphagia (resolved), nutritional status, and hypokalemia. He is now medically stable and ready to discharge to acute inpatient rehab.     The patient requires an intensive inpatient rehab program to address the following acute impairments:impaired ROM, impaired strength, impaired activity tolerance, impaired tone, impaired balance, impaired judgement and safety awareness and dysarthria, severe left neglect, L hemiplegia (flaccid UE/LE), L visual field deficits, impaired tone, and mild expressive aphasia. These impairments are impacting his ability to safely and independently perform bed mobility, transfers, gait, stairs, ADLs, IADLs, and return to work.     Current Active Medical Management  Needs/Risks for Clinical Complications  The patient requires the high level of rehabilitation physician supervision that accompanies the provision of intensive rehabilitation therapy.  The patient needs the services of the rehabilitation physician to assess the patient medically and functionally and to modify the course of treatment as needed to maximize the patient's capacity to benefit from the rehabilitation process.  The patient requires physician oversight at least 3x/wk for medical mgmt and assessment of:    Neurologic status in setting of CVA: Etiology ESUS. Aspirin 325 mg PO daily for secondary stroke prevention & mild pain relief. Patient is right handed at baseline with new onset L hemiplegia, L hemianopia, L visual field deficits & mild expressive aphasia. Assess for neurologic recovery; provide stroke education and risk factor reduction strategies. 14 day zio patch as getting discharged to [facility].      Uncontrolled Diabetes:  Hgb A1c 7.8 upon admission. Diagnosed w/ DM2 in 2017 w/ lifestyle changes recommended. Hypoglycemia protocol. Lantus 8 units daily at 1600. CHO coverage-1 units per 25 grams carb--> increase to 1 per 20 grams. Novolog  Medium intensity sliding scale AC, HS >/140 at all points. BG monitoring TID AC, HS, 0200--> discontinue the 0200. Carb counting protocol  Needs diabetic education as pt has not been on DM medications previous to this admission. Will need ongoing assessment.     Resistant HTN: In setting of refractory HTN. Continue Amiloride to 10mg daily. Carvedilol 37.5 mg PO BID, Chlorthalidone 25 mg PO daily, Clonidine 0.1 mg PO daily, Lisinopril 40 mg PO daily, Hydralazine 50 mg PO TID, Hydralazine/Labetalol PRN for SBP > 180. Per nephrology: Titrate other BP agents as needed, if able to come off agents with addition of Amiloride would prioritize discontinuation of Hydralazine followed by Clonidine. Pt may benefit from Spironolactone. Follow up with Nephrology in 2-3 wks. Will need  ongoing assessment as HTN put patient at increased risk for further strokes.     HLD: LDL 75 with long term goals of 40-70. Continue atorvastatin 40 mg PO daily. Encourage PO hydration. IVF boluse: NS 1L. Will need ongoing assessment.     DISHA: Pt presented with Cr 1.37 improved with IVF to 1.17. Now at baseline. Daily BMP.     Mental Health: In setting of new L hemiplegia, encephalopathy, (resolved) and concern for seizures. Patient remains at increased risk for decreased skin integrity, limb contracture and impaired muscle tone. Provide education and promote acceptance of altered body image and coping with lifestyle changes. Depakote ER 1000 mg PO daily. Melatonin PRN for sleep. Promote sleep hygiene.     Prophylaxis: SCD's for VTE prevention.     Pain: Acetaminophen. Will need ongoing assessment and adjustment for optimal participation in therapies.   Body Mass Index 26.66    Patient will continue to need ongoing medical management as patient is at risk for falls with or without injury due to gait impairments, impaired balance, muscle weakness and deconditioning. He is also at increased risk for decreased skin integrity, impaired muscle tone and limb contracture given L hemiplegia.     Past Medical/Surgical History  Surgery in the past 100 days: No  Additional relevant past medical history: HTN, tonsillectomy, type 2 diabetes mellitus uncontrolled w/ hyperglycemia    Level of Functioning Prior to Admission:  LIVING ENVIRONMENT  People in home: alone  Current Living Arrangements: apartment  Home Accessibility: stairs to enter home   Number of Stairs, Main Entrance: flight to second level   Stair Railings, Main Entrance: railings safe and in good condition  Transportation Anticipated: car, drives self  Living Environment Comments: Patient lives alone in second story apartment. Once inside, apt no further stairs. Patient states he may be able to move to a 1st floor apt at discharge.     SELF-CARE  Usual Activity  Tolerance: excellent  Equipment Currently Used at Home: none  Activity/Exercise/Self-Care Comment: Patient fully IND prior to admission, working full time in IT. Right handed.     DISABILITY/FUNCTION  Concentrating, Remembering or Making Decisions Difficulty: no  Difficulty Communicating: no  Difficulty Eating/Swallowing: no  Walking or Climbing Stairs Difficulty: no  Dressing/Bathing Difficulty: no  Toileting issues: no  Doing Errands Independently Difficulty (such as shopping): no  Fall history within last six months: no;    Change in Functional Status Since Onset of Current Illness/Injury: yes    Level of Function: GG Scale (Section GG Functional Ability and Goals; CMS's MEJÍA Version 3.0 Manual effective 10.1.2019):  PT Current Function Goals for Rehab   Bed Rolling 3 Partial/moderate assistance 6 Independent   Supine to Sit 3 Partial/moderate assistance 6 Independent   Sit to Stand 3 Partial/moderate assistance 4 Supervision or touching assitance   Transfer 3 Partial/moderate assistance 4 Supervision or touching assitance   Ambulation Not completed 2 Substantial/maximal assistance   Stairs Not completed 2 Substantial/maximal assistance     OT Current Function Goals for Rehab   Feeding 3 Partial/moderate assistance 6 Independent   Grooming 3 Partial/moderate assistance 6 Independent   Bathing 1 Dependent 3 Partial/moderate assistance   Upper Body Dressing 1 Dependent 3 Partial/moderate assistance   Lower Body Dressing 1 Dependent 3 Partial/moderate assistance   Toileting 1 Dependent 4 Supervision or touching assitance   Toilet Transfer 1 Dependent(modA x2) 4 Supervision or touching assitance   Tub/Shower Transfer Not completed 3 Partial/moderate assistance   Cognition Not Assessed Supervision     SLP Current Function Goals for Rehab   Swallow Impaired Tolerate least restrictive diet without signs & symptoms of aspiration and adhere to safe swallow strategies   Communication Not Assessed Communicate basic needs  effectively     Current Diet:  0-Thin, 7-Regular/easy to chew and Diabetic    Summary Statement:  Patient receiving PT, OT and SLP services. He is currently on a regular diet with thin liquids, but requires Gigi with eating & would benefit from a full cognitive linguistic evaluation upon admission given mild expressive aphasia as well as ideational apraxia. Patient attempts to use toothpaste during G/H tasks without taking cap off and overall requires Gigi to complete these tasks in sitting. Bathing remains dependent with patient receiving bed bath from nursing staff & pt requires maxA with urinal use. Dependent with dressing. Patient remains Gigi with rolling to R and is mod I with rolling to L.  Dependent for bed to chair transfers using lift. Patient is able to complete sit<>stand transfer from recliner chair with modA x1. Commode transfers and pivot transfers require modA x2 to R. Gait has yet to be assessed.     Expected Therapies and Services Required During Inpatient Rehab Admission  Intensity of Therapy: Patient requires intensive therapies not available in a lesser level of care. Patient is motivated, making gains, and can tolerate 3 hours of therapy a day.  Physical Therapy: 60 minutes per day, 7 days a week for 21 days  Occupational Therapy: 60 minutes per day, 7 days a week for 21 days  Speech and Language Therapy:  60 minutes per day, 7 days a week for 21 days  Rehabilitation Nursing Needs: Patient requires 24 hour Rehab Nursing to manage vitals, medication education, tone management, positioning, carryover of new rehab techniques, care coordination, skin integrity, blood sugar management, diabetes education, assess neurologic status, pain management, stroke education, provide safe environment for patient at falls risk and monitor nutritional intake.    Precautions/restrictions/special needs:  Precautions: fall precautions; aspiration precautions  Restrictions: N/A  Special Needs: Specialty Mattress and  soft call light   Designated Visitor: Unknown    Expected Level of Improvement: Mod assist with transfers, gait and bathing. Max assist with stairs and gait. Mod I with grooming and feeding activities. Pt will be w/c based for mobility at discharge.  Expected Length of time to achieve: 21 days    Anticipated Discharge Needs:  Anticipated Discharge Destination: TCU  Anticipated Discharge Support: None  24/7 support available : No  Identified caregiver(s):  unknown  Anticipated Discharge Needs: TCU. He may benefit from Endocrine and Genetics consults as an outpatient to workup for genetic causes of refractory HTN.     Identified challenges/barriers: severity of stroke and severe L neglect, lacks support for discharge.     Rehab Admission Liaison Signature/Date/Time:     Physician statement of review and agreement:  I have reviewed and am in agreement of the need for IRF stay to address above functional and medical needs. In addition to above statements address, Patient requires intensive active and ongoing therapeutic intervention and multiple therapies; Patient requires medical supervision; Expected to actively participate in the intensive rehab program; Sufficiently stable to actively participate; Expectation for measurable improvement in functional capacity or adaption to impairments.    Physician Signature/Date/Time:

## 2022-01-26 ENCOUNTER — APPOINTMENT (OUTPATIENT)
Dept: OCCUPATIONAL THERAPY | Facility: CLINIC | Age: 47
End: 2022-01-26
Payer: COMMERCIAL

## 2022-01-26 ENCOUNTER — APPOINTMENT (OUTPATIENT)
Dept: PHYSICAL THERAPY | Facility: CLINIC | Age: 47
End: 2022-01-26
Payer: COMMERCIAL

## 2022-01-26 LAB
ALDOST 24H UR-MRATE: 5.9 UG/D
ALDOST/RENIN PLAS-RTO: 57.5 {RATIO} (ref 0–25)
ANION GAP SERPL CALCULATED.3IONS-SCNC: 7 MMOL/L (ref 3–14)
BUN SERPL-MCNC: 28 MG/DL (ref 7–30)
CALCIUM SERPL-MCNC: 9.3 MG/DL (ref 8.5–10.1)
CHLORIDE BLD-SCNC: 105 MMOL/L (ref 94–109)
CO2 SERPL-SCNC: 28 MMOL/L (ref 20–32)
COLLECT DURATION TIME SPEC: NORMAL H
CREAT 24H UR-MRATE: 1125 MG/D
CREAT SERPL-MCNC: 1.31 MG/DL (ref 0.66–1.25)
CREAT UR-MCNC: 125 MG/DL
ERYTHROCYTE [DISTWIDTH] IN BLOOD BY AUTOMATED COUNT: 13.5 % (ref 10–15)
GFR SERPL CREATININE-BSD FRML MDRD: 68 ML/MIN/1.73M2
GLUCOSE BLD-MCNC: 104 MG/DL (ref 70–99)
GLUCOSE BLDC GLUCOMTR-MCNC: 115 MG/DL (ref 70–99)
GLUCOSE BLDC GLUCOMTR-MCNC: 134 MG/DL (ref 70–99)
GLUCOSE BLDC GLUCOMTR-MCNC: 247 MG/DL (ref 70–99)
GLUCOSE BLDC GLUCOMTR-MCNC: 86 MG/DL (ref 70–99)
GLUCOSE BLDC GLUCOMTR-MCNC: 99 MG/DL (ref 70–99)
HCT VFR BLD AUTO: 43.4 % (ref 40–53)
HGB BLD-MCNC: 14.5 G/DL (ref 13.3–17.7)
MAGNESIUM SERPL-MCNC: 1.9 MG/DL (ref 1.6–2.3)
MCH RBC QN AUTO: 30.5 PG (ref 26.5–33)
MCHC RBC AUTO-ENTMCNC: 33.4 G/DL (ref 31.5–36.5)
MCV RBC AUTO: 91 FL (ref 78–100)
PHOSPHATE SERPL-MCNC: 3 MG/DL (ref 2.5–4.5)
PLATELET # BLD AUTO: 311 10E3/UL (ref 150–450)
POTASSIUM BLD-SCNC: 3.6 MMOL/L (ref 3.4–5.3)
POTASSIUM BLD-SCNC: 3.6 MMOL/L (ref 3.4–5.3)
POTASSIUM BLD-SCNC: 3.9 MMOL/L (ref 3.4–5.3)
RBC # BLD AUTO: 4.76 10E6/UL (ref 4.4–5.9)
SODIUM SERPL-SCNC: 140 MMOL/L (ref 133–144)
SPECIMEN VOL ?TM UR: NORMAL ML
WBC # BLD AUTO: 7 10E3/UL (ref 4–11)

## 2022-01-26 PROCEDURE — 250N000013 HC RX MED GY IP 250 OP 250 PS 637

## 2022-01-26 PROCEDURE — 120N000003 HC R&B IMCU UMMC

## 2022-01-26 PROCEDURE — 83735 ASSAY OF MAGNESIUM: CPT | Performed by: PSYCHIATRY & NEUROLOGY

## 2022-01-26 PROCEDURE — 36415 COLL VENOUS BLD VENIPUNCTURE: CPT

## 2022-01-26 PROCEDURE — 97112 NEUROMUSCULAR REEDUCATION: CPT | Mod: GP | Performed by: PHYSICAL THERAPIST

## 2022-01-26 PROCEDURE — 97535 SELF CARE MNGMENT TRAINING: CPT | Mod: GO | Performed by: OCCUPATIONAL THERAPIST

## 2022-01-26 PROCEDURE — 99233 SBSQ HOSP IP/OBS HIGH 50: CPT | Performed by: CLINICAL NURSE SPECIALIST

## 2022-01-26 PROCEDURE — 84132 ASSAY OF SERUM POTASSIUM: CPT

## 2022-01-26 PROCEDURE — 250N000013 HC RX MED GY IP 250 OP 250 PS 637: Performed by: STUDENT IN AN ORGANIZED HEALTH CARE EDUCATION/TRAINING PROGRAM

## 2022-01-26 PROCEDURE — 84100 ASSAY OF PHOSPHORUS: CPT | Performed by: PSYCHIATRY & NEUROLOGY

## 2022-01-26 PROCEDURE — 250N000013 HC RX MED GY IP 250 OP 250 PS 637: Performed by: COUNSELOR

## 2022-01-26 PROCEDURE — 36415 COLL VENOUS BLD VENIPUNCTURE: CPT | Performed by: STUDENT IN AN ORGANIZED HEALTH CARE EDUCATION/TRAINING PROGRAM

## 2022-01-26 PROCEDURE — 97112 NEUROMUSCULAR REEDUCATION: CPT | Mod: GO | Performed by: OCCUPATIONAL THERAPIST

## 2022-01-26 PROCEDURE — 82310 ASSAY OF CALCIUM: CPT | Performed by: STUDENT IN AN ORGANIZED HEALTH CARE EDUCATION/TRAINING PROGRAM

## 2022-01-26 PROCEDURE — 250N000013 HC RX MED GY IP 250 OP 250 PS 637: Performed by: PSYCHIATRY & NEUROLOGY

## 2022-01-26 PROCEDURE — 99232 SBSQ HOSP IP/OBS MODERATE 35: CPT | Performed by: PSYCHIATRY & NEUROLOGY

## 2022-01-26 PROCEDURE — 99233 SBSQ HOSP IP/OBS HIGH 50: CPT | Mod: GC | Performed by: INTERNAL MEDICINE

## 2022-01-26 PROCEDURE — 250N000013 HC RX MED GY IP 250 OP 250 PS 637: Performed by: INTERNAL MEDICINE

## 2022-01-26 PROCEDURE — 97110 THERAPEUTIC EXERCISES: CPT | Mod: GP | Performed by: PHYSICAL THERAPIST

## 2022-01-26 PROCEDURE — 85027 COMPLETE CBC AUTOMATED: CPT | Performed by: STUDENT IN AN ORGANIZED HEALTH CARE EDUCATION/TRAINING PROGRAM

## 2022-01-26 RX ORDER — AMILORIDE HYDROCHLORIDE 5 MG/1
10 TABLET ORAL DAILY
Status: DISCONTINUED | OUTPATIENT
Start: 2022-01-27 | End: 2022-01-28 | Stop reason: HOSPADM

## 2022-01-26 RX ADMIN — NYSTATIN: 100000 CREAM TOPICAL at 08:45

## 2022-01-26 RX ADMIN — INSULIN GLARGINE 8 UNITS: 100 INJECTION, SOLUTION SUBCUTANEOUS at 17:09

## 2022-01-26 RX ADMIN — CARVEDILOL 37.5 MG: 25 TABLET, FILM COATED ORAL at 08:33

## 2022-01-26 RX ADMIN — MICONAZOLE NITRATE: 2 POWDER TOPICAL at 08:46

## 2022-01-26 RX ADMIN — CLONIDINE HYDROCHLORIDE 0.1 MG: 0.1 TABLET ORAL at 20:01

## 2022-01-26 RX ADMIN — HYDRALAZINE HYDROCHLORIDE 50 MG: 50 TABLET, FILM COATED ORAL at 17:06

## 2022-01-26 RX ADMIN — ACETAMINOPHEN 650 MG: 325 TABLET, FILM COATED ORAL at 22:39

## 2022-01-26 RX ADMIN — THERA TABS 1 TABLET: TAB at 08:32

## 2022-01-26 RX ADMIN — DIVALPROEX SODIUM 1000 MG: 500 TABLET, FILM COATED, EXTENDED RELEASE ORAL at 08:33

## 2022-01-26 RX ADMIN — MICONAZOLE NITRATE: 2 POWDER TOPICAL at 19:58

## 2022-01-26 RX ADMIN — HYDRALAZINE HYDROCHLORIDE 50 MG: 50 TABLET, FILM COATED ORAL at 02:10

## 2022-01-26 RX ADMIN — POTASSIUM CHLORIDE 20 MEQ: 1.5 POWDER, FOR SOLUTION ORAL at 08:34

## 2022-01-26 RX ADMIN — HYDRALAZINE HYDROCHLORIDE 50 MG: 50 TABLET, FILM COATED ORAL at 10:01

## 2022-01-26 RX ADMIN — CARVEDILOL 37.5 MG: 25 TABLET, FILM COATED ORAL at 17:06

## 2022-01-26 RX ADMIN — LISINOPRIL 40 MG: 20 TABLET ORAL at 08:32

## 2022-01-26 RX ADMIN — ASPIRIN 81 MG CHEWABLE TABLET 324 MG: 81 TABLET CHEWABLE at 08:33

## 2022-01-26 RX ADMIN — THIAMINE HCL TAB 100 MG 100 MG: 100 TAB at 08:46

## 2022-01-26 RX ADMIN — POTASSIUM CHLORIDE 20 MEQ: 1.5 POWDER, FOR SOLUTION ORAL at 20:01

## 2022-01-26 RX ADMIN — AMILORIDE HYDROCLORIDE 5 MG: 5 TABLET ORAL at 08:33

## 2022-01-26 RX ADMIN — CLONIDINE HYDROCHLORIDE 0.1 MG: 0.1 TABLET ORAL at 08:33

## 2022-01-26 RX ADMIN — CHLORTHALIDONE 25 MG: 25 TABLET ORAL at 08:33

## 2022-01-26 RX ADMIN — NYSTATIN: 100000 CREAM TOPICAL at 20:01

## 2022-01-26 RX ADMIN — ATORVASTATIN CALCIUM 40 MG: 40 TABLET, FILM COATED ORAL at 20:01

## 2022-01-26 ASSESSMENT — ACTIVITIES OF DAILY LIVING (ADL)
ADLS_ACUITY_SCORE: 16
ADLS_ACUITY_SCORE: 17
ADLS_ACUITY_SCORE: 16
ADLS_ACUITY_SCORE: 17
ADLS_ACUITY_SCORE: 21
ADLS_ACUITY_SCORE: 21
ADLS_ACUITY_SCORE: 17
ADLS_ACUITY_SCORE: 16
ADLS_ACUITY_SCORE: 17
ADLS_ACUITY_SCORE: 17
ADLS_ACUITY_SCORE: 21
ADLS_ACUITY_SCORE: 16
ADLS_ACUITY_SCORE: 17

## 2022-01-26 NOTE — PROGRESS NOTES
[facility]    Stroke Progress Note    Interval Events  - NAEON  - Started Amiloride 5 mg PO daily  - Aspirin 325 mg daily  - PCO: Avoid interruptions at night  - Patient ready for discharge  - Discussed SHADI with pt , he expressed interest in trial     HPI Summary  Miriam Hall is a 46 year old male with PMHx HTN who presented on 1/8/2022 with symptoms concerning for stroke. The pt reports that he was in his normal state of health that morning and then decided to take a nap around noon. On waking from his nap around 2:00PM, he found that he was having trouble moving and feeling the L side of his body. Unfortunately, the pt was unable to get to a phone and ended up crawling on his floor until he could finally get the attention of one of his neighbors, who then was able to call EMS.     EMS arrived to find the pt continuing to have decreased sensation and weakness on his L side. They noted a , /145, and . On arrival in the ED, the pt remained afebrile, but was tachycardic () and quite hypertensive (/146). A stroke code was called for initial NIHSS 9, for a partial L gaze palsy, L-sided complete hemianopia, L arm > leg weakness, decreased sensation in the L arm/leg to sensation (pinprick intact in arm but decreased in leg), mild dysarthria, L-sided extinction, and possible L neglect. The pt also seemed to be having trouble with proprioception in the L arm, though did recognize it to be his own. Pt was immediately taken to CT where he underwent a CTH, CTA Head/Neck and CT Perfusion study, revealing a well-established R parieto-occipital stroke with an LVO of the R P1 segment. As the stroke already appeared well-established and the pt was outside the time window, no tPA was given. No thrombectomy was performed due to the location of the LVO in the PCA. The pt was loaded with ASA 324mg once with plans to allow for permissive HTN and  admission to the stroke service for further work-up     The patient denies any history of smoking or drug use. Does drink EtOH, unclear amount.      TPA Treatment   Not given due to established infarct on imaging and unclear or unfavorable risk-benefit profile for extended window thrombolysis beyond the conventional 4.5 hour time window.     Endovascular Treatment  Proximal vessel occlusion present, but endovascular treatment not initiated due to location of the thrombus in the posterior cerebral artery    Stroke Evaluation Summarized    MRI/Head CT MRI Brain (1/12)  1. Findings from CT and CTA performed yesterday are confirmed with stroke in the distribution of the right posterior cerebral artery. This involves the medial right occipital lobe and medial right temporal lobe with extension into the body of the thalamus.  2. Additional punctate foci of diffusion restriction in the left putamen.  3. Moderate leukoaraiosis.     CT Head (1/16)  1. Continued evolution of large right posterior cerebral artery territory infarct. With similar mass effect and 4 mm of leftward midline shift, previously 5 mm.  2. Subtle hyperdensities in the right basal ganglia consistent with petechial hemorrhages seen on previous MRI. No new intracranial hemorrhage or new loss of gray-white matter differentiation.    MRI Brain (1/18)  1. Exam sensitivity is severely diminished due to considerable motion artifact.  2. No definite abnormal enhancement to suggest infection on the motion degraded postcontrast images.  3. Continued evolution of right posterior cerebral territory infarct and punctate infarct involving the left putamen. Associated petechial hemorrhage and developing cortical laminar necrosis.   Intracranial Vasculature MRA (1/12)  Redemonstration of occlusion of the right PCA near its  origin. Additional short segment high-grade stenosis of a right-sided M2 MCA branch. Findings are not likely not significantly changed from CT  performed yesterday.   Cervical Vasculature CTA (1/12)  1. Occlusion of the proximal right PCA.  2. High-grade stenosis of the occiput M2 branch of the right MCA with  attenuated flow distal.  3. Neck CTA demonstrates no stenosis of the major cervical arteries.     Echocardiogram TTE: The visual ejection fraction is 50-55%.   No regional wall motion abnormalities are seen.  Moderate to severe concentric wall thickening consistent with left ventricular hypertrophy is present.  Global right ventricular function is normal.  There was no shunt at the atrial septal level as assessed by agitated saline bubble study at rest and with Valsalva maneuver.  Previous study not available for comparison.  CARISA:  Global and regional left ventricular function is normal with an EF of 55-60%.  Severe concentric left ventricular hypertrophy is present.  Global right ventricular function is normal.  The atrial septum is intact as assessed by color Doppler and agitated saline  bubble study .  The left atrial appendage is free of thrombus.  No significant valvular abnormalities present.  No pericardial effusion is present     EKG/Telemetry EKG with sinus tachycardia   Other Testing Hypercoagulability profile has not been sent yet     LDL  1/8/2022: 75 mg/dL   A1C  1/8/2022: 7.8 %   Troponin No lab value available in past 48 hrs       Impression   # Acute ischemic stroke of Right P1 segement of the PCA  # R P1 occlusion and R M2 Stenosis  # Large vessel atherosclerosis vs ESUS  # Cerebral edema   # Hemorrhagic transformation of ischemic infarct  Miriam Hall is a 46 year old male with PMHx HTN who presented on 1/8/2022 with L gaze palsy, L complete hemianopia, mild dysarthria, L arm > leg weakness, L arm/leg decreased sensation, and L side extinction after waking up from a nap. FTH an occlusion of the P1 segment of the R PCA with associated established infarct as well as high grade stenosis of the M2 segment of the R MCA. TTE with EF of  50-55%, no WMA or atrial enlargement. Etiology ESUS.  - Aspirin 325 mg PO daily for secondary stroke prevention & mild pain relief  - CARISA unremarkable  - PT/OT/SLP: ARU appropriate  - CT C/A/P (1/18): Negative  - Hypercoag panel (1/18): Negative  - Vasculitis panel (1/18): Negative  - 14 day zio patch as getting discharged to ARU    #Encephalopathy  #Leukocytosis  #Concern for seizures  Patient with ongoing waxing and waning level of consciousness over the past few days. Hooked up to EEG on 1/12 with no evidence of seizures per report from Dr. Porter and pt pulling off leads overnight so EEG disconnected on 1/13. Repeat EEG on 1/16 completed and negative for seizure activity. No fevers documented in chart but patient does have a mild leukocytosis. No alarming electrolyte derangements. BP has been persistently elevated and resistant to medications, so his confusion could represent hypertensive encephalopathy.   - Depakote ER 1000 mg PO daily   - VPA total/free trough levels (1/18): 100/19.8  - EEG spot study (1/18): Negative seizures  - Blood cultures (1/18): NGTD  - UA & UCx (1/18): Negative  - CXR (1/18): No focal pulmonary opacity    #HLD  LDL 75. Long-term LDL goal 40-70.   - Atorvastatin 40 mg PO daily    #Resistant HTN   Sustained mostly at BP goal < 160. Now up to 5 anti-hypertensives without a clear etiology.  - Amiloride 5 mg daily  - Carvedilol 37.5 mg PO BID  - Chlorthalidone 25 mg PO daily  - Clonidine 0.1 mg PO daily   -Lisinopril 40 mg PO daily   - Hydralazine 50 mg PO TID  - Hydralazine/Labetalol PRN for SBP > 180  - Nephrology consulted, appreciate recs    #DM  Newly diagnosed DM with Hgb A1C 7.8.  - Continue to monitor glucose  - MDSSI  - Hypoglycemia protocol  - Endocrinology consulted, appreciate recs    #Nutrition  Patient does not meet two of the established criteria necessary for diagnosing malnutrition but is at risk for malnutrition.  - NG tube in place with TF started  - Nutrition consulted  -  Minced and moist diet with thin liquids - patient has been taking in very little PO    #Hypokalemia   - RN Managed Standard Replacement protocol    #Superficial RUE thrombus  US completed 1/17 negative for DVT but positive for thrombus in the R basilic and cephalic veins from the antecubital fossa to the wrist.     Chronic/Resolved Issues:  #DISHA (resolved)  Pt presented with Cr 1.37 improved with IVF to 1.17. Now at baseline.   - Daily BMP    #Elevated troponin (resolved)  Pt arrives with Trop 147 and rising. EKG exhibited sinus tachycardia, but no concerning signs of ischemia. Troponin peaked 1/10. Unclear what caused this elevation; unlikely to be MI given no sx or EKG changes.  - Coronary CTA as outpatient per Cards recs      Prophylaxis            For VTE Prevention:  - SCD  For Acid Suppression:  - GI prophylaxis is not indicated  FEN:      Code Status: Full Code    Dispo: ALLYSON Christine MD    ______________________________________________________    Clinically Significant Risk Factors Present on Admission                 Medications   Scheduled Meds    aMILoride  5 mg Oral Daily     aspirin  324 mg Oral or NG Tube Daily    Or     aspirin  325 mg Oral Daily     atorvastatin  40 mg Oral QPM     carvedilol  37.5 mg Oral BID w/meals     chlorthalidone  25 mg Oral Daily     cloNIDine  0.1 mg Oral BID     divalproex sodium extended-release  1,000 mg Oral Daily     hydrALAZINE  50 mg Oral Q8H CARLOS A     insulin aspart   Subcutaneous TID w/meals     insulin aspart  1-7 Units Subcutaneous 4x Daily AC & HS     insulin glargine  8 Units Subcutaneous Q24H     lisinopril  40 mg Oral Daily     miconazole   Topical BID     multivitamin, therapeutic  1 tablet Oral Daily     nystatin   Topical BID     potassium chloride  20 mEq Oral BID     sodium chloride (PF)  3 mL Intracatheter Q8H     sodium chloride (PF)  3 mL Intracatheter Q8H     thiamine  100 mg Per Feeding Tube Daily       Infusion Meds    dextrose       -  MEDICATION INSTRUCTIONS -       - MEDICATION INSTRUCTIONS -         PRN Meds  acetaminophen, artificial tears ophthalmic solution, dextrose, glucose **OR** dextrose **OR** glucagon, insulin aspart, lidocaine 4%, lidocaine (buffered or not buffered), - MEDICATION INSTRUCTIONS -, - MEDICATION INSTRUCTIONS -, melatonin, polyethylene glycol, senna-docusate, sodium chloride (PF)       PHYSICAL EXAMINATION  Temp:  [97.6  F (36.4  C)-98.6  F (37  C)] 98.5  F (36.9  C)  Pulse:  [68-78] 70  Resp:  [16-18] 18  BP: ()/() 115/69  SpO2:  [94 %-100 %] 94 %      Neurologic  Mental Status: lethargic but awakens to voice, oriented to self, location, situation & medical condition; named age, month, year; spelled WORLD [employer] SCHO[employer] forwards  Cranial Nerves:  PERRL, right gaze preference but able to slightly cross the midline, oculocephalic reflex intact bilaterally, no facial droop, shoulder shrug strong on the R  Motor:  no abnormal movements. 5/5 RUE and RLE; 1/5 LUE & 2/5 LLE, more movement of digits of L hand   Sensory:  Intact to noxious sensation in 4/4 extremities, decreased sensation on L side, can detect pressure/cold  Coordination:  deferred  Station/Gait:  deferred    Stroke Scales    NIHSS  Interval baseline (01/12/22 0324)   Interval Comments     1a. Level of Consciousness 0-->Alert, keenly responsive   1b. LOC Questions 0-->Answers both questions correctly   1c. LOC Commands 0-->Performs both tasks correctly   2.   Best Gaze 1-->Partial gaze palsy, gaze is abnormal in one or both eyes, but forced deviation or total gaze paresis is not present   3.   Visual 2-->Complete hemianopia   4.   Facial Palsy 1-->Minor paralysis (flattened nasolabial fold, asymmetry on smiling)   5a. Motor Arm, Left 4-->No movement   5b. Motor Arm, Right 0-->No drift, limb holds 90 (or 45) degrees for full 10 secs   6a. Motor Leg, Left 3-->No effort against gravity, leg falls to bed immediately   6b. Motor Leg, right 0-->No drift, leg  holds 30 degree position for full 5 secs   7.   Limb Ataxia 0-->Absent   8.   Sensory 1-->Mild-to-moderate sensory loss, patient feels pinprick is less sharp or is dull on the affected side, or there is a loss of superficial pain with pinprick, but patient is aware of being touched   9.   Best Language 0-->No aphasia, normal   10. Dysarthria 1-->Mild-to-moderate dysarthria, patient slurs at least some words and, at worst, can be understood with some difficulty   11. Extinction and Inattention  2-->Profound angelique-inattention/extinction more than 1 modality   Total 15 (01/26/22 1006)       Imaging  I personally reviewed all imaging; relevant findings per HPI.     Lab Results Data   CBC  Recent Labs   Lab 01/26/22  0439 01/25/22  0425 01/24/22  0414   WBC 7.0 9.0 8.7   RBC 4.76 4.76 4.74   HGB 14.5 14.5 14.4   HCT 43.4 43.7 43.1    288 292     Basic Metabolic Panel    Recent Labs   Lab 01/26/22  0845 01/26/22  0439 01/25/22  2208 01/25/22  1821 01/25/22  1401 01/25/22  0713 01/25/22  0425 01/24/22  1006 01/24/22  0414   NA  --  140  --   --   --   --  139  --  136   POTASSIUM  --  3.6  3.6  --   --  3.8  --  3.3*  3.3*   < > 3.3*  3.3*   CHLORIDE  --  105  --   --   --   --  104  --  101   CO2  --  28  --   --   --   --  31  --  27   BUN  --  28  --   --   --   --  33*  --  34*   CR  --  1.31*  --   --   --   --  1.32*  --  1.38*   GLC 99 104* 131*   < >  --    < > 106*   < > 106*   VALERIE  --  9.3  --   --   --   --  9.1  --  9.2    < > = values in this interval not displayed.     Liver Panel  No results for input(s): PROTTOTAL, ALBUMIN, BILITOTAL, ALKPHOS, AST, ALT, BILIDIRECT in the last 168 hours.  INR    Recent Labs   Lab Test 01/18/22  1405 01/08/22  1843 01/08/22  1834   INR 1.06 0.95 1.0*      Lipid Profile    Recent Labs   Lab Test 01/08/22  1843 05/16/14  1506   CHOL 169 131   HDL 70 53   LDL 75 70   TRIG 118 38     A1C    Recent Labs   Lab Test 01/08/22  1843   A1C 7.8*     Troponin I  No results for  input(s): TROPONIN, GHTROP in the last 168 hours.

## 2022-01-26 NOTE — PLAN OF CARE
Neuro: A&Ox4, forgetful at times, some difficulty with word finding. LUE with minimal movement,  LLE no movement. Left visual field cut. Pt sad and tearful at times about stroke diagnosis.  Cardiac: SR. SBP 's, no PRN antihypertensives needed this shift.  Respiratory: Sating >94% on RA. LS clear  GI/: Adequate urine output. No BM this shift  Diet/appetite: Tolerating carb controlled diet. Poor appetite. Pills whole with applesauce  Activity:  Lift assist, up to chair for several hours this morning. Able to shift in bed independently.  Pain: Left hip pain with movement, declines PRNs.  Skin: No new deficits noted.  LDA's: Left PIV - SL    Plan: Continue with POC. 24 hour urine collection started today at 12:45. Notify primary team with changes.

## 2022-01-26 NOTE — PLAN OF CARE
Neuro: A&Ox4, forgetful at times, some difficulty with word finding. LUE with minimal movement,  LLE no movement. Left visual field cut. Frustrated at times due to physical limitations  Cardiac: SR. -150's, no PRN antihypertensives needed this shift.  Respiratory: Sating >94% on RA. LS clear  GI/: Adequate urine output. No BM this shift  Diet/appetite: Tolerating carb controlled diet. Fair appetite. Pills whole with applesauce  Activity:  Lift assist, up to chair for most of shift  Pain: Left hip pain with movement, declines PRNs.  Skin: No new deficits noted.  LDA's: Left PIV - SL     Plan: Continue with POC. 24 hour urine collection restarted today at 0200. Notify primary team with changes.    Pt requested that his designated visitor be switched to Marion (friend) to assist him with paperwork and coordination of care. Confirmed with the charge nurse that it is OK to switch his visitor for the rest of his hospital stay, but no other changes would be allowed. RN explained this to patient who stated understanding and confirmed that he wants to switch his visitor to Marion. RN then called Pam (former designated visitor) of the change. Pam was upset but stated understanding that she would no longer be allowed to visit. Chart updated to reflect changes.

## 2022-01-26 NOTE — PROGRESS NOTES
Nephrology Progress Note  01/26/2022         Assessment & Recommendations:   Miriam Hall is a 46 year old year old male      #Severe resistant hypertension, c/f hyperaldosteronism  #Hypokalemia  #Metabolic alkalosis, improved   #Acute ischemic stroke w/ L sided hemiparesis, c/b hemorrhagic transformation   #Moderate to severe left ventricular hypertrophy    Investigations:  - Renal US - Right kidney 10.2 cm in length, left kidney 10.4 cm in length; normal parenchymal thickness and echogenicity, no obstruction. No evidence of left or right renal artery stenosis.  - Cortisol level at 4 pm is 20.5  - Serum aldosterone 11.5  - Serum renin 0.2  - 24H urine aldosterone pending   - Urine cortisol, cortisone pending     RECOMMENDATIONS:  - Increase Amiloride to 10mg daily  - Titrate other BP agents as needed, if able to come off agents with addition of Amiloride would prioritize discontinuation of Hydralazine followed by Clonidine    - Patient does not need to remain inpatient for BP titration. He should follow up with Nephrology within 2-3 weeks, we will arrange this. He may also benefit from Endocrine and Genetics consults as an outpatient to workup for genetic causes of refractory HTN.   - Nephrology will continue to follow while inpatient      Recommendations were communicated to primary team in person and via this note    Seen and discussed with Dr. Florecita Nolasco MD   PGY3 Internal Medicine   [phone]    Interval History :   Nursing and provider notes from last 24 hours reviewed.    NAEO. This morning feeling well. Overwhelmed with everything that has happened to him. Denies fevers, chills, SOB, abdominal pain/distension, LE swelling. Continues to make urine. No other acute concerns this morning.     Review of Systems:   4 point ROS is conducted and is negative apart from above     Physical Exam:   I/O last 3 completed shifts:  In: 300 [P.O.:300]  Out: 550 [Urine:550]   /63 (BP  "Location: Right arm)   Pulse 74   Temp 97.9  F (36.6  C) (Oral)   Resp 20   Ht 1.702 m (5' 7\")   Wt 80.6 kg (177 lb 11.1 oz)   SpO2 97%   BMI 27.83 kg/m       GENERAL APPEARANCE: nontoxic appearing man in NAD, sitting up comfortably in chair   HENT: mouth without ulcers or lesions  PULM: lungs clear to auscultation bilaterally, equal air movement, no increased WOB on RA   CV: regular rhythm, normal rate, no appreciated murmurs/rubs, no peripheral edema   GI: soft, non-tender, not distended, bowel sounds are +  INTEGUMENT: no cyanosis, no rash on areas of exposed skin     Labs:   All labs reviewed by me  Electrolytes/Renal -   Recent Labs   Lab Test 01/26/22  0845 01/26/22  0439 01/25/22  2208 01/25/22  1821 01/25/22  1401 01/25/22  0713 01/25/22  0425 01/24/22  1006 01/24/22  0414 01/23/22  0752 01/23/22  0447   NA  --  140  --   --   --   --  139  --  136  --  140   POTASSIUM  --  3.6  3.6  --   --  3.8  --  3.3*  3.3*   < > 3.3*  3.3*   < > 3.3*   CHLORIDE  --  105  --   --   --   --  104  --  101  --  102   CO2  --  28  --   --   --   --  31  --  27  --  30   BUN  --  28  --   --   --   --  33*  --  34*  --  36*   CR  --  1.31*  --   --   --   --  1.32*  --  1.38*  --  1.38*   GLC 99 104* 131*   < >  --    < > 106*   < > 106*   < > 93   VALERIE  --  9.3  --   --   --   --  9.1  --  9.2  --  8.6   MAG  --  1.9  --   --   --   --  2.1  --   --   --  2.0   PHOS  --  3.0  --   --   --   --  3.7  --   --   --  3.4    < > = values in this interval not displayed.     CBC -   Recent Labs   Lab Test 01/26/22  0439 01/25/22  0425 01/24/22  0414   WBC 7.0 9.0 8.7   HGB 14.5 14.5 14.4    288 292     LFTs -   Recent Labs   Lab Test 01/17/22  0433 01/08/22  1843 04/25/14  1530   ALKPHOS 98 94 66   BILITOTAL 0.7 0.8 0.7   ALT 20 31 50*   AST 22 26 26   PROTTOTAL 7.8 8.5 7.1   ALBUMIN 2.9* 3.9 4.3     Iron Panel - No lab results found.    Imaging:  All imaging studies reviewed by me.     1/20/2022 RENAL US "   Impression:  1. Right kidney:  1a. Renal parenchyma: Unremarkable renal parenchyma.  1b. Renal artery: No evidence of renal artery stenosis.     2. Left kidney:  2a. Renal parenchyma: Renal parenchyma is difficult to visualize  though appears grossly unremarkable.  2b. Renal artery: No evidence of renal artery stenosis.    1/18/2022 CT CAP W CONTRAST   ABDOMEN/PELVIS:  LIVER: Geographic hypoattenuation along the falciform ligament,  consistent with focal fat deposition. No suspicious hepatic mass.  GALLBLADDER: Within normal limits.  PANCREAS: Within normal limits.  SPLEEN: Within normal limits.  ADRENAL GLANDS: Within normal limits.  URINARY TRACT: Symmetric cortical enhancement bilaterally. No  hydronephrosis, nephrolithiasis, or suspicious renal mass. Urinary  bladder is unremarkable.  REPRODUCTIVE ORGANS: Prostatomegaly.  BOWEL: Gastric tube tip terminates in the stomach. Normal caliber of  the small and large bowel. Appendix is within normal limits. No  abnormal wall thickening or inflammatory change.  PERITONEUM/FLUID: No free fluid or air in the abdomen or pelvis.     VESSELS: No aneurysmal dilatation of the abdominal aorta. The portal,  splenic, and superior mesenteric veins are patent. The origins of the  celiac and superior mesenteric arteries are patent.     LYMPH NODES: No lymphadenopathy in the abdomen or pelvis.     BONES/SOFT TISSUES: No suspicious osseous lesions.                                                        IMPRESSION:   No CT evidence of malignancy in the chest, abdomen or pelvis.    Current Medications:    aMILoride  5 mg Oral Daily     aspirin  324 mg Oral or NG Tube Daily    Or     aspirin  325 mg Oral Daily     atorvastatin  40 mg Oral QPM     carvedilol  37.5 mg Oral BID w/meals     chlorthalidone  25 mg Oral Daily     cloNIDine  0.1 mg Oral BID     divalproex sodium extended-release  1,000 mg Oral Daily     hydrALAZINE  50 mg Oral Q8H [facility]     insulin aspart   Subcutaneous TID  w/meals     insulin aspart  1-7 Units Subcutaneous 4x Daily AC & HS     insulin glargine  8 Units Subcutaneous Q24H     lisinopril  40 mg Oral Daily     miconazole   Topical BID     multivitamin, therapeutic  1 tablet Oral Daily     nystatin   Topical BID     potassium chloride  20 mEq Oral BID     sodium chloride (PF)  3 mL Intracatheter Q8H     sodium chloride (PF)  3 mL Intracatheter Q8H     thiamine  100 mg Per Feeding Tube Daily       dextrose       - MEDICATION INSTRUCTIONS -       - MEDICATION INSTRUCTIONS -

## 2022-01-26 NOTE — PROGRESS NOTES
"BP (!) 150/96   Pulse 64   Temp 98.3  F (36.8  C) (Oral)   Resp 18   Ht 1.702 m (5' 7\")   Wt 80.6 kg (177 lb 11.1 oz)   SpO2 98%   BMI 27.83 kg/m       Neuro: A&Ox4.  Neuro's unchanged  Cardiac: Afebrile, VSS.   Respiratory: RA   GI/: Voiding spontaneously. No BM this shift.   Diet/appetite: Tolerating  diet. Denies nausea   Activity: Up with lift.   Pain: Denies   Skin: No new deficits noted.  Lines: PIV x1  Drains:None  Pt discharging to [facility] at 2 pm tomorrow.No new complaints.Will continue to monitor and follow plan of care.       "

## 2022-01-26 NOTE — PROGRESS NOTES
CLINICAL NUTRITION SERVICES - BRIEF NOTE     Nutrition Prescription    Recommendations already ordered by Registered Dietitian (RD):  Allow pt to order Glucerna ONS, PRN if desired. [220 Kcals, 10 gm protein, 26 gm CHO per carton]    Future/Additional Recommendations:  -Continue to encourage intake, ONS if needed  -Endo following for glycemic control; monitoring trneds  -Monitoring intake trends, weights   For last full RD assessment, see note dated 1/21.      NEW FINDINGS   --Diet upgraded since last assessment; no longer on dysphagia restrictions. Tolerating CHO controlled diet with allowance for 60 gm CHO per meal.  --Variable intakes; % of meals. Poor to fair appetite. Ordering small meals. Notes indicate pt had been self-limiting CHO-containing foods d/t fear that BG levels will increase (such as potatoes). Endo team met with pt yesterday to discourage restricting CHO, which seemed to help intake in afternoon.   --RD met with pt this AM to check in; had been out of room for a procedure 1/21. Pt had a pancake and some fruit for breakfast. Encouraged intake of protein-containing foods with meals. Placing a PRN supplement order for pt to order with meals, if desired per request.   --Per chart stable for discharge and awaiting placement to ARU.     INTERVENTIONS  Implementation  Modify composition of meals/snacks - Glucerna, PRN if desired by pt    Monitoring/Evaluation  Will continue to monitor and evaluate per protocol.    Navi Gordon, VIANNEY, LD, CNSC  [address] RD pager: [phone]   [address] Phone: *[phone]

## 2022-01-26 NOTE — PROGRESS NOTES
IP Diabetes Management  Daily Note           Assessment and Plan:   HPI: Miriam Hall is a 46 year old male with PMHx HTN who presented on 1/8/2022 with L gaze palsy, L complete hemianopia, mild dysarthria, L arm > leg weakness, L arm/leg decreased sensation, and L side extinction after waking up from a nap. FTH an occlusion of the P1 segment of the R PCA with associated established infarct as well as high grade stenosis of the M2 segment of the R MCA. TTE with EF of 50-55%, no WMA or atrial enlargement.  Diagnosed with DM 2 in 2017 and lifestyle changes recommended, never on DM medication.      Assessment:   1)Type 2 Diabetes Mellitus, uncontrolled (A1c 7.8), with hyperglycemia  2)acute ischemic stroke w/encephelopathy    Plan:    - Lantus 8 units daily at 1600   -CHO coverage-1 units per 25 grams carb--> increase to 1 per 20 grams   -Novolog  Medium intensity sliding scale AC, HS >/140 at all points   -BG monitoring TID AC, HS, 0200--> discontinue the 0200   -hypoglycemia protocol   -carb counting protocol   -diabetes education needs will be assessed closer to discharge.     Outpatient follow up: TBD    Discussed w/ pt and dietician.      Interval History and Assessment: interval glucose trend reviewed:     BG spike to 200 yesterday-- food consumption prior  included quesadilla and christelle food cake , but no aspart was administered.        Patient notes that he has been trying to keep his carbohydrates low (he skipped mashed potatoes last night, for example) given the need for insulin.   Explained to him that we want his calorie intake to increase, and reaching that goal should include carbohydrates.       Subsequent meals have included more carbs.  Now, timing and administration of aspart need to improve. Until on non-insulin therapy.  RD arranged for pt to order PRN Glucerna.    eGFR in 60s.  Hopeful for improvement, or stability at least-- and consideration of metformin.    DPP4 might provide sufficient  coverage.      Current nutritional intake and type: Orders Placed This Encounter      Consistent Carbohydrate Diet Moderate Consistent Carb (60 g CHO per Meal) Diet      Planned Procedures/surgeries: none  Steroid planning: none  D5W-containing solutions/medications: none    PTA Diabetes Regimen: none    Discharge Planning: TBD, pt hopeful for rehab, but timing not clear, maybe 1/27 at 1430           Diabetes History:   Type of Diabetes: Type 2 Diabetes Mellitus  Lab Results   Component Value Date    A1C 7.8 01/08/2022              Review of Systems:     The Review of Systems is negative other than noted in the Interval History.           Medications:     Current Facility-Administered Medications   Medication     acetaminophen (TYLENOL) tablet 650 mg     aMILoride (MIDAMOR) tablet 5 mg     aspirin (ASA) chewable tablet 324 mg    Or     aspirin (ASA) EC tablet 325 mg     atorvastatin (LIPITOR) tablet 40 mg     carboxymethylcellulose PF (REFRESH PLUS) 0.5 % ophthalmic solution 1 drop     carvedilol (COREG) tablet 37.5 mg     chlorthalidone (HYGROTON) tablet 25 mg     cloNIDine (CATAPRES) tablet 0.1 mg     dextrose 10% infusion     glucose gel 15-30 g    Or     dextrose 50 % injection 25-50 mL    Or     glucagon injection 1 mg     divalproex sodium extended-release (DEPAKOTE ER) 24 hr tablet 1,000 mg     hydrALAZINE (APRESOLINE) tablet 50 mg     insulin aspart (NovoLOG) injection (RAPID ACTING)     insulin aspart (NovoLOG) injection (RAPID ACTING)     insulin aspart (NovoLOG) injection (RAPID ACTING)     insulin glargine (LANTUS PEN) injection 8 Units     lidocaine (LMX4) cream     lidocaine 1 % 0.1-1 mL     lisinopril (ZESTRIL) tablet 40 mg     medication instruction - No oral meds if patient didn't pass dysphagia screen     Medication Instructions - Avoid dextrose in IV solutions.     melatonin tablet 5 mg     miconazole (MICATIN) 2 % powder     multivitamin, therapeutic (THERA-VIT) tablet 1 tablet     nystatin  "(MYCOSTATIN) cream     polyethylene glycol (MIRALAX) Packet 17 g     potassium chloride (KLOR-CON) Packet 20 mEq     senna-docusate (SENOKOT-S/PERICOLACE) 8.6-50 MG per tablet 1-2 tablet     sodium chloride (PF) 0.9% PF flush 3 mL     sodium chloride (PF) 0.9% PF flush 3 mL     sodium chloride (PF) 0.9% PF flush 3 mL     thiamine (B-1) tablet 100 mg            Physical Exam:    BP (!) 152/80 (BP Location: Right arm)   Pulse 70   Temp 98.5  F (36.9  C) (Oral)   Resp 18   Ht 1.702 m (5' 7\")   Wt 80.6 kg (177 lb 11.1 oz)   SpO2 94%   BMI 27.83 kg/m    General:resting in bed  HEENT: normocephalic, atraumatic. Oral mucous membranes moist.   Lungs: unlabored respiration, no cough  ABD:   Skin: dry, no obvious lesions  Mental status: alert, oriented, engaged clear speech  Psych:  calm            Data:     Recent Labs   Lab 01/26/22  0439 01/25/22  2208 01/25/22  2051 01/25/22  1821 01/25/22  1231 01/25/22  0713   * 131* 180* 222* 135* 104*     Lab Results   Component Value Date    WBC 7.0 01/26/2022    WBC 9.0 01/25/2022    WBC 8.7 01/24/2022    HGB 14.5 01/26/2022    HGB 14.5 01/25/2022    HGB 14.4 01/24/2022    HCT 43.4 01/26/2022    HCT 43.7 01/25/2022    HCT 43.1 01/24/2022    MCV 91 01/26/2022    MCV 92 01/25/2022    MCV 91 01/24/2022     01/26/2022     01/25/2022     01/24/2022     Lab Results   Component Value Date     01/26/2022     01/25/2022     01/24/2022    POTASSIUM 3.6 01/26/2022    POTASSIUM 3.6 01/26/2022    POTASSIUM 3.8 01/25/2022    CHLORIDE 105 01/26/2022    CHLORIDE 104 01/25/2022    CHLORIDE 101 01/24/2022    CO2 28 01/26/2022    CO2 31 01/25/2022    CO2 27 01/24/2022     (H) 01/26/2022     (H) 01/25/2022     (H) 01/25/2022     Lab Results   Component Value Date    BUN 28 01/26/2022    BUN 33 (H) 01/25/2022    BUN 34 (H) 01/24/2022     No results found for: TSH  Lab Results   Component Value Date    AST 22 01/17/2022    AST 26 " 01/08/2022    AST 26 04/25/2014    ALT 20 01/17/2022    ALT 31 01/08/2022    ALT 50 (H) 04/25/2014    ALKPHOS 98 01/17/2022    ALKPHOS 94 01/08/2022    ALKPHOS 66 04/25/2014           I spent a total of 35 minutes bedside and on the inpatient unit managing the glycemic care of Miriam Hall. Over 50% of my time on the unit was spent counseling the patient  and/or coordinating care regarding acute glycemic management.  See note for details.  Shanae Antoine APRN -[id number]      To contact Endocrine Diabetes service:   From 8AM-4PM: page inpatient diabetes provider that is following the patient  For questions or updates from 4PM-8AM: page the diabetes job code for on call fellow: [phone]

## 2022-01-26 NOTE — PLAN OF CARE
Neuro: A&Ox4. Forgetful. Word-finding difficulty. L-sided hemiparesis. Slight movement of fingers and wrist on LUE noted during assessment.   Cardiac: SR. VSS.   Respiratory: RA. Oxygen saturation >95%.   GI/: Adequate urine output; incontinent of urine at times. No BM this shift.  Diet/appetite: Carb controlled diet.   Activity:  Lift assist. Encouraged repositioning overnight.  Pain: Pain with L leg during repositioning. No pain at rest.   Skin: No new deficits noted.  LDA's: PIV.     Plan: 24 hour urine collection needs to be restarted. Continue with POC. Notify primary team with changes.

## 2022-01-26 NOTE — PROGRESS NOTES
Care Management Follow Up    Length of Stay (days): 18    Expected Discharge Date: 01/26/2022     Concerns to be Addressed: discharge planning      Patient plan of care discussed at interdisciplinary rounds: Yes    Anticipated Discharge Disposition: ARU recommended- TBD  Anticipated Discharge Services: Transportation, rehab   Anticipated Discharge DME: None    Patient/family educated on Medicare website which has current facility and service quality ratings: yes   Education Provided on the Discharge Plan: yes    Patient/Family in Agreement with the Plan: yes     Referrals Placed by CM/SW:     ARU  *[id number]  - MARLENA updated Octavia in admissions that pt is now med ready. Octavia confirmed that insurance auth has been received. She does not anticipate any open beds today for ARU, Octavia to inform SW if a bed becomes available tomorrow.     Private pay costs discussed: Not applicable    Additional Information:  SW following for dispo needs- per pt's med team, pt is med ready for discharge pending ARU bed and insurance auth. Pt will continue to follow OP for BP management.    MARLENA spoke with Octavia in admissions and confirmed that pt is med ready for discharge. Per Octavia, they will not have an available ARU bed today but can review for tomorrow. Octavia to keep SW updated.    ADDENDUM: 1414  MARLENA received message from Octavia with request to arrange ride for tomorrow at 1400. MARLENA spoke with [facility] and arranged a tentative WC ride for tomorrow at 1430 to [facility].     SW updated pt in room and pt is agreeable to discharge plan. Pt understands SW will continue to keep him updated.     SW to continue following for dispo needs.    Madi Menjivar, ML, LGSW  [address]B   [facility]  Phone: [phone]  Pager: [phone]

## 2022-01-27 ENCOUNTER — APPOINTMENT (OUTPATIENT)
Dept: PHYSICAL THERAPY | Facility: CLINIC | Age: 47
End: 2022-01-27
Payer: COMMERCIAL

## 2022-01-27 ENCOUNTER — APPOINTMENT (OUTPATIENT)
Dept: OCCUPATIONAL THERAPY | Facility: CLINIC | Age: 47
End: 2022-01-27
Payer: COMMERCIAL

## 2022-01-27 PROBLEM — N17.9 ACUTE KIDNEY FAILURE, UNSPECIFIED (H): Status: ACTIVE | Noted: 2022-01-27

## 2022-01-27 LAB
ALBUMIN UR-MCNC: NEGATIVE MG/DL
ANION GAP SERPL CALCULATED.3IONS-SCNC: 7 MMOL/L (ref 3–14)
APPEARANCE UR: CLEAR
BILIRUB UR QL STRIP: NEGATIVE
BUN SERPL-MCNC: 30 MG/DL (ref 7–30)
CALCIUM SERPL-MCNC: 9.5 MG/DL (ref 8.5–10.1)
CHLORIDE BLD-SCNC: 103 MMOL/L (ref 94–109)
CO2 SERPL-SCNC: 29 MMOL/L (ref 20–32)
COLOR UR AUTO: ABNORMAL
CREAT SERPL-MCNC: 1.56 MG/DL (ref 0.66–1.25)
ERYTHROCYTE [DISTWIDTH] IN BLOOD BY AUTOMATED COUNT: 13.4 % (ref 10–15)
GFR SERPL CREATININE-BSD FRML MDRD: 55 ML/MIN/1.73M2
GLUCOSE BLD-MCNC: 95 MG/DL (ref 70–99)
GLUCOSE BLDC GLUCOMTR-MCNC: 120 MG/DL (ref 70–99)
GLUCOSE BLDC GLUCOMTR-MCNC: 121 MG/DL (ref 70–99)
GLUCOSE BLDC GLUCOMTR-MCNC: 165 MG/DL (ref 70–99)
GLUCOSE BLDC GLUCOMTR-MCNC: 209 MG/DL (ref 70–99)
GLUCOSE UR STRIP-MCNC: NEGATIVE MG/DL
HCT VFR BLD AUTO: 44.3 % (ref 40–53)
HGB BLD-MCNC: 14.7 G/DL (ref 13.3–17.7)
HGB UR QL STRIP: NEGATIVE
KETONES UR STRIP-MCNC: NEGATIVE MG/DL
LEUKOCYTE ESTERASE UR QL STRIP: NEGATIVE
MAGNESIUM SERPL-MCNC: 2 MG/DL (ref 1.6–2.3)
MCH RBC QN AUTO: 30.7 PG (ref 26.5–33)
MCHC RBC AUTO-ENTMCNC: 33.2 G/DL (ref 31.5–36.5)
MCV RBC AUTO: 93 FL (ref 78–100)
MUCOUS THREADS #/AREA URNS LPF: PRESENT /LPF
NITRATE UR QL: NEGATIVE
PH UR STRIP: 6 [PH] (ref 5–7)
PHOSPHATE SERPL-MCNC: 3.8 MG/DL (ref 2.5–4.5)
PLATELET # BLD AUTO: 310 10E3/UL (ref 150–450)
POTASSIUM BLD-SCNC: 3.7 MMOL/L (ref 3.4–5.3)
POTASSIUM BLD-SCNC: 3.7 MMOL/L (ref 3.4–5.3)
POTASSIUM BLD-SCNC: 3.8 MMOL/L (ref 3.4–5.3)
RBC # BLD AUTO: 4.79 10E6/UL (ref 4.4–5.9)
RBC URINE: <1 /HPF
SODIUM SERPL-SCNC: 139 MMOL/L (ref 133–144)
SODIUM UR-SCNC: 72 MMOL/L
SP GR UR STRIP: 1.01 (ref 1–1.03)
UROBILINOGEN UR STRIP-MCNC: NORMAL MG/DL
WBC # BLD AUTO: 7 10E3/UL (ref 4–11)
WBC URINE: <1 /HPF

## 2022-01-27 PROCEDURE — 120N000003 HC R&B IMCU UMMC

## 2022-01-27 PROCEDURE — 250N000013 HC RX MED GY IP 250 OP 250 PS 637

## 2022-01-27 PROCEDURE — 84300 ASSAY OF URINE SODIUM: CPT | Performed by: STUDENT IN AN ORGANIZED HEALTH CARE EDUCATION/TRAINING PROGRAM

## 2022-01-27 PROCEDURE — 258N000003 HC RX IP 258 OP 636

## 2022-01-27 PROCEDURE — 81001 URINALYSIS AUTO W/SCOPE: CPT | Performed by: STUDENT IN AN ORGANIZED HEALTH CARE EDUCATION/TRAINING PROGRAM

## 2022-01-27 PROCEDURE — 97530 THERAPEUTIC ACTIVITIES: CPT | Mod: GP | Performed by: PHYSICAL THERAPIST

## 2022-01-27 PROCEDURE — 36415 COLL VENOUS BLD VENIPUNCTURE: CPT

## 2022-01-27 PROCEDURE — 99233 SBSQ HOSP IP/OBS HIGH 50: CPT | Mod: GC | Performed by: INTERNAL MEDICINE

## 2022-01-27 PROCEDURE — 250N000013 HC RX MED GY IP 250 OP 250 PS 637: Performed by: PSYCHIATRY & NEUROLOGY

## 2022-01-27 PROCEDURE — 99233 SBSQ HOSP IP/OBS HIGH 50: CPT | Performed by: NURSE PRACTITIONER

## 2022-01-27 PROCEDURE — 84132 ASSAY OF SERUM POTASSIUM: CPT

## 2022-01-27 PROCEDURE — 97530 THERAPEUTIC ACTIVITIES: CPT | Mod: GO | Performed by: OCCUPATIONAL THERAPIST

## 2022-01-27 PROCEDURE — 83735 ASSAY OF MAGNESIUM: CPT | Performed by: PSYCHIATRY & NEUROLOGY

## 2022-01-27 PROCEDURE — 250N000013 HC RX MED GY IP 250 OP 250 PS 637: Performed by: COUNSELOR

## 2022-01-27 PROCEDURE — 80048 BASIC METABOLIC PNL TOTAL CA: CPT | Performed by: STUDENT IN AN ORGANIZED HEALTH CARE EDUCATION/TRAINING PROGRAM

## 2022-01-27 PROCEDURE — 97535 SELF CARE MNGMENT TRAINING: CPT | Mod: GO | Performed by: OCCUPATIONAL THERAPIST

## 2022-01-27 PROCEDURE — 85027 COMPLETE CBC AUTOMATED: CPT | Performed by: STUDENT IN AN ORGANIZED HEALTH CARE EDUCATION/TRAINING PROGRAM

## 2022-01-27 PROCEDURE — 99232 SBSQ HOSP IP/OBS MODERATE 35: CPT | Mod: GC | Performed by: PSYCHIATRY & NEUROLOGY

## 2022-01-27 PROCEDURE — 250N000013 HC RX MED GY IP 250 OP 250 PS 637: Performed by: STUDENT IN AN ORGANIZED HEALTH CARE EDUCATION/TRAINING PROGRAM

## 2022-01-27 PROCEDURE — 250N000013 HC RX MED GY IP 250 OP 250 PS 637: Performed by: INTERNAL MEDICINE

## 2022-01-27 PROCEDURE — 250N000012 HC RX MED GY IP 250 OP 636 PS 637: Performed by: CLINICAL NURSE SPECIALIST

## 2022-01-27 PROCEDURE — 84100 ASSAY OF PHOSPHORUS: CPT | Performed by: PSYCHIATRY & NEUROLOGY

## 2022-01-27 PROCEDURE — 97112 NEUROMUSCULAR REEDUCATION: CPT | Mod: GO | Performed by: OCCUPATIONAL THERAPIST

## 2022-01-27 PROCEDURE — 82530 CORTISOL FREE: CPT | Performed by: STUDENT IN AN ORGANIZED HEALTH CARE EDUCATION/TRAINING PROGRAM

## 2022-01-27 PROCEDURE — 36415 COLL VENOUS BLD VENIPUNCTURE: CPT | Performed by: STUDENT IN AN ORGANIZED HEALTH CARE EDUCATION/TRAINING PROGRAM

## 2022-01-27 RX ADMIN — NYSTATIN: 100000 CREAM TOPICAL at 20:04

## 2022-01-27 RX ADMIN — DIVALPROEX SODIUM 1000 MG: 500 TABLET, FILM COATED, EXTENDED RELEASE ORAL at 09:15

## 2022-01-27 RX ADMIN — ATORVASTATIN CALCIUM 40 MG: 40 TABLET, FILM COATED ORAL at 20:04

## 2022-01-27 RX ADMIN — HYDRALAZINE HYDROCHLORIDE 50 MG: 50 TABLET, FILM COATED ORAL at 09:30

## 2022-01-27 RX ADMIN — MICONAZOLE NITRATE: 2 POWDER TOPICAL at 20:04

## 2022-01-27 RX ADMIN — MICONAZOLE NITRATE: 2 POWDER TOPICAL at 09:17

## 2022-01-27 RX ADMIN — THIAMINE HCL TAB 100 MG 100 MG: 100 TAB at 09:14

## 2022-01-27 RX ADMIN — CLONIDINE HYDROCHLORIDE 0.1 MG: 0.1 TABLET ORAL at 20:04

## 2022-01-27 RX ADMIN — THERA TABS 1 TABLET: TAB at 09:14

## 2022-01-27 RX ADMIN — INSULIN ASPART 1 UNITS: 100 INJECTION, SOLUTION INTRAVENOUS; SUBCUTANEOUS at 10:06

## 2022-01-27 RX ADMIN — CARVEDILOL 37.5 MG: 25 TABLET, FILM COATED ORAL at 17:31

## 2022-01-27 RX ADMIN — INSULIN GLARGINE 8 UNITS: 100 INJECTION, SOLUTION SUBCUTANEOUS at 17:31

## 2022-01-27 RX ADMIN — POTASSIUM CHLORIDE 20 MEQ: 1.5 POWDER, FOR SOLUTION ORAL at 20:04

## 2022-01-27 RX ADMIN — AMILORIDE HYDROCLORIDE 10 MG: 5 TABLET ORAL at 09:14

## 2022-01-27 RX ADMIN — LISINOPRIL 40 MG: 20 TABLET ORAL at 09:14

## 2022-01-27 RX ADMIN — HYDRALAZINE HYDROCHLORIDE 50 MG: 50 TABLET, FILM COATED ORAL at 17:31

## 2022-01-27 RX ADMIN — ASPIRIN 81 MG CHEWABLE TABLET 324 MG: 81 TABLET CHEWABLE at 09:13

## 2022-01-27 RX ADMIN — INSULIN ASPART 2 UNITS: 100 INJECTION, SOLUTION INTRAVENOUS; SUBCUTANEOUS at 17:35

## 2022-01-27 RX ADMIN — SODIUM CHLORIDE 1000 ML: 9 INJECTION, SOLUTION INTRAVENOUS at 10:19

## 2022-01-27 RX ADMIN — CHLORTHALIDONE 25 MG: 25 TABLET ORAL at 09:14

## 2022-01-27 RX ADMIN — HYDRALAZINE HYDROCHLORIDE 50 MG: 50 TABLET, FILM COATED ORAL at 03:10

## 2022-01-27 RX ADMIN — POTASSIUM CHLORIDE 20 MEQ: 1.5 POWDER, FOR SOLUTION ORAL at 09:14

## 2022-01-27 RX ADMIN — NYSTATIN: 100000 CREAM TOPICAL at 09:17

## 2022-01-27 RX ADMIN — CARVEDILOL 37.5 MG: 25 TABLET, FILM COATED ORAL at 09:13

## 2022-01-27 RX ADMIN — CLONIDINE HYDROCHLORIDE 0.1 MG: 0.1 TABLET ORAL at 09:14

## 2022-01-27 ASSESSMENT — ACTIVITIES OF DAILY LIVING (ADL)
ADLS_ACUITY_SCORE: 18
ADLS_ACUITY_SCORE: 16
ADLS_ACUITY_SCORE: 18
ADLS_ACUITY_SCORE: 16
ADLS_ACUITY_SCORE: 18
ADLS_ACUITY_SCORE: 16
ADLS_ACUITY_SCORE: 18
ADLS_ACUITY_SCORE: 16
ADLS_ACUITY_SCORE: 18
ADLS_ACUITY_SCORE: 16

## 2022-01-27 ASSESSMENT — MIFFLIN-ST. JEOR: SCORE: 1605.63

## 2022-01-27 NOTE — DISCHARGE SUMMARY
[facility]    Neurology Stroke Discharge Summary    Date of Admission: 1/8/2022  Date of Discharge: 01/28/2022    Disposition: Discharged to rehabilitation facility  Primary Care Physician: No Ref-Primary, Physician      Admission Diagnosis:   Right Posterior Cerebral Artery Stroke    Discharge Diagnosis:   Acute ischemic stroke of Right PCA due to embolic stroke of undetermined source (ESUS)  Hypertension  Type 2 Diabetes Milletus    Problem Leading to Hospitalization (from HPI):   Miriam Hall is a 46 year old male with PMHx HTN who presented on 1/8/2022 with symptoms concerning for stroke. The pt reports that he was in his normal state of health this morning and then decided to take a nap around noon today. However, on waking from his nap around 2:00PM, he found that he was having trouble moving and feeling the L side of his body. Unfortunately, the pt was unable to get to a phone and ended up crawling on his floor until he could finally get the attention of one of his neighbors, who then was able to call EMS.     EMS arrived to find the pt continuing to have decreased sensation and weakness on his L side. They noted a , /145, and . On arrival in the ED, the pt remained afebrile, but was tachycardic () and quite hypertensive (/146). A stroke code was called for initial NIHSS 9, for a partial L gaze palsy, L-sided complete hemianopia, L arm > leg weakness, decreased sensation in the L arm/leg to sensation (pinprick intact in arm but decreased in leg), mild dysarthria, L-sided extinction, and possible L neglect. The pt also seemed to be having trouble with proprioception in the L arm, though did recognize it to be his own. Pt was immediately taken to CT where he underwent a CTH, CTA Head/Neck and CT Perfusion study, revealing a well-established R parieto-occipital stroke with an LVO of the R P1 segment. As the stroke already  appeared well-established and the pt was outside the time window, no tPA was given. No thrombectomy was performed due to the location of the LVO in the PCA. The pt was loaded with ASA 324mg once with plans to allow for permissive HTN and admission to the Stroke service for further work-up     The patient denies any history of smoking or drug use. Only rarely uses EtOH. He would prefer that no one be contact at this time concerning his diagnosis or medical condition.    Please see H&P dated 1/8/2022 for further details about presentation.    Brief Hospital Course:   Patient presented with Left-sided weakness, numbness, extinction and neglect.      Found to have Right PCA stroke.      IV tPA was not given.      Work-up as stated below under Pertinent Investigations.    Etiology is thought to be Embolic Stroke of Unknown Source.      Rehab evaluation: OT, PT, SLP and PM&R.     Smoking Cessation: education provided, support referral ordered, patient is not a smoker    BP Long-term Goal: 130/80 or less    Antithrombotic/Anticoagulant Agent: aspirin 81 mg    Statins: Started on atorvastatin 40 mg       Hgb A1C Goal: < 7.0    Complications: None.     Other problems addressed during this hospitalization:    # Acute ischemic stroke of Right P1 segement of the PCA  # R P1 occlusion and R M2 Stenosis  # Hemorrhagic transformation of ischemic infarct  Extensive workup to establish the etiology of the stroke was unremarkable. Suspect possible intracranial atheresclerosis vs Embolic Stroke of Unknown Source (ESUS).  - Aspirin 325 mg PO daily for secondary stroke prevention & mild pain relief  - 14 day zio patch as getting discharged to ARU     #Encephalopathy (resolved)  #Leukocytosis (resolved)  #Concern for seizures  Leukocytosis and encephalopathy initially concerning for sepsis but workout was reassuringly negative. However, his attention and alertness continues to wax and wane raising concern for subclinical seizures.  Multiple video EEG negative for seizure activity. However, given stroke burden and prolonged encephalopathy, his risk for seizure remains elevated. Defer decision to stop Depakote at his follow up visit.   - Depakote ER 1000 mg PO daily      #HLD  LDL 75. Long-term LDL goal 40-70.   - Atorvastatin 40 mg PO daily     #Refractory HTN   Required 6 anti-hypertensives to maintain SBP goal < 180. No clear etiology despite extensive workup and consultations with Cardiology and Nephrology. His anti-hypertensive at discharge is as follows:  - Amiloride 10 mg daily  - Carvedilol 37.5 mg PO BID  - Chlorthalidone 25 mg PO daily  - Clonidine 0.1 mg PO daily   -Lisinopril 40 mg PO daily   - Hydralazine 50 mg PO TID  - Per Nephrology Recommendations at Discharge:              - Titrate other BP agents as needed, if able to come off agents with addition of Amiloride would prioritize discontinuation of Hydralazine followed by Clonidine     - If problems with AM drowsiness from BP meds, could schedule Lisinopril at night    - Pt may benefit from Spironolactone, we can follow up labs and start this in the clinic    - Patient does not need to remain inpatient for BP titration. He should follow up with Nephrology within 2-3 weeks, we will arrange this. He may also benefit from Endocrine and Genetics consults as an outpatient to workup for genetic causes of refractory HTN.      #Type 2 Diabetes Mellitus  Hgb A1C 7.8. Patient admitted that he had been non-compliant with his diabetes medication prior to his stroke. Endocrinology consulted to help manage while inpatient  - Per Endocrinology Recommendations at Discharge:   - Start Metformin 500 mg with supper     #DISHA  Pt presented with Cr 1.37 improved with IVF to 1.17. Now at baseline.   - Encourage PO hydration    #Hypokalemia  RN Managed Standard Replacement protocol  - Started KCl 20 mEq PO BID     Chronic/Resolved Issues:     #Nutrition (resolved)  Patient passed SLP evaluation and  approved for regular diet.     #Elevated troponin (resolved)  Pt arrived with Trop 147. EKG exhibited sinus tachycardia, but no concerning signs of ischemia. Troponin peaked 1/10. Unclear what caused this elevation; unlikely to be MI given no sx or EKG changes.  - Coronary CTA as outpatient per Cards recs      #Superficial RUE thrombus (incidental finding)  US completed 1/17 negative for DVT but positive for thrombus in the R basilic and cephalic veins from the antecubital fossa to the wrist. Patient was asymptomatic.    PERTINENT INVESTIGATIONS    Labs  Lipid Panel: Recent Labs   Lab Test 01/08/22  1843   CHOL 169   HDL 70   LDL 75   TRIG 118     A1C:   Lab Results   Component Value Date    A1C 7.8 01/08/2022     INR: No lab results found in last 7 days.   Coag Panel / Hypercoag Workup: Negative  Pending test results: Nephrology Urine Studies    Stroke Evaluation Summarized     MRI/Head CT MRI Brain (1/12)  1. Findings from CT and CTA performed yesterday are confirmed with stroke in the distribution of the right posterior cerebral artery. This involves the medial right occipital lobe and medial right temporal lobe with extension into the body of the thalamus.  2. Additional punctate foci of diffusion restriction in the left putamen.  3. Moderate leukoaraiosis.      CT Head (1/16)  1. Continued evolution of large right posterior cerebral artery territory infarct. With similar mass effect and 4 mm of leftward midline shift, previously 5 mm.  2. Subtle hyperdensities in the right basal ganglia consistent with petechial hemorrhages seen on previous MRI. No new intracranial hemorrhage or new loss of gray-white matter differentiation.     MRI Brain (1/18)  1. Exam sensitivity is severely diminished due to considerable motion artifact.  2. No definite abnormal enhancement to suggest infection on the motion degraded postcontrast images.  3. Continued evolution of right posterior cerebral territory infarct and punctate  infarct involving the left putamen. Associated petechial hemorrhage and developing cortical laminar necrosis.   Intracranial Vasculature MRA (1/12)  Redemonstration of occlusion of the right PCA near its  origin. Additional short segment high-grade stenosis of a right-sided M2 MCA branch. Findings are not likely not significantly changed from CT performed yesterday.   Cervical Vasculature CTA (1/12)  1. Occlusion of the proximal right PCA.  2. High-grade stenosis of the occiput M2 branch of the right MCA with  attenuated flow distal.  3. Neck CTA demonstrates no stenosis of the major cervical arteries.      Echocardiogram TTE: The visual ejection fraction is 50-55%.   No regional wall motion abnormalities are seen.  Moderate to severe concentric wall thickening consistent with left ventricular hypertrophy is present.  Global right ventricular function is normal.  There was no shunt at the atrial septal level as assessed by agitated saline bubble study at rest and with Valsalva maneuver.  Previous study not available for comparison.  CARISA:  Global and regional left ventricular function is normal with an EF of 55-60%.  Severe concentric left ventricular hypertrophy is present.  Global right ventricular function is normal.  The atrial septum is intact as assessed by color Doppler and agitated saline  bubble study .  The left atrial appendage is free of thrombus.  No significant valvular abnormalities present.  No pericardial effusion is present      EKG/Telemetry EKG with sinus tachycardia   Other Testing Hypercoagulability profile has not been sent yet      LDL  1/8/2022: 75 mg/dL   A1C  1/8/2022: 7.8 %   Troponin No lab value available in past 48 hrs        Cardiac Monitoring: Patient had > 24 hrs of cardiac monitor while in hospital.    Findings: No atrial fibrillation was found.    Sleep Apnea Screen:   Questions/Answers      1. Prior to your stroke, have you been told that you snore? Yes.    2. Prior to your  stroke, have you been told that you struggle to breath while you are sleeping? No.    3. Prior to your stroke, do you feel tired and sleepy even after getting a normal night of sleep? No.    Sleep Apnea Screen Findings: Patient has 0-1 symptoms of sleep apnea.  Further sleep study is not recommended at this time.    PHQ-9 Depression Screen Score: 6    Education discussed with: patient on blood pressure management, cholesterol management, medical management, follow-up recommendations/plan and 911 call if warning signs of stroke.    During daily rounds, the plan of care was discussed and developed with patient.  Plan of care includes: Acute Rehab, Stroke Follow up and controlling his HTN & DM.    PHYSICAL EXAMINATION  Vital Signs:  B/P: 156/92, T: 97.8, P: 66, R: 16    General:  patient lying in bed without any acute distress     HEENT:  normocephalic/atraumatic  Cardio:  RRR  Pulmonary:  no respiratory distress  Abdomen:  soft, non-tender, non-distended, bowel sounds present  Extremities:  no edema, peripheral pulses palpable  Skin:  intact, warm/dry     Neurologic  Mental Status:  fully alert, attentive and oriented, follows commands, speech clear and fluent  Cranial Nerves:  visual fields intact, PERRL, EOMI with normal smooth pursuit, initially neglects left side but can cross midline with encouragement for full lateral gaze, facial sensation intact and symmetric, facial movements symmetric, mild droop on left side, hearing not formally tested but intact to conversation, palate elevation symmetric and uvula midline, no dysarthria, shoulder shrug strong bilaterally, tongue protrusion midline  Motor:  no abnormal movements, normal tone throughout, normal muscle bulk, no pronator drift, normal rapid finger tapping on right, able to move right limbs spontaneously, strength 5/5 throughout upper and lower extremities on right side; 1/5 strength of upper and lower extremities on left side (can flex fingers & move  proximal thigh)  Reflexes:  no clonus, toes downgoing  Sensory:  intact to light touch on right; diminished light touch on left face; feels pressure/tingling on left extremities  Coordination:  FNF and HS intact without dysmetria on right only  Station/Gait:  unable to test    National Institutes of Health Stroke Scale (on day of discharge)  NIHSS Total Score: 15    Modified Phoenix Score (Discharge)  4-Moderately severe disability; unable to walk without assistance and unable to attend to own bodily    Medications    Current Discharge Medication List      START taking these medications    Details   acetaminophen (TYLENOL) 325 MG tablet Take 2 tablets (650 mg) by mouth every 4 hours as needed for mild pain or fever (temperatures greater than 100.4  F (38  C))    Associated Diagnoses: Acute ischemic right posterior cerebral artery (PCA) stroke (H)      aMILoride (MIDAMOR) 5 MG tablet Take 2 tablets (10 mg) by mouth daily    Associated Diagnoses: Acute ischemic right posterior cerebral artery (PCA) stroke (H)      aspirin (ASA) 81 MG chewable tablet Take 1 tablet (81 mg) by mouth daily    Associated Diagnoses: Acute ischemic right posterior cerebral artery (PCA) stroke (H)      atorvastatin (LIPITOR) 40 MG tablet Take 1 tablet (40 mg) by mouth every evening    Associated Diagnoses: Acute ischemic right posterior cerebral artery (PCA) stroke (H)      carvedilol (COREG) 12.5 MG tablet Take 3 tablets (37.5 mg) by mouth 2 times daily (with meals)    Associated Diagnoses: Acute ischemic right posterior cerebral artery (PCA) stroke (H)      chlorthalidone (HYGROTON) 25 MG tablet Take 1 tablet (25 mg) by mouth daily    Associated Diagnoses: Acute ischemic right posterior cerebral artery (PCA) stroke (H)      cloNIDine (CATAPRES) 0.1 MG tablet Take 1 tablet (0.1 mg) by mouth 2 times daily    Associated Diagnoses: Acute ischemic right posterior cerebral artery (PCA) stroke (H)      divalproex sodium extended-release (DEPAKOTE  ER) 500 MG 24 hr tablet Take 2 tablets (1,000 mg) by mouth daily    Associated Diagnoses: Acute ischemic right posterior cerebral artery (PCA) stroke (H)      hydrALAZINE (APRESOLINE) 50 MG tablet Take 1 tablet (50 mg) by mouth every 8 hours    Associated Diagnoses: Acute ischemic right posterior cerebral artery (PCA) stroke (H)      lisinopril (ZESTRIL) 40 MG tablet Take 1 tablet (40 mg) by mouth daily    Associated Diagnoses: Acute ischemic right posterior cerebral artery (PCA) stroke (H)      metFORMIN (GLUCOPHAGE) 500 MG tablet Take 1 tablet (500 mg) by mouth daily (with dinner)    Associated Diagnoses: Acute ischemic right posterior cerebral artery (PCA) stroke (H)      potassium chloride (KLOR-CON) 20 MEQ packet Take 20 mEq by mouth 2 times daily    Associated Diagnoses: Acute ischemic right posterior cerebral artery (PCA) stroke (H)         CONTINUE these medications which have NOT CHANGED    Details   melatonin 5 MG tablet Take 1 tablet (5 mg) by mouth nightly as needed for sleep    Associated Diagnoses: Acute ischemic right posterior cerebral artery (PCA) stroke (H)         STOP taking these medications       aspirin 81 MG EC tablet Comments:   Reason for Stopping:         lisinopril-hydrochlorothiazide (PRINZIDE,ZESTORETIC) 20-25 MG per tablet Comments:   Reason for Stopping:               Additional recommendations and follow up:       Adult Neurology  Referral      General info for SNF    Length of Stay Estimate: Short Term Care: Estimated # of Days <30  Condition at Discharge: Improving  Level of care:skilled   Rehabilitation Potential: Good  Admission H&P remains valid and up-to-date: Yes  Recent Chemotherapy: N/A  Use Nursing Home Standing Orders: Yes     Mantoux instructions    Give two-step Mantoux (PPD) Per Facility Policy Yes     Follow Up and recommended labs and tests    Follow up with primary care provider in 1-2 weeks to help manage your diabetes and other chronic conditions.      Reason for your hospital stay    Right Posterior Cerebral Artery Stroke     Glucose monitor nursing POCT    Before meals and at bedtime     Activity - Up with nursing assistance     Full Code     Physical Therapy Adult Consult    Evaluate and treat as clinically indicated.    Reason:  Stroke Rehab     Occupational Therapy Adult Consult    Evaluate and treat as clinically indicated.    Reason:  Stroke Rehab     Fall precautions     Seizure precautions     Diet    Follow this diet upon discharge: Orders Placed This Encounter      Snacks/Supplements Adult: Glucerna; With Meals      Consistent Carbohydrate Diet Moderate Consistent Carb (60 g CHO per Meal) Diet       Patient was seen and discussed with the Attending, Dr. Christine.    Nelson Laura DO  Neurology Resident PGY1

## 2022-01-27 NOTE — PLAN OF CARE
Neuro: A&Ox4. Forgetful at times. L side flacid from previous stroke. Able to wiggle fingers in LUE, has diminished sensation to LLE, no active movement. L pupil greater than R, MD aware. Both reactive to light.   Cardiac: SR, 60-80s. BP elevated this morning 170s/100s, with improvement to 140s/90s after morning medications. Afternoon BP 120s/80s. MD made aware.   Respiratory: Sating >95% on RA.  GI/: Adequate urine output, using bedside urinal. UA sent.  Diet/appetite: Tolerating consistent carb diet.   Activity:  Lift Assist. Up to chair this morning and again this afternoon.  Pain: Denies  Skin: No new deficits noted. Protective mepilex in place to sacrum.  LDA's: PIV x1, saine locked    Plan: Continue with POC. Notify primary team with changes.

## 2022-01-27 NOTE — PLAN OF CARE
Neuro: A&Ox4. Forgetful at times, word-finding difficulties.   Cardiac: SR. -150s/90s.   Respiratory: Sating >95% on RA.  GI/: Adequate urine output via urinal. BM X1 this shift. 24-hour urine collection successfully completed at 0200.  Diet/appetite: Tolerating consistent carb diet.  Activity:  Assist of lift. Refuses repositioning, but can weight shift himself, on pulsate mattress.  Pain: C/o left hip pain, tylenol given x1 this shift.    Skin: No new deficits noted.  LDA's: Left PIV, saline locked.    Plan: Continue with POC. Notify primary team with changes.

## 2022-01-27 NOTE — PROGRESS NOTES
Care Management Discharge Note    Discharge Date: 01/27/2022     Discharge Disposition: [facility]  Discharge Services: Transportation, Rehab  Discharge DME: None    Discharge Transportation:  ride with [employer] [facility] at 2:30pm.     Private pay costs discussed: Not applicable    PAS Confirmation Code: NA- pt going to ARU   Patient/family educated on Medicare website which has current facility and service quality ratings: yes     Education Provided on the Discharge Plan: yes   Persons Notified of Discharge Plans: Pt, provider, bedside nurse, and [facility] admissions.   Patient/Family in Agreement with the Plan: yes    Handoff Referral Completed: No; None listed on facesheet.     Additional Information:  Pt has a tentative discharge today at 2:30pm to [facility]. Pt's insurance auth has been approved and MARLENA paged providing team with request to complete orders by noon.    MARLENA to continue following for dispo needs.     ADDENDUM: 8:43am  MARLENA received message from Stefany in admissions at [facility] that there concerns regarding pt's admission today. Pt has not been accepted today due to following concerns: 1.) Pt's creatinine has been trending upward the past couple days and [facility] would like team to address this. 2.) Pt is on a 24 hr urine collection and want to ensure pt is stable. 3.) Pt's blood pressure this morning was more elevated than usual and they would like team to address this in addition to ensuring pt is stable.     MARLENA updated primary team and they are aware pt has been declined for admission today. MARLENA rescheduled WC ride for tomorrow at 2:30pm and [facility] will re-evaluate pt tomorrow morning.     MARLENA updated beside nurse that pt will not be discharging today.     ADDENDUM: 9:09am  MARLENA was asked by providing team to reach out to [facility] for reconsideration of discharge. Per provider, they have addressed concerns and do not believe pt will need to remain IP.    MARLENA reached out to [place] with request for doc to doc  report so that provider can address concerns.  [facility]U to initiate doc to doc.     ADDENDUM: 9:57am  SW spoke with Stefany and confirmed that pt will not be discharging today. Pt will be kept IP to ensure labs and vitals remain stable for the next 24 hours. SW to follow up tomorrow to determine discharge.     SW met with pt to provide update that pt will not be discharging today. Pt expressed understanding and is aware SW will continue to keep him updated.    ADDENDUM: 11:37am  SW received FMLA paperwork from pt's  (Jenniffer Howard) with request to complete. Provider to complete and SW to fax to HR.     ADDENDUM: [id number]  SW spoke with Stefany in admissions at  rehab and pt will not have a designated visitor at this time. They will readdress when pt is admitted.     SW to continue following for dispo needs.     ML Araujo, LGSW  [address]   [facility]  Phone: [phone]  Pager: [phone]

## 2022-01-27 NOTE — PROGRESS NOTES
BRIEF PROGRESS NOTE    Patient was visited today by his company's , Jenniffer Howard at [employer]. She informed the team that the patient's former co-worker, Alma Delia, has been posing as the patient's  and trying to gain access to his credit cards and other financial information. This woman is no longer an employee at [employer] and has no legal authority or employment responsibility to access these records.    Unfortunately, the patient has no advanced care directive or designated HCA. His known next of kin are unable or unwilling to participate in his medical care decision making. The closest advocate is the patient's ex-girlfriend, Pam Quach, who is currently listed as the patient's primary insurance beneficiary (80%) and shares acces to a joint bank and phone account. These connections were established at least a year prior to the patient's stroke. She may be reached at [phone].    Nelson Laura DO  Neurology Resident PGY1  ASCOM: [id number]

## 2022-01-27 NOTE — PROGRESS NOTES
Nephrology Progress Note  01/27/2022         Assessment & Recommendations:   Miriam Hall is a 46 year old male with history of hypertension who was admitted on 1/8/2022 with symptoms concerning for stroke. Nephrology has been consulted to assist with resistant hypertension.    #Severe resistant hypertension, c/f hyperaldosteronism  #Hypokalemia  #Metabolic alkalosis, improved   #Acute ischemic stroke w/ L sided hemiparesis, c/b hemorrhagic transformation   #Moderate to severe left ventricular hypertrophy    Investigations:  - Renal US - Right kidney 10.2 cm in length, left kidney 10.4 cm in length; normal parenchymal thickness and echogenicity, no obstruction. No evidence of left or right renal artery stenosis.  - Cortisol level at 4 pm is 20.5  - Serum aldosterone 11.5  - Serum renin 0.2  - 24H urine aldosterone 5.9   - Urine cortisol, cortisone pending     RECOMMENDATIONS:  - Continue Amiloride to 10mg daily  - Agree with IVF resuscitation today, will add on UA/urine Na    - Titrate other BP agents as needed, if able to come off agents with addition of Amiloride would prioritize discontinuation of Hydralazine followed by Clonidine    - Pt may benefit from Spironolactone, we can continue to assess BP control moving forward, as well as follow up urine Aldosterone and Cortisone     - Patient does not need to remain inpatient for BP titration. He should follow up with Nephrology within 2-3 weeks, we will arrange this. He may also benefit from Endocrine and Genetics consults as an outpatient to workup for genetic causes of refractory HTN.   - Nephrology will continue to follow while inpatient      Recommendations were communicated to primary team via this note    Seen and discussed with Dr. Florecita Nolasco MD   PGY3 Internal Medicine   [phone]    Interval History :   Nursing and provider notes from last 24 hours reviewed.    NAEO. Cr with slight uptrend today. Pt continues to feel well.  "Denies SOB, abdominal pain/distension, n/v, LE swelling. Making good urine, denies urinary sx. Feels thirsty. Slightly somnolent following morning meds.     Review of Systems:   4 point ROS is conducted and is negative apart from above     Physical Exam:   I/O last 3 completed shifts:  In: 180 [P.O.:180]  Out: 275 [Urine:275]   /85 (BP Location: Right arm)   Pulse 65   Temp 98.1  F (36.7  C) (Oral)   Resp 14   Ht 1.702 m (5' 7\")   Wt 77.2 kg (170 lb 3.1 oz)   SpO2 96%   BMI 26.66 kg/m       GENERAL APPEARANCE: nontoxic appearing man in NAD, sitting up comfortably in chair   HENT: mouth without ulcers or lesions  PULM: lungs clear to auscultation bilaterally, equal air movement, no increased WOB on RA   CV: regular rhythm, normal rate, no appreciated murmurs/rubs, no peripheral edema   GI: soft, non-tender, not distended, bowel sounds are +  INTEGUMENT: no cyanosis, no rash on areas of exposed skin     Labs:   All labs reviewed by me  Electrolytes/Renal -   Recent Labs   Lab Test 01/27/22  1053 01/27/22  0916 01/27/22  0428 01/26/22  1659 01/26/22  1448 01/26/22  0845 01/26/22  0439 01/25/22  0713 01/25/22  0425   NA  --   --  139  --   --   --  140  --  139   POTASSIUM  --   --  3.7  3.7  --  3.9  --  3.6  3.6   < > 3.3*  3.3*   CHLORIDE  --   --  103  --   --   --  105  --  104   CO2  --   --  29  --   --   --  28  --  31   BUN  --   --  30  --   --   --  28  --  33*   CR  --   --  1.56*  --   --   --  1.31*  --  1.32*   * 121* 95   < >  --    < > 104*   < > 106*   VALERIE  --   --  9.5  --   --   --  9.3  --  9.1   MAG  --   --  2.0  --   --   --  1.9  --  2.1   PHOS  --   --  3.8  --   --   --  3.0  --  3.7    < > = values in this interval not displayed.     CBC -   Recent Labs   Lab Test 01/27/22  0428 01/26/22  0439 01/25/22  0425   WBC 7.0 7.0 9.0   HGB 14.7 14.5 14.5    311 288     LFTs -   Recent Labs   Lab Test 01/17/22  0433 01/08/22  1843 04/25/14  1530   ALKPHOS 98 94 66 "   BILITOTAL 0.7 0.8 0.7   ALT 20 31 50*   AST 22 26 26   PROTTOTAL 7.8 8.5 7.1   ALBUMIN 2.9* 3.9 4.3     Iron Panel - No lab results found.    Imaging:  All imaging studies reviewed by me.     1/20/2022 RENAL US   Impression:  1. Right kidney:  1a. Renal parenchyma: Unremarkable renal parenchyma.  1b. Renal artery: No evidence of renal artery stenosis.     2. Left kidney:  2a. Renal parenchyma: Renal parenchyma is difficult to visualize  though appears grossly unremarkable.  2b. Renal artery: No evidence of renal artery stenosis.    1/18/2022 CT CAP W CONTRAST   ABDOMEN/PELVIS:  LIVER: Geographic hypoattenuation along the falciform ligament,  consistent with focal fat deposition. No suspicious hepatic mass.  GALLBLADDER: Within normal limits.  PANCREAS: Within normal limits.  SPLEEN: Within normal limits.  ADRENAL GLANDS: Within normal limits.  URINARY TRACT: Symmetric cortical enhancement bilaterally. No  hydronephrosis, nephrolithiasis, or suspicious renal mass. Urinary  bladder is unremarkable.  REPRODUCTIVE ORGANS: Prostatomegaly.  BOWEL: Gastric tube tip terminates in the stomach. Normal caliber of  the small and large bowel. Appendix is within normal limits. No  abnormal wall thickening or inflammatory change.  PERITONEUM/FLUID: No free fluid or air in the abdomen or pelvis.     VESSELS: No aneurysmal dilatation of the abdominal aorta. The portal,  splenic, and superior mesenteric veins are patent. The origins of the  celiac and superior mesenteric arteries are patent.     LYMPH NODES: No lymphadenopathy in the abdomen or pelvis.     BONES/SOFT TISSUES: No suspicious osseous lesions.                                                        IMPRESSION:   No CT evidence of malignancy in the chest, abdomen or pelvis.    Current Medications:    sodium chloride 0.9%  1,000 mL Intravenous Once     aMILoride  10 mg Oral Daily     aspirin  324 mg Oral or NG Tube Daily    Or     aspirin  325 mg Oral Daily      atorvastatin  40 mg Oral QPM     carvedilol  37.5 mg Oral BID w/meals     chlorthalidone  25 mg Oral Daily     cloNIDine  0.1 mg Oral BID     divalproex sodium extended-release  1,000 mg Oral Daily     hydrALAZINE  50 mg Oral Q8H CARLOS A     insulin aspart   Subcutaneous TID w/meals     insulin aspart  1-7 Units Subcutaneous 4x Daily AC & HS     insulin glargine  8 Units Subcutaneous Q24H     lisinopril  40 mg Oral Daily     miconazole   Topical BID     multivitamin, therapeutic  1 tablet Oral Daily     nystatin   Topical BID     potassium chloride  20 mEq Oral BID     sodium chloride (PF)  3 mL Intracatheter Q8H     sodium chloride (PF)  3 mL Intracatheter Q8H     thiamine  100 mg Per Feeding Tube Daily       dextrose       - MEDICATION INSTRUCTIONS -       - MEDICATION INSTRUCTIONS -

## 2022-01-27 NOTE — PROVIDER NOTIFICATION
2357: Paged neuro team regarding pt single episode of urinary incontinence at 2030 - does that negate the 24-hour urine collection? Team instructed to keep collecting until the end of the collection at period at 0200 on 1/27.

## 2022-01-27 NOTE — PROGRESS NOTES
1058: RN notified by another RN that pt was non-responsive in chair. Apon arrival to room, pt was not responding to stimuli but did arouse after 3 vigirous sternal rubs. Pt transferred back to bed. VSS.   -RRT called at 1100  -NeuroStroke MD at bedside. There was question whether seizure meds should be adjusted.   -Team aware L pupil is greater than R, both equal and reactive to light.     -No new interventions ordered by primary team.

## 2022-01-27 NOTE — PROGRESS NOTES
IP Diabetes Management  Daily Note           Assessment and Plan:   HPI: Miriam Hall is a 46 year old male with PMHx HTN who presented on 1/8/2022 with L gaze palsy, L complete hemianopia, mild dysarthria, L arm > leg weakness, L arm/leg decreased sensation, and L side extinction after waking up from a nap. FTH an occlusion of the P1 segment of the R PCA with associated established infarct as well as high grade stenosis of the M2 segment of the R MCA. TTE with EF of 50-55%, no WMA or atrial enlargement.  Diagnosed with DM 2 in 2017 and lifestyle changes recommended, never on DM medication.      Assessment:   1)Type 2 Diabetes Mellitus, uncontrolled (A1c 7.8), with hyperglycemia  2)acute ischemic stroke w/encephelopathy    Plan:    - Lantus 8 units daily at 1600   -CHO coverage-1 per 20 grams   -Novolog  Medium intensity sliding scale AC, HS >/140 at all points   -BG monitoring TID AC, HS, 0200--> discontinue the 0200   -hypoglycemia protocol   -carb counting protocol   -diabetes education needs will be assessed closer to discharge.     Outpatient follow up: TBD    Discussed w/ pt.      Interval History and Assessment: interval glucose trend reviewed:         BG relatively within target.  Eating well, in good spirits.  Wanting to make lifestyle changes and very motivated.      Carryover documentation:    Patient notes that he has been trying to keep his carbohydrates low (he skipped mashed potatoes last night, for example) given the need for insulin.   Explained to him that we want his calorie intake to increase, and reaching that goal should include carbohydrates.         eGFR 55, worsened.   Hopeful for improvement, or stability at least-- and consideration of metformin.    DPP4 might provide sufficient coverage.      Current nutritional intake and type: Orders Placed This Encounter      Consistent Carbohydrate Diet Moderate Consistent Carb (60 g CHO per Meal) Diet      Planned Procedures/surgeries:  none  Steroid planning: none  D5W-containing solutions/medications: none    PTA Diabetes Regimen: none    Discharge Planning: discharge to ARU, possible 1/28           Diabetes History:   Type of Diabetes: Type 2 Diabetes Mellitus  Lab Results   Component Value Date    A1C 7.8 01/08/2022              Review of Systems:     The Review of Systems is negative other than noted in the Interval History.           Medications:     Current Facility-Administered Medications   Medication     acetaminophen (TYLENOL) tablet 650 mg     aMILoride (MIDAMOR) tablet 10 mg     aspirin (ASA) chewable tablet 324 mg    Or     aspirin (ASA) EC tablet 325 mg     atorvastatin (LIPITOR) tablet 40 mg     carboxymethylcellulose PF (REFRESH PLUS) 0.5 % ophthalmic solution 1 drop     carvedilol (COREG) tablet 37.5 mg     chlorthalidone (HYGROTON) tablet 25 mg     cloNIDine (CATAPRES) tablet 0.1 mg     dextrose 10% infusion     glucose gel 15-30 g    Or     dextrose 50 % injection 25-50 mL    Or     glucagon injection 1 mg     divalproex sodium extended-release (DEPAKOTE ER) 24 hr tablet 1,000 mg     hydrALAZINE (APRESOLINE) tablet 50 mg     insulin aspart (NovoLOG) injection (RAPID ACTING)     insulin aspart (NovoLOG) injection (RAPID ACTING)     insulin aspart (NovoLOG) injection (RAPID ACTING)     insulin glargine (LANTUS PEN) injection 8 Units     lidocaine (LMX4) cream     lidocaine 1 % 0.1-1 mL     lisinopril (ZESTRIL) tablet 40 mg     medication instruction - No oral meds if patient didn't pass dysphagia screen     Medication Instructions - Avoid dextrose in IV solutions.     melatonin tablet 5 mg     miconazole (MICATIN) 2 % powder     multivitamin, therapeutic (THERA-VIT) tablet 1 tablet     nystatin (MYCOSTATIN) cream     polyethylene glycol (MIRALAX) Packet 17 g     potassium chloride (KLOR-CON) Packet 20 mEq     senna-docusate (SENOKOT-S/PERICOLACE) 8.6-50 MG per tablet 1-2 tablet     sodium chloride (PF) 0.9% PF flush 3 mL      "sodium chloride (PF) 0.9% PF flush 3 mL     sodium chloride (PF) 0.9% PF flush 3 mL     thiamine (B-1) tablet 100 mg            Physical Exam:    BP (!) 156/92 (BP Location: Right arm)   Pulse 66   Temp 97.8  F (36.6  C) (Axillary)   Resp 16   Ht 1.702 m (5' 7\")   Wt 77.2 kg (170 lb 3.1 oz)   SpO2 98%   BMI 26.66 kg/m    General: sitting up in chair.  HEENT: normocephalic, atraumatic. Oral mucous membranes moist.   Lungs: unlabored respiration, no cough  Skin: dry, no obvious lesions  Mental status: alert, oriented, engaged clear speech  Psych:  calm            Data:     Recent Labs   Lab 01/27/22  1053 01/27/22  0916 01/27/22  0428 01/26/22  2325 01/26/22  2214 01/26/22  1659   * 121* 95 115* 134* 86     Lab Results   Component Value Date    WBC 7.0 01/27/2022    WBC 7.0 01/26/2022    WBC 9.0 01/25/2022    HGB 14.7 01/27/2022    HGB 14.5 01/26/2022    HGB 14.5 01/25/2022    HCT 44.3 01/27/2022    HCT 43.4 01/26/2022    HCT 43.7 01/25/2022    MCV 93 01/27/2022    MCV 91 01/26/2022    MCV 92 01/25/2022     01/27/2022     01/26/2022     01/25/2022     Lab Results   Component Value Date     01/27/2022     01/26/2022     01/25/2022    POTASSIUM 3.7 01/27/2022    POTASSIUM 3.7 01/27/2022    POTASSIUM 3.9 01/26/2022    CHLORIDE 103 01/27/2022    CHLORIDE 105 01/26/2022    CHLORIDE 104 01/25/2022    CO2 29 01/27/2022    CO2 28 01/26/2022    CO2 31 01/25/2022    GLC 95 01/27/2022     (H) 01/26/2022     (H) 01/26/2022     Lab Results   Component Value Date    BUN 30 01/27/2022    BUN 28 01/26/2022    BUN 33 (H) 01/25/2022     No results found for: TSH  Lab Results   Component Value Date    AST 22 01/17/2022    AST 26 01/08/2022    AST 26 04/25/2014    ALT 20 01/17/2022    ALT 31 01/08/2022    ALT 50 (H) 04/25/2014    ALKPHOS 98 01/17/2022    ALKPHOS 94 01/08/2022    ALKPHOS 66 04/25/2014           I spent a total of 35 minutes bedside and on the inpatient " unit managing the glycemic care of Miriam Hall. Over 50% of my time on the unit was spent counseling the patient  and/or coordinating care regarding acute glycemic management.  See note for details.    Aida Guerra, APRN -[id number]      To contact Endocrine Diabetes service:   From 8AM-4PM: page inpatient diabetes provider that is following the patient  For questions or updates from 4PM-8AM: page the diabetes job code for on call fellow: [phone]

## 2022-01-28 ENCOUNTER — APPOINTMENT (OUTPATIENT)
Dept: OCCUPATIONAL THERAPY | Facility: CLINIC | Age: 47
End: 2022-01-28
Payer: COMMERCIAL

## 2022-01-28 ENCOUNTER — HOSPITAL ENCOUNTER (INPATIENT)
Facility: CLINIC | Age: 47
LOS: 22 days | Discharge: ANOTHER HOSPITAL | End: 2022-02-19
Attending: PHYSICAL MEDICINE & REHABILITATION | Admitting: PHYSICAL MEDICINE & REHABILITATION
Payer: COMMERCIAL

## 2022-01-28 ENCOUNTER — APPOINTMENT (OUTPATIENT)
Dept: CARDIOLOGY | Facility: CLINIC | Age: 47
End: 2022-01-28
Payer: COMMERCIAL

## 2022-01-28 VITALS
RESPIRATION RATE: 18 BRPM | HEIGHT: 67 IN | SYSTOLIC BLOOD PRESSURE: 120 MMHG | WEIGHT: 170.42 LBS | OXYGEN SATURATION: 98 % | TEMPERATURE: 98.1 F | DIASTOLIC BLOOD PRESSURE: 71 MMHG | HEART RATE: 65 BPM | BODY MASS INDEX: 26.75 KG/M2

## 2022-01-28 DIAGNOSIS — I10 HYPERTENSION, UNSPECIFIED TYPE: Primary | ICD-10-CM

## 2022-01-28 DIAGNOSIS — R45.1 AGITATION: ICD-10-CM

## 2022-01-28 DIAGNOSIS — I63.09 CEREBROVASCULAR ACCIDENT (CVA) DUE TO THROMBOSIS OF OTHER PRECEREBRAL ARTERY (H): ICD-10-CM

## 2022-01-28 LAB
ANION GAP SERPL CALCULATED.3IONS-SCNC: 6 MMOL/L (ref 3–14)
BUN SERPL-MCNC: 31 MG/DL (ref 7–30)
CALCIUM SERPL-MCNC: 9 MG/DL (ref 8.5–10.1)
CHLORIDE BLD-SCNC: 103 MMOL/L (ref 94–109)
CO2 SERPL-SCNC: 28 MMOL/L (ref 20–32)
CREAT SERPL-MCNC: 1.32 MG/DL (ref 0.66–1.25)
ERYTHROCYTE [DISTWIDTH] IN BLOOD BY AUTOMATED COUNT: 13.6 % (ref 10–15)
GFR SERPL CREATININE-BSD FRML MDRD: 67 ML/MIN/1.73M2
GLUCOSE BLD-MCNC: 96 MG/DL (ref 70–99)
GLUCOSE BLDC GLUCOMTR-MCNC: 118 MG/DL (ref 70–99)
GLUCOSE BLDC GLUCOMTR-MCNC: 133 MG/DL (ref 70–99)
GLUCOSE BLDC GLUCOMTR-MCNC: 133 MG/DL (ref 70–99)
GLUCOSE BLDC GLUCOMTR-MCNC: 141 MG/DL (ref 70–99)
GLUCOSE BLDC GLUCOMTR-MCNC: 93 MG/DL (ref 70–99)
HCT VFR BLD AUTO: 42 % (ref 40–53)
HGB BLD-MCNC: 14.1 G/DL (ref 13.3–17.7)
MAGNESIUM SERPL-MCNC: 1.9 MG/DL (ref 1.6–2.3)
MCH RBC QN AUTO: 30.3 PG (ref 26.5–33)
MCHC RBC AUTO-ENTMCNC: 33.6 G/DL (ref 31.5–36.5)
MCV RBC AUTO: 90 FL (ref 78–100)
PHOSPHATE SERPL-MCNC: 4.2 MG/DL (ref 2.5–4.5)
PLATELET # BLD AUTO: 296 10E3/UL (ref 150–450)
POTASSIUM BLD-SCNC: 3.7 MMOL/L (ref 3.4–5.3)
RBC # BLD AUTO: 4.66 10E6/UL (ref 4.4–5.9)
SODIUM SERPL-SCNC: 137 MMOL/L (ref 133–144)
WBC # BLD AUTO: 7.7 10E3/UL (ref 4–11)

## 2022-01-28 PROCEDURE — 99238 HOSP IP/OBS DSCHRG MGMT 30/<: CPT | Mod: GC | Performed by: PSYCHIATRY & NEUROLOGY

## 2022-01-28 PROCEDURE — 93246 EXT ECG>7D<15D RECORDING: CPT

## 2022-01-28 PROCEDURE — 250N000012 HC RX MED GY IP 250 OP 636 PS 637: Performed by: PHYSICIAN ASSISTANT

## 2022-01-28 PROCEDURE — 97112 NEUROMUSCULAR REEDUCATION: CPT | Mod: GO | Performed by: OCCUPATIONAL THERAPIST

## 2022-01-28 PROCEDURE — 93248 EXT ECG>7D<15D REV&INTERPJ: CPT | Performed by: INTERNAL MEDICINE

## 2022-01-28 PROCEDURE — 250N000013 HC RX MED GY IP 250 OP 250 PS 637

## 2022-01-28 PROCEDURE — 83735 ASSAY OF MAGNESIUM: CPT | Performed by: PSYCHIATRY & NEUROLOGY

## 2022-01-28 PROCEDURE — 250N000013 HC RX MED GY IP 250 OP 250 PS 637: Performed by: STUDENT IN AN ORGANIZED HEALTH CARE EDUCATION/TRAINING PROGRAM

## 2022-01-28 PROCEDURE — 99207 PR CONSULT E&M CHANGED TO INITIAL LEVEL: CPT | Performed by: PHYSICIAN ASSISTANT

## 2022-01-28 PROCEDURE — 80048 BASIC METABOLIC PNL TOTAL CA: CPT | Performed by: STUDENT IN AN ORGANIZED HEALTH CARE EDUCATION/TRAINING PROGRAM

## 2022-01-28 PROCEDURE — 250N000013 HC RX MED GY IP 250 OP 250 PS 637: Performed by: PHYSICIAN ASSISTANT

## 2022-01-28 PROCEDURE — 99222 1ST HOSP IP/OBS MODERATE 55: CPT | Performed by: PHYSICIAN ASSISTANT

## 2022-01-28 PROCEDURE — 128N000003 HC R&B REHAB

## 2022-01-28 PROCEDURE — 250N000013 HC RX MED GY IP 250 OP 250 PS 637: Performed by: PSYCHIATRY & NEUROLOGY

## 2022-01-28 PROCEDURE — 99233 SBSQ HOSP IP/OBS HIGH 50: CPT | Mod: GC | Performed by: INTERNAL MEDICINE

## 2022-01-28 PROCEDURE — 99233 SBSQ HOSP IP/OBS HIGH 50: CPT | Performed by: NURSE PRACTITIONER

## 2022-01-28 PROCEDURE — 250N000013 HC RX MED GY IP 250 OP 250 PS 637: Performed by: INTERNAL MEDICINE

## 2022-01-28 PROCEDURE — 97535 SELF CARE MNGMENT TRAINING: CPT | Mod: GO | Performed by: OCCUPATIONAL THERAPIST

## 2022-01-28 PROCEDURE — 250N000013 HC RX MED GY IP 250 OP 250 PS 637: Performed by: COUNSELOR

## 2022-01-28 PROCEDURE — 97530 THERAPEUTIC ACTIVITIES: CPT | Mod: GO | Performed by: OCCUPATIONAL THERAPIST

## 2022-01-28 PROCEDURE — 99223 1ST HOSP IP/OBS HIGH 75: CPT | Performed by: PHYSICAL MEDICINE & REHABILITATION

## 2022-01-28 PROCEDURE — 85027 COMPLETE CBC AUTOMATED: CPT | Performed by: STUDENT IN AN ORGANIZED HEALTH CARE EDUCATION/TRAINING PROGRAM

## 2022-01-28 PROCEDURE — 84100 ASSAY OF PHOSPHORUS: CPT | Performed by: PSYCHIATRY & NEUROLOGY

## 2022-01-28 PROCEDURE — 36415 COLL VENOUS BLD VENIPUNCTURE: CPT | Performed by: STUDENT IN AN ORGANIZED HEALTH CARE EDUCATION/TRAINING PROGRAM

## 2022-01-28 RX ORDER — POTASSIUM CHLORIDE 1.5 G/1.58G
20 POWDER, FOR SOLUTION ORAL 2 TIMES DAILY
Status: ON HOLD | DISCHARGE
Start: 2022-01-28 | End: 2022-02-17

## 2022-01-28 RX ORDER — SENNOSIDES 8.6 MG
8.6 TABLET ORAL 2 TIMES DAILY PRN
Status: DISCONTINUED | OUTPATIENT
Start: 2022-01-28 | End: 2022-02-19 | Stop reason: HOSPADM

## 2022-01-28 RX ORDER — POLYETHYLENE GLYCOL 3350 17 G/17G
17 POWDER, FOR SOLUTION ORAL DAILY PRN
Status: DISCONTINUED | OUTPATIENT
Start: 2022-01-28 | End: 2022-02-19 | Stop reason: HOSPADM

## 2022-01-28 RX ORDER — AMILORIDE HYDROCHLORIDE 5 MG/1
10 TABLET ORAL DAILY
Status: ON HOLD | DISCHARGE
Start: 2022-01-29 | End: 2022-02-17

## 2022-01-28 RX ORDER — DIVALPROEX SODIUM 500 MG/1
1000 TABLET, EXTENDED RELEASE ORAL DAILY
Status: ON HOLD | DISCHARGE
Start: 2022-01-29 | End: 2022-02-17

## 2022-01-28 RX ORDER — CHLORTHALIDONE 25 MG/1
25 TABLET ORAL DAILY
Status: ON HOLD | DISCHARGE
Start: 2022-01-29 | End: 2022-02-17

## 2022-01-28 RX ORDER — BISACODYL 10 MG
10 SUPPOSITORY, RECTAL RECTAL DAILY PRN
Status: DISCONTINUED | OUTPATIENT
Start: 2022-01-28 | End: 2022-02-19 | Stop reason: HOSPADM

## 2022-01-28 RX ORDER — CLONIDINE HYDROCHLORIDE 0.1 MG/1
0.1 TABLET ORAL 2 TIMES DAILY
Status: ON HOLD | DISCHARGE
Start: 2022-01-28 | End: 2022-02-17

## 2022-01-28 RX ORDER — DEXTROSE MONOHYDRATE 25 G/50ML
25-50 INJECTION, SOLUTION INTRAVENOUS
Status: DISCONTINUED | OUTPATIENT
Start: 2022-01-28 | End: 2022-02-05

## 2022-01-28 RX ORDER — ACETAMINOPHEN 325 MG/1
650 TABLET ORAL EVERY 4 HOURS PRN
Status: ON HOLD | DISCHARGE
Start: 2022-01-28 | End: 2022-02-17

## 2022-01-28 RX ORDER — DIVALPROEX SODIUM 500 MG/1
1000 TABLET, EXTENDED RELEASE ORAL DAILY
Status: DISCONTINUED | OUTPATIENT
Start: 2022-01-29 | End: 2022-02-19 | Stop reason: HOSPADM

## 2022-01-28 RX ORDER — LISINOPRIL 20 MG/1
40 TABLET ORAL DAILY
Status: DISCONTINUED | OUTPATIENT
Start: 2022-01-29 | End: 2022-02-19 | Stop reason: HOSPADM

## 2022-01-28 RX ORDER — ATORVASTATIN CALCIUM 40 MG/1
40 TABLET, FILM COATED ORAL EVERY EVENING
DISCHARGE
Start: 2022-01-28

## 2022-01-28 RX ORDER — CHLORTHALIDONE 25 MG/1
25 TABLET ORAL DAILY
Status: DISCONTINUED | OUTPATIENT
Start: 2022-01-29 | End: 2022-02-18

## 2022-01-28 RX ORDER — CLONIDINE HYDROCHLORIDE 0.1 MG/1
0.1 TABLET ORAL 2 TIMES DAILY
Status: DISCONTINUED | OUTPATIENT
Start: 2022-01-28 | End: 2022-02-02

## 2022-01-28 RX ORDER — ONDANSETRON 4 MG/1
4 TABLET, FILM COATED ORAL EVERY 6 HOURS PRN
Status: DISCONTINUED | OUTPATIENT
Start: 2022-01-28 | End: 2022-02-19 | Stop reason: HOSPADM

## 2022-01-28 RX ORDER — AMILORIDE HYDROCHLORIDE 5 MG/1
10 TABLET ORAL DAILY
Status: DISCONTINUED | OUTPATIENT
Start: 2022-01-29 | End: 2022-02-19 | Stop reason: HOSPADM

## 2022-01-28 RX ORDER — ASPIRIN 81 MG/1
81 TABLET, CHEWABLE ORAL DAILY
Status: DISCONTINUED | OUTPATIENT
Start: 2022-01-29 | End: 2022-02-19 | Stop reason: HOSPADM

## 2022-01-28 RX ORDER — HYDRALAZINE HYDROCHLORIDE 50 MG/1
50 TABLET, FILM COATED ORAL EVERY 8 HOURS
Status: ON HOLD | DISCHARGE
Start: 2022-01-28 | End: 2022-02-17

## 2022-01-28 RX ORDER — NICOTINE POLACRILEX 4 MG
15-30 LOZENGE BUCCAL
Status: DISCONTINUED | OUTPATIENT
Start: 2022-01-28 | End: 2022-02-05

## 2022-01-28 RX ORDER — LISINOPRIL 40 MG/1
40 TABLET ORAL DAILY
Status: ON HOLD | DISCHARGE
Start: 2022-01-29 | End: 2022-02-17

## 2022-01-28 RX ORDER — ASPIRIN 81 MG/1
81 TABLET, CHEWABLE ORAL DAILY
DISCHARGE
Start: 2022-01-28

## 2022-01-28 RX ORDER — CARVEDILOL 12.5 MG/1
37.5 TABLET ORAL 2 TIMES DAILY WITH MEALS
Status: ON HOLD | DISCHARGE
Start: 2022-01-28 | End: 2022-02-17

## 2022-01-28 RX ORDER — HYDRALAZINE HYDROCHLORIDE 50 MG/1
50 TABLET, FILM COATED ORAL EVERY 8 HOURS
Status: DISCONTINUED | OUTPATIENT
Start: 2022-01-28 | End: 2022-02-01

## 2022-01-28 RX ORDER — ATORVASTATIN CALCIUM 40 MG/1
40 TABLET, FILM COATED ORAL EVERY EVENING
Status: DISCONTINUED | OUTPATIENT
Start: 2022-01-28 | End: 2022-02-19 | Stop reason: HOSPADM

## 2022-01-28 RX ORDER — POTASSIUM CHLORIDE 1.5 G/1.58G
20 POWDER, FOR SOLUTION ORAL 2 TIMES DAILY
Status: DISCONTINUED | OUTPATIENT
Start: 2022-01-28 | End: 2022-02-03

## 2022-01-28 RX ORDER — ACETAMINOPHEN 325 MG/1
650 TABLET ORAL EVERY 4 HOURS PRN
Status: DISCONTINUED | OUTPATIENT
Start: 2022-01-28 | End: 2022-01-31

## 2022-01-28 RX ADMIN — AMILORIDE HYDROCLORIDE 10 MG: 5 TABLET ORAL at 08:35

## 2022-01-28 RX ADMIN — DIVALPROEX SODIUM 1000 MG: 500 TABLET, FILM COATED, EXTENDED RELEASE ORAL at 08:39

## 2022-01-28 RX ADMIN — CARVEDILOL 37.5 MG: 25 TABLET, FILM COATED ORAL at 08:36

## 2022-01-28 RX ADMIN — CHLORTHALIDONE 25 MG: 25 TABLET ORAL at 08:35

## 2022-01-28 RX ADMIN — POTASSIUM CHLORIDE 20 MEQ: 1.5 FOR SOLUTION ORAL at 20:55

## 2022-01-28 RX ADMIN — HYDRALAZINE HYDROCHLORIDE 50 MG: 50 TABLET, FILM COATED ORAL at 02:03

## 2022-01-28 RX ADMIN — CARVEDILOL 37.5 MG: 25 TABLET, FILM COATED ORAL at 17:24

## 2022-01-28 RX ADMIN — INSULIN ASPART 1 UNITS: 100 INJECTION, SOLUTION INTRAVENOUS; SUBCUTANEOUS at 17:23

## 2022-01-28 RX ADMIN — ATORVASTATIN CALCIUM 40 MG: 40 TABLET, FILM COATED ORAL at 20:55

## 2022-01-28 RX ADMIN — CLONIDINE HYDROCHLORIDE 0.1 MG: 0.1 TABLET ORAL at 08:35

## 2022-01-28 RX ADMIN — METFORMIN HYDROCHLORIDE 500 MG: 500 TABLET, FILM COATED ORAL at 17:24

## 2022-01-28 RX ADMIN — HYDRALAZINE HYDROCHLORIDE 50 MG: 50 TABLET, FILM COATED ORAL at 19:04

## 2022-01-28 RX ADMIN — POTASSIUM CHLORIDE 20 MEQ: 1.5 POWDER, FOR SOLUTION ORAL at 08:37

## 2022-01-28 RX ADMIN — LISINOPRIL 40 MG: 20 TABLET ORAL at 08:36

## 2022-01-28 RX ADMIN — THERA TABS 1 TABLET: TAB at 08:35

## 2022-01-28 RX ADMIN — THIAMINE HCL TAB 100 MG 100 MG: 100 TAB at 08:36

## 2022-01-28 RX ADMIN — CLONIDINE HYDROCHLORIDE 0.1 MG: 0.1 TABLET ORAL at 20:55

## 2022-01-28 RX ADMIN — ASPIRIN 81 MG CHEWABLE TABLET 324 MG: 81 TABLET CHEWABLE at 08:34

## 2022-01-28 ASSESSMENT — ACTIVITIES OF DAILY LIVING (ADL)
ADLS_ACUITY_SCORE: 12
ADLS_ACUITY_SCORE: 16
ADLS_ACUITY_SCORE: 20
ADLS_ACUITY_SCORE: 16
ADLS_ACUITY_SCORE: 10
DIFFICULTY_EATING/SWALLOWING: NO
ADLS_ACUITY_SCORE: 16
ADLS_ACUITY_SCORE: 16
ADLS_ACUITY_SCORE: 20
ADLS_ACUITY_SCORE: 16
ADLS_ACUITY_SCORE: 18
ADLS_ACUITY_SCORE: 16
ADLS_ACUITY_SCORE: 18
ADLS_ACUITY_SCORE: 16
TOILETING_ISSUES: NO
ADLS_ACUITY_SCORE: 20
ADLS_ACUITY_SCORE: 22
ADLS_ACUITY_SCORE: 16
FALL_HISTORY_WITHIN_LAST_SIX_MONTHS: NO
ADLS_ACUITY_SCORE: 16
ADLS_ACUITY_SCORE: 16

## 2022-01-28 ASSESSMENT — MIFFLIN-ST. JEOR
SCORE: 1612.45
SCORE: 1606.63

## 2022-01-28 ASSESSMENT — PATIENT HEALTH QUESTIONNAIRE - PHQ9: SUM OF ALL RESPONSES TO PHQ QUESTIONS 1-9: 6

## 2022-01-28 NOTE — PLAN OF CARE
Occupational Therapy Discharge Summary    Reason for therapy discharge:    Discharged to acute rehabilitation facility.    Progress towards therapy goal(s). See goals on Care Plan in [facility] electronic health record for goal details.  Goals not met.  Barriers to achieving goals:   limited tolerance for therapy and discharge from facility. Pt with new complex deficits.    Therapy recommendation(s):    Continued therapy is recommended.  Rationale/Recommendations:  Pt well below his baseline with new deficits in strength, sensation, vision, proprioception and coordination.  Very motivated..

## 2022-01-28 NOTE — PROGRESS NOTES
Care Management Discharge Note    Discharge Date: 01/28/2022    Discharge Disposition:  ARU  Discharge Services: Transportation, rehab  Discharge DME: None     Discharge Transportation: WC ride with [employer] at 2:30pm.     Private pay costs discussed: Not applicable    PAS Confirmation Code: NA  Patient/family educated on Medicare website which has current facility and service quality ratings: yes    Education Provided on the Discharge Plan: yes  Persons Notified of Discharge Plans: Pt, provider, 6B charge nurse, and  ARU admissions.   Patient/Family in Agreement with the Plan: yes    Handoff Referral Completed: No; None listed on facesheet.    Additional Information:  Pt will be discharging today at 2:30pm to [facility] via WC ride with [employer]. SW confirmed acceptance with Stefany in admissions at [facility] and informed provider of acceptance. Provider to complete discharge orders.    MARLENA met with pt in room to provide update on discharge plan. MARLENA shared with pt that he has been accepted to ARU today and that SW arranged a WC ride at 2:30pm. Pt expressed understanding and did not have any questions for SW.     MARLENA updated charge nurse of discharge plan.     SW received FMLA paperwork back from provider and emailed the forms to pt's , Jenniffer Howard ([email or web address]). SW placed FMLA paperwork in chart.     ML Araujo, LGSW  6B   [facility]  Phone: [phone]  Pager: [phone]   Reviewed home discharge orders and medications. Instructed on importance of follow up appt.  VERENA joseph dry and intact. Instructed on safety and s/s of infection. Pt and daughter verbalized understanding.  SW to room to discuss DME. Secure chat notified OSEAS Crawford of pt request to change RX from [facility] to [facility] CM and Judge Milind Malloy.

## 2022-01-28 NOTE — PROGRESS NOTES
IP Diabetes Management  Daily Note           Assessment and Plan:   HPI: Miriam Hall is a 46 year old male with PMHx HTN who presented on 1/8/2022 with L gaze palsy, L complete hemianopia, mild dysarthria, L arm > leg weakness, L arm/leg decreased sensation, and L side extinction after waking up from a nap. FTH an occlusion of the P1 segment of the R PCA with associated established infarct as well as high grade stenosis of the M2 segment of the R MCA. TTE with EF of 50-55%, no WMA or atrial enlargement.  Diagnosed with DM 2 in 2017 and lifestyle changes recommended, never on DM medication.      Assessment:   1)Type 2 Diabetes Mellitus, uncontrolled (A1c 7.8), with hyperglycemia  2)acute ischemic stroke w/encephelopathy    Plan:    - discontinue Lantus   -start Metformin 500 mg tonight with dinner   -discontinue CHO coverage starting with dinner tonight (continue for Bfast and lunch today)   -Novolog  Medium intensity sliding scale AC, HS    -BG monitoring TID AC, HS   -hypoglycemia protocol   -carb counting protocol   -diabetes education needs will be assessed closer to discharge.     Outpatient follow up: TBD    Discussed w/ pt.       Interval History and Assessment: interval glucose trend reviewed:         BG within target.  Eating well, in good spirits.  Wanting to make lifestyle changes and very motivated.  Trying to eat more low carb.  Yesterday afternoon, had episode of decreased alertness, see notes.  Possible side effect of medications.  This AM, he is alert and ate Bfast.  No complaints.  He is agreeable to try Metformin with dinner tonight.      Carryover documentation:    Patient notes that he has been trying to keep his carbohydrates low (he skipped mashed potatoes last night, for example) given the need for insulin.   Explained to him that we want his calorie intake to increase, and reaching that goal should include carbohydrates.         eGFR 67, improving.   Will start Metformin 500 mg with  dinner tonight and discontinue Lantus and CHO coverage once started.          Current nutritional intake and type: Orders Placed This Encounter      Consistent Carbohydrate Diet Moderate Consistent Carb (60 g CHO per Meal) Diet      Planned Procedures/surgeries: none  Steroid planning: none  D5W-containing solutions/medications: none    PTA Diabetes Regimen: none    Discharge Planning: discharge to ARU today at 1430           Diabetes History:   Type of Diabetes: Type 2 Diabetes Mellitus  Lab Results   Component Value Date    A1C 7.8 01/08/2022              Review of Systems:     The Review of Systems is negative other than noted in the Interval History.           Medications:     Current Facility-Administered Medications   Medication     acetaminophen (TYLENOL) tablet 650 mg     aMILoride (MIDAMOR) tablet 10 mg     aspirin (ASA) chewable tablet 324 mg    Or     aspirin (ASA) EC tablet 325 mg     atorvastatin (LIPITOR) tablet 40 mg     carboxymethylcellulose PF (REFRESH PLUS) 0.5 % ophthalmic solution 1 drop     carvedilol (COREG) tablet 37.5 mg     chlorthalidone (HYGROTON) tablet 25 mg     cloNIDine (CATAPRES) tablet 0.1 mg     dextrose 10% infusion     glucose gel 15-30 g    Or     dextrose 50 % injection 25-50 mL    Or     glucagon injection 1 mg     divalproex sodium extended-release (DEPAKOTE ER) 24 hr tablet 1,000 mg     hydrALAZINE (APRESOLINE) tablet 50 mg     insulin aspart (NovoLOG) injection (RAPID ACTING)     insulin aspart (NovoLOG) injection (RAPID ACTING)     insulin aspart (NovoLOG) injection (RAPID ACTING)     insulin glargine (LANTUS PEN) injection 8 Units     lidocaine (LMX4) cream     lidocaine 1 % 0.1-1 mL     lisinopril (ZESTRIL) tablet 40 mg     medication instruction - No oral meds if patient didn't pass dysphagia screen     Medication Instructions - Avoid dextrose in IV solutions.     melatonin tablet 5 mg     miconazole (MICATIN) 2 % powder     multivitamin, therapeutic (THERA-VIT) tablet  "1 tablet     nystatin (MYCOSTATIN) cream     polyethylene glycol (MIRALAX) Packet 17 g     potassium chloride (KLOR-CON) Packet 20 mEq     senna-docusate (SENOKOT-S/PERICOLACE) 8.6-50 MG per tablet 1-2 tablet     sodium chloride (PF) 0.9% PF flush 3 mL     sodium chloride (PF) 0.9% PF flush 3 mL     sodium chloride (PF) 0.9% PF flush 3 mL     thiamine (B-1) tablet 100 mg            Physical Exam:    BP (!) 146/94 (BP Location: Right arm)   Pulse 65   Temp 97.7  F (36.5  C) (Axillary)   Resp 20   Ht 1.702 m (5' 7\")   Wt 77.3 kg (170 lb 6.7 oz)   SpO2 99%   BMI 26.69 kg/m    General: sitting up in chair.  HEENT: normocephalic, atraumatic. Oral mucous membranes moist.   Lungs: unlabored respiration, no cough  Skin: dry, no obvious lesions  Mental status: alert, oriented, engaged clear speech  Psych:  calm            Data:     Recent Labs   Lab 01/28/22  0428 01/27/22  2303 01/27/22  1251 01/27/22  1053 01/27/22  0916 01/27/22  0428   GLC 96 120* 165* 209* 121* 95     Lab Results   Component Value Date    WBC 7.7 01/28/2022    WBC 7.0 01/27/2022    WBC 7.0 01/26/2022    HGB 14.1 01/28/2022    HGB 14.7 01/27/2022    HGB 14.5 01/26/2022    HCT 42.0 01/28/2022    HCT 44.3 01/27/2022    HCT 43.4 01/26/2022    MCV 90 01/28/2022    MCV 93 01/27/2022    MCV 91 01/26/2022     01/28/2022     01/27/2022     01/26/2022     Lab Results   Component Value Date     01/28/2022     01/27/2022     01/26/2022    POTASSIUM 3.7 01/28/2022    POTASSIUM 3.8 01/27/2022    POTASSIUM 3.7 01/27/2022    POTASSIUM 3.7 01/27/2022    CHLORIDE 103 01/28/2022    CHLORIDE 103 01/27/2022    CHLORIDE 105 01/26/2022    CO2 28 01/28/2022    CO2 29 01/27/2022    CO2 28 01/26/2022    GLC 96 01/28/2022     (H) 01/27/2022     (H) 01/27/2022     Lab Results   Component Value Date    BUN 31 (H) 01/28/2022    BUN 30 01/27/2022    BUN 28 01/26/2022     No results found for: TSH  Lab Results   Component " Value Date    AST 22 01/17/2022    AST 26 01/08/2022    AST 26 04/25/2014    ALT 20 01/17/2022    ALT 31 01/08/2022    ALT 50 (H) 04/25/2014    ALKPHOS 98 01/17/2022    ALKPHOS 94 01/08/2022    ALKPHOS 66 04/25/2014           I spent a total of 35 minutes bedside and on the inpatient unit managing the glycemic care of Miriam Hall. Over 50% of my time on the unit was spent counseling the patient  and/or coordinating care regarding acute glycemic management.  See note for details.    Aida Guerra, APRN -[phone]      To contact Endocrine Diabetes service:   From 8AM-4PM: page inpatient diabetes provider that is following the patient  For questions or updates from 4PM-8AM: page the diabetes job code for on call fellow: [phone]

## 2022-01-28 NOTE — H&P
[facility]   Acute Rehabilitation Unit  Admission History and Physical  Patient Name: Miriam Hall : 1975 Medical Record: [id number]    CHIEF COMPLAINT     Ischemic stroke with hemorrhagic transformation    HISTORY OF PRESENT ILLNESS  Miriam Hall is a 46 year old R hand dominant male with HTN and T2DM who presented to [facility] ED on 2022 with L side weakness and decreased sensation. Imaging and workup showed a R parieto-occipital stroke, but was outside of the tPA window. He was admitted for further care.     On , he was found to have new neuro changes, AMS, and mild to moderate aphasia. He continued to have multiple episodes of changing mental status during his hospital stay as well as episodes of HTN which were difficult to control. On 1/15, he underwent an EEG, which did not show epileptic changes but did have findings concerning for moderate encephalopathy, possibly due to HTN or medications. His HTN was resistant to 5 different medications, and he was followed by cardiology and later nephrology in order to work up his treatment resistant HTN.     Nephrology was concerned about hyperaldosteronism with several different etiologies suspected, such as Cushing, Liddle syndrome, or mineralocorticoid excess. There was also concern for an DISHA given an increase in creatinine on , but Cr improved with increased fluid intake.     During this acute hospitalization, patient was seen and evaluated by PT, OT, SLP and PM&R consult service.  All specialties collectively recommended that patient would benefit from ongoing therapies in the acute inpatient rehabilitation setting.     Today, he reports feeling optimistic, and is willing to work hard to regain function. He currently denies vision changes or headache.     In review of the therapy notes, Miriam is on a regular diet with thin liquids, but requires Gigi with eating & would benefit from a full cognitive  "linguistic evaluation upon admission given mild expressive aphasia as well as ideational apraxia. Patient attempts to use toothpaste during G/H tasks without taking cap off and overall requires Gigi to complete these tasks in sitting. Bathing remains dependent with patient receiving bed bath from nursing staff & pt requires maxA with urinal use. Dependent with dressing. Patient remains Gigi with rolling to R and is mod I with rolling to L.  Dependent for bed to chair transfers using lift. Patient is able to complete sit<>stand transfer from recliner chair with modA x1. Commode transfers and pivot transfers require modA x2 to R. Gait has yet to be assessed.    Currently, the patient is medically appropriate and is assessed to have needs and will benefit from an inpatient acute rehabilitation comprehensive program working with PT, OT and SLP, and will also benefit from supervision and management of Rehab Nursing and Rehab MD.     MEDICAL HISTORY     Past Medical History:   Diagnosis Date     Hypertension        SURGICAL HISTORY  Past Surgical History:   Procedure Laterality Date     TONSILLECTOMY         PRIOR FUNCTIONAL HISTORY   Pt was independent with all ADLs/IADLs, transfers, mobility and gait.        SOCIAL HISTORY  Marital Status: single  Living situation: lives alone in 2nd story apartment, roughly 11 steps to enter.   Family support: Mother and cousin live in the area, but describes his mother as \"high pressure\"  Vocational History: works in [employer] for [employer] area [employer]   Tobacco abuse: denies  EtOH abuse: roughly 20oz of beer per week  Illicit drug use: denies    FAMILY HISTORY  Family History   Problem Relation Age of Onset     Breast Cancer Mother      Diabetes Mother      Lipids Mother      Hypertension Mother      Hypertension Father      Heart Disease Father      Respiratory Father      Heart Disease Maternal Grandfather         CHF        MEDICATIONS  Scheduled " "meds  Amiloride  Carvedilol  Chlorthalidone  Hydralazine  Lisinopril  Clonidine  Aspirin  Atorvastatin  Depakote   sliding scale insulin  Metformin  Potassium chloride      PRN meds:  Tylenol  Miralax  Senna  Bisacodyl  Zofran    ALLERGIES  Allergies   Allergen Reactions     Pcn [Penicillin G] Hives and Itching        REVIEW OF SYSTEMS  Constitutional: denies any fevers, chills.  Eyes: denies vision changes   Ears, Nose, Throat: denies any difficulty swallowing  Endocrine: denies appetite changes  Cardiovascular: denies any  chest pain  Respiratory: denies difficulty breathing  Gastrointestinal: denies any nausea, vomiting  Genitourinary: denies any dysuria  Musculoskeletal: denies any muscle pain, joint pain, neck pain or back pain  Integumentary: denies pruritis  Allergy/Immunologic: denies difficulty breathing with exposure to food  Neurologic: denies any headache, disturbance of motor or sensory function, no loss of balance or vertigo  Psychiatric: denies mood changes  Hematology/lymphatic: denies any easy bruising or bleeding    PHYSICAL EXAM  VITAL SIGNS:  There were no vitals taken for this visit.  BMI:  Estimated body mass index is 26.69 kg/m  as calculated from the following:    Height as of 1/10/22: 1.702 m (5' 7\").    Weight as of 1/28/22: 77.3 kg (170 lb 6.7 oz).     General: NAD, pleasant and cooperative   HEENT: oropharynx clear with MMM, EOMI  Pulmonary: lungs clear to auscultation bilaterally  Cardiovascular: RRR, no murmur   Abdominal: soft, non-tender, non-distended  Extremities: warm, well perfused, no edema in BLE, nontender  MSK/neuro:   Mental Status:  alert and oriented x3    Cranial Nerves: grossly normal   1. 2nd CN: Pupils equal, round.   2. 3rd,4th,6th CN:  EOMI  3. 5th CN: decreased sensation to soft touch on L side  4. 7th CN: face symmetrical   5. 8th CN: functional hearing bilaterally  6. 9th, 10th CN: palate elevates symmetrically   7. 11th CN: R sidesternocleidomastoid and trapezii " strong, L side does not move on command   8. 12th CN: tongue midline and without fasciculations     Sensory: Decreased sensation to soft touch on LLE, LUE, L side face. Some sensation with pressure   Strength:  3/5 R side, 2/5 L side   SA  EF  EE  HF  KE  DF  PF   L  2/5 2/5 2/5 2/5 2/5 2/5 2/5  R 5/5 4/5  4/5 5/5 5/5 5/5 5/5      Abnormal movements: None    Speech: appropriate to conversation  Skin: no redness, warmth, or swelling      LABS  Lab Results   Component Value Date    WBC 7.7 01/28/2022    HGB 14.1 01/28/2022    HCT 42.0 01/28/2022    MCV 90 01/28/2022     01/28/2022     Lab Results   Component Value Date     01/28/2022    POTASSIUM 3.7 01/28/2022    CHLORIDE 103 01/28/2022    CO2 28 01/28/2022     (H) 01/28/2022     (H) 01/28/2022     Lab Results   Component Value Date    GFRESTIMATED 67 01/28/2022     Lab Results   Component Value Date    AST 22 01/17/2022    ALT 20 01/17/2022    ALKPHOS 98 01/17/2022    BILITOTAL 0.7 01/17/2022    DEON 23 01/16/2022     Lab Results   Component Value Date    INR 1.06 01/18/2022     Lab Results   Component Value Date    BUN 31 (H) 01/28/2022    CR 1.32 (H) 01/28/2022       ASSESSMENT/PLAN:  Miriam Hall is a 46 year old R hand dominant male with a hx of HTN and T2DM who presents to the ARU for an ischemic stroke with hemorrhagic transformation. 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 Rohan with have therapy evaluations tomorrow. Medicine consulted for assistance with medication management due to resistant HTN.    Admission to acute inpatient rehab on 1/28/2022.    Impairment group code: Stroke Ischemic 01.1 (L) Body Involvement (R) Brain: R PCA ischemic stroke w/ hemorrhagic transformation noted      1. PT, OT and SLP 60 minutes of each on a daily basis, in addition to rehab nursing and close management of physiatrist.      2. Impairment of ADL's:  OT for 60 min daily to work on upper and  lower body self care, dressing, toileting, bathing, energy conservation techniques with use of ADs as needed.     3. Impairment of mobility:   PT for 60 min daily to work on gait exercises, strengthening, endurance buildup, transfers with use of walker as needed.     4. Impairment of cognition/language/swallow:   SLP for 60 min daily for cognitive evaluation and treatment strategies for higher level cognitive deficits and memory impairment.     5. Rehab RN to administer medication, patient education on medication taking, VS monitoring, bowel regimen, glucose monitoring and wound care/surgical wound dressing changes and monitoring.       1. Medical Conditions    Neuro  Acute ischemic stroke of Right P1 segement of the PCA  R P1 occlusion and R M2 Stenosis  Hemorrhagic transformation of ischemic infarct  -aspirin 325mg  -hypercoagulability panel negative  -vasculitis panel negative   -acute rehab with PT, OT, and SLP  -per neuro recommendations, continue depakote until outpatient f/u    CV  Refractory HTN  -possibly secondary to clinical hyperaldosteronism of several possible etiologies  -medicine on for assistance with HTN management   -Cushing, Liddle syndrome, or mineralocorticoid excess   -workup negative so far   -urine studies pending  -amiloride 10mg  -Carvedilol 37.5mg   -chlorthalidone 25mg  -clonidine 0.1mg  -hydralazine 50mg Q8H  -lisinopril 40mg     HLD  -atorvastatin 40mg    Endo  Type 2 Diabetes  -metformin 500mg with dinner  -sliding scale insulin     Renal  DISHA  -monitor Cr with BMP  -encourage PO fluids    Hypokalemia  -20 meq K BID  -hyperaldosteronism workup still pendings  -monitor with BMP    2. Adjustment to disability:  Clinical psychology to eval and treat  3. FEN: PO fluids, monitor with BMP  4. Bowel: continent, PRN miralax and senna  5. Bladder: continent  6. DVT Prophylaxis: mechanical with SCDs, aspirin 325mg  7. GI Prophylaxis: regular diet  8. Code: Full  9. Disposition: to home,  modified independent  10. ELOS:  21 days.  11. Rehab prognosis:  fair  12. Follow up Appointments on Discharge: Neuro, cardio for Zio patch, nephrology       Discharge Goals:  1. Estimated Length of Stay: 21  2. Prognosis: Good  3. Decision Maker: self  4. Code Status: full  5. Anticipated D/C Destination and functional outcome: to home, modified independent  6. Follow up Appointments on Discharge: Neuro, cardio for Zio patch, nephrology      Desire Birch DO  PGY-1  PM&R    Discussed with Dr. Sherwood, PM&R staff physician

## 2022-01-28 NOTE — PROGRESS NOTES
Nephrology Progress Note  01/28/2022         Assessment & Recommendations:   Miriam Hall is a 46 year old male with history of hypertension who was admitted on 1/8/2022 with symptoms concerning for stroke. Nephrology has been consulted to assist with resistant hypertension.    #Severe resistant hypertension, c/f hyperaldosteronism  #Hypokalemia  #Metabolic alkalosis, improved   #Acute ischemic stroke w/ L sided hemiparesis, c/b hemorrhagic transformation   #Moderate to severe left ventricular hypertrophy    Investigations:  - Renal US - Right kidney 10.2 cm in length, left kidney 10.4 cm in length; normal parenchymal thickness and echogenicity, no obstruction. No evidence of left or right renal artery stenosis.  - Cortisol level at 4 pm is 20.5  - Serum aldosterone 11.5  - Serum renin 0.2  - 24H urine aldosterone 5.9   - Urine cortisol, cortisone pending     RECOMMENDATIONS:  - Continue Amiloride to 10mg daily  - Titrate other BP agents as needed, if able to come off agents with addition of Amiloride would prioritize discontinuation of Hydralazine followed by Clonidine    - If problems with AM drowsiness from BP meds, could schedule Lisinopril at night   - Pt may benefit from Spironolactone, we can follow up labs and start this in the clinic   - Patient does not need to remain inpatient for BP titration. He should follow up with Nephrology within 2-3 weeks, we will arrange this. He may also benefit from Endocrine and Genetics consults as an outpatient to workup for genetic causes of refractory HTN.   - Nephrology will follow while inpatient      Recommendations were communicated to primary team in person and via this note    Seen and discussed with Dr. Florecita Nolasco MD   PGY3 Internal Medicine   [phone]    Interval History :   Nursing and provider notes from last 24 hours reviewed.    NAEO. Improved kidney fx with IVF yesterday. Pt is working to increase his PO fluid intake. He is  "looking forward to rehab. Denies SOB and LE swelling. Denies abdominal pain. Pt in agreement with plans for outpatient follow up. No other acute concerns this morning.     Review of Systems:   4 point ROS is conducted and is negative apart from above     Physical Exam:   I/O last 3 completed shifts:  In: 600 [P.O.:600]  Out: 1400 [Urine:1400]   BP (!) 77/56 (BP Location: Right arm)   Pulse 68   Temp 98.1  F (36.7  C) (Oral)   Resp 18   Ht 1.702 m (5' 7\")   Wt 77.3 kg (170 lb 6.7 oz)   SpO2 99%   BMI 26.69 kg/m       GENERAL APPEARANCE: nontoxic appearing man in NAD, sitting up comfortably in chair   PULM: lungs clear to auscultation bilaterally, equal air movement, no increased WOB on RA   CV: regular rhythm, normal rate, no appreciated murmurs/rubs, no peripheral edema   GI: soft, non-tender, not distended, bowel sounds are +  INTEGUMENT: no cyanosis, no rash on areas of exposed skin     Labs:   All labs reviewed by me  Electrolytes/Renal -   Recent Labs   Lab Test 01/28/22  0844 01/28/22  0428 01/27/22  2303 01/27/22  1346 01/27/22  0916 01/27/22  0428 01/26/22  0845 01/26/22  0439   NA  --  137  --   --   --  139  --  140   POTASSIUM  --  3.7  --  3.8  --  3.7  3.7   < > 3.6  3.6   CHLORIDE  --  103  --   --   --  103  --  105   CO2  --  28  --   --   --  29  --  28   BUN  --  31*  --   --   --  30  --  28   CR  --  1.32*  --   --   --  1.56*  --  1.31*   GLC 93 96 120*  --    < > 95   < > 104*   VALERIE  --  9.0  --   --   --  9.5  --  9.3   MAG  --  1.9  --   --   --  2.0  --  1.9   PHOS  --  4.2  --   --   --  3.8  --  3.0    < > = values in this interval not displayed.     CBC -   Recent Labs   Lab Test 01/28/22  0428 01/27/22  0428 01/26/22  0439   WBC 7.7 7.0 7.0   HGB 14.1 14.7 14.5    310 311     LFTs -   Recent Labs   Lab Test 01/17/22  0433 01/08/22  1843 04/25/14  1530   ALKPHOS 98 94 66   BILITOTAL 0.7 0.8 0.7   ALT 20 31 50*   AST 22 26 26   PROTTOTAL 7.8 8.5 7.1   ALBUMIN 2.9* 3.9 4.3 "     Iron Panel - No lab results found.    Imaging:  All imaging studies reviewed by me.     1/20/2022 RENAL US   Impression:  1. Right kidney:  1a. Renal parenchyma: Unremarkable renal parenchyma.  1b. Renal artery: No evidence of renal artery stenosis.     2. Left kidney:  2a. Renal parenchyma: Renal parenchyma is difficult to visualize  though appears grossly unremarkable.  2b. Renal artery: No evidence of renal artery stenosis.    1/18/2022 CT CAP W CONTRAST   ABDOMEN/PELVIS:  LIVER: Geographic hypoattenuation along the falciform ligament,  consistent with focal fat deposition. No suspicious hepatic mass.  GALLBLADDER: Within normal limits.  PANCREAS: Within normal limits.  SPLEEN: Within normal limits.  ADRENAL GLANDS: Within normal limits.  URINARY TRACT: Symmetric cortical enhancement bilaterally. No  hydronephrosis, nephrolithiasis, or suspicious renal mass. Urinary  bladder is unremarkable.  REPRODUCTIVE ORGANS: Prostatomegaly.  BOWEL: Gastric tube tip terminates in the stomach. Normal caliber of  the small and large bowel. Appendix is within normal limits. No  abnormal wall thickening or inflammatory change.  PERITONEUM/FLUID: No free fluid or air in the abdomen or pelvis.     VESSELS: No aneurysmal dilatation of the abdominal aorta. The portal,  splenic, and superior mesenteric veins are patent. The origins of the  celiac and superior mesenteric arteries are patent.     LYMPH NODES: No lymphadenopathy in the abdomen or pelvis.     BONES/SOFT TISSUES: No suspicious osseous lesions.                                                        IMPRESSION:   No CT evidence of malignancy in the chest, abdomen or pelvis.    Current Medications:    aMILoride  10 mg Oral Daily     aspirin  324 mg Oral or NG Tube Daily    Or     aspirin  325 mg Oral Daily     atorvastatin  40 mg Oral QPM     carvedilol  37.5 mg Oral BID w/meals     chlorthalidone  25 mg Oral Daily     cloNIDine  0.1 mg Oral BID     divalproex sodium  extended-release  1,000 mg Oral Daily     hydrALAZINE  50 mg Oral Q8H CARLOS A     insulin aspart  1-7 Units Subcutaneous TID AC     insulin aspart  1-5 Units Subcutaneous At Bedtime     insulin aspart   Subcutaneous TID w/meals     lisinopril  40 mg Oral Daily     metFORMIN  500 mg Oral Daily with supper     miconazole   Topical BID     multivitamin, therapeutic  1 tablet Oral Daily     nystatin   Topical BID     potassium chloride  20 mEq Oral BID     sodium chloride (PF)  3 mL Intracatheter Q8H     sodium chloride (PF)  3 mL Intracatheter Q8H     thiamine  100 mg Per Feeding Tube Daily       dextrose       - MEDICATION INSTRUCTIONS -       - MEDICATION INSTRUCTIONS -

## 2022-01-28 NOTE — CONSULTS
[facility]  Consult Note - Hospitalist Service  Date of Admission:  1/28/2022  Consult Requested by: Dr. Kohler  Reason for Consult: Refractory hypertension, new onset diabetes, recent R PCA stroke.      ASSESSMENT & PLAN:   Miriam Hall is a 46 year old male with history of HTN admitted to [facility] on 1/8/22 with acute R PCA stroke, refractory HTN with concern for secondary hypertension, new onset type II diabetes, hypokalemia, DISHA, and acute encephalopathy. Transferred to ARU on 1/28/22 for rehabilitation.     # Acute R PCA stroke:  Presented w/ L hemiplegia. CT Head (1/8) showed a R parieto-occipital infarct. CTA showed occlusion of proximal R PCA and high grade stenosis of M2 branch of R MCA. Did not receive tPA (outside window). Repeat imaging 1/12 showed evidence of hemorrhagic transformation. Source suspected to be high grade atherosclerosis vs ESUS. No hx a-fib. Telemetry negative. CARISA negative for LA thrombus. CV risk factors include uncontrolled HTN and DM2. Residual deficits include L hemiplegia, L hemianopsia, and mild dysarthria.   - PT/OT consults  - ASA 81mg daily  - Atorvastatin 40mg daily  - See below for anti-hypertensive regimen  - Continue Ziopatch x 14 days (placed prior to discharge)  - Follow up with Neurology prn   - Genetics referral recommended on discharge given acute CVA and refractory HTN at young age    # Refractory HTN:  Not on BP meds prior to admission. SBP >240 on presentation. Required 6 medication regimen to get SBP <180 in hospital. Nephrology consulted and concerned for secondary hypertension. Urine metanephrines negative. Renal US negative for ABBIE. Hyperaldosteronism suspected given persistent hypokalemia and concomitant metabolic alkalosis. Aldosterone and Renin levels normal. Per Nephrology, DDx includes Cushing (urinary cortisol pending), Liddle syndrome, and apparent mineralocorticoid excess (free urinary cortisol  and cortisone pending). Currently well controlled.  - Amiloride 10mg QD  - Carvedilol 37.5mg BID  - Chlorthalidone 25mg QD  - Clonidine 0.1mg BID  - Hydralazine 50mg TID  - Lisinopril 40mg QD   - Per Nephrology, if able to come off meds prioritize discontinuation of hydralazine, followed by clonidine  - Follow up with nephrology in 2 weeks for ongoing management  - Endocrine referral on discharge to further evaluate secondary HTN    # New onset diabetes, type II:  A1C 7.8% on 1/8. Endocrine consulted in hospital for management. Initially managed with insulin. Started on metformin prior to discharge. Currently well controlled.  - Metformin 500mg QD  - MSSI AC/HS  - Hypoglycemia protocol     # Hypokalemia, refractory:  Concern for hyperaldosteronism, see above. Work up negative thus far. Managed with replacement protocol in hospital, discharged on supplements. K 3.7 today.  - KCl 20meq BID  - Monitor K closely while on Amiloride   - Recommend BMP q M/Th    # DISHA:  Baseline Cr 1.0-1.2. Noted to have DISHA on admission w/ Cr 1.37. ? Secondary to hypertensive urgency/emergency. Cr normalized on 1/12. Developed recurrent DISHA on 1/22 with Cr up to 1.5. Felt to be d/t volume depletion. BP medications possibly contributing. Improved w/ 250 mL IVF bolus. Cr 1.32 today.   - Monitor closely  - Repeat BMP in AM  - Avoid volume depletion and nephrotoxic meds    # Acute encephalopathy (resolved):  Persistent encephalopathy noted throughout hospital stay which gradually resolved. Possible etiologies include effective of CVA, hypertensive encephalopathy, and seizures. Initial EEG with possible subclinical seizures. Loaded w/ Keppra. Repeat EEG negative. Currently A&O x3. Does not appear to be confused or encephalopathic.   - Monitor clinically     # Possible seizures:  As above, EEG 1/12 in setting of AMS concerning for subclinical seizures. Initially loaded w/ keppra but later transitioned to depakote. Repeat EEG negative.   -  Seizure precautions  - Depakote ER 1000mg QD  - Follow up with Neurology as directed     # Non-severe malnutrition:  NG feeding tube placed in hospital d/t encephalopathy and malnutrition risk. No dysphagia noted. Diet advanced. TF discontinued prior to discharge and NG removed.   - Recommend nutrition and SLP consults    # Platelet defect:  Secondary to ASA. Will monitor for clinical signs of bleeding.        The patient's care was discussed with the Patient and Primary team.    DAWNA Barboza [facility]  Securely message with the Vocera Web Console (learn more here)  Text page via [facility] [employer]       Hospitalist Service      ______________________________________________________________________    History is obtained from the patient    History of Present Illness   Miriam Hall is a 46 year old male who was admitted to [facility] on 1/8/22 with acute right PCA distribution ischemic stroke and hypertensive urgency. Patient had initially presented with acute onset left upper and lower extremity weakness. CT head showed a well established R parieto-occipital infarct. He was outside the window for tPA. His blood pressure was severely elevated with systolic in 240's. He was admitted to the neurology service with cardiology and nephrology consulting. His hospital stay was complicated by refractory hypertension, hypokalemia, DISHA, acute encephalopathy, and hyperglycemia. He was diagnosed with new onset type II DM. There was concern for secondary hypertension d/t hyperaldosteronism, although initial work up came back negative. Several urine studies are pending. Gradually he improved throughout his stay, although currently on 6 anti-hypertensives to control BP. He has residual left hemiplegia, left hemianopsia, and mild dysarthria. He was receiving TF via NG feeding tube during his stay d/t prolonged encephalopathy, however has been taking better PO and  NG was removed prior to discharge.     He currently reports feeling well. Neurologic deficits remain stable. He denies any fevers, chills, chest pain, dyspnea, cough, nausea, vomiting, diarrhea, abdominal pain, urinary complaints, or peripheral edema.     Review of Systems   The 10 point Review of Systems is negative other than noted in the HPI or here.     Past Medical History    I have reviewed this patient's medical history and updated it with pertinent information if needed.   Past Medical History:   Diagnosis Date     Hypertension        Past Surgical History   I have reviewed this patient's surgical history and updated it with pertinent information if needed.  Past Surgical History:   Procedure Laterality Date     TONSILLECTOMY         Social History   I have reviewed this patient's social history and updated it with pertinent information if needed.  Social History     Tobacco Use     Smoking status: Never Smoker     Smokeless tobacco: Never Used   Substance Use Topics     Alcohol use: Yes     Alcohol/week: 1.7 standard drinks     Types: 2 Cans of beer per week     Comment: 1 weeklyt     Drug use: No       Family History   I have reviewed this patient's family history and updated it with pertinent information if needed.  Family History   Problem Relation Age of Onset     Breast Cancer Mother      Diabetes Mother      Lipids Mother      Hypertension Mother      Hypertension Father      Heart Disease Father      Respiratory Father      Heart Disease Maternal Grandfather         CHF       Medications   I have reviewed this patient's current medications    Allergies   Allergies   Allergen Reactions     Pcn [Penicillin G] Hives and Itching       Physical Exam   Vital Signs:                    Weight: 0 lbs 0 oz    GENERAL:  Well developed male. Appears stated age. Awake. Alert. Oriented x 3. Fluent speech. Good memory and recall. Appears comfortable.   HEENT:  No scleral icterus. No conjunctival erythema. Nares  patent. Moist mucous membranes. No tongue deviation.   CV:  RRR. S1, S2. No murmur. Pedal pulses 2+ bilaterally.  PULM:  Normal effort. CTAB. No wheezing, rhonchi or rales.  GI:  Abdomen soft, non-distended. Active bowel sounds. No tenderness, guarding, or rebound. No mass or HSM.    NEURO:  Left hemiplegia. Left facial droop, mild. +L facial paresthesias. LUE and LLE paresthesias.     EXTREMITIES:  No peripheral edema. No calf tenderness.   SKIN:  Dry. No visible rash.     Data   Recent Labs   Lab 01/28/22 0428 01/27/22  1346 01/27/22  0428 01/26/22  1448 01/26/22  0439 01/25/22  1401 01/25/22  0425     --  139  --  140  --  139   POTASSIUM 3.7 3.8 3.7  3.7 3.9 3.6  3.6   < > 3.3*  3.3*   CHLORIDE 103  --  103  --  105  --  104   CO2 28  --  29  --  28  --  31   ANIONGAP 6  --  7  --  7  --  4   BUN 31*  --  30  --  28  --  33*   CR 1.32*  --  1.56*  --  1.31*  --  1.32*   VALERIE 9.0  --  9.5  --  9.3  --  9.1   MAG 1.9  --  2.0  --  1.9  --  2.1   PHOS 4.2  --  3.8  --  3.0  --  3.7    < > = values in this interval not displayed.     Recent Labs   Lab 01/28/22 0428 01/27/22  0428 01/26/22  0439 01/25/22 0425   WBC 7.7 7.0 7.0 9.0   RBC 4.66 4.79 4.76 4.76   HGB 14.1 14.7 14.5 14.5   HCT 42.0 44.3 43.4 43.7   MCV 90 93 91 92   MCH 30.3 30.7 30.5 30.5   MCHC 33.6 33.2 33.4 33.2   RDW 13.6 13.4 13.5 13.4    310 311 288     No lab results found in last 7 days.  No lab results found in last 7 days.   No lab results found in last 7 days.  No lab results found in last 7 days.  Recent Labs   Lab 01/28/22  1259 01/28/22  0844 01/28/22  0428 01/27/22  2303 01/27/22  1251 01/27/22  1053 01/27/22  0916 01/27/22  0428 01/26/22  2325 01/26/22  2214 01/26/22  1659 01/26/22  1224   *  133* 93 96 120* 165* 209* 121* 95 115* 134* 86 247*        All labs personally reviewed in [facility].  See A&P for additional results.     Unresulted Labs Ordered in the Past 30 Days of this Admission     Date and Time Order Name  Status Description    1/25/2022 11:36 AM Cortisol Cortisone Free 24 Hour Urine In process

## 2022-01-28 NOTE — PLAN OF CARE
Neuro: A&Ox4. Forgetful at times, word-finding difficulties. Left side flaccid, left visual cut, due to stroke, but can wiggle fingers. PERRLA, both size 3. Q8H neuros and vitals.  Cardiac: SR. -140s/90s.          Respiratory: Sating >95% on RA.  GI/: Adequate urine output via urinal.   Diet/appetite: Tolerating consistent carb diet.  Activity:  Assist of lift. Refuses most repositioning, but can weight shift himself, on pulsate mattress, PCDs on.  Pain: No complaints of pain this shift.   Skin: No new deficits noted.  LDA's: Left PIV, saline locked.     Plan: Plan to discharge to ARU 1/28. Continue with POC. Notify primary team with changes.

## 2022-01-28 NOTE — PLAN OF CARE
Physical Therapy Discharge Summary    Reason for therapy discharge:    Discharged to acute rehabilitation facility.    Progress towards therapy goal(s). See goals on Care Plan in [facility] electronic health record for goal details.  Goals partially met.  Barriers to achieving goals:   discharge from facility.    Therapy recommendation(s):    Continued therapy is recommended.  Rationale/Recommendations:  for continued strengthening and mobility training.

## 2022-01-29 ENCOUNTER — APPOINTMENT (OUTPATIENT)
Dept: OCCUPATIONAL THERAPY | Facility: CLINIC | Age: 47
End: 2022-01-29
Attending: PHYSICAL MEDICINE & REHABILITATION
Payer: COMMERCIAL

## 2022-01-29 ENCOUNTER — APPOINTMENT (OUTPATIENT)
Dept: PHYSICAL THERAPY | Facility: CLINIC | Age: 47
End: 2022-01-29
Attending: PHYSICAL MEDICINE & REHABILITATION
Payer: COMMERCIAL

## 2022-01-29 ENCOUNTER — APPOINTMENT (OUTPATIENT)
Dept: SPEECH THERAPY | Facility: CLINIC | Age: 47
End: 2022-01-29
Attending: PHYSICAL MEDICINE & REHABILITATION
Payer: COMMERCIAL

## 2022-01-29 LAB
ANION GAP SERPL CALCULATED.3IONS-SCNC: 6 MMOL/L (ref 3–14)
BASOPHILS # BLD AUTO: 0.1 10E3/UL (ref 0–0.2)
BASOPHILS NFR BLD AUTO: 1 %
BUN SERPL-MCNC: 30 MG/DL (ref 7–30)
CALCIUM SERPL-MCNC: 9.2 MG/DL (ref 8.5–10.1)
CHLORIDE BLD-SCNC: 106 MMOL/L (ref 94–109)
CO2 SERPL-SCNC: 25 MMOL/L (ref 20–32)
CREAT SERPL-MCNC: 1.45 MG/DL (ref 0.66–1.25)
EOSINOPHIL # BLD AUTO: 0.1 10E3/UL (ref 0–0.7)
EOSINOPHIL NFR BLD AUTO: 1 %
ERYTHROCYTE [DISTWIDTH] IN BLOOD BY AUTOMATED COUNT: 13.4 % (ref 10–15)
GFR SERPL CREATININE-BSD FRML MDRD: 60 ML/MIN/1.73M2
GLUCOSE BLD-MCNC: 124 MG/DL (ref 70–99)
GLUCOSE BLDC GLUCOMTR-MCNC: 100 MG/DL (ref 70–99)
GLUCOSE BLDC GLUCOMTR-MCNC: 112 MG/DL (ref 70–99)
GLUCOSE BLDC GLUCOMTR-MCNC: 114 MG/DL (ref 70–99)
GLUCOSE BLDC GLUCOMTR-MCNC: 116 MG/DL (ref 70–99)
GLUCOSE BLDC GLUCOMTR-MCNC: 127 MG/DL (ref 70–99)
GLUCOSE BLDC GLUCOMTR-MCNC: 169 MG/DL (ref 70–99)
HCT VFR BLD AUTO: 40.7 % (ref 40–53)
HGB BLD-MCNC: 13.8 G/DL (ref 13.3–17.7)
IMM GRANULOCYTES # BLD: 0.1 10E3/UL
IMM GRANULOCYTES NFR BLD: 1 %
LYMPHOCYTES # BLD AUTO: 1.4 10E3/UL (ref 0.8–5.3)
LYMPHOCYTES NFR BLD AUTO: 15 %
MCH RBC QN AUTO: 30.5 PG (ref 26.5–33)
MCHC RBC AUTO-ENTMCNC: 33.9 G/DL (ref 31.5–36.5)
MCV RBC AUTO: 90 FL (ref 78–100)
MONOCYTES # BLD AUTO: 0.8 10E3/UL (ref 0–1.3)
MONOCYTES NFR BLD AUTO: 8 %
NEUTROPHILS # BLD AUTO: 6.9 10E3/UL (ref 1.6–8.3)
NEUTROPHILS NFR BLD AUTO: 74 %
NRBC # BLD AUTO: 0 10E3/UL
NRBC BLD AUTO-RTO: 0 /100
PLATELET # BLD AUTO: 305 10E3/UL (ref 150–450)
POTASSIUM BLD-SCNC: 3.9 MMOL/L (ref 3.4–5.3)
RBC # BLD AUTO: 4.53 10E6/UL (ref 4.4–5.9)
SARS-COV-2 RNA RESP QL NAA+PROBE: NEGATIVE
SODIUM SERPL-SCNC: 137 MMOL/L (ref 133–144)
WBC # BLD AUTO: 9.2 10E3/UL (ref 4–11)

## 2022-01-29 PROCEDURE — 99232 SBSQ HOSP IP/OBS MODERATE 35: CPT | Performed by: PHYSICAL MEDICINE & REHABILITATION

## 2022-01-29 PROCEDURE — 36415 COLL VENOUS BLD VENIPUNCTURE: CPT | Performed by: PHYSICIAN ASSISTANT

## 2022-01-29 PROCEDURE — 97530 THERAPEUTIC ACTIVITIES: CPT | Mod: GP

## 2022-01-29 PROCEDURE — 97535 SELF CARE MNGMENT TRAINING: CPT | Mod: GO | Performed by: OCCUPATIONAL THERAPIST

## 2022-01-29 PROCEDURE — U0003 INFECTIOUS AGENT DETECTION BY NUCLEIC ACID (DNA OR RNA); SEVERE ACUTE RESPIRATORY SYNDROME CORONAVIRUS 2 (SARS-COV-2) (CORONAVIRUS DISEASE [COVID-19]), AMPLIFIED PROBE TECHNIQUE, MAKING USE OF HIGH THROUGHPUT TECHNOLOGIES AS DESCRIBED BY CMS-2020-01-R: HCPCS | Performed by: PHYSICIAN ASSISTANT

## 2022-01-29 PROCEDURE — 128N000003 HC R&B REHAB

## 2022-01-29 PROCEDURE — 99232 SBSQ HOSP IP/OBS MODERATE 35: CPT | Performed by: NURSE PRACTITIONER

## 2022-01-29 PROCEDURE — 99233 SBSQ HOSP IP/OBS HIGH 50: CPT | Performed by: PHYSICIAN ASSISTANT

## 2022-01-29 PROCEDURE — 97167 OT EVAL HIGH COMPLEX 60 MIN: CPT | Mod: GO | Performed by: OCCUPATIONAL THERAPIST

## 2022-01-29 PROCEDURE — 250N000013 HC RX MED GY IP 250 OP 250 PS 637: Performed by: PHYSICIAN ASSISTANT

## 2022-01-29 PROCEDURE — 85025 COMPLETE CBC W/AUTO DIFF WBC: CPT | Performed by: PHYSICAL MEDICINE & REHABILITATION

## 2022-01-29 PROCEDURE — 96125 COGNITIVE TEST BY HC PRO: CPT | Mod: GN

## 2022-01-29 PROCEDURE — 97163 PT EVAL HIGH COMPLEX 45 MIN: CPT | Mod: GP

## 2022-01-29 PROCEDURE — 250N000013 HC RX MED GY IP 250 OP 250 PS 637: Performed by: PHYSICAL MEDICINE & REHABILITATION

## 2022-01-29 PROCEDURE — 80048 BASIC METABOLIC PNL TOTAL CA: CPT | Performed by: PHYSICIAN ASSISTANT

## 2022-01-29 RX ADMIN — POTASSIUM CHLORIDE 20 MEQ: 1.5 FOR SOLUTION ORAL at 08:37

## 2022-01-29 RX ADMIN — CHLORTHALIDONE 25 MG: 25 TABLET ORAL at 08:37

## 2022-01-29 RX ADMIN — CARVEDILOL 37.5 MG: 25 TABLET, FILM COATED ORAL at 17:58

## 2022-01-29 RX ADMIN — CLONIDINE HYDROCHLORIDE 0.1 MG: 0.1 TABLET ORAL at 20:39

## 2022-01-29 RX ADMIN — ATORVASTATIN CALCIUM 40 MG: 40 TABLET, FILM COATED ORAL at 20:40

## 2022-01-29 RX ADMIN — CARVEDILOL 37.5 MG: 25 TABLET, FILM COATED ORAL at 08:36

## 2022-01-29 RX ADMIN — POTASSIUM CHLORIDE 20 MEQ: 1.5 FOR SOLUTION ORAL at 20:40

## 2022-01-29 RX ADMIN — HYDRALAZINE HYDROCHLORIDE 50 MG: 50 TABLET, FILM COATED ORAL at 03:33

## 2022-01-29 RX ADMIN — ASPIRIN 81 MG CHEWABLE TABLET 81 MG: 81 TABLET CHEWABLE at 08:37

## 2022-01-29 RX ADMIN — HYDRALAZINE HYDROCHLORIDE 50 MG: 50 TABLET, FILM COATED ORAL at 12:18

## 2022-01-29 RX ADMIN — DIVALPROEX SODIUM 1000 MG: 500 TABLET, FILM COATED, EXTENDED RELEASE ORAL at 08:37

## 2022-01-29 RX ADMIN — CLONIDINE HYDROCHLORIDE 0.1 MG: 0.1 TABLET ORAL at 08:37

## 2022-01-29 RX ADMIN — AMILORIDE HYDROCLORIDE 10 MG: 5 TABLET ORAL at 08:37

## 2022-01-29 RX ADMIN — METFORMIN HYDROCHLORIDE 500 MG: 500 TABLET, FILM COATED ORAL at 18:38

## 2022-01-29 RX ADMIN — LISINOPRIL 40 MG: 20 TABLET ORAL at 08:37

## 2022-01-29 RX ADMIN — HYDRALAZINE HYDROCHLORIDE 50 MG: 50 TABLET, FILM COATED ORAL at 19:32

## 2022-01-29 RX ADMIN — SENNOSIDES 8.6 MG: 8.6 TABLET ORAL at 21:20

## 2022-01-29 ASSESSMENT — ACTIVITIES OF DAILY LIVING (ADL)
ADLS_ACUITY_SCORE: 16
ADLS_ACUITY_SCORE: 20
ADLS_ACUITY_SCORE: 16
ADLS_ACUITY_SCORE: 20
ADLS_ACUITY_SCORE: 21
ADLS_ACUITY_SCORE: 20
ADLS_ACUITY_SCORE: 16
ADLS_ACUITY_SCORE: 20
ADLS_ACUITY_SCORE: 20
ADLS_ACUITY_SCORE: 21
ADLS_ACUITY_SCORE: 16
ADLS_ACUITY_SCORE: 16
ADLS_ACUITY_SCORE: 20
ADLS_ACUITY_SCORE: 20
ADLS_ACUITY_SCORE: 21
ADLS_ACUITY_SCORE: 20
ADLS_ACUITY_SCORE: 16
ADLS_ACUITY_SCORE: 16
ADLS_ACUITY_SCORE: 20
PREVIOUS_RESPONSIBILITIES: MEAL PREP;HOUSEKEEPING;LAUNDRY;SHOPPING;YARDWORK;MEDICATION MANAGEMENT;FINANCES;DRIVING;WORK

## 2022-01-29 NOTE — PLAN OF CARE
Discharge Planner Post-Acute Rehab PT:     Discharge Plan: TBD, may not have the support to return home. Pt lives in 2nd story apartment, his ex-girlfriend is his only local identified support, but pt does not want or anticipate she will help significantly.    Precautions: Fall/alarm, L angelique, L field cut and neglect    Current Status:  Bed Mobility: Mod-A >sit, max-A > supine  Transfer: Max-A squat/pivot. Assist of 1 Kaley Edwards with nursing  Gait: Unable  Stairs: Unable  Balance: Sits close supervision unsupported, assist to stand due to weakness  PASS: 11/36 on 1/29    Assessment:  Pt presents following CVA with significant L side hemiparesis and sensory deficits leading to above deficits. PASS score indicates w/c based goals at this time, will continue to evaluate potential for mixed mobility. Given pt's current living enviornment and lack of support, guarded potential to return home from this setting unless other accomodations are made.    Other Barriers to Discharge (DME, Family Training, etc):   Stairs to enter  Level of support  W/C  L AFO  Further gait DME TBD

## 2022-01-29 NOTE — PLAN OF CARE
FUGL-HERNANDEZ ASSESSMENT   UPPER EXTREMITY  Assessment of sensorimotor function   Stroke specific, performance-based impairment index. It is designed to assess motor functioning, sensation, and joint functioning in patients with post-stroke hemiplegia.    Scoring  The FMA is an impairment measure, consisting of 33 motor tasks and 30 sensory/pain observations.   Motor and sensory items are scored on a 0-2 scale: 0 none, 1 partial, and 2 full.  Left Upper Extremity  Motor Function   Upper Extremity 2/36   Wrist 0/10   Hand 1/14   Coordination/Speed 0/6   Total 3/66     Sensory Function   Sensation 2/12   Joint Pain 4/24   Passive Joint Motion 0/24     Motor Classification  Very Severe (0-12)  Severe-Moderate (13-30)  Moderate-Mild (31-47)  Mild (48-60)    Sensory/joint observations (light touch, proprioception, PROM, PMHx impacting UE function):  Severe deficits with sensation  Mild discomfort proximal UE.    References  FMA-UE PROTOCOL Rehabilitation Medicine, [facility]  Approved by Sonjal-Hernandez AR 2010  Fugl-Hernandez AR, Lauro L, Ellen I, Fabian S, Chantel S: The post-stroke hemiplegic patient. A method for evaluation of physical performance. Scand J Rehabil Med 1975, 7:13-31.  Akbar murphy al.: Determining Levels of Upper Extremity Movement Impairment by Applying a Cluster Analysis to the Fugl-Hernandez Assessment of the Upper Extremity in Chronic Stroke. Archives of Physical Medicine and Rehabilitation 2016, 98: 456-462.

## 2022-01-29 NOTE — PLAN OF CARE
Discharge Planner Post-Acute Rehab OT:     Discharge Plan: TBD pending progress    Precautions: L side inattention and hemiparesis, falls    Current Status:  ADLs:    Mobility: W/c based. Kaley stedy A x 1.    Grooming: Min A supported sitting.    Dressing: UB Max A. LB Total A.     Bathing: TBA    Toileting: Kaley stedy A x 1 commode. Total A hygiene/clothing.  IADLs: Needs assistance.  Vision/Cognition: L side inattention.    Assessment: Initial evaluation completed and POC established. ADL/IADL performance is below baseline, impacted by L side weakness, L side inattention, and L side impaired sensation. Goals to maximize IND ADLs/IADLs, however anticipate extended rehab course to reach functional level to return home. ELOS 4 weeks.    Other Barriers to Discharge (DME, Family Training, etc):   Pt lives alone and caregiver support is limited.  Home set up - 2nd level apartment.  Requires significant physical assistance.

## 2022-01-29 NOTE — PHARMACY-MEDICATION REGIMEN REVIEW
Pharmacy Medication Regimen Review  Miriam Hall is a 46 year old male who is currently in the Acute Rehab Unit.    Assessment: All medications have an appropriate indications, durations and no unnecessary use was found    Plan:   Continue current medications  Attending provider will be sent this note for review.  If there are any emergent issues noted above, pharmacist will contact provider directly by phone.      Pharmacy will periodically review the resident's medication regimen for any PRN medications not administered in > 72 hours and discontinue them. The pharmacist will discuss gradual dose reductions of psychopharmacologic medications with interdisciplinary team on a regular basis.    Please contact pharmacy if the above does not answer specific medication questions/concerns.    Background:  A pharmacist has reviewed all medications and pertinent medical history today.  Medications were reviewed for appropriate use and any irregularities found are listed with recommendations.      Current Facility-Administered Medications:      acetaminophen (TYLENOL) tablet 650 mg, 650 mg, Oral, Q4H PRN, Karlo Guerin PA-C     aMILoride (MIDAMOR) tablet 10 mg, 10 mg, Oral, Daily, Karlo Guerin PA-C, 10 mg at 01/29/22 0837     aspirin (ASA) chewable tablet 81 mg, 81 mg, Oral, Daily, Karlo Guerin PA-C, 81 mg at 01/29/22 0837     atorvastatin (LIPITOR) tablet 40 mg, 40 mg, Oral, QPM, Karlo Guerin PA-C, 40 mg at 01/28/22 2055     bisacodyl (DULCOLAX) Suppository 10 mg, 10 mg, Rectal, Daily PRN, Jean Paul Kohler DO     carvedilol (COREG) tablet 37.5 mg, 37.5 mg, Oral, BID w/meals, Karlo Guerin PA-C, 37.5 mg at 01/29/22 0836     chlorthalidone (HYGROTON) tablet 25 mg, 25 mg, Oral, Daily, Karlo Guerin PA-C, 25 mg at 01/29/22 0837     cloNIDine (CATAPRES) tablet 0.1 mg, 0.1 mg, Oral, BID, Karlo Guerin PA-C, 0.1 mg at 01/29/22 0837     glucose gel 15-30 g, 15-30 g, Oral,  Q15 Min PRN **OR** dextrose 50 % injection 25-50 mL, 25-50 mL, Intravenous, Q15 Min PRN **OR** glucagon injection 1 mg, 1 mg, Subcutaneous, Q15 Min PRN, Karlo Guerin PA-C     divalproex sodium extended-release (DEPAKOTE ER) 24 hr tablet 1,000 mg, 1,000 mg, Oral, Daily, Karlo Guerin PA-C, 1,000 mg at 01/29/22 0837     hydrALAZINE (APRESOLINE) tablet 50 mg, 50 mg, Oral, Q8H, Karlo Guerin PA-C, 50 mg at 01/29/22 1218     insulin aspart (NovoLOG) injection (RAPID ACTING), 1-7 Units, Subcutaneous, TID AC, Karlo Guerin PA-C, 1 Units at 01/28/22 1723     insulin aspart (NovoLOG) injection (RAPID ACTING), 1-5 Units, Subcutaneous, At Bedtime, Karlo Guerin PA-C     lisinopril (ZESTRIL) tablet 40 mg, 40 mg, Oral, Daily, Karlo Guerin PA-C, 40 mg at 01/29/22 0837     metFORMIN (GLUCOPHAGE) tablet 500 mg, 500 mg, Oral, Daily with supper, Karlo Guerin PA-C, 500 mg at 01/28/22 1724     ondansetron (ZOFRAN) tablet 4 mg, 4 mg, Oral, Q6H PRN, Jean Paul Kohler,      polyethylene glycol (MIRALAX) Packet 17 g, 17 g, Oral, Daily PRN, Jean Paul Kohler,      potassium chloride (KLOR-CON) Packet 20 mEq, 20 mEq, Oral, BID, Karlo Guerin PA-C, 20 mEq at 01/29/22 0837     sennosides (SENOKOT) tablet 8.6 mg, 8.6 mg, Oral, BID PRN, Jean Paul Kohler,   No current outpatient prescriptions on file.   PMH:   HTN, TD2DM     Akil Felix, PharmD, BCPS

## 2022-01-29 NOTE — PROGRESS NOTES
"PM&R PROGRESS NOTE     Patient Active Problem List   Diagnosis     HTN (hypertension)     Stroke (H)     Ischemic cerebrovascular accident (CVA) (H)     Acute kidney failure, unspecified (H)       HPI   Miriam Hall is a 46 year old male admitted to the ARU on 1/28/2022 for right parieto-occipital infarct secondary to occlusion of the proximal right PCA and high-grade stenosis of M2 of right MCA.  There was additional hemorrhagic transformation afterwards.    He has a history of hypertension which has been refractory    From the functional perspective,   Formal initial evaluations today by PT and OT.  Consider adding speech-language pathology if deficits are found.      Medically,   Appreciate input from endocrinology as well as hospitalist.  He is currently on several medications, in excess of 6 different antihypertensives for the control of his blood pressures.,  Reviewed the blood pressure today it is 128 x 70  Continue the regimen unchanged.    Diabetes   His hemoglobin A1c at the time of admission was 7.8 indicating need for optimization.  Appreciate endocrinology input    Acute kidney injury  Continues to be raise at 1.32 we will closely monitor.      Orders Placed This Encounter      Combination Diet Regular Diet        Review Of Systems  Total of ten systems reviewed, pertinent positives and negatives as follows  Denies chest pain fever chills rigors  Denies any shortness of breath   Denies any nausea or vomiting   Denies any lightheadedness or dizziness   Orders Placed This Encounter      Combination Diet Regular Diet  Denies any pain    Denies any sob or cough   Denies any new rashes   Mood euphoric   Voiding without any problems, no incontinence   Slept well last night   Remainder of the review of the systems was negative        /72   Pulse 74   Temp 98  F (36.7  C) (Oral)   Resp 16   Ht 1.737 m (5' 8.4\")   Wt 75.7 kg (166 lb 12.8 oz)   SpO2 97%   BMI 25.07 kg/m    Physical exam  "   Patient is lying in bed comfortable in no acute distress     HEENT NC AT PRTL EOM good   Neck supple  Heart S1S2  Lungs CTA  Abdomen  benign BS positive NT NR   LE no edema    Has significant severe neglect         Current Facility-Administered Medications   Medication     acetaminophen (TYLENOL) tablet 650 mg     aMILoride (MIDAMOR) tablet 10 mg     aspirin (ASA) chewable tablet 81 mg     atorvastatin (LIPITOR) tablet 40 mg     bisacodyl (DULCOLAX) Suppository 10 mg     carvedilol (COREG) tablet 37.5 mg     chlorthalidone (HYGROTON) tablet 25 mg     cloNIDine (CATAPRES) tablet 0.1 mg     glucose gel 15-30 g    Or     dextrose 50 % injection 25-50 mL    Or     glucagon injection 1 mg     divalproex sodium extended-release (DEPAKOTE ER) 24 hr tablet 1,000 mg     hydrALAZINE (APRESOLINE) tablet 50 mg     insulin aspart (NovoLOG) injection (RAPID ACTING)     insulin aspart (NovoLOG) injection (RAPID ACTING)     lisinopril (ZESTRIL) tablet 40 mg     metFORMIN (GLUCOPHAGE) tablet 500 mg     ondansetron (ZOFRAN) tablet 4 mg     polyethylene glycol (MIRALAX) Packet 17 g     potassium chloride (KLOR-CON) Packet 20 mEq     sennosides (SENOKOT) tablet 8.6 mg        Labs:  Lab Results   Component Value Date    WBC 9.2 01/29/2022    HGB 13.8 01/29/2022    HCT 40.7 01/29/2022     01/29/2022     01/29/2022    POTASSIUM 3.9 01/29/2022    CHLORIDE 106 01/29/2022    CO2 25 01/29/2022    BUN 30 01/29/2022    CR 1.45 (H) 01/29/2022     (H) 01/29/2022    SED 34 (H) 01/18/2022    AST 22 01/17/2022    ALT 20 01/17/2022    ALKPHOS 98 01/17/2022    BILITOTAL 0.7 01/17/2022    DEON 23 01/16/2022    INR 1.06 01/18/2022       Attestation:  This patient has been seen and evaluated by me, Sindhu Emerson MD.    Total time: 25 minutes more than 50% in Care coordination on the floor/ counseling the patient face to face   Sindhu Emerson MD, MHA   Department of Rehabilitation

## 2022-01-29 NOTE — PROGRESS NOTES
01/29/22 1100   Quick Adds   Type of Visit Initial Occupational Therapy Evaluation       Present no   Language English   Living Environment   People in home alone   Current Living Arrangements apartment   Home Accessibility stairs to enter home   Number of Stairs, Main Entrance greater than 10 stairs   Stair Railings, Main Entrance railing on right side (ascending)   Transportation Anticipated family or friend will provide   Living Environment Comments Pt lives in [facility]. Street parking. Walk-in shower. Standard toilet. 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   Instrumental Activities of Daily Living (IADL)   Previous Responsibilities meal prep;housekeeping;laundry;shopping;yardwork;medication management;finances;driving;work   IADL Comments Previously IND ADL/IADL   Disability/Function   Hearing Difficulty or Deaf no   Wear Glasses or Blind no   Concentrating, Remembering or Making Decisions Difficulty no   Difficulty Communicating no   Difficulty Eating/Swallowing no   Walking or Climbing Stairs Difficulty no   Dressing/Bathing Difficulty no   Toileting issues no   Doing Errands Independently Difficulty (such as shopping) no   Fall history within last six months yes   Number of times patient has fallen within last six months 1   Change in Functional Status Since Onset of Current Illness/Injury yes   General Information   Onset of Illness/Injury or Date of Surgery 01/08/22   Referring Physician Dr. Sherwood   Patient/Family Therapy Goal Statement (OT) Pt goals to return home   Additional Occupational Profile Info/Pertinent History of Current Problem Miriam Hall is a 46 year old R  hand dominant male with a hx of HTN and T2DM who presents to the ARU for an ischemic stroke with hemorrhagic transformation. 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 Rohan with have therapy evaluations tomorrow. Medicine consulted for assistance with medication management due to resistant HTN.   Performance Patterns (Routines, Roles, Habits) Pt living alone, IND ADLs/IADL, working/driving.   Existing Precautions/Restrictions fall  (L hemiparesis)   Limitations/Impairments visual;sensory   Left Upper Extremity (Weight-bearing Status) full weight-bearing (FWB)   Right Upper Extremity (Weight-bearing Status) full weight-bearing (FWB)   Left Lower Extremity (Weight-bearing Status) full weight-bearing (FWB)   Right Lower Extremity (Weight-bearing Status) full weight-bearing (FWB)   Heart Disease Risk Factors Medical history   Cognitive Status Examination   Orientation Status orientation to person, place and time   Affect/Mental Status (Cognitive) low arousal/lethargic   Cognitive Status Comments Just waking from a nap at the beginning of the session, continued to be drowsy throughout session. A & O yet attention impaired and demonstrating general disorganization with sequencing ADLs and functional mobility.    Visual Perception   Visual Processing Deficit visual attention, left;angelique-inattention/neglect, left   Sensory   Sensory Quick Adds Sharp dull discrimination;Stereognosis;Proprioception;Light touch   Sensation Light Touch, Rehab Eval   LUE severe impairment   RUE within normal limits   Sensation Sharp Dull Discrimination, Rehab Eval   LUE severe impairment   RUE within normal limits   Stereognosis, OT Eval   LUE severe impairment   RUE within normal limits   Proprioception, OT Eval   LUE moderate impairment   RUE within normal limits   Pain Assessment   Patient Currently in Pain Yes, see Vital Sign flowsheet   Integumentary/Edema   Integumentary/Edema Comments Mild edema  LUE/LLE   Posture   Posture not impaired   Range of Motion Comprehensive   Comment, General Range of Motion RUE WFL. LUE AROM limited due to hemiparesis.    Strength Comprehensive (MMT)   General Manual Muscle Testing (MMT) Assessment upper extremity strength deficits identified   Upper Extremity (Manual Muscle Testing)   Upper Extremity: Manual Muscle Testing (MMT) left scapular strength deficit;left elbow/forearm strength deficit;left wrist strength deficit;left shoulder strength deficit   Comment, MMT: Upper Extremity RUE 4-5/5 MMT. LUE: scapular retraction 2/5, sh flex/ext 2/5, sh abd/add 1/5, elbow flex/ext 1/5, wrist flex/ext 1/5, grasp 2/5.    Muscle Tone Assessment   Muscle Tone Quick Adds LUE;LLE   Muscle Tone Assessment - LUE severely decreased tone   Muscle Tone Assessment - LLE severely decreased tone   Coordination   Upper Extremity Coordination Left UE impaired   Functional Limitations Fine motor ADL performance impaired;Impaired ability to perform bilateral tasks;Object transport impaired;Reach to targets impaired   Coordination Comments Impacted by hemiparesis and impaired sensation   ARC Assessment Only   Acute Rehab Functional Assessment See IP Rehab Daily Documentation Flowsheet for Functional Mobility/ADL Assessment   Bed Mobility   Comment (Bed Mobility) Max A supine <> EOB   Transfers   Transfer Comments Kaley ramsey A x 1. Max A pivot transfer no device.   Balance   Balance Assessment sitting balance: static;sitting balance: dynamic;sit to stand balance;standing balance: static   Sitting Balance: Static mild impairment   Sitting Balance: Dynamic severe impairment   Sit-to-Stand Balance poor balance   Standing Balance: Static poor balance   Balance Comments Unable to ambulate   Clinical Impression   Criteria for Skilled Therapeutic Interventions Met (OT) yes   OT Diagnosis Decreased IND ADLs/IADLs   OT Problem List-Impairments impacting ADL problems related to;activity tolerance  impaired;balance;cognition;mobility;motor control;muscle tone;range of motion (ROM);sensation;strength;postural control;pain   ADL comments/analysis ADL performance is below baseline   Assessment of Occupational Performance 5 or more Performance Deficits   Identified Performance Deficits Performance deficits include mobility, transfers, dressing, grooming, bathing, toileting, work, home management, community transportation.   Planned Therapy Interventions (OT) ADL retraining;IADL retraining;balance training;bed mobility training;cognition;E-stim;fine motor coordination training;motor coordination training;neuromuscular re-education;ROM;strengthening;stretching;transfer training;home program guidelines;progressive activity/exercise;visual perception   Intervention Comments FES bike   Clinical Decision Making Complexity (OT) high complexity   Therapy Frequency (OT) Daily   Predicted Duration of Therapy 4 weeks   Anticipated Equipment Needs Upon Discharge (OT) wheelchair;reacher;walker, rolling;tub bench   Risk & Benefits of therapy have been explained evaluation/treatment results reviewed;care plan/treatment goals reviewed;risks/benefits reviewed;current/potential barriers reviewed;participants voiced agreement with care plan;participants included;patient   Comment-Clinical Impression The pt is a 46 yr old male admitted to IP ARU following hospital stay for CVA. Performance with ADLs/IADLs is below baseline, impacted by L hemiparesis, L inattention, L sensory deficits, and cognitive deficits. Goals to maximize IND and safety ADLs/IADLs, however limited by caregiver support, home set up, and expected physical assist at discharge. Pt will benefit from skilled OT intervention to address goals in POC and maximize IND and safety in discharge environment. ELOS 4 weeks.   Total Evaluation Time (Minutes)   Total Evaluation Time (Minutes) 30

## 2022-01-29 NOTE — PROGRESS NOTES
01/29/22 0900   Quick Adds   Type of Visit Initial PT Evaluation   Living Environment   People in home alone   Current Living Arrangements apartment   Home Accessibility stairs to enter home   Number of Stairs, Main Entrance greater than 10 stairs  (11. Landing about custodial)   Stair Railings, Main Entrance railing on right side (ascending)   Transportation Anticipated family or friend will provide   Living Environment Comments Pt lives in [facility]. Street parking. Walk-in shower. Standard toilet. 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   Disability/Function   Hearing Difficulty or Deaf no   Wear Glasses or Blind no   Concentrating, Remembering or Making Decisions Difficulty no   Difficulty Communicating no   Difficulty Eating/Swallowing no   Walking or Climbing Stairs Difficulty no   Dressing/Bathing Difficulty no   Toileting issues no   Doing Errands Independently Difficulty (such as shopping) yes   Fall history within last six months yes   Number of times patient has fallen within last six months 1   Change in Functional Status Since Onset of Current Illness/Injury yes   General Information   Onset of Illness/Injury or Date of Surgery 01/08/22   Referring Physician Dr. Kohler   Patient/Family Therapy Goals Statement (PT) Be able to take a few steps, be as IND as possible   Pertinent History of Current Problem (include personal factors and/or comorbidities that impact the POC) R parieto-occipital CVA   Existing Precautions/Restrictions fall   General Observations Emotionally labile   Cognition   Orientation Status (Cognition) person;place;situation  (January)   Cognitive Status Comments Functionally  "appropriate. Some difficulty remembering home setup. Acknowledges expressive aphasia   Pain Assessment   Patient Currently in Pain Yes, see Vital Sign flowsheet  (L hip pain with activity. Fall at hospital intake)   Integumentary/Edema   Integumentary/Edema Comments Trace dependent edema LLE   Posture    Posture Forward head position   Posture Comments   (Sits slumped posture d/t trunk weakness, R gaze preferenc)   Range of Motion (ROM)   ROM Comment L calf, HS, glute guarded ROM due to L hip pain, limited range with empty end feel   Strength   Strength Comments RLE 5/5. LLE 2/5 hip flexion and knee extensor, otherwise trace movement only throughout   ARC Assessment Only   Acute Rehab Functional Assessment See IP Rehab Daily Documentation Flowsheet for Functional Mobility/ADL Assessment   Balance   Balance Comments Sits EOB with supervision, assist to stand due to weakness. PASS 11/36   Sensory Examination   Sensory Perception Comments LLE abscent except for ability to \"feel movement on leg\" with light touch, unable to pinpoint location. L feild cut   Coordination   Coordination Comments Unable to assess R side due to weakness   Muscle Tone   Muscle Tone Comments Tone in LUE, not noted in LE, no spasticity in LLE   Clinical Impression   Criteria for Skilled Therapeutic Intervention yes, treatment indicated   PT Diagnosis (PT) Impaired functional mobility   Influenced by the following impairments L sensory impairement, L hemiparesis, feild cut, neglect, cognitive deficits, decreased activity tolerance and postural control   Functional limitations due to impairments Bed mobility, transfers, gait, stairs   Clinical Presentation Unstable/Unpredictable   Clinical Presentation Rationale Clinical judgement, factors impacting mobility   Clinical Decision Making (Complexity) high complexity   Therapy Frequency (PT) Daily  (60-90 minutes)   Predicted Duration of Therapy Intervention (days/wks) 4 weeks   Planned Therapy " Interventions (PT) balance training;bed mobility training;E-stim;gait training;home exercise program;neuromuscular re-education;patient/family education;orthotic fitting/training;postural re-education;stair training;strengthening;stretching;transfer training;wheelchair management/propulsion training;progressive activity/exercise;risk factor education;home program guidelines   Anticipated Equipment Needs at Discharge (PT)   (w/c, L AFO, further gait DME TBD)   Risk & Benefits of therapy have been explained evaluation/treatment results reviewed;care plan/treatment goals reviewed;risks/benefits reviewed;current/potential barriers reviewed;participants voiced agreement with care plan;participants included;patient   Clinical Impression Comments Pt presents following CVA with significant L side hemiparesis and sensory deficits leading to above deficits. PASS score indicates w/c based goals at this time, will continue to evaluate potential for mixed mobility. Given pt's current living enviornment and lack of support, guarded potential to return home from this setting unless other accomodations are made.   Total Evaluation Time   Total Evaluation Time (Minutes) 35

## 2022-01-29 NOTE — PLAN OF CARE
[id number]:  Neuro: A&Ox4. Forgetful at times, word-finding difficulties. Left side flaccid, able to wiggle fingers. Left field cut.  PERRLA, both pupils 3. Q8H neuros and vitals.  Cardiac: SR. BP elevated this morning before morning meds given. BPs low when up in chair, likely d/t somewhat slumped position, but after moved to bed, /71. Afebrile, other VSS.  Respiratory: Sating >95% on RA.  GI/: Adequate urine output via urinal. No BM today.   Diet/appetite: Tolerating consistent carb diet.  Activity:  Assist of celiling lift. Sometimes declines repositioning, but can weight shift self.  Pulsate mattress, PCDs on when in bed. Spent several hours in chair today.  Pain: Denied pain today.  Skin: No new deficits noted.  LDA's: Left PIV removed.  Ziopatch applied this afternoon, to be kept on while at ARU.     Discharged, transferred to ARU today. Left with transporter at 1430. Report given to ARU nurse.

## 2022-01-29 NOTE — PROGRESS NOTES
01/29/22 1504   General Information   Onset of Illness/Injury or Date of Surgery 01/08/22   Referring Physician Desire Birch   Patient/Family Therapy Goal Statement (SLP) The pt would like to return to work.   Pertinent History of Current Problem The pt is a 46 year old male with history of HTN admitted to [facility] on 1/8/22 with acute R PCA stroke, refractory HTN with concern for secondary hypertension, new onset type II diabetes, hypokalemia, DISHA, and acute encephalopathy. Transferred to ARU on 1/28/22 for rehabilitation. Cognitive-linguistic evaluation completed per MD order.   General Observations The pt is very pleasant and agreeable to evaluation. He presents with significant left-sided neglect . The pt lives alone in [place]. 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 He denies vision or hearing deficits. He is right-handed. He enjoys photography and stand-up comedy.   Pain Assessment   Patient Currently in Pain No   Type of Evaluation   Type of Evaluation Speech, Language, Cognition   Speech   Speech Intelligibility (Motor Speech) WFL   Phonation (motor speech) Adequate   Articulation (Motor Speech) errors with increasing word length;imprecise articulation   Speech Fluency (Motor Speech) WNL   Vocal Loudness (Motor Speech) WNL   Comment, Motor Speech Assessment Mildly imprecise articulation increases with speed and fatigue   Auditory Comprehension   Yes/No Questions (Auditory Comprehension) simple/factual questions;biographical/personal questions   Simple/Factual Questions (Auditory Comprehension) intact;over 90% accuracy   Biographical/Personal Questions (Auditory Comprehension) 75-90% accuracy   Verbal Expression   Conversational Speech (Verbal Expression) WFL   Comment, Assessment (Verbal Expression) Occasional paraphasias and word-finding difficulties noted   Cognition   Cognitive Status At least moderate cognitive-linguistic deficits   Cognitive Function  "attention deficit;executive function deficit;memory deficit;safety deficit   Attention Deficit (Cognition) severe deficit;focused/sustained attention;selective attention   Executive Function Deficit (Cognition) moderate deficit;information processing;insight/awareness of deficits;organization/sequencing;planning/decision-making;problem-solving/reasoning;self-monitoring/self-correction   Memory Deficit (Cognition) minimal deficit;short-term memory   Safety Deficit (Cognition) moderate deficit;awareness of need for assistance;insight into deficits/self-awareness   General Therapy Interventions   Planned Therapy Interventions Cognitive Treatment   Cognitive treatment Internal memory strategy training;External memory strategy training;Progressive attention training   SLP Therapy Assessment/Plan   Criteria for Skilled Therapeutic Interventions Met (SLP Eval) yes;treatment indicated   SLP Diagnosis At least moderate cognitive-linguistic deficits   Rehab Potential (SLP Eval) good, to achieve stated therapy goals   Therapy Frequency (SLP Eval) daily   Predicted Duration of Therapy Intervention (SLP Eval) 3 weeks   Comment, Therapy Assessment/Plan (SLP) Cognitive-linguistic evaluation completed per MD order. Pt presents with at least moderate cognitive-linguistic deficits per formal evaluation (CLQT). Pt presents with significant left neglect/inattention which impacts processing speed, organization, and attention. Pt's memory is a relative strength as he uses verbal processing/repetition. Formal evaluation not fully completed d/t slowed processing, however anticipate at least moderate deficits in the areas of attention, executive function, and visuospatial skills. Pt would benefit from SLP services to target cognitive-linguistic skills and compensatory strategies. Pt with some insight to deficits towards the end of the session, stating \"It takes me so long to think things through\".    Therapy Plan Review/Discharge Plan (SLP) "   Therapy Plan Review (SLP) evaluation/treatment results reviewed;care plan/treatment goals reviewed;risks/benefits reviewed;current/potential barriers reviewed;participants voiced agreement with care plan;participants included;patient    Total Evaluation Time   Total Evaluation Time (Minutes) 60  (cognition)

## 2022-01-29 NOTE — PROGRESS NOTES
Diabetes Consult Daily  Progress Note          Assessment/Plan:     HPI:  Miriam Hall is a 46 year old male with PMHx HTN who presented on 1/8/2022 with L gaze palsy, L complete hemianopia, mild dysarthria, L arm > leg weakness, L arm/leg decreased sensation, and L side extinction after waking up from a nap. FTH an occlusion of the P1 segment of the R PCA with associated established infarct as well as high grade stenosis of the M2 segment of the R MCA. TTE with EF of 50-55%, no WMA or atrial enlargement.    Diagnosed with DM 2 in 2017 and lifestyle changes recommended, never on DM medication.     Assessment:     1)  Type 2 Diabetes Mellitus, uncontrolled (A1c 7.8), with hyperglycemia  2)  Acute ischemic stroke w/encephelopathy     Plan:                  -  Metformin  mg with dinner - low threshold to discharge if renal fxn worsens                 -  Novolog  Medium intensity sliding scale AC, HS                  -  BG monitoring TID AC, HS                 -  hypoglycemia protocol                 -  carb counting protocol                 -  diabetes education needs will be assessed closer to discharge.      Outpatient follow up: TBD     Plan discussed with patient, bedside RN, and text page to primary team            Interval History:     The last 24 hours progress and nursing notes reviewed.  Started low dose metformin last evening.     1/28 Creat 1.32/GFR 67  1/29 creat 1.45/GFR 60    BG stable.       Rohan has no question or issues today  Aware we will monitor renal function with start of Metformin  Text page to primary team as ROMY re: creat/GFR and metformin       Carryover documentation:    Patient notes that he has been trying to keep his carbohydrates low (he skipped mashed potatoes last night, for example) given the need for insulin.   Explained to him that we want his calorie intake to increase, and reaching that goal should include carbohydrates.        Recent Labs   Lab  "01/29/22  0629 01/29/22  0234 01/28/22  2050 01/28/22  1653 01/28/22  1259 01/28/22  0844   * 116* 118* 141* 133*  133* 93           Nutrition:     Orders Placed This Encounter      Combination Diet Regular Diet          PTA Regimen:     none          Review of Systems:   CC:  denies    Constitutional:   No fever/chills  ENT/Mouth:   No hearing changes, no ear pain, no sore throat, no rhinorrhea, no difficulty swallowing  Eyes:  No eye pain, no discharge, no vision changes  CV:   No CP/SOB, no new edema  Resp:  No cough, no wheezing  GI:   No N/V, denies constipation, denies diarrhea  :  No dysuria, frequency, or hematuria  Musk:  No new joint swelling/pain, no back pain  Skin/heme:   No new rashes/bruises/open areas.  No pruritis  Neuro:   No new weakness, denies numbness/tingling, no headache  Psych:   No new anxiety, denies mood changes or depression  Endocrine:  No new polyuria or polydipsia         Medications:   Steroid planning:  none       Physical Exam:   /72   Pulse 74   Temp 98  F (36.7  C) (Oral)   Resp 16   Ht 1.737 m (5' 8.4\")   Wt 75.7 kg (166 lb 12.8 oz)   SpO2 97%   BMI 25.07 kg/m      General:   NAD, pleasant, resting comfortably   HEENT:  NC/AT. MMM, sclera not injected  Lungs:  unremarkable, no new cough, no SOB  ABD:   rounded, soft  Skin:  warm and dry, no obvious lesions/rash/bleeding  MSK:   moving all extremities  Mental Status:  alert and oriented x3  Psych:   Cooperative, good eye contact, full/appropriate affect          Data:     Lab Results   Component Value Date    A1C 7.8 01/08/2022        CBC RESULTS:   Recent Labs   Lab Test 01/29/22  0629   WBC 9.2   RBC 4.53   HGB 13.8   HCT 40.7   MCV 90   MCH 30.5   MCHC 33.9   RDW 13.4        Recent Labs   Lab Test 01/29/22  0629 01/29/22  0234 01/28/22  0844 01/28/22  0428     --   --  137   POTASSIUM 3.9  --   --  3.7   CHLORIDE 106  --   --  103   CO2 25  --   --  28   ANIONGAP 6  --   --  6   * " 116*   < > 96   BUN 30  --   --  31*   CR 1.45*  --   --  1.32*   VALERIE 9.2  --   --  9.0    < > = values in this interval not displayed.     Liver Function Studies -   Recent Labs   Lab Test 01/17/22  0433   PROTTOTAL 7.8   ALBUMIN 2.9*   BILITOTAL 0.7   ALKPHOS 98   AST 22   ALT 20     Lab Results   Component Value Date    INR 1.06 01/18/2022    INR 0.95 01/08/2022    INR 1.0 01/08/2022       MARBELLA Alexis CNP   Inpatient Diabetes Management Service  Pager - [phone]  Friday - Monday 0800 - 1600 hrs    To contact Endocrine Diabetes service:   From 8AM-4PM: page inpatient diabetes provider that is following the patient that day (see filed or incomplete progress notes/consult notes).  If uncertain of provider assignment: page job code [phone].  For questions or updates from 4PM-8AM: page the diabetes job code for on call fellow: [phone]    Please notify inpatient diabetes service if changes are planned to steroids, nutrition, or if procedures are planned requiring prolonged NPO status.Diabetes Management Team job code: [phone]    I spent a total of 25 minutes on the date of the encounter doing chart review, history and exam, documentation and further activities per the note.  Over 50% of my time on the unit was spent counseling the patient and/or coordinating care regarding acute hyperglycemic management.  See note for details.

## 2022-01-29 NOTE — PLAN OF CARE
FOCUS/GOAL  Medical management    ASSESSMENT, INTERVENTIONS AND CONTINUING PLAN FOR GOAL:  Had a good night of sleep. Scheduled hydralazine given  /71 at the moment. Denied pain. Voided using urinal.No BM this shift.  No weakness or numbness reported. Will continue with POC.

## 2022-01-29 NOTE — PLAN OF CARE
Postural Assessment for Stroke Scale (PASS)    Maintaining posture -   1) Sitting without support - 3  2) Standing with support (feet position free) - 2  3) Standing without support (feet position free) - 0  4) Standing on nonparetic leg - 0  5) Standing on paretic leg - 0    Changing posture -  6) Rolling supine -> affected side - 2  7) Rolling supine -> unaffected side - 1  8) Sup->sitting edge of bed - 1  9) Sitting edge of bed -> sup - 0  10) Sit->stand without support - 1  11) Stand->sit without support - 1  12) Standing,  pencil from floor without support - 0    Total Score - 11/36    The PASS assesses a patient's ability to change and maintain posture during different balance activities. It is not associated with falls risk, but is used to track progress with the patient's ability to control their posture and balance throughout the course of the patient's admission.

## 2022-01-29 NOTE — PROGRESS NOTES
[facility]    Medicine Progress Note - Hospitalist Service     ARU Day #1       ASSESSMENT & PLAN:   Miriam Hall is a 46 year old male with history of HTN admitted to [facility] on 1/8/22 with acute R PCA stroke, refractory HTN with concern for secondary hypertension, new onset type II diabetes, hypokalemia, DISHA, and acute encephalopathy. Transferred to ARU on 1/28/22 for rehabilitation.     # Low grade fever:  Etiology unclear. Asymptomatic. Exam unremarkable. WBC 9.2 but up-trending. COVID-vaccinated. Last COVID PCR 1/23.   - Monitor for recurrent fevers and new symptoms  - Repeat COVID-19 PCR     # Acute R PCA stroke:  Presented w/ L hemiplegia. CT Head (1/8) showed a R parieto-occipital infarct. CTA showed occlusion of proximal R PCA and high grade stenosis of M2 branch of R MCA. No tPA, presented outside tx window. Repeat imaging 1/12 showed hemorrhagic transformation, otherwise stable. Source suspected to be high grade atherosclerosis vs ESUS. No hx a-fib. CARISA negative for LA thrombus. CV risk factors include uncontrolled HTN and DM2. Residual deficits include L hemiplegia, L hemianopsia, and mild dysarthria. Overall clinically stable.  - PT/OT consulted  - ASA 81mg daily  - Atorvastatin 40mg daily  - See below for anti-hypertensive regimen  - Continue Ziopatch x 14 days (placed 1/28)  - Follow up with Neurology prn   - Genetics referral on discharge given early age for CVA     # Refractory HTN:  SBP >240 on presentation. Required 6 med regimen to get SBP <180 in hospital. Nephrology consulted, question possible secondary hypertension. Urine metanephrines negative. Renal US negative for ABBIE. Presentation c/f hyperaldosteronism given persistent hypokalemia and concomitant metabolic alkalosis. Aldosterone and Renin levels normal. Per Nephrology, DDx includes Cushing (urinary cortisol pending), Liddle syndrome, and apparent mineralocorticoid excess (free  urinary cortisol and cortisone pending). BP remains adequately controlled today.  - Amiloride 10mg QD  - Carvedilol 37.5mg BID  - Chlorthalidone 25mg QD  - Clonidine 0.1mg BID  - Hydralazine 50mg TID  - Lisinopril 40mg QD   - Per Nephrology, if able to come off meds prioritize discontinuation of hydralazine, followed by clonidine  - Follow up with nephrology in 2 weeks for ongoing management  - Endocrine referral on discharge to further evaluate possible hyperaldo     # New onset diabetes, type II:  A1C 7.8% on 1/8. Endocrine consulted in hospital for management. Started on metformin prior to discharge. Currently well controlled.  - Metformin 500mg QD  - MSSI AC/HS  - Hypoglycemia protocol      # Hypokalemia, refractory:  Concern for hyperaldosteronism, see above. Work up negative thus far. Managed with replacement protocol in hospital, discharged on supplements. K 3.9 today.  - Continue KCl 20meq BID  - Monitor for hyperkalemia while on Amiloride   - Recommend BMP q M/Th     # DISHA:  Baseline Cr 1.0-1.2. Noted to have DISHA on admission w/ Cr 1.37, possibly secondary to hypertensive urgency/emergency. Cr normalized on 1/12. Developed recurrent DISHA on 1/22 with Cr up to 1.5. Felt to be d/t volume depletion. Many med adjustments made around that time. BP medications likely contributing (ACE, diuretics). Mild improvement following small fluid bolus, however Cr back up to 1.45 today.  - Monitor closely  - Encourage PO hydration   - Repeat BMP in AM     # Acute encephalopathy (resolved):  Persistent encephalopathy noted throughout hospital stay which gradually resolved. Possible etiologies include effective of CVA, hypertensive encephalopathy, and seizures. Initial EEG with possible subclinical seizures. Loaded w/ Keppra. Repeat EEG negative. Currently A&O x3. Does not appear to be confused or encephalopathic.   - Monitor clinically      # Possible seizures:  As above, EEG 1/12 in setting of AMS concerning for subclinical  seizures. Initially loaded w/ keppra but later transitioned to depakote. Repeat EEG negative.   - Seizure precautions  - Depakote ER 1000mg QD  - Follow up with Neurology as directed      # Non-severe malnutrition:  NG feeding tube placed in hospital d/t encephalopathy and malnutrition risk. No dysphagia noted. Diet advanced. TF discontinued prior to discharge and NG removed.   - Recommend nutrition and SLP consults     # Platelet defect:  Secondary to ASA. Will monitor for clinical signs of bleeding.            The patient's care was discussed with the Patient and Primary team.    Karlo Guerin PA-C  Hospitalist Service  [facility]  Securely message with the Vocera Web Console (learn more here)  Text page via [facility] Paging/Directory       ______________________________________________________________________    Interval History   Feeling well today. Noted to have a low grade fever this AM. Denies any new complaints. No chills, cough, sore throat, sinus congestion, body aches, worsening fatigue, N/V/D, abdominal pain, dysuria, urinary urgency, or frequency.      ROS:  Pulm, CV, GI and  negative unless otherwise noted above.     Data reviewed today: I reviewed all medications, new labs and imaging results over the last 24 hours.    Physical Exam   Vital Signs: Temp: 98  F (36.7  C) Temp src: Oral BP: 128/72 Pulse: 74   Resp: 16 SpO2: 97 % O2 Device: None (Room air)    Weight: 166 lbs 12.8 oz  GENERAL:  Awake. Alert. NAD.    HEENT:  Moist mucous membranes.   CV:  RRR. S1, S2. No murmur.   PULM:  Normal effort. CTAB. No wheezing, rhonchi or rales.  GI:  Abdomen soft, non-distended. Active bowel sounds. No tenderness.    NEURO:  Left hemiplegia, otherwise non-focal. Moves all extremities.    EXTREMITIES:  No peripheral edema. No calf tenderness.   SKIN:  Dry. No visible rash.     Data   Recent Labs   Lab 01/29/22  0629 01/28/22  0428 01/27/22  1346 01/27/22  0428  01/26/22  1448 01/26/22  0439 01/25/22  1401 01/25/22  0425    137  --  139  --  140  --  139   POTASSIUM 3.9 3.7 3.8 3.7  3.7   < > 3.6  3.6   < > 3.3*  3.3*   CHLORIDE 106 103  --  103  --  105  --  104   CO2 25 28  --  29  --  28  --  31   ANIONGAP 6 6  --  7  --  7  --  4   BUN 30 31*  --  30  --  28  --  33*   CR 1.45* 1.32*  --  1.56*  --  1.31*  --  1.32*   VALERIE 9.2 9.0  --  9.5  --  9.3  --  9.1   MAG  --  1.9  --  2.0  --  1.9  --  2.1   PHOS  --  4.2  --  3.8  --  3.0  --  3.7    < > = values in this interval not displayed.     Recent Labs   Lab 01/29/22  0629 01/28/22  0428 01/27/22  0428 01/26/22  0439   WBC 9.2 7.7 7.0 7.0   RBC 4.53 4.66 4.79 4.76   HGB 13.8 14.1 14.7 14.5   HCT 40.7 42.0 44.3 43.4   MCV 90 90 93 91   MCH 30.5 30.3 30.7 30.5   MCHC 33.9 33.6 33.2 33.4   RDW 13.4 13.6 13.4 13.5    296 310 311     No lab results found in last 7 days.  No lab results found in last 7 days.   No lab results found in last 7 days.  No lab results found in last 7 days.  Recent Labs   Lab 01/29/22  1235 01/29/22  1213 01/29/22  0808 01/29/22  0629 01/29/22  0234 01/28/22  2050 01/28/22  1653 01/28/22  1259 01/28/22  0844 01/28/22  0428 01/27/22  2303 01/27/22  1251   * 127* 114* 124* 116* 118* 141* 133*  133* 93 96 120* 165*        All labs personally reviewed in Epic.  See A&P for additional results.     Unresulted Labs Ordered in the Past 30 Days of this Admission     Date and Time Order Name Status Description    1/25/2022 11:36 AM Cortisol Cortisone Free 24 Hour Urine In process

## 2022-01-29 NOTE — PLAN OF CARE
FOCUS/GOAL  Bowel management, Bladder management, Pain management, Medical management, Mobility, Skin integrity, and Safety management    ASSESSMENT, INTERVENTIONS AND CONTINUING PLAN FOR GOAL:  Patient is alert oriented x 4 and forgetful. Stroke with left side weakness. A-2 with liko lift. Skin is clean and intact. Mild edema on left foot and ankle. Patient denied pain headache dizziness, CP or SOB. Numbness present on left upper and lower extremities no tingling reported. Regular/thin diet with good appetite.  and 118. Take pills whole. VS WDL. Continent of bladder uses urinal with assist PVR 81 and 15 last BM 01/27 per patient report. No care concern at this time. Call light is in reach alarm is on for safety. Side rails up x 3, tray table placed on standard position. We will continue per POC.

## 2022-01-29 NOTE — PLAN OF CARE
"Discharge Planner Post-Acute Rehab SLP:     Discharge Plan: TBD    Precautions: Fall    Current Status:  Communication: Mild dysarthria. Occasional word-finding difficulties in conversation, however pt is fluent and able to communicate wants/needs.  Cognition: At least moderate cognitive-linguistic deficits in the areas of attention, executive function, processing speed, and visuospatial skills.  Swallow: Pt reportedly tolerating a regular diet with thin liquids. Pt discharged from previous hospital with no swallowing concerns.    Assessment: Cognitive-linguistic evaluation completed per MD order. Pt presents with at least moderate cognitive-linguistic deficits per formal evaluation (CLQT). Pt presents with significant left neglect/inattention which impacts processing speed, organization, and attention. Pt's memory is a relative strength as he uses verbal processing/repetition. Formal evaluation not fully completed d/t slowed processing, however anticipate at least moderate deficits in the areas of attention, executive function, and visuospatial skills. Pt would benefit from SLP services to target cognitive-linguistic skills and compensatory strategies. Pt with some insight to deficits towards the end of the session, stating \"It takes me so long to think things through\".     Unable to score CLQT subsections at this time d/t 5/10 subtests remaining. Will plan to complete 1/30.    Other Barriers to Discharge (Family Training, etc): TBD    "

## 2022-01-29 NOTE — PLAN OF CARE
FOCUS/GOAL  Bowel management, Bladder management, Pain management, Mobility, Skin integrity, and Safety management    ASSESSMENT, INTERVENTIONS AND CONTINUING PLAN FOR GOAL:  A/O, VS stable. Regular/thin, pills whole with water. Ate most of meal. BG checks good, no insulin needed to be given. Continent of bowel and bladder, last BM 1/27. No complaints of pain this shift. Assist of 2 with liko to transfer, wheelchair based. No new skin concerns. Calling appropriately with light. Continue POC.

## 2022-01-30 ENCOUNTER — APPOINTMENT (OUTPATIENT)
Dept: SPEECH THERAPY | Facility: CLINIC | Age: 47
End: 2022-01-30
Attending: PHYSICAL MEDICINE & REHABILITATION
Payer: COMMERCIAL

## 2022-01-30 ENCOUNTER — APPOINTMENT (OUTPATIENT)
Dept: OCCUPATIONAL THERAPY | Facility: CLINIC | Age: 47
End: 2022-01-30
Attending: PHYSICAL MEDICINE & REHABILITATION
Payer: COMMERCIAL

## 2022-01-30 ENCOUNTER — APPOINTMENT (OUTPATIENT)
Dept: PHYSICAL THERAPY | Facility: CLINIC | Age: 47
End: 2022-01-30
Attending: PHYSICAL MEDICINE & REHABILITATION
Payer: COMMERCIAL

## 2022-01-30 LAB
ANION GAP SERPL CALCULATED.3IONS-SCNC: 6 MMOL/L (ref 3–14)
BUN SERPL-MCNC: 30 MG/DL (ref 7–30)
CALCIUM SERPL-MCNC: 9.5 MG/DL (ref 8.5–10.1)
CHLORIDE BLD-SCNC: 106 MMOL/L (ref 94–109)
CO2 SERPL-SCNC: 26 MMOL/L (ref 20–32)
CREAT SERPL-MCNC: 1.54 MG/DL (ref 0.66–1.25)
GFR SERPL CREATININE-BSD FRML MDRD: 56 ML/MIN/1.73M2
GLUCOSE BLD-MCNC: 221 MG/DL (ref 70–99)
GLUCOSE BLDC GLUCOMTR-MCNC: 107 MG/DL (ref 70–99)
GLUCOSE BLDC GLUCOMTR-MCNC: 118 MG/DL (ref 70–99)
GLUCOSE BLDC GLUCOMTR-MCNC: 149 MG/DL (ref 70–99)
GLUCOSE BLDC GLUCOMTR-MCNC: 190 MG/DL (ref 70–99)
GLUCOSE BLDC GLUCOMTR-MCNC: 91 MG/DL (ref 70–99)
HOLD SPECIMEN: NORMAL
POTASSIUM BLD-SCNC: 4.2 MMOL/L (ref 3.4–5.3)
SODIUM SERPL-SCNC: 138 MMOL/L (ref 133–144)

## 2022-01-30 PROCEDURE — 99233 SBSQ HOSP IP/OBS HIGH 50: CPT | Performed by: PHYSICIAN ASSISTANT

## 2022-01-30 PROCEDURE — 97129 THER IVNTJ 1ST 15 MIN: CPT | Mod: GN

## 2022-01-30 PROCEDURE — 250N000013 HC RX MED GY IP 250 OP 250 PS 637: Performed by: PHYSICIAN ASSISTANT

## 2022-01-30 PROCEDURE — 82310 ASSAY OF CALCIUM: CPT | Performed by: PHYSICIAN ASSISTANT

## 2022-01-30 PROCEDURE — 97110 THERAPEUTIC EXERCISES: CPT | Mod: GP

## 2022-01-30 PROCEDURE — 97112 NEUROMUSCULAR REEDUCATION: CPT | Mod: GP

## 2022-01-30 PROCEDURE — 250N000013 HC RX MED GY IP 250 OP 250 PS 637: Performed by: PHYSICAL MEDICINE & REHABILITATION

## 2022-01-30 PROCEDURE — 36415 COLL VENOUS BLD VENIPUNCTURE: CPT | Performed by: PHYSICIAN ASSISTANT

## 2022-01-30 PROCEDURE — 97535 SELF CARE MNGMENT TRAINING: CPT | Mod: GO | Performed by: OCCUPATIONAL THERAPIST

## 2022-01-30 PROCEDURE — 99232 SBSQ HOSP IP/OBS MODERATE 35: CPT | Performed by: NURSE PRACTITIONER

## 2022-01-30 PROCEDURE — 97130 THER IVNTJ EA ADDL 15 MIN: CPT | Mod: GN

## 2022-01-30 PROCEDURE — 128N000003 HC R&B REHAB

## 2022-01-30 PROCEDURE — 97530 THERAPEUTIC ACTIVITIES: CPT | Mod: GP

## 2022-01-30 RX ADMIN — HYDRALAZINE HYDROCHLORIDE 50 MG: 50 TABLET, FILM COATED ORAL at 12:31

## 2022-01-30 RX ADMIN — POTASSIUM CHLORIDE 20 MEQ: 1.5 FOR SOLUTION ORAL at 20:35

## 2022-01-30 RX ADMIN — INSULIN ASPART 1 UNITS: 100 INJECTION, SOLUTION INTRAVENOUS; SUBCUTANEOUS at 12:31

## 2022-01-30 RX ADMIN — CHLORTHALIDONE 25 MG: 25 TABLET ORAL at 08:04

## 2022-01-30 RX ADMIN — DIVALPROEX SODIUM 1000 MG: 500 TABLET, FILM COATED, EXTENDED RELEASE ORAL at 08:04

## 2022-01-30 RX ADMIN — POTASSIUM CHLORIDE 20 MEQ: 1.5 FOR SOLUTION ORAL at 08:04

## 2022-01-30 RX ADMIN — POLYETHYLENE GLYCOL 3350 17 G: 17 POWDER, FOR SOLUTION ORAL at 16:23

## 2022-01-30 RX ADMIN — CLONIDINE HYDROCHLORIDE 0.1 MG: 0.1 TABLET ORAL at 20:35

## 2022-01-30 RX ADMIN — HYDRALAZINE HYDROCHLORIDE 50 MG: 50 TABLET, FILM COATED ORAL at 03:21

## 2022-01-30 RX ADMIN — ACETAMINOPHEN 650 MG: 325 TABLET, FILM COATED ORAL at 16:18

## 2022-01-30 RX ADMIN — ASPIRIN 81 MG CHEWABLE TABLET 81 MG: 81 TABLET CHEWABLE at 08:04

## 2022-01-30 RX ADMIN — CLONIDINE HYDROCHLORIDE 0.1 MG: 0.1 TABLET ORAL at 08:04

## 2022-01-30 RX ADMIN — AMILORIDE HYDROCLORIDE 10 MG: 5 TABLET ORAL at 08:04

## 2022-01-30 RX ADMIN — ATORVASTATIN CALCIUM 40 MG: 40 TABLET, FILM COATED ORAL at 20:35

## 2022-01-30 RX ADMIN — CARVEDILOL 37.5 MG: 25 TABLET, FILM COATED ORAL at 08:04

## 2022-01-30 RX ADMIN — CARVEDILOL 37.5 MG: 25 TABLET, FILM COATED ORAL at 17:07

## 2022-01-30 RX ADMIN — LISINOPRIL 40 MG: 20 TABLET ORAL at 08:04

## 2022-01-30 ASSESSMENT — ACTIVITIES OF DAILY LIVING (ADL)
ADLS_ACUITY_SCORE: 16

## 2022-01-30 NOTE — PROGRESS NOTES
Diabetes Consult Daily  Progress Note          Assessment/Plan:     HPI:  Miriam Hall is a 46 year old male with PMHx HTN who presented on 1/8/2022 with L gaze palsy, L complete hemianopia, mild dysarthria, L arm > leg weakness, L arm/leg decreased sensation, and L side extinction after waking up from a nap. FTH an occlusion of the P1 segment of the R PCA with associated established infarct as well as high grade stenosis of the M2 segment of the R MCA. TTE with EF of 50-55%, no WMA or atrial enlargement.    Diagnosed with DM 2 in 2017 and lifestyle changes recommended, never on DM medication.    IDS to sign off today     Assessment:     1)  Type 2 Diabetes Mellitus, uncontrolled (A1c 7.8), with hyperglycemia  2)  Acute ischemic stroke w/encephelopathy     Plan:                  -  Metformin  mg with dinner - rec low threshold to discharge if renal fxn worsens (medicine team aware)                 -  Novolog  Medium intensity sliding scale AC, HS                  -  BG monitoring TID AC, HS                 -  hypoglycemia protocol                 -  carb counting protocol                 -  diabetes education needs will be assessed closer to discharge dependant on needs.     Inpatient Diabetes Service Signing off 01/30/2022  Please call back with any questions. If needing home recs, please call at least 24 hours prior to discharge.       Outpatient follow up: recommend PCP      Plan discussed with patient, bedside RN, and text page to primary team            Interval History:     The last 24 hours progress and nursing notes reviewed.  Started low dose metformin last evening.     1/28 Creat 1.32/GFR 67  1/29 creat 1.45/GFR 60  1/30 no labs for today    BG stable.       Rohan has no new questions or issues today    Patient has 2 packages of apple squeezie he has consumed as well as an ensure on the bedside table.  Suspect this is the reason for his postprandial  "hyperglycemia.    Reminded to please let staff know re: snacking       Recent Labs   Lab 01/30/22  0204 01/29/22  2111 01/29/22  1805 01/29/22  1235 01/29/22  1213 01/29/22  0808   * 169* 100* 112* 127* 114*           Nutrition:     Orders Placed This Encounter      Combination Diet Regular Diet          PTA Regimen:     none          Review of Systems:   CC:  denies    Constitutional:   No fever/chills  ENT/Mouth:   No hearing changes, no ear pain, no sore throat, no rhinorrhea, no difficulty swallowing  Eyes:  No eye pain, no discharge, no vision changes  CV:   No CP/SOB, no new edema  Resp:  No cough, no wheezing  GI:   No N/V, denies constipation, denies diarrhea  :  No dysuria, frequency, or hematuria  Musk:  No new joint swelling/pain, no back pain  Skin/heme:   No new rashes/bruises/open areas.  No pruritis  Neuro:   No new weakness, denies numbness/tingling, no headache  Psych:   No new anxiety, denies mood changes or depression  Endocrine:  No new polyuria or polydipsia         Medications:   Steroid planning:  none       Physical Exam:   /77   Pulse 73   Temp 97.4  F (36.3  C) (Oral)   Resp 16   Ht 1.737 m (5' 8.4\")   Wt 75.7 kg (166 lb 12.8 oz)   SpO2 95%   BMI 25.07 kg/m      General:   NAD, pleasant, resting comfortably   HEENT:  NC/AT. MMM - thrush?, sclera not injected  Lungs:  unremarkable, no new cough, no SOB  ABD:   rounded, soft  Skin:  warm and dry, no obvious lesions/rash/bleeding  MSK:   moving all extremities  Mental Status:  alert and oriented to self  Psych:   Cooperative, good eye contact, full/appropriate affect          Data:     Lab Results   Component Value Date    A1C 7.8 01/08/2022        CBC RESULTS: Recent Labs   Lab Test 01/29/22  0629   WBC 9.2   RBC 4.53   HGB 13.8   HCT 40.7   MCV 90   MCH 30.5   MCHC 33.9   RDW 13.4          Recent Labs   Lab Test 01/30/22  0744 01/30/22  0204 01/29/22  0808 01/29/22  0629 01/28/22  0844 01/28/22  0428   NA  --   " --   --  137  --  137   POTASSIUM  --   --   --  3.9  --  3.7   CHLORIDE  --   --   --  106  --  103   CO2  --   --   --  25  --  28   ANIONGAP  --   --   --  6  --  6   * 107*   < > 124*   < > 96   BUN  --   --   --  30  --  31*   CR  --   --   --  1.45*  --  1.32*   VALERIE  --   --   --  9.2  --  9.0    < > = values in this interval not displayed.     Liver Function Studies -   Recent Labs   Lab Test 01/17/22  0433   PROTTOTAL 7.8   ALBUMIN 2.9*   BILITOTAL 0.7   ALKPHOS 98   AST 22   ALT 20     Lab Results   Component Value Date    INR 1.06 01/18/2022    INR 0.95 01/08/2022    INR 1.0 01/08/2022       MARBELLA Alexis CNP   Inpatient Diabetes Management Service  Pager - [phone]  Friday - Monday 0800 - 1600 hrs    To contact Endocrine Diabetes service:   From 8AM-4PM: page inpatient diabetes provider that is following the patient that day (see filed or incomplete progress notes/consult notes).  If uncertain of provider assignment: page job code [phone].  For questions or updates from 4PM-8AM: page the diabetes job code for on call fellow: [phone]    Please notify inpatient diabetes service if changes are planned to steroids, nutrition, or if procedures are planned requiring prolonged NPO status.Diabetes Management Team job code: [phone]    I spent a total of 25 minutes on the date of the encounter doing chart review, history and exam, documentation and further activities per the note.  Over 50% of my time on the unit was spent counseling the patient and/or coordinating care regarding acute hyperglycemic management.  See note for details.

## 2022-01-30 NOTE — PLAN OF CARE
FOCUS/GOAL  Bowel management, Bladder management, Pain management, Mobility, Skin integrity, and Safety management    ASSESSMENT, INTERVENTIONS AND CONTINUING PLAN FOR GOAL:  A/O, VS stable. Regular/thin, pills whole with water. Ate most of meal. , 149, insulin given with lunch. Continent of bowel and bladder, last BM 1/27. No complaints of pain this shift. Assist of 1-2 with ghada steady to transfer. No new skin concerns. Calling appropriately with light. Continue POC.

## 2022-01-30 NOTE — CONSULTS
Social Work: Initial Assessment with Discharge Plan    Patient Name: Miriam Hall  : 1975  Age: 46 year old  MRN: [id number]  Completed assessment with: Chart review and interview with patient   Admitted to ARU: 2022    Presenting Information   Date of SW assessment: 2022  Health Care Directive: Patient considering completing  Primary Health Care Agent: Pt  Secondary Health Care Agent: Mother is YSABELK.Pt interested in completing HCD. SW provided copies to pt.   Living Situation: Lives alone in an apartment on the 2nd level in [place], [place]. [address].   Previous Functional Status: Independent w/ ADLs, IADLs, transfers and gait.  DME available: See therapy evals.  Patient and family understanding of hospitalization: Appropriate  Cultural/Language/Spiritual Considerations: English speaking male.       Physical Health  Reason for admission: Miriam Hall is a 46 year old R hand dominant male with a hx of HTN and T2DM who presents to the ARU for an ischemic stroke with hemorrhagic transformation. 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    Provider Information   Primary Care Physician:No Ref-Primary, Physician - pt needs PCP. Would like to go [facility], no PCP preference. MARLENA updated [facility].   : None reported    Mental Health/Chemical Dependency:   Diagnosis: No formal diagnosis, but pt reports anxiety from his job.  Alcohol/Tobacco/Narcotis: None reported  Support/Services in Place: None reported  Services Needed/Recommended: Supportive services available by consult (Health Psychology and Livia services)   Sexuality/Intimacy: Not discussed     Support System  Marital Status: Single  Family support: Brother, Davi, lives in [place] and can come stay w/ pt to provide support at discharge if needed. Friend/ex-girlfriend, Taniya, is the most involved per pt and able to help. Friends, Jordyn and Deirdre. Pt's mother lives locally, but pt  states they have not talked in over a year and he has tried to get in contact with her, but he hasn't heard from her.  Other support available: None reported    Community Resources  Current in home services: None reported  Previous services: None reported    Financial/Employment/Education  Employment Status: Works in [employer] for [employer] area [employer]. Job is in person.   Income Source: Wages  Education: [employer] school  Financial Concerns:  None reported  Insurance: [employer] [place]      Discharge Plan   Patient and family discharge goal: Home with HC vs OP, pending progress.   Provided Education on discharge plan: Yes  Patient agreeable to discharge plan:  Yes  Provided education and attained signature for Medicare IM and IRF Patient Rights and Privacy Information provided to patient : NA  Provided patient with [facility] Resources: Yes. SW sent referral and copied info in AVS w/ pt's permission.  Barriers to discharge: Medical clearance, therapy goals met.    Discharge Recommendations   Disposition: See above   Transportation Needs: Friend assistance   Name of Transportation Company and Phone: N/A     Additional comments   Discharge CARY SEBASTIAN 3-4 weeks. 13/15 on BIMS.    SW will remain available and continue to follow as needs arise.     Please invite to Care Conference:  ROMULO BULL, GEGE Barrientos   Phone: [phone]  Pager: [phone]

## 2022-01-30 NOTE — PLAN OF CARE
Discharge Planner Post-Acute Rehab OT:     Discharge Plan: TBD pending progress    Precautions: L side inattention and hemiparesis, falls    Current Status:  ADLs:    Mobility: W/c based. Max A SPT R side. Kaley stedy A x 1.    Grooming: Min A supported sitting.    Dressing: UB Max A. LB Total A.     Bathing: Dependent chair. Max A.    Toileting: Kaley stedy A x 1 commode. Total A hygiene/clothing.  IADLs: Needs assistance.  Vision/Cognition: L side inattention.    Assessment: Requiring significant physical assistance to complete ADL routine, limited by L hemiparesis, L side inattention, and cognitive deficits. Initiated training for L visual scanning and angelique ADL techniques. Plan to initiate UE FES tomorrow for sensorimotor retraining.    Other Barriers to Discharge (DME, Family Training, etc):   Pt lives alone and caregiver support is limited.  Home set up - 2nd level apartment.  Requires significant physical assistance.

## 2022-01-30 NOTE — PLAN OF CARE
Skilled set up on :  Pt dependent for proper placement of electrodes on L gastroc, ant tib, quad, HS for optimum muscle contraction, safe positioning on w/c within frame and cushion for prevention of skin breakdown, feet secured to foot pedals, w/c secured to  w/ Q-straints.  Passive motion assessed to ensure proper positioning.      Pt performed 27.5 minutes of active FES ergometry with 100% stimulation applied to above muscles at 35rpm with 0.5nm resistance.  This PT adjusted e-stim and cycling parameters in real-time to ensure palpable muscle contractions throughout session.  Please see [email or web address] for further details on patient's stimulation parameters and ergometry outcomes.      Changes in parameters that were part of this treatment:   -resistance  -pulse width, frequency  -amplitude  -pedal speed    - Initial setup. Time spent adjusting parameters due to complaint of L hip pain. Pain was persistent however with all stim turned off, was worse with stim, likely from increased mm activation. 15 min was performed without stim for ROM and endurance.      Functional outcomes from this intervention include:   -improved sensory awareness and proprioception  -improved muscle strength  -improved motor coordination    Session Summary:   -Average Power: 1.2W  -Asymmetry: L 1%  -Active Minutes: 14

## 2022-01-30 NOTE — PLAN OF CARE
Discharge Planner Post-Acute Rehab PT:     Discharge Plan: TBD, may not have the support to return home. Pt lives in 2nd story apartment, his ex-girlfriend is his only local identified support, but pt does not want or anticipate she will help significantly.    Precautions: Fall/alarm, L angelique, L field cut and neglect    Current Status:  Bed Mobility: Mod-A >sit, max-A > supine  Transfer: Max-A squat/pivot. Assist of 1 Kaley Edwards with nursing  Gait: Unable  Stairs: Unable  Balance: Sits close supervision unsupported, assist to stand due to weakness  PASS: 11/36 on 1/29    Assessment:  Initiated FES bike, poor tolerance due to L hip pain radiating into groin. Reviewed xray from acute site which states no acute abnormalities, however this pain is significantly limiting functionally and given level of pain with bed mobility and transfers, this will be significantly limiting in mobility progression at this time warranting further workup.    Other Barriers to Discharge (DME, Family Training, etc):   Stairs to enter  Level of support  W/C  L AFO  Further gait DME TBD

## 2022-01-30 NOTE — PLAN OF CARE
FOCUS/GOAL  Bowel management, Bladder management, Pain management, Medical management, Mobility, Skin integrity, and Safety management    ASSESSMENT, INTERVENTIONS AND CONTINUING PLAN FOR GOAL:  Patient is alert and oriented. Left side weakness. Denied pain, headache, dizziness, CP or SOB. A-1 or 2 with ghada steady left. Regular/thin fluid given and encouraged with good acceptance.  and 169. Take pills whole. Continent of bladder uses urinal with staff assist, PVR 0 ml bladder scan completed in this shift. Last BM 01/27 PRN senna administered. Asymptomatic COVID test negative. VS WDL No care concern at this time. Call light is in reach alarm is on for safety bed side rails are up x 3 tray table placed on standard position. We will continue per POC.

## 2022-01-30 NOTE — PLAN OF CARE
FOCUS/GOAL  Bowel management, Bladder management, and Pain management    ASSESSMENT, INTERVENTIONS AND CONTINUING PLAN FOR GOAL:    Pt slept well during the overnight, denies pain.  at 0200. Continent of urine and uses urinal at night. Continue with plan of care.

## 2022-01-30 NOTE — PROGRESS NOTES
[facility]    Medicine Progress Note - Hospitalist Service     ARU Day #2       ASSESSMENT & PLAN:   Miriam Hall is a 46 year old male with history of HTN admitted to [facility] on 1/8/22 with acute R PCA stroke, refractory HTN with concern for secondary hypertension, new onset type II diabetes, hypokalemia, DISHA, and acute encephalopathy. Transferred to ARU on 1/28/22 for rehabilitation.     # Low grade fever:  Etiology unclear. Asymptomatic. Exam unremarkable. WBC 9.2 but up-trending. COVID-vaccinated. Repeat COVID PCR negative. Now afebrile >24h.   - Monitor for recurrent fevers and new symptoms     # Acute R PCA stroke:  Presented w/ L hemiplegia. CT Head (1/8) showed a R parieto-occipital infarct. CTA showed occlusion of proximal R PCA and high grade stenosis of M2 branch of R MCA. Did not receive tPA (presented outside tx window). Repeat imaging 1/12 showed hemorrhagic transformation, otherwise stable. Source suspected to be high grade atherosclerosis vs ESUS. No hx a-fib. CARISA negative for LA thrombus. CV risk factors include uncontrolled HTN and DM2. Residual deficits include L hemiplegia, L hemianopsia, and mild dysarthria. Overall clinically stable. No significant dysarthria noted today.  - PT/OT consulted  - ASA 81mg daily  - Atorvastatin 40mg daily  - See below for anti-hypertensive regimen  - Continue Ziopatch x 14 days (placed 1/28)  - Follow up with Neurology prn   - Genetics referral on discharge given early age for CVA     # Refractory HTN:  SBP >240 on presentation. Nephrology consulted in hospital. Evaluated for possible secondary hypertension. Urine metanephrines negative. Renal US negative for ABBIE. Concern for hyperaldosteronism given persistent hypokalemia and concomitant metabolic alkalosis. Aldosterone and Renin levels normal. Per Nephrology, DDx includes Cushing (urinary cortisol pending), Liddle syndrome, and apparent mineralocorticoid  excess (free urinary cortisol and cortisone pending). BP adequately controlled on current regimen. Reports feeling more fatigued, possibly result of diuretics and mild volume depletion.  - Amiloride 10mg QD  - Carvedilol 37.5mg BID  - Chlorthalidone 25mg QD  - Clonidine 0.1mg BID  - Hydralazine 50mg TID  - Lisinopril 40mg QD   - Per Nephrology, if able to come off meds prioritize discontinuation of hydralazine, followed by clonidine  - Follow up with nephrology in 2 weeks for ongoing management  - Endocrine referral on discharge to further evaluate possible hyperaldo     # New onset diabetes, type II:  A1C 7.8% on 1/8. Endocrine consulted in hospital for management. Started on metformin prior to discharge. Currently well controlled. Endocrine consulted here.   - Metformin 500mg daily, monitoring GFR  - [facility]  - Hypoglycemia protocol      # Hypokalemia, refractory:  Concern for hyperaldosteronism, see above. Work up negative thus far. Managed with replacement protocol in hospital, discharged on supplements. K remains normal.  - Continue KCl 20meq BID  - Monitor for hyperkalemia while on Amiloride   - Recommend BMP q M/Th     # DISHA:  Baseline Cr 1.0-1.2. Noted to have DISHA on admission w/ Cr 1.37, possibly secondary to hypertensive urgency/emergency. Cr normalized on 1/12. Developed recurrent DISHA on 1/22 with Cr up to 1.5. Felt to be d/t volume depletion. Many med adjustments made around that time. BP medications likely contributing (ACE, diuretics). Mild improvement following small fluid bolus, however Cr back up to 1.45.  - Repeat BMP pending  - Encourage PO hydration   - Repeat BMP in AM     # Acute encephalopathy (resolved):  Persistent encephalopathy noted throughout hospital stay which gradually resolved. Possible etiologies include effective of CVA, hypertensive encephalopathy, and seizures. Initial EEG with possible subclinical seizures. Loaded w/ Keppra. Repeat EEG negative. Currently A&O x3. Does not  appear to be confused or encephalopathic.   - Monitor clinically      # Possible seizures:  As above, EEG 1/12 in setting of AMS concerning for subclinical seizures. Initially loaded w/ keppra but later transitioned to depakote. Repeat EEG negative.   - Seizure precautions  - Depakote ER 1000mg QD  - Follow up with Neurology as directed      # Non-severe malnutrition:  NG feeding tube placed in hospital d/t encephalopathy and malnutrition risk. No dysphagia noted. Diet advanced. TF discontinued prior to discharge and NG removed.   - Recommend nutrition and SLP consults     # Platelet defect:  Secondary to ASA. Will monitor for clinical signs of bleeding.            The patient's care was discussed with the Patient and Primary team.    Karlo Guerin PA-C  Hospitalist Service  [facility]  Securely message with the Vocera Web Console (learn more here)  Text page via [facility] Paging/Directory       ______________________________________________________________________    Interval History   No acute events overnight. Reports feeling more fatigued today. Questions whether it could be d/t BP meds. Also states he feels dehydrated and plans to drink more fluids today. No other complaints. Otherwise feeling well. Stable L-sided weakness.    ROS:  Pulm, CV, GI and  negative unless otherwise noted above.     Data reviewed today: I reviewed all medications, new labs and imaging results over the last 24 hours.    Physical Exam   Vital Signs: Temp: 98.1  F (36.7  C) Temp src: Oral BP: (Abnormal) 146/83 Pulse: 68   Resp: 16 SpO2: 97 % O2 Device: None (Room air)    Weight: 166 lbs 12.8 oz  GENERAL:  Awake. Alert. NAD.    HEENT:  Moist mucous membranes.   CV:  RRR. S1, S2. No murmur.   PULM:  Normal effort. CTAB. No wheezing, rhonchi or rales.  GI:  Abdomen soft, non-distended. Active bowel sounds. No tenderness.    NEURO:  Left hemiplegia, otherwise non-focal. Moves all extremities.     EXTREMITIES:  No peripheral edema. No calf tenderness.   SKIN:  Dry. No visible rash.     Data   Recent Labs   Lab 01/29/22  0629 01/28/22  0428 01/27/22  1346 01/27/22  0428 01/26/22  1448 01/26/22  0439 01/25/22  1401 01/25/22  0425    137  --  139  --  140  --  139   POTASSIUM 3.9 3.7 3.8 3.7  3.7   < > 3.6  3.6   < > 3.3*  3.3*   CHLORIDE 106 103  --  103  --  105  --  104   CO2 25 28  --  29  --  28  --  31   ANIONGAP 6 6  --  7  --  7  --  4   BUN 30 31*  --  30  --  28  --  33*   CR 1.45* 1.32*  --  1.56*  --  1.31*  --  1.32*   VALERIE 9.2 9.0  --  9.5  --  9.3  --  9.1   MAG  --  1.9  --  2.0  --  1.9  --  2.1   PHOS  --  4.2  --  3.8  --  3.0  --  3.7    < > = values in this interval not displayed.     Recent Labs   Lab 01/29/22  0629 01/28/22  0428 01/27/22  0428 01/26/22  0439   WBC 9.2 7.7 7.0 7.0   RBC 4.53 4.66 4.79 4.76   HGB 13.8 14.1 14.7 14.5   HCT 40.7 42.0 44.3 43.4   MCV 90 90 93 91   MCH 30.5 30.3 30.7 30.5   MCHC 33.9 33.6 33.2 33.4   RDW 13.4 13.6 13.4 13.5    296 310 311       Recent Labs   Lab 01/30/22  0744 01/30/22  0204 01/29/22  2111 01/29/22  1805 01/29/22  1235 01/29/22  1213 01/29/22  0808 01/29/22  0629 01/29/22  0234 01/28/22  2050 01/28/22  1653 01/28/22  1259   * 107* 169* 100* 112* 127* 114* 124* 116* 118* 141* 133*  133*        All labs personally reviewed in Epic.  See A&P for additional results.     Unresulted Labs Ordered in the Past 30 Days of this Admission     Date and Time Order Name Status Description    1/25/2022 11:36 AM Cortisol Cortisone Free 24 Hour Urine In process

## 2022-01-30 NOTE — PLAN OF CARE
Discharge Planner Post-Acute Rehab SLP:     Discharge Plan: TBD    Precautions: Fall    Current Status:  Communication: Mild dysarthria. Occasional word-finding difficulties in conversation, however pt is fluent and able to communicate wants/needs.  Cognition: At least moderate cognitive-linguistic deficits in the areas of attention, executive function, processing speed, and visuospatial skills.  Swallow: Pt reportedly tolerating a regular diet with thin liquids. Pt discharged from previous hospital with no swallowing concerns.    Assessment: Patient able to verbalize awareness of his left-sided neglect and noted to be attempting to independently scan to the left.  When provided with a bright line on the left side of the paper, patient was able to attend to that side.  However, when attempting to have patient locate items in a field, was unable to do so without max assistance and patient confused and attempting to turn the page versus attending to the left.    Other Barriers to Discharge (Family Training, etc): TBD

## 2022-01-31 ENCOUNTER — APPOINTMENT (OUTPATIENT)
Dept: SPEECH THERAPY | Facility: CLINIC | Age: 47
End: 2022-01-31
Attending: PHYSICAL MEDICINE & REHABILITATION
Payer: COMMERCIAL

## 2022-01-31 ENCOUNTER — APPOINTMENT (OUTPATIENT)
Dept: PHYSICAL THERAPY | Facility: CLINIC | Age: 47
End: 2022-01-31
Attending: PHYSICAL MEDICINE & REHABILITATION
Payer: COMMERCIAL

## 2022-01-31 ENCOUNTER — APPOINTMENT (OUTPATIENT)
Dept: ULTRASOUND IMAGING | Facility: CLINIC | Age: 47
End: 2022-01-31
Attending: PHYSICIAN ASSISTANT
Payer: COMMERCIAL

## 2022-01-31 ENCOUNTER — APPOINTMENT (OUTPATIENT)
Dept: OCCUPATIONAL THERAPY | Facility: CLINIC | Age: 47
End: 2022-01-31
Attending: PHYSICAL MEDICINE & REHABILITATION
Payer: COMMERCIAL

## 2022-01-31 LAB
ALBUMIN UR-MCNC: NEGATIVE MG/DL
ANION GAP SERPL CALCULATED.3IONS-SCNC: 7 MMOL/L (ref 3–14)
APPEARANCE UR: CLEAR
BILIRUB UR QL STRIP: NEGATIVE
BUN SERPL-MCNC: 36 MG/DL (ref 7–30)
CALCIUM SERPL-MCNC: 9.3 MG/DL (ref 8.5–10.1)
CHLORIDE BLD-SCNC: 103 MMOL/L (ref 94–109)
CO2 SERPL-SCNC: 24 MMOL/L (ref 20–32)
COLOR UR AUTO: ABNORMAL
CREAT SERPL-MCNC: 1.65 MG/DL (ref 0.66–1.25)
CREAT UR-MCNC: 93 MG/DL
GFR SERPL CREATININE-BSD FRML MDRD: 51 ML/MIN/1.73M2
GLUCOSE BLD-MCNC: 232 MG/DL (ref 70–99)
GLUCOSE BLDC GLUCOMTR-MCNC: 105 MG/DL (ref 70–99)
GLUCOSE BLDC GLUCOMTR-MCNC: 108 MG/DL (ref 70–99)
GLUCOSE BLDC GLUCOMTR-MCNC: 117 MG/DL (ref 70–99)
GLUCOSE BLDC GLUCOMTR-MCNC: 183 MG/DL (ref 70–99)
GLUCOSE BLDC GLUCOMTR-MCNC: 74 MG/DL (ref 70–99)
GLUCOSE BLDC GLUCOMTR-MCNC: 74 MG/DL (ref 70–99)
GLUCOSE UR STRIP-MCNC: NEGATIVE MG/DL
HGB UR QL STRIP: NEGATIVE
HYALINE CASTS: 4 /LPF
KETONES UR STRIP-MCNC: NEGATIVE MG/DL
LEUKOCYTE ESTERASE UR QL STRIP: NEGATIVE
MUCOUS THREADS #/AREA URNS LPF: PRESENT /LPF
NITRATE UR QL: NEGATIVE
OSMOLALITY SERPL: 303 MMOL/KG (ref 275–295)
OSMOLALITY UR: 456 MMOL/KG (ref 100–1200)
PH UR STRIP: 5.5 [PH] (ref 5–7)
POTASSIUM BLD-SCNC: 4.6 MMOL/L (ref 3.4–5.3)
RBC URINE: <1 /HPF
SODIUM SERPL-SCNC: 134 MMOL/L (ref 133–144)
SODIUM UR-SCNC: 79 MMOL/L
SP GR UR STRIP: 1.01 (ref 1–1.03)
SQUAMOUS EPITHELIAL: <1 /HPF
UROBILINOGEN UR STRIP-MCNC: NORMAL MG/DL
WBC URINE: 1 /HPF

## 2022-01-31 PROCEDURE — 250N000013 HC RX MED GY IP 250 OP 250 PS 637: Performed by: PHYSICAL MEDICINE & REHABILITATION

## 2022-01-31 PROCEDURE — 83935 ASSAY OF URINE OSMOLALITY: CPT | Performed by: PHYSICIAN ASSISTANT

## 2022-01-31 PROCEDURE — 36415 COLL VENOUS BLD VENIPUNCTURE: CPT | Performed by: PHYSICIAN ASSISTANT

## 2022-01-31 PROCEDURE — 97535 SELF CARE MNGMENT TRAINING: CPT | Mod: GO | Performed by: OCCUPATIONAL THERAPIST

## 2022-01-31 PROCEDURE — 97129 THER IVNTJ 1ST 15 MIN: CPT | Mod: GN

## 2022-01-31 PROCEDURE — 99232 SBSQ HOSP IP/OBS MODERATE 35: CPT | Mod: GC | Performed by: PHYSICAL MEDICINE & REHABILITATION

## 2022-01-31 PROCEDURE — 97140 MANUAL THERAPY 1/> REGIONS: CPT | Mod: GP | Performed by: PHYSICAL THERAPIST

## 2022-01-31 PROCEDURE — 82570 ASSAY OF URINE CREATININE: CPT | Performed by: PHYSICIAN ASSISTANT

## 2022-01-31 PROCEDURE — 128N000003 HC R&B REHAB

## 2022-01-31 PROCEDURE — 76770 US EXAM ABDO BACK WALL COMP: CPT | Mod: 26

## 2022-01-31 PROCEDURE — 97130 THER IVNTJ EA ADDL 15 MIN: CPT | Mod: GN

## 2022-01-31 PROCEDURE — 97112 NEUROMUSCULAR REEDUCATION: CPT | Mod: GP

## 2022-01-31 PROCEDURE — 97112 NEUROMUSCULAR REEDUCATION: CPT | Mod: GO | Performed by: OCCUPATIONAL THERAPIST

## 2022-01-31 PROCEDURE — 81001 URINALYSIS AUTO W/SCOPE: CPT | Performed by: PHYSICIAN ASSISTANT

## 2022-01-31 PROCEDURE — 99233 SBSQ HOSP IP/OBS HIGH 50: CPT | Performed by: PHYSICIAN ASSISTANT

## 2022-01-31 PROCEDURE — 84300 ASSAY OF URINE SODIUM: CPT | Performed by: PHYSICIAN ASSISTANT

## 2022-01-31 PROCEDURE — 83930 ASSAY OF BLOOD OSMOLALITY: CPT | Performed by: PHYSICIAN ASSISTANT

## 2022-01-31 PROCEDURE — 250N000013 HC RX MED GY IP 250 OP 250 PS 637: Performed by: PHYSICIAN ASSISTANT

## 2022-01-31 PROCEDURE — 80048 BASIC METABOLIC PNL TOTAL CA: CPT | Performed by: PHYSICIAN ASSISTANT

## 2022-01-31 PROCEDURE — 258N000003 HC RX IP 258 OP 636: Performed by: PHYSICIAN ASSISTANT

## 2022-01-31 PROCEDURE — 97530 THERAPEUTIC ACTIVITIES: CPT | Mod: GP

## 2022-01-31 PROCEDURE — 76770 US EXAM ABDO BACK WALL COMP: CPT

## 2022-01-31 RX ORDER — LIDOCAINE 4 G/G
1 PATCH TOPICAL
Status: DISCONTINUED | OUTPATIENT
Start: 2022-01-31 | End: 2022-02-14

## 2022-01-31 RX ORDER — SODIUM CHLORIDE, SODIUM LACTATE, POTASSIUM CHLORIDE, CALCIUM CHLORIDE 600; 310; 30; 20 MG/100ML; MG/100ML; MG/100ML; MG/100ML
INJECTION, SOLUTION INTRAVENOUS
Status: DISPENSED
Start: 2022-01-31 | End: 2022-02-01

## 2022-01-31 RX ORDER — ACETAMINOPHEN 325 MG/1
650 TABLET ORAL EVERY 6 HOURS
Status: DISCONTINUED | OUTPATIENT
Start: 2022-01-31 | End: 2022-02-14

## 2022-01-31 RX ADMIN — POTASSIUM CHLORIDE 20 MEQ: 1.5 FOR SOLUTION ORAL at 21:33

## 2022-01-31 RX ADMIN — DIVALPROEX SODIUM 1000 MG: 500 TABLET, FILM COATED, EXTENDED RELEASE ORAL at 08:25

## 2022-01-31 RX ADMIN — HYDRALAZINE HYDROCHLORIDE 50 MG: 50 TABLET, FILM COATED ORAL at 18:14

## 2022-01-31 RX ADMIN — CARVEDILOL 37.5 MG: 25 TABLET, FILM COATED ORAL at 08:24

## 2022-01-31 RX ADMIN — ATORVASTATIN CALCIUM 40 MG: 40 TABLET, FILM COATED ORAL at 21:33

## 2022-01-31 RX ADMIN — CLONIDINE HYDROCHLORIDE 0.1 MG: 0.1 TABLET ORAL at 08:25

## 2022-01-31 RX ADMIN — LIDOCAINE PATCH 4% 1 PATCH: 40 PATCH TOPICAL at 21:33

## 2022-01-31 RX ADMIN — HYDRALAZINE HYDROCHLORIDE 50 MG: 50 TABLET, FILM COATED ORAL at 11:52

## 2022-01-31 RX ADMIN — LISINOPRIL 40 MG: 20 TABLET ORAL at 08:25

## 2022-01-31 RX ADMIN — SODIUM CHLORIDE, POTASSIUM CHLORIDE, SODIUM LACTATE AND CALCIUM CHLORIDE 500 ML: 600; 310; 30; 20 INJECTION, SOLUTION INTRAVENOUS at 17:11

## 2022-01-31 RX ADMIN — CLONIDINE HYDROCHLORIDE 0.1 MG: 0.1 TABLET ORAL at 21:33

## 2022-01-31 RX ADMIN — CHLORTHALIDONE 25 MG: 25 TABLET ORAL at 08:24

## 2022-01-31 RX ADMIN — POTASSIUM CHLORIDE 20 MEQ: 1.5 FOR SOLUTION ORAL at 08:25

## 2022-01-31 RX ADMIN — ASPIRIN 81 MG CHEWABLE TABLET 81 MG: 81 TABLET CHEWABLE at 08:24

## 2022-01-31 RX ADMIN — AMILORIDE HYDROCLORIDE 10 MG: 5 TABLET ORAL at 08:25

## 2022-01-31 RX ADMIN — HYDRALAZINE HYDROCHLORIDE 50 MG: 50 TABLET, FILM COATED ORAL at 02:20

## 2022-01-31 RX ADMIN — CARVEDILOL 37.5 MG: 25 TABLET, FILM COATED ORAL at 18:15

## 2022-01-31 ASSESSMENT — ACTIVITIES OF DAILY LIVING (ADL)
ADLS_ACUITY_SCORE: 16

## 2022-01-31 NOTE — PLAN OF CARE
FOCUS/GOAL  Bowel management, Bladder management, Pain management, Medical management, Mobility, Skin integrity, and Safety management    ASSESSMENT, INTERVENTIONS AND CONTINUING PLAN FOR GOAL:  Patient is alert and oriented. Complained of left hip pain after therapy PRN tylenol was effective.Left side weakness left upper and lower extremities are flaccid. Denied dizziness, headache, chest pain or SOB. A-2 with ghada steady lift. Continent of B/B transferred to [facility] and had large formed BM after Miralax.  Regular/thin good appetite after set up assist. BG 91 and 190. Patient take pills whole metformin held due to GFR 56 on call provider notified. Hydralazine held at 1900 per BP parameter. No care concern at this time. Call light is in reach alarm is on for safety, side rails are up x 3, tray table in standard position. We will continue per POC.

## 2022-01-31 NOTE — PROGRESS NOTES
[facility]    Medicine Progress Note - Hospitalist Service    Date of Admission: 1/28/2022    Assessment & Plan        Miriam Hall is a 46 year old male admitted with history of HTN who was admitted to [facility] 1/8 with R PCA stroke, refractory HTN and concern for secondary HTN, new onset DMII, hypokalemia, DISHA and encephalopathy. Stabilized and transferred to ARU 1/28. Medicine consulted for assistance with medical management       Acute R PCA stroke: Presented with L hemiplegia. Head CT 1/8 R parieto-occipital infarct. CTA noted occlusion on proximal R PCA and high grade stenosis of M2 branch of R MCA. Outside window for tPA. Repeat imagine 1/12 with hemorrhagic transformation. Source 2/2 high grade atherosclerosis vs ESUS. CARISA negative for LA thrombus. Residual deficits include L hemiplegia, L hemianopsia, mild dysarthria   - Continue ASA, statin  - HTN regimen as below   - Ziopatch through 2/10  - PT/OT following   - Follow up with genetics on discharge given early age CVA  - Follow up with neurology as indicated     DISHA: BL Cr 1-1.2. Peak 1.5 while at [facility], thought 2/2 hypovolemia and medication adjustments. BMP stabilized, then started creeping up over the past several days. Cr 1.65 (1.32 --> 1.45 --> 1.54) with rising BUN, so possible prerenal etiology. No documented hypotension. Nephrotoxic drugs include chlorthalidone, lisinopril, ASA, metformin  - 500 ml bolus  - Urine studies  - Renal US  - BMP 2/1    Refractory HTN: SBP>240 on admission. Nephrology consulted while admitted. Concern for hyperaldosteronism given persistent hypokalemia and concomitant metabolic alkalosis. Aldosterone and renin levels normal. Ddx per nephrology includes Cushing (urinary cortisol pending), Liddle syndrome, apparent mineral corticoid excess (free urinary cortisol and cortisone pending). BP stable on current regimen  - Continue current Amiloride, Coreg, chlorthalidone, clonidine,  hydralazine, lisinopril with hold parameters   - Per nephrology, if able to come off meds stop hydralazine first, then clonidine   - Follow urine cortisol   - Follow up with nephrology in 2 weeks  - Follow up with endo on discharge for ongoing eval for possible hyperaldosteronemia     DMII, new onset: A1c 7.8 1/8. Endo consulted, started on Metformin prior to discharge. Glucose stable  - Continue Metformin, monitor GFR  - Monitor for hyperkalemia while on Amiloride   - MSSI, hypoglycemia protocol     Refractory hypokalemia: Concern for hyperaldosteronism as above. Negative workup thus far. Discharge on Kdur. K stable. Continue scheduled K replacement. BMP qM/Th      Resolved and Stable Issues:  Possible seizures: In the setting of CVA. EEG 1/12 concerning for subclinical seizures. Loaded with Keppra then transitioned to Depakote. Continue Depakote and seizure precautions. Follow up OP with neurology as indicated   Low grade fever: Resolved. See notes 1/30 and prior. Repeat infectious workup if recurrent fevers   Toxic metabolic and stroke related encephalopathy: Resolved prior to transfer to ARU. Per notes, etiology 2/2 CVA vs hypertensive encephalopathy vs seizures. No ongoing issues noted. Monitor  Non-severe protein calorie malnutrition: In the setting of the above. Reports good oral intake. Monitor and consider nutrition consult pending course   Platelet defect: 2/2 ASA use. Monitor for bleeding        Diet: Combination Diet Regular Diet  Room Service    DVT Prophylaxis: Defer to primary service  Mendoza Catheter: Not present  Central Lines: None  Cardiac Monitoring: None  Code Status: Full Code      Disposition Plan   Expected Discharge: 02/18/2022     Anticipated discharge location:  Awaiting care coordination huddle  Delays:     Per primary team      Park Lyle PA-C  Hospitalist Service  [facility]  Securely message with the Vocera Web Console (learn more  here)  Text page via [facility] Paging/Directory         Clinically Significant Risk Factors Present on Admission             # Diabetes, type II: last A1C 7.8 % (Ref range: 0.0 - 5.6 %)  # Overweight: last Body mass index is 25.07 kg/m .      ______________________________________________________________________    Interval History   Doing well but very tired after PT/OT this am. Getting stronger. Appetite stable. Denies GI losses. No AMS. Denies visual changes. No chest pain, dyspnea, cough, abdominal pain, diarrhea, urinary symptoms. Tired and plans for a nap before his next therapy session     Data reviewed today: I reviewed all medications, new labs and imaging results over the last 24 hours    Physical Exam   Vital Signs: Temp: 98.5  F (36.9  C) Temp src: Oral BP: 130/76 Pulse: 72   Resp: 16 SpO2: 98 % O2 Device: None (Room air)    Weight: 166 lbs 12.8 oz  General Appearance: Alert and oriented x3, sitting up in wheelchair   HEENT: Anicteric sclera, MMM   Respiratory: Breathing comfortably on room air, lungs CTAB without wheezing or crackles  Cardiovascular: RRR, S1, S2. No murmur noted. ZioPatch in place  GI: Abdomen soft, non-tender with active bowel sounds. No guarding or rebound  Lymph/Hematologic: No peripheral edema, distal pulses palpable   Skin: No rash or jaundice   Musculoskeletal: Moves all extremities   Neurologic:  Sided hemiplegia      Data   Recent Labs   Lab 01/31/22  1120 01/31/22  1108 01/31/22  0551 01/30/22  1156 01/30/22  1038 01/29/22  0808 01/29/22  0629 01/28/22  0844 01/28/22  0428 01/27/22  0916 01/27/22  0428   WBC  --   --   --   --   --   --  9.2  --  7.7  --  7.0   HGB  --   --   --   --   --   --  13.8  --  14.1  --  14.7   MCV  --   --   --   --   --   --  90  --  90  --  93   PLT  --   --   --   --   --   --  305  --  296  --  310   NA  --  134  --   --  138  --  137  --  137  --  139   POTASSIUM  --  4.6  --   --  4.2  --  3.9  --  3.7   < > 3.7  3.7   CHLORIDE  --  103  --   --   106  --  106  --  103  --  103   CO2  --  24  --   --  26  --  25  --  28  --  29   BUN  --  36*  --   --  30  --  30  --  31*  --  30   CR  --  1.65*  --   --  1.54*  --  1.45*  --  1.32*  --  1.56*   ANIONGAP  --  7  --   --  6  --  6  --  6  --  7   VALERIE  --  9.3  --   --  9.5  --  9.2  --  9.0  --  9.5   * 232* 105*   < > 221*   < > 124*   < > 96   < > 95    < > = values in this interval not displayed.     No results found for this or any previous visit (from the past 24 hour(s)).

## 2022-01-31 NOTE — PROGRESS NOTES
"Individualized Overall Plan Of Care (IOPOC)      Rehab diagnosis/Impairment Group Code: Stroke ischemic 01.1 (l) body involvement (r) brain: r pca ischemic stroke  Stroke (h)       Expected functional outcome: reach a level of mod I     Clinical Impression Comments: patient with L hemiparesis 2/2 CVA    Mobility: Pt presents following CVA with significant L side hemiparesis and sensory deficits leading to above deficits. PASS score indicates w/c based goals at this time, will continue to evaluate potential for mixed mobility. Given pt's current living enviornment and lack of support, guarded potential to return home from this setting unless other accomodations are made.    ADL: The pt is a 46 yr old male admitted to IP ARU following hospital stay for CVA. Performance with ADLs/IADLs is below baseline, impacted by L hemiparesis, L inattention, L sensory deficits, and cognitive deficits. Goals to maximize IND and safety ADLs/IADLs, however limited by caregiver support, home set up, and expected physical assist at discharge. Pt will benefit from skilled OT intervention to address goals in POC and maximize IND and safety in discharge environment. ELOS 4 weeks.    Communication/Cognition/Swallow: Cognitive-linguistic evaluation completed per MD order. Pt presents with at least moderate cognitive-linguistic deficits per formal evaluation (CLQT). Pt presents with significant left neglect/inattention which impacts processing speed, organization, and attention. Pt's memory is a relative strength as he uses verbal processing/repetition. Formal evaluation not fully completed d/t slowed processing, however anticipate at least moderate deficits in the areas of attention, executive function, and visuospatial skills. Pt would benefit from SLP services to target cognitive-linguistic skills and compensatory strategies. Pt with some insight to deficits towards the end of the session, stating \"It takes me so long to think things " "through\".      Intensity of therapy:   PT 60 minutes, Daily (60-90 minutes), for 4 weeks  OT 60 minutes, Daily, for 4 weeks  SLP 60 minutes, daily, for 3 weeks    Orthotics AFO  Education stroke  Neuropsychology Testing: No        Medical Prognosis: good     Physician summary statement: patient with L hemiparesis 2/2 CVA, goal is to reach a level of mod I     Discharge destination: prior home  Discharge rehabilitation needs: home care, RN, PT, OT and SLP      Estimated length of stay: 21 days       Rehabilitation Physician Jean Paul Kohler, DO       "

## 2022-01-31 NOTE — TELEPHONE ENCOUNTER
Talked in person with pt about SHADI trial on 1/27/2022. Pt expressed interest in trial. Plan to follow up with him in [facility].

## 2022-01-31 NOTE — PLAN OF CARE
Discharge Planner Post-Acute Rehab PT:     Discharge Plan: TBD, may not have the support to return home. Pt lives in 2nd story apartment, his ex-girlfriend is his only local identified support, but pt does not want or anticipate she will help significantly.    Precautions: Fall/alarm, L angelique, L field cut and neglect    Current Status:  Bed Mobility: Mod-A >sit, max-A > supine  Transfer: Max-A squat/pivot. Assist of 1 Kaley Edwards with nursing  Gait: Unable  Stairs: Unable  Balance: Sits close supervision, requires UE support and assist for midline, assist to stand due to weakness  PASS: 11/36 on 1/29    Assessment: Continues to have significant limitations due to L hip pain that progresses with increased sitting activities and any WB attempts, refers into groin. Focus today on seated posture/trunk control.    Other Barriers to Discharge (DME, Family Training, etc):   L hip pain  Stairs to enter  Level of support  W/C  L AFO  Further gait DME TBD

## 2022-01-31 NOTE — PLAN OF CARE
Discharge Planner Post-Acute Rehab OT:     Discharge Plan: HC vs TCU  Barriers include home set up and limited caregiver support.    Precautions: L side inattention and hemiparesis, falls    Current Status:  ADLs:    Mobility: W/c based. Max A SPT R side. Kaley stedy A x 1.    Grooming: Min A supported sitting.    Dressing: UB Max A. LB Total A.     Bathing: Dependent chair. Max A.    Toileting: Kaley stedy A x 1 commode. Total A hygiene/clothing.  IADLs: Needs assistance.  Vision/Cognition: L side inattention.    Assessment: Initiated BUE FES for LUE sensorimotor retraining. Pt experiencing L hip pain, especially with unsupported sitting and standing tasks. No pain with prolonged supported sitting.      Other Barriers to Discharge (DME, Family Training, etc):   Pt lives alone and caregiver support is limited.  Home set up - 2nd level apartment.  Requires significant physical assistance.

## 2022-01-31 NOTE — PROGRESS NOTES
"  [facility]   Acute Rehabilitation Unit    INTERVAL HISTORY  Nursing notes reviewed, no acute events overnight.     Continent of bladder, continent of bowel.     Denies chest pain abdominal pain, shortness of breath. No further concerns. He reports L hip pain, which worsens with sitting activities.    Functionally, he has multiple cognitive impairments noted on Quick Test results. He is currently unable to do stairs for ambulate, and is a max assist with transfers.      MEDICATIONS  Scheduled meds    aMILoride  10 mg Oral Daily     aspirin  81 mg Oral Daily     atorvastatin  40 mg Oral QPM     carvedilol  37.5 mg Oral BID w/meals     chlorthalidone  25 mg Oral Daily     cloNIDine  0.1 mg Oral BID     divalproex sodium extended-release  1,000 mg Oral Daily     hydrALAZINE  50 mg Oral Q8H     insulin aspart  1-7 Units Subcutaneous TID AC     insulin aspart  1-5 Units Subcutaneous At Bedtime     lisinopril  40 mg Oral Daily     metFORMIN  500 mg Oral Daily with supper     potassium chloride  20 mEq Oral BID       PRN meds:  acetaminophen, bisacodyl, glucose **OR** dextrose **OR** glucagon, ondansetron, polyethylene glycol, sennosides      PHYSICAL EXAM  BP (!) 148/86 (BP Location: Right arm)   Pulse 72   Temp 98.5  F (36.9  C) (Oral)   Resp 16   Ht 1.737 m (5' 8.4\")   Wt 75.7 kg (166 lb 12.8 oz)   SpO2 98%   BMI 25.07 kg/m    Gen: well appearing, pleasant, sitting in wheelchair, slightly sleepy  HEENT: moist mucus membranes, nares clear, no discharge  Cardio: RRR, no murmurs  Pulm: lungs CTA bilaterally, on room air  Abd: soft, nondistended  Ext: able to move R hand, skin clean, dry, nonerythematous on BUE  Neuro/MSK: negative LEIF, able to move RUE independently    LABS  Results for orders placed or performed during the hospital encounter of 01/28/22 (from the past 24 hour(s))   Glucose by meter   Result Value Ref Range    GLUCOSE BY METER POCT 149 (H) 70 - 99 mg/dL "   Extra Tube    Narrative    The following orders were created for panel order Extra Tube.  Procedure                               Abnormality         Status                     ---------                               -----------         ------                     Extra Purple Top Tube[[id number]]                            Final result                 Please view results for these tests on the individual orders.   Extra Purple Top Tube   Result Value Ref Range    Hold Specimen JIC    Glucose by meter   Result Value Ref Range    GLUCOSE BY METER POCT 91 70 - 99 mg/dL   Glucose by meter   Result Value Ref Range    GLUCOSE BY METER POCT 190 (H) 70 - 99 mg/dL   Glucose by meter   Result Value Ref Range    GLUCOSE BY METER POCT 74 70 - 99 mg/dL   Glucose by meter   Result Value Ref Range    GLUCOSE BY METER POCT 105 (H) 70 - 99 mg/dL       ASSESSMENT AND PLAN    Miriam Hall is a 46 year old R hand dominant male with a hx of HTN and T2DM who presents to the ARU for an ischemic stroke with hemorrhagic transformation. 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 Rohan with have therapy evaluations tomorrow. Medicine consulted for assistance with medication management due to resistant HTN. Endo was also consulted for management of his T2DM due to an A1c of 7.8     Admission to acute inpatient rehab on 1/28/2022.    Impairment group code: Stroke Ischemic 01.1 (L) Body Involvement (R) Brain: R PCA ischemic stroke w/ hemorrhagic transformation noted    1. PT, OT and SLP 60 minutes of each on a daily basis, in addition to rehab nursing and close management of physiatrist.      2. Impairment of ADL's:  OT for 60 minutes daily to work on ADL re-training such as grooming, self cares and bathing.      3. Impairment of mobility:  PT for 60 minutes daily to work on neuromuscular re-education focusing on strength, balance, coordination, and endurance.      4. Impairment of  cognition/language/swallow:  SLP for 60 minutes daily to work on cognitive and linguistic deficits.    5. Rehab RN for med administration, bowel regimen, glucose monitoring and wound care.          1. Medical Conditions    Neuro  Acute ischemic stroke of Right P1 segement of the PCA  R P1 occlusion and R M2 Stenosis  Hemorrhagic transformation of ischemic infarct  -aspirin 325mg  -hypercoagulability panel negative  -vasculitis panel negative   -acute rehab with PT, OT, and SLP  -per neuro recommendations, continue depakote until outpatient f/u    MSK  L Hip Pain  -reports increased L hip pain  -schedule Tylenol 650mg q6h  -hip XR on 1/24 does not show evidence of fx  -can consider ice, heating pad as adjuvants     CV  Refractory HTN  -possibly secondary to clinical hyperaldosteronism of several possible etiologies  -medicine on for assistance with HTN management              -Cushing, Liddle syndrome, or mineralocorticoid excess              -workup negative so far              -urine studies pending  -amiloride 10mg  -Carvedilol 37.5mg   -chlorthalidone 25mg  -clonidine 0.1mg  -hydralazine 50mg Q8H  -lisinopril 40mg      HLD  -atorvastatin 40mg     Endo  Type 2 Diabetes  -metformin 500mg with dinner  -sliding scale insulin      Renal  DISHA  -monitor Cr with BMP  -encourage PO fluids     Hypokalemia  -20 meq K BID  -hyperaldosteronism workup still pendings  -monitor with BMP    2. Adjustment to disability:  Clinical psychology to eval and treat  3. FEN: PO fluids, monitor with BMP  4. Bowel: continent, PRN miralax and senna  5. Bladder: continent  6. DVT Prophylaxis: mechanical with SCDs, aspirin 325mg  7. GI Prophylaxis: regular diet  8. Code: Full  9. Disposition: to home, modified independent  10. ELOS:  21 days.  11. Rehab prognosis:  fair  12. Follow up Appointments on Discharge: Neuro, cardio for Zio patch, nephrology      Desire Birch,   PGY-1  Physical Medicine and Rehabilitation

## 2022-01-31 NOTE — PLAN OF CARE
Skilled set up on :  Pt dependent for proper placement of electrodes on LUE (scapular stabilization, posterior deltoid, supraspinatous, biceps, triceps, wrist flexors, and wrist extensors) for optimum muscle contraction, safe positioning on w/c within frame and cushion for prevention of skin breakdown, UE secured to arm support. Needs arm trough due to significant hemiparesis. Passive motion assessed to ensure proper positioning.      Pt performed 20 minutes of active FES ergometry with 0-100% stimulation applied to above muscles at 35 rpm with .5 nm resistance.  This OT adjusted e-stim and cycling parameters in real-time to ensure palpable muscle contractions throughout session.  Please see [email or web address] for further details on patient's stimulation parameters and ergometry outcomes.      Changes in parameters that were part of this treatment:   -resistance  -pulse width, frequency  -amplitude  -pedal speed  -Needs cues and assistance for L side attention and midline gaze.    Functional outcomes from this intervention include:   -reduced spasticity  -improved sensory awareness and proprioception  -improved muscle strength  -improved motor coordination

## 2022-01-31 NOTE — PROGRESS NOTES
Cognitive-Linguistic Quick Test results    Cognitive Domain  Severity Rating  Attention        moderate  Memory        mild  Executive Functions       severe  Language        WN  Visuospatial Skills       severe    Composite Severity Rating      moderste    Clock Drawing Severity Rating    severe    While cumulative score was in the moderate range and most scores in the moderate to severe areas, this was significantly impacted by severity of patient's left neglect.  When able to isolate and compensate for the vision impairments, patient able to complete tasks with significantly improved accuracy.  Therefore interpretation of this assessment must be done with great care.  Patient will benefit from ongoing SLP interventions to address some mild reasoning/problem solving and the severe visuospatial skills.

## 2022-01-31 NOTE — PLAN OF CARE
Cognition: Pt is AOx4  Pain: Denied pain, but has baseline numbness to left extremities. Left side flacid.   Skin: No new concerns.  Bowel/bladder: Continent of bowel and bladder. Uses urinal with assistance. A2 to commode for BM.  Transfer: A2 with ghada steady and gait belt.  Vital signs: BP in AM was elevated. MD aware. Denied SOB, chest pain, or new numbness/tingling.  BG: BGs stable. Held afternoon insulin per MD order until pt gets IV fluids. Notified evening shift RN.  Labs: Creatinine elevated - 1.65. Pt is being encouraged to drink more but not drinking liquids unless with meds. Renal ultrasound ordered. Results pending. Fluid bolus of lactated ringers ordered. Waiting for pt to get US before starting fluids. Passed on to evening shift. Peripheral IV in pt right hand initiated. Will continue to monitor and proceed with POC.

## 2022-01-31 NOTE — PLAN OF CARE
FOCUS/GOAL  Bowel management, Bladder management, Skin integrity and Cognition/Memory/Judgment/Problem solving    ASSESSMENT, INTERVENTIONS AND CONTINUING PLAN FOR GOAL:  Pt is alert and oriented, education regarding BG control provided. BG of 74 at 2 am, 4 oz of apple juice provided at this time and recheck was 105 at 6 am. /80 scheduled hydralazine administered. A of 2 with ghada steady to transfer. Pt denied pain, sob, fever, chills, n/v, or new numbness and tingling.  Needs encouragement for increased fluid intake. No further care concerns at this time.

## 2022-02-01 ENCOUNTER — APPOINTMENT (OUTPATIENT)
Dept: PHYSICAL THERAPY | Facility: CLINIC | Age: 47
End: 2022-02-01
Attending: PHYSICAL MEDICINE & REHABILITATION
Payer: COMMERCIAL

## 2022-02-01 ENCOUNTER — APPOINTMENT (OUTPATIENT)
Dept: OCCUPATIONAL THERAPY | Facility: CLINIC | Age: 47
End: 2022-02-01
Attending: PHYSICAL MEDICINE & REHABILITATION
Payer: COMMERCIAL

## 2022-02-01 ENCOUNTER — APPOINTMENT (OUTPATIENT)
Dept: SPEECH THERAPY | Facility: CLINIC | Age: 47
End: 2022-02-01
Attending: PHYSICAL MEDICINE & REHABILITATION
Payer: COMMERCIAL

## 2022-02-01 LAB
ANION GAP SERPL CALCULATED.3IONS-SCNC: 4 MMOL/L (ref 3–14)
BUN SERPL-MCNC: 35 MG/DL (ref 7–30)
CALCIUM SERPL-MCNC: 9.5 MG/DL (ref 8.5–10.1)
CHLORIDE BLD-SCNC: 105 MMOL/L (ref 94–109)
CO2 SERPL-SCNC: 29 MMOL/L (ref 20–32)
CREAT SERPL-MCNC: 1.72 MG/DL (ref 0.66–1.25)
GFR SERPL CREATININE-BSD FRML MDRD: 49 ML/MIN/1.73M2
GLUCOSE BLD-MCNC: 139 MG/DL (ref 70–99)
GLUCOSE BLDC GLUCOMTR-MCNC: 106 MG/DL (ref 70–99)
GLUCOSE BLDC GLUCOMTR-MCNC: 133 MG/DL (ref 70–99)
GLUCOSE BLDC GLUCOMTR-MCNC: 159 MG/DL (ref 70–99)
GLUCOSE BLDC GLUCOMTR-MCNC: 92 MG/DL (ref 70–99)
HOLD SPECIMEN: NORMAL
POTASSIUM BLD-SCNC: 5 MMOL/L (ref 3.4–5.3)
SODIUM SERPL-SCNC: 138 MMOL/L (ref 133–144)

## 2022-02-01 PROCEDURE — 99233 SBSQ HOSP IP/OBS HIGH 50: CPT | Performed by: PHYSICIAN ASSISTANT

## 2022-02-01 PROCEDURE — 80048 BASIC METABOLIC PNL TOTAL CA: CPT | Performed by: PHYSICIAN ASSISTANT

## 2022-02-01 PROCEDURE — 97535 SELF CARE MNGMENT TRAINING: CPT | Mod: GO | Performed by: OCCUPATIONAL THERAPIST

## 2022-02-01 PROCEDURE — 250N000013 HC RX MED GY IP 250 OP 250 PS 637: Performed by: PHYSICIAN ASSISTANT

## 2022-02-01 PROCEDURE — 999N000150 HC STATISTIC PT MED CONFERENCE < 30 MIN

## 2022-02-01 PROCEDURE — 99207 PR CDG-MDM COMPONENT: MEETS MODERATE - UP CODED: CPT | Performed by: PHYSICIAN ASSISTANT

## 2022-02-01 PROCEDURE — 250N000013 HC RX MED GY IP 250 OP 250 PS 637: Performed by: PHYSICAL MEDICINE & REHABILITATION

## 2022-02-01 PROCEDURE — 97530 THERAPEUTIC ACTIVITIES: CPT | Mod: GO

## 2022-02-01 PROCEDURE — 999N000125 HC STATISTIC PATIENT MED CONFERENCE < 30 MIN: Performed by: OCCUPATIONAL THERAPIST

## 2022-02-01 PROCEDURE — 99233 SBSQ HOSP IP/OBS HIGH 50: CPT | Mod: GC | Performed by: PHYSICAL MEDICINE & REHABILITATION

## 2022-02-01 PROCEDURE — 97130 THER IVNTJ EA ADDL 15 MIN: CPT | Mod: GN

## 2022-02-01 PROCEDURE — 999N000125 HC STATISTIC PATIENT MED CONFERENCE < 30 MIN

## 2022-02-01 PROCEDURE — 97129 THER IVNTJ 1ST 15 MIN: CPT | Mod: GN

## 2022-02-01 PROCEDURE — 128N000003 HC R&B REHAB

## 2022-02-01 PROCEDURE — 97112 NEUROMUSCULAR REEDUCATION: CPT | Mod: GP

## 2022-02-01 PROCEDURE — 36415 COLL VENOUS BLD VENIPUNCTURE: CPT | Performed by: PHYSICIAN ASSISTANT

## 2022-02-01 RX ADMIN — CLONIDINE HYDROCHLORIDE 0.1 MG: 0.1 TABLET ORAL at 20:44

## 2022-02-01 RX ADMIN — ACETAMINOPHEN 650 MG: 325 TABLET, FILM COATED ORAL at 06:14

## 2022-02-01 RX ADMIN — ATORVASTATIN CALCIUM 40 MG: 40 TABLET, FILM COATED ORAL at 20:45

## 2022-02-01 RX ADMIN — DICLOFENAC 2 G: 10 GEL TOPICAL at 10:04

## 2022-02-01 RX ADMIN — AMILORIDE HYDROCLORIDE 10 MG: 5 TABLET ORAL at 10:03

## 2022-02-01 RX ADMIN — ASPIRIN 81 MG CHEWABLE TABLET 81 MG: 81 TABLET CHEWABLE at 10:03

## 2022-02-01 RX ADMIN — DICLOFENAC 2 G: 10 GEL TOPICAL at 17:42

## 2022-02-01 RX ADMIN — DICLOFENAC 2 G: 10 GEL TOPICAL at 20:49

## 2022-02-01 RX ADMIN — DIVALPROEX SODIUM 1000 MG: 500 TABLET, FILM COATED, EXTENDED RELEASE ORAL at 10:03

## 2022-02-01 RX ADMIN — ACETAMINOPHEN 650 MG: 325 TABLET, FILM COATED ORAL at 00:24

## 2022-02-01 RX ADMIN — CLONIDINE HYDROCHLORIDE 0.1 MG: 0.1 TABLET ORAL at 10:03

## 2022-02-01 RX ADMIN — LIDOCAINE PATCH 4% 1 PATCH: 40 PATCH TOPICAL at 20:45

## 2022-02-01 RX ADMIN — CARVEDILOL 37.5 MG: 25 TABLET, FILM COATED ORAL at 10:03

## 2022-02-01 RX ADMIN — CHLORTHALIDONE 25 MG: 25 TABLET ORAL at 10:03

## 2022-02-01 RX ADMIN — ACETAMINOPHEN 650 MG: 325 TABLET, FILM COATED ORAL at 18:23

## 2022-02-01 RX ADMIN — ACETAMINOPHEN 650 MG: 325 TABLET, FILM COATED ORAL at 13:18

## 2022-02-01 RX ADMIN — CARVEDILOL 37.5 MG: 25 TABLET, FILM COATED ORAL at 18:23

## 2022-02-01 RX ADMIN — LISINOPRIL 40 MG: 20 TABLET ORAL at 10:04

## 2022-02-01 ASSESSMENT — ACTIVITIES OF DAILY LIVING (ADL)
ADLS_ACUITY_SCORE: 16

## 2022-02-01 NOTE — PROGRESS NOTES
"  [facility]   Acute Rehabilitation Unit    INTERVAL HISTORY  Nursing notes reviewed, no acute events overnight.      Denies chest pain, abdominal pain, shortness of breath. He previously reported L hip pain, but today feels much improved after the Voltaren gel.     Functionally, see team rounds note for updates.      MEDICATIONS  Scheduled meds    acetaminophen  650 mg Oral Q6H     aMILoride  10 mg Oral Daily     aspirin  81 mg Oral Daily     atorvastatin  40 mg Oral QPM     carvedilol  37.5 mg Oral BID w/meals     chlorthalidone  25 mg Oral Daily     cloNIDine  0.1 mg Oral BID     diclofenac  2 g Topical 4x Daily     divalproex sodium extended-release  1,000 mg Oral Daily     hydrALAZINE  50 mg Oral Q8H     insulin aspart  1-7 Units Subcutaneous TID AC     insulin aspart  1-5 Units Subcutaneous At Bedtime     lidocaine  1 patch Transdermal Q24h    And     lidocaine   Transdermal Q8H     lisinopril  40 mg Oral Daily     metFORMIN  500 mg Oral Daily with supper     potassium chloride  20 mEq Oral BID       PRN meds:  bisacodyl, glucose **OR** dextrose **OR** glucagon, ondansetron, polyethylene glycol, sennosides      PHYSICAL EXAM  /71 (BP Location: Right arm)   Pulse 72   Temp 98.5  F (36.9  C) (Oral)   Resp 16   Ht 1.737 m (5' 8.4\")   Wt 75.7 kg (166 lb 12.8 oz)   SpO2 98%   BMI 25.07 kg/m    Gen: tired, pleasant, sitting in bed.  HEENT: moist mucus membranes, nares clear, no discharge. Struggles to turn head to the L  Cardio: RRR, no murmurs  Pulm: lungs CTA bilaterally, on room air  Abd: soft, nondistended  Ext: able to move R hand, skin clean, dry, nonerythematous on BUE  Neuro/MSK: negative LEIF, able to move RUE independently    LABS  Results for orders placed or performed during the hospital encounter of 01/28/22 (from the past 24 hour(s))   Basic metabolic panel   Result Value Ref Range    Sodium 134 133 - 144 mmol/L    Potassium 4.6 3.4 - 5.3 mmol/L    " Chloride 103 94 - 109 mmol/L    Carbon Dioxide (CO2) 24 20 - 32 mmol/L    Anion Gap 7 3 - 14 mmol/L    Urea Nitrogen 36 (H) 7 - 30 mg/dL    Creatinine 1.65 (H) 0.66 - 1.25 mg/dL    Calcium 9.3 8.5 - 10.1 mg/dL    Glucose 232 (H) 70 - 99 mg/dL    GFR Estimate 51 (L) >60 mL/min/1.73m2   Osmolality   Result Value Ref Range    Osmolality Blood 303 (H) 275 - 295 mmol/kg    Narrative    Greater than 385 mmol/kg relates to stupor in hyperglycemia   Greater than 400 mmol/kg can relate to seizures   Greater than 420 mmol/kg can be lethal    Serum Osmalar Gap:   Normal <10   Larger suggest unmeasured substances present in serum (ethanol, methanol, isopropanol, mannitol, ethylene glycol).   Glucose by meter   Result Value Ref Range    GLUCOSE BY METER POCT 183 (H) 70 - 99 mg/dL   US Renal Complete    Narrative    EXAMINATION: US RENAL COMPLETE, 1/31/2022 4:20 PM     COMPARISON: Renal ultrasound 1/20/2022    HISTORY: AKA with unclear etiology    TECHNIQUE: The kidneys and bladder were scanned in the standard  fashion with specialized ultrasound transducer(s) using both gray  scale and limited color/spectral Doppler techniques.    FINDINGS:    Right kidney: Measures 10.2 cm in length. Parenchyma is of normal  thickness and echogenicity. No focal mass. No hydronephrosis.    Left kidney: Measures 9.8 cm in length. Parenchyma is of normal  thickness and echogenicity. No focal mass. No hydronephrosis.     Bladder: Unremarkable.    Partially imaged increased hepatic parenchymal echogenicity.      Impression    IMPRESSION:  1.  Normal renal ultrasound. No findings to explain lab abnormalities.    2.  Partially imaged hepatic steatosis.    I have personally reviewed the examination and initial interpretation  and I agree with the findings.    ELSY MARTINEZ MD         SYSTEM ID:  [id number]   Glucose by meter   Result Value Ref Range    GLUCOSE BY METER POCT 74 70 - 99 mg/dL   Glucose by meter   Result Value Ref Range    GLUCOSE BY  METER POCT 117 (H) 70 - 99 mg/dL   UA with Microscopic reflex to Culture    Specimen: Urine, Clean Catch   Result Value Ref Range    Color Urine Light Yellow Colorless, Straw, Light Yellow, Yellow    Appearance Urine Clear Clear    Glucose Urine Negative Negative mg/dL    Bilirubin Urine Negative Negative    Ketones Urine Negative Negative mg/dL    Specific Gravity Urine 1.013 1.003 - 1.035    Blood Urine Negative Negative    pH Urine 5.5 5.0 - 7.0    Protein Albumin Urine Negative Negative mg/dL    Urobilinogen Urine Normal Normal, 2.0 mg/dL    Nitrite Urine Negative Negative    Leukocyte Esterase Urine Negative Negative    Mucus Urine Present (A) None Seen /LPF    RBC Urine <1 <=2 /HPF    WBC Urine 1 <=5 /HPF    Squamous Epithelials Urine <1 <=1 /HPF    Hyaline Casts Urine 4 (H) <=2 /LPF    Narrative    Urine Culture not indicated   Sodium random urine   Result Value Ref Range    Sodium Urine mmol/L 79 mmol/L   Creatinine random urine   Result Value Ref Range    Creatinine Urine mg/dL 93 mg/dL   Osmolality urine   Result Value Ref Range    Osmolality Urine 456 100-1,200 mmol/kg    Narrative    Reference Ranges depend on patient's hydration status and renal function.   Neonates:  mmol/kg   2 years and older, random specimens: 100-1200 mmol/kg; Greater than 850 mmol/kg after 12 hour fluid restriction  Urine/serum osmolality ratio: 2 years and older: 1.0-3.0; 3.0-4.7 after 12 hour fluid restriction   Glucose by meter   Result Value Ref Range    GLUCOSE BY METER POCT 108 (H) 70 - 99 mg/dL   Glucose by meter   Result Value Ref Range    GLUCOSE BY METER POCT 133 (H) 70 - 99 mg/dL   Extra Tube ([place] Draw)    Narrative    The following orders were created for panel order Extra Tube ([place] Draw).  Procedure                               Abnormality         Status                     ---------                               -----------         ------                     Extra Purple Top Tube[[id number]]                             Final result                 Please view results for these tests on the individual orders.   Extra Purple Top Tube   Result Value Ref Range    Hold Specimen [facility]    Basic metabolic panel   Result Value Ref Range    Sodium 138 133 - 144 mmol/L    Potassium 5.0 3.4 - 5.3 mmol/L    Chloride 105 94 - 109 mmol/L    Carbon Dioxide (CO2) 29 20 - 32 mmol/L    Anion Gap 4 3 - 14 mmol/L    Urea Nitrogen 35 (H) 7 - 30 mg/dL    Creatinine 1.72 (H) 0.66 - 1.25 mg/dL    Calcium 9.5 8.5 - 10.1 mg/dL    Glucose 139 (H) 70 - 99 mg/dL    GFR Estimate 49 (L) >60 mL/min/1.73m2       ASSESSMENT AND PLAN    Miriam Hall is a 46 year old R hand dominant male with a hx of HTN and T2DM who presents to the ARU for an ischemic stroke with hemorrhagic transformation. 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 Rohan with have therapy evaluations tomorrow. Medicine consulted for assistance with medication management due to resistant HTN. Endo was also consulted for management of his T2DM due to an A1c of 7.8, who continued to recommend 500mg of metformin. However, his Cr continued to elevate, and a renal US did not show evidence of acute process. Medicine is concerned about nephrotoxic drugs and is planning on stopping hydralazine now, and possibly stopping clonidine on 2/2.      Admission to acute inpatient rehab on 1/28/2022.    Impairment group code: Stroke Ischemic 01.1 (L) Body Involvement (R) Brain: R PCA ischemic stroke w/ hemorrhagic transformation noted    1. PT, OT and SLP 60 minutes of each on a daily basis, in addition to rehab nursing and close management of physiatrist.      2. Impairment of ADL's:  OT for 60 minutes daily to work on ADL re-training such as grooming, self cares and bathing.      3. Impairment of mobility:  PT for 60 minutes daily to work on neuromuscular re-education focusing on strength, balance, coordination, and endurance.      4. Impairment of  cognition/language/swallow:  SLP for 60 minutes daily to work on cognitive and linguistic deficits.    5. Rehab RN for med administration, bowel regimen, glucose monitoring and wound care.          1. Medical Conditions    Neuro  Acute ischemic stroke of Right P1 segement of the PCA  R P1 occlusion and R M2 Stenosis  Hemorrhagic transformation of ischemic infarct  -aspirin 325mg  -hypercoagulability panel negative  -vasculitis panel negative   -acute rehab with PT, OT, and SLP  -per neuro recommendations, continue depakote until outpatient f/u    MSK  L Hip Pain  -reports increased L hip pain  -schedule Tylenol 650mg q6h  -hip XR on 1/24 does not show evidence of fx  -can consider ice, heating pad as adjuvants     CV  Refractory HTN  -possibly secondary to clinical hyperaldosteronism of several possible etiologies  -medicine on for assistance with HTN management              -Cushing, Liddle syndrome, or mineralocorticoid excess              -workup negative so far              -urine studies pending  -amiloride 10mg  -Carvedilol 37.5mg   -chlorthalidone 25mg  -clonidine 0.1mg  -hydralazine 50mg Q8H  -lisinopril 40mg      HLD  -atorvastatin 40mg     Endo  Type 2 Diabetes  -metformin 500mg with dinner  -sliding scale insulin      Renal  DISHA  -monitor Cr with BMP  -encourage PO fluids     Hypokalemia  -20 meq K BID  -hyperaldosteronism workup still pendings  -monitor with BMP    2. Adjustment to disability:  Clinical psychology to eval and treat  3. FEN: PO fluids, monitor with BMP  4. Bowel: continent, PRN miralax and senna  5. Bladder: continent  6. DVT Prophylaxis: mechanical with SCDs, aspirin 325mg  7. GI Prophylaxis: regular diet  8. Code: Full  9. Disposition: to home, modified independent  10. ELOS:  21 days.  11. Rehab prognosis:  fair  12. Follow up Appointments on Discharge: Neuro, cardio for Zio patch, nephrology      Desire Birch,   PGY-1  Physical Medicine and Rehabilitation

## 2022-02-01 NOTE — PLAN OF CARE
Discharge Planner Post-Acute Rehab OT:     Discharge Plan: HC vs TCU  Barriers include home set up and limited caregiver support.    Precautions: L side inattention and hemiparesis, falls    Current Status:  ADLs:    Mobility: W/c based. Mod-Gigi SPT R side. Kaley stedy A x 1.    Grooming: Min A supported sitting.    Dressing: UB Max A. LB Total A.     Bathing: Dependent chair. Max A.    Toileting: Kaley stedy A x 1 commode. Total A hygiene/clothing.  IADLs: Needs assistance.  Vision/Cognition: L side inattention.    Assessment: Pt completing ADLs in morning session. Pt engaging in left side attention and awareness activity with reaching and cervical rotation for visual engagement in pm session. Pt requiring graded assistance but improvements with time with scanning and cervical rotation to left side. Pt continues to be experiencing L hip pain, especially with unsupported sitting and standing tasks. No pain with prolonged supported sitting.      Other Barriers to Discharge (DME, Family Training, etc):   Pt lives alone and caregiver support is limited.  Home set up - 2nd level apartment.  Requires significant physical assistance.

## 2022-02-01 NOTE — PLAN OF CARE
FOCUS/GOAL  Pain management, Carryover of rehab techniques, and Safety management  ASSESSMENT, INTERVENTIONS AND CONTINUING PLAN FOR GOAL:  Cognition: Pt is AOx4 and can make needs known. However, he needs some help initiating cares such as using urinal.  Pain: Reported pain in left hip after therapies. Rated as 8/10 and said it gets worse after movement of any kind. Gave PRN tylenol with some relief. Pt declined Voltaren gel and lidocaine patch.  BGs stable. Appetite good today.  Skin: No new concerns.  Bowel/bladder: Continent of bowel and bladder. Uses urinal with staff set up. Voiding spontaneously and had good output. Continuing to encourage fluids.  Transfer: A1 with walker and gait belt.  Vital signs: VSS. Denied SOB, chest pain, new numbness/tingling.

## 2022-02-01 NOTE — PLAN OF CARE
Discharge Planner Post-Acute Rehab PT:     Discharge Plan: TBD, may not have the support to return home. Pt lives in 2nd story apartment, his ex-girlfriend is his only local identified support, but pt does not want or anticipate she will help significantly, therefore likely TCU candidate.    Precautions: Fall/alarm, L angelique, L field cut and neglect, L hip pain    Current Status:  Bed Mobility: Mod-A >sit, max-A > supine  Transfer: Mod-A squat/pivot. Assist of 1 Kaley Edwards with nursing  Gait: Unable  Stairs: Unable  Balance: Sits close supervision, requires UE support and assist for midline, assist to stand due to weakness  PASS: 11/36 on 1/29    Assessment: Hip pain significantly improved following rounds, tolerated FES bike with active performance, which is a huge improvement of the past few days, hopefully remains improved.    Other Barriers to Discharge (DME, Family Training, etc):   Stairs to enter  Level of support  W/C  L AFO  Further gait DME TBD

## 2022-02-01 NOTE — PROGRESS NOTES
[facility]    Medicine Progress Note - Hospitalist Service    Date of Admission: 1/28/2022    Assessment & Plan        Miriam Hall is a 46 year old male admitted with history of HTN who was admitted to [facility] 1/8 with R PCA stroke, refractory HTN and concern for secondary HTN, new onset DMII, hypokalemia, DISHA and encephalopathy. Stabilized and transferred to ARU 1/28. Medicine consulted for assistance with medical management       Acute R PCA stroke: Presented with L hemiplegia. Head CT 1/8 R parieto-occipital infarct. CTA noted occlusion on proximal R PCA and high grade stenosis of M2 branch of R MCA. Outside window for tPA. Repeat imagine 1/12 with hemorrhagic transformation. Source 2/2 high grade atherosclerosis vs ESUS. CARISA negative for LA thrombus. Residual deficits include L hemiplegia, L hemianopsia, mild dysarthria   - Continue ASA, statin  - Increased tiredness 2/1 (but arouses to voice and appropriate, no new neuro deficits noted). Please contact PM&R and/or if unable to arouse patient with PT/OT this am or concerns/changes noted  - HTN regimen as below   - Ziopatch through 2/10  - PT/OT following   - Follow up with genetics on discharge given early age CVA  - Follow up with neurology as indicated     DISHA: Worsening. BL Cr 1-1.2. Peak 1.56 while at [facility], thought 2/2 hypovolemia and medication adjustments. BMP stabilized, but now rising over the past several days. S/p 500 ml bolus 1/31 without improvement. Renal US 1/31 no acute findings. UA bland. Urine osm 456, osm 303. Urine Cr 93. Urine Na 79.    Cr 1.72 (1.65). BP downtrend over the past 24 hours with hypotension 1/28. Nephrotoxic drugs include chlorthalidone, lisinopril, ASA, metformin  - Writer d/w nephrology who suggested the following: stop hydralazine, will consider stopping clonidine 2/2   - Repeat BMP 2/2  ** Addendum: Per d/w Dr. Spence will hold Metformin. Treat hyperglycemia with sliding scale  insulin for now. Strict I&Os, daily weights    Refractory HTN: SBP>240 on admission. Nephrology consulted while admitted. Concern for hyperaldosteronism given persistent hypokalemia and concomitant metabolic alkalosis. Aldosterone and renin levels normal. Ddx per nephrology includes Cushing (urinary cortisol pending), Liddle syndrome, apparent mineral corticoid excess (free urinary cortisol and cortisone pending). BP stable on current regimen  - Continue current Amiloride, Coreg, chlorthalidone, clonidine, hydralazine, lisinopril with hold parameters   - Per nephrology, if able to come off meds stop hydralazine first, then clonidine   - Follow urine cortisol   - Follow up with nephrology in 2 weeks  - Follow up with endo on discharge for ongoing eval for possible hyperaldosteronemia     DMII, new onset: A1c 7.8 1/8. Endo consulted, started on Metformin prior to discharge. Glucose stable  - Continue Metformin, monitor GFR  - Monitor for hyperkalemia while on Amiloride   - Stop MSSI  - Decrease glucose checks to bid  - Hypoglycemia protocol     Refractory hypokalemia: Concern for hyperaldosteronism as above. Negative workup thus far. Discharge on Kdur. K 5.0 2/1  - Hold K replacement  - BMP 2/2 as above       Resolved and Stable Issues:  Possible seizures: In the setting of CVA. EEG 1/12 concerning for subclinical seizures. Loaded with Keppra then transitioned to Depakote. Continue Depakote and seizure precautions. Follow up OP with neurology as indicated   Low grade fever: Resolved. See notes 1/30 and prior. Repeat infectious workup if recurrent fevers   Toxic metabolic and stroke related encephalopathy: Resolved prior to transfer to ARU. Per notes, etiology 2/2 CVA vs hypertensive encephalopathy vs seizures. No ongoing issues noted. Monitor  Non-severe protein calorie malnutrition: In the setting of the above. Monitor and consider nutrition consult pending course   Platelet defect: 2/2 ASA use. Monitor for bleeding         Diet: Combination Diet Regular Diet  Room Service    DVT Prophylaxis: Defer to primary service  Mendoza Catheter: Not present  Central Lines: None  Cardiac Monitoring: None  Code Status: Full Code      Disposition Plan   Expected Discharge: 02/18/2022     Anticipated discharge location:  Awaiting care coordination huddle  Delays:     Per primary team      Park Lyle PA-C  Hospitalist Service  [facility]  Securely message with the Vocera Web Console (learn more here)  Text page via [employer] Paging/Directory         Clinically Significant Risk Factors Present on Admission             # Diabetes, type II: last A1C 7.8 % (Ref range: 0.0 - 5.6 %)  # Overweight: last Body mass index is 25.07 kg/m .      ______________________________________________________________________    Interval History   More tired this morning. Denies confusion. Per nursing, napping throughout the morning. Denies AMS. No fever or chills. Briefly answers questions then falls back asleep     Data reviewed today: I reviewed all medications, new labs and imaging results over the last 24 hours    Physical Exam   Vital Signs: Temp: 97.5  F (36.4  C) Temp src: Oral BP: 122/82 Pulse: 67   Resp: 16 SpO2: 96 % O2 Device: None (Room air)    Weight: 166 lbs 12.8 oz  General Appearance: Alert and oriented x3, in bed. Sleepy but arouses to voice and answers questions appropriately   HEENT: Anicteric sclera, MMM   Respiratory: Breathing comfortably on room air, lungs CTAB without wheezing or crackles  Cardiovascular: RRR, S1, S2. No murmur noted. ZioPatch in place  GI: Abdomen soft, non-tender with active bowel sounds. No guarding or rebound  Lymph/Hematologic: No peripheral edema, distal pulses palpable   Skin: No rash or jaundice   Musculoskeletal: Moves all extremities   Neurologic:  Arouses easily to voice. Opens eyes and tracks writer. Smiles, opens mouth. Elevates eyebrows. Freely moves the R side.  L sided hemiplegia      Data   Recent Labs   Lab 02/01/22  0624 02/01/22  0235 01/31/22 2054 01/31/22  1120 01/31/22  1108 01/30/22  1156 01/30/22  1038 01/29/22  0808 01/29/22  0629 01/28/22  0844 01/28/22  0428 01/27/22  0916 01/27/22  0428   WBC  --   --   --   --   --   --   --   --  9.2  --  7.7  --  7.0   HGB  --   --   --   --   --   --   --   --  13.8  --  14.1  --  14.7   MCV  --   --   --   --   --   --   --   --  90  --  90  --  93   PLT  --   --   --   --   --   --   --   --  305  --  296  --  310     --   --   --  134  --  138  --  137  --  137  --  139   POTASSIUM 5.0  --   --   --  4.6  --  4.2  --  3.9  --  3.7   < > 3.7  3.7   CHLORIDE 105  --   --   --  103  --  106  --  106  --  103  --  103   CO2 29  --   --   --  24  --  26  --  25  --  28  --  29   BUN 35*  --   --   --  36*  --  30  --  30  --  31*  --  30   CR 1.72*  --   --   --  1.65*  --  1.54*  --  1.45*  --  1.32*  --  1.56*   ANIONGAP 4  --   --   --  7  --  6  --  6  --  6  --  7   VALERIE 9.5  --   --   --  9.3  --  9.5  --  9.2  --  9.0  --  9.5   * 133* 108*   < > 232*   < > 221*   < > 124*   < > 96   < > 95    < > = values in this interval not displayed.     Recent Results (from the past 24 hour(s))   US Renal Complete    Narrative    EXAMINATION: US RENAL COMPLETE, 1/31/2022 4:20 PM     COMPARISON: Renal ultrasound 1/20/2022    HISTORY: AKA with unclear etiology    TECHNIQUE: The kidneys and bladder were scanned in the standard  fashion with specialized ultrasound transducer(s) using both gray  scale and limited color/spectral Doppler techniques.    FINDINGS:    Right kidney: Measures 10.2 cm in length. Parenchyma is of normal  thickness and echogenicity. No focal mass. No hydronephrosis.    Left kidney: Measures 9.8 cm in length. Parenchyma is of normal  thickness and echogenicity. No focal mass. No hydronephrosis.     Bladder: Unremarkable.    Partially imaged increased hepatic parenchymal echogenicity.       Impression    IMPRESSION:  1.  Normal renal ultrasound. No findings to explain lab abnormalities.    2.  Partially imaged hepatic steatosis.    I have personally reviewed the examination and initial interpretation  and I agree with the findings.    ELSY MARTINEZ MD         SYSTEM ID:  [id number]

## 2022-02-01 NOTE — PLAN OF CARE
FOCUS/GOAL  Bowel management, Bladder management, Medication management, Pain management, Medical management, Mobility, Skin integrity, and Safety management    ASSESSMENT, INTERVENTIONS AND CONTINUING PLAN FOR GOAL:    Pt is alert and oriented x 4. Vital signs stable. Received 500 ml lactated ringers after pt was back from ultrasound. Held metformin this shift per MD order. Urine sample sent to lab. BG was 74 when pt came back from ultrasound which went up to 117 after pt drank a cup of cranberry juice. Denied pain, nausea and vomiting, shortness of breath. Numbness present in LUE and LLE. Alarms on, side rails up x 3 for safety. Call light within reach, able to make needs known. Voiding spontaneously, no bm this shift. Pt transfers with assist of 2 with ghada ramsey. Reg diet, thin liquids. Will continue with plan of care.

## 2022-02-01 NOTE — PLAN OF CARE
Discharge Planner Post-Acute Rehab SLP:     Discharge Plan: TBD    Precautions: Fall    Current Status:  Communication: Mild dysarthria. Occasional word-finding difficulties in conversation, however pt is fluent and able to communicate wants/needs.  Cognition: At least moderate cognitive-linguistic deficits in the areas of attention, executive function, processing speed, and visuospatial skills.  Swallow: Pt reportedly tolerating a regular diet with thin liquids. Pt discharged from previous hospital with no swallowing concerns.    Assessment: Pt participated in visual scanning task to L side. Introduced to visual anchor to aid/increase orientation and locate targets more efficiently and effectively. Pt with excellent initial orientation to this and able to use functionally. However,  as task progressed, pt with notable increased difficulty using line and locating targets. Pt unable to redirect to information despite max cues and task dsicontinued. Anticipate visual load being barrier with onset of new learned information/strategy. Visual scanning broken down further into Fx4 matching shapes with word and same visual support. Pt able to match word/object with 100% accuracy min cues. Complexity increased to higher visual load, accuracy continued (fx6). Missed 2/6 objects, required further external support and reduction of visual load to locate. Pt demonstrating insight into deficits as he provided (non cued) rationale for where/what caused breakdown during task.         Other Barriers to Discharge (Family Training, etc): TBD

## 2022-02-01 NOTE — PLAN OF CARE
Skilled set up on :  Pt dependent for proper placement of electrodes on L gastroc, ant tib, quad, HS for optimum muscle contraction, safe positioning on w/c within frame and cushion for prevention of skin breakdown, feet secured to foot pedals, w/c secured to  w/ Q-straints.  Passive motion assessed to ensure proper positioning.      Pt performed 32 minutes of active FES ergometry with 100% stimulation applied to above muscles at 35-45rpm with 0.5nm resistance.  This PT adjusted e-stim and cycling parameters in real-time to ensure palpable muscle contractions throughout session.  Please see [email or web address] for further details on patient's stimulation parameters and ergometry outcomes.      Changes in parameters that were part of this treatment:   -Stim turn back on and tolerated this date. W/c further back to dec hip flexion with good result. Plan to inc resistance next session    Functional outcomes from this intervention include:   -improved sensory awareness and proprioception  -improved muscle strength  -improved motor coordination    Session Summary:   -Average Power: 1.2W  -Asymmetry: L 5%  -Active Minutes: 30

## 2022-02-01 NOTE — PLAN OF CARE
FOCUS/GOAL  Bladder management, Pain management, Mobility, Cognition/Memory/Judgment/Problem solving, and Safety management    ASSESSMENT, INTERVENTIONS AND CONTINUING PLAN FOR GOAL:  Pt is alert and oriented. Continent of bladder using urinal with assistance. Denied pain or discomfort overnight. Up with assist of 2 with Kaley Rivera. Pt appeared to be sleeping well during rounds. Call light within reach, able to make needs known. Bed alarm on for safety. Will continue with POC.

## 2022-02-01 NOTE — CARE CONFERENCE
Acute Rehab Care Conference/Team Rounds    Type: Team Rounds    Present: Dr. Jean Paul Kohler, Linda Perez PA, Jose George PT, Candie Conroy OT, Anand Oleary SLP, Rhiannon Shah SW, Betsy Nicholson RD, Isabel Lloyd RN, Patient Miriam Hall.     Discharge Barriers/Treatment/Education    Rehab Diagnosis:  L hemiparesis 2/2 CVA    Active Medical Co-morbidities/Prognosis:     Patient Active Problem List   Diagnosis Code     HTN (hypertension) I10     Stroke (H) I63.9     Ischemic cerebrovascular accident (CVA) (H) I63.9     Acute kidney failure, unspecified (H) N17.9   L hemiparesis       Safety: Pt is alert and oriented. Uses call light appropriately,able to make needs known. Bed alarm on for safety. Seizure pads in place.    Pain: scheduled Tylenol, Lidoderm patch    Medications, Skin, Tubes/Lines: Medications taken whole with water. Skin intact. No tubes or lines.     Swallowing/Nutrition: No dysphagia related concerns.     Bowel/Bladder: Continent of bladder using urinal with assistance. Continent of bowel, last BM 1/30.    Psychosocial: Lives alone. Limited support. Indep PTA. Apartment with no elevator. No substance abuse, mental health or financial concerns at this time. Works in [employer] PTA.     ADLs/IADLs: Early in IP rehab stay. Pt completing w/c based ADLs, requiring Max A SPT towards the R side. Pt completing Kaley stedy A x 1 with nursing staff. Pt requiring Min A grooming in supported sitting, Max A UB dressing, Total A LB dressing, Max A bathing in a dependent shower chair, and Total A toileting. Performance limited severe L side hemiparesis, L side inattention, L side visual field cut, and impaired cognition. Interventions focused on L side sensorimotor re-education, midline orientation, and L side attention. Goals to maximize IND and safety ADLs/IADLs. Anticipated extended rehab course to reach a functional level that is safe to return home. Recommend ongoing IP rehab. Discharge plans to be determined  pending progress. Other barriers include home set up (stairs) and limited caregiver support.     Mobility: Pt is early in ARU. Presents with significant L-side hemiparesis and sensory impairments. Early in stay limited by L hip pain, believed to be interarticular. Max-A bed mobility and squat pivot, using Kaley Stedy with nursing. Not ambulatory at this time. 11/36 on PASS indicating w/c based goals. Working on seated midline and FES initiated. Equipment: w/c, L AFO, further DME pending. Concerns with level of support and living environment given w/c based status, may need extended rehab stay.    Cognition/Language: Patient's formal cognitive scores showing moderate-severe impairments but not fully valid measure due to severity of vision impairments. When able to isolate vision, showing mild cognitive impairments in area of memory and executive function. Anticipate would benefit from oversight with higher level IADL tasks due to vision impairments. Ongoing therapy upon facility discharge.     Community Re-Entry: Recommend assistance due to physical, cognitive, and visual deficits.     Transportation: Car transfer not safe right now, would need a ride arranged.    Decision maker: self    Plan of Care and goals reviewed and updated.    Discharge Plan/Recommendations    Fall Precautions: continue    Patient/Family input to goals: yes     Estimated length of stay: 20 days     Overall plan for the patient: reach a level of mod I       Utilization Review and Continued Stay Justification    Medical Necessity Criteria:    For any criteria that is not met, please document reason and plan for discharge, transfer, or modification of plan of care to address.    Requires intensive rehabilitation program to treat functional deficits?: Yes    Requires 3x per week or greater involvement of rehabilitation physician to oversee rehabilitation program?: Yes    Requires rehabilitation nursing interventions?: Yes    Patient is making  functional progress?: Yes    There is a potential for additional functional progress? Yes    Patient is participating in therapy 3 hours per day a minimum of 5 days per week or 15 hours per week in 7 day period?:Yes    Has discharge needs that require coordinated discharge planning approach?:Yes      Barriers/Concerns related to meeting medical necessity criteria:  none    Team Plan to Address Concern:  As needed       Final Physician Sign off    Statement of Approval:  Jean Paul Kohler, DO      Patient Goals  Social Work Goals: Confirm discharge recommendations with therapy, coordinate safe discharge plan and remain available to support and assist as needed.    OT Frequency: 60-90 min daily  OT goal: hygiene/grooming: independent  OT goal: upper body dressing: Independent  OT goal: lower body dressing: Supervision/stand-by assist  OT goal: upper body bathing: Supervision/stand-by assist  OT goal: lower body bathing: Minimal assist  OT goal: bed mobility: Modified independent  OT Goal: transfer: Modified independent  OT goal: toilet transfer/toileting: Supervision/stand-by assist  OT goal 1: Pt will demo SBA shower transfer using DME/AE     PT Frequency: Daily 60-90 minutes  PT goal: bed mobility: Modified independent,Supine to/from sit,Rolling  PT goal: transfers: Modified independent,Sit to/from stand,Bed to/from chair  PT goal: stairs: Rail on right,Greater than 10 stairs,Minimal assist  PT goal 1: Floor transfer min-A for fall recovery  PT Goal 2: Car transfer min-A for transportation needs      SLP Frequency: daily   SLP goal 1: Patient will complete moderate level executive function tasks with 80% accuracy given moderate cues.  SLP goal 2: Patient will recall functional information with 90% accuracy given external aids and min assist.  SLP goal 3: Pt will complete simple attention tasks with 90% accuracy with mod cues to implement strategies.    Nursing Goals  Bowel and Bladder care  Fall prevention    Medication Education  Skin Care protection

## 2022-02-02 ENCOUNTER — APPOINTMENT (OUTPATIENT)
Dept: PHYSICAL THERAPY | Facility: CLINIC | Age: 47
End: 2022-02-02
Attending: PHYSICAL MEDICINE & REHABILITATION
Payer: COMMERCIAL

## 2022-02-02 ENCOUNTER — APPOINTMENT (OUTPATIENT)
Dept: SPEECH THERAPY | Facility: CLINIC | Age: 47
End: 2022-02-02
Attending: PHYSICAL MEDICINE & REHABILITATION
Payer: COMMERCIAL

## 2022-02-02 ENCOUNTER — APPOINTMENT (OUTPATIENT)
Dept: OCCUPATIONAL THERAPY | Facility: CLINIC | Age: 47
End: 2022-02-02
Attending: PHYSICAL MEDICINE & REHABILITATION
Payer: COMMERCIAL

## 2022-02-02 LAB
ANION GAP SERPL CALCULATED.3IONS-SCNC: 4 MMOL/L (ref 3–14)
BUN SERPL-MCNC: 44 MG/DL (ref 7–30)
CALCIUM SERPL-MCNC: 9 MG/DL (ref 8.5–10.1)
CHLORIDE BLD-SCNC: 106 MMOL/L (ref 94–109)
CO2 SERPL-SCNC: 27 MMOL/L (ref 20–32)
COLLECT DURATION TIME UR: 24 H
CORTIS 24H UR-MRATE: 12 MCG/24 H (ref 3.5–45)
CORTISONE 24H UR-MRATE: 24 MCG/24 H (ref 17–129)
CREAT SERPL-MCNC: 1.77 MG/DL (ref 0.66–1.25)
GFR SERPL CREATININE-BSD FRML MDRD: 47 ML/MIN/1.73M2
GLUCOSE BLD-MCNC: 125 MG/DL (ref 70–99)
GLUCOSE BLDC GLUCOMTR-MCNC: 106 MG/DL (ref 70–99)
GLUCOSE BLDC GLUCOMTR-MCNC: 129 MG/DL (ref 70–99)
GLUCOSE BLDC GLUCOMTR-MCNC: 135 MG/DL (ref 70–99)
GLUCOSE BLDC GLUCOMTR-MCNC: 138 MG/DL (ref 70–99)
GLUCOSE BLDC GLUCOMTR-MCNC: 148 MG/DL (ref 70–99)
HOLD SPECIMEN: NORMAL
POTASSIUM BLD-SCNC: 4.7 MMOL/L (ref 3.4–5.3)
SODIUM SERPL-SCNC: 137 MMOL/L (ref 133–144)
SPECIMEN VOL 24H UR: 750 ML

## 2022-02-02 PROCEDURE — 99233 SBSQ HOSP IP/OBS HIGH 50: CPT | Performed by: PHYSICIAN ASSISTANT

## 2022-02-02 PROCEDURE — 128N000003 HC R&B REHAB

## 2022-02-02 PROCEDURE — 250N000013 HC RX MED GY IP 250 OP 250 PS 637: Performed by: PHYSICAL MEDICINE & REHABILITATION

## 2022-02-02 PROCEDURE — 250N000013 HC RX MED GY IP 250 OP 250 PS 637: Performed by: PHYSICIAN ASSISTANT

## 2022-02-02 PROCEDURE — 97112 NEUROMUSCULAR REEDUCATION: CPT | Mod: GP

## 2022-02-02 PROCEDURE — 97112 NEUROMUSCULAR REEDUCATION: CPT | Mod: GO | Performed by: OCCUPATIONAL THERAPIST

## 2022-02-02 PROCEDURE — 97130 THER IVNTJ EA ADDL 15 MIN: CPT | Mod: GN

## 2022-02-02 PROCEDURE — 97535 SELF CARE MNGMENT TRAINING: CPT | Mod: GO | Performed by: OCCUPATIONAL THERAPIST

## 2022-02-02 PROCEDURE — 36415 COLL VENOUS BLD VENIPUNCTURE: CPT | Performed by: PHYSICIAN ASSISTANT

## 2022-02-02 PROCEDURE — 99232 SBSQ HOSP IP/OBS MODERATE 35: CPT | Performed by: PHYSICAL MEDICINE & REHABILITATION

## 2022-02-02 PROCEDURE — 82310 ASSAY OF CALCIUM: CPT | Performed by: PHYSICIAN ASSISTANT

## 2022-02-02 PROCEDURE — 97129 THER IVNTJ 1ST 15 MIN: CPT | Mod: GN

## 2022-02-02 RX ORDER — CLONIDINE HYDROCHLORIDE 0.1 MG/1
0.1 TABLET ORAL DAILY
Status: DISCONTINUED | OUTPATIENT
Start: 2022-02-03 | End: 2022-02-04

## 2022-02-02 RX ADMIN — LIDOCAINE PATCH 4% 1 PATCH: 40 PATCH TOPICAL at 20:10

## 2022-02-02 RX ADMIN — CLONIDINE HYDROCHLORIDE 0.1 MG: 0.1 TABLET ORAL at 09:03

## 2022-02-02 RX ADMIN — CARVEDILOL 37.5 MG: 25 TABLET, FILM COATED ORAL at 09:03

## 2022-02-02 RX ADMIN — CARVEDILOL 37.5 MG: 25 TABLET, FILM COATED ORAL at 17:43

## 2022-02-02 RX ADMIN — ATORVASTATIN CALCIUM 40 MG: 40 TABLET, FILM COATED ORAL at 20:10

## 2022-02-02 RX ADMIN — ACETAMINOPHEN 650 MG: 325 TABLET, FILM COATED ORAL at 12:19

## 2022-02-02 RX ADMIN — ACETAMINOPHEN 650 MG: 325 TABLET, FILM COATED ORAL at 06:16

## 2022-02-02 RX ADMIN — CHLORTHALIDONE 25 MG: 25 TABLET ORAL at 09:03

## 2022-02-02 RX ADMIN — ACETAMINOPHEN 650 MG: 325 TABLET, FILM COATED ORAL at 00:24

## 2022-02-02 RX ADMIN — ACETAMINOPHEN 650 MG: 325 TABLET, FILM COATED ORAL at 17:43

## 2022-02-02 RX ADMIN — DIVALPROEX SODIUM 1000 MG: 500 TABLET, FILM COATED, EXTENDED RELEASE ORAL at 09:02

## 2022-02-02 RX ADMIN — DICLOFENAC 2 G: 10 GEL TOPICAL at 20:14

## 2022-02-02 RX ADMIN — AMILORIDE HYDROCLORIDE 10 MG: 5 TABLET ORAL at 09:02

## 2022-02-02 RX ADMIN — ASPIRIN 81 MG CHEWABLE TABLET 81 MG: 81 TABLET CHEWABLE at 09:03

## 2022-02-02 RX ADMIN — LISINOPRIL 40 MG: 20 TABLET ORAL at 09:03

## 2022-02-02 ASSESSMENT — ACTIVITIES OF DAILY LIVING (ADL)
ADLS_ACUITY_SCORE: 16

## 2022-02-02 ASSESSMENT — MIFFLIN-ST. JEOR: SCORE: 1612.9

## 2022-02-02 NOTE — PLAN OF CARE
FOCUS/GOAL  Bowel management, Bladder management, Pain management, Medical management, Mobility, Skin integrity, and Safety management    ASSESSMENT, INTERVENTIONS AND CONTINUING PLAN FOR GOAL:  Patient is alert and oriented but forgetful. Denied headache, dizziness, chest pain or SOB. Left hip pain managed with scheduled tylenol, Voltaren gel and lidocaine patch. Continent of B/B uses urinal with assist. Regular/thin BG 92 and 159. Take pills whole. A-2 Kaley steady lift. VS WDL. No care concern at this time. Call light is in reach alarm is on for safety, bed side rails are up x 3 tray table in standard position. We will continue per POC.

## 2022-02-02 NOTE — PROGRESS NOTES
"  General acute hospital   Acute Rehabilitation Unit    INTERVAL HISTORY  Miriam Hall was seen and examined at bedside. He was doing ok. Denied any pain or discomfort. He had some \"anxiety dreams\" last night so didn't sleep well. Used urinal ok and has BMs on the commode with no issues.     Working with SLP on dysarthria and cognitive-linguistic deficits. He requires mod A for transfers using ghada Zairgedy; he is  based with no walking or standing yet.     Talked to the hospitalist team about the plan for his renal function and BG check; appreciate their assistance.       MEDICATIONS  Scheduled meds    acetaminophen  650 mg Oral Q6H     aMILoride  10 mg Oral Daily     aspirin  81 mg Oral Daily     atorvastatin  40 mg Oral QPM     carvedilol  37.5 mg Oral BID w/meals     chlorthalidone  25 mg Oral Daily     [START ON 2/3/2022] cloNIDine  0.1 mg Oral Daily     diclofenac  2 g Topical 4x Daily     divalproex sodium extended-release  1,000 mg Oral Daily     insulin aspart  1-7 Units Subcutaneous TID AC     insulin aspart  1-5 Units Subcutaneous At Bedtime     lidocaine  1 patch Transdermal Q24h    And     lidocaine   Transdermal Q8H     lisinopril  40 mg Oral Daily     [Held by provider] metFORMIN  500 mg Oral Daily with supper     [Held by provider] potassium chloride  20 mEq Oral BID       PRN meds:  bisacodyl, glucose **OR** dextrose **OR** glucagon, ondansetron, polyethylene glycol, sennosides      PHYSICAL EXAM  /71 (BP Location: Right arm)   Pulse 68   Temp 97.8  F (36.6  C) (Oral)   Resp 16   Ht 1.737 m (5' 8.4\")   Wt 75.7 kg (166 lb 14.4 oz)   SpO2 97%   BMI 25.08 kg/m      Gen: NAD, sitting in WC and was leaning to the left   Cardio: RRR - Ziopatch in place   Pulm: clear breath sounds b/l   Abd: soft, and non-tender   Ext: no edema in bilateral lower extremities, no calf tenderness   Neuro/MSK: left sided neglect with clear gaze preference. Left hemiplegia with no " volitional movement even with closed kinetic chain. Sensory loss on the left side as well.         LABS  Results for orders placed or performed during the hospital encounter of 01/28/22 (from the past 24 hour(s))   Glucose by meter   Result Value Ref Range    GLUCOSE BY METER POCT 159 (H) 70 - 99 mg/dL   Glucose by meter   Result Value Ref Range    GLUCOSE BY METER POCT 148 (H) 70 - 99 mg/dL   Basic metabolic panel   Result Value Ref Range    Sodium 137 133 - 144 mmol/L    Potassium 4.7 3.4 - 5.3 mmol/L    Chloride 106 94 - 109 mmol/L    Carbon Dioxide (CO2) 27 20 - 32 mmol/L    Anion Gap 4 3 - 14 mmol/L    Urea Nitrogen 44 (H) 7 - 30 mg/dL    Creatinine 1.77 (H) 0.66 - 1.25 mg/dL    Calcium 9.0 8.5 - 10.1 mg/dL    Glucose 125 (H) 70 - 99 mg/dL    GFR Estimate 47 (L) >60 mL/min/1.73m2   Extra Tube    Narrative    The following orders were created for panel order Extra Tube.  Procedure                               Abnormality         Status                     ---------                               -----------         ------                     Extra Purple Top Tube[[id number]]                            Final result                 Please view results for these tests on the individual orders.   Extra Purple Top Tube   Result Value Ref Range    Hold Specimen JIC    Glucose by meter   Result Value Ref Range    GLUCOSE BY METER POCT 106 (H) 70 - 99 mg/dL   Glucose by meter   Result Value Ref Range    GLUCOSE BY METER POCT 135 (H) 70 - 99 mg/dL   Glucose by meter   Result Value Ref Range    GLUCOSE BY METER POCT 138 (H) 70 - 99 mg/dL         ASSESSMENT AND PLAN  Miriam Hall is a 46 year old R hand dominant male with h/o HTN who was admitted on 1/8 with R PCA ischemic stroke. His hospital course was complicated by concerns for secondary HTN, new diagnosis of DM II, DISHA, hypokalemia and encephalopathy. He clinically improved and transferred to ARU for ongoing medical management and intensive inpatient rehab.         Admission to acute inpatient rehab on 1/28/2022.    Impairment group code: Stroke Ischemic 01.1 (L) Body Involvement (R) Brain: R PCA ischemic stroke w/ hemorrhagic transformation noted      Rehabilitation - continue comprehensive acute inpatient rehabilitation program with multidisciplinary approach including therapies, rehab nursing, and physiatry following. See interval history for updates.         Medical Conditions  Neuro  Acute right PCA ischemic stroke   R P1 occlusion and R M2 Stenosis  Hemorrhagic transformation of ischemic infarct  -aspirin 81mg   -HTN, DM and HLD control as below for secondary prevention   -hypercoagulability panel negative  -vasculitis panel negative  -ziopatch in place till 2/10   -acute rehab with PT, OT, and SLP  -per neuro recommendations, continue depakote until outpatient f/u  -should see stroke neurology and PM&R after discharge as well as genetic team per recs       Possible seizures   EEG 1/12 concerning for subclinical seizures. Loaded with Keppra then transitioned to Depakote.   -continue Depakote  -seizure precautions      MSK  L Hip Pain  -reports increased L hip pain  -schedule Tylenol 650mg q6h  -hip XR on 1/24 does not show evidence of fx  -can consider ice, heating pad as adjuvants     CV  Refractory HTN  -possibly secondary to clinical hyperaldosteronism of several possible etiologies  -medicine on for assistance with HTN management              -DDs included cushing, Liddle syndrome, or mineralocorticoid excess              -workup negative so far              -urine studies pending (urine cortisol level)  -amiloride 10mg daily  -Carvedilol 37.5mg bid  -chlorthalidone 25mg daily  -clonidine 0.1mg - dose decreased 2/2  -hydralazine 50mg Q8H - discontinued 2/1  -lisinopril 40mg daily   -f/u with nephrology and endo teams as outpatient       HLD  -atorvastatin 40mg     Endo  Type 2 Diabetes  A1c 7.8 1/8  -metformin 500mg with dinner; held on 2/1 due to worsening  Cr  -sliding scale insulin      Renal  DISHA  Likely due to hypovolemia and medication induced. 2/1 Cr was worse so metformin and hydralazine was discontinued. 2/2 plan was discussed with nephro team and clonidine dose was decreased with the plan to taper off over the next few days.  -monitor Cr   -daily weights        Hypokalemia  -20 meq K BID; discontinued 2/1  -hyperaldosteronism workup still pendings  -monitor with BMP    2. Adjustment to disability:  Clinical psychology to eval and treat as needed   3. FEN: regular diet   4. Bowel: continent, PRN miralax and senna  5. Bladder: continent; PVRs were checked and were acceptable   6. DVT Prophylaxis: mechanical with SCDs  7. GI Prophylaxis: none   8. Code: Full  9. Disposition: to home  10. ELOS:  21 days.  11. Rehab prognosis:  fair  12. Follow up Appointments on Discharge: Neuro, cardio for Zio patch, nephrology, PCP and PM&R       Zohreh Gaffney MD  Physical Medicine & Rehabilitation     Time Spent on this Encounter   I spent a total of 25 minutes face to face and coordinating care of Miriam Hall.  Over 50% of my time on the unit was spent counseling the patient and /or coordinating care; see note for details.

## 2022-02-02 NOTE — PLAN OF CARE
FOCUS/GOAL  Bowel management, Bladder management, Pain management, Mobility, Skin integrity, and Safety management    ASSESSMENT, INTERVENTIONS AND CONTINUING PLAN FOR GOAL:  A/O, VS stable. Regular/thin, pills whole with water. , 135. Ate most of meal. Continent of bowel and bladder. No complaints of pain this shift, lidocaine patch currently off hip. Assist of 2 with ghada steady to transfer. No new skin concerns. Calling appropriately with light. Continue POC.

## 2022-02-02 NOTE — PLAN OF CARE
"Discharge Planner Post-Acute Rehab SLP:     Discharge Plan: TBD    Precautions: Fall    Current Status:  Communication: Mild dysarthria. Occasional word-finding difficulties in conversation, however pt is fluent and able to communicate wants/needs.  Cognition: At least moderate cognitive-linguistic deficits in the areas of attention, executive function, processing speed, and visuospatial skills.  Swallow: Pt reportedly tolerating a regular diet with thin liquids. Pt discharged from previous hospital with no swallowing concerns.    Assessment: Patient initially confused when waking from nap but able to be redirected.  Patient engaged in iPad based task \"flow\".  In task requiring consistent scanning to both the left and right, patient was able to show consistent improvement and able to complete the task with just intermittent mild to moderate verbal cues.  Some difficulties being able to implement strategies once taught and requiring reminders to use the strategies.    Other Barriers to Discharge (Family Training, etc): TBD    "

## 2022-02-02 NOTE — PLAN OF CARE
FOCUS/GOAL  Bladder management, Pain management, Cognition/Memory/Judgment/Problem solving, and Safety management    ASSESSMENT, INTERVENTIONS AND CONTINUING PLAN FOR GOAL:  Pt is alert and oriented. Continent of bladder using urinal. Staff empties urinal. Denied pain or discomfort overnight. Takes scheduled Tylenol and has Lidoderm patch on overnight for left hip pain. Appeared to be sleeping well during rounds. Uses call light appropriately, able to make needs known. Bed alarm on for safety. Will continue with POC.

## 2022-02-02 NOTE — PROGRESS NOTES
[facility]    Medicine Progress Note - Hospitalist Service    Date of Admission: 1/28/2022    Assessment & Plan        Miriam Hall is a 46 year old male admitted with history of HTN who was admitted to [facility] 1/8 with R PCA stroke, refractory HTN and concern for secondary HTN, new onset DMII, hypokalemia, DISHA and encephalopathy. Stabilized and transferred to ARU 1/28. Medicine consulted for assistance with medical management       Acute R PCA stroke: Presented with L hemiplegia. Head CT 1/8 R parieto-occipital infarct. CTA noted occlusion on proximal R PCA and high grade stenosis of M2 branch of R MCA. Outside window for tPA. Repeat imagine 1/12 with hemorrhagic transformation. Source 2/2 high grade atherosclerosis vs ESUS. CARISA negative for LA thrombus. Residual deficits include L hemiplegia, L hemianopsia, mild dysarthria. Sitting up, conversational, and pleasant 2/2 (significant improvement from 2/1)  - Continue ASA, statin  - HTN regimen as below   - Ziopatch through 2/10  - PT/OT following   - Follow up with genetics on discharge given early age CVA  - Follow up with neurology as indicated     DISHA: Worsening. BL Cr 1-1.2. Peak 1.56 at [facility], 2/2 hypovolemia and medication adjustments. Cr rising slowly over the past several days. Hypotensive events 1/28, BP since stable. S/p 500 ml bolus 1/31 without improvement. Renal US 1/31 no acute findings. UA bland. Urine osm 456, osm 303. Urine Cr 93. Urine Na 79. Hydralazine stopped and metformin held starting 2/1. Other nephrotoxic drugs include chlorthalidone, lisinopril, ASA. Cr 1.77 (1.72)  - Per d/w nephrology, decrease clonidine to daily. Will taper off over the next few days  - Continue to hold Metformin  - Strict I&Os, daily weights   - Daily BMP for now     Refractory HTN: SBP>240 on admission. Nephrology consulted while admitted. Concern for hyperaldosteronism given persistent hypokalemia and concomitant metabolic  alkalosis. Aldosterone and renin levels normal. Ddx per nephrology includes Cushing (urinary cortisol pending), Liddle syndrome, apparent mineral corticoid excess (free urinary cortisol and cortisone pending). Hydralazine stopped 2/1. Current regimen includes: Amiloride, Coreg, chlorthalidone, clonidine, lisinopril with hold parameters   - Decrease clonidine as above. Continue other current meds with hold parameters   - Follow urine cortisol   - Follow up with nephrology in 2 weeks  - Follow up with endo on discharge for ongoing eval for possible hyperaldosteronemia     DMII, new onset: A1c 7.8 1/8. Endo consulted, started on Metformin prior to discharge. Metformin held starting 2/1 due to DISHA. Currently on MSSI. Glucose stable  - Continue to hold Metformin and continue MSSI  - Monitor for hyperkalemia while on Amiloride   - Hypoglycemia protocol     Refractory hypokalemia: Concern for hyperaldosteronism as above. Negative workup thus far. Discharge on Kdur. K 5.0 2/1 thus Kdur stopped. K 4.7 2/2  - Continue to hold K replacement  - Monitor K with BMPs      Resolved and Stable Issues:  Possible seizures: In the setting of CVA. EEG 1/12 concerning for subclinical seizures. Loaded with Keppra then transitioned to Depakote. Continue Depakote and seizure precautions. Follow up OP with neurology as indicated   Low grade fever: Resolved. See notes 1/30 and prior. Repeat infectious workup if recurrent fevers   Toxic metabolic and stroke related encephalopathy: Resolved prior to transfer to ARU. Per notes, etiology 2/2 CVA vs hypertensive encephalopathy vs seizures. No ongoing issues noted. Monitor  Non-severe protein calorie malnutrition: In the setting of the above. Monitor and consider nutrition consult pending course   Platelet defect: 2/2 ASA use. Monitor for bleeding     Writer discussed the above with patient, nephrology, PM&R, and attending physician, Dr. Spence       Diet: Combination Diet Regular Diet  Room Service     DVT Prophylaxis: Defer to primary service  Mendoza Catheter: Not present  Central Lines: None  Cardiac Monitoring: None  Code Status: Full Code      Disposition Plan   Expected Discharge: 02/18/2022     Anticipated discharge location:  Awaiting care coordination huddle  Delays: { TIP  Update expected discharge date and delays and refresh note (tipsheet)     :[id number]}    Per primary team      Park Lyle PA-C  Hospitalist Service  [facility]  Securely message with the Vocera Web Console (learn more here)  Text page via [employer] Paging/Directory         Clinically Significant Risk Factors Present on Admission                    ______________________________________________________________________    Interval History   Feeling better this am. Far less tired. Denies AMS. Ate most of his breakfast. Appetite is good. Sleep is poor as he could not find positions of comfort. No fever or chills. Urination is at BL. No urinary symptoms     Data reviewed today: I reviewed all medications, new labs and imaging results over the last 24 hours    Physical Exam   Vital Signs: Temp: 96.9  F (36.1  C) Temp src: Oral BP: 129/79 Pulse: 69   Resp: 16 SpO2: 98 % O2 Device: None (Room air)    Weight: 166 lbs 14.4 oz  General Appearance: Alert and oriented x3, sitting up in wheelchair. Conversational and pleasant  Respiratory: Breathing comfortably on room air, lungs CTAB without wheezing or crackles  Cardiovascular: RRR, S1, S2. No murmur noted. ZioPatch in place  GI: Abdomen soft, non-tender with active bowel sounds. No guarding or rebound  Lymph/Hematologic: No peripheral edema, distal pulses palpable   Skin: No rash or jaundice  Neurologic: Freely moves the R side. L sided hemiplegia      Data   Recent Labs   Lab 02/02/22  0810 02/02/22  0548 02/02/22  0211 02/01/22  0951 02/01/22  0624 01/31/22  1120 01/31/22  1108 01/29/22  0808 01/29/22  0629 01/28/22  0844 01/28/22  0428  01/27/22  0916 01/27/22  0428   WBC  --   --   --   --   --   --   --   --  9.2  --  7.7  --  7.0   HGB  --   --   --   --   --   --   --   --  13.8  --  14.1  --  14.7   MCV  --   --   --   --   --   --   --   --  90  --  90  --  93   PLT  --   --   --   --   --   --   --   --  305  --  296  --  310   NA  --  137  --   --  138  --  134   < > 137  --  137  --  139   POTASSIUM  --  4.7  --   --  5.0  --  4.6   < > 3.9  --  3.7   < > 3.7  3.7   CHLORIDE  --  106  --   --  105  --  103   < > 106  --  103  --  103   CO2  --  27  --   --  29  --  24   < > 25  --  28  --  29   BUN  --  44*  --   --  35*  --  36*   < > 30  --  31*  --  30   CR  --  1.77*  --   --  1.72*  --  1.65*   < > 1.45*  --  1.32*  --  1.56*   ANIONGAP  --  4  --   --  4  --  7   < > 6  --  6  --  7   VALERIE  --  9.0  --   --  9.5  --  9.3   < > 9.2  --  9.0  --  9.5   * 125* 148*   < > 139*   < > 232*   < > 124*   < > 96   < > 95    < > = values in this interval not displayed.     No results found for this or any previous visit (from the past 24 hour(s)).

## 2022-02-02 NOTE — PLAN OF CARE
Discharge Planner Post-Acute Rehab OT:     Discharge Plan: HC vs TCU  Barriers include home set up and limited caregiver support.    Precautions: L side inattention and hemiparesis, L visual field cut, falls    Current Status:  ADLs:    Mobility: W/c based. Mod-max A SPT R side. Kaley stedy A x 1.    Grooming: Min A supported sitting.    Dressing: UB Max A. LB Total A.     Bathing: Dependent chair. Max A.    Toileting: Kaley stedy A x 1 commode, A x 2 with nursing. Total A hygiene/clothing.  IADLs: Needs assistance.  Vision/Cognition: L side inattention and L visual field cut. Mild cognitive deficits with executive functioning and attention.    Assessment: Continued FES for LUE sensorimotor re-ed - see POC. Persistent L inattention and R gaze preference, responds well to verbal cueing and cervical midline correction. Pt did not report any L hip pain during am ADLs.     Other Barriers to Discharge (DME, Family Training, etc):   Pt lives alone and caregiver support is limited.  Home set up - 2nd level apartment.  Requires significant physical assistance.

## 2022-02-02 NOTE — PLAN OF CARE
Skilled set up on :  Pt dependent for proper placement of electrodes on LUE (scapular stabilization, posterior deltoid, supraspinatous, biceps, triceps, wrist flexors, and wrist extensors) for optimum muscle contraction, safe positioning on w/c within frame and cushion for prevention of skin breakdown, UE secured to arm support. Needs arm trough due to significant hemiparesis. Passive motion assessed to ensure proper positioning.      Pt performed 25 minutes of active FES ergometry with 0-100% stimulation applied to above muscles at 35 rpm with .5 nm resistance.  This OT adjusted e-stim and cycling parameters in real-time to ensure palpable muscle contractions throughout session.  Please see [email or web address] for further details on patient's stimulation parameters and ergometry outcomes.      Changes in parameters that were part of this treatment:   -resistance  -amplitude  -Needs cues and assistance for L side attention and midline gaze.    Functional outcomes from this intervention include:   -reduced spasticity  -improved sensory awareness and proprioception  -improved muscle strength  -improved motor coordination    Active time 6 min  Passive time 19 min

## 2022-02-02 NOTE — PLAN OF CARE
Discharge Planner Post-Acute Rehab PT:     Discharge Plan: TBD, may not have the support to return home. Pt lives in 2nd story apartment, his ex-girlfriend is his only local identified support, but pt does not want or anticipate she will help significantly, therefore likely TCU candidate.    Precautions: Fall/alarm, L angelique, L field cut and neglect, L hip pain    Current Status:  Bed Mobility: Mod-A >sit, max-A > supine  Transfer: Mod-A squat/pivot. Assist of 1 Kaley Edwards with nursing  Gait: Unable  Stairs: Unable  Balance: Sits close supervision, requires UE support and assist for midline, assist to stand due to weakness  PASS: 11/36 on 1/29    Assessment: Hip pain not limiting for 2nd day in a row. Focus on standing midline at R munoz rail, able to progress into guarded LLE WB. Missed time in PM due to somnolence    Other Barriers to Discharge (DME, Family Training, etc):   Stairs to enter  Level of support  W/C  L AFO  Further gait DME TBD

## 2022-02-03 ENCOUNTER — APPOINTMENT (OUTPATIENT)
Dept: PHYSICAL THERAPY | Facility: CLINIC | Age: 47
End: 2022-02-03
Attending: PHYSICAL MEDICINE & REHABILITATION
Payer: COMMERCIAL

## 2022-02-03 ENCOUNTER — APPOINTMENT (OUTPATIENT)
Dept: EDUCATION SERVICES | Facility: CLINIC | Age: 47
End: 2022-02-03
Attending: STUDENT IN AN ORGANIZED HEALTH CARE EDUCATION/TRAINING PROGRAM
Payer: COMMERCIAL

## 2022-02-03 ENCOUNTER — APPOINTMENT (OUTPATIENT)
Dept: OCCUPATIONAL THERAPY | Facility: CLINIC | Age: 47
End: 2022-02-03
Attending: PHYSICAL MEDICINE & REHABILITATION
Payer: COMMERCIAL

## 2022-02-03 ENCOUNTER — APPOINTMENT (OUTPATIENT)
Dept: SPEECH THERAPY | Facility: CLINIC | Age: 47
End: 2022-02-03
Attending: PHYSICAL MEDICINE & REHABILITATION
Payer: COMMERCIAL

## 2022-02-03 LAB
ANION GAP SERPL CALCULATED.3IONS-SCNC: 7 MMOL/L (ref 3–14)
BUN SERPL-MCNC: 44 MG/DL (ref 7–30)
CALCIUM SERPL-MCNC: 9.3 MG/DL (ref 8.5–10.1)
CHLORIDE BLD-SCNC: 107 MMOL/L (ref 94–109)
CO2 SERPL-SCNC: 23 MMOL/L (ref 20–32)
CREAT SERPL-MCNC: 1.76 MG/DL (ref 0.66–1.25)
ERYTHROCYTE [DISTWIDTH] IN BLOOD BY AUTOMATED COUNT: 13.4 % (ref 10–15)
GFR SERPL CREATININE-BSD FRML MDRD: 47 ML/MIN/1.73M2
GLUCOSE BLD-MCNC: 128 MG/DL (ref 70–99)
GLUCOSE BLDC GLUCOMTR-MCNC: 131 MG/DL (ref 70–99)
GLUCOSE BLDC GLUCOMTR-MCNC: 82 MG/DL (ref 70–99)
HCT VFR BLD AUTO: 40.8 % (ref 40–53)
HGB BLD-MCNC: 13.8 G/DL (ref 13.3–17.7)
MCH RBC QN AUTO: 30.5 PG (ref 26.5–33)
MCHC RBC AUTO-ENTMCNC: 33.8 G/DL (ref 31.5–36.5)
MCV RBC AUTO: 90 FL (ref 78–100)
PLATELET # BLD AUTO: 256 10E3/UL (ref 150–450)
POTASSIUM BLD-SCNC: 4.1 MMOL/L (ref 3.4–5.3)
RBC # BLD AUTO: 4.52 10E6/UL (ref 4.4–5.9)
SODIUM SERPL-SCNC: 137 MMOL/L (ref 133–144)
WBC # BLD AUTO: 9 10E3/UL (ref 4–11)

## 2022-02-03 PROCEDURE — 250N000013 HC RX MED GY IP 250 OP 250 PS 637: Performed by: PHYSICIAN ASSISTANT

## 2022-02-03 PROCEDURE — 128N000003 HC R&B REHAB

## 2022-02-03 PROCEDURE — 97130 THER IVNTJ EA ADDL 15 MIN: CPT | Mod: GN

## 2022-02-03 PROCEDURE — 99232 SBSQ HOSP IP/OBS MODERATE 35: CPT | Performed by: PHYSICIAN ASSISTANT

## 2022-02-03 PROCEDURE — 97530 THERAPEUTIC ACTIVITIES: CPT | Mod: GP | Performed by: PHYSICAL THERAPIST

## 2022-02-03 PROCEDURE — 85027 COMPLETE CBC AUTOMATED: CPT | Performed by: PHYSICIAN ASSISTANT

## 2022-02-03 PROCEDURE — 250N000013 HC RX MED GY IP 250 OP 250 PS 637: Performed by: PHYSICAL MEDICINE & REHABILITATION

## 2022-02-03 PROCEDURE — 97112 NEUROMUSCULAR REEDUCATION: CPT | Mod: GP | Performed by: PHYSICAL THERAPIST

## 2022-02-03 PROCEDURE — 82310 ASSAY OF CALCIUM: CPT | Performed by: PHYSICIAN ASSISTANT

## 2022-02-03 PROCEDURE — 99232 SBSQ HOSP IP/OBS MODERATE 35: CPT | Mod: GC | Performed by: PHYSICAL MEDICINE & REHABILITATION

## 2022-02-03 PROCEDURE — 36415 COLL VENOUS BLD VENIPUNCTURE: CPT | Performed by: PHYSICIAN ASSISTANT

## 2022-02-03 PROCEDURE — 97129 THER IVNTJ 1ST 15 MIN: CPT | Mod: GN

## 2022-02-03 PROCEDURE — 97110 THERAPEUTIC EXERCISES: CPT | Mod: GO

## 2022-02-03 RX ADMIN — CARVEDILOL 37.5 MG: 25 TABLET, FILM COATED ORAL at 08:19

## 2022-02-03 RX ADMIN — ACETAMINOPHEN 650 MG: 325 TABLET, FILM COATED ORAL at 00:22

## 2022-02-03 RX ADMIN — ACETAMINOPHEN 650 MG: 325 TABLET, FILM COATED ORAL at 13:02

## 2022-02-03 RX ADMIN — DICLOFENAC 2 G: 10 GEL TOPICAL at 16:46

## 2022-02-03 RX ADMIN — DIVALPROEX SODIUM 1000 MG: 500 TABLET, FILM COATED, EXTENDED RELEASE ORAL at 08:19

## 2022-02-03 RX ADMIN — CHLORTHALIDONE 25 MG: 25 TABLET ORAL at 08:20

## 2022-02-03 RX ADMIN — CLONIDINE HYDROCHLORIDE 0.1 MG: 0.1 TABLET ORAL at 08:19

## 2022-02-03 RX ADMIN — DICLOFENAC 2 G: 10 GEL TOPICAL at 08:20

## 2022-02-03 RX ADMIN — DICLOFENAC 2 G: 10 GEL TOPICAL at 13:02

## 2022-02-03 RX ADMIN — ATORVASTATIN CALCIUM 40 MG: 40 TABLET, FILM COATED ORAL at 20:18

## 2022-02-03 RX ADMIN — LISINOPRIL 40 MG: 20 TABLET ORAL at 08:20

## 2022-02-03 RX ADMIN — ACETAMINOPHEN 650 MG: 325 TABLET, FILM COATED ORAL at 18:20

## 2022-02-03 RX ADMIN — AMILORIDE HYDROCLORIDE 10 MG: 5 TABLET ORAL at 08:19

## 2022-02-03 RX ADMIN — CARVEDILOL 37.5 MG: 25 TABLET, FILM COATED ORAL at 18:21

## 2022-02-03 RX ADMIN — DICLOFENAC 2 G: 10 GEL TOPICAL at 20:22

## 2022-02-03 RX ADMIN — ASPIRIN 81 MG CHEWABLE TABLET 81 MG: 81 TABLET CHEWABLE at 08:19

## 2022-02-03 RX ADMIN — ACETAMINOPHEN 650 MG: 325 TABLET, FILM COATED ORAL at 05:44

## 2022-02-03 RX ADMIN — LIDOCAINE PATCH 4% 1 PATCH: 40 PATCH TOPICAL at 20:18

## 2022-02-03 ASSESSMENT — ACTIVITIES OF DAILY LIVING (ADL)
ADLS_ACUITY_SCORE: 16
ADLS_ACUITY_SCORE: 18
ADLS_ACUITY_SCORE: 16
ADLS_ACUITY_SCORE: 18
ADLS_ACUITY_SCORE: 18
ADLS_ACUITY_SCORE: 16
ADLS_ACUITY_SCORE: 18
ADLS_ACUITY_SCORE: 18
ADLS_ACUITY_SCORE: 16
ADLS_ACUITY_SCORE: 18
ADLS_ACUITY_SCORE: 16
ADLS_ACUITY_SCORE: 18
ADLS_ACUITY_SCORE: 16
ADLS_ACUITY_SCORE: 18

## 2022-02-03 ASSESSMENT — MIFFLIN-ST. JEOR: SCORE: 1598.84

## 2022-02-03 NOTE — PLAN OF CARE
"Discharge Planner Post-Acute Rehab SLP:     Discharge Plan: TBD    Precautions: Fall    Current Status:  Communication: Mild dysarthria. Occasional word-finding difficulties in conversation, however pt is fluent and able to communicate wants/needs.  Cognition: At least moderate cognitive-linguistic deficits in the areas of attention, executive function, processing speed, and visuospatial skills.  Swallow: Pt reportedly tolerating a regular diet with thin liquids. Pt discharged from previous hospital with no swallowing concerns.    Assessment: Engaged patient in iPad based task, Lumosity. In task requiring visual attention to the left, patient successful with use of a visual anchor on the left hand side along with verbal cue to use \"carriage return\" in reference to restting attentin on the left like an old fashioned typewriter.     Other Barriers to Discharge (Family Training, etc): TBD    "

## 2022-02-03 NOTE — PLAN OF CARE
FOCUS/GOAL  Bowel management, Bladder management, Pain management, Medical management, Mobility, Skin integrity, and Safety management    ASSESSMENT, INTERVENTIONS AND CONTINUING PLAN FOR GOAL:  Patient is alert and oriented. Able to make needs known. Complained of left hip pain during transfer Scheduled tylenol, Voltaren gel, Lidocaine patch on left hip are helpful. Regular/thin good appetite after set up assist. Left side flaccid.  and 129. A-2 ghada steady for transfer. Assisted to the bath room and had ex-large BM in this shift. Uses urinal with assist. Continent of B/B. VS WDL. No skin issue, needs A-2 for bed positioning and boost up in bed. No care concern at this time. No info patient. Call light is in reach alarm is on for safety. Side rails are up X 3 tray table in standard position. We will continue per POC.

## 2022-02-03 NOTE — CONSULTS
Stroke Education Note    The following information has been reviewed with the patient:    1. Warning signs of stroke    2. Calling 911 if having warning signs of stroke    3. All modifiable risk factors: hypertension, CAD, atrial fib, diabetes, hypercholesterolemia, smoking, substance abuse, diet, physical inactivity, obesity, sleep apnea.    4. Patient's risk factors for stroke which include: HTN, DM, HLD, physical inactivity    5. Follow-up plan for after discharge    6. Discharge medications which include: ASA, Lipitor, amiloride, carvedilol, chlorthalidone, clonidine, lisinopril    In addition, the above information was given to the patient in writing as a part of the [facility] Stroke Class Handout.    Learner's response to risk factors / lifestyle modification education: Reasons to change Need to change     Subha Navarro RN

## 2022-02-03 NOTE — PLAN OF CARE
FOCUS/GOAL  Bowel management, Bladder management, Pain management, Mobility, Skin integrity, and Safety management    ASSESSMENT, INTERVENTIONS AND CONTINUING PLAN FOR GOAL:  A/O, VS stable. Regular/thin, pills whole with water. Ate most of meal. BG stable, MD changed order to BID due to pt not needing insulin. Continent of bowel and bladder, last BM today. Pain in left hip, tylenol and Voltaren given to relieve. Assist of 2 with ghada steady to transfer. No new skin concerns. Calling appropriately with light. Continue POC.

## 2022-02-03 NOTE — PROGRESS NOTES
"  [facility]   Acute Rehabilitation Unit    INTERVAL HISTORY  Rohan reports that he has been doing well. His L hip pain flared up somewhat yesterday, but the voltaren gel helped control the pain. He reports being able to  the Kaley Steady.     Denies chest pain, abdominal pain, or difficulty breathing.    Functionally, he continues to exhibit a L vision field cut, but was able to complete a task requiring consistent L and R scanning with intermittent mild-mod verbal cues. He's a mod assist with squat pivot transfers, and requires assistance for midline balance.     MEDICATIONS  Scheduled meds    acetaminophen  650 mg Oral Q6H     aMILoride  10 mg Oral Daily     aspirin  81 mg Oral Daily     atorvastatin  40 mg Oral QPM     carvedilol  37.5 mg Oral BID w/meals     chlorthalidone  25 mg Oral Daily     cloNIDine  0.1 mg Oral Daily     diclofenac  2 g Topical 4x Daily     divalproex sodium extended-release  1,000 mg Oral Daily     insulin aspart  1-7 Units Subcutaneous TID AC     insulin aspart  1-5 Units Subcutaneous At Bedtime     lidocaine  1 patch Transdermal Q24h    And     lidocaine   Transdermal Q8H     lisinopril  40 mg Oral Daily     [Held by provider] metFORMIN  500 mg Oral Daily with supper     [Held by provider] potassium chloride  20 mEq Oral BID       PRN meds:  bisacodyl, glucose **OR** dextrose **OR** glucagon, ondansetron, polyethylene glycol, sennosides      PHYSICAL EXAM  BP (!) 154/91 (BP Location: Right arm)   Pulse 69   Temp 97.7  F (36.5  C) (Oral)   Resp 16   Ht 1.737 m (5' 8.4\")   Wt 74.3 kg (163 lb 12.8 oz)   SpO2 97%   BMI 24.62 kg/m      Gen: NAD, sitting in WC, slouched to R side  Cardio: RRR - Ziopatch in place   Pulm: clear breath sounds b/l   Abd: soft, and non-tender   Ext: no edema in bilateral lower extremities, no calf tenderness   Neuro/MSK: left sided neglect with clear gaze preference. Left hemiplegia with no volitional movement. " Sensory loss on the left side as well.  4/5 R side, 2/5 L side.         LABS  Results for orders placed or performed during the hospital encounter of 01/28/22 (from the past 24 hour(s))   Glucose by meter   Result Value Ref Range    GLUCOSE BY METER POCT 106 (H) 70 - 99 mg/dL   Glucose by meter   Result Value Ref Range    GLUCOSE BY METER POCT 135 (H) 70 - 99 mg/dL   Glucose by meter   Result Value Ref Range    GLUCOSE BY METER POCT 138 (H) 70 - 99 mg/dL   Glucose by meter   Result Value Ref Range    GLUCOSE BY METER POCT 129 (H) 70 - 99 mg/dL         ASSESSMENT AND PLAN  Miriam Hall is a 46 year old R hand dominant male with h/o HTN who was admitted on 1/8 with R PCA ischemic stroke. His hospital course was complicated by concerns for secondary HTN, new diagnosis of DM II, DISHA, hypokalemia and encephalopathy. He clinically improved and transferred to ARU for ongoing medical management and intensive inpatient rehab. He is progressing with therapy, and is still having episodes of HTN. Medicine is following and is helping with BP management as well as DISHA management.        Admission to acute inpatient rehab on 1/28/2022.    Impairment group code: Stroke Ischemic 01.1 (L) Body Involvement (R) Brain: R PCA ischemic stroke w/ hemorrhagic transformation noted      Rehabilitation - continue comprehensive acute inpatient rehabilitation program with multidisciplinary approach including therapies, rehab nursing, and physiatry following. See interval history for updates.         Medical Conditions  Neuro  Acute right PCA ischemic stroke   R P1 occlusion and R M2 Stenosis  Hemorrhagic transformation of ischemic infarct  -aspirin 81mg   -HTN, DM and HLD control as below for secondary prevention   -hypercoagulability panel negative  -vasculitis panel negative  -ziopatch in place till 2/10   -acute rehab with PT, OT, and SLP  -per neuro recommendations, continue depakote until outpatient f/u  -should see stroke neurology  and PM&R after discharge as well as genetic team per recs       Possible seizures   EEG 1/12 concerning for subclinical seizures. Loaded with Keppra then transitioned to Depakote.   -continue Depakote  -seizure precautions      MSK  L Hip Pain  -reports increased L hip pain  -schedule Tylenol 650mg q6h  -hip XR on 1/24 does not show evidence of fx  -can consider ice, heating pad as adjuvants     CV  Refractory HTN  -possibly secondary to clinical hyperaldosteronism of several possible etiologies  -medicine on for assistance with HTN management              -DDs included cushing, Liddle syndrome, or mineralocorticoid excess              -workup negative so far              -urine studies pending (urine cortisol level)  -amiloride 10mg daily  -Carvedilol 37.5mg bid  -chlorthalidone 25mg daily  -clonidine 0.1mg - dose decreased 2/2  -hydralazine 50mg Q8H - discontinued 2/1  -lisinopril 40mg daily   -f/u with nephrology and endo teams as outpatient       HLD  -atorvastatin 40mg     Endo  Type 2 Diabetes  A1c 7.8 1/8  -metformin 500mg with dinner; held on 2/1 due to worsening Cr  -continue to hold metformin in the setting of DISHA  -sliding scale insulin      Renal  DISHA  Likely due to hypovolemia and medication induced. On 2/3 Cr was slightly improved  -hydralazine stopped, tapering clonidine  -other nephrotoxic medications including chlorthalidone, lisinopril  -monitor Cr   -daily weights        Hypokalemia  -20 meq K BID; discontinued 2/1  -hyperaldosteronism workup still pendings  -monitor with BMP    2. Adjustment to disability:  Clinical psychology to eval and treat as needed   3. FEN: regular diet   4. Bowel: continent, PRN miralax and senna  5. Bladder: continent; PVRs were checked and were acceptable   6. DVT Prophylaxis: mechanical with SCDs  7. GI Prophylaxis: none   8. Code: Full  9. Disposition: to home  10. ELOS:  21 days.  11. Rehab prognosis:  fair  12. Follow up Appointments on Discharge: Neuro, cardio for  Orlin nunez, nephrology, PCP and PM&R       Dseire Birch, DO  PGY-1  Physical Medicine and Rehabilitation    Physician Attestation   I, Zohreh Gaffney MD, saw this patient with the resident and agree with the resident/fellow's findings and plan of care as documented in the note.      I personally reviewed vital signs, medications and labs.    [place] findings: Rohan was doing well today. His renal function remained stable. No changes in his meds today; discussed the plan with the hospitalist team. He got emotional when talked about his experience with standing. Anticipate 2+ more weeks at ARU and may need TCU after that.     Zohreh Gaffney MD  Date of Service (when I saw the patient): 02/03/22

## 2022-02-03 NOTE — PLAN OF CARE
FOCUS/GOAL  Bowel management, Bladder management, Skin integrity and Cognition/Memory/Judgment/Problem solving    ASSESSMENT, INTERVENTIONS AND CONTINUING PLAN FOR GOAL:  Pt is alert and oriented x4, denies fever, chills, chest pain , SOB, N/V, abdominal pain, or new weakness/numbness/tingling, continent of bowel and bladder, using urinal with assistance overnight. Pt encouraged to continue drinking fluids. transferring with assist of 2 and ghada steady. Pt was able to turn to left side with just side rail. Notable left side field cut. Sleeping on and off, no tubes, lines or drains, vs stable, no further care concerns at this time continue with POC.

## 2022-02-03 NOTE — PROGRESS NOTES
[facility]    Medicine Progress Note - Hospitalist Service    Date of Admission: 1/28/2022    Assessment & Plan        Miriam Hall is a 46 year old male admitted with history of HTN who was admitted to [facility] 1/8 with R PCA stroke, refractory HTN and concern for secondary HTN, new onset DMII, hypokalemia, DISHA and encephalopathy. Stabilized and transferred to ARU 1/28. Medicine consulted for assistance with medical management       Acute R PCA stroke: Presented with L hemiplegia. Head CT 1/8 R parieto-occipital infarct. CTA noted occlusion on proximal R PCA and high grade stenosis of M2 branch of R MCA. Outside window for tPA. Repeat imagine 1/12 with hemorrhagic transformation. Source 2/2 high grade atherosclerosis vs ESUS. CARISA negative for LA thrombus. Residual deficits include L hemiplegia, L hemianopsia, mild dysarthria. Sitting up, conversational, and pleasant 2/2 (significant improvement from 2/1)  - Continue ASA, statin  - HTN regimen as below   - Ziopatch through 2/10  - PT/OT following   - Follow up with genetics on discharge given early age CVA  - Follow up with neurology as indicated     DISHA: Stable. BL Cr 1-1.2. Peak 1.56 at [facility], 2/2 hypovolemia and medication adjustments. Cr rising slowly over the past several days. Hypotensive events 1/28. S/p 500 ml bolus 1/31 without improvement. Renal US 1/31 no acute findings. Hydralazine stopped and metformin held starting 2/1. Other nephrotoxic drugs include chlorthalidone, lisinopril, ASA. Cr 1.76 (1.77)  - No changes to antihypertensive regimen today, see below   - Continue to hold Metformin  - [facility] 2/5  - Strict I&Os, daily weights     Refractory HTN: SBP>240 on admission. Nephrology consulted while admitted. Concern for hyperaldosteronism given persistent hypokalemia and concomitant metabolic alkalosis. Aldosterone and renin levels normal. Ddx per nephrology includes Cushing (urinary cortisol pending),  Liddle syndrome, apparent mineral corticoid excess (free urinary cortisol and cortisone pending). Hydralazine stopped 2/1, clonidine decreased to daily 2/2. Current regimen includes: Amlodipine, Coreg, chlorthalidone, clonidine, lisinopril  - Continue daily Clonidine today to prevent rebound HTN, will stop over the next few days pending pressures  - Continue current Amlodipine, Coreg, chlorthalidone, lisinopril   - Follow urine cortisol   - Follow up with nephrology in 2 weeks  - Follow up with endo on discharge for ongoing eval for possible hyperaldosteronemia     DMII: New onset. A1c 7.8 1/8. Endo consulted, started on Metformin prior to discharge. Metformin held starting 2/1 due to DISHA. Currently on MSSI. Glucose stable  - Stop MSSI as patient minimally requiring treatment. Continue to hold Metformin given renal disease. Glucose checks bid for now  - Monitor for hyperkalemia while on Amiloride   - Hypoglycemia protocol     Resolved and Stable Issues:  Refractory hypokalemia: Concern for hyperaldosteronism as above. Negative workup thus far. Discharge on Kdur. K 5.0 2/1 thus Kdur stopped. K stable. Monitor M/Th, sooner if acute changes.   Possible seizures: In the setting of CVA. EEG 1/12 concerning for subclinical seizures. Loaded with Keppra then transitioned to Depakote. Continue Depakote and seizure precautions. Follow up OP with neurology as indicated   Low grade fever: Resolved. See notes 1/30 and prior. Repeat infectious workup if recurrent fevers   Toxic metabolic and stroke related encephalopathy: Resolved prior to transfer to ARU. Per notes, etiology 2/2 CVA vs hypertensive encephalopathy vs seizures. No ongoing issues noted. Monitor  Non-severe protein calorie malnutrition: In the setting of the above. Appetite and oral intake is stable  Platelet defect: 2/2 ASA use. Monitor for bleeding     Writer discussed the above with patient, PM&R, and attending physician, Dr. Spence       Diet: Combination Diet  Regular Diet  Room Service    DVT Prophylaxis: Defer to primary service  Mendoza Catheter: Not present  Central Lines: None  Cardiac Monitoring: None  Code Status: Full Code      Disposition Plan   Expected Discharge: 02/18/2022     Anticipated discharge location:  Awaiting care coordination huddle  Delays:     Per primary team      Park Lyle PA-C  Hospitalist Service  [facility]  Securely message with the Vocera Web Console (learn more here)  Text page via [employer] Paging/Directory         Clinically Significant Risk Factors Present on Admission                    ______________________________________________________________________    Interval History   Had a poor night sleep due to hip pain. Moved while sleeping and triggered pain which kept him up much of the night. Denies fever or chills. Appetite is good. Ate entire omelette for breakfast. No N/V. Denies acute changes    Data reviewed today: I reviewed all medications, new labs and imaging results over the last 24 hours    Physical Exam   Vital Signs: Temp: 97.7  F (36.5  C) Temp src: Oral BP: (!) 154/91 Pulse: 69   Resp: 16 SpO2: 97 % O2 Device: None (Room air)    Weight: 163 lbs 12.8 oz  General Appearance: Alert and oriented x3, sitting up in wheelchair. Conversational and pleasant  Respiratory: Breathing comfortably on room air, lungs CTAB without wheezing or crackles  Cardiovascular: RRR, S1, S2. No murmur noted. ZioPatch in place  GI: Abdomen soft, non-tender with active bowel sounds. No guarding or rebound  Lymph/Hematologic: No peripheral edema, distal pulses palpable   Skin: No rash or jaundice  Neurologic: Freely moves the R side. L sided hemiplegia      Data   Recent Labs   Lab 02/03/22  0754 02/03/22  0746 02/02/22  2139 02/02/22  0810 02/02/22  0548 02/01/22  0951 02/01/22  0624 01/29/22  0808 01/29/22  0629 01/28/22  0844 01/28/22  0428   WBC 9.0  --   --   --   --   --   --   --  9.2  --  7.7    HGB 13.8  --   --   --   --   --   --   --  13.8  --  14.1   MCV 90  --   --   --   --   --   --   --  90  --  90     --   --   --   --   --   --   --  305  --  296     --   --   --  137  --  138   < > 137  --  137   POTASSIUM 4.1  --   --   --  4.7  --  5.0   < > 3.9  --  3.7   CHLORIDE 107  --   --   --  106  --  105   < > 106  --  103   CO2 23  --   --   --  27  --  29   < > 25  --  28   BUN 44*  --   --   --  44*  --  35*   < > 30  --  31*   CR 1.76*  --   --   --  1.77*  --  1.72*   < > 1.45*  --  1.32*   ANIONGAP 7  --   --   --  4  --  4   < > 6  --  6   VALERIE 9.3  --   --   --  9.0  --  9.5   < > 9.2  --  9.0   * 131* 129*   < > 125*   < > 139*   < > 124*   < > 96    < > = values in this interval not displayed.     No results found for this or any previous visit (from the past 24 hour(s)).

## 2022-02-03 NOTE — PLAN OF CARE
Discharge Planner Post-Acute Rehab OT:     Discharge Plan: HC vs TCU  Barriers include home set up and limited caregiver support.    Precautions: L side inattention and hemiparesis, L visual field cut, falls    Current Status:  ADLs:    Mobility: W/c based. Mod-max A SPT R side. Kaley stedy A x 1.    Grooming: Min A supported sitting.    Dressing: UB Max A. LB Total A.     Bathing: Dependent chair. Max A.    Toileting: Kaley stedy A x 1 commode, A x 2 with nursing. Total A hygiene/clothing.  IADLs: Needs assistance.  Vision/Cognition: L side inattention and L visual field cut. Mild cognitive deficits with executive functioning and attention.    Assessment: Initiated LUE NMES to promote sensorimotor re-education and left-side attention. Pt tolerated well, no skin redness or pain. Pt able to perform scapular strengthening exercises as instructed and visual reminder placed to A with carryover outside of therapy sessions. Pt did not report pain in session. Continue POC.    Other Barriers to Discharge (DME, Family Training, etc):   Pt lives alone and caregiver support is limited.  Home set up - 2nd level apartment.  Requires significant physical assistance.

## 2022-02-03 NOTE — PLAN OF CARE
Skilled set up on :  Pt dependent for proper placement of electrodes on L gastroc, ant tib, quad, HS for optimum muscle contraction, safe positioning on w/c within frame and cushion for prevention of skin breakdown, feet secured to foot pedals, w/c secured to  w/ Q-straints.  Passive motion assessed to ensure proper positioning.      Pt performed 30 minutes of active FES ergometry with 100% stimulation applied to above muscles at 35-45rpm with 0.64nm resistance.  This PT adjusted e-stim and cycling parameters in real-time to ensure palpable muscle contractions throughout session.  Please see [email or web address] for further details on patient's stimulation parameters and ergometry outcomes.      Changes in parameters that were part of this treatment:   -Stim turn back on and tolerated this date. W/c further back to dec hip flexion with good result. Resistance increased this session.  Speed also increased to 40.     Functional outcomes from this intervention include:   -improved sensory awareness and proprioception  -improved muscle strength  -improved motor coordination    Session Summary:   -Average Power: 2,.4W  -Asymmetry: L 6%  -Active Minutes: 29:43

## 2022-02-03 NOTE — PROGRESS NOTES
"Brought to SW attention that there are some concerns about visitors and contacts calling the unit wanting to talk/get information about pt. SW informed that pt designated visitor and friend/ex-girlfriend Taniya is requesting pt be made confidential because of concerns with a woman names Alma Delia trying to get information and accessing pt banking information. SW gathered information and met with pt at bedside.     Spoke with pt at bedside. SW asked pt about his support. Pt stated his mom (name unknown), Taniya (friend/ex-girlfriend), Davi (brother in [place]), Deirdre (friend/considers her a sister), and Jordyn (Deirdre's mom). Pt stated that he has many friends and a lot of support. Pt stated that many people he would 'call family although not blood'. Pt went on to share his concerns about his mom. Pt stated that his mom is 78 y/o and lives in [facility]. Pt stated that she is \"fiesty\" and has \"isoluated herself\" recently. Pt stated that she lives in a ground level apt and leaves her patio door unlocked so that patient can come visit if in the neighborhood. Pt has not been able to contact his mom and he asked Taniya to go check on her (Taniya and his mom know each other well, per pt). Taniya has been there 3x and mom has not been there, although the last time Taniya went she saw a wheelchair from [facility]. Pt concerned that his mom is in the hospital and 'has removed his name from getting any information'. Discussed family dynamics and informed pt that according to what he is saying, he is his mom's NOK. SW suggested that pt call [facility] as her NOK, call the police and make wellness check request, and have Taniya continue to check. SW provided empathy and offered to assist, pt declined at this time and stated he would consider SW suggestions.     SW discussed with pt that Taniya's concerns that were brought forward to RN staff. Pt stated that Alma Delia is a former employee where he still currently works and Alma Delia worked in HR. Pt stated that he " "considers her a friend. SW asked if he has concerns with Alma Delia accessing information about pt and/or accessing banking information. Pt stated that he didn't know she was trying to access that information until recently and stated \"I will deal with all that\". SW specifically asked if he has concerns about Alma Delia financial exploiting him AND being a vulnerable adult and pt strongly denied. SWer asked pt about his preference to be made confidential or not. SW educated pt on what this means and pt stated he DOES NOT want to be confidential. Pt appeared to joke with SW and stated \"you can tell her I'm here and forward the call, just don't tell her my banking information\". Pt stated that if Taniya is upset about this, it's because she is protective and that they had been in a relationship for a long time. Pt stated that he feels well and able to communicate this with Taniya as well.     Neurology note from [facility] on 01/27/2022 acknowledged by this writer. MARLENA confirmed with the team that pt is his own decision-making and able to complete a HCD/POA. SWer brought pt a blank copy of both and plans to assist pt with this process.     Providers, HUC, Charge RN and Rehab Director (Marylou Beavers) updated on the information below. MARLENA plans to check in with pt, continue to assist and support. Will discuss TCU with pt and provide additional resources.     Rhiannon Shah [facility] HospitalMARLENA   [facility]   Direct Phone: [phone]  I   Pager: [phone]  I  Fax: [phone]    "

## 2022-02-04 ENCOUNTER — APPOINTMENT (OUTPATIENT)
Dept: PHYSICAL THERAPY | Facility: CLINIC | Age: 47
End: 2022-02-04
Attending: PHYSICAL MEDICINE & REHABILITATION
Payer: COMMERCIAL

## 2022-02-04 ENCOUNTER — APPOINTMENT (OUTPATIENT)
Dept: OCCUPATIONAL THERAPY | Facility: CLINIC | Age: 47
End: 2022-02-04
Attending: PHYSICAL MEDICINE & REHABILITATION
Payer: COMMERCIAL

## 2022-02-04 ENCOUNTER — APPOINTMENT (OUTPATIENT)
Dept: SPEECH THERAPY | Facility: CLINIC | Age: 47
End: 2022-02-04
Attending: PHYSICAL MEDICINE & REHABILITATION
Payer: COMMERCIAL

## 2022-02-04 LAB
GLUCOSE BLDC GLUCOMTR-MCNC: 124 MG/DL (ref 70–99)
GLUCOSE BLDC GLUCOMTR-MCNC: 127 MG/DL (ref 70–99)

## 2022-02-04 PROCEDURE — 97530 THERAPEUTIC ACTIVITIES: CPT | Mod: GP

## 2022-02-04 PROCEDURE — 97112 NEUROMUSCULAR REEDUCATION: CPT | Mod: GP

## 2022-02-04 PROCEDURE — 250N000013 HC RX MED GY IP 250 OP 250 PS 637: Performed by: PHYSICIAN ASSISTANT

## 2022-02-04 PROCEDURE — 250N000013 HC RX MED GY IP 250 OP 250 PS 637: Performed by: PHYSICAL MEDICINE & REHABILITATION

## 2022-02-04 PROCEDURE — 128N000003 HC R&B REHAB

## 2022-02-04 PROCEDURE — 97535 SELF CARE MNGMENT TRAINING: CPT | Mod: GO | Performed by: OCCUPATIONAL THERAPIST

## 2022-02-04 PROCEDURE — 99232 SBSQ HOSP IP/OBS MODERATE 35: CPT | Performed by: PHYSICIAN ASSISTANT

## 2022-02-04 PROCEDURE — 99232 SBSQ HOSP IP/OBS MODERATE 35: CPT | Mod: GC | Performed by: PHYSICAL MEDICINE & REHABILITATION

## 2022-02-04 PROCEDURE — 97130 THER IVNTJ EA ADDL 15 MIN: CPT | Mod: GN

## 2022-02-04 PROCEDURE — 97112 NEUROMUSCULAR REEDUCATION: CPT | Mod: GO | Performed by: OCCUPATIONAL THERAPIST

## 2022-02-04 PROCEDURE — 99207 PR CDG-MDM COMPONENT: MEETS MODERATE - UP CODED: CPT | Performed by: PHYSICIAN ASSISTANT

## 2022-02-04 PROCEDURE — 97129 THER IVNTJ 1ST 15 MIN: CPT | Mod: GN

## 2022-02-04 RX ORDER — CLONIDINE HYDROCHLORIDE 0.1 MG/1
0.1 TABLET ORAL DAILY
Status: COMPLETED | OUTPATIENT
Start: 2022-02-04 | End: 2022-02-04

## 2022-02-04 RX ADMIN — DICLOFENAC 2 G: 10 GEL TOPICAL at 15:26

## 2022-02-04 RX ADMIN — CARVEDILOL 37.5 MG: 25 TABLET, FILM COATED ORAL at 08:44

## 2022-02-04 RX ADMIN — ACETAMINOPHEN 650 MG: 325 TABLET, FILM COATED ORAL at 18:04

## 2022-02-04 RX ADMIN — ACETAMINOPHEN 650 MG: 325 TABLET, FILM COATED ORAL at 12:30

## 2022-02-04 RX ADMIN — CLONIDINE HYDROCHLORIDE 0.1 MG: 0.1 TABLET ORAL at 08:48

## 2022-02-04 RX ADMIN — LISINOPRIL 40 MG: 20 TABLET ORAL at 08:45

## 2022-02-04 RX ADMIN — CARVEDILOL 37.5 MG: 25 TABLET, FILM COATED ORAL at 18:04

## 2022-02-04 RX ADMIN — DIVALPROEX SODIUM 1000 MG: 500 TABLET, FILM COATED, EXTENDED RELEASE ORAL at 08:44

## 2022-02-04 RX ADMIN — AMILORIDE HYDROCLORIDE 10 MG: 5 TABLET ORAL at 08:44

## 2022-02-04 RX ADMIN — ASPIRIN 81 MG CHEWABLE TABLET 81 MG: 81 TABLET CHEWABLE at 08:45

## 2022-02-04 RX ADMIN — ATORVASTATIN CALCIUM 40 MG: 40 TABLET, FILM COATED ORAL at 20:09

## 2022-02-04 RX ADMIN — CHLORTHALIDONE 25 MG: 25 TABLET ORAL at 08:45

## 2022-02-04 ASSESSMENT — ACTIVITIES OF DAILY LIVING (ADL)
ADLS_ACUITY_SCORE: 18
ADLS_ACUITY_SCORE: 16
ADLS_ACUITY_SCORE: 18
ADLS_ACUITY_SCORE: 16
ADLS_ACUITY_SCORE: 16
ADLS_ACUITY_SCORE: 18
ADLS_ACUITY_SCORE: 18
ADLS_ACUITY_SCORE: 16
ADLS_ACUITY_SCORE: 18
ADLS_ACUITY_SCORE: 18
ADLS_ACUITY_SCORE: 16

## 2022-02-04 NOTE — PROGRESS NOTES
[facility]    Medicine Progress Note - Hospitalist Service    Date of Admission: 1/28/2022    Assessment & Plan        Miriam Hall is a 46 year old male admitted with history of HTN who was admitted to [facility] 1/8 with R PCA stroke, refractory HTN and concern for secondary HTN, new onset DMII, hypokalemia, DISHA and encephalopathy. Stabilized and transferred to ARU 1/28. Medicine consulted for assistance with medical management       Acute R PCA stroke: Presented with L hemiplegia. Head CT 1/8 R parieto-occipital infarct. CTA noted occlusion on proximal R PCA and high grade stenosis of M2 branch of R MCA. Outside window for tPA. Repeat imagine 1/12 with hemorrhagic transformation. Source 2/2 high grade atherosclerosis vs ESUS. CARISA negative for LA thrombus. Residual deficits include L hemiplegia, L hemianopsia, mild dysarthria. Sitting up, conversational, and pleasant 2/2 (significant improvement from 2/1)  - Continue ASA, statin  - HTN regimen as below   - Ziopatch through 2/10  - PT/OT following   - Follow up with genetics on discharge given early age CVA  - Follow up with neurology as indicated     DISHA: Stable. BL Cr 1-1.2. Peak 1.56 at [facility], 2/2 hypovolemia and medication adjustments. Cr rising slowly over the past several days. Hypotensive events 1/28. Renal US 1/31 no acute findings. Hydralazine stopped and metformin held starting 2/1. Other nephrotoxic drugs include chlorthalidone, lisinopril, ASA. Cr 1.76 (1.77) 2/3  - Stop Clonidine as below   - Continue to hold Metformin  - Next BMP 2/5  - Strict I&Os, daily weights     Refractory HTN: SBP>240 on admission. Nephrology consulted, concern for hyperaldosteronism given persistent hypokalemia and concomitant metabolic alkalosis. Aldosterone and renin levels normal. Ddx per nephrology includes Cushing (urinary cortisol pending), Liddle syndrome, apparent mineral corticoid excess (free urinary cortisol and cortisone  pending). Hydralazine stopped 2/1, clonidine to be stopped 2/5. Current regimen includes: Amlodipine, Coreg, chlorthalidone, clonidine, lisinopril. BP slightly up 2/3, ?rebound with decreased clonidine dose. BP stable 2/4  - Last dose clonidine today to prevent rebound HTN  - Continue current Amlodipine, Coreg, chlorthalidone, lisinopril   - Follow urine cortisol   - Follow up with nephrology 2/10/22 as planned  - Follow up with endo on discharge for ongoing eval for possible hyperaldosteronemia     DMII: New onset. A1c 7.8 1/8. Endo consulted, started on Metformin prior to discharge. Metformin held starting 2/1 due to DISHA. MSSI stopped 2/3 as patient requiring minimal Novolog. Glucose stable  - Metformin remains on hold  - Monitor for hyperkalemia while on Amiloride   - Hypoglycemia protocol     Resolved and Stable Issues:  Refractory hypokalemia: Concern for hyperaldosteronism as above. Negative workup thus far. Discharge on Kdur. K 5.0 2/1 thus Kdur stopped. K stable. Monitor M/Th, sooner if acute changes.   Possible seizures: In the setting of CVA. EEG 1/12 concerning for subclinical seizures. Loaded with Keppra then transitioned to Depakote. Continue Depakote and seizure precautions. Follow up OP with neurology as indicated   Low grade fever: Resolved. See notes 1/30 and prior  Toxic metabolic and stroke related encephalopathy: Resolved prior to transfer to ARU. Per notes, etiology 2/2 CVA vs hypertensive encephalopathy vs seizures. No ongoing issues noted. Monitor  Non-severe protein calorie malnutrition: In the setting of the above. Appetite and oral intake are stable  Platelet defect: 2/2 ASA use. Monitor for bleeding     Writer discussed the above with patient, PM&R, and attending physician, Dr. Spence       Diet: Combination Diet Regular Diet  Room Service    DVT Prophylaxis: Defer to primary service  Mendoza Catheter: Not present  Central Lines: None  Cardiac Monitoring: None  Code Status: Full Code       Disposition Plan   Expected Discharge: 02/18/2022     Anticipated discharge location:  Awaiting care coordination huddle  Delays:     Per primary team      Park Lyle PA-C  Hospitalist Service  [facility]  Securely message with the Vocera Web Console (learn more here)  Text page via [facility] Paging/Directory         Clinically Significant Risk Factors Present on Admission                    ______________________________________________________________________    Interval History   Tired this afternoon after three therapy sessions this am. Sleep was slightly better last night. Feels as if he is getting stronger. Denies AMS. No fever or chills. UOP seems normal. No abdominal pain, N/V. Appetite is good    Data reviewed today: I reviewed all medications, new labs and imaging results over the last 24 hours    Physical Exam   Vital Signs: Temp: 97.4  F (36.3  C) Temp src: Oral BP: 132/77 Pulse: 75   Resp: 18 SpO2: 96 % O2 Device: None (Room air)    Weight: 163 lbs 12.8 oz  General Appearance: Alert and oriented x3, sitting up in bed. Sleepy but conversational and pleasant   Respiratory: Breathing comfortably on room air, lungs CTAB without wheezing or crackles  Cardiovascular: RRR, S1, S2. No murmur noted. ZioPatch in place  GI: Abdomen soft, non-tender with active bowel sounds. No guarding or rebound  Lymph/Hematologic: No peripheral edema, distal pulses palpable   Skin: No rash or jaundice  Neurologic: Freely moves the R side. L sided hemiplegia      Data   Recent Labs   Lab 02/04/22  0815 02/03/22  1728 02/03/22  0754 02/02/22  0810 02/02/22  0548 02/01/22  0951 02/01/22  0624 01/29/22  0808 01/29/22  0629   WBC  --   --  9.0  --   --   --   --   --  9.2   HGB  --   --  13.8  --   --   --   --   --  13.8   MCV  --   --  90  --   --   --   --   --  90   PLT  --   --  256  --   --   --   --   --  305   NA  --   --  137  --  137  --  138   < > 137   POTASSIUM  --    --  4.1  --  4.7  --  5.0   < > 3.9   CHLORIDE  --   --  107  --  106  --  105   < > 106   CO2  --   --  23  --  27  --  29   < > 25   BUN  --   --  44*  --  44*  --  35*   < > 30   CR  --   --  1.76*  --  1.77*  --  1.72*   < > 1.45*   ANIONGAP  --   --  7  --  4  --  4   < > 6   VALERIE  --   --  9.3  --  9.0  --  9.5   < > 9.2   * 82 128*   < > 125*   < > 139*   < > 124*    < > = values in this interval not displayed.     No results found for this or any previous visit (from the past 24 hour(s)).

## 2022-02-04 NOTE — PLAN OF CARE
Skilled set up on :  Pt dependent for proper placement of electrodes on LUE (scapular stabilization, posterior deltoid, supraspinatous, biceps, triceps, wrist flexors, and wrist extensors) for optimum muscle contraction, safe positioning on w/c within frame and cushion for prevention of skin breakdown, UE secured to arm support. Needs arm trough due to significant hemiparesis. Passive motion assessed to ensure proper positioning.      Pt performed 25 minutes of active FES ergometry with 0-100% stimulation applied to above muscles at 35 rpm with .5 nm resistance.  This OT adjusted e-stim and cycling parameters in real-time to ensure palpable muscle contractions throughout session.  Please see [email or web address] for further details on patient's stimulation parameters and ergometry outcomes.      Changes in parameters that were part of this treatment:   -amplitude  -Needs Min cues and assistance for L side attention and midline gaze.    Functional outcomes from this intervention include:   -reduced spasticity  -improved sensory awareness and proprioception  -improved muscle strength  -improved motor coordination    Active time 7 min  Passive time 18 min

## 2022-02-04 NOTE — PLAN OF CARE
FOCUS/GOAL  Bowel management, Bladder management, Pain management, Medical management, Mobility, Skin integrity, and Safety management    ASSESSMENT, INTERVENTIONS AND CONTINUING PLAN FOR GOAL:  Patient is alert and oriented. Denied headache, dizziness, CP or SOB. Left hip pain during activity received scheduled tylenol, Voltaren gel and lidocaine patch and ws helpful. Regular/thin good appetite needs set up assist. BG 82. Take pills whole. Continent of B/B last BM 02/03 uses urinal with assist. VS WDL. No care concern at this time. Call light is in reach alarm is on for safety. Side rails up x 3, tray table in standard position. We will continue per POC.

## 2022-02-04 NOTE — PROGRESS NOTES
"  [facility]   Acute Rehabilitation Unit    INTERVAL HISTORY  Rohan was initially asleep upon encounter, but easily arousable. He reports feeling well, but tired.     Denies chest pain, abdominal pain, or difficulty breathing.    Functionally, he continues to exhibit moderate cognitive linguistic deficits with executive function and other areas. He continues to have L side inattention and L visual field cut, and is a Kaley Steady x1 A for toileting, BUE dressing max A, and BLE dressing total A.    Rohan noted that his mother has been admitted to [facility] for toe gangrene and had an amputation.     MEDICATIONS  Scheduled meds    acetaminophen  650 mg Oral Q6H     aMILoride  10 mg Oral Daily     aspirin  81 mg Oral Daily     atorvastatin  40 mg Oral QPM     carvedilol  37.5 mg Oral BID w/meals     chlorthalidone  25 mg Oral Daily     cloNIDine  0.1 mg Oral Daily     diclofenac  2 g Topical 4x Daily     divalproex sodium extended-release  1,000 mg Oral Daily     lidocaine  1 patch Transdermal Q24h    And     lidocaine   Transdermal Q8H     lisinopril  40 mg Oral Daily     [Held by provider] metFORMIN  500 mg Oral Daily with supper       PRN meds:  bisacodyl, glucose **OR** dextrose **OR** glucagon, ondansetron, polyethylene glycol, sennosides      PHYSICAL EXAM  /80   Pulse 63   Temp 97.4  F (36.3  C) (Oral)   Resp 20   Ht 1.737 m (5' 8.4\")   Wt 74.3 kg (163 lb 12.8 oz)   SpO2 98%   BMI 24.62 kg/m      Gen: NAD, sitting in bed, initially asleep  Cardio: RRR - Ziopatch in place   Pulm: clear breath sounds b/l   Abd: soft, and non-tender   Ext: no edema in bilateral lower extremities, no calf tenderness   Neuro/MSK: left sided neglect with clear gaze preference. Left hemiplegia with no volitional movement.         LABS  Results for orders placed or performed during the hospital encounter of 01/28/22 (from the past 24 hour(s))   Glucose by meter   Result Value Ref Range    " GLUCOSE BY METER POCT 82 70 - 99 mg/dL         ASSESSMENT AND PLAN  Miriam Hall is a 46 year old R hand dominant male with h/o HTN who was admitted on 1/8 with R PCA ischemic stroke. His hospital course was complicated by concerns for secondary HTN, new diagnosis of DM II, DISHA, hypokalemia and encephalopathy. He clinically improved and transferred to ARU for ongoing medical management and intensive inpatient rehab. He is progressing with therapy, and is still having episodes of HTN. Medicine is following and is helping with BP management as well as DISHA management.        Admission to acute inpatient rehab on 1/28/2022.    Impairment group code: Stroke Ischemic 01.1 (L) Body Involvement (R) Brain: R PCA ischemic stroke w/ hemorrhagic transformation noted      Rehabilitation - continue comprehensive acute inpatient rehabilitation program with multidisciplinary approach including therapies, rehab nursing, and physiatry following. See interval history for updates.         Medical Conditions  Neuro  Acute right PCA ischemic stroke   R P1 occlusion and R M2 Stenosis  Hemorrhagic transformation of ischemic infarct  -aspirin 81mg   -HTN, DM and HLD control as below for secondary prevention   -hypercoagulability panel negative  -vasculitis panel negative  -ziopatch in place till 2/10   -acute rehab with PT, OT, and SLP  -per neuro recommendations, continue depakote until outpatient f/u  -should see stroke neurology and PM&R after discharge as well as genetic team per recs       Possible seizures   EEG 1/12 concerning for subclinical seizures. Loaded with Keppra then transitioned to Depakote.   -continue Depakote  -seizure precautions      MSK  L Hip Pain  -reports increased L hip pain  -schedule Tylenol 650mg q6h  -hip XR on 1/24 does not show evidence of fx  -can consider ice, heating pad as adjuvants     CV  Refractory HTN  -possibly secondary to clinical hyperaldosteronism of several possible etiologies  -medicine on  for assistance with HTN management              -DDs included cushing, Liddle syndrome, or mineralocorticoid excess              -workup negative so far              -urine studies pending (urine cortisol level)  -amiloride 10mg daily  -Carvedilol 37.5mg bid  -chlorthalidone 25mg daily  -clonidine 0.1mg - dose decreased 2/2 and discontinued 2/4  -hydralazine 50mg Q8H - discontinued 2/1  -lisinopril 40mg daily   -f/u with nephrology and endo teams as outpatient       HLD  -atorvastatin 40mg     Endo  Type 2 Diabetes  A1c 7.8 1/8  -metformin 500mg with dinner; held on 2/1 due to worsening Cr  -continue to hold metformin in the setting of DISHA  -sliding scale insulin; was discontinued 2/3 as he was not requiring       Renal  DISHA  Likely due to hypovolemia and medication induced. On 2/3 Cr was slightly improved  -hydralazine stopped, tapering clonidine  -other nephrotoxic medications including chlorthalidone, lisinopril  -monitor Cr   -daily weights        Hypokalemia  -20 meq K BID; discontinued 2/1  -hyperaldosteronism workup still pendings  -monitor with BMP    2. Adjustment to disability:  Clinical psychology to eval and treat as needed   3. FEN: regular diet   4. Bowel: continent, PRN miralax and senna  5. Bladder: continent; PVRs were checked and were acceptable   6. DVT Prophylaxis: mechanical with SCDs  7. GI Prophylaxis: none   8. Code: Full  9. Disposition: to home  10. ELOS:  21 days.  11. Rehab prognosis:  fair  12. Follow up Appointments on Discharge: Neuro, cardio for Zio patch, nephrology, PCP, genetics and PM&R       Desire Birch DO  PGY-1  Physical Medicine and Rehabilitation      Physician Attestation   I, Zohreh Gaffney MD, personally examined and evaluated this patient.  I discussed the patient with the resident/fellow and care team, and agree with the assessment and plan of care as documented in the note of 1/28/22.      I personally reviewed vital signs and medications.    Key findings: saw Rohan  briefly in the gym today. He was doing well and working with PT. He was relieved to know where his mother is but wanted to get more info about her medical issues and current status. Denied any acute issues.   Reviewed the plan with hospitalist team regarding medication changes as above; appreciate assistance.     He is doing well on regular diet. His functional deficits are significant and he still requires mod to max A for transfers and mobility, and max to total A for most ADLs. Will continue current plan of care and discuss at rounds next week.     Zohreh Gaffney MD  Date of Service (when I saw the patient): 02/04/22

## 2022-02-04 NOTE — PLAN OF CARE
Discharge Planner Post-Acute Rehab SLP:     Discharge Plan: [facility]    Precautions: Fall    Current Status:  Communication: Mild dysarthria. Occasional word-finding difficulties in conversation, however pt is fluent and able to communicate wants/needs.  Cognition: At least moderate cognitive-linguistic deficits in the areas of attention, executive function, processing speed, and visuospatial skills.  Swallow: Pt reportedly tolerating a regular diet with thin liquids. Pt discharged from previous hospital with no swallowing concerns.    Assessment: Pt participated in visual scanning and language association tasks. Visual anchor provided on L side to assist in orientation. Pt with improvement within connecting words/symbols given Fx6, more difficulty with Fx7+. SLP provided additional strategies and structured visual support(see flow sheet). Pt with increased success with these modifications. Initial and final cueing needed for pt to continue to locate targets but pt with overall improvement and  independence with task and locating information         Other Barriers to Discharge (Family Training, etc): TBD

## 2022-02-04 NOTE — PLAN OF CARE
FOCUS/GOAL  Bowel management, Bladder management, Pain management and Cognition/Memory/Judgment/Problem solving    ASSESSMENT, INTERVENTIONS AND CONTINUING PLAN FOR GOAL:  Pt is alert and oriented, slightly dysarthric. Denied pain, sob, fever, chills, n/v, or new numbness and tingling. Continent of bowel and bladder calling for assistance with urinal. Sleeping well overnight, no further care concerns at this time continue with POC.

## 2022-02-04 NOTE — PLAN OF CARE
Pt A&Ox4. A2 sarasteady. L sided weakness. Continent of B&B, LBM 2/4 on shift. No skin issues. Pt SOB, headache, n/v, and new numbness/tingling. Pt c/o of L hip pain upon transfers, scheduled tylenol given, relief provided. Pt declined voltaren cream. Call light within reach, alarms on, calls appropriately. Continue with POC.

## 2022-02-04 NOTE — PLAN OF CARE
Discharge Planner Post-Acute Rehab OT:     Discharge Plan: HC vs TCU  Barriers include home set up and limited caregiver support.    Precautions: L side inattention and hemiparesis, L visual field cut, falls    Current Status:  ADLs:    Mobility: W/c based. Mod-max A SPT R side. Kaley stedy A x 1.    Grooming: Min A supported sitting.    Dressing: UB Max A. LB Total A.     Bathing: Dependent chair. Max A.    Toileting: Kaley stedy A x 1 commode, A x 2 with nursing. Total A hygiene/clothing.  IADLs: Needs assistance.  Vision/Cognition: L side inattention and L visual field cut. Mild cognitive deficits with executive functioning and attention.    Assessment: Completed LUE FES for sensorimotor retraining. Pt requiring less cueing for midline attention; demonstrating increased active time with cycling.      Other Barriers to Discharge (DME, Family Training, etc):   Pt lives alone and caregiver support is limited.  Home set up - 2nd level apartment, stairs.  Requires significant physical assistance.

## 2022-02-04 NOTE — PLAN OF CARE
Discharge Planner Post-Acute Rehab PT:     Discharge Plan:TCU due to lack of support    Precautions: Fall/alarm, L angelique, L field cut and neglect, L hip pain    Current Status:  Bed Mobility: Mod-A   Transfer: Mod-A squat/pivot. Assist of 1 Kaley Edwards with nursing  Gait: Unable  Stairs: Unable  Balance: Sits close supervision EOM, improved midline, assist to stand due to weakness  PASS: 11/36 on 1/29    Assessment: Focus on trunk rotation today, initiated stand frame. Unable to get palpable contraction in standing on L side.    Other Barriers to Discharge (DME, Family Training, etc):   Stairs to enter  Level of support  W/C  L AFO  Further gait DME TBD

## 2022-02-04 NOTE — PROGRESS NOTES
Received vm from RN last night that pt was asking questions about disability. SW met with pt at bedside. Pt stated that he needed to complete some ppwk for his STD but that Taniya, his friend completed it for him. Pt declined and SW needs at this time. SW provided pt with SW business card in the meantime. SWer discussed and educated pt on TCU. Discussed ARU LOS and transition home vs TCU. Pt in agreement to TCU. SWer offered choice. Pt gave SW permission to send referral to  TCU. Referral sent. Waiting for reply. Will f/u with pt and update team next week. SW will continue to follow.     Rhiannon Shah [facility] Acute Rehab   Direct Phone: [phone]  I   Pager: [phone]  I  Fax: [phone]

## 2022-02-05 ENCOUNTER — APPOINTMENT (OUTPATIENT)
Dept: OCCUPATIONAL THERAPY | Facility: CLINIC | Age: 47
End: 2022-02-05
Attending: PHYSICAL MEDICINE & REHABILITATION
Payer: COMMERCIAL

## 2022-02-05 ENCOUNTER — APPOINTMENT (OUTPATIENT)
Dept: SPEECH THERAPY | Facility: CLINIC | Age: 47
End: 2022-02-05
Attending: PHYSICAL MEDICINE & REHABILITATION
Payer: COMMERCIAL

## 2022-02-05 ENCOUNTER — APPOINTMENT (OUTPATIENT)
Dept: PHYSICAL THERAPY | Facility: CLINIC | Age: 47
End: 2022-02-05
Attending: PHYSICAL MEDICINE & REHABILITATION
Payer: COMMERCIAL

## 2022-02-05 LAB
ANION GAP SERPL CALCULATED.3IONS-SCNC: 7 MMOL/L (ref 3–14)
BUN SERPL-MCNC: 47 MG/DL (ref 7–30)
CALCIUM SERPL-MCNC: 9.5 MG/DL (ref 8.5–10.1)
CHLORIDE BLD-SCNC: 106 MMOL/L (ref 94–109)
CO2 SERPL-SCNC: 26 MMOL/L (ref 20–32)
CREAT SERPL-MCNC: 1.71 MG/DL (ref 0.66–1.25)
GFR SERPL CREATININE-BSD FRML MDRD: 49 ML/MIN/1.73M2
GLUCOSE BLD-MCNC: 120 MG/DL (ref 70–99)
GLUCOSE BLDC GLUCOMTR-MCNC: 106 MG/DL (ref 70–99)
GLUCOSE BLDC GLUCOMTR-MCNC: 93 MG/DL (ref 70–99)
HOLD SPECIMEN: NORMAL
POTASSIUM BLD-SCNC: 4.1 MMOL/L (ref 3.4–5.3)
SODIUM SERPL-SCNC: 139 MMOL/L (ref 133–144)

## 2022-02-05 PROCEDURE — 250N000013 HC RX MED GY IP 250 OP 250 PS 637: Performed by: PHYSICIAN ASSISTANT

## 2022-02-05 PROCEDURE — 97535 SELF CARE MNGMENT TRAINING: CPT | Mod: GO | Performed by: OCCUPATIONAL THERAPIST

## 2022-02-05 PROCEDURE — 80048 BASIC METABOLIC PNL TOTAL CA: CPT | Performed by: PHYSICAL MEDICINE & REHABILITATION

## 2022-02-05 PROCEDURE — 97112 NEUROMUSCULAR REEDUCATION: CPT | Mod: GP | Performed by: PHYSICAL THERAPIST

## 2022-02-05 PROCEDURE — 97129 THER IVNTJ 1ST 15 MIN: CPT | Mod: GN

## 2022-02-05 PROCEDURE — 97130 THER IVNTJ EA ADDL 15 MIN: CPT | Mod: GN

## 2022-02-05 PROCEDURE — 36415 COLL VENOUS BLD VENIPUNCTURE: CPT | Performed by: PHYSICAL MEDICINE & REHABILITATION

## 2022-02-05 PROCEDURE — 128N000003 HC R&B REHAB

## 2022-02-05 PROCEDURE — 250N000013 HC RX MED GY IP 250 OP 250 PS 637: Performed by: PHYSICAL MEDICINE & REHABILITATION

## 2022-02-05 PROCEDURE — 99207 PR CDG-MDM COMPONENT: MEETS MODERATE - UP CODED: CPT | Performed by: PHYSICIAN ASSISTANT

## 2022-02-05 PROCEDURE — 97530 THERAPEUTIC ACTIVITIES: CPT | Mod: GO | Performed by: OCCUPATIONAL THERAPIST

## 2022-02-05 PROCEDURE — 97530 THERAPEUTIC ACTIVITIES: CPT | Mod: GP | Performed by: PHYSICAL THERAPIST

## 2022-02-05 PROCEDURE — 99232 SBSQ HOSP IP/OBS MODERATE 35: CPT | Performed by: PHYSICIAN ASSISTANT

## 2022-02-05 RX ADMIN — CARVEDILOL 37.5 MG: 25 TABLET, FILM COATED ORAL at 08:10

## 2022-02-05 RX ADMIN — ATORVASTATIN CALCIUM 40 MG: 40 TABLET, FILM COATED ORAL at 19:26

## 2022-02-05 RX ADMIN — DIVALPROEX SODIUM 1000 MG: 500 TABLET, FILM COATED, EXTENDED RELEASE ORAL at 08:10

## 2022-02-05 RX ADMIN — DICLOFENAC 2 G: 10 GEL TOPICAL at 16:27

## 2022-02-05 RX ADMIN — ACETAMINOPHEN 650 MG: 325 TABLET, FILM COATED ORAL at 05:48

## 2022-02-05 RX ADMIN — LISINOPRIL 40 MG: 20 TABLET ORAL at 08:10

## 2022-02-05 RX ADMIN — CHLORTHALIDONE 25 MG: 25 TABLET ORAL at 08:10

## 2022-02-05 RX ADMIN — ASPIRIN 81 MG CHEWABLE TABLET 81 MG: 81 TABLET CHEWABLE at 08:10

## 2022-02-05 RX ADMIN — CARVEDILOL 37.5 MG: 25 TABLET, FILM COATED ORAL at 17:11

## 2022-02-05 RX ADMIN — AMILORIDE HYDROCLORIDE 10 MG: 5 TABLET ORAL at 08:10

## 2022-02-05 RX ADMIN — DICLOFENAC 2 G: 10 GEL TOPICAL at 08:11

## 2022-02-05 ASSESSMENT — ACTIVITIES OF DAILY LIVING (ADL)
ADLS_ACUITY_SCORE: 18

## 2022-02-05 NOTE — PLAN OF CARE
FOCUS/GOAL  Bowel management, Bladder management, Pain management, Mobility, Skin integrity, and Safety management    ASSESSMENT, INTERVENTIONS AND CONTINUING PLAN FOR GOAL:  A/O x4, VSS on RA.  Up w/ assist two with gait belt and ghada steady. Left side flaccid. Patient had a shower tonight. Regular thin diet, takes pills well whole. Bg was 124 before dinner.  No c/o pain, no prn meds given.  continent of bowel and bladder, last BM today. Patch free this evening.  Skin intact.  Continue with POC.

## 2022-02-05 NOTE — PROGRESS NOTES
"  [facility]   Acute Rehabilitation Unit    INTERVAL HISTORY  Pt seen and examined at bedside. Sign out received from RN no issues overnight. Pt is doing well and notes improvement with L sided strength. Denies chest pain, abdominal pain, shortness of breath, headache, nausea, vomiting, fevers, chills. He was tearful and grateful for the recovery that he has begun to show.    MEDICATIONS  Scheduled meds    acetaminophen  650 mg Oral Q6H     aMILoride  10 mg Oral Daily     aspirin  81 mg Oral Daily     atorvastatin  40 mg Oral QPM     carvedilol  37.5 mg Oral BID w/meals     chlorthalidone  25 mg Oral Daily     diclofenac  2 g Topical 4x Daily     divalproex sodium extended-release  1,000 mg Oral Daily     lidocaine  1 patch Transdermal Q24h    And     lidocaine   Transdermal Q8H     lisinopril  40 mg Oral Daily     [Held by provider] metFORMIN  500 mg Oral Daily with supper       PRN meds:  bisacodyl, hydrALAZINE, ondansetron, polyethylene glycol, sennosides      PHYSICAL EXAM  BP (!) 164/104 (BP Location: Right arm)   Pulse 77   Temp 97.3  F (36.3  C) (Oral)   Resp 18   Ht 1.737 m (5' 8.4\")   Wt 74.3 kg (163 lb 12.8 oz)   SpO2 95%   BMI 24.62 kg/m      Gen: NAD, sitting at bedside  Cardio: RRR - Ziopatch in place   Pulm: clear breath sounds b/l   Abd: soft, and non-tender   Ext: no edema in bilateral lower extremities, no calf tenderness   Neuro/MSK: left sided neglect with clear gaze preference. Left hemiplegia with improved volitional movement 1/5 in fingers and foot.         LABS  Results for orders placed or performed during the hospital encounter of 01/28/22 (from the past 24 hour(s))   Glucose by meter   Result Value Ref Range    GLUCOSE BY METER POCT 124 (H) 70 - 99 mg/dL   Glucose by meter   Result Value Ref Range    GLUCOSE BY METER POCT 106 (H) 70 - 99 mg/dL   Basic metabolic panel   Result Value Ref Range    Sodium 139 133 - 144 mmol/L    Potassium 4.1 3.4 - " 5.3 mmol/L    Chloride 106 94 - 109 mmol/L    Carbon Dioxide (CO2) 26 20 - 32 mmol/L    Anion Gap 7 3 - 14 mmol/L    Urea Nitrogen 47 (H) 7 - 30 mg/dL    Creatinine 1.71 (H) 0.66 - 1.25 mg/dL    Calcium 9.5 8.5 - 10.1 mg/dL    Glucose 120 (H) 70 - 99 mg/dL    GFR Estimate 49 (L) >60 mL/min/1.73m2   Extra Tube    Narrative    The following orders were created for panel order Extra Tube.  Procedure                               Abnormality         Status                     ---------                               -----------         ------                     Extra Purple Top Tube[[id number]]                            Final result                 Please view results for these tests on the individual orders.   Extra Purple Top Tube   Result Value Ref Range    Hold Specimen JI          ASSESSMENT AND PLAN  Miriam Hall is a 46 year old R hand dominant male with h/o HTN who was admitted on 1/8 with R PCA ischemic stroke. His hospital course was complicated by concerns for secondary HTN, new diagnosis of DM II, DISHA, hypokalemia and encephalopathy. He clinically improved and transferred to ARU for ongoing medical management and intensive inpatient rehab. He is progressing with therapy, and is still having episodes of HTN. Medicine is following and is helping with BP management as well as DISHA management.        Admission to acute inpatient rehab on 1/28/2022.    Impairment group code: Stroke Ischemic 01.1 (L) Body Involvement (R) Brain: R PCA ischemic stroke w/ hemorrhagic transformation noted      Rehabilitation - continue comprehensive acute inpatient rehabilitation program with multidisciplinary approach including therapies, rehab nursing, and physiatry following. See interval history for updates.         Medical Conditions  Neuro  Acute right PCA ischemic stroke   R P1 occlusion and R M2 Stenosis  Hemorrhagic transformation of ischemic infarct  -aspirin 81mg   -HTN, DM and HLD control as below for secondary  prevention   -hypercoagulability panel negative  -vasculitis panel negative  -ziopatch in place till 2/10   -acute rehab with PT, OT, and SLP  -per neuro recommendations, continue depakote until outpatient f/u  -should see stroke neurology and PM&R after discharge as well as genetic team per recs       Possible seizures   EEG 1/12 concerning for subclinical seizures. Loaded with Keppra then transitioned to Depakote.   -continue Depakote  -seizure precautions      MSK  L Hip Pain  -reports increased L hip pain  -schedule Tylenol 650mg q6h  -hip XR on 1/24 does not show evidence of fx  -can consider ice, heating pad as adjuvants     CV  Refractory HTN  -possibly secondary to clinical hyperaldosteronism of several possible etiologies  -medicine on for assistance with HTN management              -DDs included cushing, Liddle syndrome, or mineralocorticoid excess              -workup negative so far              -urine studies pending (urine cortisol level)  -amiloride 10mg daily  -Carvedilol 37.5mg bid  -chlorthalidone 25mg daily  -clonidine 0.1mg - dose decreased 2/2 and discontinued 2/4  -hydralazine 50mg Q8H - discontinued 2/1  -lisinopril 40mg daily   -f/u with nephrology and endo teams as outpatient       HLD  -atorvastatin 40mg     Endo  Type 2 Diabetes  A1c 7.8 1/8  -metformin 500mg with dinner; held on 2/1 due to worsening Cr  -continue to hold metformin in the setting of DISHA  -sliding scale insulin; was discontinued 2/3 as he was not requiring       Renal  DISHA  Likely due to hypovolemia and medication induced. On 2/3 Cr was slightly improved  -hydralazine stopped, tapering clonidine  -other nephrotoxic medications including chlorthalidone, lisinopril  -monitor Cr   -daily weights        Hypokalemia  -20 meq K BID; discontinued 2/1  -hyperaldosteronism workup still pendings  -monitor with BMP    2. Adjustment to disability:  Clinical psychology to eval and treat as needed   3. FEN: regular diet   4. Bowel:  continent, PRN miralax and senna  5. Bladder: continent; PVRs were checked and were acceptable   6. DVT Prophylaxis: mechanical with SCDs  7. GI Prophylaxis: none   8. Code: Full  9. Disposition: to home  10. ELOS:  21 days.  11. Rehab prognosis:  fair  12. Follow up Appointments on Discharge: Neuro, cardio for Zio patch, nephrology, PCP, genetics and PM&R       The patient's assessment and plan was discussed with my attending physician Dr. Ki Delgadillo, DO  PGY-4 PM&R Resident

## 2022-02-05 NOTE — PLAN OF CARE
FOCUS/GOAL  Bladder management, Pain management, Cognition/Memory/Judgment/Problem solving, and Safety management    ASSESSMENT, INTERVENTIONS AND CONTINUING PLAN FOR GOAL:  Pt is alert and oriented. Continent of bladder using urinal. Staff empties urinal. Pt reported no pain, but did take scheduled Tylenol at 0600 stating it was helpful prior to therapies. Pt slept well between cares. Uses call light appropriately, able to make needs known. Bed alarm on for safety. Will continue with POC.

## 2022-02-05 NOTE — PLAN OF CARE
Pt A&Ox4. A2 sarasteady. L sided weakness. Continent of B&B, LBM 2/4. No skin issues. Pt denies SOB, headache, n/v, and new numbness/tingling. Pt c/o of L hip pain upon transfers, scheduled voltaren cream given, relief provided. Call light within reach, alarms on, calls appropriately. Continue with POC.

## 2022-02-05 NOTE — PROGRESS NOTES
[facility]    Medicine Progress Note - Hospitalist Service    Date of Admission: 1/28/2022    Assessment & Plan        Miriam Hall is a 46 year old male admitted with history of HTN who was admitted to [facility] 1/8 with R PCA stroke, refractory HTN and concern for secondary HTN, new onset DMII, hypokalemia, DISHA and encephalopathy. Stabilized and transferred to ARU 1/28. Medicine consulted for assistance with medical management       Acute R PCA stroke: Presented with L hemiplegia. Head CT 1/8 R parieto-occipital infarct. CTA noted occlusion on proximal R PCA and high grade stenosis of M2 branch of R MCA. Outside window for tPA. Repeat imagine 1/12 with hemorrhagic transformation. Source 2/2 high grade atherosclerosis vs ESUS. CARISA negative for LA thrombus. Residual deficits include L hemiplegia, L hemianopsia, mild dysarthria. Sitting up, conversational, and pleasant on exam over the past several days  - Continue ASA, statin  - HTN regimen as below   - Ziopatch through 2/10  - PT/OT following   - Follow up with genetics on discharge given early age CVA  - Follow up with neurology as indicated     DISHA: Stable. BL Cr 1-1.2. Peak 1.56 at [facility], 2/2 hypovolemia and medication adjustments. Cr rising slowly over the past several days. Hypotensive events 1/28. Renal US 1/31 no acute findings. Per d/w nephrology 2/1 and 2/2, etiology likely due to hypotension: hydralazine stopped 2/1, clonidine discontinued 2/4. Other nephrotoxic drugs include chlorthalidone, lisinopril, ASA. Cr 1.71 (1.76) 2/5  - Continue to hold Metformin  - Next BMP 2/7  - Strict I&Os, daily weights     Refractory HTN: SBP>240 on admission. Nephrology consulted, concern for hyperaldosteronism given persistent hypokalemia and concomitant metabolic alkalosis. Aldosterone and renin levels normal. Ddx per nephrology includes Cushing (urinary cortisol pending), Liddle syndrome, apparent mineral corticoid excess  (free urinary cortisol and cortisone pending). Hydralazine stopped 2/1, clonidine 2/4. Current regimen includes: Amlodipine, Coreg, chlorthalidone, lisinopril. /104 this am, patient reports he was moving around during BP check. Repeat BP with writer 140s/90s this am.   - No changes to scheduled regimen at this time. Continue Amlodipine, Coreg, chlorthalidone, lisinopril   - Hydralazine prn for SBP>160 due to goal SBP<150  - Follow up with nephrology 2/10/22 as planned  - Follow up with endo on discharge for ongoing eval for possible hyperaldosteronemia     DMII: New onset. A1c 7.8 1/8. Endo consulted, started on Metformin prior to discharge. Metformin held starting 2/1 due to DISHA. MSSI stopped 2/3 as patient requiring minimal Novolog. Glucose stable  - Metformin remains on hold  - Monitor for hyperkalemia while on Amiloride     Resolved and Stable Issues:  Refractory hypokalemia: Concern for hyperaldosteronism as above. Negative workup thus far. Discharge on Kdur. K 5.0 2/1 thus Kdur stopped. K stable. Monitor M/Th, sooner if acute changes.   Possible seizures: In the setting of CVA. EEG 1/12 concerning for subclinical seizures. Loaded with Keppra then transitioned to Depakote. Continue Depakote and seizure precautions. Follow up OP with neurology as indicated   Low grade fever: Resolved. See notes 1/30 and prior  Toxic metabolic and stroke related encephalopathy: Resolved prior to transfer to ARU. Per notes, etiology 2/2 CVA vs hypertensive encephalopathy vs seizures. No ongoing issues noted. Monitor  Non-severe protein calorie malnutrition: In the setting of the above. Appetite and oral intake are stable  Platelet defect: 2/2 ASA use. Monitor for bleeding     Writer discussed the above with patient and attending physician, Dr. Spence       Diet: Combination Diet Regular Diet  Room Service    DVT Prophylaxis: Defer to primary service  Mendoza Catheter: Not present  Central Lines: None  Cardiac Monitoring:  None  Code Status: Full Code      Disposition Plan   Expected Discharge: 02/18/2022     Anticipated discharge location:  Awaiting care coordination huddle  Delays:     Per primary team      Park Lyle PA-C  Hospitalist Service  [facility]  Securely message with the Vocera Web Console (learn more here)  Text page via [employer] Paging/Directory         Clinically Significant Risk Factors Present on Admission                    ______________________________________________________________________    Interval History   Patient reports he was moving at the time his BP was checked. He slept better last night. Doing well with therapies, starting to feel stronger. Appetite is good. No fever or chills. Denies AMS. No visual changes. No new weakness     Data reviewed today: I reviewed all medications, new labs and imaging results over the last 24 hours    Physical Exam   Vital Signs: Temp: 97.3  F (36.3  C) Temp src: Oral BP: (!) 164/104 Pulse: 77   Resp: 18 SpO2: 95 % O2 Device: None (Room air)    Weight: 163 lbs 12.8 oz  General Appearance: Alert and oriented x3, sitting up in wheelchair. Working with therapies. Smiling   Respiratory: Breathing comfortably on room air, lungs CTAB without wheezing or crackles  Cardiovascular: RRR, S1, S2. No murmur noted. ZioPatch in place  GI: Abdomen soft, non-tender with active bowel sounds. No guarding or rebound  Lymph/Hematologic: No peripheral edema, distal pulses palpable   Skin: No rash or jaundice  Neurologic: Freely moves the R side. L sided hemiplegia      Data   Recent Labs   Lab 02/05/22  0709 02/05/22  0200 02/04/22  1757 02/03/22  1728 02/03/22  0754 02/02/22  0810 02/02/22  0548   WBC  --   --   --   --  9.0  --   --    HGB  --   --   --   --  13.8  --   --    MCV  --   --   --   --  90  --   --    PLT  --   --   --   --  256  --   --      --   --   --  137  --  137   POTASSIUM 4.1  --   --   --  4.1  --  4.7    CHLORIDE 106  --   --   --  107  --  106   CO2 26  --   --   --  23  --  27   BUN 47*  --   --   --  44*  --  44*   CR 1.71*  --   --   --  1.76*  --  1.77*   ANIONGAP 7  --   --   --  7  --  4   VALERIE 9.5  --   --   --  9.3  --  9.0   * 106* 124*   < > 128*   < > 125*    < > = values in this interval not displayed.     No results found for this or any previous visit (from the past 24 hour(s)).

## 2022-02-05 NOTE — PLAN OF CARE
"Discharge Planner Post-Acute Rehab SLP:     Discharge Plan: TBD    Precautions: Fall    Current Status:  Communication: Mild dysarthria. Occasional word-finding difficulties in conversation, however pt is fluent and able to communicate wants/needs.  Cognition: At least moderate cognitive-linguistic deficits in the areas of attention, executive function, processing speed, and visuospatial skills.  Swallow: Pt reportedly tolerating a regular diet with thin liquids. Pt discharged from previous hospital with no swallowing concerns.    Assessment: Pt engaged in visual scanning tasks today with focus on L neglect. In paragraph reading, required max assist to scan all the way back to the beginning of the next line - verbal cues of \"carriage return\" along w visual black anchor/line on L side of page.  Upon completion of reading task, pt reported, \"I feel like I just stepped into traffic; I'm overwhelmed\".  Engaged for approx. 20min in visual scanning task by sequencing cards A-K. Increased verbal prompts required towards start of task, w increased independence in scanning left for final trials. 76% (40/52) independently, increasing to 100% (52/52) w cues.    Other Barriers to Discharge (Family Training, etc): TBD    "

## 2022-02-05 NOTE — PLAN OF CARE
Skilled set up on :  Pt dependent for proper placement of electrodes on L gastroc, ant tib, quad, HS for optimum muscle contraction, safe positioning on w/c within frame and cushion for prevention of skin breakdown, feet secured to foot pedals, w/c secured to  w/ Q-straints.  Passive motion assessed to ensure proper positioning.      Pt performed 27 minutes of active FES ergometry with 100% stimulation applied to above muscles at 45rpm with 0.71nm resistance.  This PT adjusted e-stim and cycling parameters in real-time to ensure palpable muscle contractions throughout session.  Please see [email or web address] for further details on patient's stimulation parameters and ergometry outcomes.      Changes in parameters that were part of this treatment:   - W/c further back to dec hip flexion with good result. Resistance increased this session.  Speed also increased to 45, . Increased intensity to ant tib, did not tolerate increase to gastroc.     Functional outcomes from this intervention   Changes include - resistance, speed.       Include : resistance to 0.71. :RPM at 45.    -improved sensory awareness and proprioception  -improved muscle strength  -improved motor coordination    Session Summary:   -Average Power: 2,.4W  -Asymmetry: L 6%

## 2022-02-06 ENCOUNTER — APPOINTMENT (OUTPATIENT)
Dept: SPEECH THERAPY | Facility: CLINIC | Age: 47
End: 2022-02-06
Attending: PHYSICAL MEDICINE & REHABILITATION
Payer: COMMERCIAL

## 2022-02-06 ENCOUNTER — APPOINTMENT (OUTPATIENT)
Dept: PHYSICAL THERAPY | Facility: CLINIC | Age: 47
End: 2022-02-06
Attending: PHYSICAL MEDICINE & REHABILITATION
Payer: COMMERCIAL

## 2022-02-06 ENCOUNTER — APPOINTMENT (OUTPATIENT)
Dept: OCCUPATIONAL THERAPY | Facility: CLINIC | Age: 47
End: 2022-02-06
Attending: PHYSICAL MEDICINE & REHABILITATION
Payer: COMMERCIAL

## 2022-02-06 LAB
GLUCOSE BLDC GLUCOMTR-MCNC: 114 MG/DL (ref 70–99)
GLUCOSE BLDC GLUCOMTR-MCNC: 96 MG/DL (ref 70–99)
SARS-COV-2 RNA RESP QL NAA+PROBE: NEGATIVE

## 2022-02-06 PROCEDURE — 128N000003 HC R&B REHAB

## 2022-02-06 PROCEDURE — 97112 NEUROMUSCULAR REEDUCATION: CPT | Mod: GP

## 2022-02-06 PROCEDURE — 250N000013 HC RX MED GY IP 250 OP 250 PS 637: Performed by: PHYSICIAN ASSISTANT

## 2022-02-06 PROCEDURE — 97530 THERAPEUTIC ACTIVITIES: CPT | Mod: GP | Performed by: PHYSICAL THERAPIST

## 2022-02-06 PROCEDURE — 99232 SBSQ HOSP IP/OBS MODERATE 35: CPT | Performed by: PHYSICIAN ASSISTANT

## 2022-02-06 PROCEDURE — 97530 THERAPEUTIC ACTIVITIES: CPT | Mod: GO | Performed by: OCCUPATIONAL THERAPIST

## 2022-02-06 PROCEDURE — 92507 TX SP LANG VOICE COMM INDIV: CPT | Mod: GN

## 2022-02-06 PROCEDURE — 97110 THERAPEUTIC EXERCISES: CPT | Mod: GP | Performed by: PHYSICAL THERAPIST

## 2022-02-06 PROCEDURE — 97535 SELF CARE MNGMENT TRAINING: CPT | Mod: GO | Performed by: OCCUPATIONAL THERAPIST

## 2022-02-06 PROCEDURE — 87635 SARS-COV-2 COVID-19 AMP PRB: CPT | Performed by: STUDENT IN AN ORGANIZED HEALTH CARE EDUCATION/TRAINING PROGRAM

## 2022-02-06 PROCEDURE — 250N000013 HC RX MED GY IP 250 OP 250 PS 637: Performed by: PHYSICAL MEDICINE & REHABILITATION

## 2022-02-06 RX ORDER — CLONIDINE HYDROCHLORIDE 0.1 MG/1
0.1 TABLET ORAL DAILY
Status: DISCONTINUED | OUTPATIENT
Start: 2022-02-06 | End: 2022-02-12

## 2022-02-06 RX ADMIN — DICLOFENAC 2 G: 10 GEL TOPICAL at 12:23

## 2022-02-06 RX ADMIN — CLONIDINE HYDROCHLORIDE 0.1 MG: 0.1 TABLET ORAL at 10:05

## 2022-02-06 RX ADMIN — CARVEDILOL 37.5 MG: 25 TABLET, FILM COATED ORAL at 17:51

## 2022-02-06 RX ADMIN — DICLOFENAC 2 G: 10 GEL TOPICAL at 08:00

## 2022-02-06 RX ADMIN — AMILORIDE HYDROCLORIDE 10 MG: 5 TABLET ORAL at 07:34

## 2022-02-06 RX ADMIN — ACETAMINOPHEN 650 MG: 325 TABLET, FILM COATED ORAL at 12:23

## 2022-02-06 RX ADMIN — DIVALPROEX SODIUM 1000 MG: 500 TABLET, FILM COATED, EXTENDED RELEASE ORAL at 07:35

## 2022-02-06 RX ADMIN — CHLORTHALIDONE 25 MG: 25 TABLET ORAL at 07:35

## 2022-02-06 RX ADMIN — CARVEDILOL 37.5 MG: 25 TABLET, FILM COATED ORAL at 07:35

## 2022-02-06 RX ADMIN — LISINOPRIL 40 MG: 20 TABLET ORAL at 07:35

## 2022-02-06 RX ADMIN — ACETAMINOPHEN 650 MG: 325 TABLET, FILM COATED ORAL at 06:13

## 2022-02-06 RX ADMIN — ASPIRIN 81 MG CHEWABLE TABLET 81 MG: 81 TABLET CHEWABLE at 07:35

## 2022-02-06 RX ADMIN — ATORVASTATIN CALCIUM 40 MG: 40 TABLET, FILM COATED ORAL at 20:10

## 2022-02-06 ASSESSMENT — ACTIVITIES OF DAILY LIVING (ADL)
ADLS_ACUITY_SCORE: 18
ADLS_ACUITY_SCORE: 16
ADLS_ACUITY_SCORE: 16
ADLS_ACUITY_SCORE: 18
ADLS_ACUITY_SCORE: 16
ADLS_ACUITY_SCORE: 18
ADLS_ACUITY_SCORE: 16
ADLS_ACUITY_SCORE: 16

## 2022-02-06 NOTE — PLAN OF CARE
Discharge Planner Post-Acute Rehab PT:     Discharge Plan:TCU due to lack of support    Precautions: Fall/alarm, L angelique, L field cut and neglect, L hip pain    Current Status:  Bed Mobility: Mod-A   Transfer: Mod-A squat/pivot. Assist of 1 Kaley Edwards with nursing  Gait: Unable  Stairs: Unable  Balance: Sits close supervision EOM, improved midline, assist to stand due to weakness  PASS: 11/36 on 1/29    Assessment: Pt with emerging L hip strength with therex on mat today. .  Still with tendency for gaze preference to R.  Pt making gradual progress  And with good effort with all therapy activities.  Pt still with significant L side hemiparesis and needing cuing for motor planning at times.  Cont toward goals.     .  Other Barriers to Discharge (DME, Family Training, etc):   Stairs to enter  Level of support  W/C  L AFO  Further gait DME TBD

## 2022-02-06 NOTE — PLAN OF CARE
"Discharge Planner Post-Acute Rehab SLP:     Discharge Plan: TBD    Precautions: Fall    Current Status:  Communication: Mild dysarthria. Occasional word-finding difficulties in conversation, however pt is fluent and able to communicate wants/needs.  Cognition: At least moderate cognitive-linguistic deficits in the areas of attention, executive function, processing speed, and visuospatial skills.  Swallow: Pt reportedly tolerating a regular diet with thin liquids. Pt discharged from previous hospital with no swallowing concerns.    Assessment: Pt completed mod-complex problem solving task targeting attention and left neglect. Pt able to code 60% of 2 lines with min cues given increased processing time, however neglected left 40% of page. With max cues for \"carrier return\" and placement of yellow sheet on left side of page, pt able to increase awareness of neglect. Pt expressed the most difficulty with alternating attention between the code and the \"key\"; discussed placing right hand where he is currently working in the code, and then scanning the key only visually (e.g., not moving his hand), to then have a target to return to. This increased pt's efficiency with task.    Other Barriers to Discharge (Family Training, etc): TBD    "

## 2022-02-06 NOTE — PLAN OF CARE
FOCUS/GOAL  Bowel management, Bladder management, Pain management, Mobility, Skin integrity, and Safety management    ASSESSMENT, INTERVENTIONS AND CONTINUING PLAN FOR GOAL:  A/O x4, VSS with elevated bps, scheduled bp meds given.  Up with assist two with ghada wei.  Continent of bowel and bladder, last BM 2/4.  Regular thin diet, takes his pills well whole.  Bg before dinner 93.  No c/o pain, no prn meds given.  Continue with POC.

## 2022-02-06 NOTE — PLAN OF CARE
Pt is alert and oriented. Continent of bladder, voiding without difficulty using urinal with assistance. LBM 2/4.Turning and repositioning with minimal assistance. Tylenol and voltaren gel for left hip pain. Eating independently with good set up.  Uses call light appropriately, able to make needs known. Bed alarm on for safety. Will continue with POC.

## 2022-02-06 NOTE — PLAN OF CARE
Discharge Planner Post-Acute Rehab OT:      Discharge Plan: HC vs TCU  Barriers include home set up and limited caregiver support.     Precautions: L side inattention and hemiparesis, L visual field cut, falls     Current Status:  ADLs:  Mobility: W/c based. Mod-max A SPT R side. Kaley stedy A x 1.  Grooming: Min A supported sitting.  Dressing: UB Mod A. LB Total A.   Bathing: Dependent chair. Max A.  Toileting: Kaley stedy A x 1 commode, A x 2 with nursing. Total A hygiene/clothing.  IADLs: Needs assistance.  Vision/Cognition: L side inattention and L visual field cut. Mild cognitive deficits with executive functioning and attention.     Assessment: Pt limited by left decreased attention, decreased static sitting, and left sided weakness.  Pt continues to be motivated and working toward goals with full effort thus benefitting from skilled OT intervention to address deficits, teach compensatory strategies, and assist with disposition planning.  Other Barriers to Discharge (DME, Family Training, etc):   Pt lives alone and caregiver support is limited.  Home set up - 2nd level apartment, stairs.  Requires significant physical assistance.

## 2022-02-06 NOTE — PLAN OF CARE
FOCUS/GOAL  Bladder management, Pain management, Cognition/Memory/Judgment/Problem solving, and Safety management    ASSESSMENT, INTERVENTIONS AND CONTINUING PLAN FOR GOAL:  Pt is alert and oriented. Continent of bladder, voiding without difficulty using urinal with assistance. Turning and repositioning with minimal assistance. Denied pain or discomfort overnight. Appeared to be sleeping well during rounds. Uses call light appropriately, able to make needs known. Bed alarm on for safety. Will continue with POC.

## 2022-02-07 ENCOUNTER — APPOINTMENT (OUTPATIENT)
Dept: OCCUPATIONAL THERAPY | Facility: CLINIC | Age: 47
End: 2022-02-07
Attending: PHYSICAL MEDICINE & REHABILITATION
Payer: COMMERCIAL

## 2022-02-07 ENCOUNTER — APPOINTMENT (OUTPATIENT)
Dept: PHYSICAL THERAPY | Facility: CLINIC | Age: 47
End: 2022-02-07
Attending: PHYSICAL MEDICINE & REHABILITATION
Payer: COMMERCIAL

## 2022-02-07 ENCOUNTER — APPOINTMENT (OUTPATIENT)
Dept: SPEECH THERAPY | Facility: CLINIC | Age: 47
End: 2022-02-07
Attending: PHYSICAL MEDICINE & REHABILITATION
Payer: COMMERCIAL

## 2022-02-07 LAB
ANION GAP SERPL CALCULATED.3IONS-SCNC: 5 MMOL/L (ref 3–14)
BUN SERPL-MCNC: 51 MG/DL (ref 7–30)
CALCIUM SERPL-MCNC: 9.6 MG/DL (ref 8.5–10.1)
CHLORIDE BLD-SCNC: 108 MMOL/L (ref 94–109)
CO2 SERPL-SCNC: 28 MMOL/L (ref 20–32)
CREAT SERPL-MCNC: 1.74 MG/DL (ref 0.66–1.25)
ERYTHROCYTE [DISTWIDTH] IN BLOOD BY AUTOMATED COUNT: 13.6 % (ref 10–15)
GFR SERPL CREATININE-BSD FRML MDRD: 48 ML/MIN/1.73M2
GLUCOSE BLD-MCNC: 134 MG/DL (ref 70–99)
GLUCOSE BLDC GLUCOMTR-MCNC: 124 MG/DL (ref 70–99)
GLUCOSE BLDC GLUCOMTR-MCNC: 158 MG/DL (ref 70–99)
HCT VFR BLD AUTO: 39.1 % (ref 40–53)
HGB BLD-MCNC: 12.9 G/DL (ref 13.3–17.7)
MCH RBC QN AUTO: 29.9 PG (ref 26.5–33)
MCHC RBC AUTO-ENTMCNC: 33 G/DL (ref 31.5–36.5)
MCV RBC AUTO: 91 FL (ref 78–100)
PLATELET # BLD AUTO: 196 10E3/UL (ref 150–450)
POTASSIUM BLD-SCNC: 4.2 MMOL/L (ref 3.4–5.3)
RBC # BLD AUTO: 4.31 10E6/UL (ref 4.4–5.9)
SODIUM SERPL-SCNC: 141 MMOL/L (ref 133–144)
WBC # BLD AUTO: 7.4 10E3/UL (ref 4–11)

## 2022-02-07 PROCEDURE — 99232 SBSQ HOSP IP/OBS MODERATE 35: CPT | Performed by: PHYSICAL MEDICINE & REHABILITATION

## 2022-02-07 PROCEDURE — 85027 COMPLETE CBC AUTOMATED: CPT | Performed by: PHYSICIAN ASSISTANT

## 2022-02-07 PROCEDURE — 99233 SBSQ HOSP IP/OBS HIGH 50: CPT | Performed by: PHYSICIAN ASSISTANT

## 2022-02-07 PROCEDURE — 250N000013 HC RX MED GY IP 250 OP 250 PS 637: Performed by: PHYSICAL MEDICINE & REHABILITATION

## 2022-02-07 PROCEDURE — 97130 THER IVNTJ EA ADDL 15 MIN: CPT | Mod: GN

## 2022-02-07 PROCEDURE — 250N000013 HC RX MED GY IP 250 OP 250 PS 637: Performed by: PHYSICIAN ASSISTANT

## 2022-02-07 PROCEDURE — 97530 THERAPEUTIC ACTIVITIES: CPT | Mod: GP

## 2022-02-07 PROCEDURE — 99207 PR CDG-MDM COMPONENT: MEETS HIGH - UP CODED: CPT | Performed by: PHYSICIAN ASSISTANT

## 2022-02-07 PROCEDURE — 97542 WHEELCHAIR MNGMENT TRAINING: CPT | Mod: GP

## 2022-02-07 PROCEDURE — 128N000003 HC R&B REHAB

## 2022-02-07 PROCEDURE — 97535 SELF CARE MNGMENT TRAINING: CPT | Mod: GO | Performed by: OCCUPATIONAL THERAPIST

## 2022-02-07 PROCEDURE — 97129 THER IVNTJ 1ST 15 MIN: CPT | Mod: GN

## 2022-02-07 PROCEDURE — 82310 ASSAY OF CALCIUM: CPT | Performed by: PHYSICIAN ASSISTANT

## 2022-02-07 PROCEDURE — 97112 NEUROMUSCULAR REEDUCATION: CPT | Mod: GO | Performed by: OCCUPATIONAL THERAPIST

## 2022-02-07 PROCEDURE — 36415 COLL VENOUS BLD VENIPUNCTURE: CPT | Performed by: PHYSICIAN ASSISTANT

## 2022-02-07 RX ADMIN — DICLOFENAC 2 G: 10 GEL TOPICAL at 09:25

## 2022-02-07 RX ADMIN — AMILORIDE HYDROCLORIDE 10 MG: 5 TABLET ORAL at 09:24

## 2022-02-07 RX ADMIN — CARVEDILOL 37.5 MG: 25 TABLET, FILM COATED ORAL at 09:24

## 2022-02-07 RX ADMIN — CLONIDINE HYDROCHLORIDE 0.1 MG: 0.1 TABLET ORAL at 09:24

## 2022-02-07 RX ADMIN — ASPIRIN 81 MG CHEWABLE TABLET 81 MG: 81 TABLET CHEWABLE at 09:24

## 2022-02-07 RX ADMIN — CHLORTHALIDONE 25 MG: 25 TABLET ORAL at 09:24

## 2022-02-07 RX ADMIN — LISINOPRIL 40 MG: 20 TABLET ORAL at 09:24

## 2022-02-07 RX ADMIN — POLYETHYLENE GLYCOL 3350 17 G: 17 POWDER, FOR SOLUTION ORAL at 12:26

## 2022-02-07 RX ADMIN — ACETAMINOPHEN 650 MG: 325 TABLET, FILM COATED ORAL at 17:47

## 2022-02-07 RX ADMIN — ATORVASTATIN CALCIUM 40 MG: 40 TABLET, FILM COATED ORAL at 19:18

## 2022-02-07 RX ADMIN — CARVEDILOL 37.5 MG: 25 TABLET, FILM COATED ORAL at 17:47

## 2022-02-07 RX ADMIN — ACETAMINOPHEN 650 MG: 325 TABLET, FILM COATED ORAL at 12:26

## 2022-02-07 RX ADMIN — ACETAMINOPHEN 650 MG: 325 TABLET, FILM COATED ORAL at 06:30

## 2022-02-07 RX ADMIN — SENNOSIDES 8.6 MG: 8.6 TABLET ORAL at 12:26

## 2022-02-07 RX ADMIN — DIVALPROEX SODIUM 1000 MG: 500 TABLET, FILM COATED, EXTENDED RELEASE ORAL at 09:24

## 2022-02-07 ASSESSMENT — ACTIVITIES OF DAILY LIVING (ADL)
ADLS_ACUITY_SCORE: 18
ADLS_ACUITY_SCORE: 16
ADLS_ACUITY_SCORE: 16
ADLS_ACUITY_SCORE: 18
ADLS_ACUITY_SCORE: 16
ADLS_ACUITY_SCORE: 18
ADLS_ACUITY_SCORE: 16
ADLS_ACUITY_SCORE: 18
ADLS_ACUITY_SCORE: 18
ADLS_ACUITY_SCORE: 16
ADLS_ACUITY_SCORE: 18
ADLS_ACUITY_SCORE: 18
ADLS_ACUITY_SCORE: 16
ADLS_ACUITY_SCORE: 16
ADLS_ACUITY_SCORE: 18
ADLS_ACUITY_SCORE: 16
ADLS_ACUITY_SCORE: 18
ADLS_ACUITY_SCORE: 16
ADLS_ACUITY_SCORE: 16
ADLS_ACUITY_SCORE: 18

## 2022-02-07 NOTE — PLAN OF CARE
FOCUS/GOAL  Bowel management, Bladder management, Pain management, Mobility, Skin integrity, and Safety management    ASSESSMENT, INTERVENTIONS AND CONTINUING PLAN FOR GOAL:  A/O x4 with slow dysarthric speech at times.  VSS with elevated bp, scheduled meds given and bp recheck was 139/76.  No c/o pain, no prn meds given.  Regular thin diet, takes his pills well whole with water.  No appetite for dinner tonight. Evening BG was 96.  Up with assist 2 with ghada wei.  Continent of bowel and bladder, last BM 2/4.  No lidocaine patch placed at HS.  Continue with POC.

## 2022-02-07 NOTE — PROGRESS NOTES
"CLINICAL NUTRITION SERVICES - ASSESSMENT NOTE     Nutrition Prescription    RECOMMENDATIONS FOR MDs/PROVIDERS TO ORDER:  None at this time.     Malnutrition Status:    Moderate malnutrition in the context of acute illness or injury.     Recommendations already ordered by Registered Dietitian (RD):  Ensure Enlive BID at 10 and 2.   Encouraged PO intake.     Future/Additional Recommendations:  Continue to monitor wt, labs, PO intake, and assess supplement regimen.   Monitor BGs, assess need to switch to Glucerna.      REASON FOR ASSESSMENT  Miriam Hall is a/an 46 year old male assessed by the dietitian for LOS    NUTRITION HISTORY  Pt admitted due to stroke (1/8/22) with other PMH of HTN and DISHA (1/27). Pt recently diagnosed with T2DM while admitted - pt had an A1c of 7.8% on 1/8/22. Metformin currently held d/t DISHA.     Pt states he has had no loss of appetite, and is usually eating 3 meals a day. States that he is normally a 'grazer' at home, but feels he has been eating a lot while admitted. Pt has lost about 11-12 lbs from his UBW of 175 lbs which pt states he was at prior to his stroke (1/8). Pt was agreeable to begin receiving Ensure Enlive BID, which will now be provided at 10am and 2pm daily.  CURRENT NUTRITION ORDERS  Diet: Regular  Intake/Tolerance: Variable - but usually eating 100% of meals.     LABS  Labs reviewed  BG's fairly well controlled - usually 100-130 mg/dL range.   MEDICATIONS  Medications reviewed    ANTHROPOMETRICS  Height: 173.7 cm (5' 8.4\")  Most Recent Weight: 74.3 kg (163 lb 12.8 oz)    IBW: 156.4 lbs / 71 kg  BMI: Normal BMI  Weight History: Pt states UBW of 175lbs prior to stroke (1/8).   Wt Readings from Last 10 Encounters:   02/03/22 74.3 kg (163 lb 12.8 oz)   01/28/22 77.3 kg (170 lb 6.7 oz)   05/13/14 86.2 kg (190 lb)   04/25/14 89.5 kg (197 lb 6.4 oz)   10/18/11 89.8 kg (198 lb)   07/29/11 91.9 kg (202 lb 9.6 oz)   04/22/11 88 kg (194 lb)     Dosing Weight: 74.3 kg    ASSESSED " NUTRITION NEEDS  Estimated Energy Needs: 9082-6329 kcals/day (25 - 30 kcals/kg)  Justification: Maintenance  Estimated Protein Needs: 59-74 grams protein/day (0.8 - 1.0 grams of pro/kg)  Justification: Maintenance  Estimated Fluid Needs: 5061-1197 mL/day (1 mL/kcal)   Justification: Maintenance    PHYSICAL FINDINGS  See malnutrition section below.  No abnormal nutrition-related physical findings observed.     MALNUTRITION  % Intake: No decreased intake noted per pt and flowsheets  % Weight Loss: > 5% in 1 month (severe)  Subcutaneous Fat Loss: Facial region:  Moderate suborbital fat loss.   Muscle Loss: Temporal:  Moderate temporal wasting.   Fluid Accumulation/Edema: None noted  Malnutrition Diagnosis: Moderate malnutrition in the context of acute illness or injury.     NUTRITION DIAGNOSIS  Unintended weight loss related to decreased intake as evidenced by weight loss of >6% over 1 month.       INTERVENTIONS  Implementation  Nutrition education for recommended modifications   Medical food supplement therapy - Ensure Enlive BID.     Goals  Patient to consume % of nutritionally adequate meal trays TID, or the equivalent with supplements/snacks.     Monitoring/Evaluation  Progress toward goals will be monitored and evaluated per protocol.    Christ Jones RD, LD

## 2022-02-07 NOTE — PROGRESS NOTES
"  [facility]   Acute Rehabilitation Unit    INTERVAL HISTORY  Pt seen and examined after finishing OT. No acute events overnight. Denies chest pain, abdominal pain, shortness of breath, headache, nausea, vomiting, fevers, chills.  His L hand and extension is starting to improve.  There is some increased tone but improved with PROM on L.  Labs reviewed, renal function stable, continue to encourage fluids.     Functional  PT:  Bed Mobility: Mod-A   Transfer: Mod-A squat/pivot. Assist of 1 Kaley Edwards with nursing  Gait: Unable  Stairs: Unable  Balance: Sits close supervision EOM, improved midline, assist to stand due to weakness  PASS: 11/36 on 1/29     ROS: 10 point ROS neg other than the symptoms noted above in the HPI.      MEDICATIONS  Scheduled meds    acetaminophen  650 mg Oral Q6H     aMILoride  10 mg Oral Daily     aspirin  81 mg Oral Daily     atorvastatin  40 mg Oral QPM     carvedilol  37.5 mg Oral BID w/meals     chlorthalidone  25 mg Oral Daily     cloNIDine  0.1 mg Oral Daily     diclofenac  2 g Topical 4x Daily     divalproex sodium extended-release  1,000 mg Oral Daily     lidocaine  1 patch Transdermal Q24h    And     lidocaine   Transdermal Q8H     lisinopril  40 mg Oral Daily     [Held by provider] metFORMIN  500 mg Oral Daily with supper       PRN meds:  bisacodyl, hydrALAZINE, ondansetron, polyethylene glycol, sennosides      PHYSICAL EXAM  /73 (BP Location: Right arm, Patient Position: Supine)   Pulse 67   Temp 98.7  F (37.1  C) (Oral)   Resp 16   Ht 1.737 m (5' 8.4\")   Wt 74.3 kg (163 lb 12.8 oz)   SpO2 97%   BMI 24.62 kg/m      Gen: NAD, up in chair resting at after OT  HEENT: NCAT, L facial weakness  Cardio: RRR - Ziopatch in place   Pulm: nonlabored, symmetrical chest rise  Abd: soft, and non-tender   Ext: no edema in bilateral lower extremities, no calf tenderness   Neuro/MSK: left sided neglect with clear gaze preference. Left hemiplegia with " improved volitional movement 1/5 in fingers and foot. There is about 10 degrees of wrist extension on L.   Some increased extension tone.           LABS  Results for orders placed or performed during the hospital encounter of 01/28/22 (from the past 24 hour(s))   Asymptomatic COVID-19 Virus (Coronavirus) by PCR Nasopharyngeal    Specimen: Nasopharyngeal; Swab   Result Value Ref Range    SARS CoV2 PCR Negative Negative    Narrative    Testing was performed using the maria elena  SARS-CoV-2 & Influenza A/B Assay on the maria elena  [employer]  System.  This test should be ordered for the detection of SARS-COV-2 in individuals who meet SARS-CoV-2 clinical and/or epidemiological criteria. Test performance is unknown in asymptomatic patients.  This test is for in vitro diagnostic use under the FDA EUA for laboratories certified under CLIA to perform moderate and/or high complexity testing. This test has not been FDA cleared or approved.  A negative test does not rule out the presence of PCR inhibitors in the specimen or target RNA in concentration below the limit of detection for the assay. The possibility of a false negative should be considered if the patient's recent exposure or clinical presentation suggests COVID-19.  [facility] are certified under the Clinical Laboratory Improvement Amendments of 1988 (CLIA-88) as qualified to perform moderate and/or high complexity laboratory testing.   Glucose by meter   Result Value Ref Range    GLUCOSE BY METER POCT 96 70 - 99 mg/dL   CBC with platelets   Result Value Ref Range    WBC Count 7.4 4.0 - 11.0 10e3/uL    RBC Count 4.31 (L) 4.40 - 5.90 10e6/uL    Hemoglobin 12.9 (L) 13.3 - 17.7 g/dL    Hematocrit 39.1 (L) 40.0 - 53.0 %    MCV 91 78 - 100 fL    MCH 29.9 26.5 - 33.0 pg    MCHC 33.0 31.5 - 36.5 g/dL    RDW 13.6 10.0 - 15.0 %    Platelet Count 196 150 - 450 10e3/uL   Basic metabolic panel   Result Value Ref Range    Sodium 141 133 - 144 mmol/L    Potassium 4.2 3.4 -  5.3 mmol/L    Chloride 108 94 - 109 mmol/L    Carbon Dioxide (CO2) 28 20 - 32 mmol/L    Anion Gap 5 3 - 14 mmol/L    Urea Nitrogen 51 (H) 7 - 30 mg/dL    Creatinine 1.74 (H) 0.66 - 1.25 mg/dL    Calcium 9.6 8.5 - 10.1 mg/dL    Glucose 134 (H) 70 - 99 mg/dL    GFR Estimate 48 (L) >60 mL/min/1.73m2   Glucose by meter   Result Value Ref Range    GLUCOSE BY METER POCT 124 (H) 70 - 99 mg/dL         ASSESSMENT AND PLAN  Miriam Hall is a 46 year old R hand dominant male with h/o HTN who was admitted on 1/8 with R PCA ischemic stroke. His hospital course was complicated by concerns for secondary HTN, new diagnosis of DM II, DISHA, hypokalemia and encephalopathy. He clinically improved and transferred to ARU for ongoing medical management and intensive inpatient rehab. He is progressing with therapy, and is still having episodes of HTN. Medicine is following and is helping with BP management as well as DISHA management.        Admission to acute inpatient rehab on 1/28/2022.    Impairment group code: Stroke Ischemic 01.1 (L) Body Involvement (R) Brain: R PCA ischemic stroke w/ hemorrhagic transformation noted      Rehabilitation - continue comprehensive acute inpatient rehabilitation program with multidisciplinary approach including therapies, rehab nursing, and physiatry following. See interval history for updates.         Medical Conditions  Neuro  Acute right PCA ischemic stroke   R P1 occlusion and R M2 Stenosis  Hemorrhagic transformation of ischemic infarct  -aspirin 81mg   -HTN, DM and HLD control as below for secondary prevention   -hypercoagulability panel negative  -vasculitis panel negative  -ziopatch in place till 2/10   -acute rehab with PT, OT, and SLP  -per neuro recommendations, continue depakote until outpatient f/u  -should see stroke neurology and PM&R after discharge as well as genetic team per recs       Possible seizures   EEG 1/12 concerning for subclinical seizures. Loaded with Keppra then  transitioned to Depakote.   -continue Depakote  -seizure precautions      MSK  L Hip Pain  -reports increased L hip pain  -schedule Tylenol 650mg q6h  -hip XR on 1/24 does not show evidence of fx  -can consider ice, heating pad as adjuvants     CV  Refractory HTN  -possibly secondary to clinical hyperaldosteronism of several possible etiologies  -medicine on for assistance with HTN management              -DDs included cushing, Liddle syndrome, or mineralocorticoid excess              -workup negative so far              -urine studies pending (urine cortisol level)  -amiloride 10mg daily  -Carvedilol 37.5mg bid  -chlorthalidone 25mg daily  -clonidine 0.1mg - dose decreased 2/2 and discontinued 2/4  -hydralazine 50mg Q8H - discontinued 2/1  -lisinopril 40mg daily   -f/u with nephrology and endo teams as outpatient       HLD  -atorvastatin 40mg     Endo  Type 2 Diabetes  A1c 7.8 1/8  -metformin 500mg with dinner; held on 2/1 due to worsening Cr  -continue to hold metformin in the setting of DISHA  -sliding scale insulin; was discontinued 2/3 as he was not requiring       Renal  DISHA  Likely due to hypovolemia and medication induced. On 2/3 Cr was slightly improved and is stable currently at 2/7.  -hydralazine stopped, tapering clonidine  -other nephrotoxic medications including chlorthalidone, lisinopril  -monitor Cr   -daily weights        Hypokalemia  -20 meq K BID; discontinued 2/1  -hyperaldosteronism workup still pendings  -monitor with BMP    2. Adjustment to disability:  Clinical psychology to eval and treat as needed   3. FEN: regular diet   4. Bowel: continent, PRN miralax and senna  5. Bladder: continent; PVRs were checked and were acceptable   6. DVT Prophylaxis: mechanical with SCDs  7. GI Prophylaxis: none   8. Code: Full  9. Disposition: to home  10. ELOS:  21 days.  11. Rehab prognosis:  fair  12. Follow up Appointments on Discharge: Neuro, cardio for Zio patch, nephrology, PCP, genetics and PM&R          Jean Paul Kohler, DO        I spent a total of 25 minutes face to face and coordinating care of Miriam Hall. Over 50% of my time on the unit was spent counseling the patient and /or coordinating care regarding post CVA.

## 2022-02-07 NOTE — PLAN OF CARE
Skilled set up on :  Pt dependent for proper placement of electrodes on LUE (scapular stabilization, posterior deltoid, supraspinatous, biceps, triceps, wrist flexors, and wrist extensors) for optimum muscle contraction, safe positioning on w/c within frame and cushion for prevention of skin breakdown, UE secured to arm support. Needs arm trough due to significant hemiparesis. Passive motion assessed to ensure proper positioning.      Pt performed 30 minutes of active FES ergometry with 0-100% stimulation applied to above muscles at 35 rpm with .5-.81 nm resistance.  This OT adjusted e-stim and cycling parameters in real-time to ensure palpable muscle contractions throughout session.  Please see [email or web address] for further details on patient's stimulation parameters and ergometry outcomes.    Changes in parameters that were part of this treatment:   -amplitude  -Needs intermittent cues for L side attention and midline gaze.    Functional outcomes from this intervention include:   -reduced spasticity  -improved sensory awareness and proprioception  -improved muscle strength  -improved motor coordination

## 2022-02-07 NOTE — PLAN OF CARE
Postural Assessment for Stroke Scale (PASS)    Maintaining posture -   1) Sitting without support - 3  2) Standing with support (feet position free) - 2  3) Standing without support (feet position free) - 0  4) Standing on nonparetic leg - 0  5) Standing on paretic leg - 0    Changing posture -  6) Rolling supine -> affected side - 3  7) Rolling supine -> unaffected side - 3  8) Sup->sitting edge of bed - 3  9) Sitting edge of bed -> sup -3  10) Sit->stand without support - 1  11) Stand->sit without support - 1  12) Standing,  pencil from floor without support - 0    Total Score - 19/36    The PASS assesses a patient's ability to change and maintain posture during different balance activities. It is not associated with falls risk, but is used to track progress with the patient's ability to control their posture and balance throughout the course of the patient's admission.

## 2022-02-07 NOTE — PROGRESS NOTES
Received a call from pt friend Taniya. Spoke with Taniya for 1 hour. Taniya expressed much gratitude for care being provided from ARU staff. Taniya updated SW on pt mom, pt and Taniya relationship, needs for pt STD ppwk, and long-term plans. Discussed TCU, pending referral, LOS on ARU, disability, transportation resources, HCD/POA, etc. Taniya emailed SWer the STD ppwk. SW went to help pt fill out, pt eating and made a plan to come back. SW unable to f/u end of the day, notified pt, and made a plan to f/u tomorrow. Taniya notified. SW will continue to follow and remain available as needs arise.     VINCENZO Chua   [facility]   Direct Phone: [phone]  I   Pager: [phone]  I  Fax: [phone]

## 2022-02-07 NOTE — PLAN OF CARE
Discharge Planner Post-Acute Rehab PT:     Discharge Plan:TCU due to lack of support    Precautions: Fall/alarm, L angelique, L field cut and neglect, L hip pain    Current Status:  Bed Mobility: Mod-A   Transfer: Mod-A squat/pivot. Assist of 1 Kaley Edwards with nursing  Gait: Unable  Stairs: Unable  Balance: Sits close supervision EOM, improved midline, assist to stand due to weakness  PASS 1/29/22: 11/36  PASS 2/7/22: 19/36    Assessment: PASS improved today, most notably in changing posture and evident with seated trunk control. Still unable to do any sort of unsupported standing. Slowly emerging LLE activation in WB.     Other Barriers to Discharge (DME, Family Training, etc):   Stairs to enter  Level of support  W/C  L AFO  Further gait DME TBD

## 2022-02-07 NOTE — PLAN OF CARE
Discharge Planner Post-Acute Rehab OT:      Discharge Plan: HC vs TCU     Precautions: L side inattention and hemiparesis, L visual field cut, falls     Current Status:  ADLs:    Mobility: W/c based. Mod A SPT R side. Kaley ramsey A x 1 with nursing.    Grooming: Min A supported sitting.    Dressing: UB Mod A. LB Max A.     Bathing: Dependent chair. Max A.    Toileting: Kaley ramsey A x 1 commode, A x 2 with nursing. Total A hygiene/clothing.  IADLs: Needs assistance.  Vision/Cognition: L side inattention and L visual field cut. Mild cognitive deficits with executive functioning and attention.     Assessment: Increased flexor muscle tone in LUE - some improvement after FES bike. Issued self ROM HEP. Plan to fabricate L resting hand splint tomorrow.     Other Barriers to Discharge (DME, Family Training, etc):   Pt lives alone and caregiver support TBD.  Home set up - 2nd level apartment, stairs.  Requires significant physical assistance.

## 2022-02-07 NOTE — PLAN OF CARE
FOCUS/GOAL  Bladder management, Pain management, Mobility, Cognition/Memory/Judgment/Problem solving, and Safety management    ASSESSMENT, INTERVENTIONS AND CONTINUING PLAN FOR GOAL:  Pt is alert and oriented. Continent of bladder, voiding without difficulty using urinal with assistance. Denied pain or discomfort overnight. Turns and repositions with minimal assistance, able to pull self up in bed independently. Appeared to be sleeping well during rounds. Uses call light appropriately, able to make needs known. Bed alarm on for safety. Will continue with POC.

## 2022-02-07 NOTE — PROGRESS NOTES
[facility]   Internal Medicine Progress Note    Rehab Day # 10    Assessment & Plan: Miriam Hall is a 46 year old man with a history of HTN who was admitted to [facility] 1/8-1/28/22 for right PCA stroke c/b refractory HTN w/ concern for secondary HTN, DISHA, new dx of DM II, hypokalemia, and encephalopathy. Transferred to ARU for ongoing rehabilitation.     #Acute Right PCA Stroke. Presented w/ left hemiplegia and head CT showing right parieto-occipital infarct and CTA showing occlusion of proximal right PCA and high grade stenosis of M2 branch of right MCA. Was outside window for tPA. No thrombectomy performed d/t location of LVO in PCA. Repeat imaging 1/12 w/ hemorrhagic transformation. Source 2/2 possible intracranial atherosclerosis vs. ESUS. CARISA neg for LA thrombus. Residual deficits include L hemiplegia, L hemianopsia, mild dysarthria.  - Continue ASA, statin.   - Continue empiric Depakote (and seizure precautions) until neuro clinic f/u (had multiple vEEG neg for seizure activity but elevated risk d/t stroke burden and prior prolonged period of encephalopathy).   - BP management as below; goal < 130/80.   - Has Ziopatch through 2/10.     #HTN. Required 6 antihypertensives while inpatient to maintain SBP goal < 180. No clear etiology despite extensive workup and consultations with Cardiology and Nephrology. Discharged on amiloride, carvedilol, chlorthalidone, clonidine, lisinopril, hydralazine, but hydralazine was stopped 2/1 and clonidine 2/4. BP then began to rise and clonidine was restarted 2/6. BPs most recently 130s/80s, within goal range.   - Continue amiloride 10 mg daily, carvedilol 37.5 mg BID, chlorthalidone 25 mg daily, lisinopril 40 mg daily, and clonidine 0.1 mg daily.   - Has OP nephrology f/u 2/10 but will need to reschedule if still here. Also needs OP endocrinology and genetics follow ups.     #DISHA. Cr 1.3 on hospital admit, best of 0.94 on 1/20, bump on 1/23 back  "to 1.3, and peak of 1.56 on 1/27 --> 1.3 w/ IV fluid bolus. Here has been 1.7 since 2/1. Was discussed w/ nephrology x2 around that time and attributed to hypotension as patient had some normal but lower than his usual BP readings. No concerning BPs since but Cr hasn't improved. Renal US 1/31 was unrevealing. UOP is 600 ml/d over past 48 hrs which is a decrease from prior baseline. No e/o retention on prior bladder scans. Does not look fluid responsive w/ U Na 79 (last on 1/31). Also likely contributing is diuretics x 2 and ACE-I.   - Discussed w/ nephrology again today and this may just be his current baseline, acceptable w/ GFR 48 and perceived benefit from current med regimen. Will need clinic f/u after discharge.     #DM II. New dx during hospital stay w/ HgbA1c 7.8%. Endocrinology saw IP and recommended metformin, which is currently on hold d/t DISHA. BG well controlled.   - Needs diabetic education and meter/supplies before discharge (added to sticky note). Will resume metformin when appropriate.     IM will follow for BP management and DISHA.     Tena Arzate PA-C  Hospitalist Service  Pager: [phone]  ________________________________________________________________    Subjective & Interval History:  No concerns today. Participated in therapy. Discussed blood pressure readings and plan.     Last 24 hour care team notes reviewed.   ROS: 4 point ROS (including Respiratory, CV, GI and ) was performed and negative unless otherwise noted in HPI.     Medications: Reviewed in EPIC.    Physical Exam:    Blood pressure 134/73, pulse 67, temperature 98.7  F (37.1  C), temperature source Oral, resp. rate 16, height 1.737 m (5' 8.4\"), weight 74.3 kg (163 lb 12.8 oz), SpO2 97 %.    GENERAL: Alert and oriented x 3. Lying in bed, appears comfortable. Pleasant and conversant.   HEENT: Anicteric sclera. Mucous membranes moist.   CV: RRR. S1, S2. No murmurs appreciated.   RESPIRATORY: Effort normal on room air. Lungs CTAB " with no wheezing, rales, rhonchi.   GI: Abdomen soft, non distended, non tender.   NEUROLOGICAL: Left sided hemiplegia.    EXTREMITIES: No peripheral edema.   SKIN: No jaundice. No rashes.     Lines/Tubes/Drains:  none                   not applicable

## 2022-02-07 NOTE — PLAN OF CARE
"Discharge Planner Post-Acute Rehab SLP:     Discharge Plan: TBD    Precautions: Fall    Current Status:  Communication: Mild dysarthria. Occasional word-finding difficulties in conversation, however pt is fluent and able to communicate wants/needs.  Cognition: At least moderate cognitive-linguistic deficits in the areas of attention, executive function, processing speed, and visuospatial skills.  Swallow: Pt reportedly tolerating a regular diet with thin liquids. Pt discharged from previous hospital with no swallowing concerns.    Assessment: Incidentally observed breakfast meal which patient tolerated with no difficulties. Targeted left neglect during breakfast using blue tape and \"carriage return\" to locate food items on left side with min cues provided. Patient demonstrated difficulties at start of session with initiation of breakfast. Patient stated that with past worksheets during session, he has difficulties attending and returning to the current questions he is on. Discussed targeting compensatory cancellation strategies with questions completed in future sessions.      Other Barriers to Discharge (Family Training, etc): TBD    "

## 2022-02-08 ENCOUNTER — APPOINTMENT (OUTPATIENT)
Dept: OCCUPATIONAL THERAPY | Facility: CLINIC | Age: 47
End: 2022-02-08
Attending: PHYSICAL MEDICINE & REHABILITATION
Payer: COMMERCIAL

## 2022-02-08 ENCOUNTER — APPOINTMENT (OUTPATIENT)
Dept: PHYSICAL THERAPY | Facility: CLINIC | Age: 47
End: 2022-02-08
Attending: PHYSICAL MEDICINE & REHABILITATION
Payer: COMMERCIAL

## 2022-02-08 ENCOUNTER — APPOINTMENT (OUTPATIENT)
Dept: SPEECH THERAPY | Facility: CLINIC | Age: 47
End: 2022-02-08
Attending: PHYSICAL MEDICINE & REHABILITATION
Payer: COMMERCIAL

## 2022-02-08 LAB
GLUCOSE BLDC GLUCOMTR-MCNC: 123 MG/DL (ref 70–99)
GLUCOSE BLDC GLUCOMTR-MCNC: 144 MG/DL (ref 70–99)

## 2022-02-08 PROCEDURE — 128N000003 HC R&B REHAB

## 2022-02-08 PROCEDURE — 999N000125 HC STATISTIC PATIENT MED CONFERENCE < 30 MIN: Performed by: OCCUPATIONAL THERAPIST

## 2022-02-08 PROCEDURE — 250N000013 HC RX MED GY IP 250 OP 250 PS 637: Performed by: PHYSICAL MEDICINE & REHABILITATION

## 2022-02-08 PROCEDURE — 250N000013 HC RX MED GY IP 250 OP 250 PS 637: Performed by: PHYSICIAN ASSISTANT

## 2022-02-08 PROCEDURE — 97129 THER IVNTJ 1ST 15 MIN: CPT | Mod: GN

## 2022-02-08 PROCEDURE — 97130 THER IVNTJ EA ADDL 15 MIN: CPT | Mod: GN

## 2022-02-08 PROCEDURE — 97112 NEUROMUSCULAR REEDUCATION: CPT | Mod: GO | Performed by: OCCUPATIONAL THERAPIST

## 2022-02-08 PROCEDURE — L3808 WHFO, RIGID W/O JOINTS: HCPCS | Performed by: OCCUPATIONAL THERAPIST

## 2022-02-08 PROCEDURE — 999N000125 HC STATISTIC PATIENT MED CONFERENCE < 30 MIN

## 2022-02-08 PROCEDURE — 97530 THERAPEUTIC ACTIVITIES: CPT | Mod: GP

## 2022-02-08 PROCEDURE — 999N000150 HC STATISTIC PT MED CONFERENCE < 30 MIN

## 2022-02-08 PROCEDURE — 97112 NEUROMUSCULAR REEDUCATION: CPT | Mod: GP

## 2022-02-08 PROCEDURE — 99233 SBSQ HOSP IP/OBS HIGH 50: CPT | Performed by: PHYSICAL MEDICINE & REHABILITATION

## 2022-02-08 RX ADMIN — CLONIDINE HYDROCHLORIDE 0.1 MG: 0.1 TABLET ORAL at 09:37

## 2022-02-08 RX ADMIN — ACETAMINOPHEN 650 MG: 325 TABLET, FILM COATED ORAL at 00:00

## 2022-02-08 RX ADMIN — CARVEDILOL 37.5 MG: 25 TABLET, FILM COATED ORAL at 09:37

## 2022-02-08 RX ADMIN — ASPIRIN 81 MG CHEWABLE TABLET 81 MG: 81 TABLET CHEWABLE at 09:37

## 2022-02-08 RX ADMIN — DIVALPROEX SODIUM 1000 MG: 500 TABLET, FILM COATED, EXTENDED RELEASE ORAL at 09:37

## 2022-02-08 RX ADMIN — LIDOCAINE PATCH 4% 1 PATCH: 40 PATCH TOPICAL at 20:49

## 2022-02-08 RX ADMIN — LISINOPRIL 40 MG: 20 TABLET ORAL at 09:37

## 2022-02-08 RX ADMIN — ATORVASTATIN CALCIUM 40 MG: 40 TABLET, FILM COATED ORAL at 19:10

## 2022-02-08 RX ADMIN — ACETAMINOPHEN 650 MG: 325 TABLET, FILM COATED ORAL at 17:57

## 2022-02-08 RX ADMIN — CHLORTHALIDONE 25 MG: 25 TABLET ORAL at 09:37

## 2022-02-08 RX ADMIN — AMILORIDE HYDROCLORIDE 10 MG: 5 TABLET ORAL at 09:37

## 2022-02-08 RX ADMIN — CARVEDILOL 37.5 MG: 25 TABLET, FILM COATED ORAL at 17:57

## 2022-02-08 ASSESSMENT — ACTIVITIES OF DAILY LIVING (ADL)
ADLS_ACUITY_SCORE: 18
ADLS_ACUITY_SCORE: 20
ADLS_ACUITY_SCORE: 18
ADLS_ACUITY_SCORE: 18
ADLS_ACUITY_SCORE: 20
ADLS_ACUITY_SCORE: 18
ADLS_ACUITY_SCORE: 20
ADLS_ACUITY_SCORE: 18
ADLS_ACUITY_SCORE: 20

## 2022-02-08 NOTE — PLAN OF CARE
Discharge Planner Post-Acute Rehab SLP:     Discharge Plan: TBCALLUM    Precautions: Fall    Current Status:  Communication: Mild dysarthria. Occasional word-finding difficulties in conversation, however pt is fluent and able to communicate wants/needs.  Cognition: At least moderate cognitive-linguistic deficits in the areas of attention, executive function, processing speed, and visuospatial skills.  Swallow: Pt reportedly tolerating a regular diet with thin liquids. Pt discharged from previous hospital with no swallowing concerns.    Assessment: Patient completed moderate level deductive reasoning task with 75% accuracy requiring moderate prompting by clinician for both visual scanning and problem solving during reading. Patient appeared frustrated throughout reading and stated how much harder this task is compared to baseline prior to injury. Patient benefited from both verbal and visual cues (highlighter, bookmark) with return to paragraph reading. Patient requesting book to read for free time in future sessions.     Other Barriers to Discharge (Family Training, etc): TBD

## 2022-02-08 NOTE — PLAN OF CARE
Discharge Planner Post-Acute Rehab OT:      Discharge Plan: TCU     Precautions: L side inattention and hemiparesis, L visual field cut, falls     Current Status:  ADLs:    Mobility: W/c based. Min-Mod A SPT R side. Kaley ramsey A x 1 with nursing.    Grooming: Min A seated.    Dressing: UB Mod A. LB Max A.     Bathing: Dependent chair. Max A.    Toileting: Kaley stedy A x 1 commode, total A hygiene/clothing.  IADLs: Needs assistance.  Vision/Cognition: L side inattention and L visual field cut. Mild cognitive deficits with executive functioning and attention.     Assessment:  Fabricated left resting hand splint. Purpose of splint to counteract flexor tone/contracture and provide functional hand/instrinsic plus position.Wear schedule: pt to wear splint only at night, should take off during the day. Educated pt on donning/doffing splint, visual placed on pt communication board, and placed in individualization plan.     Other Barriers to Discharge (DME, Family Training, etc):   Pt lives alone and caregiver support TBD.  Home set up - 2nd level apartment, stairs.  Requires significant physical assistance.

## 2022-02-08 NOTE — PLAN OF CARE
Acute Rehab Care Conference/Team Rounds    Type: Team Rounds    Present: Dr. Jean Paul Kohler, Linda Perez PA, Jose George PT, Candie Conroy OT, Anand Oleary SLP, Rhiannon Shah SW, Betsy Nicholson RD, Laura Tinajero RN, Patient Miriam Hall.     Discharge Barriers/Treatment/Education    Rehab Diagnosis: post CVA    Active Medical Co-morbidities/Prognosis:     Patient Active Problem List   Diagnosis Code     HTN (hypertension) I10     Stroke (H) I63.9     Ischemic cerebrovascular accident (CVA) (H) I63.9     Acute kidney failure, unspecified (H) N17.9   L hemiparesis  L hemineglect      Safety: fall precautions    Pain: as needed     Medications, Skin, Tubes/Lines: none     Swallowing/Nutrition: Regular texture diet and thin liquids. At times low initiation with his meals.    Bowel/Bladder: continent     Psychosocial: Single, no children, and lives alone. Brother in [place], mom in [place] but distant relationship. Working full-time PTA. Indep with ADLs and IADLs. No mental health, substance abuse, or financial concerns at this time. Good support from close friends (Taniya, Deirdre, and Jordyn).     ADLs/IADLs: Pt is progress with ADLs. Pt completing w/c based ADLs and Mod A pivot transfers towards the R side. Pt using the ghada stedy with nursing staff for safety and efficiency. Pt requiring Min A seated grooming, Mod A UB dressing, Max A LB dressing, and Max A bathing in a dependent shower chair. Pt using ghada stedy to access the commode and toileting, needing max A hygiene/clothing management. Pt able to sit unsupported with supervision, still needing assistance for all standing tasks. Performance limited by L inattention, L side field cut/R gaze preference, L side hemiparesis and impaired sensation, impaired balance, and mild impairements with cognition. Goals to maximize IND ADLs and functional mobility. Barriers to returning home include home set up (stairs and not w/c accessible), limited caregiver support, and  increased level of physical assistance. Anticipate extended rehab course to reach functional level that is safe to return home. Pt benefiting from IP OT but will also benefit from TCU stay. Current equipment needs: hospital bed, manual w/c with L arm support, L resting hand splint, ghada stedy, dependent shower chair.    Mobility: Pt is making steady progress in PT. Mobility deficits include L hemiparesis and sensory impairments, L inattention and visual deficits impacting mobility. Active participation with FES greatly improving and functionally starting to get some antigravity LLE movements. Bed mobility SBA to min-A, transferring with min-A squat/pivot R, max to L. Sitting EOB with supervision and progressing dynamic trunk control. Still requires mod to max-A standing tasks, progressing with standing frame. PASS improved to 19/36. Given level of support, pt will need to ascend a flight of stairs and be fully mod-I w/c based at home, due to this recommending TCU for extended rehab stay. To return home will need custom w/c, further gait DME for mixed mobility pending progress.    Cognition/Language: Ongoing difficulties with left sided vision but showing steady improvement in ability to utilize compensatory strategies to attend to the left side. Ongoing support needed to consistently utilize and place the various systems. Cognition, patient presenting with some deficits in reasoning/problem solving but functionally significantly limited by vision impairments.     Community Re-Entry: Recommend assistance due to physical, visual, and cognitive deficits.     Transportation: Pt not a , will need family to provide.    Decision maker: self    Plan of Care and goals reviewed and updated.    Discharge Plan/Recommendations    Fall Precautions: continue    Patient/Family input to goals: yes     Estimated length of stay: 16-18 days     Overall plan for the patient: reach a level of mod I       Utilization Review and  Continued Stay Justification    Medical Necessity Criteria:    For any criteria that is not met, please document reason and plan for discharge, transfer, or modification of plan of care to address.    Requires intensive rehabilitation program to treat functional deficits?: Yes    Requires 3x per week or greater involvement of rehabilitation physician to oversee rehabilitation program?: Yes    Requires rehabilitation nursing interventions?: Yes    Patient is making functional progress?: Yes    There is a potential for additional functional progress? Yes    Patient is participating in therapy 3 hours per day a minimum of 5 days per week or 15 hours per week in 7 day period?:Yes    Has discharge needs that require coordinated discharge planning approach?:Yes      Barriers/Concerns related to meeting medical necessity criteria:  none    Team Plan to Address Concern:  As needed       Final Physician Sign off    Statement of Approval:  Jean Paul Kohler, DO      Patient Goals  Social Work Goals:  Clinically accepted to [facility] in [place]. Waiting to find out about bed avail. SW assisting pt with disability ppwk from employer and completing health care directive/power of .     OT Frequency: 60-90 min daily  OT goal: hygiene/grooming: independent  OT goal: upper body dressing: Minimal assist  OT goal: lower body dressing: Minimal assist  OT goal: upper body bathing: Supervision/stand-by assist  OT goal: lower body bathing: Minimal assist  OT goal: bed mobility: Modified independent  OT Goal: transfer: Supervision/stand-by assist  OT goal: toilet transfer/toileting: Supervision/stand-by assist  OT goal: meal preparation: Minimal assist,from wheelchair  OT goal: home management: Minimal assist,with light demand household tasks,from wheelchair  OT goal 1: Pt will demo Min A shower transfer using DME/AE prn     PT Frequency: Daily 60-90 minutes  PT goal: bed mobility: Modified independent,Supine  to/from sit,Rolling  PT goal: transfers: Modified independent,Sit to/from stand,Bed to/from chair  PT goal: stairs: Rail on right,Greater than 10 stairs,Minimal assist  PT goal 1: Floor transfer min-A for fall recovery  PT Goal 2: Car transfer min-A for transportation needs  PT goal: MW mod-I 100'     SLP Frequency: daily   SLP goal 1: Patient will complete moderate level executive function tasks with 80% accuracy given moderate cues.  SLP goal 2: Patient will recall functional information with 90% accuracy given external aids and min assist.  SLP goal 3: Pt will complete simple attention tasks with 90% accuracy with mod cues to implement strategies.      Nursing Goals  Bowel and Bladder care  Fall prevention   Medication Education  Skin Care protection

## 2022-02-08 NOTE — PROGRESS NOTES
"  [facility]   Acute Rehabilitation Unit    INTERVAL HISTORY  Pt seen and examined this AM. No acute events overnight. Denies chest pain, abdominal pain, shortness of breath, headache, nausea, vomiting, fevers, chills. L arm tone worsening, will monitor with therapy and ROM, but if no improvement will start baclofen or tizanidine.  BP stable today, appreciate IM team recommendations.       Functional  OT:  ADLs:    Mobility: W/c based. Min-Mod A SPT R side. Kaley stedy A x 1 with nursing.    Grooming: Min A seated.    Dressing: UB Mod A. LB Max A.     Bathing: Dependent chair. Max A.    Toileting: Kaley stedy A x 1 commode, total A hygiene/clothing.  IADLs: Needs assistance.  Vision/Cognition: L side inattention and L visual field cut. Mild cognitive deficits with executive functioning and attention.     ROS: 10 point ROS neg other than the symptoms noted above in the HPI.      MEDICATIONS  Scheduled meds    acetaminophen  650 mg Oral Q6H     aMILoride  10 mg Oral Daily     aspirin  81 mg Oral Daily     atorvastatin  40 mg Oral QPM     carvedilol  37.5 mg Oral BID w/meals     chlorthalidone  25 mg Oral Daily     cloNIDine  0.1 mg Oral Daily     diclofenac  2 g Topical 4x Daily     divalproex sodium extended-release  1,000 mg Oral Daily     lidocaine  1 patch Transdermal Q24h    And     lidocaine   Transdermal Q8H     lisinopril  40 mg Oral Daily     [Held by provider] metFORMIN  500 mg Oral Daily with supper       PRN meds:  bisacodyl, hydrALAZINE, ondansetron, polyethylene glycol, sennosides      PHYSICAL EXAM  /89   Pulse 73   Temp 98.3  F (36.8  C) (Oral)   Resp 20   Ht 1.737 m (5' 8.4\")   Wt 74.3 kg (163 lb 12.8 oz)   SpO2 98%   BMI 24.62 kg/m      Gen: NAD, up in chair   HEENT: NCAT, L facial weakness  Cardio: RRR - Ziopatch in place   Pulm: nonlabored, symmetrical chest rise  Abd: soft, and non-tender   Ext: no edema in bilateral lower extremities, no calf " tenderness   Neuro/MSK: left sided neglect with clear gaze preference. Left hemiplegia with improved volitional movement 1/5 in fingers and foot. There is about 10 degrees of wrist extension on L.   Some increased tone today at pectoralis, biceps as well.         LABS  Results for orders placed or performed during the hospital encounter of 01/28/22 (from the past 24 hour(s))   Glucose by meter   Result Value Ref Range    GLUCOSE BY METER POCT 158 (H) 70 - 99 mg/dL   Glucose by meter   Result Value Ref Range    GLUCOSE BY METER POCT 144 (H) 70 - 99 mg/dL         ASSESSMENT AND PLAN  Miriam Hall is a 46 year old R hand dominant male with h/o HTN who was admitted on 1/8 with R PCA ischemic stroke. His hospital course was complicated by concerns for secondary HTN, new diagnosis of DM II, DISHA, hypokalemia and encephalopathy. He clinically improved and transferred to ARU for ongoing medical management and intensive inpatient rehab. He is progressing with therapy, and is still having episodes of HTN. Medicine is following and is helping with BP management as well as DISHA management.        Admission to acute inpatient rehab on 1/28/2022.    Impairment group code: Stroke Ischemic 01.1 (L) Body Involvement (R) Brain: R PCA ischemic stroke w/ hemorrhagic transformation noted      Rehabilitation - continue comprehensive acute inpatient rehabilitation program with multidisciplinary approach including therapies, rehab nursing, and physiatry following. See interval history for updates.         Medical Conditions  Neuro  Acute right PCA ischemic stroke   R P1 occlusion and R M2 Stenosis  Hemorrhagic transformation of ischemic infarct  -aspirin 81mg   -HTN, DM and HLD control as below for secondary prevention   -hypercoagulability panel negative  -vasculitis panel negative  -ziopatch in place till 2/10   -acute rehab with PT, OT, and SLP  -per neuro recommendations, continue depakote until outpatient f/u  -should see stroke  neurology and PM&R after discharge as well as genetic team per recs   --L arm tone worsening, will monitor with therapy and ROM, but if no improvement will start baclofen or tizanidine/       Possible seizures   EEG 1/12 concerning for subclinical seizures. Loaded with Keppra then transitioned to Depakote.   -continue Depakote  -seizure precautions      MSK  L Hip Pain  -reports increased L hip pain  -schedule Tylenol 650mg q6h  -hip XR on 1/24 does not show evidence of fx  -can consider ice, heating pad as adjuvants     CV  Refractory HTN  -possibly secondary to clinical hyperaldosteronism of several possible etiologies  -medicine on for assistance with HTN management              -DDs included cushing, Liddle syndrome, or mineralocorticoid excess              -workup negative so far              -urine studies pending (urine cortisol level)  -amiloride 10mg daily  -Carvedilol 37.5mg bid  -chlorthalidone 25mg daily  -clonidine 0.1mg - dose decreased 2/2 and discontinued 2/4  -hydralazine 50mg Q8H - discontinued 2/1  -lisinopril 40mg daily   -f/u with nephrology and endo teams as outpatient       HLD  -atorvastatin 40mg     Endo  Type 2 Diabetes  A1c 7.8 1/8  -metformin 500mg with dinner; held on 2/1 due to worsening Cr  -continue to hold metformin in the setting of DISHA  -sliding scale insulin; was discontinued 2/3 as he was not requiring       Renal  DISHA  Likely due to hypovolemia and medication induced. On 2/3 Cr was slightly improved and is stable currently at 2/7.  -hydralazine stopped, tapering clonidine  -other nephrotoxic medications including chlorthalidone, lisinopril  -monitor Cr   -daily weights        Hypokalemia  -20 meq K BID; discontinued 2/1  -hyperaldosteronism workup still pendings  -monitor with BMP    2. Adjustment to disability:  Clinical psychology to eval and treat as needed   3. FEN: regular diet   4. Bowel: continent, PRN miralax and senna  5. Bladder: continent; PVRs were checked and were  acceptable   6. DVT Prophylaxis: mechanical with SCDs  7. GI Prophylaxis: none   8. Code: Full  9. Disposition: to home  10. ELOS:  21 days.  11. Rehab prognosis:  fair  12. Follow up Appointments on Discharge: Neuro, cardio for Zio patch, nephrology, PCP, genetics and PM&R         Jean Paul Kohler DO        I spent a total of 35 minutes face to face and coordinating care of Miriam Hall. Over 50% of my time on the unit was spent counseling the patient and /or coordinating care regarding post CVA.

## 2022-02-08 NOTE — PLAN OF CARE
Patient alert and oriented x4. A2 ghada steady. Regular diet, thins, pills whole. BG BID: 158. Continent of bowel and bladder, LBM: 2/4. Pt given miralax and senna near end of last shift, no result this evening, will pass on to try suppository tomorrow if no result occurs. Pt refused scheduled lidocaine patch and voltaren gel. Pt denies pain during shift. Will continue with POC.     NOC shift: pt slept well throughout shift, no new changes.

## 2022-02-08 NOTE — PLAN OF CARE
Orientation: Alert and oriented  Bowel: Continent of bowel. LBM 2/7 per pt.  Bladder: Continent of bladder.  Pain: Denies.  Ambulation/Transfers: Assist of 1 with Kaley Jerry  Blood sugars: Bid blood sugars  Diet/ Liquids: Regular thin  Skin: Unremarkable  Voiced frustration about finding new diabilities from stroke. Therapeutic listening utilized. Encouraged patient to celebrate little victories, and he agreed that was helpful. Bed alarm on for safety, call light within reach. Continue with POC.

## 2022-02-08 NOTE — PROGRESS NOTES
Brief Medicine Note    Patient not in room x 2. BP well controlled overnight and this AM. No changes to current regimen. Will follow.     Tena Arzate PA-C  Hospitalist Service  Contact information available via [facility] Paging/Directory

## 2022-02-08 NOTE — PLAN OF CARE
Skilled set up on :  Pt dependent for proper placement of electrodes on L gastroc, ant tib, quad, HS for optimum muscle contraction, safe positioning on w/c within frame and cushion for prevention of skin breakdown, feet secured to foot pedals, w/c secured to  w/ Q-straints.  Passive motion assessed to ensure proper positioning.      Pt performed 28 minutes of active FES ergometry with 100% stimulation applied to above muscles at 45rpm with 0.91nm resistance.  This PT adjusted e-stim and cycling parameters in real-time to ensure palpable muscle contractions throughout session.  Please see [email or web address] for further details on patient's stimulation parameters and ergometry outcomes.      Changes in parameters that were part of this treatment:   - Resistance increased due to continued ability to sustain pedal at speed and subjective reported easily ability to pedal    Functional outcomes from this intervention     -improved sensory awareness and proprioception  -improved muscle strength  -improved motor coordination    Session Summary:   -Average Power: 4.5W  -Asymmetry: L 15%  -Active Time: 26

## 2022-02-08 NOTE — PROGRESS NOTES
"[facility] accepted, TCU admissions will update SW tomorrow when bed may be available. SW updated pt at beside. SW also helped pt with disability ppwk. After assisting pt, pt tearful and expressed \"faith\". Pt expressed gratitude and denied additional needs or concerns. Will give provider half to fill out and SW will assist with faxing/scanning ppwk to Taniya and pt HR. SW will continue to follow.     VINCENZO Chua   [facility]   Direct Phone: [phone]  I   Pager: [phone]  I  Fax: [phone]    "

## 2022-02-09 ENCOUNTER — APPOINTMENT (OUTPATIENT)
Dept: OCCUPATIONAL THERAPY | Facility: CLINIC | Age: 47
End: 2022-02-09
Attending: PHYSICAL MEDICINE & REHABILITATION
Payer: COMMERCIAL

## 2022-02-09 ENCOUNTER — APPOINTMENT (OUTPATIENT)
Dept: SPEECH THERAPY | Facility: CLINIC | Age: 47
End: 2022-02-09
Attending: PHYSICAL MEDICINE & REHABILITATION
Payer: COMMERCIAL

## 2022-02-09 ENCOUNTER — APPOINTMENT (OUTPATIENT)
Dept: PHYSICAL THERAPY | Facility: CLINIC | Age: 47
End: 2022-02-09
Attending: PHYSICAL MEDICINE & REHABILITATION
Payer: COMMERCIAL

## 2022-02-09 LAB
GLUCOSE BLDC GLUCOMTR-MCNC: 142 MG/DL (ref 70–99)
GLUCOSE BLDC GLUCOMTR-MCNC: 251 MG/DL (ref 70–99)

## 2022-02-09 PROCEDURE — 97130 THER IVNTJ EA ADDL 15 MIN: CPT | Mod: GN | Performed by: SPEECH-LANGUAGE PATHOLOGIST

## 2022-02-09 PROCEDURE — 99207 PR CDG-MDM COMPONENT: MEETS HIGH - UP CODED: CPT | Performed by: PHYSICIAN ASSISTANT

## 2022-02-09 PROCEDURE — 97112 NEUROMUSCULAR REEDUCATION: CPT | Mod: GO | Performed by: OCCUPATIONAL THERAPIST

## 2022-02-09 PROCEDURE — 97535 SELF CARE MNGMENT TRAINING: CPT | Mod: GO | Performed by: OCCUPATIONAL THERAPIST

## 2022-02-09 PROCEDURE — 250N000013 HC RX MED GY IP 250 OP 250 PS 637: Performed by: PHYSICIAN ASSISTANT

## 2022-02-09 PROCEDURE — 128N000003 HC R&B REHAB

## 2022-02-09 PROCEDURE — 99232 SBSQ HOSP IP/OBS MODERATE 35: CPT | Mod: FS | Performed by: PHYSICAL MEDICINE & REHABILITATION

## 2022-02-09 PROCEDURE — 97129 THER IVNTJ 1ST 15 MIN: CPT | Mod: GN | Performed by: SPEECH-LANGUAGE PATHOLOGIST

## 2022-02-09 PROCEDURE — 250N000013 HC RX MED GY IP 250 OP 250 PS 637: Performed by: PHYSICAL MEDICINE & REHABILITATION

## 2022-02-09 PROCEDURE — 99233 SBSQ HOSP IP/OBS HIGH 50: CPT | Performed by: PHYSICIAN ASSISTANT

## 2022-02-09 PROCEDURE — 97112 NEUROMUSCULAR REEDUCATION: CPT | Mod: GP

## 2022-02-09 RX ADMIN — AMILORIDE HYDROCLORIDE 10 MG: 5 TABLET ORAL at 09:29

## 2022-02-09 RX ADMIN — DIVALPROEX SODIUM 1000 MG: 500 TABLET, FILM COATED, EXTENDED RELEASE ORAL at 09:29

## 2022-02-09 RX ADMIN — ACETAMINOPHEN 650 MG: 325 TABLET, FILM COATED ORAL at 05:50

## 2022-02-09 RX ADMIN — CARVEDILOL 37.5 MG: 25 TABLET, FILM COATED ORAL at 17:21

## 2022-02-09 RX ADMIN — ACETAMINOPHEN 650 MG: 325 TABLET, FILM COATED ORAL at 00:10

## 2022-02-09 RX ADMIN — LISINOPRIL 40 MG: 20 TABLET ORAL at 09:28

## 2022-02-09 RX ADMIN — ASPIRIN 81 MG CHEWABLE TABLET 81 MG: 81 TABLET CHEWABLE at 09:41

## 2022-02-09 RX ADMIN — ACETAMINOPHEN 650 MG: 325 TABLET, FILM COATED ORAL at 12:33

## 2022-02-09 RX ADMIN — CLONIDINE HYDROCHLORIDE 0.1 MG: 0.1 TABLET ORAL at 09:28

## 2022-02-09 RX ADMIN — ACETAMINOPHEN 650 MG: 325 TABLET, FILM COATED ORAL at 23:31

## 2022-02-09 RX ADMIN — CARVEDILOL 37.5 MG: 25 TABLET, FILM COATED ORAL at 09:29

## 2022-02-09 RX ADMIN — ATORVASTATIN CALCIUM 40 MG: 40 TABLET, FILM COATED ORAL at 20:02

## 2022-02-09 RX ADMIN — CHLORTHALIDONE 25 MG: 25 TABLET ORAL at 09:28

## 2022-02-09 ASSESSMENT — ACTIVITIES OF DAILY LIVING (ADL)
ADLS_ACUITY_SCORE: 16
ADLS_ACUITY_SCORE: 18
ADLS_ACUITY_SCORE: 16
ADLS_ACUITY_SCORE: 18
ADLS_ACUITY_SCORE: 16
ADLS_ACUITY_SCORE: 18
ADLS_ACUITY_SCORE: 16
ADLS_ACUITY_SCORE: 18
ADLS_ACUITY_SCORE: 18
ADLS_ACUITY_SCORE: 16
ADLS_ACUITY_SCORE: 18
ADLS_ACUITY_SCORE: 18
ADLS_ACUITY_SCORE: 16
ADLS_ACUITY_SCORE: 18
ADLS_ACUITY_SCORE: 16

## 2022-02-09 ASSESSMENT — MIFFLIN-ST. JEOR: SCORE: 1618.8

## 2022-02-09 NOTE — PLAN OF CARE
Orientation: Alert and oriented x4.   Bowel: Continent of bowel. LBM 2/7 per pt.  Bladder: Continent of bladder.  Pain: Denies pain, but reported shoulder discomfort when leaning forward. Patient accepted scheduled Tylenol and declined Voltaren at this time. Patient reported good relief.   Ambulation/Transfers: Assist of 1 with Kaley Edwards.  Blood sugars: Bid blood sugars.  Diet/ Liquids: Regular thin.  Skin: Unremarkable.    Patient voiced no new acute medical concerns this shift.     Neeru Kulkarni RN on 2/9/2022 at 2:37 PM

## 2022-02-09 NOTE — PROGRESS NOTES
[facility]   Acute Rehabilitation Unit    INTERVAL HISTORY  Patient was seen and examined at bedside this afternoon.  No acute events reported overnight.  Today, patient reports that he had a good day.  He has a busy morning of therapies, then took a nap, and now just finished his final speech session.  He is noting increased tightness in finger and wrist on left, and is using splint and also doing some stretching and passive ROM at the time of my visit.  He denies any discomfort associated with this increased tightness.   He notes ongoing intermittent L hip pain, though not interfering with therapies.  He denies shortness of breath, cough, chest pain, fever/chills, dizziness, nausea, abdominal pain, bowel or bladder changes.  Patient is interested in receiving COVID booster prior to discharge.  States he received 2 doses of Pfizer, last on 3/24/21, well-tolerated.    Functionally, he is currently needing min A for bed mobility, min A for squat/pivot to R, mod to max A to L, supervision and cues for seated balance.  He also needs mod A for full body dressing, max A for bathing with tub bench, total A for toilet hygiene and clothing management.      MEDICATIONS  Scheduled meds    acetaminophen  650 mg Oral Q6H     aMILoride  10 mg Oral Daily     aspirin  81 mg Oral Daily     atorvastatin  40 mg Oral QPM     carvedilol  37.5 mg Oral BID w/meals     chlorthalidone  25 mg Oral Daily     cloNIDine  0.1 mg Oral Daily     diclofenac  2 g Topical 4x Daily     divalproex sodium extended-release  1,000 mg Oral Daily     lidocaine  1 patch Transdermal Q24h    And     lidocaine   Transdermal Q8H     lisinopril  40 mg Oral Daily     [Held by provider] metFORMIN  500 mg Oral Daily with supper       PRN meds:  bisacodyl, hydrALAZINE, ondansetron, polyethylene glycol, sennosides      PHYSICAL EXAM  /76 (BP Location: Right arm, Patient Position: Sitting)   Pulse 78   Temp (!) 96.2  F  "(35.7  C) (Oral)   Resp 18   Ht 1.737 m (5' 8.4\")   Wt 76.3 kg (168 lb 3.2 oz)   SpO2 98%   BMI 25.28 kg/m      Gen: NAD, upright in bed  HEENT: NCAT, L facial weakness  Cardio: RRR - Ziopatch in place   Pulm: non-labored on room air  Abd: soft and non-tender   Ext: no edema in bilateral lower extremities   Neuro/MSK: left sided neglect with clear gaze preference. Left hemiplegia.   Some increased tone in LUE at pectoralis, biceps, wrist and finger flexors        LABS  CBC RESULTS: Recent Labs   Lab Test 02/07/22  0721 02/03/22  0754 01/29/22  0629   WBC 7.4 9.0 9.2   RBC 4.31* 4.52 4.53   HGB 12.9* 13.8 13.8   HCT 39.1* 40.8 40.7   MCV 91 90 90   MCH 29.9 30.5 30.5   MCHC 33.0 33.8 33.9   RDW 13.6 13.4 13.4    256 305     Last Basic Metabolic Panel:  Recent Labs   Lab Test 02/09/22  0809 02/08/22  1708 02/08/22  0810 02/07/22  0750 02/07/22  0721 02/05/22  1744 02/05/22  0709 02/03/22  1728 02/03/22  0754   NA  --   --   --   --  141  --  139  --  137   POTASSIUM  --   --   --   --  4.2  --  4.1  --  4.1   CHLORIDE  --   --   --   --  108  --  106  --  107   CO2  --   --   --   --  28  --  26  --  23   ANIONGAP  --   --   --   --  5  --  7  --  7   * 123* 144*   < > 134*   < > 120*   < > 128*   BUN  --   --   --   --  51*  --  47*  --  44*   CR  --   --   --   --  1.74*  --  1.71*  --  1.76*   GFRESTIMATED  --   --   --   --  48*  --  49*  --  47*   VALERIE  --   --   --   --  9.6  --  9.5  --  9.3    < > = values in this interval not displayed.     Recent Labs   Lab 02/09/22  0809 02/08/22  1708 02/08/22  0810 02/07/22  1757 02/07/22  0750 02/07/22  0721   * 123* 144* 158* 124* 134*       ASSESSMENT AND PLAN  Miriam Hall is a 46 year old R hand dominant male with h/o HTN who was admitted on 1/8 with R PCA ischemic stroke. His hospital course was complicated by concerns for secondary HTN, new diagnosis of DM II, DISHA, hypokalemia and encephalopathy. He clinically improved and transferred " to ARU for ongoing medical management and intensive inpatient rehab. He is progressing with therapy, and is still having episodes of HTN. Medicine is following and is helping with BP management as well as DISHA management.        Admission to acute inpatient rehab on 1/28/2022.    Impairment group code: Stroke Ischemic 01.1 (L) Body Involvement (R) Brain: R PCA ischemic stroke w/ hemorrhagic transformation noted      Rehabilitation - continue comprehensive acute inpatient rehabilitation program with multidisciplinary approach including therapies, rehab nursing, and physiatry following. See interval history for updates.         Medical Conditions  Neuro  Acute right PCA ischemic stroke   R P1 occlusion and R M2 Stenosis  Hemorrhagic transformation of ischemic infarct  -Hypercoagulability panel negative  -vasculitis panel negative  -aspirin 81mg   -HTN, DM and HLD control as below for secondary prevention   -ziopatch in place until 2/10   -acute rehab with PT, OT, and SLP  -per neuro recommendations, continue depakote until outpatient f/u  -should see stroke neurology and PM&R after discharge as well as genetic team per recs   --L arm tone worsening, will monitor with therapy and ROM, but if no improvement will start baclofen or tizanidine    Possible seizures   EEG 1/12 concerning for subclinical seizures. Loaded with Keppra then transitioned to Depakote.   -continue Depakote  -seizure precautions  -follow-up neurology for ongoing     MSK  L Hip Pain  -reports increased L hip pain at ARU admission  -scheduled Tylenol 650mg q6h  -hip XR on 1/24 does not show evidence of fx  -continue ice, heat, lidocaine patch, voltaren as adjuvants  -ongoing improvements, not interfering with therapy    CV  Refractory HTN  -possibly secondary to clinical hyperaldosteronism of several possible etiologies, nephrology and cardiology consulted but no clear etiology  -Appreciate internal medicine assistance   -Continue amiloride 10mg  daily  -Carvedilol 37.5mg bid  -chlorthalidone 25mg daily  -clonidine 0.1mg - dose decreased 2/2 and discontinued 2/4, resumed 2/6 with Cr stabilized  -hydralazine 50mg Q8H - discontinued 2/1  -lisinopril 40mg daily  -BP generally stable, though 1 elevated recording last evening 150s/80s  -f/u with nephrology and endo teams as outpatient       HLD  -atorvastatin 40mg     Endo  Type 2 Diabetes, new diagnosis this admission  A1c 7.8 1/8  -metformin 500mg with dinner; held since 2/1 due to worsening Cr  -continue to hold metformin in the setting of DISHA  -sliding scale insulin; was discontinued 2/3 as he was not requiring    -BG stable  last 24 hours     Renal  DISHA  -Likely due to hypovolemia and medication induced.  -While inpatient Cr up to peak of 1.56 on 1/27 and then improved to 1.3 on 1/28; however steadily increased up to now stable in 1.7 range since 2/1.  -Hospitalist following, appreciate assistance  -Renal U/S on 1/31 unrevealing  -Per IM discussion with nephrology last 2/7, may represent new baseline  -hydralazine stopped, tapering clonidine  -other nephrotoxic medications including chlorthalidone, lisinopril  -monitor Cr twice weekly  -f/u with nephrology as outpatient       Hypokalemia, resolved  -20 meq K BID; discontinued 2/1  -K stable WNL  -hyperaldosteronism workup completed while inpatient  -monitor with BMP    2. Adjustment to disability:  Clinical psychology to eval and treat as needed   3. FEN: regular diet   4. Bowel: continent, PRN miralax and senna  5. Bladder: continent   6. DVT Prophylaxis: mechanical with SCDs  7. GI Prophylaxis: none   8. Code: Full  9. Disposition: plan for extended rehab at [facility]  10. ELOS:  21 days  11. Follow up Appointments on Discharge: PCP, neuro, cardio for Zio patch, nephrology, endo, genetics and PM&R       Patient discussed with Dr. Jean Paul Adair , PM&R staff physician     MARJORIE MullenC  Physical Medicine & Rehabilitation

## 2022-02-09 NOTE — PLAN OF CARE
Discharge Planner Post-Acute Rehab SLP:     Discharge Plan: TCU with ongoing SLP    Precautions: Fall, Left-Attention impairment    Current Status:  Communication: Mild dysarthria. Occasional word-finding difficulties in conversation, however pt is fluent and able to communicate wants/needs.  Cognition: At least moderate cognitive-linguistic deficits in the areas of attention, executive function, processing speed, and visuospatial skills.  Swallow: Pt reportedly tolerating a regular diet with thin liquids. Pt discharged from previous hospital with no swallowing concerns.      Assessment: Patient improving independent recall and implementation of left-attention strategies when reading information. Does require rare cues, but good insight to deficits when completing structured reading tasks. Increased difficulty noted with more complex visual attention tasks, such as finding specific details in a picture or from an advertisement. Ongoing implementation and generalization throughout ongoing SLP sessions.      Other Barriers to Discharge (Family Training, etc): TBD

## 2022-02-09 NOTE — PLAN OF CARE
Discharge Planner Post-Acute Rehab OT:      Discharge Plan: [facility]     Precautions: L side inattention and hemiparesis, L visual field cut, falls     Current Status:  ADLs:    Mobility: W/c based. Min SPT R side, Mod A L side. Ghada stedy A x 1 with nursing.    Grooming: Set up supported sitting.    Dressing: UB Mod A. LB Mod A.     Bathing: Max A tub bench.    Toileting: Ghada stedy A x 1 commode, Total A hygiene/clothing.  IADLs: Needs assistance.  Vision/Cognition: L side inattention and L visual field cut. Mild cognitive deficits with executive functioning and attention.     Assessment: Completed FES for sensorimotor retraining - assisting with tone management. Reinforced ROM HEP, resting hand splint, and L side attention strategies. Unsupported sitting balance and functional transfers improving. Goals to discontinue use of ghada stedy with nursing staff as transfers become more consistent.     Anticipate extended rehab course to reach a functional level that is safe to discharge home with a caregiver. Pt making good improvements with skilled therapy and is highly motivated. Pt will benefit from TCU placement.    Other Barriers to Discharge (DME, Family Training, etc):   Pt lives alone and caregiver support TBD.  Home set up - 2nd level apartment, stairs.  Level of physical assistance.

## 2022-02-09 NOTE — PLAN OF CARE
Discharge Planner Post-Acute Rehab PT:     Discharge Plan:TCU due to lack of support    Precautions: Fall/alarm, L angelique, L field cut and neglect, L hip pain    Current Status:  Bed Mobility: Min-A  Transfer: Min-A squat/pivot R, mod/max-L  Gait: Unable  Stairs: Unable  Balance: Sits supervision EOM, cueing for midline midline, assist to stand due to weakness  PASS 1/29/22: 11/36  PASS 2/7/22: 19/36    Assessment: Improving trunk ext and standing stability/LLE WB acceptance.    Other Barriers to Discharge (DME, Family Training, etc):   Stairs to enter  Level of support  W/C  Further gait DME TBD

## 2022-02-09 NOTE — PLAN OF CARE
FOCUS/GOAL  Medical management    ASSESSMENT, INTERVENTIONS AND CONTINUING PLAN FOR GOAL:    Pt is alert and oriented, dysarthric. Left side affected by stroke. Denied pain, sob, numbness, tingling or any new weakness. Has scheduled tylenol and lidocaine patch on left thigh which seems to help with pain. Continent of bladder, assist from staff needed with using the urinal. No bm this shift. A1 ghada steady in transfers. Able to turn from side to side in bed independently. VSS this morning. Needing weight, bed needs to be zeroed. Endorsed to Am staff. Will continue poc.

## 2022-02-09 NOTE — PROGRESS NOTES
Disability ppwk completed and scanned/emailed to: [email or web address] (Taniya, Friends), [email or web address] (Jose L, coworker), and [email or web address] (Prema, Employer HR). Pt aware and gave SW permission. Appreciative of assistance with completing and sending ppwk.     [facility] plan to [facility] on Fri 02/18. Will send updates next week and start auth. Once auth obtained and pending any changes, SW will continue to coordinate. SW updated friend Taniya and updated pt. Both expressed appreciation and denied concerns. Will discuss SSDI and metro mobility with pt before discharge. Will given hand-off to [facility] and remain available if additional needs arise.     [facility]--Plan for Friday 02/18/2022, pending auth.   PH: [phone]  F: [phone]    [facility]INCEN[facility]ua   [facility]   Direct Phone: [phone]  I   Pager: [phone]  I  Fax: [phone]

## 2022-02-09 NOTE — PLAN OF CARE
FOCUS/GOAL  Bowel management, Bladder management, Pain management, Mobility, Skin integrity, and Safety management    ASSESSMENT, INTERVENTIONS AND CONTINUING PLAN FOR GOAL:  A/O x4 with word finding difficulty at times but able to make needs known.  VSS on RA with elevated bps, scheduled bp medications given and controlled it.  C/o headache, scheduled tylenol given w/ relief.  Regular thin diet, takes pills whole.  Bg was 123 before dinner.  lidocaine patch placed to left hip at HS. Patient got a shower this evening.  Up with assist one with ghada wei. Continent of bowel and bladder, last  2/7.   Continue with POC.

## 2022-02-10 ENCOUNTER — APPOINTMENT (OUTPATIENT)
Dept: SPEECH THERAPY | Facility: CLINIC | Age: 47
End: 2022-02-10
Attending: PHYSICAL MEDICINE & REHABILITATION
Payer: COMMERCIAL

## 2022-02-10 ENCOUNTER — APPOINTMENT (OUTPATIENT)
Dept: OCCUPATIONAL THERAPY | Facility: CLINIC | Age: 47
End: 2022-02-10
Attending: PHYSICAL MEDICINE & REHABILITATION
Payer: COMMERCIAL

## 2022-02-10 ENCOUNTER — APPOINTMENT (OUTPATIENT)
Dept: PHYSICAL THERAPY | Facility: CLINIC | Age: 47
End: 2022-02-10
Attending: PHYSICAL MEDICINE & REHABILITATION
Payer: COMMERCIAL

## 2022-02-10 LAB
ANION GAP SERPL CALCULATED.3IONS-SCNC: 7 MMOL/L (ref 3–14)
BUN SERPL-MCNC: 59 MG/DL (ref 7–30)
CALCIUM SERPL-MCNC: 9.4 MG/DL (ref 8.5–10.1)
CHLORIDE BLD-SCNC: 104 MMOL/L (ref 94–109)
CO2 SERPL-SCNC: 28 MMOL/L (ref 20–32)
CREAT SERPL-MCNC: 2.04 MG/DL (ref 0.66–1.25)
GFR SERPL CREATININE-BSD FRML MDRD: 40 ML/MIN/1.73M2
GLUCOSE BLD-MCNC: 288 MG/DL (ref 70–99)
GLUCOSE BLDC GLUCOMTR-MCNC: 138 MG/DL (ref 70–99)
GLUCOSE BLDC GLUCOMTR-MCNC: 152 MG/DL (ref 70–99)
POTASSIUM BLD-SCNC: 3.7 MMOL/L (ref 3.4–5.3)
SODIUM SERPL-SCNC: 139 MMOL/L (ref 133–144)

## 2022-02-10 PROCEDURE — 36415 COLL VENOUS BLD VENIPUNCTURE: CPT | Performed by: PHYSICIAN ASSISTANT

## 2022-02-10 PROCEDURE — 97129 THER IVNTJ 1ST 15 MIN: CPT | Mod: GN

## 2022-02-10 PROCEDURE — 250N000013 HC RX MED GY IP 250 OP 250 PS 637: Performed by: PHYSICIAN ASSISTANT

## 2022-02-10 PROCEDURE — 97535 SELF CARE MNGMENT TRAINING: CPT | Mod: GO | Performed by: OCCUPATIONAL THERAPIST

## 2022-02-10 PROCEDURE — 99207 PR CDG-MDM COMPONENT: MEETS HIGH - UP CODED: CPT | Performed by: PHYSICIAN ASSISTANT

## 2022-02-10 PROCEDURE — 97530 THERAPEUTIC ACTIVITIES: CPT | Mod: GP | Performed by: PHYSICAL THERAPIST

## 2022-02-10 PROCEDURE — 97130 THER IVNTJ EA ADDL 15 MIN: CPT | Mod: GN

## 2022-02-10 PROCEDURE — 99233 SBSQ HOSP IP/OBS HIGH 50: CPT | Performed by: PHYSICIAN ASSISTANT

## 2022-02-10 PROCEDURE — 97112 NEUROMUSCULAR REEDUCATION: CPT | Mod: GP | Performed by: PHYSICAL THERAPIST

## 2022-02-10 PROCEDURE — 99232 SBSQ HOSP IP/OBS MODERATE 35: CPT | Performed by: PHYSICAL MEDICINE & REHABILITATION

## 2022-02-10 PROCEDURE — 80048 BASIC METABOLIC PNL TOTAL CA: CPT | Performed by: PHYSICIAN ASSISTANT

## 2022-02-10 PROCEDURE — 128N000003 HC R&B REHAB

## 2022-02-10 PROCEDURE — 250N000013 HC RX MED GY IP 250 OP 250 PS 637: Performed by: PHYSICAL MEDICINE & REHABILITATION

## 2022-02-10 RX ORDER — BACLOFEN 10 MG/1
5 TABLET ORAL 3 TIMES DAILY
Status: DISCONTINUED | OUTPATIENT
Start: 2022-02-10 | End: 2022-02-19 | Stop reason: HOSPADM

## 2022-02-10 RX ADMIN — DIVALPROEX SODIUM 1000 MG: 500 TABLET, FILM COATED, EXTENDED RELEASE ORAL at 07:53

## 2022-02-10 RX ADMIN — ATORVASTATIN CALCIUM 40 MG: 40 TABLET, FILM COATED ORAL at 20:39

## 2022-02-10 RX ADMIN — CHLORTHALIDONE 25 MG: 25 TABLET ORAL at 07:54

## 2022-02-10 RX ADMIN — LISINOPRIL 40 MG: 20 TABLET ORAL at 07:53

## 2022-02-10 RX ADMIN — CARVEDILOL 37.5 MG: 25 TABLET, FILM COATED ORAL at 07:53

## 2022-02-10 RX ADMIN — ACETAMINOPHEN 650 MG: 325 TABLET, FILM COATED ORAL at 11:29

## 2022-02-10 RX ADMIN — CARVEDILOL 37.5 MG: 25 TABLET, FILM COATED ORAL at 18:35

## 2022-02-10 RX ADMIN — ASPIRIN 81 MG CHEWABLE TABLET 81 MG: 81 TABLET CHEWABLE at 07:53

## 2022-02-10 RX ADMIN — AMILORIDE HYDROCLORIDE 10 MG: 5 TABLET ORAL at 07:53

## 2022-02-10 RX ADMIN — CLONIDINE HYDROCHLORIDE 0.1 MG: 0.1 TABLET ORAL at 07:53

## 2022-02-10 RX ADMIN — ACETAMINOPHEN 650 MG: 325 TABLET, FILM COATED ORAL at 18:36

## 2022-02-10 RX ADMIN — DICLOFENAC 2 G: 10 GEL TOPICAL at 15:57

## 2022-02-10 RX ADMIN — ACETAMINOPHEN 650 MG: 325 TABLET, FILM COATED ORAL at 05:55

## 2022-02-10 RX ADMIN — Medication 5 MG: at 20:39

## 2022-02-10 RX ADMIN — SENNOSIDES 8.6 MG: 8.6 TABLET ORAL at 05:55

## 2022-02-10 ASSESSMENT — ACTIVITIES OF DAILY LIVING (ADL)
ADLS_ACUITY_SCORE: 16

## 2022-02-10 NOTE — PLAN OF CARE
FOCUS/GOAL  Pain management, Medical management, and Safety management    ASSESSMENT, INTERVENTIONS AND CONTINUING PLAN FOR GOAL:      Pt is alert and oriented. Uses the call light appropriately. Denies pain, sob, N/v, headache, numbness or tingling. On a scheduled tylenol every 6 hrs. No lidocaine patch on tonight, pt refused per report. Min A1 in bed mobility. A1 Kaley Steady in transfers but stayed in bed all night. Continent of B/B, LBM 2/7. Offered stool softener in the morning. Toileting offered but refused this shift multiple times. No concerns overnight. Will continue poc.

## 2022-02-10 NOTE — PLAN OF CARE
Discharge Planner Post-Acute Rehab OT:      Discharge Plan: [facility]     Precautions: L side inattention and hemiparesis, L visual field cut, falls     Current Status:  ADLs:    Mobility: W/c based. Min SPT R side, Mod A L side. Kaley ramsey A x 1 with nursing.    Grooming: Set up supported sitting.    Dressing: UB Mod A. LB Mod A.     Bathing: Max A tub bench.    Toileting: Kaley stedy A x 1 commode, Total A hygiene/clothing.  IADLs: Needs assistance.  Vision/Cognition: L side inattention and L visual field cut. Mild cognitive deficits with executive functioning and attention.     Assessment: Intervention focused on self cares with continued education on angelique techniques/positioning to complete AM self care and Dressing. Pt. Continues to present left side inattention during tasks.   Anticipate extended rehab course to reach a functional level that is safe to discharge home with a caregiver. Pt making good improvements with skilled therapy and is highly motivated. Pt will benefit from TCU placement.    Other Barriers to Discharge (DME, Family Training, etc):   Pt lives alone and caregiver support TBD.  Home set up - 2nd level apartment, stairs.  Level of physical assistance.

## 2022-02-10 NOTE — PLAN OF CARE
FOCUS/GOAL  Bowel management, Bladder management, Pain management, Mobility, Skin integrity, and Safety management    ASSESSMENT, INTERVENTIONS AND CONTINUING PLAN FOR GOAL:  A/O x4, VSS on RA.  Mild word finding difficulty/ dysarthric continues but able to make needs known.  Up with assist one with ghada steady to the bathroom tonight.  Last BM 2/7.  Regular thin diet, takes his pills well whole, good appetite tonight for dinner.  No c/o pain, no prn meds given. 1700 bg was elevated of 251 but patient had an ensure prior to that check.  Continue with POC.

## 2022-02-10 NOTE — PLAN OF CARE
Skilled set up on :  Pt dependent for proper placement of electrodes on L quads, hamstrings, gastroc, and anterior tibialis for optimum muscle contraction, safe positioning on w/c within frame and cushion for prevention of skin breakdown, feet secured to foot pedals, w/c secured to  w/ Q-straints.  Passive motion assessed to ensure proper positioning.      Pt performed 35 minutes of active FES ergometry with 100% stimulation applied to above muscles at 45rpm with 0.91nm resistance.  This PT adjusted e-stim and cycling parameters in real-time to ensure palpable muscle contractions throughout session.  Please see [email or web address] for further details on patient's stimulation parameters and ergometry outcomes.      Changes in parameters that were part of this treatment:   -None    Functional outcomes from this intervention include:   -improved sensory awareness and proprioception  -improved muscle strength  -improved motor coordination    Session Summary:   -Average Power: 4.0 W  -Asymmetry: Left 14%  -Active Minutes: 30:53

## 2022-02-10 NOTE — PROGRESS NOTES
"  [facility]   Acute Rehabilitation Unit    INTERVAL HISTORY  Patient was seen and examined at bedside this afternoon.  No acute events reported overnight.  Labs reviewed.  Appreciate recommendations from IM team.  Tone in LUE persists, will start low dose baclofen given on clonidine for BP.  Denies chest pain,  Shortness of breath, no fever or chills.    Functional  OT:  ADLs:    Mobility: W/c based. Min SPT R side, Mod A L side. Kaley stedy A x 1 with nursing.    Grooming: Set up supported sitting.    Dressing: UB Mod A. LB Mod A.     Bathing: Max A tub bench.    Toileting: Kaley stedy A x 1 commode, Total A hygiene/clothing.  IADLs: Needs assistance.  Vision/Cognition: L side inattention and L visual field cut. Mild cognitive deficits with executive functioning and attention.       ROS: 10 point ROS neg other than the symptoms noted above in the HPI.      MEDICATIONS  Scheduled meds    acetaminophen  650 mg Oral Q6H     aMILoride  10 mg Oral Daily     aspirin  81 mg Oral Daily     atorvastatin  40 mg Oral QPM     Baclofen  5 mg Oral TID     carvedilol  37.5 mg Oral BID w/meals     chlorthalidone  25 mg Oral Daily     cloNIDine  0.1 mg Oral Daily     diclofenac  2 g Topical 4x Daily     divalproex sodium extended-release  1,000 mg Oral Daily     lidocaine  1 patch Transdermal Q24h    And     lidocaine   Transdermal Q8H     [Held by provider] lisinopril  40 mg Oral Daily     [Held by provider] metFORMIN  500 mg Oral Daily with supper       PRN meds:  bisacodyl, hydrALAZINE, ondansetron, polyethylene glycol, sennosides      PHYSICAL EXAM  /83 (BP Location: Right arm)   Pulse 67   Temp 98.3  F (36.8  C) (Oral)   Resp 16   Ht 1.737 m (5' 8.4\")   Wt 76.3 kg (168 lb 3.2 oz)   SpO2 95%   BMI 25.28 kg/m      Gen: NAD, upright in bed  HEENT: NCAT, L facial weakness  Cardio: RRR - Ziopatch in place   Pulm: non-labored on room air  Abd: soft and non-tender   Ext: no edema in " bilateral lower extremities   Neuro/MSK: left sided neglect with clear gaze preference. Left hemiplegia.   Some increased tone in LUE at pectoralis, biceps, wrist and finger flexors        LABS  CBC RESULTS:   Recent Labs   Lab Test 02/07/22  0721 02/03/22  0754 01/29/22  0629   WBC 7.4 9.0 9.2   RBC 4.31* 4.52 4.53   HGB 12.9* 13.8 13.8   HCT 39.1* 40.8 40.7   MCV 91 90 90   MCH 29.9 30.5 30.5   MCHC 33.0 33.8 33.9   RDW 13.6 13.4 13.4    256 305     Last Basic Metabolic Panel:  Recent Labs   Lab Test 02/10/22  0852 02/10/22  0731 02/09/22  1707 02/07/22  0750 02/07/22  0721 02/05/22  1744 02/05/22  0709     --   --   --  141  --  139   POTASSIUM 3.7  --   --   --  4.2  --  4.1   CHLORIDE 104  --   --   --  108  --  106   CO2 28  --   --   --  28  --  26   ANIONGAP 7  --   --   --  5  --  7   * 138* 251*   < > 134*   < > 120*   BUN 59*  --   --   --  51*  --  47*   CR 2.04*  --   --   --  1.74*  --  1.71*   GFRESTIMATED 40*  --   --   --  48*  --  49*   VALERIE 9.4  --   --   --  9.6  --  9.5    < > = values in this interval not displayed.     Recent Labs   Lab 02/10/22  0852 02/10/22  0731 02/09/22  1707 02/09/22  0809 02/08/22  1708 02/08/22  0810   * 138* 251* 142* 123* 144*       ASSESSMENT AND PLAN  Miriam Hall is a 46 year old R hand dominant male with h/o HTN who was admitted on 1/8 with R PCA ischemic stroke. His hospital course was complicated by concerns for secondary HTN, new diagnosis of DM II, DISHA, hypokalemia and encephalopathy. He clinically improved and transferred to ARU for ongoing medical management and intensive inpatient rehab. He is progressing with therapy, and is still having episodes of HTN. Medicine is following and is helping with BP management as well as DISHA management.        Admission to acute inpatient rehab on 1/28/2022.    Impairment group code: Stroke Ischemic 01.1 (L) Body Involvement (R) Brain: R PCA ischemic stroke w/ hemorrhagic transformation  noted      Rehabilitation - continue comprehensive acute inpatient rehabilitation program with multidisciplinary approach including therapies, rehab nursing, and physiatry following. See interval history for updates.         Medical Conditions  Neuro  Acute right PCA ischemic stroke   R P1 occlusion and R M2 Stenosis  Hemorrhagic transformation of ischemic infarct  -Hypercoagulability panel negative  -vasculitis panel negative  -aspirin 81mg   -HTN, DM and HLD control as below for secondary prevention   -ziopatch in place until 2/10   -acute rehab with PT, OT, and SLP  -per neuro recommendations, continue depakote until outpatient f/u  -should see stroke neurology and PM&R after discharge as well as genetic team per recs   --L arm tone worsening, will monitor with therapy and ROM, will start baclofen 5 mg TID and watch for sedation given on clonidine for BP management.     Possible seizures   EEG 1/12 concerning for subclinical seizures. Loaded with Keppra then transitioned to Depakote.   -continue Depakote  -seizure precautions  -follow-up neurology for ongoing     MSK  L Hip Pain  -reports increased L hip pain at ARU admission  -scheduled Tylenol 650mg q6h  -hip XR on 1/24 does not show evidence of fx  -continue ice, heat, lidocaine patch, voltaren as adjuvants  -ongoing improvements, not interfering with therapy    CV  Refractory HTN  -possibly secondary to clinical hyperaldosteronism of several possible etiologies, nephrology and cardiology consulted but no clear etiology  -Appreciate internal medicine assistance   -Continue amiloride 10mg daily  -Carvedilol 37.5mg bid  -chlorthalidone 25mg daily  -clonidine 0.1mg - dose decreased 2/2 and discontinued 2/4, resumed 2/6 with Cr stabilized  -hydralazine 50mg Q8H - discontinued 2/1  -lisinopril 40mg daily - held due to renal function   -BP generally stable, though 1 elevated recording last evening 150s/80s  -f/u with nephrology and endo teams as outpatient        HLD  -atorvastatin 40mg     Endo  Type 2 Diabetes, new diagnosis this admission  A1c 7.8 1/8  -metformin 500mg with dinner; held since 2/1 due to worsening Cr  -continue to hold metformin in the setting of DISHA  -sliding scale insulin; was discontinued 2/3 as he was not requiring    -BG stable  last 24 hours     Renal  DISHA  -Likely due to hypovolemia and medication induced.  -While inpatient Cr up to peak of 1.56 on 1/27 and then improved to 1.3 on 1/28; however steadily increased up to 2.04  -Hospitalist following, appreciate assistance  -Renal U/S on 1/31 unrevealing  -Per IM discussion with nephrology last 2/7, may represent new baseline  -hydralazine stopped, tapering clonidine, holding ACEi  -other nephrotoxic medications including chlorthalidone, lisinopril  -monitor Cr twice weekly  -f/u with nephrology as outpatient       Hypokalemia, resolved  -20 meq K BID; discontinued 2/1  -K stable WNL  -hyperaldosteronism workup completed while inpatient  -monitor with BMP    2. Adjustment to disability:  Clinical psychology to eval and treat as needed   3. FEN: regular diet   4. Bowel: continent, PRN miralax and senna  5. Bladder: continent   6. DVT Prophylaxis: mechanical with SCDs  7. GI Prophylaxis: none   8. Code: Full  9. Disposition: plan for extended rehab at [facility]  10. ELOS:  21 days  11. Follow up Appointments on Discharge: PCP, neuro, cardio for Zio patch, nephrology, endo, genetics and PM&R           Jean Paul Kohler, DO  Physical Medicine & Rehabilitation        I spent a total of 25 minutes face to face and coordinating care of Miriam Hall. Over 50% of my time on the unit was spent counseling the patient and /or coordinating care regarding post CVA.

## 2022-02-10 NOTE — PROGRESS NOTES
[facility]   Internal Medicine Progress Note    Rehab Day # 13    Assessment & Plan: Miriam Hall is a 46 year old man with a history of HTN who was admitted to [facility] 1/8-1/28/22 for right PCA stroke c/b refractory HTN w/ concern for secondary HTN, DISHA, new dx of DM II, hypokalemia, and encephalopathy. Transferred to ARU for ongoing rehabilitation.     #Acute Right PCA Stroke. Presented w/ left hemiplegia and head CT showing right parieto-occipital infarct and CTA showing occlusion of proximal right PCA and high grade stenosis of M2 branch of right MCA. Was outside window for tPA. No thrombectomy performed d/t location of LVO in PCA. Repeat imaging 1/12 w/ hemorrhagic transformation. Source 2/2 possible intracranial atherosclerosis vs. ESUS. CARISA neg for LA thrombus. Residual deficits include L hemiplegia, L hemianopsia, mild dysarthria.  - Continue ASA, statin.   - Continue empiric Depakote (and seizure precautions) until neuro clinic f/u (had multiple vEEG neg for seizure activity but elevated risk d/t stroke burden and prior prolonged period of encephalopathy).   - BP management as below; goal < 130/80.   - Has Ziopatch through today.     #HTN. Required 6 antihypertensives while inpatient to maintain SBP goal < 180. No clear etiology despite extensive workup and consultations with Cardiology and Nephrology. Discharged on amiloride, carvedilol, chlorthalidone, clonidine, lisinopril, hydralazine, but hydralazine was stopped 2/1 and clonidine 2/4. BP then began to rise and clonidine was restarted 2/6. BPs most recently within goal range.   - Per d/w nephrology, stopping lisinopril d/t DISHA.   - Continue amiloride 10 mg daily, carvedilol 37.5 mg BID, chlorthalidone 25 mg daily, and clonidine 0.1 mg daily. Also has PRN hydralazine but not needing. Anticipate needing to make adjustments to clonidine and/or hydralazine in coming days with stopping lisinopril.   - Needs OP nephrology,  "endocrinology, and genetics follow ups.     #DISHA. Cr 1.3 on hospital admit, best of 0.94 on 1/20, bump on 1/23 back to 1.3, and peak of 1.56 on 1/27 --> 1.3 w/ IV fluid bolus. Here has been 1.7 since 2/1. Was discussed w/ nephrology x2 around that time and attributed to hypotension as patient had some normal but lower than his usual BP readings. No concerning BPs since but Cr hasn't improved - worsened to 2 today. Renal US 1/31 was unrevealing. No e/o retention on prior bladder scans. Does not look fluid responsive w/ U Na 79 (last on 1/31). Suspect ACE-I > diuretics as culprit.   - As above discussed w/ nephrology today and holding lisinopril. Will recheck BMP on Saturday.   - Will need clinic f/u after discharge.     #DM II. New dx during hospital stay w/ HgbA1c 7.8%. Endocrinology saw IP and recommended metformin, which is currently on hold d/t DISHA. BG well controlled except 288 this AM and 251 last evening - question if these may have been post prandial readings.   - Needs diabetic education and meter/supplies before discharge (added to sticky note). Will resume metformin when appropriate.     IM will follow for BP management and DISHA.   Discussed with primary team.     Tena Arzate PA-C  Hospitalist Service  Pager: [phone]  ________________________________________________________________    Subjective & Interval History:  No concerns today. Participating in therapies. No high BPs. Tolerating diet.     Last 24 hour care team notes reviewed.   ROS: 4 point ROS (including Respiratory, CV, GI and ) was performed and negative unless otherwise noted in HPI.     Medications: Reviewed in EPIC.    Physical Exam:    Blood pressure 134/83, pulse 67, temperature 98.3  F (36.8  C), temperature source Oral, resp. rate 16, height 1.737 m (5' 8.4\"), weight 76.3 kg (168 lb 3.2 oz), SpO2 95 %.    GENERAL: Alert and oriented x 3. Sitting up in chair, appears comfortable. Pleasant and conversant.   HEENT: Anicteric sclera. " Mucous membranes moist.   NEUROLOGICAL: Left sided hemiplegia.    EXTREMITIES: No peripheral edema.   SKIN: No jaundice. No rashes.     Lines/Tubes/Drains:  none

## 2022-02-10 NOTE — PLAN OF CARE
"Orientation: Alert and oriented x4.   Bowel: Continent of bowel. LBM 2/7 per pt.  Bladder: Continent of bladder.  Pain: Denies pain. Accepted scheduled Tylenol to \"stay ahead of any pain\".  Ambulation/Transfers: Assist of 1 with Kaley Edwards.  Blood sugars: Bid blood sugars.  Diet/ Liquids: Regular/thin.  Skin: Unremarkable.    Creatinine noted to be 2.04 this shift. Provider aware.    Neeru Kulkarni RN on 2/10/2022 at 1:45 PM    "

## 2022-02-10 NOTE — PLAN OF CARE
"Discharge Planner Post-Acute Rehab SLP:     Discharge Plan: TCU with ongoing SLP    Precautions: Fall, Left-Attention impairment    Current Status:  Communication: Mild dysarthria. Occasional word-finding difficulties in conversation, however pt is fluent and able to communicate wants/needs.  Cognition: At least moderate cognitive-linguistic deficits in the areas of attention, executive function, processing speed, and visuospatial skills.  Swallow: Pt reportedly tolerating a regular diet with thin liquids. Pt discharged from previous hospital with no swallowing concerns.      Assessment: Pt able to recall strategy \"carriage return\" and mildly visual anchor. Pt participated in visual scanning task with visual anchor (red line L side) and additional brackets to aid orientation to L side. Pt required to match city with corresponding state within Fx9. Pt required to utilzie visual supports and strategies to assist in locating all information. Pt with excellent improvement and use of strategies within task leading to overall completion with 100% accuracy and extra time. Pt demonstrating excellent sustained/alternating attention during task. Read paragraph with same visual supports, missed few words on L. Did not IND self correct when reading outloud when sentence did not make sense.         Other Barriers to Discharge (Family Training, etc): TBD    "

## 2022-02-11 ENCOUNTER — APPOINTMENT (OUTPATIENT)
Dept: OCCUPATIONAL THERAPY | Facility: CLINIC | Age: 47
End: 2022-02-11
Attending: PHYSICAL MEDICINE & REHABILITATION
Payer: COMMERCIAL

## 2022-02-11 ENCOUNTER — APPOINTMENT (OUTPATIENT)
Dept: SPEECH THERAPY | Facility: CLINIC | Age: 47
End: 2022-02-11
Attending: PHYSICAL MEDICINE & REHABILITATION
Payer: COMMERCIAL

## 2022-02-11 ENCOUNTER — APPOINTMENT (OUTPATIENT)
Dept: PHYSICAL THERAPY | Facility: CLINIC | Age: 47
End: 2022-02-11
Attending: PHYSICAL MEDICINE & REHABILITATION
Payer: COMMERCIAL

## 2022-02-11 LAB
GLUCOSE BLDC GLUCOMTR-MCNC: 118 MG/DL (ref 70–99)
GLUCOSE BLDC GLUCOMTR-MCNC: 186 MG/DL (ref 70–99)
GLUCOSE BLDC GLUCOMTR-MCNC: 210 MG/DL (ref 70–99)

## 2022-02-11 PROCEDURE — 97129 THER IVNTJ 1ST 15 MIN: CPT | Mod: GN | Performed by: SPEECH-LANGUAGE PATHOLOGIST

## 2022-02-11 PROCEDURE — 99232 SBSQ HOSP IP/OBS MODERATE 35: CPT | Mod: FS | Performed by: PHYSICAL MEDICINE & REHABILITATION

## 2022-02-11 PROCEDURE — 97130 THER IVNTJ EA ADDL 15 MIN: CPT | Mod: GN | Performed by: SPEECH-LANGUAGE PATHOLOGIST

## 2022-02-11 PROCEDURE — 97112 NEUROMUSCULAR REEDUCATION: CPT | Mod: GP

## 2022-02-11 PROCEDURE — 250N000013 HC RX MED GY IP 250 OP 250 PS 637: Performed by: PHYSICAL MEDICINE & REHABILITATION

## 2022-02-11 PROCEDURE — 97530 THERAPEUTIC ACTIVITIES: CPT | Mod: GP | Performed by: REHABILITATION PRACTITIONER

## 2022-02-11 PROCEDURE — 250N000013 HC RX MED GY IP 250 OP 250 PS 637: Performed by: PHYSICIAN ASSISTANT

## 2022-02-11 PROCEDURE — 99207 PR CDG-CUT & PASTE-POTENTIAL IMPACT ON LEVEL: CPT | Performed by: PHYSICIAN ASSISTANT

## 2022-02-11 PROCEDURE — 99233 SBSQ HOSP IP/OBS HIGH 50: CPT | Performed by: PHYSICIAN ASSISTANT

## 2022-02-11 PROCEDURE — 128N000003 HC R&B REHAB

## 2022-02-11 PROCEDURE — 97530 THERAPEUTIC ACTIVITIES: CPT | Mod: GP

## 2022-02-11 PROCEDURE — 97112 NEUROMUSCULAR REEDUCATION: CPT | Mod: GO

## 2022-02-11 PROCEDURE — 97116 GAIT TRAINING THERAPY: CPT | Mod: GP

## 2022-02-11 RX ADMIN — DIVALPROEX SODIUM 1000 MG: 500 TABLET, FILM COATED, EXTENDED RELEASE ORAL at 09:26

## 2022-02-11 RX ADMIN — CHLORTHALIDONE 25 MG: 25 TABLET ORAL at 09:27

## 2022-02-11 RX ADMIN — Medication 5 MG: at 19:39

## 2022-02-11 RX ADMIN — ACETAMINOPHEN 650 MG: 325 TABLET, FILM COATED ORAL at 05:53

## 2022-02-11 RX ADMIN — ATORVASTATIN CALCIUM 40 MG: 40 TABLET, FILM COATED ORAL at 19:40

## 2022-02-11 RX ADMIN — Medication 5 MG: at 09:26

## 2022-02-11 RX ADMIN — ACETAMINOPHEN 650 MG: 325 TABLET, FILM COATED ORAL at 12:16

## 2022-02-11 RX ADMIN — CARVEDILOL 37.5 MG: 25 TABLET, FILM COATED ORAL at 19:39

## 2022-02-11 RX ADMIN — ACETAMINOPHEN 650 MG: 325 TABLET, FILM COATED ORAL at 19:40

## 2022-02-11 RX ADMIN — AMILORIDE HYDROCLORIDE 10 MG: 5 TABLET ORAL at 09:26

## 2022-02-11 RX ADMIN — CLONIDINE HYDROCHLORIDE 0.1 MG: 0.1 TABLET ORAL at 09:26

## 2022-02-11 RX ADMIN — CARVEDILOL 37.5 MG: 25 TABLET, FILM COATED ORAL at 09:26

## 2022-02-11 RX ADMIN — ASPIRIN 81 MG CHEWABLE TABLET 81 MG: 81 TABLET CHEWABLE at 09:26

## 2022-02-11 RX ADMIN — Medication 5 MG: at 14:26

## 2022-02-11 ASSESSMENT — ACTIVITIES OF DAILY LIVING (ADL)
ADLS_ACUITY_SCORE: 16
ADLS_ACUITY_SCORE: 14
ADLS_ACUITY_SCORE: 16
ADLS_ACUITY_SCORE: 14
ADLS_ACUITY_SCORE: 16
ADLS_ACUITY_SCORE: 16
ADLS_ACUITY_SCORE: 14
ADLS_ACUITY_SCORE: 16
ADLS_ACUITY_SCORE: 16
ADLS_ACUITY_SCORE: 14
ADLS_ACUITY_SCORE: 16

## 2022-02-11 NOTE — PLAN OF CARE
FOCUS/GOAL  Bowel management, Bladder management, Pain management, Mobility, Cognition/Memory/Judgment/Problem solving, and Safety management    ASSESSMENT, INTERVENTIONS AND CONTINUING PLAN FOR GOAL:  Pt is alert and oriented. Continent of bladder, but did wake from a dream already voiding x 1 this morning. Reports having a BM 2/10. Denied pain, but took scheduled Tylenol x 1. Turning and repositioning independently in bed. Uses call light appropriately, able to make needs known. Bed alarm on for safety. Will continue with POC.

## 2022-02-11 NOTE — PLAN OF CARE
Discharge Planner Post-Acute Rehab OT:      Discharge Plan: TCU - [facility]     Precautions: L side inattention and hemiparesis, L visual field cut, falls     Current Status:  ADLs:    Mobility: W/c based. Min SPT R side, Mod A L side. Kaley ramsey A x 1 with nursing.    Grooming: Set up supported sitting.    Dressing: UB Mod A. LB Mod A.     Bathing: Max A tub bench.    Toileting: Kaley stedy A x 1 commode, Total A hygiene/clothing.  IADLs: Needs assistance.  Vision/Cognition: L side inattention and L visual field cut. Mild cognitive deficits with executive functioning and attention.     Assessment:Skilled set up on :  Pt dependent for proper placement of electrodes on l u/e for optimum muscle contraction, safe positioning on w/c within frame and cushion for prevention of skin breakdown, UE secured to arm support.  Passive motion assessed to ensure proper positioning.      Pt performed 26minutes of active FES ergometry with 0-100% stimulation applied to above muscles at 28rpm with .05nm resistance.  This OT adjusted e-stim and cycling parameters in real-time to ensure palpable muscle contractions throughout session.  Please see [email or web address] for further details on patient's stimulation parameters and ergometry outcomes.            Functional outcomes from this intervention include:   -reduced spasticity  -improved sensory awareness and proprioception  -improved muscle strength  -improved motor coordination Pt will benefit from TCU placement.    Other Barriers to Discharge (DME, Family Training, etc):   Pt lives alone and caregiver support TBD.  Home set up - 2nd level apartment, stairs.  Level of physical assistance.

## 2022-02-11 NOTE — PLAN OF CARE
Discharge Planner Post-Acute Rehab PT:     Discharge Plan:TCU due to lack of support    Precautions: Fall/alarm, L angelique, L field cut and neglect, L hip pain    Current Status:  Bed Mobility: SBA to min-A  Transfer: Min-A squat/pivot R, mod/max-L  Gait: 20' R munoz rail, L AFO, mod to max-A LLE management and knee block in stance  Stairs: Unable  Balance: Sits supervision EOM, cueing for midline midline, assist to stand due to weakness  PASS 1/29/22: 11/36  PASS 2/7/22: 19/36    Assessment: Continuing to progress L WB ability. Gait initiated at munoz rail today.    Other Barriers to Discharge (DME, Family Training, etc):   Stairs to enter  Level of support  W/C  Further gait DME TBD

## 2022-02-11 NOTE — PLAN OF CARE
VSS. A/Ox's 3. Pt was disoriented to time, thought it was morning at 9pm. Pain rated as 5 when he was getting ready for therapy. Diclofenac gel given for pain control. Tolerated regular diet. Denied any nausea, CP, SOB, lightheadedness or dizziness. Voiding without pain or difficulty. Passing flatus. Resting in bed at this time with call light in reach and able to make needs known.

## 2022-02-11 NOTE — PROGRESS NOTES
[facility]   Internal Medicine Progress Note    Rehab Day # 14    Assessment & Plan: Miriam Hall is a 46 year old man with a history of HTN who was admitted to [facility] 1/8-1/28/22 for right PCA stroke c/b refractory HTN w/ concern for secondary HTN, DISHA, new dx of DM II, hypokalemia, and encephalopathy. Transferred to ARU for ongoing rehabilitation.     #Acute Right PCA Stroke. Presented w/ left hemiplegia and head CT showing right parieto-occipital infarct and CTA showing occlusion of proximal right PCA and high grade stenosis of M2 branch of right MCA. Was outside window for tPA. No thrombectomy performed d/t location of LVO in PCA. Repeat imaging 1/12 w/ hemorrhagic transformation. Source 2/2 possible intracranial atherosclerosis vs. ESUS. CARISA neg for LA thrombus. Residual deficits include L hemiplegia, L hemianopsia, mild dysarthria.  - Continue ASA, statin.   - Continue empiric Depakote (and seizure precautions) until neuro clinic f/u (had multiple vEEG neg for seizure activity but elevated risk d/t stroke burden and prior prolonged period of encephalopathy).   - BP management as below; goal < 130/80.     #HTN. Required 6 antihypertensives while inpatient to maintain SBP goal < 180. No clear etiology despite extensive workup and consultations with Cardiology and Nephrology. Discharged on amiloride, carvedilol, chlorthalidone, clonidine, lisinopril, hydralazine, but hydralazine was stopped 2/1 and clonidine 2/4. BP then began to rise and clonidine was restarted 2/6. BPs most recently within goal range.   - Per d/w nephrology stopping lisinopril d/t DISHA. Anticipate we may see rise in BP starting this afternoon or evening. If so, can increase clonidine from daily to BID to start, and use hydralazine.   - Continue amiloride 10 mg daily, carvedilol 37.5 mg BID, chlorthalidone 25 mg daily, and clonidine 0.1 mg daily. Also has PRN hydralazine but not needing.   - Needs OP nephrology,  "endocrinology, and genetics follow ups.     #DISHA. Cr 1.3 on hospital admit, best of 0.94 on 1/20, bump on 1/23 back to 1.3, and peak of 1.56 on 1/27 --> 1.3 w/ IV fluid bolus. Here has been 1.7 since 2/1. Was discussed w/ nephrology x2 around that time and attributed to hypotension as patient had some normal but lower than his usual BP readings. No concerning BPs since but Cr hasn't improved - worsened to 2.04 on 2/10. Renal US 1/31 was unrevealing. No e/o retention on prior bladder scans. Does not look fluid responsive w/ U Na 79 (last on 1/31). Suspect ACE-I > diuretics as culprit per d/w nephrology last on 2/10.   - Lisinopril has been put on hold. Following BMP tomorrow.   - Will need clinic f/u after discharge.     #DM II. New dx during hospital stay w/ HgbA1c 7.8%. Endocrinology saw IP and recommended metformin, which is currently on hold d/t DISHA. BG well controlled except 288 yesterday AM and 251 the evening prior - question if these may have been post prandial readings.   - Needs diabetic education and meter/supplies before discharge (added to sticky note). Will resume metformin when CrCl allows.     IM will follow for BP management and DISHA.   Discussed with primary team.     Tena Arzate PA-C  Hospitalist Service  Pager: [phone]  ________________________________________________________________    Subjective & Interval History:  No concerns today. Participating in therapies. No high BPs. Tolerating diet.     Last 24 hour care team notes reviewed.   ROS: 4 point ROS (including Respiratory, CV, GI and ) was performed and negative unless otherwise noted in HPI.     Medications: Reviewed in EPIC.    Physical Exam:    Blood pressure 132/63, pulse 73, temperature 98.2  F (36.8  C), temperature source Oral, resp. rate 20, height 1.737 m (5' 8.4\"), weight 76.3 kg (168 lb 3.2 oz), SpO2 95 %.    GENERAL: Alert and oriented x 3. Sitting up in chair, appears comfortable. Pleasant and conversant.   HEENT: " Anicteric sclera. Mucous membranes moist.   NEUROLOGICAL: Left sided hemiplegia.    EXTREMITIES: No peripheral edema.   SKIN: No jaundice. No rashes.     Lines/Tubes/Drains:  none

## 2022-02-11 NOTE — PROGRESS NOTES
"  [facility]   Acute Rehabilitation Unit    INTERVAL HISTORY  Patient was seen and examined at bedside this morning.  No acute events reported overnight.  Today, patient reports to be feeling well.  He has not noted any increased drowsiness/somnolence with addition of baclofen.  He has not noticed change in arm tightness, discussed passive ROM/stretching.  He reports to be hydrating well.  He denies any other concerns or questions at this time.    Functionally, he is currently needing SBA to min A for bed mobility, min A for squat pivot transfers to R, mod to max A to left, mod to max A to ambulate 20' with munoz rail and L AFO.  He needs mod A for full body dressing, set-up for grooming in supported sitting, total A for hygiene and clothing management with toileting.    ROS: 10 point ROS neg other than the symptoms noted above in the HPI.      MEDICATIONS  Scheduled meds    acetaminophen  650 mg Oral Q6H     aMILoride  10 mg Oral Daily     aspirin  81 mg Oral Daily     atorvastatin  40 mg Oral QPM     Baclofen  5 mg Oral TID     carvedilol  37.5 mg Oral BID w/meals     chlorthalidone  25 mg Oral Daily     cloNIDine  0.1 mg Oral Daily     diclofenac  2 g Topical 4x Daily     divalproex sodium extended-release  1,000 mg Oral Daily     lidocaine  1 patch Transdermal Q24h    And     lidocaine   Transdermal Q8H     [Held by provider] lisinopril  40 mg Oral Daily     [Held by provider] metFORMIN  500 mg Oral Daily with supper       PRN meds:  bisacodyl, hydrALAZINE, ondansetron, polyethylene glycol, sennosides      PHYSICAL EXAM  /63 (BP Location: Right arm)   Pulse 73   Temp 98.2  F (36.8  C) (Oral)   Resp 20   Ht 1.737 m (5' 8.4\")   Wt 76.3 kg (168 lb 3.2 oz)   SpO2 95%   BMI 25.28 kg/m    Gen: NAD, upright in chair  HEENT: NCAT, L facial weakness  Cardio: RRR    Pulm: non-labored on room air  Abd: soft and non-tender   Ext: no edema in bilateral lower extremities "   Neuro/MSK: left sided neglect with clear gaze preference. Left hemiplegia.   Some increased tone in LUE at pectoralis, biceps, wrist and finger flexors, improved today.      LABS  CBC RESULTS:   Recent Labs   Lab Test 02/07/22  0721 02/03/22  0754 01/29/22  0629   WBC 7.4 9.0 9.2   RBC 4.31* 4.52 4.53   HGB 12.9* 13.8 13.8   HCT 39.1* 40.8 40.7   MCV 91 90 90   MCH 29.9 30.5 30.5   MCHC 33.0 33.8 33.9   RDW 13.6 13.4 13.4    256 305     Last Basic Metabolic Panel:  Recent Labs   Lab Test 02/11/22  1228 02/10/22  1721 02/10/22  0852 02/07/22  0750 02/07/22  0721 02/05/22  1744 02/05/22  0709   NA  --   --  139  --  141  --  139   POTASSIUM  --   --  3.7  --  4.2  --  4.1   CHLORIDE  --   --  104  --  108  --  106   CO2  --   --  28  --  28  --  26   ANIONGAP  --   --  7  --  5  --  7   * 152* 288*   < > 134*   < > 120*   BUN  --   --  59*  --  51*  --  47*   CR  --   --  2.04*  --  1.74*  --  1.71*   GFRESTIMATED  --   --  40*  --  48*  --  49*   VALERIE  --   --  9.4  --  9.6  --  9.5    < > = values in this interval not displayed.     Recent Labs   Lab 02/11/22  1228 02/10/22  1721 02/10/22  0852 02/10/22  0731 02/09/22  1707 02/09/22  0809   * 152* 288* 138* 251* 142*       ASSESSMENT AND PLAN  Miriam Hall is a 46 year old R hand dominant male with h/o HTN who was admitted on 1/8 with R PCA ischemic stroke. His hospital course was complicated by concerns for secondary HTN, new diagnosis of DM II, DISHA, hypokalemia and encephalopathy. He clinically improved and transferred to ARU for ongoing medical management and intensive inpatient rehab. He is progressing with therapy, and is still having episodes of HTN. Medicine is following and is helping with BP management as well as DISHA management.        Admission to acute inpatient rehab on 1/28/2022.    Impairment group code: Stroke Ischemic 01.1 (L) Body Involvement (R) Brain: R PCA ischemic stroke w/ hemorrhagic transformation  noted      Rehabilitation - continue comprehensive acute inpatient rehabilitation program with multidisciplinary approach including therapies, rehab nursing, and physiatry following. See interval history for updates.         Medical Conditions  Neuro  Acute right PCA ischemic stroke   R P1 occlusion and R M2 Stenosis  Hemorrhagic transformation of ischemic infarct  -Hypercoagulability panel negative  -vasculitis panel negative  -aspirin 81mg   -HTN, DM and HLD control as below for secondary prevention   -ziopatch until 2/10   -acute rehab with PT, OT, and SLP  -per neuro recommendations, continue depakote until outpatient f/u  -should see stroke neurology and PM&R after discharge as well as genetic team per recs   --Noted worsening L arm tone, started baclofen 5 mg TID on 2/10, thus far seems to be tolerating well and improved tone    Possible seizures   EEG 1/12 concerning for subclinical seizures. Loaded with Keppra then transitioned to Depakote.   -continue Depakote  -seizure precautions  -follow-up neurology for ongoing     MSK  L Hip Pain  -reports increased L hip pain at ARU admission  -scheduled Tylenol 650mg q6h  -hip XR on 1/24 does not show evidence of fx  -continue ice, heat, lidocaine patch, voltaren as adjuvants  -ongoing improvements, not interfering with therapy    CV  Refractory HTN  -possibly secondary to clinical hyperaldosteronism of several possible etiologies, nephrology and cardiology consulted but no clear etiology  -Appreciate internal medicine assistance   -Continue amiloride 10mg daily  -Carvedilol 37.5mg bid  -Chlorthalidone 25mg daily  -Clonidine 0.1mg - dose decreased 2/2 and discontinued 2/4, resumed 2/6 with Cr stabilized  -Hydralazine 50mg Q8H - discontinued 2/1  -Lisinopril 40mg daily - held starting 2/10 due to renal function (per hosp/nephro)  -BP generally stable, suspect may see some elevations given holding lisinopril  -F/u with nephrology and endo teams as outpatient        HLD  -Continue atorvastatin 40mg     Endo  Type 2 Diabetes, new diagnosis this admission  A1c 7.8 1/8  -Metformin 500mg with dinner; held since 2/1 due to worsening Cr  -Sliding scale insulin; was discontinued 2/3 as he was not requiring    -BG stable 138-251 last 24 hours, but previously appear to be well-controlled.  Monitor for recurrent elevations.     Renal  DISHA likely due to hypovolemia and medication induced.  While inpatient Cr up to peak of 1.56 on 1/27 and then improved to 1.3 on 1/28; however steadily at ARU and then stabilized in 1.7 range.  Renal U/S on 1/31 unrevealing.  Hydralazine stopped, clonidine held then resumed at lower dose.  Per nephro, thought this may represent new baseline, however then increased up to 2.04 on 2/10.  Hospitalist discussed with nephro, holding lisinopril and repeat labs  -Hospitalist following, appreciate assistance  -Continue holding ACEi.  Repeat BMP tomorrow.  -Other nephrotoxic medications including amiloride, chlorthalidone, clonidine  -Monitor Cr twice weekly or more often as indicated  -F/u with nephrology as outpatient     Hypokalemia, resolved  -20 meq K BID; discontinued 2/1  -K stable WNL  -hyperaldosteronism workup completed while inpatient  -monitor with BMP    2. Adjustment to disability:  Clinical psychology to eval and treat as needed   3. FEN: regular diet   4. Bowel: continent, PRN miralax and senna  5. Bladder: continent   6. DVT Prophylaxis: mechanical with SCDs  7. GI Prophylaxis: none   8. Code: Full  9. Disposition: plan for extended rehab at [facility]  10. ELOS:  21 days  11. Follow up Appointments on Discharge: PCP, neuro, cardio for Zio patch, nephrology, endo, genetics and PM&R         Seen and discussed with Dr. Jean Paul Kohler, PM&R staff physician.  Discussed with JESS Avila PA-C, PA-C  Physical Medicine & Rehabilitation

## 2022-02-11 NOTE — PLAN OF CARE
"/63 (BP Location: Right arm)   Pulse 73   Temp 98.2  F (36.8  C) (Oral)   Resp 20   Ht 1.737 m (5' 8.4\")   Wt 76.3 kg (168 lb 3.2 oz)   SpO2 95%   BMI 25.28 kg/m      Denies pain.  Has scheduled Tylenol and occasionally c/o L hip pain.  Worked w/ therapy - states he ambulated today and was \"blown away\" that he could do that.  Enc elevation of LUE, wears brace at noc and off during the day per patient.  Pulsate mattress on bed.  Reg diet good appetite.  Bm yesterday, daily wt.  VSS.  Creatnine noted.  Staff unable to obtain bg prior to bkfst - noted.  Occasionally incontinent of urine - uses a urinal at bedside.  In good spirits, a/ox4, uses call light appropriately, makes needs known.  Appropriate in conversation and actions.    1245pm - Denies pain. States usually has pain in the left hip.  Scheduled Tylenol eff, has Diclofenac available - declined x2 this shift.  Does not have a lidopatch on, states \"not using that\".  Pleasant, cooperative, no concerns noted.  "

## 2022-02-11 NOTE — PLAN OF CARE
Discharge Planner Post-Acute Rehab SLP:     Discharge Plan: TCU with ongoing SLP    Precautions: Fall, Left-Attention impairment    Current Status:  Communication: Mild dysarthria. Occasional word-finding difficulties in conversation, however pt is fluent and able to communicate wants/needs.  Cognition: At least moderate cognitive-linguistic deficits in the areas of attention, executive function, processing speed, and visuospatial skills.  Swallow: Pt reportedly tolerating a regular diet with thin liquids. Pt discharged from previous hospital with no swallowing concerns.      Assessment: Patient improving independent use of visual scanning strategies (using another piece of paper to scan lines and eliminate distractors), but does require cues during structured and unstructured tasks to look all the way to the left. Planning and problem solving improving as well, however will benefit from continued structured problem solving tasks to increase independence with more complex personal and home management tasks (finances, medications, etc.).        Other Barriers to Discharge (Family Training, etc): TBD

## 2022-02-12 ENCOUNTER — APPOINTMENT (OUTPATIENT)
Dept: SPEECH THERAPY | Facility: CLINIC | Age: 47
End: 2022-02-12
Attending: PHYSICAL MEDICINE & REHABILITATION
Payer: COMMERCIAL

## 2022-02-12 ENCOUNTER — APPOINTMENT (OUTPATIENT)
Dept: OCCUPATIONAL THERAPY | Facility: CLINIC | Age: 47
End: 2022-02-12
Attending: PHYSICAL MEDICINE & REHABILITATION
Payer: COMMERCIAL

## 2022-02-12 ENCOUNTER — APPOINTMENT (OUTPATIENT)
Dept: PHYSICAL THERAPY | Facility: CLINIC | Age: 47
End: 2022-02-12
Attending: PHYSICAL MEDICINE & REHABILITATION
Payer: COMMERCIAL

## 2022-02-12 LAB
ANION GAP SERPL CALCULATED.3IONS-SCNC: 6 MMOL/L (ref 3–14)
BUN SERPL-MCNC: 58 MG/DL (ref 7–30)
CALCIUM SERPL-MCNC: 9.6 MG/DL (ref 8.5–10.1)
CHLORIDE BLD-SCNC: 104 MMOL/L (ref 94–109)
CO2 SERPL-SCNC: 28 MMOL/L (ref 20–32)
CREAT SERPL-MCNC: 1.89 MG/DL (ref 0.66–1.25)
GFR SERPL CREATININE-BSD FRML MDRD: 44 ML/MIN/1.73M2
GLUCOSE BLD-MCNC: 150 MG/DL (ref 70–99)
GLUCOSE BLDC GLUCOMTR-MCNC: 136 MG/DL (ref 70–99)
GLUCOSE BLDC GLUCOMTR-MCNC: 162 MG/DL (ref 70–99)
GLUCOSE BLDC GLUCOMTR-MCNC: 199 MG/DL (ref 70–99)
HOLD SPECIMEN: NORMAL
POTASSIUM BLD-SCNC: 4.1 MMOL/L (ref 3.4–5.3)
SODIUM SERPL-SCNC: 138 MMOL/L (ref 133–144)

## 2022-02-12 PROCEDURE — 36415 COLL VENOUS BLD VENIPUNCTURE: CPT | Performed by: PHYSICIAN ASSISTANT

## 2022-02-12 PROCEDURE — 99207 PR CDG-CUT & PASTE-POTENTIAL IMPACT ON LEVEL: CPT | Performed by: PHYSICIAN ASSISTANT

## 2022-02-12 PROCEDURE — 97112 NEUROMUSCULAR REEDUCATION: CPT | Mod: GO

## 2022-02-12 PROCEDURE — 97530 THERAPEUTIC ACTIVITIES: CPT | Mod: GP | Performed by: PHYSICAL THERAPIST

## 2022-02-12 PROCEDURE — 250N000013 HC RX MED GY IP 250 OP 250 PS 637: Performed by: PHYSICIAN ASSISTANT

## 2022-02-12 PROCEDURE — 250N000013 HC RX MED GY IP 250 OP 250 PS 637: Performed by: PHYSICAL MEDICINE & REHABILITATION

## 2022-02-12 PROCEDURE — 97130 THER IVNTJ EA ADDL 15 MIN: CPT | Mod: GN | Performed by: REHABILITATION PRACTITIONER

## 2022-02-12 PROCEDURE — 80048 BASIC METABOLIC PNL TOTAL CA: CPT | Performed by: PHYSICIAN ASSISTANT

## 2022-02-12 PROCEDURE — 128N000003 HC R&B REHAB

## 2022-02-12 PROCEDURE — 97112 NEUROMUSCULAR REEDUCATION: CPT | Mod: GP | Performed by: PHYSICAL THERAPIST

## 2022-02-12 PROCEDURE — 99233 SBSQ HOSP IP/OBS HIGH 50: CPT | Performed by: PHYSICIAN ASSISTANT

## 2022-02-12 PROCEDURE — 97129 THER IVNTJ 1ST 15 MIN: CPT | Mod: GN | Performed by: REHABILITATION PRACTITIONER

## 2022-02-12 PROCEDURE — 97535 SELF CARE MNGMENT TRAINING: CPT | Mod: GO

## 2022-02-12 RX ORDER — CLONIDINE HYDROCHLORIDE 0.1 MG/1
0.1 TABLET ORAL 2 TIMES DAILY
Status: DISCONTINUED | OUTPATIENT
Start: 2022-02-12 | End: 2022-02-15

## 2022-02-12 RX ADMIN — ASPIRIN 81 MG CHEWABLE TABLET 81 MG: 81 TABLET CHEWABLE at 08:16

## 2022-02-12 RX ADMIN — CARVEDILOL 37.5 MG: 25 TABLET, FILM COATED ORAL at 08:16

## 2022-02-12 RX ADMIN — AMILORIDE HYDROCLORIDE 10 MG: 5 TABLET ORAL at 08:16

## 2022-02-12 RX ADMIN — ACETAMINOPHEN 650 MG: 325 TABLET, FILM COATED ORAL at 17:29

## 2022-02-12 RX ADMIN — Medication 5 MG: at 08:16

## 2022-02-12 RX ADMIN — ACETAMINOPHEN 650 MG: 325 TABLET, FILM COATED ORAL at 06:34

## 2022-02-12 RX ADMIN — CARVEDILOL 37.5 MG: 25 TABLET, FILM COATED ORAL at 17:28

## 2022-02-12 RX ADMIN — CLONIDINE HYDROCHLORIDE 0.1 MG: 0.1 TABLET ORAL at 08:36

## 2022-02-12 RX ADMIN — DICLOFENAC 2 G: 10 GEL TOPICAL at 08:21

## 2022-02-12 RX ADMIN — DIVALPROEX SODIUM 1000 MG: 500 TABLET, FILM COATED, EXTENDED RELEASE ORAL at 08:18

## 2022-02-12 RX ADMIN — Medication 5 MG: at 14:10

## 2022-02-12 RX ADMIN — CLONIDINE HYDROCHLORIDE 0.1 MG: 0.1 TABLET ORAL at 20:45

## 2022-02-12 RX ADMIN — CHLORTHALIDONE 25 MG: 25 TABLET ORAL at 08:18

## 2022-02-12 RX ADMIN — ATORVASTATIN CALCIUM 40 MG: 40 TABLET, FILM COATED ORAL at 20:45

## 2022-02-12 RX ADMIN — Medication 5 MG: at 20:45

## 2022-02-12 ASSESSMENT — ACTIVITIES OF DAILY LIVING (ADL)
ADLS_ACUITY_SCORE: 18
ADLS_ACUITY_SCORE: 20
ADLS_ACUITY_SCORE: 18
ADLS_ACUITY_SCORE: 20
ADLS_ACUITY_SCORE: 16
ADLS_ACUITY_SCORE: 18
ADLS_ACUITY_SCORE: 20
ADLS_ACUITY_SCORE: 18
ADLS_ACUITY_SCORE: 16
ADLS_ACUITY_SCORE: 18
ADLS_ACUITY_SCORE: 16
ADLS_ACUITY_SCORE: 18
ADLS_ACUITY_SCORE: 20
ADLS_ACUITY_SCORE: 18
ADLS_ACUITY_SCORE: 18
ADLS_ACUITY_SCORE: 16

## 2022-02-12 NOTE — PROGRESS NOTES
[facility]   Internal Medicine Progress Note    Rehab Day # 15    Assessment & Plan: Miriam Hall is a 46 year old man with a history of HTN who was admitted to [facility] 1/8-1/28/22 for right PCA stroke c/b refractory HTN w/ concern for secondary HTN, DISHA, new dx of DM II, hypokalemia, and encephalopathy. Transferred to ARU for ongoing rehabilitation.     #Acute Right PCA Stroke. Presented w/ left hemiplegia and head CT showing right parieto-occipital infarct and CTA showing occlusion of proximal right PCA and high grade stenosis of M2 branch of right MCA. Was outside window for tPA. No thrombectomy performed d/t location of LVO in PCA. Repeat imaging 1/12 w/ hemorrhagic transformation. Source 2/2 possible intracranial atherosclerosis vs. ESUS. CARISA neg for LA thrombus. Residual deficits include L hemiplegia, L hemianopsia, mild dysarthria.  - Continue ASA, statin.   - Continue empiric Depakote (and seizure precautions) until neuro clinic f/u (had multiple vEEG neg for seizure activity but elevated risk d/t stroke burden and prior prolonged period of encephalopathy).   - BP management as below; goal < 130/80.     #HTN. Required 6 antihypertensives while inpatient to maintain SBP goal < 180. No clear etiology despite extensive workup and consultations with Cardiology and Nephrology. Discharged on amiloride, carvedilol, chlorthalidone, clonidine, lisinopril, hydralazine, but hydralazine was stopped 2/1 and clonidine 2/4. BP then began to rise and clonidine was restarted 2/6. BPs were very well controlled since, but now starting to see rise w/ last dose of lisinopril (holding for DISHA) on 2/10. /99 last night -->136/89 without intervention and this /96-->159/88 before AM meds.   - Increased clonidine from 0.1 mg daily to BID. Anticipate needing to schedule hydralazine again as well, but awaiting recheck of BP after AM meds.   - Continue amiloride 10 mg daily, carvedilol 37.5 mg  "BID, chlorthalidone 25 mg daily.  - Needs OP nephrology, endocrinology, and genetics follow ups.     #DISHA. Cr 1.3 on hospital admit, best of 0.94 on 1/20, bump on 1/23 back to 1.3, and peak of 1.56 on 1/27 --> 1.3 w/ IV fluid bolus. Here has been 1.7 since 2/1. Was discussed w/ nephrology x2 around that time and attributed to hypotension as patient had some normal but lower than his usual BP readings. No concerning BPs since but Cr hasn't improved - worsened to 2.04 on 2/10. Renal US 1/31 was unrevealing. No e/o retention on prior bladder scans. Does not look fluid responsive w/ U Na 79 (last on 1/31). Suspect ACE-I > diuretics as culprit per d/w nephrology last on 2/10. Lisinopril was stopped and today's Cr has improved a bit to 1.89.   - Lisinopril to remain on hold. Next BMP Monday.   - Will need clinic f/u after discharge.     #DM II. New dx during hospital stay w/ HgbA1c 7.8%. Endocrinology saw IP and recommended metformin, which is currently on hold d/t DISHA. BG well controlled.   - Needs diabetic education and meter/supplies before discharge (added to sticky note). Will resume metformin when CrCl allows.     IM will follow for BP management and DISHA.     Tena Arzate PA-C  Hospitalist Service  Pager: [phone]  ________________________________________________________________    Subjective & Interval History:  No concerns today. Participating in therapy. /99 last night -->136/89 without intervention and this /96-->159/88 before AM meds.     Last 24 hour care team notes reviewed.   ROS: 4 point ROS (including Respiratory, CV, GI and ) was performed and negative unless otherwise noted in HPI.     Medications: Reviewed in EPIC.    Physical Exam:    Blood pressure (!) 159/88, pulse 68, temperature 97.5  F (36.4  C), temperature source Oral, resp. rate 20, height 1.737 m (5' 8.4\"), weight 76.3 kg (168 lb 3.2 oz), SpO2 97 %.    GENERAL: Alert and oriented x 3. Sitting at bedside working with OT, " appears comfortable. Pleasant and conversant.   HEENT: Anicteric sclera. Mucous membranes moist.   NEUROLOGICAL: Left sided hemiplegia.    EXTREMITIES: No peripheral edema.   SKIN: No jaundice. No rashes.     Lines/Tubes/Drains:  none

## 2022-02-12 NOTE — PLAN OF CARE
"FOCUS/GOAL  Mobility    ASSESSMENT, INTERVENTIONS AND CONTINUING PLAN FOR GOAL:  Pt A/O, using call light appropriately. Up with 1-2 assist/pivot with gaitbelt to w/c. Denied pain this shift. Offered lidocaine patch/diclofenac gel, pt declined stating \"I'll take them before therapies, but I don't need them in the afternoon\". Voided in toilet, urinal withno BM today. LS hemiparesis, purposeful movement RS. Pleasant and had visitor in the evening. No other concerns, continue with current POC.    Care Shift [id number]      "

## 2022-02-12 NOTE — PLAN OF CARE
Discharge Planner Post-Acute Rehab SLP:      Discharge Plan: TCU with ongoing SLP     Precautions: Fall, Left-Attention impairment     Current Status:  Communication: Mild dysarthria. Occasional word-finding difficulties in conversation, however pt is fluent and able to communicate wants/needs.  Cognition: At least moderate cognitive-linguistic deficits in the areas of attention, executive function, processing speed, and visuospatial skills.  Swallow: Pt reportedly tolerating a regular diet with thin liquids. Pt discharged from previous hospital with no swallowing concerns.        Assessment: Pt seen for cognitive communication tx. Targeted use of visual scanning strategies to complete moderate level planning/problem solving task. Patient missed most items on L side of page despite use of visual anchoring technique. His approach to task was disorganized and he was unable to identify errors. Pt requested another copy of task to attempt on his own- provided task per pt request.        Other Barriers to Discharge (Family Training, etc): TBD

## 2022-02-12 NOTE — PLAN OF CARE
Discharge Planner Post-Acute Rehab OT:      Discharge Plan: TRINA Farooq     Precautions: L side inattention and hemiparesis, L visual field cut, falls     Current Status:  ADLs:    Mobility: W/c based. Min SPT R side, Mod A L side. Kaley ramsey A x 1 with nursing.    Grooming: Set up supported sitting.    Dressing: UB Mod A. LB Mod A.     Bathing: Max A tub bench.    Toileting: Kaley stedy A x 1 commode, Total A hygiene/clothing.  IADLs: Needs assistance.  Vision/Cognition: L side inattention and L visual field cut. Mild cognitive deficits with executive functioning and attention.     Assessment: Pt was  from wait list in later AM session. Pt's tsfer status did not change and pt not able to report which side was easier tsfer. LUE neuro-redu in supine/long sitting and pt demo'd activity in deltoids, trace in bicep and active finger flexion in hand. Pt was tearful during episode of being able to accomplishing grasping/hold his nutrition drink/mouthwash in his L hand as a stabilizer and being able to open the top using his R hand. Hand limited by active finger extension requiring dep A to open. Pt edu on increasing use with LUE and using vision to increase during progressing active/passive/ hold/relax exercises/ROM. Pt was receptive to education and assess for carry over in next sessions.      Cont. To progress L UE neuro-re-edu, using L hand as functional grasping/stabilizer during B hand function/ADL tasks, progressing estim/FES and PROM-AAROM in LUE.    Other Barriers to Discharge (DME, Family Training, etc):   Pt lives alone and caregiver support TBD.  Home set up - 2nd level apartment, stairs.  Level of physical assistance.

## 2022-02-12 NOTE — PLAN OF CARE
"  VS: BP (!) 159/88   Pulse 68   Temp 97.5  F (36.4  C) (Oral)   Resp 20   Ht 1.737 m (5' 8.4\")   Wt 76.3 kg (168 lb 3.2 oz)   SpO2 97%   BMI 25.28 kg/m       O2: 97% on RA, denies SOB    Output: Voiding without difficulty using urinal at bedside    Last BM: 2/10/2022   Activity: Assist of 2 stand to pivot to wheelchair    Skin: Intact, no skin problems noted    Pain: Rated 7 out of 10 in left hip, voltaren gel applied    CMS: A&Ox4, denies NV, no numbness or tingling present    Dressing: R hand brace    Diet: Regular diet, whole pills, thin liquids. BG check twice a day; AM    LDA: None    Equipment: Personal belongings and walker at bedside    Plan: Continue per plan of care    Additional Info:       Federica Aviles RN     "

## 2022-02-12 NOTE — PLAN OF CARE
Discharge Planner Post-Acute Rehab PT:     Discharge Plan:TCU due to lack of support    Precautions: Fall/alarm, L angelique, L field cut and neglect, L hip pain    Current Status:  Bed Mobility: SBA to min-A  Transfer: Min-A squat/pivot R, mod/max-L  Gait: 20' R munoz rail, L AFO, mod to max-A LLE management and knee block in stance  Stairs: Unable  Balance: Sits supervision EOM, cueing for midline midline, assist to stand due to weakness  PASS 1/29/22: 11/36  PASS 2/7/22: 19/36    Assessment: Pt very emotional today during PT session as pt gains more function and awareness on L side.  Pt to continue to build L sided function toward functional goals.    Other Barriers to Discharge (DME, Family Training, etc):   Stairs to enter  Level of support  W/C  Further gait DME TBD

## 2022-02-13 ENCOUNTER — APPOINTMENT (OUTPATIENT)
Dept: PHYSICAL THERAPY | Facility: CLINIC | Age: 47
End: 2022-02-13
Attending: PHYSICAL MEDICINE & REHABILITATION
Payer: COMMERCIAL

## 2022-02-13 ENCOUNTER — APPOINTMENT (OUTPATIENT)
Dept: OCCUPATIONAL THERAPY | Facility: CLINIC | Age: 47
End: 2022-02-13
Attending: PHYSICAL MEDICINE & REHABILITATION
Payer: COMMERCIAL

## 2022-02-13 ENCOUNTER — APPOINTMENT (OUTPATIENT)
Dept: SPEECH THERAPY | Facility: CLINIC | Age: 47
End: 2022-02-13
Attending: PHYSICAL MEDICINE & REHABILITATION
Payer: COMMERCIAL

## 2022-02-13 LAB
GLUCOSE BLDC GLUCOMTR-MCNC: 141 MG/DL (ref 70–99)
GLUCOSE BLDC GLUCOMTR-MCNC: 168 MG/DL (ref 70–99)
GLUCOSE BLDC GLUCOMTR-MCNC: 242 MG/DL (ref 70–99)
SARS-COV-2 RNA RESP QL NAA+PROBE: NEGATIVE

## 2022-02-13 PROCEDURE — 99232 SBSQ HOSP IP/OBS MODERATE 35: CPT | Performed by: PHYSICAL MEDICINE & REHABILITATION

## 2022-02-13 PROCEDURE — 250N000013 HC RX MED GY IP 250 OP 250 PS 637: Performed by: PHYSICIAN ASSISTANT

## 2022-02-13 PROCEDURE — 97130 THER IVNTJ EA ADDL 15 MIN: CPT | Mod: GN | Performed by: REHABILITATION PRACTITIONER

## 2022-02-13 PROCEDURE — 99207 PR CDG-CUT & PASTE-POTENTIAL IMPACT ON LEVEL: CPT | Performed by: PHYSICIAN ASSISTANT

## 2022-02-13 PROCEDURE — 97535 SELF CARE MNGMENT TRAINING: CPT | Mod: GO

## 2022-02-13 PROCEDURE — 99233 SBSQ HOSP IP/OBS HIGH 50: CPT | Performed by: PHYSICIAN ASSISTANT

## 2022-02-13 PROCEDURE — 97110 THERAPEUTIC EXERCISES: CPT | Mod: GO

## 2022-02-13 PROCEDURE — 97129 THER IVNTJ 1ST 15 MIN: CPT | Mod: GN | Performed by: REHABILITATION PRACTITIONER

## 2022-02-13 PROCEDURE — 250N000013 HC RX MED GY IP 250 OP 250 PS 637: Performed by: PHYSICAL MEDICINE & REHABILITATION

## 2022-02-13 PROCEDURE — 128N000003 HC R&B REHAB

## 2022-02-13 PROCEDURE — U0003 INFECTIOUS AGENT DETECTION BY NUCLEIC ACID (DNA OR RNA); SEVERE ACUTE RESPIRATORY SYNDROME CORONAVIRUS 2 (SARS-COV-2) (CORONAVIRUS DISEASE [COVID-19]), AMPLIFIED PROBE TECHNIQUE, MAKING USE OF HIGH THROUGHPUT TECHNOLOGIES AS DESCRIBED BY CMS-2020-01-R: HCPCS | Performed by: PHYSICAL MEDICINE & REHABILITATION

## 2022-02-13 PROCEDURE — 97112 NEUROMUSCULAR REEDUCATION: CPT | Mod: GP

## 2022-02-13 RX ADMIN — CLONIDINE HYDROCHLORIDE 0.1 MG: 0.1 TABLET ORAL at 20:51

## 2022-02-13 RX ADMIN — CARVEDILOL 37.5 MG: 25 TABLET, FILM COATED ORAL at 08:37

## 2022-02-13 RX ADMIN — ASPIRIN 81 MG CHEWABLE TABLET 81 MG: 81 TABLET CHEWABLE at 08:37

## 2022-02-13 RX ADMIN — CLONIDINE HYDROCHLORIDE 0.1 MG: 0.1 TABLET ORAL at 08:38

## 2022-02-13 RX ADMIN — CARVEDILOL 37.5 MG: 25 TABLET, FILM COATED ORAL at 19:31

## 2022-02-13 RX ADMIN — ATORVASTATIN CALCIUM 40 MG: 40 TABLET, FILM COATED ORAL at 19:32

## 2022-02-13 RX ADMIN — Medication 5 MG: at 14:16

## 2022-02-13 RX ADMIN — ACETAMINOPHEN 650 MG: 325 TABLET, FILM COATED ORAL at 19:31

## 2022-02-13 RX ADMIN — AMILORIDE HYDROCLORIDE 10 MG: 5 TABLET ORAL at 08:38

## 2022-02-13 RX ADMIN — Medication 5 MG: at 08:37

## 2022-02-13 RX ADMIN — Medication 5 MG: at 19:32

## 2022-02-13 RX ADMIN — ACETAMINOPHEN 650 MG: 325 TABLET, FILM COATED ORAL at 23:28

## 2022-02-13 RX ADMIN — ACETAMINOPHEN 650 MG: 325 TABLET, FILM COATED ORAL at 05:12

## 2022-02-13 RX ADMIN — DICLOFENAC 2 G: 10 GEL TOPICAL at 08:37

## 2022-02-13 RX ADMIN — CHLORTHALIDONE 25 MG: 25 TABLET ORAL at 08:38

## 2022-02-13 RX ADMIN — DIVALPROEX SODIUM 1000 MG: 500 TABLET, FILM COATED, EXTENDED RELEASE ORAL at 08:38

## 2022-02-13 ASSESSMENT — ACTIVITIES OF DAILY LIVING (ADL)
ADLS_ACUITY_SCORE: 16
ADLS_ACUITY_SCORE: 18
ADLS_ACUITY_SCORE: 16
ADLS_ACUITY_SCORE: 16
ADLS_ACUITY_SCORE: 18
ADLS_ACUITY_SCORE: 16
ADLS_ACUITY_SCORE: 16
ADLS_ACUITY_SCORE: 18
ADLS_ACUITY_SCORE: 18
ADLS_ACUITY_SCORE: 16
ADLS_ACUITY_SCORE: 18
ADLS_ACUITY_SCORE: 16
ADLS_ACUITY_SCORE: 16
ADLS_ACUITY_SCORE: 18
ADLS_ACUITY_SCORE: 18
ADLS_ACUITY_SCORE: 16
ADLS_ACUITY_SCORE: 16

## 2022-02-13 NOTE — PROGRESS NOTES
"  [facility]   Acute Rehabilitation Unit    INTERVAL HISTORY  Patient was seen and examined at bedside this morning.  No acute events reported overnight.  States hand seemed looser this AM.  Denies chest pain, shortness of breath, no fever or chills.  Discussed stroke recovery prognosis and importance of continues therapy as well as ROM of L side for tone management.  Labs reviewed, slight improvement in renal function, appreciate support from IM Team.     Functional  PT:  Bed Mobility: SBA to min-A  Transfer: Min-A squat/pivot R, mod/max-L  Gait: 20' R munoz rail, L AFO, mod to max-A LLE management and knee block in stance  Stairs: Unable  Balance: Sits supervision EOM, cueing for midline midline, assist to stand due to weakness  PASS 1/29/22: 11/36  PASS 2/7/22: 19/36        ROS: 10 point ROS neg other than the symptoms noted above in the HPI.      MEDICATIONS  Scheduled meds    acetaminophen  650 mg Oral Q6H     aMILoride  10 mg Oral Daily     aspirin  81 mg Oral Daily     atorvastatin  40 mg Oral QPM     Baclofen  5 mg Oral TID     carvedilol  37.5 mg Oral BID w/meals     chlorthalidone  25 mg Oral Daily     cloNIDine  0.1 mg Oral BID     diclofenac  2 g Topical 4x Daily     divalproex sodium extended-release  1,000 mg Oral Daily     lidocaine  1 patch Transdermal Q24h    And     lidocaine   Transdermal Q8H     [Held by provider] lisinopril  40 mg Oral Daily     [Held by provider] metFORMIN  500 mg Oral Daily with supper       PRN meds:  bisacodyl, hydrALAZINE, ondansetron, polyethylene glycol, sennosides      PHYSICAL EXAM  /75 (BP Location: Right arm)   Pulse 70   Temp 97.5  F (36.4  C) (Oral)   Resp 16   Ht 1.737 m (5' 8.4\")   Wt 76.3 kg (168 lb 3.2 oz)   SpO2 97%   BMI 25.28 kg/m    Gen: NAD, resting in bed   HEENT: NCAT, L facial weakness  Cardio: RRR, 2+ radial pulse     Pulm: non-labored on room air, symmetrical chest rise   Abd: soft and non-tender   Ext: " no edema in bilateral lower extremities, no calf tenderness    Neuro/MSK: left sided neglect with clear gaze preference. Left hemiparesis.   Some increased tone in LUE at pectoralis, biceps, wrist and finger flexors - hemiparetic flexion positioning.       LABS  CBC RESULTS:   Recent Labs   Lab Test 02/07/22 0721 02/03/22  0754 01/29/22  0629   WBC 7.4 9.0 9.2   RBC 4.31* 4.52 4.53   HGB 12.9* 13.8 13.8   HCT 39.1* 40.8 40.7   MCV 91 90 90   MCH 29.9 30.5 30.5   MCHC 33.0 33.8 33.9   RDW 13.6 13.4 13.4    256 305     Last Basic Metabolic Panel:  Recent Labs   Lab Test 02/13/22 0727 02/12/22 2117 02/12/22 1721 02/12/22 0743 02/12/22 0548 02/10/22  1721 02/10/22  0852 02/07/22  0750 02/07/22  0721   NA  --   --   --   --  138  --  139  --  141   POTASSIUM  --   --   --   --  4.1  --  3.7  --  4.2   CHLORIDE  --   --   --   --  104  --  104  --  108   CO2  --   --   --   --  28  --  28  --  28   ANIONGAP  --   --   --   --  6  --  7  --  5   * 199* 162*   < > 150*   < > 288*   < > 134*   BUN  --   --   --   --  58*  --  59*  --  51*   CR  --   --   --   --  1.89*  --  2.04*  --  1.74*   GFRESTIMATED  --   --   --   --  44*  --  40*  --  48*   VALERIE  --   --   --   --  9.6  --  9.4  --  9.6    < > = values in this interval not displayed.     Recent Labs   Lab 02/13/22 0727 02/12/22 2117 02/12/22 1721 02/12/22 0743 02/12/22 0548 02/11/22 2117   * 199* 162* 136* 150* 118*       ASSESSMENT AND PLAN  Miriam Hall is a 46 year old R hand dominant male with h/o HTN who was admitted on 1/8 with R PCA ischemic stroke. His hospital course was complicated by concerns for secondary HTN, new diagnosis of DM II, DISHA, hypokalemia and encephalopathy. He clinically improved and transferred to ARU for ongoing medical management and intensive inpatient rehab. He is progressing with therapy, and is still having episodes of HTN. Medicine is following and is helping with BP management as well as DISHA  management.        Admission to acute inpatient rehab on 1/28/2022.    Impairment group code: Stroke Ischemic 01.1 (L) Body Involvement (R) Brain: R PCA ischemic stroke w/ hemorrhagic transformation noted      Rehabilitation - continue comprehensive acute inpatient rehabilitation program with multidisciplinary approach including therapies, rehab nursing, and physiatry following. See interval history for updates.         Medical Conditions  Neuro  Acute right PCA ischemic stroke   R P1 occlusion and R M2 Stenosis  Hemorrhagic transformation of ischemic infarct  -Hypercoagulability panel negative  -vasculitis panel negative  -aspirin 81mg   -HTN, DM and HLD control as below for secondary prevention   -ziopatch until 2/10   -acute rehab with PT, OT, and SLP  -per neuro recommendations, continue depakote until outpatient f/u  -should see stroke neurology and PM&R after discharge as well as genetic team per recs   --Noted worsening L arm tone, started baclofen 5 mg TID on 2/10, thus far seems to be tolerating well and improved tone, states ROM in AM improving, but still limited functional gains on the L.     Possible seizures   EEG 1/12 concerning for subclinical seizures. Loaded with Keppra then transitioned to Depakote.   -continue Depakote  -seizure precautions  -follow-up neurology for ongoing     MSK  L Hip Pain  -reports increased L hip pain at ARU admission  -scheduled Tylenol 650mg q6h  -hip XR on 1/24 does not show evidence of fx  -continue ice, heat, lidocaine patch, voltaren as adjuvants  -ongoing improvements, not interfering with therapy    CV  Refractory HTN  -possibly secondary to clinical hyperaldosteronism of several possible etiologies, nephrology and cardiology consulted but no clear etiology  -Appreciate internal medicine assistance   -Continue amiloride 10mg daily  -Carvedilol 37.5mg bid  -Chlorthalidone 25mg daily  -Clonidine 0.1mg - dose decreased 2/2 and discontinued 2/4, resumed 2/6 with Cr  stabilized  -Hydralazine 50mg Q8H - discontinued 2/1  -Lisinopril 40mg daily - held starting 2/10 due to renal function (per hosp/nephro)  -BP generally stable, suspect may see some elevations given holding lisinopril  -F/u with nephrology and endo teams as outpatient       HLD  -Continue atorvastatin 40mg     Endo  Type 2 Diabetes, new diagnosis this admission  A1c 7.8 1/8  -Metformin 500mg with dinner; held since 2/1 due to worsening Cr  -Sliding scale insulin; was discontinued 2/3 as he was not requiring    -BG stable 136-199 last 24 hours, but previously appear to be well-controlled.  Monitor for recurrent elevations.     Renal  DISHA likely due to hypovolemia and medication induced.  While inpatient Cr up to peak of 1.56 on 1/27 and then improved to 1.3 on 1/28; however steadily at ARU and then stabilized in 1.7 range.  Renal U/S on 1/31 unrevealing.  Hydralazine stopped, clonidine held then resumed at lower dose.  Per nephro, thought this may represent new baseline, however then increased up to 2.04 on 2/10.  Hospitalist discussed with nephro, holding lisinopril and repeat labs  -Hospitalist following, appreciate assistance  -Continue holding ACEi.  Repeat BMP shows GFR 44  -Other nephrotoxic medications including amiloride, chlorthalidone, clonidine  -Monitor Cr twice weekly or more often as indicated  -F/u with nephrology as outpatient     Hypokalemia, resolved  -20 meq K BID; discontinued 2/1  -K stable WNL  -hyperaldosteronism workup completed while inpatient  -monitor with BMP    2. Adjustment to disability:  Clinical psychology to eval and treat as needed   3. FEN: regular diet   4. Bowel: continent, PRN miralax and senna  5. Bladder: continent   6. DVT Prophylaxis: mechanical with SCDs  7. GI Prophylaxis: none   8. Code: Full  9. Disposition: plan for extended rehab at [facility]  10. ELOS:  21 days  11. Follow up Appointments on Discharge: PCP, neuro, cardio for Zio patch, nephrology, endo, genetics and PM&R            Jean Paul Kohler, DO  Physical Medicine & Rehabilitation        I spent a total of 25 minutes face to face and coordinating care of Miriam Hall. Over 50% of my time on the unit was spent counseling the patient and /or coordinating care regarding post CVA.

## 2022-02-13 NOTE — PLAN OF CARE
Status Note  [id number]    Patient is A&Ox4, able to make needs known, using call light appropriately, stands and pivots to and from .  Patient refused lidocaine patch and voltaran gel this shift, states pain is well controlled at this time.  No acute events this shift, continue with POC.

## 2022-02-13 NOTE — PROGRESS NOTES
[facility]   Internal Medicine Progress Note    Rehab Day # 16    Assessment & Plan: Miriam Hall is a 46 year old man with a history of HTN who was admitted to [facility] 1/8-1/28/22 for right PCA stroke c/b refractory HTN w/ concern for secondary HTN, DISHA, new dx of DM II, hypokalemia, and encephalopathy. Transferred to ARU for ongoing rehabilitation.     #Acute Right PCA Stroke. Presented w/ left hemiplegia and head CT showing right parieto-occipital infarct and CTA showing occlusion of proximal right PCA and high grade stenosis of M2 branch of right MCA. Was outside window for tPA. No thrombectomy performed d/t location of LVO in PCA. Repeat imaging 1/12 w/ hemorrhagic transformation. Source 2/2 possible intracranial atherosclerosis vs. ESUS. CARISA neg for LA thrombus. Residual deficits include L hemiplegia, L hemianopsia, mild dysarthria.  - Continue ASA, statin.   - Continue empiric Depakote (and seizure precautions) until neuro clinic f/u (had multiple vEEG neg for seizure activity but elevated risk d/t stroke burden and prior prolonged period of encephalopathy).   - BP management as below; goal < 130/80.     #HTN. Required 6 antihypertensives while inpatient to maintain SBP goal < 180. No clear etiology despite extensive workup and consultations with Cardiology and Nephrology. Discharged on amiloride, carvedilol, chlorthalidone, clonidine, lisinopril, hydralazine, but hydralazine was stopped 2/1 and clonidine 2/4. BP then began to rise and clonidine was restarted 2/6. BPs were very well controlled since. Lisinopril stopped d/t DISHA on 2/11. Have seen some intermittent BP elevations since but have down trended w/o intervention. This AM well controlled.   - Continue amiloride 10 mg daily, carvedilol 37.5 mg BID, chlorthalidone 25 mg daily, clonidine 0.1 mg BID. Will have low threshold to restart scheduled hydralazine if BP up trending off lisinopril.   - Needs OP nephrology,  "endocrinology, and genetics follow ups.     #DISHA. Cr 1.3 on hospital admit, best of 0.94 on 1/20, bump on 1/23 back to 1.3, and peak of 1.56 on 1/27 --> 1.3 w/ IV fluid bolus. Here has been 1.7 since 2/1. Was discussed w/ nephrology x2 around that time and attributed to hypotension as patient had some normal but lower than his usual BP readings. No concerning BPs since but Cr hasn't improved - worsened to 2.04 on 2/10. Renal US 1/31 was unrevealing. No e/o retention on prior bladder scans. Does not look fluid responsive w/ U Na 79 (last on 1/31). Suspect ACE-I > diuretics as culprit per d/w nephrology last on 2/10. Lisinopril was stopped and 2/12 Cr has improved a bit to 1.89.   - Lisinopril to remain on hold. Next BMP tomorrow.   - Will need clinic f/u after discharge.     #DM II. New dx during hospital stay w/ HgbA1c 7.8%. Endocrinology saw IP and recommended metformin, which is currently on hold d/t DISHA. BG well controlled.   - Needs diabetic education and meter/supplies before discharge (added to sticky note). Will resume metformin when CrCl allows.     IM will follow for BP management and DISHA.     Tena Arzate PA-C  Hospitalist Service  Pager: [phone]  ________________________________________________________________    Subjective & Interval History:  No concerns today. BP controlled. No events overnight.     Last 24 hour care team notes reviewed.   ROS: 4 point ROS (including Respiratory, CV, GI and ) was performed and negative unless otherwise noted in HPI.     Medications: Reviewed in EPIC.    Physical Exam:    Blood pressure 131/75, pulse 70, temperature 97.5  F (36.4  C), temperature source Oral, resp. rate 16, height 1.737 m (5' 8.4\"), weight 76.3 kg (168 lb 3.2 oz), SpO2 97 %.    GENERAL: Alert and oriented x 3. Lying in bed, appears comfortable. Pleasant and conversant.   HEENT: Anicteric sclera. Mucous membranes moist.   NEUROLOGICAL: Left sided hemiplegia.    EXTREMITIES: No peripheral edema. "   SKIN: No jaundice. No rashes.     Lines/Tubes/Drains:  none

## 2022-02-13 NOTE — PLAN OF CARE
Discharge Planner Post-Acute Rehab PT:     Discharge Plan:TCU due to lack of support    Precautions: Fall/alarm, L angelique, L field cut and neglect, L hip pain    Current Status:  Bed Mobility: SBA to min-A  Transfer: Min-A squat/pivot R, mod/max-L  Gait: 20' R munoz rail, L AFO, mod to max-A LLE management and knee block in stance  Stairs: Unable  Balance: Sits supervision EOM, cueing for midline midline, assist to stand due to weakness  PASS 1/29/22: 11/36  PASS 2/7/22: 19/36    Assessment: Completed FES bike this date. Continue with POC as appropriate.     Other Barriers to Discharge (DME, Family Training, etc):   Stairs to enter  Level of support  W/C  Further gait DME TBD

## 2022-02-13 NOTE — PLAN OF CARE
FOCUS/GOAL  Medical management    ASSESSMENT, INTERVENTIONS AND CONTINUING PLAN FOR GOAL:    Pt has been sleeping good tonight. Denied pain, sob, headache, n/v or any new weakness. Numbness and tingling on the left side of the body is still at baseline per pt. Left arm brace worn all night and elevated extremity on a pillow. Alert and oriented, make needs known. Uses inappropriate words at times but is able to rephrase and correct self. Continent of B/B. No bm this shift. Offered toileting and the urinal but declined x2. Will continue poc.

## 2022-02-13 NOTE — PLAN OF CARE
"Discharge Planner Post-Acute Rehab SLP:      Discharge Plan: TCU with ongoing SLP     Precautions: Fall, Left-Attention impairment     Current Status:  Communication: Mild dysarthria. Occasional word-finding difficulties in conversation, however pt is fluent and able to communicate wants/needs.  Cognition: At least moderate cognitive-linguistic deficits in the areas of attention, executive function, processing speed, and visuospatial skills.  Swallow: Pt reportedly tolerating a regular diet with thin liquids. Pt discharged from previous hospital with no swallowing concerns.        Assessment: Patient somewhat teary upon SLP arrival. Pt reports \"crying so easily now\". Session focused on pt education re: emotional lability post CVA, cognitive deficits and progress towards goals. Pt verbalized understanding.  Pt remains highly motivated but feels \"sad\" about new difficulties with reading/vision/cognition. Provided therapuetic listening. Continue POC.     Other Barriers to Discharge (Family Training, etc): TBD  "

## 2022-02-13 NOTE — PLAN OF CARE
Patient alert and oriented x4. A1-2 pivot transfer, w/c based. L side flaccid, hand splint on. Regular diet, thins, pills whole. BID B. Continent of bowel and bladder, LBM: 2/10. Pt refused scheduled tylenol, voltaren gel, and lidocaine patch. Zio patch on L upper chest. Pt denies pain during shift. Will continue with POC.

## 2022-02-13 NOTE — PLAN OF CARE
Discharge Planner Post-Acute Rehab OT:      Discharge Plan: [facility]     Precautions: L side inattention and hemiparesis, L visual field cut, falls     Current Status:  ADLs:    Mobility: W/c based. Min SPT R side, Mod A L side. Kaley stedy A x 1 with nursing.    Grooming: Set up supported sitting.    Dressing: UB Mod A. LB Mod A.     Bathing: Max A tub bench.    Toileting: Kaley stedy A x 1 commode, Total A hygiene/clothing.  IADLs: Needs assistance.  Vision/Cognition: L side inattention and L visual field cut. Mild cognitive deficits with executive functioning and attention.     Assessment: Increase in LUE tone noted in session with treatment focus on LUE PROM. Pt shows gradual carryover of strategies, limited by memory deficits and left-inattention. Pt squat pivots to right consistently min A, no longer requiring SaraStedy. Continue POC, left-attention and scanning activities.    Other Barriers to Discharge (DME, Family Training, etc):   Pt lives alone and caregiver support TBD.  Home set up - 2nd level apartment, stairs.  Level of physical assistance.

## 2022-02-13 NOTE — PLAN OF CARE
[id number] shift:    Pt feeling well this evening; pain well controlled and felt that he didn't need his Voltaren gel to his hip this evening. Took his medications whole with water. Minimal set up for dinner, but overall able eat independently. LBM 2/10/22 and uses the urinal to void. Pivot transfers with mod assist to wheelchair.

## 2022-02-13 NOTE — PLAN OF CARE
Skilled set up on :  Pt dependent for proper placement of electrodes on L quads, hamstrings, gastroc, and anterior tibialis for optimum muscle contraction, safe positioning on w/c within frame and cushion for prevention of skin breakdown, feet secured to foot pedals, w/c secured to  w/ Q-straints.  Passive motion assessed to ensure proper positioning.      Pt performed 25 minutes of active FES ergometry with 100% stimulation applied to above muscles at 45rpm with 0.91nm resistance.  This PT adjusted e-stim and cycling parameters in real-time to ensure palpable muscle contractions throughout session.  Please see [email or web address] for further details on patient's stimulation parameters and ergometry outcomes.      Changes in parameters that were part of this treatment:   -None    Functional outcomes from this intervention include:   -improved sensory awareness and proprioception  -improved muscle strength  -improved motor coordination    Session Summary:   -Average Power: 2.6 W  -Asymmetry: Left 12%  -Active Minutes: 16:54

## 2022-02-14 ENCOUNTER — APPOINTMENT (OUTPATIENT)
Dept: OCCUPATIONAL THERAPY | Facility: CLINIC | Age: 47
End: 2022-02-14
Attending: PHYSICAL MEDICINE & REHABILITATION
Payer: COMMERCIAL

## 2022-02-14 ENCOUNTER — APPOINTMENT (OUTPATIENT)
Dept: PHYSICAL THERAPY | Facility: CLINIC | Age: 47
End: 2022-02-14
Attending: PHYSICAL MEDICINE & REHABILITATION
Payer: COMMERCIAL

## 2022-02-14 ENCOUNTER — APPOINTMENT (OUTPATIENT)
Dept: SPEECH THERAPY | Facility: CLINIC | Age: 47
End: 2022-02-14
Attending: PHYSICAL MEDICINE & REHABILITATION
Payer: COMMERCIAL

## 2022-02-14 LAB
ANION GAP SERPL CALCULATED.3IONS-SCNC: 6 MMOL/L (ref 3–14)
BUN SERPL-MCNC: 61 MG/DL (ref 7–30)
CALCIUM SERPL-MCNC: 9.6 MG/DL (ref 8.5–10.1)
CHLORIDE BLD-SCNC: 106 MMOL/L (ref 94–109)
CO2 SERPL-SCNC: 29 MMOL/L (ref 20–32)
CREAT SERPL-MCNC: 1.79 MG/DL (ref 0.66–1.25)
ERYTHROCYTE [DISTWIDTH] IN BLOOD BY AUTOMATED COUNT: 13.4 % (ref 10–15)
GFR SERPL CREATININE-BSD FRML MDRD: 46 ML/MIN/1.73M2
GLUCOSE BLD-MCNC: 169 MG/DL (ref 70–99)
GLUCOSE BLDC GLUCOMTR-MCNC: 136 MG/DL (ref 70–99)
GLUCOSE BLDC GLUCOMTR-MCNC: 149 MG/DL (ref 70–99)
HCT VFR BLD AUTO: 37.9 % (ref 40–53)
HGB BLD-MCNC: 13 G/DL (ref 13.3–17.7)
MCH RBC QN AUTO: 30.5 PG (ref 26.5–33)
MCHC RBC AUTO-ENTMCNC: 34.3 G/DL (ref 31.5–36.5)
MCV RBC AUTO: 89 FL (ref 78–100)
PLATELET # BLD AUTO: 146 10E3/UL (ref 150–450)
POTASSIUM BLD-SCNC: 4.1 MMOL/L (ref 3.4–5.3)
RBC # BLD AUTO: 4.26 10E6/UL (ref 4.4–5.9)
SODIUM SERPL-SCNC: 141 MMOL/L (ref 133–144)
WBC # BLD AUTO: 6 10E3/UL (ref 4–11)

## 2022-02-14 PROCEDURE — 128N000003 HC R&B REHAB

## 2022-02-14 PROCEDURE — 85027 COMPLETE CBC AUTOMATED: CPT | Performed by: PHYSICIAN ASSISTANT

## 2022-02-14 PROCEDURE — 97129 THER IVNTJ 1ST 15 MIN: CPT | Mod: GN | Performed by: REHABILITATION PRACTITIONER

## 2022-02-14 PROCEDURE — 36415 COLL VENOUS BLD VENIPUNCTURE: CPT | Performed by: PHYSICIAN ASSISTANT

## 2022-02-14 PROCEDURE — 97112 NEUROMUSCULAR REEDUCATION: CPT | Mod: GO | Performed by: OCCUPATIONAL THERAPIST

## 2022-02-14 PROCEDURE — 99207 PR CDG-CUT & PASTE-POTENTIAL IMPACT ON LEVEL: CPT | Performed by: NURSE PRACTITIONER

## 2022-02-14 PROCEDURE — 97130 THER IVNTJ EA ADDL 15 MIN: CPT | Mod: GN

## 2022-02-14 PROCEDURE — 97129 THER IVNTJ 1ST 15 MIN: CPT | Mod: GN

## 2022-02-14 PROCEDURE — 250N000013 HC RX MED GY IP 250 OP 250 PS 637: Performed by: PHYSICAL MEDICINE & REHABILITATION

## 2022-02-14 PROCEDURE — 97112 NEUROMUSCULAR REEDUCATION: CPT | Mod: GP

## 2022-02-14 PROCEDURE — 97130 THER IVNTJ EA ADDL 15 MIN: CPT | Mod: GN | Performed by: REHABILITATION PRACTITIONER

## 2022-02-14 PROCEDURE — 250N000013 HC RX MED GY IP 250 OP 250 PS 637: Performed by: PHYSICIAN ASSISTANT

## 2022-02-14 PROCEDURE — 97530 THERAPEUTIC ACTIVITIES: CPT | Mod: GP

## 2022-02-14 PROCEDURE — 99232 SBSQ HOSP IP/OBS MODERATE 35: CPT | Mod: FS | Performed by: PHYSICAL MEDICINE & REHABILITATION

## 2022-02-14 PROCEDURE — 99233 SBSQ HOSP IP/OBS HIGH 50: CPT | Performed by: NURSE PRACTITIONER

## 2022-02-14 PROCEDURE — 80048 BASIC METABOLIC PNL TOTAL CA: CPT | Performed by: PHYSICIAN ASSISTANT

## 2022-02-14 RX ORDER — LIDOCAINE 4 G/G
1 PATCH TOPICAL
Status: DISCONTINUED | OUTPATIENT
Start: 2022-02-14 | End: 2022-02-19 | Stop reason: HOSPADM

## 2022-02-14 RX ORDER — ACETAMINOPHEN 325 MG/1
650 TABLET ORAL EVERY 6 HOURS PRN
Status: DISCONTINUED | OUTPATIENT
Start: 2022-02-14 | End: 2022-02-19 | Stop reason: HOSPADM

## 2022-02-14 RX ADMIN — CARVEDILOL 37.5 MG: 25 TABLET, FILM COATED ORAL at 08:38

## 2022-02-14 RX ADMIN — CLONIDINE HYDROCHLORIDE 0.1 MG: 0.1 TABLET ORAL at 08:38

## 2022-02-14 RX ADMIN — Medication 5 MG: at 13:00

## 2022-02-14 RX ADMIN — Medication 5 MG: at 08:39

## 2022-02-14 RX ADMIN — ATORVASTATIN CALCIUM 40 MG: 40 TABLET, FILM COATED ORAL at 20:07

## 2022-02-14 RX ADMIN — Medication 5 MG: at 20:07

## 2022-02-14 RX ADMIN — CLONIDINE HYDROCHLORIDE 0.1 MG: 0.1 TABLET ORAL at 20:07

## 2022-02-14 RX ADMIN — DIVALPROEX SODIUM 1000 MG: 500 TABLET, FILM COATED, EXTENDED RELEASE ORAL at 08:38

## 2022-02-14 RX ADMIN — CARVEDILOL 37.5 MG: 25 TABLET, FILM COATED ORAL at 18:07

## 2022-02-14 RX ADMIN — CHLORTHALIDONE 25 MG: 25 TABLET ORAL at 08:39

## 2022-02-14 RX ADMIN — Medication 2 G: at 08:39

## 2022-02-14 RX ADMIN — AMILORIDE HYDROCLORIDE 10 MG: 5 TABLET ORAL at 08:38

## 2022-02-14 RX ADMIN — Medication 12.5 MG: at 20:07

## 2022-02-14 RX ADMIN — ASPIRIN 81 MG CHEWABLE TABLET 81 MG: 81 TABLET CHEWABLE at 08:38

## 2022-02-14 ASSESSMENT — ACTIVITIES OF DAILY LIVING (ADL)
ADLS_ACUITY_SCORE: 18
ADLS_ACUITY_SCORE: 18
ADLS_ACUITY_SCORE: 16
ADLS_ACUITY_SCORE: 18
ADLS_ACUITY_SCORE: 18
ADLS_ACUITY_SCORE: 16
ADLS_ACUITY_SCORE: 18
ADLS_ACUITY_SCORE: 16
ADLS_ACUITY_SCORE: 18
ADLS_ACUITY_SCORE: 16
ADLS_ACUITY_SCORE: 18

## 2022-02-14 NOTE — PLAN OF CARE
End of shift Summary: See flowsheet for VS and detailed assessments.     Changes this Shift:      Pulmonary: Breathing comfortably on RA. Denies SOB/chest pain.     Output: Continent bowel/bladder. Voiding spontaneously without difficulty. LBM 2/13, per chart review.      Activity: Transfer assist x1 stand-pivot to      Skin: WDL     Pain: Denies pain     Neuro/CMS: A&O x4, occasional word-finding difficulty. L hemiplegia, L field neglect.      Dressing: None     IV/Drains: None     Plan: Continue to monitor

## 2022-02-14 NOTE — PROGRESS NOTES
Discharge Planner Post-Acute Rehab OT:      Discharge Plan: [facility]     Precautions: L side inattention and hemiparesis, L visual field cut, falls     Current Status:  ADLs:    Mobility: W/c based. Min SPT R side, Mod A L side. Kaley roxy A x 1 with nursing.    Grooming: Set up supported sitting.    Dressing: UB Mod A. LB Mod A.     Bathing: Max A tub bench.    Toileting: Kaley stedy A x 1 commode, Total A hygiene/clothing.  IADLs: Needs assistance.  Vision/Cognition: L side inattention and L visual field cut. Mild cognitive deficits with executive functioning and attention.     Assessment: Focused on LUE WB'ing and trunk control/elongation while seated at EOM with focus on scanning to the left. Utilized NMES for Left shoulder subluxation protocol. Continue with OT POC.      Other Barriers to Discharge (DME, Family Training, etc):   Pt lives alone and caregiver support TBD.  Home set up - 2nd level apartment, stairs.  Level of physical assistance.

## 2022-02-14 NOTE — PROGRESS NOTES
CLINICAL NUTRITION SERVICES - REASSESSMENT NOTE     Nutrition Prescription    RECOMMENDATIONS FOR MDs/PROVIDERS TO ORDER:  None today     Malnutrition Status:    Moderate malnutrition in the context of acute illness or injury    Recommendations already ordered by Registered Dietitian (RD):  None today - continue supplement as ordered     Future/Additional Recommendations:  Continue to monitor meal/supplement intakes, weight/lab trends      EVALUATION OF THE PROGRESS TOWARD GOALS   Diet: Regular  Supplement: Chocolate Ensure Enlive BID at 10 am and 2 pm  Intake: Mostly %, only noted 1 meal at 25% and 1 meal at 50% since last RD assessment     NEW FINDINGS   MAR reviewed. Labs reviewed. Pt visited at bedside, reports improving meal intakes and accepting of supplement as ordered, RD encouraged pt to continue to take meals/fluids well for continued work with therapies, pt agreeing and denied any additional nutrition needs at this time, agreeing to continue Ensure as ordered.     02/09/22 1355 76.3 kg (168 lb 3.2 oz)   02/03/22 0500 74.3 kg (163 lb 12.8 oz)   02/02/22 0611 75.7 kg (166 lb 14.4 oz)   01/28/22 1619 75.7 kg (166 lb 12.8 oz)     Wt Readings from Last 20 Encounters:   02/09/22 76.3 kg (168 lb 3.2 oz)   01/28/22 77.3 kg (170 lb 6.7 oz)     Weight assessment: 2 lb wt gain x1 week/from ARU admission, no other significant long term history to assess weight change. Pt previously reported a more UBW of 175 lbs prior to hospitalization.     MALNUTRITION  % Intake: Decreased intake does not meet criteria  % Weight Loss: Weight loss does not meet criteria  Subcutaneous Fat Loss: Facial region: moderate   Muscle Loss: Temporal: moderate   Fluid Accumulation/Edema: Trace per flow sheets   Malnutrition Diagnosis: Moderate malnutrition in the context of acute illness or injury     Previous Goals   Patient to consume % of nutritionally adequate meal trays TID, or the equivalent with  supplements/snacks  Evaluation: Met    Previous Nutrition Diagnosis  Unintended weight loss related to decreased intake as evidenced by weight loss of >6% over 1 month  Evaluation: Resolving, diagnosis adjusted to below     CURRENT NUTRITION DIAGNOSIS  Predicted inadequate nutrient intake related to acute illness/LOS/menu fatigue as evidenced by weight loss       INTERVENTIONS  Implementation  Medical food supplement therapy - continue as ordered     Goals  Patient to consume % of nutritionally adequate meal trays TID, or the equivalent with supplements/snacks.    Monitoring/Evaluation  Progress toward goals will be monitored and evaluated per protocol.    Betsy Nicholson RD, CNSC, LD  [facility] RD pager: [phone]

## 2022-02-14 NOTE — PLAN OF CARE
"Discharge Planner Post-Acute Rehab SLP:      Discharge Plan: TCU with ongoing SLP     Precautions: Fall, Left-Attention impairment     Current Status:  Communication: Mild dysarthria. Occasional word-finding difficulties in conversation, however pt is fluent and able to communicate wants/needs.  Cognition: At least moderate cognitive-linguistic deficits in the areas of attention, executive function, processing speed, and visuospatial skills.  Swallow: Pt reportedly tolerating a regular diet with thin liquids. Pt discharged from previous hospital with no swallowing concerns.        Assessment: Patient completed 2 pages of short story utilizing visual (highlighter, bookmark, finger scanning) and verbal cues. Patient answered reading comprehension questions with 90% accuracy given min cues. Patient demonstrated increased fatigue towards end of session, requiring moderate verbal prompts for \"carriage return\" and visual scanning.      Other Barriers to Discharge (Family Training, etc): TBD  "

## 2022-02-14 NOTE — PLAN OF CARE
FOCUS/GOAL  Medical management    ASSESSMENT, INTERVENTIONS AND CONTINUING PLAN FOR GOAL:    Pt is alert and oriented x4. May use inappropriate words and is aware of that and will correct self. Uses the call light for needs. Very happy about making progress with therapy. Improved slight contraction of the left UE. Looking forward to therapies. Denies pain, sob, dizziness, or any new weakness. Numbness on the left extremities are still at baseline, not improving per pt. Is continent of B/B, Lbm 2/10 per records. Pt unable to recall last he had one. Refused tylenol this morning, denied pain. BP at 134/76. Offered toileting and the urinal but declined. Will continue poc.

## 2022-02-14 NOTE — CONSULTS
Consulted by Farhana Trevino with Inpatient Diabetes Management Team to run a test claim for Blood Glucose Meter/Supplies & Metformin.    Patient has pharmacy benefits through [facility] [employer]. Per insurance, the following are covered and preferred under the plan:      Contour meter/supplies - $0    Metformin IR/ER tabs - $0      Please feel free to contact me with any other test claims, prior authorizations, or insurance questions regarding outpatient medications.     Thanks!      Reyna Landaverde [facility] Discharge Pharmacy Liaison  Pronouns: She/Her/Hers    [facility]  [address] [place] [address]  [address] [place] [address] [address] [id number]   [email or web address]  [email or web address]   Phone: [phone]  Pager: [phone]  Fax: [phone]

## 2022-02-14 NOTE — PROGRESS NOTES
[facility]  Internal Medicine Daily Progress Note    ARU day#: 17    Assessment & Plan: Miriam Hall is a 46 year old man with a history of HTN who was admitted to [facility] 1/8-1/28/22 for right PCA stroke c/b refractory HTN w/ concern for secondary HTN, DISHA, new dx of DM II, hypokalemia, and encephalopathy. Transferred to ARU for ongoing rehabilitation.  Internal medicine following for medical complexity.     #Acute Right PCA Stroke  Presented w/ left hemiplegia and head CT showing right parieto-occipital infarct and CTA showing occlusion of proximal right PCA and high grade stenosis of M2 branch of right MCA. Was outside window for tPA. No thrombectomy performed d/t location of LVO in PCA. Repeat imaging 1/12 w/ hemorrhagic transformation. Source 2/2 possible intracranial atherosclerosis vs. ESUS. CARISA neg for LA thrombus. Residual deficits include L hemiplegia, L hemianopsia, mild dysarthria.  - Continue ASA, statin.   - Continue empiric Depakote (and seizure precautions) until neuro clinic f/u (had multiple vEEG neg for seizure activity but elevated risk d/t stroke burden and prior prolonged period of encephalopathy).   - BP management as below; goal < 130/80.      #HTN  Required 6 antihypertensives while inpatient to maintain SBP goal < 180. No clear etiology despite extensive workup and consultations with Cardiology and Nephrology. Discharged on amiloride, carvedilol, chlorthalidone, clonidine, lisinopril, hydralazine, but hydralazine was stopped 2/1 and clonidine 2/4. BP then began to rise and clonidine was restarted 2/6. BPs were very well controlled since. Lisinopril stopped d/t DISHA on 2/11. Have seen some intermittent BP elevations since but have down trended w/o intervention. This /88.   - Continue amiloride 10 mg daily, carvedilol 37.5 mg BID, chlorthalidone 25 mg daily, clonidine 0.1 mg BID. Will have low threshold to restart scheduled hydralazine if BP up trending off  "lisinopril.   - Needs OP nephrology, endocrinology, and genetics follow ups.      #DISHA  Cr 1.3 on hospital admit, best of 0.94 on 1/20, bump on 1/23 back to 1.3, and peak of 1.56 on 1/27 --> 1.3 w/ IV fluid bolus. Here has been ~1.7 since 2/1. Was discussed w/ nephrology x2 around that time and attributed to hypotension as patient had some normal but lower than his usual BP readings. No concerning BPs since but Cr hasn't improved - worsened to 2.04 on 2/10. Renal US 1/31 was unrevealing. No e/o retention on prior bladder scans. Does not look fluid responsive w/ U Na 79 (last on 1/31). Suspect ACE-I > diuretics as culprit per d/w nephrology last on 2/10. Lisinopril was stopped and 2/12 Cr has improved a bit to 1.79.   - Lisinopril to remain on hold.   - Follow BMPs twice weekly (qMTh).  - Will need clinic f/u after discharge.      #DM II  New dx during hospital stay w/ HgbA1c 7.8%. Endocrinology saw IP and recommended metformin, which is currently on hold d/t DISHA. BG well controlled.   - Needs diabetic education and meter/supplies before discharge (added to sticky note). Will resume metformin when CrCl allows.      IM will follow for BP management and DISHA.     Carolina Antunez, MPH, [facility]  Hospitalist Service  Pager [phone]  ________________________________________________________________    Subjective & Interval History:  No concerns today.  BP well controlled presently.  No overnight events noted.     Last 24 hour care team notes reviewed.   ROS: 4 point ROS (including Respiratory, CV, GI and ) was performed and negative unless otherwise noted in HPI.     Medications: Reviewed in EPIC.    Physical Exam:    Blood pressure 97/66, pulse 96, temperature 97.8  F (36.6  C), temperature source Oral, resp. rate 16, height 1.651 m (5' 5\"), weight 56.6 kg (124 lb 12.5 oz), SpO2 99 %.    GENERAL: Alert and oriented x 3.  Pleasant and interactive, no apparent distress.   HEENT: Anicteric sclera. Mucous membranes " moist.   CV: RRR. S1, S2. No murmurs appreciated.   RESPIRATORY: Effort normal on room air. Lungs CTAB with no wheezing, rales, rhonchi.   GI: Abdomen soft and non distended, bowel sounds present. No tenderness, rebound, guarding.   NEUROLOGICAL: No focal deficits. Moves all extremities.    EXTREMITIES: No peripheral edema. Intact bilateral pedal pulses.   SKIN: No jaundice. No rashes.     Lines/Tubes/Drains:  None

## 2022-02-14 NOTE — PLAN OF CARE
"  VS: BP (!) 148/81   Pulse 69   Temp 97.2  F (36.2  C) (Oral)   Resp 16   Ht 1.737 m (5' 8.4\")   Wt 76.3 kg (168 lb 3.2 oz)   SpO2 96%   BMI 25.28 kg/m     O2: RA   Output: Voids spontaneously   Last BM: 2/13/2022   Activity: Pivot to wheelchair   Pain: Scheduled tylenol, voltaren gel   CMS: A/Ox4, cap refill <3 seconds   Diet: Regular, thin   Equipment: Wheelchair, seizure pads.   Plan: Continue POC   Additional Info:        "

## 2022-02-14 NOTE — PROGRESS NOTES
"  [facility]   Acute Rehabilitation Unit    INTERVAL HISTORY  Patient was seen and examined at bedside this morning.  Weekend therapy and progress notes reviewed, no acute events reported.  Today he reports to be feeling well.  He notes that transfers are getting stronger, needing less assist with nursing than previously.  He is working on strategies with SLP/OT to engage L hand in eating tasks more, and to scan environment to L.  He denies any LUE pain.  No increased somnolence.  He states he had bowel movement yesterday.  He is agreeable to changing pain meds/topicals to PRN since L hip pain has been minimal.     Functionally, he is currently needing SBA to min A for bed mobility, min A for squat pivot transfers to R and mod to max A to L, mod to max A for ambulating 20' at munoz rail.  He also needs set-up for supported sitting grooming, mod A for full body dressing, max A for bathing with tub bench, total A for toilet hygiene/clothing.    ROS: 10 point ROS neg other than the symptoms noted above in the HPI.      MEDICATIONS  Scheduled meds    aMILoride  10 mg Oral Daily     aspirin  81 mg Oral Daily     atorvastatin  40 mg Oral QPM     Baclofen  5 mg Oral TID     carvedilol  37.5 mg Oral BID w/meals     chlorthalidone  25 mg Oral Daily     cloNIDine  0.1 mg Oral BID     divalproex sodium extended-release  1,000 mg Oral Daily     lidocaine   Transdermal Q8H     [Held by provider] lisinopril  40 mg Oral Daily     [Held by provider] metFORMIN  500 mg Oral Daily with supper       PRN meds:  acetaminophen, bisacodyl, diclofenac, hydrALAZINE, lidocaine **AND** lidocaine, ondansetron, polyethylene glycol, sennosides      PHYSICAL EXAM  BP (!) 158/87 (BP Location: Right arm)   Pulse 70   Temp 97.8  F (36.6  C) (Oral)   Resp 16   Ht 1.737 m (5' 8.4\")   Wt 76.3 kg (168 lb 3.2 oz)   SpO2 99%   BMI 25.28 kg/m    Gen: NAD, resting in bed   HEENT: NCAT, L facial weakness  Cardio: RRR "     Pulm: non-labored on room air  Abd: soft, non-tender, non-distended  Ext: no edema in bilateral lower extremities, no calf tenderness    Neuro/MSK: left sided neglect with clear gaze preference. Left hemiparesis.  Some increased tone in LUE at pectoralis, biceps, wrist and finger flexors - hemiparetic flexion positioning.  L ankle clonus.      LABS  CBC RESULTS:   Recent Labs   Lab Test 02/14/22  0902 02/07/22  0721 02/03/22  0754   WBC 6.0 7.4 9.0   RBC 4.26* 4.31* 4.52   HGB 13.0* 12.9* 13.8   HCT 37.9* 39.1* 40.8   MCV 89 91 90   MCH 30.5 29.9 30.5   MCHC 34.3 33.0 33.8   RDW 13.4 13.6 13.4   * 196 256     Last Basic Metabolic Panel:  Recent Labs   Lab Test 02/14/22 0902 02/14/22 0747 02/13/22 2136 02/12/22  0743 02/12/22  0548 02/10/22  1721 02/10/22  0852     --   --   --  138  --  139   POTASSIUM 4.1  --   --   --  4.1  --  3.7   CHLORIDE 106  --   --   --  104  --  104   CO2 29  --   --   --  28  --  28   ANIONGAP 6  --   --   --  6  --  7   * 149* 242*   < > 150*   < > 288*   BUN 61*  --   --   --  58*  --  59*   CR 1.79*  --   --   --  1.89*  --  2.04*   GFRESTIMATED 46*  --   --   --  44*  --  40*   VALERIE 9.6  --   --   --  9.6  --  9.4    < > = values in this interval not displayed.     Recent Labs   Lab 02/14/22  0902 02/14/22  0747 02/13/22 2136 02/13/22  1718 02/13/22  0727 02/12/22  2117   * 149* 242* 168* 141* 199*       ASSESSMENT AND PLAN  Miriam Hall is a 46 year old R hand dominant male with h/o HTN who was admitted on 1/8 with R PCA ischemic stroke. His hospital course was complicated by concerns for secondary HTN, new diagnosis of DM II, DISHA, hypokalemia and encephalopathy. He clinically improved and transferred to ARU for ongoing medical management and intensive inpatient rehab. He is progressing with therapy, and is still having episodes of HTN. Medicine is following and is helping with BP management as well as DISHA management.        Admission to acute  inpatient rehab on 1/28/2022.    Impairment group code: Stroke Ischemic 01.1 (L) Body Involvement (R) Brain: R PCA ischemic stroke w/ hemorrhagic transformation noted      Rehabilitation - continue comprehensive acute inpatient rehabilitation program with multidisciplinary approach including therapies, rehab nursing, and physiatry following. See interval history for updates.         Medical Conditions  Neuro  Acute right PCA ischemic stroke   R P1 occlusion and R M2 Stenosis  Hemorrhagic transformation of ischemic infarct  -Hypercoagulability panel negative  -vasculitis panel negative  -aspirin 81mg   -HTN, DM and HLD control as below for secondary prevention   -ziopatch until 2/10   -acute rehab with PT, OT, and SLP  -per neuro recommendations, continue depakote until outpatient f/u  -should see stroke neurology and PM&R after discharge as well as genetic team per recs   --Noted worsening L arm tone, started baclofen 5 mg TID on 2/10, thus far seems to be tolerating well and improved tone, states ROM improving, but still limited functional gains on the L.      Possible seizures   EEG 1/12 concerning for subclinical seizures. Loaded with Keppra then transitioned to Depakote.   -continue Depakote  -seizure precautions  -follow-up neurology for ongoing     MSK  L Hip Pain  Reported increased L hip pain at ARU admission.  Hip xray 1/24 without evidence of fracture.  Started on scheduled Tylenol, topicals.  Ongoing improvements, not interfering with therapy.  -Change scheduled Tylenol to PRN  -Change voltaren, lidocaine patch to PRN per patient request  -Continue ice, heat PRN    CV  Refractory HTN  -possibly secondary to clinical hyperaldosteronism of several possible etiologies, nephrology and cardiology consulted but no clear etiology  -Appreciate internal medicine assistance   -Continue amiloride 10mg daily  -Carvedilol 37.5mg bid  -Chlorthalidone 25mg daily  -Clonidine 0.1mg - dose decreased 2/2 and discontinued  2/4, resumed 2/6 with Cr stabilized.  BP increased over weekend with holding lisinopril, clonidine dose increased back to BID.  -Hydralazine 50mg Q8H - discontinued 2/1.  Per hospitalist, low threshold to resume if BP elevated off lisinopril.  -Lisinopril 40mg daily - held starting 2/10 due to renal function (per hosp/nephro)  -F/u with nephrology and endo teams as outpatient       HLD  -Continue atorvastatin 40mg     Endo  Type 2 Diabetes, new diagnosis this admission  A1c 7.8 1/8  -Metformin 500mg with dinner; held since 2/1 due to worsening Cr  -Sliding scale insulin; was discontinued 2/3 as he was not requiring    -BG stable 149-242 last 24 hours, but previously appear to be well-controlled.  Monitor for persistent elevations.     Renal  DISHA likely due to hypovolemia and medication induced.  While inpatient Cr up to peak of 1.56 on 1/27 and then improved to 1.3 on 1/28; however steadily at ARU and then stabilized in 1.7 range.  Renal U/S on 1/31 unrevealing.  Hydralazine stopped, clonidine held then resumed at lower dose.  Per nephro, thought this may represent new baseline, however then increased up to 2.04 on 2/10.  Hospitalist discussed with nephro, holding lisinopril.  Repeat BMP 2/12 with Cr improved to 1.89.  -Hospitalist following, appreciate assistance  -Continue holding ACEi.  Repeat BMP today with Cr further improved to 1.79  -Other nephrotoxic medications including amiloride, chlorthalidone, clonidine  -Monitor Cr twice weekly or more often as indicated  -F/u with nephrology as outpatient     Hypokalemia, resolved  -20 meq K BID; discontinued 2/1  -K stable WNL  -hyperaldosteronism workup completed while inpatient  -monitor with BMP    2. Adjustment to disability:  Clinical psychology to eval and treat as needed   3. FEN: regular diet   4. Bowel: continent, PRN miralax and senna  5. Bladder: continent   6. DVT Prophylaxis: mechanical with SCDs  7. GI Prophylaxis: none   8. Code: Full  9. Disposition:  plan for extended rehab at TCU  10. ELOS:  21 days  11. Follow up Appointments on Discharge: PCP, neuro, cardio for Zio patch, nephrology, endo, genetics and PM&R         Seen and discussed with Dr. Jean Paul Kohler, PM&R staff physician     MARJORIE MullenC  Physical Medicine & Rehabilitation

## 2022-02-14 NOTE — PLAN OF CARE
Discharge Planner Post-Acute Rehab PT:      Discharge Plan:TCU due to lack of support     Precautions: Fall/alarm, L angelique, L field cut and neglect, L hip pain     Current Status:  Bed Mobility: SBA to min-A  Transfer: Min-A squat/pivot R, mod/max-L  Gait: 20' R munoz rail, L AFO, mod to max-A LLE management and knee block in stance  Stairs: Unable  Balance: Sits supervision EOM, cueing for midline midline, assist to stand due to weakness  PASS 1/29/22: 11/36  PASS 2/7/22: 19/36     Assessment: Focused on seated and standing midline and postural control with mirror for visual feedback. Pt reports not being able to activity extend L thumb today as was able to yesterday and previously, PA aware.   Other Barriers to Discharge (DME, Family Training, etc):   Stairs to enter  Level of support  W/C  Further gait DME TBD

## 2022-02-15 ENCOUNTER — APPOINTMENT (OUTPATIENT)
Dept: SPEECH THERAPY | Facility: CLINIC | Age: 47
End: 2022-02-15
Attending: PHYSICAL MEDICINE & REHABILITATION
Payer: COMMERCIAL

## 2022-02-15 ENCOUNTER — APPOINTMENT (OUTPATIENT)
Dept: OCCUPATIONAL THERAPY | Facility: CLINIC | Age: 47
End: 2022-02-15
Attending: PHYSICAL MEDICINE & REHABILITATION
Payer: COMMERCIAL

## 2022-02-15 ENCOUNTER — APPOINTMENT (OUTPATIENT)
Dept: PHYSICAL THERAPY | Facility: CLINIC | Age: 47
End: 2022-02-15
Attending: PHYSICAL MEDICINE & REHABILITATION
Payer: COMMERCIAL

## 2022-02-15 LAB
GLUCOSE BLDC GLUCOMTR-MCNC: 145 MG/DL (ref 70–99)
GLUCOSE BLDC GLUCOMTR-MCNC: 265 MG/DL (ref 70–99)

## 2022-02-15 PROCEDURE — 0054A HC ADMIN COVID VAC PFIZER TRS-SUCR, BOOSTER: CPT | Performed by: PHYSICIAN ASSISTANT

## 2022-02-15 PROCEDURE — 99233 SBSQ HOSP IP/OBS HIGH 50: CPT | Performed by: NURSE PRACTITIONER

## 2022-02-15 PROCEDURE — 97530 THERAPEUTIC ACTIVITIES: CPT | Mod: GO | Performed by: OCCUPATIONAL THERAPIST

## 2022-02-15 PROCEDURE — 999N000125 HC STATISTIC PATIENT MED CONFERENCE < 30 MIN

## 2022-02-15 PROCEDURE — 250N000013 HC RX MED GY IP 250 OP 250 PS 637: Performed by: PHYSICIAN ASSISTANT

## 2022-02-15 PROCEDURE — 97112 NEUROMUSCULAR REEDUCATION: CPT | Mod: GO | Performed by: OCCUPATIONAL THERAPIST

## 2022-02-15 PROCEDURE — 250N000013 HC RX MED GY IP 250 OP 250 PS 637: Performed by: NURSE PRACTITIONER

## 2022-02-15 PROCEDURE — 128N000003 HC R&B REHAB

## 2022-02-15 PROCEDURE — 99207 PR CDG-CHARGE REQUIRED MANUAL ENTRY: CPT | Performed by: NURSE PRACTITIONER

## 2022-02-15 PROCEDURE — 97535 SELF CARE MNGMENT TRAINING: CPT | Mod: GO | Performed by: OCCUPATIONAL THERAPIST

## 2022-02-15 PROCEDURE — 999N000150 HC STATISTIC PT MED CONFERENCE < 30 MIN

## 2022-02-15 PROCEDURE — 91305 HC RX IP 250 OP 636: CPT | Performed by: PHYSICIAN ASSISTANT

## 2022-02-15 PROCEDURE — 99233 SBSQ HOSP IP/OBS HIGH 50: CPT | Mod: FS | Performed by: PHYSICAL MEDICINE & REHABILITATION

## 2022-02-15 PROCEDURE — 97112 NEUROMUSCULAR REEDUCATION: CPT | Mod: GP

## 2022-02-15 PROCEDURE — 97130 THER IVNTJ EA ADDL 15 MIN: CPT | Mod: GN

## 2022-02-15 PROCEDURE — 97129 THER IVNTJ 1ST 15 MIN: CPT | Mod: GN

## 2022-02-15 PROCEDURE — 250N000013 HC RX MED GY IP 250 OP 250 PS 637: Performed by: PHYSICAL MEDICINE & REHABILITATION

## 2022-02-15 PROCEDURE — 250N000011 HC RX IP 250 OP 636: Performed by: PHYSICIAN ASSISTANT

## 2022-02-15 RX ORDER — DIPHENHYDRAMINE HYDROCHLORIDE 50 MG/ML
50 INJECTION INTRAMUSCULAR; INTRAVENOUS
Status: DISCONTINUED | OUTPATIENT
Start: 2022-02-15 | End: 2022-02-19 | Stop reason: HOSPADM

## 2022-02-15 RX ORDER — DIPHENHYDRAMINE HCL 25 MG
50 CAPSULE ORAL
Status: DISCONTINUED | OUTPATIENT
Start: 2022-02-15 | End: 2022-02-19 | Stop reason: HOSPADM

## 2022-02-15 RX ORDER — CLONIDINE HYDROCHLORIDE 0.1 MG/1
0.1 TABLET ORAL 3 TIMES DAILY
Status: DISCONTINUED | OUTPATIENT
Start: 2022-02-15 | End: 2022-02-19 | Stop reason: HOSPADM

## 2022-02-15 RX ADMIN — CLONIDINE HYDROCHLORIDE 0.1 MG: 0.1 TABLET ORAL at 08:17

## 2022-02-15 RX ADMIN — Medication 5 MG: at 08:17

## 2022-02-15 RX ADMIN — DIVALPROEX SODIUM 1000 MG: 500 TABLET, FILM COATED, EXTENDED RELEASE ORAL at 08:17

## 2022-02-15 RX ADMIN — CHLORTHALIDONE 25 MG: 25 TABLET ORAL at 08:19

## 2022-02-15 RX ADMIN — BNT162B2 30 MCG: 0.23 INJECTION, SUSPENSION INTRAMUSCULAR at 17:28

## 2022-02-15 RX ADMIN — CLONIDINE HYDROCHLORIDE 0.1 MG: 0.1 TABLET ORAL at 13:26

## 2022-02-15 RX ADMIN — CLONIDINE HYDROCHLORIDE 0.1 MG: 0.1 TABLET ORAL at 19:37

## 2022-02-15 RX ADMIN — ATORVASTATIN CALCIUM 40 MG: 40 TABLET, FILM COATED ORAL at 19:37

## 2022-02-15 RX ADMIN — CARVEDILOL 37.5 MG: 25 TABLET, FILM COATED ORAL at 08:18

## 2022-02-15 RX ADMIN — AMILORIDE HYDROCLORIDE 10 MG: 5 TABLET ORAL at 08:17

## 2022-02-15 RX ADMIN — ASPIRIN 81 MG CHEWABLE TABLET 81 MG: 81 TABLET CHEWABLE at 08:19

## 2022-02-15 RX ADMIN — Medication 5 MG: at 19:37

## 2022-02-15 RX ADMIN — Medication 5 MG: at 13:26

## 2022-02-15 RX ADMIN — CARVEDILOL 37.5 MG: 25 TABLET, FILM COATED ORAL at 17:43

## 2022-02-15 ASSESSMENT — ACTIVITIES OF DAILY LIVING (ADL)
ADLS_ACUITY_SCORE: 15
ADLS_ACUITY_SCORE: 18
ADLS_ACUITY_SCORE: 15
ADLS_ACUITY_SCORE: 18
ADLS_ACUITY_SCORE: 18
ADLS_ACUITY_SCORE: 15
ADLS_ACUITY_SCORE: 18
ADLS_ACUITY_SCORE: 18
ADLS_ACUITY_SCORE: 15
ADLS_ACUITY_SCORE: 18
ADLS_ACUITY_SCORE: 15
ADLS_ACUITY_SCORE: 18
ADLS_ACUITY_SCORE: 15

## 2022-02-15 NOTE — PLAN OF CARE
Skilled set up on :  Pt dependent for proper placement of electrodes on L quads, hamstrings, gastroc, and anterior tibialis for optimum muscle contraction, safe positioning on w/c within frame and cushion for prevention of skin breakdown, feet secured to foot pedals, w/c secured to  w/ Q-straints.  Passive motion assessed to ensure proper positioning.      Pt performed 30 minutes of active FES ergometry with 100% stimulation applied to above muscles at 45rpm with 0.91nm resistance.  This PT adjusted e-stim and cycling parameters in real-time to ensure palpable muscle contractions throughout session.  Please see [email or web address] for further details on patient's stimulation parameters and ergometry outcomes.      Changes in parameters that were part of this treatment:   -None, increased attention to posture this date during propulsion    Functional outcomes from this intervention include:   -improved sensory awareness and proprioception  -improved muscle strength  -improved motor coordination    Session Summary:   -Average Power: 4.2 W  -Asymmetry: Left 14%  -Active Minutes: 24.5

## 2022-02-15 NOTE — PLAN OF CARE
Acute Rehab Care Conference/Team Rounds    Type: Team Rounds    Present: Dr. Jean Paul Kohler, Linda Perez PA, Jose George PT, Ce Cid OT, Anand Oleary SLP, Rhiannon Shah SW, Betsy Nicholson RD, Marcie Boyer RN, Patient Miriam Hall.     Discharge Barriers/Treatment/Education    Rehab Diagnosis:  Post CVA    Active Medical Co-morbidities/Prognosis:     Patient Active Problem List   Diagnosis Code     HTN (hypertension) I10     Stroke (H) I63.9     Ischemic cerebrovascular accident (CVA) (H) I63.9     Acute kidney failure, unspecified (H) N17.9   L hemiparesis  L hemineglect      Safety: A1 pivot transfer, wheelchair based, calls appropriately, alert and oriented    Pain: denied pain    Medications, Skin, Tubes/Lines: takes pills whole, no lines/tubes    Swallowing/Nutrition: No dysphagia related concerns.     Bowel/Bladder: Continent of Bladder, used urinal independently at night, LBM 2/13/2022.     Psychosocial: Lives alone. Limited support. Indep PTA. Apartment with no elevator. No substance abuse, mental health or financial concerns at this time. Works in [employer] PTA.     ADLs/IADLs: Pt making slow but steady progress with ADLs. Barriers include left neglect, left hemiparesis, left field cut, and impaired trunk control. Pt continues to present with increased flexor tone in LUE. Pt requires min A with squat pivot transfers to/from W/C and toilet. Pt requires total A with toilet hygiene. Pt requires set-up with G/H tasks seated in W/C with cues to scan to the left. Pt requires mod A with UBD tasks seated and max A with LBD tasks utilizing bed rail to stand. Utilizing NMES for Left shoulder subluxation protocol, and FES for LUE neuro re-education and sensorimotor skills. Recommend TCU for ongoing rehabilitation.      Mobility: Pt is making slow/steady progress in PT, remains very motivated. Admitted with L neglect and field cut, hemiparesis, sensory impairments. Currently, SBA to min-A with  bed mobility. Min-A squat/pivot R, max-A L. Gait initiated at R munoz rail with L AFO, requires Max-A to manage LLE. Sitting midline progressing to perform unsupported. PASS 19/36, up from 11/36. Planning TCU due to anticipated need for extended rehab stay due to lack of support at home and need for new living environment due to number of stairs to enter.    Cognition/Language: Patient continues to make improvement in ability to attend to the left side during tasks such as reading. Patient still requiring support to appropriately utilize the strategies but attempting to complete more independently as compared to prior session. Ongoing therapy upon facility discharge.     Community Re-Entry: reach a level of mod I     Transportation: Pt not a , will need ride arrange. W/c rise okay.    Decision maker: self    Plan of Care and goals reviewed and updated.    Discharge Plan/Recommendations    Fall Precautions: continue    Patient/Family input to goals: yes     Estimated length of stay: 18 days     Overall plan for the patient: reach a level of mod I       Utilization Review and Continued Stay Justification    Medical Necessity Criteria:    For any criteria that is not met, please document reason and plan for discharge, transfer, or modification of plan of care to address.    Requires intensive rehabilitation program to treat functional deficits?: Yes    Requires 3x per week or greater involvement of rehabilitation physician to oversee rehabilitation program?: Yes    Requires rehabilitation nursing interventions?: Yes    Patient is making functional progress?: Yes    There is a potential for additional functional progress? Yes    Patient is participating in therapy 3 hours per day a minimum of 5 days per week or 15 hours per week in 7 day period?:Yes    Has discharge needs that require coordinated discharge planning approach?:Yes      Barriers/Concerns related to meeting medical necessity criteria:  none    Team  Plan to Address Concern:  As needed       Final Physician Sign off    Statement of Approval:  Jean Paul Kohler DO      Patient Goals  Social Work Goals: Discharge to [facility] on Friday 02/18/2022.     OT Frequency: 60-90 min daily  OT goal: hygiene/grooming: independent  OT goal: upper body dressing: Minimal assist  OT goal: lower body dressing: Minimal assist  OT goal: upper body bathing: Supervision/stand-by assist  OT goal: lower body bathing: Minimal assist  OT goal: bed mobility: Modified independent  OT Goal: transfer: Supervision/stand-by assist  OT goal: toilet transfer/toileting: Supervision/stand-by assist  OT goal: meal preparation: Minimal assist,from wheelchair  OT goal: home management: Minimal assist,with light demand household tasks,from wheelchair  OT goal 1: Pt will demo Min A shower transfer using DME/AE prn       PT Frequency: Daily 60-90 minutes  PT goal: bed mobility: Modified independent,Supine to/from sit,Rolling  PT goal: transfers: Modified independent,Sit to/from stand,Bed to/from chair  PT goal: stairs: Rail on right,Greater than 10 stairs,Minimal assist  PT goal 1: Floor transfer min-A for fall recovery  PT Goal 2: Car transfer min-A for transportation needs               SLP Frequency: daily   SLP goal 1: Patient will complete moderate level executive function tasks with 80% accuracy given moderate cues.  SLP goal 2: Patient will recall functional information with 90% accuracy given external aids and min assist.  SLP goal 3: Pt will complete simple attention tasks with 90% accuracy with mod cues to implement strategies.     Nursing Goals  Bowel and Bladder care  Fall prevention   Medication Education  Skin Care protection

## 2022-02-15 NOTE — PROGRESS NOTES
Marlon Farooq POLINA initiated insurance auth this afternoon. Planning discharge on Friday 02/18. SW will confirm auth, complete PAS, set up transport, and update pt and ARU team. Plan to discuss HCD/POA and other resources again before discharge. MARLENA will continue to follow.     VINCENZO Chua   [facility]   Direct Phone: [phone]  I   Pager: [phone]  I  Fax: [phone]

## 2022-02-15 NOTE — PLAN OF CARE
Discharge Planner Post-Acute Rehab PT:      Discharge Plan: [facility] Friday     Precautions: Fall/alarm, L angelique, L field cut and neglect     Current Status:  Bed Mobility: SBA to min-A  Transfer: Min-A squat/pivot R, mod/max-L  Gait: 20' R munoz rail, L AFO, mod to max-A LLE management and knee block in stance  Stairs: Unable  Balance: Sits supervision EOM, Assist in standing  PASS 1/29/22: 11/36  PASS 2/7/22: 19/36     Assessment: Alerted by nursing over concerns of pt's positioning in chair, however had not been an issue in the past with same chair. Pt was able to correct and maintain with verbal cues. FES today.    Other Barriers to Discharge (DME, Family Training, etc):   Stairs to enter  Level of support  W/C  Further gait DME TBD

## 2022-02-15 NOTE — PROGRESS NOTES
Discharge Planner Post-Acute Rehab OT:      Discharge Plan: TRINA Farooq     Precautions: L side inattention and hemiparesis, L visual field cut, falls     Current Status:  ADLs:    Mobility: W/c based. Min SPT R side, Mod A L side. Kaley ramsey A x 1 with nursing. SPT Ax1 to R with nursing    Grooming: Set up supported sitting.    Dressing: UB Mod A. LB Max A..     Bathing: Max A tub bench.    Toileting: Total A hygiene/clothing. SPT Ax1 W/C<>toilet  IADLs: Needs assistance.  Vision/Cognition: L side inattention and L visual field cut. Mild cognitive deficits with executive functioning and attention.     Assessment: Pt completed ADL routine W/C based with focus on safety with SPT. Provided pt with Bamboo elbow splint  for LUE to aid in maintaining a neutral position d/t increased flexor tone in elbow. Utilized NMES for Left shoulder subluxation protocol. Continue with OT POC.      Other Barriers to Discharge (DME, Family Training, etc):   Pt lives alone and caregiver support TBD.  Home set up - 2nd level apartment, stairs.  Level of physical assistance.

## 2022-02-15 NOTE — PROGRESS NOTES
Covid vaccine booster given in left deltoid this evening.  Patient had order from MD and given today without any effects.  Patient states previous covid vaccines patient has had an episode of speech issues the day after vaccines were given.  Cont to monitor for any side effects.

## 2022-02-15 NOTE — PLAN OF CARE
FOCUS/GOAL  Medical management    ASSESSMENT, INTERVENTIONS AND CONTINUING PLAN FOR GOAL:  BP trending high, last reading was 140/87. Clonidine ( new order) given at the moment. Nursing to keep monitoring. Denied pain, voided using urinal. No BM reported.  Patient  Was up in chair from shift start to end.  Fair appetite. Will continue with POC.

## 2022-02-15 NOTE — PROGRESS NOTES
"  [facility]   Acute Rehabilitation Unit    INTERVAL HISTORY  Patient was seen and examined at bedside this morning during team rounds.  No acute events reported overnight.  Today he reports that he is generally feeling well and making ongoing progress with therapies.  He does report new onset of an episodic \"twitch\" in bilateral upper/lower extremities, that had previously been occurring about every few seconds though no longer at the time of our visit.  He denies any associated pain, but said it had occurred with PT the day before.  Therapists denied observing any changes.  Tone does seem improved with baclofen, though OT noting some clonus on LUE now and feels tone still limiting.  Patient agreeable to COVID booster as previously discussed before transfer to TCU.  Discussed ongoing monitoring for BP and kidney function.    Functionally, he continues to make gradual progress but remains limited by L hemiparesis, inattention and would benefit from extended rehab course at TCU, to which he is planning to discharge on Friday pending insurance authorization.  For full functional updates, see team rounds note from today.      MEDICATIONS  Scheduled meds    aMILoride  10 mg Oral Daily     aspirin  81 mg Oral Daily     atorvastatin  40 mg Oral QPM     Baclofen  5 mg Oral TID     carvedilol  37.5 mg Oral BID w/meals     chlorthalidone  25 mg Oral Daily     cloNIDine  0.1 mg Oral BID     [START ON 2/16/2022] COVID-19 mRNA vacc (PFIZER)  30 mcg Intramuscular Prior to discharge     divalproex sodium extended-release  1,000 mg Oral Daily     lidocaine   Transdermal Q8H     [Held by provider] lisinopril  40 mg Oral Daily     [Held by provider] metFORMIN  500 mg Oral Daily with supper       PRN meds:  acetaminophen, bisacodyl, diclofenac, diphenhydrAMINE **OR** diphenhydrAMINE, EPINEPHrine, hydrALAZINE, lidocaine **AND** lidocaine, ondansetron, polyethylene glycol, sennosides      PHYSICAL " "EXAM  BP (!) 142/80 (BP Location: Left arm)   Pulse 57   Temp (!) 96.6  F (35.9  C) (Oral)   Resp 16   Ht 1.737 m (5' 8.4\")   Wt 76.3 kg (168 lb 3.2 oz)   SpO2 97%   BMI 25.28 kg/m    Gen: NAD, sitting upright in chair  HEENT: NCAT, L facial weakness  Cardio: RRR     Pulm: non-labored on room air  Abd: soft, non-distended  Ext: no edema in bilateral lower extremities    Neuro/MSK: left sided neglect with clear gaze preference. Left hemiparesis.  Some increased tone in LUE at pectoralis, biceps, wrist and finger flexors - hemiparetic flexion positioning.  L ankle clonus.      LABS  CBC RESULTS:   Recent Labs   Lab Test 02/14/22  0902 02/07/22  0721 02/03/22  0754   WBC 6.0 7.4 9.0   RBC 4.26* 4.31* 4.52   HGB 13.0* 12.9* 13.8   HCT 37.9* 39.1* 40.8   MCV 89 91 90   MCH 30.5 29.9 30.5   MCHC 34.3 33.0 33.8   RDW 13.4 13.6 13.4   * 196 256     Last Basic Metabolic Panel:  Recent Labs   Lab Test 02/15/22  0807 02/14/22  1803 02/14/22  0902 02/12/22  0743 02/12/22  0548 02/10/22  1721 02/10/22  0852   NA  --   --  141  --  138  --  139   POTASSIUM  --   --  4.1  --  4.1  --  3.7   CHLORIDE  --   --  106  --  104  --  104   CO2  --   --  29  --  28  --  28   ANIONGAP  --   --  6  --  6  --  7   * 136* 169*   < > 150*   < > 288*   BUN  --   --  61*  --  58*  --  59*   CR  --   --  1.79*  --  1.89*  --  2.04*   GFRESTIMATED  --   --  46*  --  44*  --  40*   VALERIE  --   --  9.6  --  9.6  --  9.4    < > = values in this interval not displayed.     Recent Labs   Lab 02/15/22  0807 02/14/22  1803 02/14/22  0902 02/14/22  0747 02/13/22  2136 02/13/22  1718   * 136* 169* 149* 242* 168*       ASSESSMENT AND PLAN  Miriam Hall is a 46 year old R hand dominant male with h/o HTN who was admitted on 1/8 with R PCA ischemic stroke. His hospital course was complicated by concerns for secondary HTN, new diagnosis of DM II, DISHA, hypokalemia and encephalopathy. He clinically improved and transferred to ARU for " "ongoing medical management and intensive inpatient rehab. He is progressing with therapy, and is still having episodes of HTN. Medicine is following and is helping with BP management as well as DISHA management.        Admission to acute inpatient rehab on 1/28/2022.    Impairment group code: Stroke Ischemic 01.1 (L) Body Involvement (R) Brain: R PCA ischemic stroke w/ hemorrhagic transformation noted      Rehabilitation - continue comprehensive acute inpatient rehabilitation program with multidisciplinary approach including therapies, rehab nursing, and physiatry following. See interval history for updates.         Medical Conditions  Neuro  Acute right PCA ischemic stroke   R P1 occlusion and R M2 Stenosis  Hemorrhagic transformation of ischemic infarct  -Hypercoagulability panel negative  -vasculitis panel negative  -aspirin 81mg   -HTN, DM and HLD control as below for secondary prevention   -ziopatch until 2/10   -acute rehab with PT, OT, and SLP  -per neuro recommendations, continue depakote until outpatient f/u  -should see stroke neurology and PM&R after discharge as well as genetic team per recs   --Noted worsening L arm tone, started baclofen 5 mg TID on 2/10, thus far seems to be tolerating well and improved tone, states ROM improving, but still limited functional gains on the L.  OT concerned that increased tone still barrier, patient describing episodic \"twitching\" though he notes this is present on R as well.  Deferred change to baclofen today, but monitor and consider increasing dose to 10 mg TID as he has tolerated thus far.    Possible seizures   EEG 1/12 concerning for subclinical seizures. Loaded with Keppra then transitioned to Depakote.   -continue Depakote  -seizure precautions  -follow-up neurology for ongoing     MSK  L Hip Pain  Reported increased L hip pain at ARU admission.  Hip xray 1/24 without evidence of fracture.  Started on scheduled Tylenol, topicals.  Ongoing improvements, not " interfering with therapy.  -Change scheduled Tylenol to PRN  -Change voltaren, lidocaine patch to PRN per patient request  -Continue ice, heat PRN    CV  Refractory HTN  -possibly secondary to clinical hyperaldosteronism of several possible etiologies, nephrology and cardiology consulted but no clear etiology  -Appreciate internal medicine assistance   -Continue amiloride 10mg daily  -Carvedilol 37.5mg bid  -Chlorthalidone 25mg daily  -Clonidine 0.1mg - dose decreased 2/2 and discontinued 2/4, resumed 2/6 with Cr stabilized.  BP increased over weekend with holding lisinopril, clonidine dose increased back to BID.  -Hydralazine 50mg Q8H - discontinued 2/1.  Per discussion with hospitalist today, will review to consider scheduled vs ongoing PRN use.  BP remains elevated, up to 193/103 last evening, improved after administration of PRN hydralazine.  -Lisinopril 40mg daily - held starting 2/10 due to renal function (per hosp/nephro)  -F/u with nephrology and endo teams as outpatient       HLD  -Continue atorvastatin 40mg     Endo  Type 2 Diabetes, new diagnosis this admission  A1c 7.8 1/8  -Metformin 500mg with dinner; held since 2/1 due to worsening Cr  -Sliding scale insulin; was discontinued 2/3 as he was not requiring    -BG stable 136-242 last 24 hours, but previously appear to be well-controlled.  Monitor for persistent elevations.     Renal  DISHA likely due to hypovolemia and medication induced.  While inpatient Cr up to peak of 1.56 on 1/27 and then improved to 1.3 on 1/28; however steadily at ARU and then stabilized in 1.7 range.  Renal U/S on 1/31 unrevealing.  Hydralazine stopped, clonidine held then resumed at lower dose.  Per nephro, thought this may represent new baseline, however then increased up to 2.04 on 2/10.  Hospitalist discussed with nephro, holding lisinopril.  Repeat BMP 2/12 with Cr improved to 1.89.  -Hospitalist following, appreciate assistance  -Continue holding ACEi.  Repeat BMP 2/14 with Cr  further improved to 1.79, next labs on Th to monitor.  -Other nephrotoxic medications including amiloride, chlorthalidone, clonidine  -Monitor Cr twice weekly or more often as indicated  -F/u with nephrology as outpatient     Hypokalemia, resolved  -20 meq K BID; discontinued 2/1  -K stable WNL  -hyperaldosteronism workup completed while inpatient  -monitor with BMP    2. Adjustment to disability:  Clinical psychology to eval and treat as needed   3. FEN: regular diet   4. Bowel: continent, PRN miralax and senna  5. Bladder: continent   6. DVT Prophylaxis: mechanical with SCDs  7. GI Prophylaxis: none   8. Code: Full  9. Disposition: plan for extended rehab at [facility]  10. ELOS:  21 days  11. Follow up Appointments on Discharge: PCP, neuro, cardio for Zio patch, nephrology, endo, genetics, and PM&R       I, Linda Perez, spent a total of 35 minutes face-to-face or managing the care of Miriam Hall. Over 50% of my time on the unit was spent counseling the patient and coordinating care. See note for details.         Seen and discussed with Dr. Jean Paul Kohler, PM&R staff physician and discussed with Carolina Antunez, IM ACNP    Linda Perez PA-C  Physical Medicine & Rehabilitation

## 2022-02-15 NOTE — PLAN OF CARE
Discharge Planner Post-Acute Rehab SLP:      Discharge Plan: TCU with ongoing SLP     Precautions: Fall, Left-Attention impairment     Current Status:  Communication: Mild dysarthria. Occasional word-finding difficulties in conversation, however pt is fluent and able to communicate wants/needs.  Cognition: At least moderate cognitive-linguistic deficits in the areas of attention, executive function, processing speed, and visuospatial skills.  Swallow: Pt reportedly tolerating a regular diet with thin liquids. Pt discharged from previous hospital with no swallowing concerns.        Assessment: Patient completed short story implementing left-side attention with 90% accuracy given min cues. Patient improved in self-regulation with reading errors made when given additional time. Patient provided short story task to complete independently.      Other Barriers to Discharge (Family Training, etc): TBD

## 2022-02-15 NOTE — PLAN OF CARE
FOCUS/GOAL  Medication management    ASSESSMENT, INTERVENTIONS AND CONTINUING PLAN FOR GOAL:  Pt Aox4, able to make needs known, using call light appropriately.  A1 pivot transfer.  L side angelique, wearing brace to wrist.  Baclofen effective for helping spasms in LUE.  Denies pain, cough, sob, chest pain, dizziness, N/V.  BG checked BID, good on writers shift.  Pt has been hypertensive, scheduled medications had been ineffective, PRN hydralazine given and was somewhat effective.  Monitoring closely.  Asymptomatic of HTN.  Reg/thin, taking pills whole with water.  Nursing will continue with POC.

## 2022-02-15 NOTE — PROGRESS NOTES
[facility] Acute Rehabilitation Unit  Internal Medicine Daily Progress Note    ARU day#: 18    Assessment & Plan: Miriam Hall is a 46 year old man with a history of HTN who was admitted to [facility] 1/8-1/28/22 for right PCA stroke c/b refractory HTN w/ concern for secondary HTN, DISHA, new dx of DM II, hypokalemia, and encephalopathy. Transferred to ARU for ongoing rehabilitation.  Internal medicine following for medical complexity.     #Acute Right PCA Stroke  Presented w/ left hemiplegia and head CT showing right parieto-occipital infarct and CTA showing occlusion of proximal right PCA and high grade stenosis of M2 branch of right MCA. Was outside window for tPA. No thrombectomy performed d/t location of LVO in PCA. Repeat imaging 1/12 w/ hemorrhagic transformation. Source 2/2 possible intracranial atherosclerosis vs. ESUS. CARISA neg for LA thrombus. Residual deficits include L hemiplegia, L hemianopsia, mild dysarthria.  - Continue ASA, statin.   - Continue empiric Depakote (and seizure precautions) until neuro clinic f/u (had multiple vEEG neg for seizure activity but elevated risk d/t stroke burden and prior prolonged period of encephalopathy).   - BP management as below; goal < 130/80.      #HTN  Required 6 antihypertensives while inpatient to maintain SBP goal < 180. No clear etiology despite extensive workup and consultations with Cardiology and Nephrology. Discharged on amiloride, carvedilol, chlorthalidone, clonidine, lisinopril, hydralazine, but hydralazine was stopped 2/1 and clonidine 2/4. BP then began to rise and clonidine was restarted 2/6, increased to BID on 2/12. BPs were very well controlled since. Lisinopril stopped d/t DISHA on 2/11.  BPs seem to be trending up overall, last night 195/103 requiring PRN hydralazine, currently 154/90.  - Continue amiloride 10 mg daily, carvedilol 37.5 mg BID, chlorthalidone 25 mg daily, clonidine 0.1 mg BID.   - D/w nephrology today who recommends increasing Clonidine  to 0.1 mg TID.  Slow titration of antiHTN meds as pt renal fxn appears to be quite sensitive to fluctuations.  Would recommend goal SBP ~150s this week and adjustments can be made as OP.   - Continue PRN Hydralazine   - Needs OP nephrology, endocrinology, and genetics follow ups.      #DISHA  Cr 1.3 on hospital admit, best of 0.94 on 1/20, bump on 1/23 back to 1.3, and peak of 1.56 on 1/27 --> 1.3 w/ IV fluid bolus. Here has been ~1.7 since 2/1. Was discussed w/ nephrology x2 around that time and attributed to hypotension as patient had some normal but lower than his usual BP readings.  Despite his BPs being relatively stable, his Cr has not improved drastically.  Worsened to 2.04 on 2/10, most recently 1.79 2/14.  Renal US 1/31 was unrevealing. No e/o retention on prior bladder scans. Does not look fluid responsive w/ U Na 79 (last on 1/31). Suspect ACE-I, diuretics and relative hypotension as culprit per d/w nephrology. Lisinopril was stopped and 2/12.  - Lisinopril to remain on hold.   - Follow BMPs twice weekly (qMTh).  - Will need clinic f/u after discharge.      #DM II  New dx during hospital stay w/ HgbA1c 7.8%. Endocrinology saw IP and recommended metformin, which is currently on hold d/t DISHA. BG well controlled.   - Needs diabetic education and meter/supplies before discharge (added to sticky note). Will resume metformin when CrCl allows.      IM will follow for BP management and DISHA.     Carolina Antunez, MPH, [facility]  Hospitalist Service  Pager [phone]  ________________________________________________________________    Subjective & Interval History:  No concerns today.  BP well controlled this AM, higher last night.  No overnight events noted.     Last 24 hour care team notes reviewed.   ROS: 4 point ROS (including Respiratory, CV, GI and ) was performed and negative unless otherwise noted in HPI.     Medications: Reviewed in EPIC.    Physical Exam:    Blood pressure 97/66, pulse 96, temperature  "97.8  F (36.6  C), temperature source Oral, resp. rate 16, height 1.651 m (5' 5\"), weight 56.6 kg (124 lb 12.5 oz), SpO2 99 %.    GENERAL: Alert and oriented x 3.  Pleasant and interactive, no apparent distress.   HEENT: Anicteric sclera. Mucous membranes moist.   CV: RRR. S1, S2. No murmurs appreciated.   RESPIRATORY: Effort normal on room air. Lungs CTAB with no wheezing, rales, rhonchi.   GI: Abdomen soft and non distended, bowel sounds present. No tenderness, rebound, guarding.   NEUROLOGICAL: No focal deficits. Moves all extremities.    EXTREMITIES: No peripheral edema. Intact bilateral pedal pulses.   SKIN: No jaundice. No rashes.     Lines/Tubes/Drains:  None   "

## 2022-02-16 ENCOUNTER — APPOINTMENT (OUTPATIENT)
Dept: PHYSICAL THERAPY | Facility: CLINIC | Age: 47
End: 2022-02-16
Attending: PHYSICAL MEDICINE & REHABILITATION
Payer: COMMERCIAL

## 2022-02-16 ENCOUNTER — APPOINTMENT (OUTPATIENT)
Dept: OCCUPATIONAL THERAPY | Facility: CLINIC | Age: 47
End: 2022-02-16
Attending: PHYSICAL MEDICINE & REHABILITATION
Payer: COMMERCIAL

## 2022-02-16 ENCOUNTER — APPOINTMENT (OUTPATIENT)
Dept: SPEECH THERAPY | Facility: CLINIC | Age: 47
End: 2022-02-16
Attending: PHYSICAL MEDICINE & REHABILITATION
Payer: COMMERCIAL

## 2022-02-16 LAB
GLUCOSE BLDC GLUCOMTR-MCNC: 141 MG/DL (ref 70–99)
GLUCOSE BLDC GLUCOMTR-MCNC: 161 MG/DL (ref 70–99)

## 2022-02-16 PROCEDURE — 99232 SBSQ HOSP IP/OBS MODERATE 35: CPT | Mod: FS | Performed by: PHYSICAL MEDICINE & REHABILITATION

## 2022-02-16 PROCEDURE — 97530 THERAPEUTIC ACTIVITIES: CPT | Mod: GP

## 2022-02-16 PROCEDURE — 97130 THER IVNTJ EA ADDL 15 MIN: CPT | Mod: GN

## 2022-02-16 PROCEDURE — 97112 NEUROMUSCULAR REEDUCATION: CPT | Mod: GO | Performed by: OCCUPATIONAL THERAPIST

## 2022-02-16 PROCEDURE — 250N000013 HC RX MED GY IP 250 OP 250 PS 637: Performed by: PHYSICIAN ASSISTANT

## 2022-02-16 PROCEDURE — 250N000013 HC RX MED GY IP 250 OP 250 PS 637: Performed by: NURSE PRACTITIONER

## 2022-02-16 PROCEDURE — 99233 SBSQ HOSP IP/OBS HIGH 50: CPT | Performed by: NURSE PRACTITIONER

## 2022-02-16 PROCEDURE — 99207 PR CDG-CHARGE REQUIRED MANUAL ENTRY: CPT | Performed by: NURSE PRACTITIONER

## 2022-02-16 PROCEDURE — 97129 THER IVNTJ 1ST 15 MIN: CPT | Mod: GN

## 2022-02-16 PROCEDURE — 97116 GAIT TRAINING THERAPY: CPT | Mod: GP

## 2022-02-16 PROCEDURE — 97535 SELF CARE MNGMENT TRAINING: CPT | Mod: GO | Performed by: OCCUPATIONAL THERAPIST

## 2022-02-16 PROCEDURE — 250N000013 HC RX MED GY IP 250 OP 250 PS 637: Performed by: PHYSICAL MEDICINE & REHABILITATION

## 2022-02-16 PROCEDURE — 128N000003 HC R&B REHAB

## 2022-02-16 RX ADMIN — AMILORIDE HYDROCLORIDE 10 MG: 5 TABLET ORAL at 08:23

## 2022-02-16 RX ADMIN — Medication 5 MG: at 08:23

## 2022-02-16 RX ADMIN — Medication 5 MG: at 13:38

## 2022-02-16 RX ADMIN — DIVALPROEX SODIUM 1000 MG: 500 TABLET, FILM COATED, EXTENDED RELEASE ORAL at 08:22

## 2022-02-16 RX ADMIN — CLONIDINE HYDROCHLORIDE 0.1 MG: 0.1 TABLET ORAL at 13:39

## 2022-02-16 RX ADMIN — CLONIDINE HYDROCHLORIDE 0.1 MG: 0.1 TABLET ORAL at 08:23

## 2022-02-16 RX ADMIN — CARVEDILOL 37.5 MG: 25 TABLET, FILM COATED ORAL at 08:22

## 2022-02-16 RX ADMIN — Medication 5 MG: at 20:04

## 2022-02-16 RX ADMIN — ATORVASTATIN CALCIUM 40 MG: 40 TABLET, FILM COATED ORAL at 20:04

## 2022-02-16 RX ADMIN — CHLORTHALIDONE 25 MG: 25 TABLET ORAL at 08:28

## 2022-02-16 RX ADMIN — CLONIDINE HYDROCHLORIDE 0.1 MG: 0.1 TABLET ORAL at 20:04

## 2022-02-16 RX ADMIN — ASPIRIN 81 MG CHEWABLE TABLET 81 MG: 81 TABLET CHEWABLE at 08:23

## 2022-02-16 RX ADMIN — CARVEDILOL 37.5 MG: 25 TABLET, FILM COATED ORAL at 17:50

## 2022-02-16 ASSESSMENT — ACTIVITIES OF DAILY LIVING (ADL)
ADLS_ACUITY_SCORE: 13

## 2022-02-16 NOTE — PROGRESS NOTES
[facility] Acute Rehabilitation Unit  Internal Medicine Daily Progress Note    ARU day#: 19    Assessment & Plan:   Miriam Hall is a 46 year old man with a history of HTN who was admitted to [facility] 1/8-1/28/22 for right PCA stroke c/b refractory HTN w/ concern for secondary HTN, DISHA, new dx of DM II, hypokalemia, and encephalopathy. Transferred to ARU for ongoing rehabilitation.  Internal medicine following for medical complexity.     #Acute Right PCA Stroke  Presented w/ left hemiplegia and head CT showing right parieto-occipital infarct and CTA showing occlusion of proximal right PCA and high grade stenosis of M2 branch of right MCA. Was outside window for tPA. No thrombectomy performed d/t location of LVO in PCA. Repeat imaging 1/12 w/ hemorrhagic transformation. Source 2/2 possible intracranial atherosclerosis vs. ESUS. CARISA neg for LA thrombus. Residual deficits include L hemiplegia, L hemianopsia, mild dysarthria.  - Continue ASA, statin.   - Continue empiric Depakote (and seizure precautions) until neuro clinic f/u (had multiple vEEG neg for seizure activity but elevated risk d/t stroke burden and prior prolonged period of encephalopathy).   - BP management as below; goal < 130/80 eventually, however, monitoring renal fxn closely as this is very sensitive to BP changes.      #HTN  Required 6 antihypertensives while inpatient to maintain SBP goal < 180. No clear etiology despite extensive workup and consultations with Cardiology and Nephrology. Discharged on amiloride, carvedilol, chlorthalidone, clonidine, lisinopril, hydralazine, but hydralazine was stopped 2/1 and clonidine 2/4. BP then began to rise and clonidine was restarted 2/6, increased to BID on 2/12.  Lisinopril stopped d/t DISHA on 2/11.  BP spiking again 2/14-2/15, d/w nephrology and increased Clonidine to TID with improvement.   - Continue amiloride 10 mg daily, carvedilol 37.5 mg BID, chlorthalidone 25 mg daily, clonidine 0.1 mg BID.   - Continue  Clonidine 0.1 mg TID with holding parameters.  Recommend slow titration of antiHTN meds as pt renal fxn appears to be quite sensitive to fluctuations.  Would recommend goal SBP ~150s this week and adjustments can be made as OP.   - Continue PRN Hydralazine   - Needs OP nephrology, endocrinology, and genetics follow ups.      #DISHA  Cr 1.3 on hospital admit, best of 0.94 on 1/20, bump on 1/23 back to 1.3, and peak of 1.56 on 1/27 --> 1.3 w/ IV fluid bolus. Here has been ~1.7 since 2/1. Was discussed w/ nephrology x2 around that time and attributed to hypotension as patient had some normal but lower than his usual BP readings.  Despite his BPs being relatively stable, his Cr has not improved drastically.  Worsened to 2.04 on 2/10, most recently 1.79 2/14.  Renal US 1/31 was unrevealing. No e/o retention on prior bladder scans. Does not look fluid responsive w/ U Na 79 (last on 1/31). Suspect ACE-I, diuretics and relative hypotension as culprit per d/w nephrology. Lisinopril was stopped and 2/12.  - Lisinopril to remain on hold.   - Follow BMPs twice weekly (qMTh).  - Will need clinic f/u after discharge.      #DM II  New dx during hospital stay w/ HgbA1c 7.8%. Endocrinology saw IP and recommended metformin, which is currently on hold d/t DISHA. BG well controlled. DM educator met with patient today.   - Needs diabetic supplies before discharge (added to sticky note). Will resume metformin when CrCl allows.      IM will follow for BP management and DISHA.     Carolina Antunez, MPH, [facility]  Hospitalist Service  Pager [phone]  ________________________________________________________________    Subjective & Interval History:  No concerns today.  BP well controlled this AM.  No overnight events noted.     Last 24 hour care team notes reviewed.   ROS: 4 point ROS (including Respiratory, CV, GI and ) was performed and negative unless otherwise noted in HPI.     Medications: Reviewed in EPIC.    Physical Exam:    Blood  "pressure 97/66, pulse 96, temperature 97.8  F (36.6  C), temperature source Oral, resp. rate 16, height 1.651 m (5' 5\"), weight 56.6 kg (124 lb 12.5 oz), SpO2 99 %.    GENERAL: Alert and oriented x 3.  Pleasant and interactive, no apparent distress.   HEENT: Anicteric sclera. Mucous membranes moist.   CV: RRR. S1, S2. No murmurs appreciated.   RESPIRATORY: Effort normal on room air. Lungs CTAB with no wheezing, rales, rhonchi.   GI: Abdomen soft and non distended, bowel sounds present. No tenderness, rebound, guarding.   NEUROLOGICAL: L hemiparesis.  L sided neglect with gaze preference.  Increased tone in LUE.    EXTREMITIES: No peripheral edema. Intact bilateral pedal pulses.   SKIN: No jaundice. No rashes.     Lines/Tubes/Drains:  None   "

## 2022-02-16 NOTE — PLAN OF CARE
Discharge Planner Post-Acute Rehab PT:      Discharge Plan: [facility] Friday     Precautions: Fall/alarm, L angelique, L field cut and neglect     Current Status:  Bed Mobility: SBA to min-A  Transfer: Min-A squat/pivot R, mod-L  Gait: 20' R munoz rail, L AFO, mod-A LLE management and knee block in stance  Stairs: Unable  Balance: Sits supervision EOM, Assist in standing  PASS 1/29/22: 11/36  PASS 2/7/22: 19/36     Assessment: Performed gait this AM, improving at munoz rail, difficulty with progression off to cane due to sequencing, decreased stability with rolling table. Planning discharge Friday to [facility].    Other Barriers to Discharge (DME, Family Training, etc):   None to TCU

## 2022-02-16 NOTE — PLAN OF CARE
Goal Outcome Evaluation:  BP fluctuating between 140 and 170. BP medications adjusted yesterday. Nursing to keep monitoring.  Patient still wheelchair base and need assistance with mobility. Was up in chair the entire shift.  Voided using urinal. No BM reported.  this morning. Patient need endocrine and nephrology consult for more insight in glucose and BP management. Will continue with POC.

## 2022-02-16 NOTE — PLAN OF CARE
"Discharge Planner Post-Acute Rehab SLP:      Discharge Plan: TCU with ongoing SLP     Precautions: Fall, Left-Attention impairment     Current Status:  Communication: Mild dysarthria. Occasional word-finding difficulties in conversation, however pt is fluent and able to communicate wants/needs.  Cognition: At least moderate cognitive-linguistic deficits in the areas of attention, executive function, processing speed, and visuospatial skills.  Swallow: Pt reportedly tolerating a regular diet with thin liquids. Pt discharged from previous hospital with no swallowing concerns.        Assessment: Patient started health care directive documentation. With moderate prompting from clinician, patient independently wrote personal information with visual and verbal cues provided. While reading through document, patient required multiple moderate cues to use \"carriage return\" and visual scan to the left compared to session prior. Patient completed memory log for tasks from OT and Speech as well as homework task to be completed for tomorrow.     Other Barriers to Discharge (Family Training, etc): TBD  "

## 2022-02-16 NOTE — PROGRESS NOTES
"  [facility]   Acute Rehabilitation Unit    INTERVAL HISTORY  Patient was seen and examined at bedside this morning.  No acute events reported overnight.  Today patient is very happy and emotional to report experiencing improved sensation and movement in his left hand.  He denies any ongoing \"twitching\" or electrical sensations as he had noted yesterday.  He received COVID booster yesterday evening, denies any side effects.  He denies other concerns.    Functionally, patient is currently needing SBA to min A for bed mobility, min A for squat/pivot transfers to R, mod to max A to L, ambulating 20' with R munoz rial and L AFO with mod to max A.    ROS: 10 point ROS neg other than the symptoms noted above in the HPI.      MEDICATIONS  Scheduled meds    aMILoride  10 mg Oral Daily     aspirin  81 mg Oral Daily     atorvastatin  40 mg Oral QPM     Baclofen  5 mg Oral TID     carvedilol  37.5 mg Oral BID w/meals     chlorthalidone  25 mg Oral Daily     cloNIDine  0.1 mg Oral TID     divalproex sodium extended-release  1,000 mg Oral Daily     lidocaine   Transdermal Q8H     [Held by provider] lisinopril  40 mg Oral Daily     [Held by provider] metFORMIN  500 mg Oral Daily with supper       PRN meds:  acetaminophen, bisacodyl, diclofenac, diphenhydrAMINE **OR** diphenhydrAMINE, EPINEPHrine, hydrALAZINE, lidocaine **AND** lidocaine, ondansetron, polyethylene glycol, sennosides      PHYSICAL EXAM  BP (!) 176/101 (BP Location: Right arm)   Pulse 60   Temp 97  F (36.1  C) (Oral)   Resp 18   Ht 1.737 m (5' 8.4\")   Wt 70.5 kg (155 lb 8 oz)   SpO2 99%   BMI 23.37 kg/m    Gen: NAD, sitting upright in chair  HEENT: NCAT, L facial weakness  Cardio: RRR     Pulm: non-labored on room air  Abd: soft, non-distended  Ext: no edema in bilateral lower extremities    Neuro/MSK: left sided neglect with clear gaze preference. Left hemiparesis.  Some increased tone in LUE at pectoralis, biceps, wrist " and finger flexors but improved.  Active/trace movement in finger flexors today on LUE.  Bamboo splint L elbow,  L AFO      LABS  CBC RESULTS:   Recent Labs   Lab Test 02/14/22  0902 02/07/22  0721 02/03/22  0754   WBC 6.0 7.4 9.0   RBC 4.26* 4.31* 4.52   HGB 13.0* 12.9* 13.8   HCT 37.9* 39.1* 40.8   MCV 89 91 90   MCH 30.5 29.9 30.5   MCHC 34.3 33.0 33.8   RDW 13.4 13.6 13.4   * 196 256     Last Basic Metabolic Panel:  Recent Labs   Lab Test 02/16/22  0816 02/15/22  1725 02/15/22  0807 02/14/22  1803 02/14/22  0902 02/12/22  0743 02/12/22  0548 02/10/22  1721 02/10/22  0852   NA  --   --   --   --  141  --  138  --  139   POTASSIUM  --   --   --   --  4.1  --  4.1  --  3.7   CHLORIDE  --   --   --   --  106  --  104  --  104   CO2  --   --   --   --  29  --  28  --  28   ANIONGAP  --   --   --   --  6  --  6  --  7   * 265* 145*   < > 169*   < > 150*   < > 288*   BUN  --   --   --   --  61*  --  58*  --  59*   CR  --   --   --   --  1.79*  --  1.89*  --  2.04*   GFRESTIMATED  --   --   --   --  46*  --  44*  --  40*   VALERIE  --   --   --   --  9.6  --  9.6  --  9.4    < > = values in this interval not displayed.     Recent Labs   Lab 02/16/22  0816 02/15/22  1725 02/15/22  0807 02/14/22  1803 02/14/22  0902 02/14/22  0747   * 265* 145* 136* 169* 149*       ASSESSMENT AND PLAN  Miriam Hall is a 46 year old R hand dominant male with h/o HTN who was admitted on 1/8 with R PCA ischemic stroke. His hospital course was complicated by concerns for secondary HTN, new diagnosis of DM II, DISHA, hypokalemia and encephalopathy. He clinically improved and transferred to ARU for ongoing medical management and intensive inpatient rehab. He is progressing with therapy, and is still having episodes of HTN. Medicine is following and is helping with BP management as well as DISHA management.        Admission to acute inpatient rehab on 1/28/2022.    Impairment group code: Stroke Ischemic 01.1 (L) Body Involvement  (R) Brain: R PCA ischemic stroke w/ hemorrhagic transformation noted      Rehabilitation - continue comprehensive acute inpatient rehabilitation program with multidisciplinary approach including therapies, rehab nursing, and physiatry following. See interval history for updates.         Medical Conditions  Neuro  Acute right PCA ischemic stroke   R P1 occlusion and R M2 Stenosis  Hemorrhagic transformation of ischemic infarct  -Hypercoagulability panel negative  -vasculitis panel negative  -aspirin 81mg   -HTN, DM and HLD control as below for secondary prevention   -ziopatch until 2/10   -acute rehab with PT, OT, and SLP  -per neuro recommendations, continue depakote until outpatient f/u  -should see stroke neurology and PM&R after discharge as well as genetic team per recs   --Noted worsening L arm tone, started baclofen 5 mg TID on 2/10, thus far seems to be tolerating well and improved tone, states ROM improving, but still limited functional gains on the L.  Monitor and consider increasing dose to 10 mg TID as he has tolerated thus far.    Possible seizures   EEG 1/12 concerning for subclinical seizures. Loaded with Keppra then transitioned to Depakote.   -continue Depakote  -seizure precautions  -follow-up neurology for ongoing     CV  Refractory HTN  -possibly secondary to clinical hyperaldosteronism of several possible etiologies, nephrology and cardiology consulted but no clear etiology  -Appreciate internal medicine assistance   -Continue amiloride 10mg daily  -Carvedilol 37.5mg bid  -Chlorthalidone 25mg daily  -Clonidine 0.1mg - dose decreased 2/2 and discontinued 2/4, resumed 2/6 with Cr stabilized.  BP increased over weekend with holding lisinopril, clonidine dose increased back to BID, then to TID on 2/15 per hospitalist/nephrology with goal SBP ~150s this week and further adjustments as OP.  -Hydralazine 50mg Q8H - discontinued 2/1.    -Lisinopril 40mg daily - held starting 2/10 due to renal function  (per hosp/nephro)  -F/u with nephrology and endo teams as outpatient       HLD  -Continue atorvastatin 40mg     Endo  Type 2 Diabetes, new diagnosis this admission  A1c 7.8 1/8  -Metformin 500mg with dinner; held since 2/1 due to worsening Cr  -Sliding scale insulin; was discontinued 2/3 as he was not requiring    -BG stable 136-265 last 24 hours, but previously appear to be well-controlled.  Monitor for persistent elevations.     Renal  DISHA likely due to hypovolemia and medication induced.  While inpatient Cr up to peak of 1.56 on 1/27 and then improved to 1.3 on 1/28; however steadily at ARU and then stabilized in 1.7 range.  Renal U/S on 1/31 unrevealing.  Hydralazine stopped, clonidine held then resumed at lower dose.  Per nephro, thought this may represent new baseline, however then increased up to 2.04 on 2/10.  Hospitalist discussed with nephro, holding lisinopril.  Repeat BMP 2/12 with Cr improved to 1.89.  -Hospitalist following, appreciate assistance  -Continue holding ACEi.  Repeat BMP 2/14 with Cr further improved to 1.79, next labs tomorrow to monitor.  -Other nephrotoxic medications including amiloride, chlorthalidone, clonidine  -Monitor Cr twice weekly or more often as indicated  -F/u with nephrology as outpatient     Hypokalemia, resolved  -20 meq K BID; discontinued 2/1  -K stable WNL  -hyperaldosteronism workup completed while inpatient  -monitor with BMP    MSK  L Hip Pain  Reported increased L hip pain at ARU admission.  Hip xray 1/24 without evidence of fracture.  Started on scheduled Tylenol, topicals.  Ongoing improvements, not interfering with therapy.  -Continue Tylenol, voltaren, lidocaine patch PRN  -Continue ice, heat PRN    COVID - vaccination status  Booster administered on 2/15, no adverse effects noted.    2. Adjustment to disability:  Clinical psychology to eval and treat as needed   3. FEN: regular diet   4. Bowel: continent, PRN miralax and senna  5. Bladder: continent   6. DVT  Prophylaxis: mechanical with SCDs  7. GI Prophylaxis: none   8. Code: Full  9. Disposition: plan for extended rehab at TCU  10. ELOS:  21 days  11. Follow up Appointments on Discharge: PCP, neuro, cardio for Zio patch, nephrology, endo, genetics, and PM&R         Seen and discussed with Dr. Jean Paul Kohler, PM&R staff physician     Linda Perez PA-C  Physical Medicine & Rehabilitation

## 2022-02-16 NOTE — PLAN OF CARE
FOCUS/GOAL  Medication management, Carryover of rehab techniques, and Safety management    ASSESSMENT, INTERVENTIONS AND CONTINUING PLAN FOR GOAL:  A/O, able to use call light and make needs known. VSS. Denies pain, SOB, chest pain, n/v nor dizziness. Assist of 1 stand pivot with transfers. On regular diet, thin liquids, takes medicines whole. Cont of BL/BM, LBM-2/15/22. Had COVID immunization this afternoon, no untoward reactions noted. Had a shower tonight. Will continue with current POC.

## 2022-02-16 NOTE — PLAN OF CARE
FOCUS/GOAL  Bowel management, Bladder management, Pain management and Skin integrity    ASSESSMENT, INTERVENTIONS AND CONTINUING PLAN FOR GOAL:  Pt is alert and oriented x4, denies fever, chills, chest pain, SOB, N/V, abdominal pain, or new weakness/numbness/tingling, continent of bowel and bladder, going to toilet to void and defecate transferring with assist of 1 SPT to right, brace on LUE, sleeping well throughout the night, no tubes, lines or drains, vs stable, no further care concerns at this time continue with POC.

## 2022-02-16 NOTE — PROGRESS NOTES
Discharge Planner Post-Acute Rehab OT:      Discharge Plan: [facility]     Precautions: L side inattention and hemiparesis, L visual field cut, falls     Current Status:  ADLs:    Mobility: W/c based . Min SPT R side, Mod A L side. Kaley ramsey A x 1 with nursing. SPT Ax1 to R with nursing    Grooming: Set up supported sitting.    Dressing: UB Mod A. LB Max A..     Bathing: Max A seated on tub bench.     Toileting: Total A hygiene/clothing. SPT Ax1 W/C<>toilet  IADLs: Needs assistance.  Vision/Cognition: L side inattention and L visual field cut. Mild cognitive deficits with executive functioning and attention.     Assessment: Pt completed ADL routine W/C based with a focus on progressing LBD tasks. Utilized NMES for Left shoulder subluxation protocol. Utilized FES for LUE neuro re-education and sensorimotor skills. Continue with OT POC.      Other Barriers to Discharge (DME, Family Training, etc):   Pt lives alone and caregiver support TBD.  Home set up - 2nd level apartment, stairs.  Level of physical assistance.       Skilled set up on :  Pt dependent for proper placement of electrodes on l u/e for optimum muscle contraction, safe positioning on w/c within frame and cushion for prevention of skin breakdown, UE secured to arm support.  Passive motion assessed to ensure proper positioning.       Pt performed 34 minutes of active FES ergometry with 0-100% stimulation applied to above muscles at 30 rpm with .5nm resistance.  This OT adjusted e-stim and cycling parameters in real-time to ensure palpable muscle contractions throughout session.  Please see [email or web address] for further details on patient's stimulation parameters and ergometry outcomes.       Functional outcomes from this intervention include:   -reduced spasticity  -improved sensory awareness and proprioception  -improved muscle strength  -improved motor coordination

## 2022-02-16 NOTE — CONSULTS
DIABETES CARE  Consulted by Provider for Diabetes Education: needs meter teaching and Metformin, new diagnosis    46 year old male with diabetes. Patient was admitted for rehab following stroke.   Related Co-morbidities include:   Past Medical History:   Diagnosis Date     Hypertension        PCP: no  Social: from home    Nutrition & Diabetes History:   Pt reports he may have been told he had high blood sugars in 2017 but not diagnosed with diabetes.   Typical day of eating:  B: frosted flakes or lately unsweetened oatmeal  L: gyro  D; eggs rolls and huggins  Snacks: food from vending machine, regular soda    Meds for BG Management PTA:  -None    Current Inpatient Meds for BG Management:  -Metformin 500mg daily-on hold d/t Cr     Labs:  Hemoglobin A1C:  7.8% (1/8/22)        Hgb: NA SCr: 1.79  GFR: 46  BGs:   Results for CARLYLE TAVERAS (MRN [id number]) as of 2/16/2022 13:07   Ref. Range 2/14/2022 18:03 2/15/2022 08:07 2/15/2022 17:25 2/16/2022 08:16   GLUCOSE BY METER POCT Latest Ref Range: 70 - 99 mg/dL 136 (H) 145 (H) 265 (H) 161 (H)       Diet Order:  Regular    Weight: 70.5kg  BMI: Body mass index is 23.37 kg/m .      DM EDUCATION/COUNSELING:  Barriers to Learning and/or DM Self-Management: left side paralysis    Current Education and/or visit with Patient and/or caregiver(s):  Provided education on diabetes disease, diagnosis. Discussed current blood sugars/A1C and target/goal numbers.  Discussed diet, exercise and medications important components to optimize BG.     Discussed diet as important components to good BG control. Briefly reviewed diabetic diet including what foods have cho and portions. Also instructed on exercise guidelines with diabetes.      Educated on Metformin, how it works and dosing. Discussed it is currently on hold but may be restarted pending kidney function. Reviewed potential side effects of Metformin.     Provided pt with Contour meter. Instructed on how to use this.  Given pt's left  "side paralysis he can not check his own blood sugars but can have someone assist him.  Reviewed importance of checking blood glucose levels and keeping log and/or bringing meter to all f/u visits.    Reviewed importance of establishing care with PCP. He plans to do this.   (See also \"Diabetic Ed Flowsheet\"  Or Education tab-diabetes for any education topic details.)    RECOMMENDATIONS:  -Monitor kidney function and ability to restart Metformin.  -FYI pt will not be able to check BG 4x per day at home, will have friend assist him as able. With oral meds could aim for 2x per day.     For inpatient diabetes management guidelines refer to \"Guidelines for Subcutaneous Insulin Dosing\" found in the DIAB Subcutaneous Insulin Management Adult order set.       DISCHARGE NEEDS:   RX needed at discharge:  1. Contour Next strips  2. Microlet lancets    Thank you,   Eryn Londono RD, LD, [facility]  Diabetes Educator  Pager: [phone]            "

## 2022-02-17 ENCOUNTER — APPOINTMENT (OUTPATIENT)
Dept: OCCUPATIONAL THERAPY | Facility: CLINIC | Age: 47
End: 2022-02-17
Attending: PHYSICAL MEDICINE & REHABILITATION
Payer: COMMERCIAL

## 2022-02-17 ENCOUNTER — APPOINTMENT (OUTPATIENT)
Dept: SPEECH THERAPY | Facility: CLINIC | Age: 47
End: 2022-02-17
Attending: PHYSICAL MEDICINE & REHABILITATION
Payer: COMMERCIAL

## 2022-02-17 ENCOUNTER — APPOINTMENT (OUTPATIENT)
Dept: PHYSICAL THERAPY | Facility: CLINIC | Age: 47
End: 2022-02-17
Attending: PHYSICAL MEDICINE & REHABILITATION
Payer: COMMERCIAL

## 2022-02-17 ENCOUNTER — TELEPHONE (OUTPATIENT)
Dept: NEPHROLOGY | Facility: CLINIC | Age: 47
End: 2022-02-17
Payer: COMMERCIAL

## 2022-02-17 DIAGNOSIS — N17.9 ACUTE RENAL FAILURE, UNSPECIFIED ACUTE RENAL FAILURE TYPE (H): Primary | ICD-10-CM

## 2022-02-17 LAB
ANION GAP SERPL CALCULATED.3IONS-SCNC: 6 MMOL/L (ref 3–14)
BUN SERPL-MCNC: 50 MG/DL (ref 7–30)
CALCIUM SERPL-MCNC: 9.4 MG/DL (ref 8.5–10.1)
CHLORIDE BLD-SCNC: 101 MMOL/L (ref 94–109)
CO2 SERPL-SCNC: 31 MMOL/L (ref 20–32)
CREAT SERPL-MCNC: 1.82 MG/DL (ref 0.66–1.25)
GFR SERPL CREATININE-BSD FRML MDRD: 46 ML/MIN/1.73M2
GLUCOSE BLD-MCNC: 157 MG/DL (ref 70–99)
GLUCOSE BLDC GLUCOMTR-MCNC: 142 MG/DL (ref 70–99)
GLUCOSE BLDC GLUCOMTR-MCNC: 172 MG/DL (ref 70–99)
POTASSIUM BLD-SCNC: 4.2 MMOL/L (ref 3.4–5.3)
SODIUM SERPL-SCNC: 138 MMOL/L (ref 133–144)

## 2022-02-17 PROCEDURE — 97750 PHYSICAL PERFORMANCE TEST: CPT | Mod: GP | Performed by: PHYSICAL THERAPIST

## 2022-02-17 PROCEDURE — 250N000013 HC RX MED GY IP 250 OP 250 PS 637: Performed by: PHYSICAL MEDICINE & REHABILITATION

## 2022-02-17 PROCEDURE — 80048 BASIC METABOLIC PNL TOTAL CA: CPT | Performed by: PHYSICIAN ASSISTANT

## 2022-02-17 PROCEDURE — 99207 PR CDG-MDM COMPONENT: MEETS HIGH - UP CODED: CPT | Performed by: NURSE PRACTITIONER

## 2022-02-17 PROCEDURE — 97129 THER IVNTJ 1ST 15 MIN: CPT | Mod: GN

## 2022-02-17 PROCEDURE — 97112 NEUROMUSCULAR REEDUCATION: CPT | Mod: GP | Performed by: PHYSICAL THERAPIST

## 2022-02-17 PROCEDURE — 128N000003 HC R&B REHAB

## 2022-02-17 PROCEDURE — 97535 SELF CARE MNGMENT TRAINING: CPT | Mod: GO

## 2022-02-17 PROCEDURE — 97130 THER IVNTJ EA ADDL 15 MIN: CPT | Mod: GN

## 2022-02-17 PROCEDURE — 97530 THERAPEUTIC ACTIVITIES: CPT | Mod: GP | Performed by: PHYSICAL THERAPIST

## 2022-02-17 PROCEDURE — 99232 SBSQ HOSP IP/OBS MODERATE 35: CPT | Mod: FS | Performed by: PHYSICAL MEDICINE & REHABILITATION

## 2022-02-17 PROCEDURE — 99233 SBSQ HOSP IP/OBS HIGH 50: CPT | Performed by: NURSE PRACTITIONER

## 2022-02-17 PROCEDURE — 36415 COLL VENOUS BLD VENIPUNCTURE: CPT | Performed by: PHYSICIAN ASSISTANT

## 2022-02-17 PROCEDURE — 97110 THERAPEUTIC EXERCISES: CPT | Mod: GP | Performed by: PHYSICAL THERAPIST

## 2022-02-17 PROCEDURE — 250N000013 HC RX MED GY IP 250 OP 250 PS 637: Performed by: PHYSICIAN ASSISTANT

## 2022-02-17 PROCEDURE — 250N000013 HC RX MED GY IP 250 OP 250 PS 637: Performed by: NURSE PRACTITIONER

## 2022-02-17 RX ORDER — ACETAMINOPHEN 325 MG/1
650 TABLET ORAL EVERY 6 HOURS PRN
DISCHARGE
Start: 2022-02-17 | End: 2023-05-11

## 2022-02-17 RX ORDER — DIVALPROEX SODIUM 500 MG/1
1000 TABLET, EXTENDED RELEASE ORAL DAILY
DISCHARGE
Start: 2022-02-18

## 2022-02-17 RX ORDER — BACLOFEN 5 MG/1
5 TABLET ORAL 3 TIMES DAILY
Status: ON HOLD | DISCHARGE
Start: 2022-02-17 | End: 2022-03-09

## 2022-02-17 RX ORDER — CARVEDILOL 12.5 MG/1
37.5 TABLET ORAL 2 TIMES DAILY WITH MEALS
Status: ON HOLD | DISCHARGE
Start: 2022-02-17 | End: 2022-03-09

## 2022-02-17 RX ORDER — CHLORTHALIDONE 25 MG/1
25 TABLET ORAL DAILY
DISCHARGE
Start: 2022-02-18 | End: 2022-02-19

## 2022-02-17 RX ORDER — CLONIDINE HYDROCHLORIDE 0.1 MG/1
0.1 TABLET ORAL 3 TIMES DAILY
DISCHARGE
Start: 2022-02-17

## 2022-02-17 RX ORDER — AMILORIDE HYDROCHLORIDE 5 MG/1
10 TABLET ORAL DAILY
Status: ON HOLD | DISCHARGE
Start: 2022-02-18 | End: 2022-03-09

## 2022-02-17 RX ORDER — HYDRALAZINE HYDROCHLORIDE 10 MG/1
12.5 TABLET, FILM COATED ORAL 4 TIMES DAILY PRN
Status: ON HOLD | DISCHARGE
Start: 2022-02-17 | End: 2022-03-09

## 2022-02-17 RX ORDER — SENNOSIDES 8.6 MG
1 TABLET ORAL 2 TIMES DAILY PRN
Status: ON HOLD | DISCHARGE
Start: 2022-02-17 | End: 2022-03-09

## 2022-02-17 RX ADMIN — Medication 5 MG: at 13:46

## 2022-02-17 RX ADMIN — ATORVASTATIN CALCIUM 40 MG: 40 TABLET, FILM COATED ORAL at 19:49

## 2022-02-17 RX ADMIN — Medication 5 MG: at 08:29

## 2022-02-17 RX ADMIN — CARVEDILOL 37.5 MG: 25 TABLET, FILM COATED ORAL at 08:29

## 2022-02-17 RX ADMIN — ASPIRIN 81 MG CHEWABLE TABLET 81 MG: 81 TABLET CHEWABLE at 08:29

## 2022-02-17 RX ADMIN — CHLORTHALIDONE 25 MG: 25 TABLET ORAL at 08:29

## 2022-02-17 RX ADMIN — CLONIDINE HYDROCHLORIDE 0.1 MG: 0.1 TABLET ORAL at 08:30

## 2022-02-17 RX ADMIN — LIDOCAINE PATCH 4% 1 PATCH: 40 PATCH TOPICAL at 11:55

## 2022-02-17 RX ADMIN — DIVALPROEX SODIUM 1000 MG: 500 TABLET, FILM COATED, EXTENDED RELEASE ORAL at 08:29

## 2022-02-17 RX ADMIN — CLONIDINE HYDROCHLORIDE 0.1 MG: 0.1 TABLET ORAL at 13:46

## 2022-02-17 RX ADMIN — CARVEDILOL 37.5 MG: 25 TABLET, FILM COATED ORAL at 18:10

## 2022-02-17 RX ADMIN — AMILORIDE HYDROCLORIDE 10 MG: 5 TABLET ORAL at 08:29

## 2022-02-17 RX ADMIN — CLONIDINE HYDROCHLORIDE 0.1 MG: 0.1 TABLET ORAL at 19:49

## 2022-02-17 RX ADMIN — Medication 5 MG: at 19:49

## 2022-02-17 ASSESSMENT — ACTIVITIES OF DAILY LIVING (ADL)
ADLS_ACUITY_SCORE: 13

## 2022-02-17 NOTE — TELEPHONE ENCOUNTER
[facility] Call Center    Phone Message    May a detailed message be left on voicemail: yes     Reason for Call: Order(s): Other:   Reason for requested: lab orders for 3/3/22 appt  Date needed: ASAP  Provider name: CORRY Mendoza      Action Taken: Message routed to:  [facility] ([facility]): neph    Travel Screening: Not Applicable

## 2022-02-17 NOTE — PLAN OF CARE
FOCUS/GOAL  Medication management, Medical management, Reinforcement of self-care/ADL, and Safety management    ASSESSMENT, INTERVENTIONS AND CONTINUING PLAN FOR GOAL:  Patient was up in chair from shift start to the end.  He is still working on mastering rehab technique to regain/ maintain independence. So far he is independent with eating after set up, he still need assistance to set up and empty urinal. Assistance is also needed for transfers. He is wheelchair based. No safety incident to report  at the moment. He is scheduled to discharge tomorrow. Voided. No BM to report. Ziopatch mailed back  Today. Reported pain on  R hip, PRN Lidocaine patch applied per provider verbal order.  Will continue with POC.

## 2022-02-17 NOTE — PLAN OF CARE
FOCUS/GOAL  Medication management, Carryover of rehab techniques, and Safety management    ASSESSMENT, INTERVENTIONS AND CONTINUING PLAN FOR GOAL:  A/O, able to use call light and make needs known. VSS. Denies pain, SOB, chest pain, n/v nor dizziness. Assist of 1 stand pivot with transfers. On regular diet, thin liquids, takes medicines whole. Cont of BL, no Bm this shift, LBM-2/15/22. Left arm splint applied. Will continue with current POC.    Goal Outcome Evaluation: on-going    Plan of Care Reviewed With: patient

## 2022-02-17 NOTE — PROGRESS NOTES
Brief Nephrology note      We have been peripherally following Mr Rafael for HTN after CVA in 1/2022.  He required increasing doses of HTN meds early in his course complicated by DISHA.  We stopped his lisinopril earlier in his course due to DISHA, now on:     Coreg 37.5mg BID  Chlorthalidone 25mg Qday   Hydralazine 25mg TID  Clonidine 0.1mg TID  Amiloride 10mg    Plan is to follow up in CKD clinic, agree with aiming for SBP's in 140's-150's for now and can adjust when out of acute setting.         Andrea Oliveira, APRN CNS  Clinical Nurse Specialist  [phone]

## 2022-02-17 NOTE — PROGRESS NOTES
Discharge to [facility] tomorrow, Friday 02/18/2022. SW called [facility] and scheduled a w/c ride for 10am. Pt aware and agreeable to OOP cost. PAS completed-[id number]. No IMM needed. Sticky note left for treatment team and providers RE: discharge time. Orders needed no later than 9am to send/fax to TCU.     Updated [facility] on PAS and transport time. Confirmed that insurance auth was obtained. SWer called [facility] Lexy Dick PH: [phone] and provided hand-off.     Met with pt at bedside. Provided update and pt agreeable to the discharge plans. Pt will update his friends/family. SWer informed pt that [facility] can assist with completing HCD/POA and other long-term resources. Pt expressed appreciation and denied additional needs, questions, and concerns.    Pt friend Taniya emailed SWer to ask about plans for tomorrow. Taniya updated.     [facility]--Tomorrow, 02/18/2022 via [facility] w/c ride at 10am.   PH: [phone]  F: [phone]    VINCENZO Chua   [facility]   Direct Phone: [phone]  I   Pager: [phone]  I  Fax: [phone]

## 2022-02-17 NOTE — PLAN OF CARE
"Speech Language Therapy Discharge Summary    Reason for therapy discharge:    Discharged to transitional care facility.    Progress towards therapy goal(s). See goals on Care Plan in [facility] electronic health record for goal details.  Goals partially met.  Barriers to achieving goals:   discharge from facility.    Therapy recommendation(s):    Continued therapy is recommended.  Rationale/Recommendations:  To continue ongoing addressing of cognition.    Goal Outcome Evaluation: Patient has improved in utilization of compensatory strategies with memory (memory book) and visual strategies (highlighter, bookmark) and verbal cues (\"carriage return\") to assist in his left neglect and difficulties with left visual scanning. Patient is within normal limits for language but presents intermittently with difficulties in tangentiality and attention to task. With min cues, patient can appropriately be redirected. Patient is currently on a regular diet with thin liquids. Continue targeting cognitive deficits such as attention, memory, problem solving, and initiation in memory and visual scanning strategies.                     "

## 2022-02-17 NOTE — PLAN OF CARE
Postural Assessment for Stroke Scale (PASS)    Maintaining posture -   1) Sitting without support - 3  2) Standing with support (feet position free) - 2  3) Standing without support (feet position free) - 0  4) Standing on nonparetic leg - 0  5) Standing on paretic leg - 0    Changing posture -  6) Rolling supine -> affected side - 3  7) Rolling supine -> unaffected side - 3  8) Sup->sitting edge of bed - 3, with verbal cuing for LUE, LLE positioning, increased time to complete.   9) Sitting edge of bed -> sup -2  10) Sit->stand without support - 2  11) Stand->sit without support - 1, decreased eccentric control.   12) Standing,  pencil from floor without support - 0    Total Score - 19/36    The PASS assesses a patient's ability to change and maintain posture during different balance activities. It is not associated with falls risk, but is used to track progress with the patient's ability to control their posture and balance throughout the course of the patient's admission.    The pt's score did not change from last testing date.  Pt was able to perform sit to stand better than last report, but more difficulty with sit to supine.  Pt continues to need extra time, cues for motor planning and cues to attend to L side.

## 2022-02-17 NOTE — PLAN OF CARE
FOCUS/GOAL  Mobility and Safety management    ASSESSMENT, INTERVENTIONS AND CONTINUING PLAN FOR GOAL:  Pt slept well overnight, appeared comfortable between rounds and assessment. Continent and using urinal. Denied pain, splint in place. Pt stated no other concerns, continue with POC.  Care Shift [phone]

## 2022-02-17 NOTE — PROGRESS NOTES
"  [facility]   Acute Rehabilitation Unit    INTERVAL HISTORY  Patient was seen and examined at bedside this morning at bedside.  No acute events reported overnight.  Today patient reports ongoing improved/increased sensation in left hand, now in palm.  He notes mild recurrent left hip pain today, requesting lidocaine patch.  He states he had some increased confusion/grogginess overnight when he woke at 3am, but resolved when he woke this morning.  He denies increased daytime somnolence.      Discussed with hopsitalist team, BP and Cr stable, plan to discharge on current meds, with lab monitoring twice weekly, and has outpatient nephrology follow-up scheduled.    Functionally, his PASS score remains stable at 19/36.    ROS: 10 point ROS neg other than the symptoms noted above in the HPI.  He currently needs set-up for grooming supported sitting, mod A for upper body dressing, max A for lower body, max A for bathing, total A for hygiene/clothing with toileting.      MEDICATIONS  Scheduled meds    aMILoride  10 mg Oral Daily     aspirin  81 mg Oral Daily     atorvastatin  40 mg Oral QPM     Baclofen  5 mg Oral TID     carvedilol  37.5 mg Oral BID w/meals     chlorthalidone  25 mg Oral Daily     cloNIDine  0.1 mg Oral TID     divalproex sodium extended-release  1,000 mg Oral Daily     lidocaine   Transdermal Q8H     [Held by provider] lisinopril  40 mg Oral Daily     [Held by provider] metFORMIN  500 mg Oral Daily with supper       PRN meds:  acetaminophen, bisacodyl, diclofenac, diphenhydrAMINE **OR** diphenhydrAMINE, EPINEPHrine, hydrALAZINE, lidocaine **AND** lidocaine, ondansetron, polyethylene glycol, sennosides      PHYSICAL EXAM  BP (!) 149/89 (BP Location: Right arm)   Pulse 65   Temp 98  F (36.7  C) (Oral)   Resp 16   Ht 1.737 m (5' 8.4\")   Wt 70.5 kg (155 lb 8 oz)   SpO2 96%   BMI 23.37 kg/m     Gen: NAD, sitting upright in chair  HEENT: NCAT, L facial " weakness  Cardio: RRR     Pulm: non-labored on room air  Abd: soft, non-distended  Ext: no edema in bilateral lower extremities    Neuro/MSK: left sided neglect with clear gaze preference. Left hemiparesis.  Some increased tone in LUE at pectoralis, biceps, wrist and finger flexors but improved.  L finger flexors 3/5 today.        LABS  CBC RESULTS:   Recent Labs   Lab Test 02/14/22  0902 02/07/22  0721 02/03/22  0754   WBC 6.0 7.4 9.0   RBC 4.26* 4.31* 4.52   HGB 13.0* 12.9* 13.8   HCT 37.9* 39.1* 40.8   MCV 89 91 90   MCH 30.5 29.9 30.5   MCHC 34.3 33.0 33.8   RDW 13.4 13.6 13.4   * 196 256     Last Basic Metabolic Panel:  Recent Labs   Lab Test 02/17/22  0816 02/17/22  0534 02/16/22 1708 02/14/22  1803 02/14/22  0902 02/12/22  0743 02/12/22  0548   NA  --  138  --   --  141  --  138   POTASSIUM  --  4.2  --   --  4.1  --  4.1   CHLORIDE  --  101  --   --  106  --  104   CO2  --  31  --   --  29  --  28   ANIONGAP  --  6  --   --  6  --  6   * 157* 141*   < > 169*   < > 150*   BUN  --  50*  --   --  61*  --  58*   CR  --  1.82*  --   --  1.79*  --  1.89*   GFRESTIMATED  --  46*  --   --  46*  --  44*   VALERIE  --  9.4  --   --  9.6  --  9.6    < > = values in this interval not displayed.     Recent Labs   Lab 02/17/22  0816 02/17/22  0534 02/16/22  1708 02/16/22  0816 02/15/22  1725 02/15/22  0807   * 157* 141* 161* 265* 145*       ASSESSMENT AND PLAN  Miriam Hall is a 46 year old R hand dominant male with h/o HTN who was admitted on 1/8 with R PCA ischemic stroke. His hospital course was complicated by concerns for secondary HTN, new diagnosis of DM II, DISHA, hypokalemia and encephalopathy. He clinically improved and transferred to ARU for ongoing medical management and intensive inpatient rehab. He is progressing with therapy, and is still having episodes of HTN. Medicine is following and is helping with BP management as well as DISHA management.        Admission to acute inpatient rehab on  1/28/2022.    Impairment group code: Stroke Ischemic 01.1 (L) Body Involvement (R) Brain: R PCA ischemic stroke w/ hemorrhagic transformation noted      Rehabilitation - continue comprehensive acute inpatient rehabilitation program with multidisciplinary approach including therapies, rehab nursing, and physiatry following. See interval history for updates.         Medical Conditions  Neuro  Acute right PCA ischemic stroke   R P1 occlusion and R M2 Stenosis  Hemorrhagic transformation of ischemic infarct  -Hypercoagulability panel negative  -vasculitis panel negative  -aspirin 81mg   -HTN, DM and HLD control as below for secondary prevention   -ziopatch until 2/10.  Returned today by nursing.  -acute rehab with PT, OT, and SLP  -per neuro recommendations, continue depakote until outpatient f/u  -should see stroke neurology and PM&R after discharge as well as genetic team per recs   --Noted worsening L arm tone, started baclofen 5 mg TID on 2/10.  Tolerating well and improved tone.    Possible seizures   EEG 1/12 concerning for subclinical seizures. Loaded with Keppra then transitioned to Depakote.   -continue Depakote  -seizure precautions  -follow-up neurology for ongoing     CV  Refractory HTN  -possibly secondary to clinical hyperaldosteronism of several possible etiologies, nephrology and cardiology consulted but no clear etiology  -Appreciate internal medicine assistance   -Continue amiloride 10mg daily  -Carvedilol 37.5mg bid  -Chlorthalidone 25mg daily  -Clonidine 0.1mg - dose decreased 2/2 and discontinued 2/4, resumed 2/6 with Cr stabilized.  BP increased over weekend with holding lisinopril, clonidine dose increased back to BID, then to TID on 2/15 per hospitalist/nephrology with goal SBP ~150s this week and further adjustments as OP.  BP improved today.  Per hospitalist discussion with nephrology today, agreeable to goal SBP ~140s-150s for now with ongoing management/adjustments as  outpatient.  -Hydralazine 50mg Q8H - discontinued 2/1.    -Lisinopril 40mg daily - held starting 2/10 due to renal function (per hosp/nephro)  -F/u with nephrology and endo teams as outpatient       HLD  -Continue atorvastatin 40mg     Endo  Type 2 Diabetes, new diagnosis this admission  A1c 7.8 1/8  -Metformin 500mg with dinner; held since 2/1 due to worsening Cr  -Sliding scale insulin; was discontinued 2/3 as he was not requiring    -BG stable 141-265 last 24 hours.  Resume metformin as kidney function allows.     Renal  DISHA likely due to hypovolemia and medication induced.  While inpatient Cr up to peak of 1.56 on 1/27 and then improved to 1.3 on 1/28; however steadily at ARU and then stabilized in 1.7 range.  Renal U/S on 1/31 unrevealing.  Hydralazine stopped, clonidine held then resumed at lower dose.  Per nephro, thought this may represent new baseline, however then increased up to 2.04 on 2/10.  Hospitalist discussed with nephro, holding lisinopril.  Repeat BMP 2/12 with Cr improved to 1.89.  -Hospitalist following, appreciate assistance  -Continue holding ACEi.  Repeat BMP today with Cr stable at 1.82.  -Other nephrotoxic medications including amiloride, chlorthalidone, clonidine  -Monitor Cr twice weekly or more often as indicated  -F/u with nephrology as outpatient     Hypokalemia, resolved  -20 meq K BID; discontinued 2/1  -K stable WNL  -hyperaldosteronism workup completed while inpatient  -monitor with BMP    MSK  L Hip Pain  Reported increased L hip pain at ARU admission.  Hip xray 1/24 without evidence of fracture.  Started on scheduled Tylenol, topicals.  Ongoing improvements, not interfering with therapy.  -Continue Tylenol, voltaren, lidocaine patch PRN  -Continue ice, heat PRN    COVID - vaccination status  Booster administered on 2/15, no adverse effects noted.    2. Adjustment to disability:  Clinical psychology to eval and treat as needed   3. FEN: regular diet   4. Bowel: continent, PRN  miralax and senna  5. Bladder: continent   6. DVT Prophylaxis: mechanical with SCDs  7. GI Prophylaxis: none   8. Code: Full  9. Disposition: plan for extended rehab at [facility]  10. ELOS:  2/18  11. Follow up Appointments on Discharge: PCP, neuro, nephrology, endo, genetics, and PM&R       Seen and discussed with Dr. Jean Paul Kohler, PM&R staff physician and discussed with JESS Berman PA-C  Physical Medicine & Rehabilitation

## 2022-02-17 NOTE — PROGRESS NOTES
[place] Acute Rehabilitation Unit  Internal Medicine Daily Progress Note    ARU day#: 20    Assessment & Plan:   Miriam Hall is a 46 year old man with a history of HTN who was admitted to [facility] 1/8-1/28/22 for right PCA stroke c/b refractory HTN w/ concern for secondary HTN, DISHA, new dx of DM II, hypokalemia, and encephalopathy. Transferred to ARU for ongoing rehabilitation.  Internal medicine following for medical complexity.     #Acute Right PCA Stroke  Presented w/ left hemiplegia and head CT showing right parieto-occipital infarct and CTA showing occlusion of proximal right PCA and high grade stenosis of M2 branch of right MCA. Was outside window for tPA. No thrombectomy performed d/t location of LVO in PCA. Repeat imaging 1/12 w/ hemorrhagic transformation. Source 2/2 possible intracranial atherosclerosis vs. ESUS. CARISA neg for LA thrombus. Residual deficits include L hemiplegia, L hemianopsia, mild dysarthria.  - Continue ASA, statin.   - Continue empiric Depakote (and seizure precautions) until neuro clinic f/u (had multiple vEEG neg for seizure activity but elevated risk d/t stroke burden and prior prolonged period of encephalopathy).   - BP management as below; goal < 130/80 eventually, however, monitoring renal fxn closely as this is very sensitive to BP changes.      #HTN  Required 6 antihypertensives while inpatient to maintain SBP goal < 180. No clear etiology despite extensive workup and consultations with Cardiology and Nephrology. Discharged on amiloride, carvedilol, chlorthalidone, clonidine, lisinopril, hydralazine, but hydralazine was stopped 2/1 and clonidine 2/4 for low BPs. BP then began to rise and clonidine was restarted 2/6, increased to BID on 2/12.  Lisinopril stopped d/t DISHA on 2/11.  BP spiking again 2/14-2/15 (up to 190 systolic) and I d/w nephrology and increased Clonidine to TID with improvement into 150s.  BP goal is listed at <130/80 but per discussion w/ nephrology this week it  seems as though his renal fxn is quite sensitive to abrupt BP fluctuations therefore favor slow reduction in BP over next 1-2 weeks.  OK with his SBP being in 150s for now until OP nephrology follow up.  Current BP  149/89.  - See nephro note from today   - On discharge: Continue amiloride 10 mg daily, carvedilol 37.5 mg BID, chlorthalidone 25 mg daily, clonidine 0.1 mg TID and PRN Hydralazine for SBP >170.   -  If BP becomes elevated in TCU prior to nephrology appointment, can consider resumption of scheduled Hydralazine.   - Needs OP nephrology (see below), endocrinology, and genetics follow ups.       #DISHA  Cr 1.3 on hospital admit, best of 0.94 on 1/20, bump on 1/23 back to 1.3, and peak of 1.56 on 1/27 --> 1.3 w/ IV fluid bolus. Here has been ~1.7 since 2/1. Was discussed w/ nephrology x2 around that time and attributed to hypotension as patient had some normal but lower than his usual BP readings.  Despite his BPs being relatively stable, his Cr has not improved drastically.  Worsened to 2.04 on 2/10, most recently 1.82 2/17.  Renal US 1/31 was unrevealing. No e/o retention on prior bladder scans. Does not look fluid responsive w/ U Na 79 (last on 1/31). Suspect ACE-I, diuretics and relative hypotension as culprit per d/w nephrology. Lisinopril was stopped and 2/12.  - Lisinopril to remain on hold on discharge.   - Follow BMPs twice weekly (qMTh) here and at TCU on discharge.  -  Follow up with Nephro Thursday March 3 at 1:10 PM with Payton Irving PA-C.  This appointment has been booked for him and is in-person.       #DM II  New dx during hospital stay w/ HgbA1c 7.8%. Endocrinology saw IP and recommended metformin, which is currently on hold d/t DISHA. BG well controlled. DM educator met with patient today.   - Needs diabetic supplies before discharge (added to sticky note). Will resume metformin when CrCl allows.   - Needs and endocrinology appointment on discharge    Carolina Antunez, MPH,  "[facility]  Hospitalist Service  Pager [phone]  ________________________________________________________________    Subjective & Interval History:    No overnight events, plan to discharge to TCU tomorrow. Doing well without medical complaints.     Last 24 hour care team notes reviewed.   ROS: 4 point ROS (including Respiratory, CV, GI and ) was performed and negative unless otherwise noted in HPI.     Medications: Reviewed in EPIC.    Physical Exam:    Blood pressure 97/66, pulse 96, temperature 97.8  F (36.6  C), temperature source Oral, resp. rate 16, height 1.651 m (5' 5\"), weight 56.6 kg (124 lb 12.5 oz), SpO2 99 %.    GENERAL: Alert and oriented x 3.  Pleasant and interactive, no apparent distress.   HEENT: Anicteric sclera. Mucous membranes moist.   CV: RRR. S1, S2. No murmurs appreciated.   RESPIRATORY: Effort normal on room air. Lungs CTAB with no wheezing, rales, rhonchi.   GI: Abdomen soft and non distended, bowel sounds present. No tenderness, rebound, guarding.   NEUROLOGICAL: L hemiparesis.  L sided neglect with gaze preference.  Increased tone in LUE.    EXTREMITIES: No peripheral edema. Intact bilateral pedal pulses.   SKIN: No jaundice. No rashes.     Lines/Tubes/Drains:  None   "

## 2022-02-17 NOTE — PLAN OF CARE
Physical Therapy Discharge Summary    Reason for therapy discharge:    Change in medical status.    Progress towards therapy goal(s). See goals on Care Plan in [facility] electronic health record for goal details.  Goals partially met.  Barriers to achieving goals:   limited tolerance for therapy.    Therapy recommendation(s):    Continued therapy is recommended.  Rationale/Recommendations:  discharge to hospital d/t AMS.

## 2022-02-18 ENCOUNTER — TELEPHONE (OUTPATIENT)
Dept: NEUROLOGY | Facility: CLINIC | Age: 47
End: 2022-02-18

## 2022-02-18 ENCOUNTER — APPOINTMENT (OUTPATIENT)
Dept: ULTRASOUND IMAGING | Facility: CLINIC | Age: 47
End: 2022-02-18
Attending: CLINICAL NURSE SPECIALIST
Payer: COMMERCIAL

## 2022-02-18 LAB
ALBUMIN SERPL-MCNC: 3.5 G/DL (ref 3.4–5)
ALBUMIN UR-MCNC: 20 MG/DL
ALP SERPL-CCNC: 65 U/L (ref 40–150)
ALT SERPL W P-5'-P-CCNC: 33 U/L (ref 0–70)
AMMONIA PLAS-SCNC: 14 UMOL/L (ref 10–50)
ANION GAP SERPL CALCULATED.3IONS-SCNC: 7 MMOL/L (ref 3–14)
APPEARANCE UR: CLEAR
AST SERPL W P-5'-P-CCNC: 23 U/L (ref 0–45)
BASOPHILS # BLD AUTO: 0 10E3/UL (ref 0–0.2)
BASOPHILS NFR BLD AUTO: 1 %
BILIRUB DIRECT SERPL-MCNC: 0.1 MG/DL (ref 0–0.2)
BILIRUB SERPL-MCNC: 0.4 MG/DL (ref 0.2–1.3)
BILIRUB UR QL STRIP: NEGATIVE
BUN SERPL-MCNC: 62 MG/DL (ref 7–30)
CALCIUM SERPL-MCNC: 9.5 MG/DL (ref 8.5–10.1)
CHLORIDE BLD-SCNC: 101 MMOL/L (ref 94–109)
CK SERPL-CCNC: 47 U/L (ref 30–300)
CO2 SERPL-SCNC: 26 MMOL/L (ref 20–32)
COLOR UR AUTO: ABNORMAL
CREAT SERPL-MCNC: 2.25 MG/DL (ref 0.66–1.25)
CREAT UR-MCNC: 198 MG/DL
CRP SERPL-MCNC: 16 MG/L (ref 0–8)
EOSINOPHIL # BLD AUTO: 0 10E3/UL (ref 0–0.7)
EOSINOPHIL NFR BLD AUTO: 1 %
ERYTHROCYTE [DISTWIDTH] IN BLOOD BY AUTOMATED COUNT: 13.4 % (ref 10–15)
GFR SERPL CREATININE-BSD FRML MDRD: 35 ML/MIN/1.73M2
GLUCOSE BLD-MCNC: 323 MG/DL (ref 70–99)
GLUCOSE BLDC GLUCOMTR-MCNC: 140 MG/DL (ref 70–99)
GLUCOSE BLDC GLUCOMTR-MCNC: 142 MG/DL (ref 70–99)
GLUCOSE UR STRIP-MCNC: 50 MG/DL
HCT VFR BLD AUTO: 37.8 % (ref 40–53)
HGB BLD-MCNC: 13 G/DL (ref 13.3–17.7)
HGB UR QL STRIP: NEGATIVE
HOLD SPECIMEN: NORMAL
HYALINE CASTS: 5 /LPF
IMM GRANULOCYTES # BLD: 0 10E3/UL
IMM GRANULOCYTES NFR BLD: 0 %
KETONES UR STRIP-MCNC: ABNORMAL MG/DL
LEUKOCYTE ESTERASE UR QL STRIP: NEGATIVE
LYMPHOCYTES # BLD AUTO: 0.9 10E3/UL (ref 0.8–5.3)
LYMPHOCYTES NFR BLD AUTO: 14 %
MAGNESIUM SERPL-MCNC: 2.4 MG/DL (ref 1.6–2.3)
MCH RBC QN AUTO: 30.4 PG (ref 26.5–33)
MCHC RBC AUTO-ENTMCNC: 34.4 G/DL (ref 31.5–36.5)
MCV RBC AUTO: 89 FL (ref 78–100)
MONOCYTES # BLD AUTO: 0.7 10E3/UL (ref 0–1.3)
MONOCYTES NFR BLD AUTO: 12 %
MUCOUS THREADS #/AREA URNS LPF: PRESENT /LPF
NEUTROPHILS # BLD AUTO: 4.7 10E3/UL (ref 1.6–8.3)
NEUTROPHILS NFR BLD AUTO: 72 %
NITRATE UR QL: NEGATIVE
NRBC # BLD AUTO: 0 10E3/UL
NRBC BLD AUTO-RTO: 0 /100
OSMOLALITY SERPL: 313 MMOL/KG (ref 275–295)
OSMOLALITY UR: 655 MMOL/KG (ref 100–1200)
PH UR STRIP: 5.5 [PH] (ref 5–7)
PHOSPHATE SERPL-MCNC: 4.1 MG/DL (ref 2.5–4.5)
PLATELET # BLD AUTO: 129 10E3/UL (ref 150–450)
POTASSIUM BLD-SCNC: 4.7 MMOL/L (ref 3.4–5.3)
PROCALCITONIN SERPL-MCNC: 0.37 NG/ML
PROT SERPL-MCNC: 7.9 G/DL (ref 6.8–8.8)
RBC # BLD AUTO: 4.27 10E6/UL (ref 4.4–5.9)
RBC URINE: 1 /HPF
SODIUM SERPL-SCNC: 134 MMOL/L (ref 133–144)
SODIUM UR-SCNC: 49 MMOL/L
SP GR UR STRIP: 1.02 (ref 1–1.03)
UROBILINOGEN UR STRIP-MCNC: NORMAL MG/DL
WBC # BLD AUTO: 6.4 10E3/UL (ref 4–11)
WBC URINE: <1 /HPF

## 2022-02-18 PROCEDURE — 80069 RENAL FUNCTION PANEL: CPT | Performed by: PHYSICAL MEDICINE & REHABILITATION

## 2022-02-18 PROCEDURE — 82248 BILIRUBIN DIRECT: CPT | Performed by: PHYSICAL MEDICINE & REHABILITATION

## 2022-02-18 PROCEDURE — 86140 C-REACTIVE PROTEIN: CPT | Performed by: NURSE PRACTITIONER

## 2022-02-18 PROCEDURE — 76770 US EXAM ABDO BACK WALL COMP: CPT

## 2022-02-18 PROCEDURE — 250N000013 HC RX MED GY IP 250 OP 250 PS 637: Performed by: PHYSICIAN ASSISTANT

## 2022-02-18 PROCEDURE — 36415 COLL VENOUS BLD VENIPUNCTURE: CPT | Performed by: NURSE PRACTITIONER

## 2022-02-18 PROCEDURE — 99233 SBSQ HOSP IP/OBS HIGH 50: CPT | Performed by: INTERNAL MEDICINE

## 2022-02-18 PROCEDURE — 250N000013 HC RX MED GY IP 250 OP 250 PS 637: Performed by: PHYSICAL MEDICINE & REHABILITATION

## 2022-02-18 PROCEDURE — 82570 ASSAY OF URINE CREATININE: CPT | Performed by: NURSE PRACTITIONER

## 2022-02-18 PROCEDURE — 84145 PROCALCITONIN (PCT): CPT | Performed by: NURSE PRACTITIONER

## 2022-02-18 PROCEDURE — 86160 COMPLEMENT ANTIGEN: CPT | Performed by: PHYSICAL MEDICINE & REHABILITATION

## 2022-02-18 PROCEDURE — 99232 SBSQ HOSP IP/OBS MODERATE 35: CPT | Mod: FS | Performed by: PHYSICAL MEDICINE & REHABILITATION

## 2022-02-18 PROCEDURE — 82140 ASSAY OF AMMONIA: CPT | Performed by: PHYSICAL MEDICINE & REHABILITATION

## 2022-02-18 PROCEDURE — 82550 ASSAY OF CK (CPK): CPT | Performed by: CLINICAL NURSE SPECIALIST

## 2022-02-18 PROCEDURE — 128N000003 HC R&B REHAB

## 2022-02-18 PROCEDURE — 250N000013 HC RX MED GY IP 250 OP 250 PS 637: Performed by: NURSE PRACTITIONER

## 2022-02-18 PROCEDURE — 83735 ASSAY OF MAGNESIUM: CPT | Performed by: NURSE PRACTITIONER

## 2022-02-18 PROCEDURE — 36415 COLL VENOUS BLD VENIPUNCTURE: CPT | Performed by: PHYSICAL MEDICINE & REHABILITATION

## 2022-02-18 PROCEDURE — 86335 IMMUNFIX E-PHORSIS/URINE/CSF: CPT | Performed by: PATHOLOGY

## 2022-02-18 PROCEDURE — 83835 ASSAY OF METANEPHRINES: CPT | Performed by: PHYSICAL MEDICINE & REHABILITATION

## 2022-02-18 PROCEDURE — 84300 ASSAY OF URINE SODIUM: CPT | Performed by: NURSE PRACTITIONER

## 2022-02-18 PROCEDURE — 80053 COMPREHEN METABOLIC PANEL: CPT | Performed by: PHYSICAL MEDICINE & REHABILITATION

## 2022-02-18 PROCEDURE — 86335 IMMUNFIX E-PHORSIS/URINE/CSF: CPT | Mod: 26 | Performed by: PATHOLOGY

## 2022-02-18 PROCEDURE — 84100 ASSAY OF PHOSPHORUS: CPT | Performed by: NURSE PRACTITIONER

## 2022-02-18 PROCEDURE — 99233 SBSQ HOSP IP/OBS HIGH 50: CPT | Performed by: NURSE PRACTITIONER

## 2022-02-18 PROCEDURE — 81001 URINALYSIS AUTO W/SCOPE: CPT | Performed by: PHYSICAL MEDICINE & REHABILITATION

## 2022-02-18 PROCEDURE — 86036 ANCA SCREEN EACH ANTIBODY: CPT | Performed by: CLINICAL NURSE SPECIALIST

## 2022-02-18 PROCEDURE — 83930 ASSAY OF BLOOD OSMOLALITY: CPT | Performed by: PHYSICAL MEDICINE & REHABILITATION

## 2022-02-18 PROCEDURE — 85025 COMPLETE CBC W/AUTO DIFF WBC: CPT | Performed by: PHYSICAL MEDICINE & REHABILITATION

## 2022-02-18 PROCEDURE — 87040 BLOOD CULTURE FOR BACTERIA: CPT | Performed by: NURSE PRACTITIONER

## 2022-02-18 PROCEDURE — 83935 ASSAY OF URINE OSMOLALITY: CPT | Performed by: NURSE PRACTITIONER

## 2022-02-18 PROCEDURE — 76770 US EXAM ABDO BACK WALL COMP: CPT | Mod: 26 | Performed by: RADIOLOGY

## 2022-02-18 RX ADMIN — Medication 5 MG: at 13:40

## 2022-02-18 RX ADMIN — Medication 12.5 MG: at 13:40

## 2022-02-18 RX ADMIN — Medication 12.5 MG: at 08:48

## 2022-02-18 RX ADMIN — Medication 12.5 MG: at 20:40

## 2022-02-18 RX ADMIN — CLONIDINE HYDROCHLORIDE 0.1 MG: 0.1 TABLET ORAL at 19:58

## 2022-02-18 RX ADMIN — SENNOSIDES 8.6 MG: 8.6 TABLET ORAL at 19:58

## 2022-02-18 RX ADMIN — CARVEDILOL 37.5 MG: 25 TABLET, FILM COATED ORAL at 17:56

## 2022-02-18 RX ADMIN — CLONIDINE HYDROCHLORIDE 0.1 MG: 0.1 TABLET ORAL at 13:40

## 2022-02-18 RX ADMIN — LIDOCAINE PATCH 4% 1 PATCH: 40 PATCH TOPICAL at 17:50

## 2022-02-18 RX ADMIN — ATORVASTATIN CALCIUM 40 MG: 40 TABLET, FILM COATED ORAL at 19:58

## 2022-02-18 RX ADMIN — CARVEDILOL 37.5 MG: 25 TABLET, FILM COATED ORAL at 09:03

## 2022-02-18 RX ADMIN — Medication 5 MG: at 09:03

## 2022-02-18 RX ADMIN — CHLORTHALIDONE 25 MG: 25 TABLET ORAL at 08:50

## 2022-02-18 RX ADMIN — AMILORIDE HYDROCLORIDE 10 MG: 5 TABLET ORAL at 08:50

## 2022-02-18 RX ADMIN — DIVALPROEX SODIUM 1000 MG: 500 TABLET, FILM COATED, EXTENDED RELEASE ORAL at 08:50

## 2022-02-18 RX ADMIN — Medication 2 G: at 06:31

## 2022-02-18 RX ADMIN — Medication 12.5 MG: at 08:05

## 2022-02-18 RX ADMIN — Medication 5 MG: at 19:58

## 2022-02-18 RX ADMIN — CLONIDINE HYDROCHLORIDE 0.1 MG: 0.1 TABLET ORAL at 09:03

## 2022-02-18 RX ADMIN — ASPIRIN 81 MG CHEWABLE TABLET 81 MG: 81 TABLET CHEWABLE at 08:49

## 2022-02-18 ASSESSMENT — ACTIVITIES OF DAILY LIVING (ADL)
ADLS_ACUITY_SCORE: 13
ADLS_ACUITY_SCORE: 13
ADLS_ACUITY_SCORE: 15
ADLS_ACUITY_SCORE: 13
ADLS_ACUITY_SCORE: 15
ADLS_ACUITY_SCORE: 13
ADLS_ACUITY_SCORE: 15
ADLS_ACUITY_SCORE: 15
ADLS_ACUITY_SCORE: 13
ADLS_ACUITY_SCORE: 15
ADLS_ACUITY_SCORE: 13
ADLS_ACUITY_SCORE: 15
ADLS_ACUITY_SCORE: 13
ADLS_ACUITY_SCORE: 15
ADLS_ACUITY_SCORE: 13
ADLS_ACUITY_SCORE: 15

## 2022-02-18 NOTE — TELEPHONE ENCOUNTER
Patient being discharged from [facility]- wanting to schedule a stroke clinic follow visit as discussed with discharge plans.

## 2022-02-18 NOTE — PLAN OF CARE
FOCUS/GOAL  Medical management and Discharge planning    ASSESSMENT, INTERVENTIONS AND CONTINUING PLAN FOR GOAL:  Patient is scheduled to discharge today to [facility]. Vitals were out off patient's baseline  /102, Temp 99.3, RR 20  and HR 90.  Patient acting strange, he split out all his meds. Arguing that they were changed. He would like to know why?  They were this look on his face, patient transformed to another person. He started crying and expressed being anxious about his discharge, The new  facility...  Patient given time to calm down. NP Carolina and PAC Linda informed.  was called by  Patient's girlfriend who was reached by the patient.SW also spoke with patient.  Med given by another staff per CN. F/U BP was 172/83. Urine sample obtained for UA and sent to the lab. Other labs collected as well. BP at 1400 was 186/102. PRN hydralazine given. Provider informed. Patient sent to Radiology for ultra sound. The test was done, result still pending.  Received a call from  Lab, They would like 10 ml urine for further tests.  Patient was unable to void. Bladder scan  Value was only 28 ml around 1 pm. Patient encouraged to drink.  PM Nurse informed to send the sample when patient is able to void. Poor appetite, ate less then 50% of his breakfast and Girlfriend still helping him with his lunch. Voided 200 ml this shift, No BM reported. Will continue with POC.

## 2022-02-18 NOTE — PROGRESS NOTES
[facility]   Acute Rehabilitation Unit    INTERVAL HISTORY  Patient was seen and examined at bedside this morning at bedside.  Overnight, patient was noted to make some confused statements and called his friend in the middle of the night making odd statements.  This morning, patient became upset with nurse when she brought medications and thought one of them was new.  At the time of our visit this morning, he states that he is feeling slightly drowsy.  He states that he did not sleep well, woke twice during the night, once had urinated, and both times slightly confused thinking it was morning.  He acknowledges some increased anxiety about the transition to TCU and states he was worried someone was offended by a joke he had made yesterday, which he thought would affect his move to TCU.  He denies any chest pain, palpitations, fever/chills, shortness of breath, abdominal pain, nausea.  He notes ongoing improvements in sensation on his left side.      Functionally, had intended for patient to transfer to TCU today for ongoing rehab.  However, given concerns about changes in mentation and labs revealing worsening DISHA, as well as elevated BP, delayed discharge for additional work-up.      Discussed with hospitalist team, who has consulted with nephrology and ordered additional medical work-up.      MEDICATIONS  Scheduled meds    aMILoride  10 mg Oral Daily     aspirin  81 mg Oral Daily     atorvastatin  40 mg Oral QPM     Baclofen  5 mg Oral TID     carvedilol  37.5 mg Oral BID w/meals     chlorthalidone  25 mg Oral Daily     cloNIDine  0.1 mg Oral TID     divalproex sodium extended-release  1,000 mg Oral Daily     lidocaine   Transdermal Q8H     [Held by provider] lisinopril  40 mg Oral Daily     [Held by provider] metFORMIN  500 mg Oral Daily with supper       PRN meds:  acetaminophen, bisacodyl, diclofenac, diphenhydrAMINE **OR** diphenhydrAMINE, EPINEPHrine, hydrALAZINE,  "lidocaine **AND** lidocaine, ondansetron, polyethylene glycol, sennosides      PHYSICAL EXAM  BP (P) 133/81 (BP Location: Right arm, Patient Position: Sitting)   Pulse 91   Temp 99.3  F (37.4  C) (Oral)   Resp 18   Ht 1.737 m (5' 8.4\")   Wt 70.5 kg (155 lb 8 oz)   SpO2 98%   BMI 23.37 kg/m     Gen: NAD, lying in bed  HEENT: NCAT, L facial weakness  Cardio: RRR     Pulm: non-labored on room air, lungs CTA bilaterally  Abd: soft, non-distended, non-tender, bowel sounds present  Ext: no edema in bilateral lower extremities    Neuro/MSK: left sided neglect with clear gaze preference. Left hemiparesis.  Some increased tone in LUE at pectoralis, biceps, wrist and finger flexors but improved.  L finger flexors 3/5 today, trace wrist flexion but not extension.  Also some movement at L hip flexors and knee extensors.  L clonus.       LABS  CBC RESULTS:   Recent Labs   Lab Test 02/18/22  0959 02/14/22  0902 02/07/22  0721   WBC 6.4 6.0 7.4   RBC 4.27* 4.26* 4.31*   HGB 13.0* 13.0* 12.9*   HCT 37.8* 37.9* 39.1*   MCV 89 89 91   MCH 30.4 30.5 29.9   MCHC 34.4 34.3 33.0   RDW 13.4 13.4 13.6   * 146* 196     Last Basic Metabolic Panel:  Recent Labs   Lab Test 02/18/22  0959 02/18/22  0741 02/17/22  1755 02/17/22  0816 02/17/22  0534 02/14/22  1803 02/14/22  0902     --   --   --  138  --  141   POTASSIUM 4.7  --   --   --  4.2  --  4.1   CHLORIDE 101  --   --   --  101  --  106   CO2 26  --   --   --  31  --  29   ANIONGAP 7  --   --   --  6  --  6   * 142* 142*   < > 157*   < > 169*   BUN 62*  --   --   --  50*  --  61*   CR 2.25*  --   --   --  1.82*  --  1.79*   GFRESTIMATED 35*  --   --   --  46*  --  46*   VALERIE 9.5  --   --   --  9.4  --  9.6    < > = values in this interval not displayed.     Recent Labs   Lab 02/18/22  0959 02/18/22  0741 02/17/22  1755 02/17/22  0816 02/17/22  0534 02/16/22  1708   * 142* 142* 172* 157* 141*       ASSESSMENT AND PLAN  Miriam Hall is a 46 year old R " "hand dominant male with h/o HTN who was admitted on 1/8 with R PCA ischemic stroke. His hospital course was complicated by concerns for secondary HTN, new diagnosis of DM II, DISHA, hypokalemia and encephalopathy. He clinically improved and transferred to ARU for ongoing medical management and intensive inpatient rehab. He is progressing with therapy, and is still having episodes of HTN. Medicine is following and is helping with BP management as well as DISHA management.        Admission to acute inpatient rehab on 1/28/2022.    Impairment group code: Stroke Ischemic 01.1 (L) Body Involvement (R) Brain: R PCA ischemic stroke w/ hemorrhagic transformation noted      Rehabilitation - continue comprehensive acute inpatient rehabilitation program with multidisciplinary approach including therapies, rehab nursing, and physiatry following. See interval history for updates.         Medical Conditions  Neuro  Acute right PCA ischemic stroke   R P1 occlusion and R M2 Stenosis  Hemorrhagic transformation of ischemic infarct  -Hypercoagulability panel negative  -vasculitis panel negative  -aspirin 81mg   -HTN, DM and HLD control as below for secondary prevention   -ziopatch until 2/10, returned on 2/17  -acute rehab with PT, OT, and SLP  -per neuro recommendations, continue depakote until outpatient f/u  -should see stroke neurology and PM&R after discharge as well as genetic team per recs   --Noted worsening L arm tone, started baclofen 5 mg TID on 2/10.  Tolerating well and improved tone.    Altered mental status  Patient was planned to discharge to TCU today.  Starting last evening, nursing noting some \"odd\" behaviors, patient making statements about someone being offended by a joke he had written and becoming quite emotional about that.  He woke several times during the night and called a friend quite confused.  This morning, he is able to relay some increased anxiety, but also feeling \"drowsy\".  No focal neuro changes on " exam.  Afebrile, though temp 99.3F this morning.  Elevated BP, improved later in day.  - CBC stable, BMP with worsening DISHA as below  - UA does not appear infected  - Procal elevated at 0.37  - CRP elevated 16.0  - Blood cultures pending  - Hepatic panel, ammonia pending  - Repeat head CT to rule-out acute changes    Possible seizures   EEG 1/12 concerning for subclinical seizures. Loaded with Keppra then transitioned to Depakote.   -continue Depakote  -seizure precautions  -follow-up neurology for ongoing     CV  Refractory HTN  -possibly secondary to clinical hyperaldosteronism of several possible etiologies, nephrology and cardiology consulted but no clear etiology  -Appreciate internal medicine assistance   -Continue amiloride 10mg daily  -Carvedilol 37.5mg bid  -Chlorthalidone 25mg daily.  Per nephrology, given worsening DISHA and weight down 11 kg since admission, hold chlorthalidone.  -Clonidine 0.1mg - dose decreased 2/2 and discontinued 2/4, resumed 2/6 with Cr stabilized.  BP increased over weekend with holding lisinopril, clonidine dose increased back to BID, then to TID on 2/15 per hospitalist/nephrology with goal SBP ~150s this week and further adjustments as OP.  BP improved today.  Per hospitalist discussion with nephrology 2/17, agreeable to goal SBP ~140s-150s for now with ongoing management/adjustments as outpatient.  -This AM, BP elevated to 180s/100s.  Patient initially spit out PRN hydralazine, did receive several doses today and improved now at 140s/90s.  -Nephrology seeing patient this afternoon for further evaluation  -Hydralazine 50mg Q8H - discontinued 2/1.    -Lisinopril 40mg daily - held starting 2/10 due to renal function (per hosp/nephro)  -F/u with nephrology and endo teams as outpatient  -F/u with outpatient genetic testing       HLD  -Continue atorvastatin 40mg     Endo  Type 2 Diabetes, new diagnosis this admission  A1c 7.8 1/8  -Metformin 500mg with dinner; held since 2/1 due to  worsening Cr  -Sliding scale insulin; was discontinued 2/3 as he was not requiring    -BG stable 141-172 last 24 hours.  Resume metformin as kidney function allows.     Renal  DISHA likely due to hypovolemia and medication induced.  While inpatient Cr up to peak of 1.56 on 1/27 and then improved to 1.3 on 1/28; however steadily at ARU and then stabilized in 1.7 range.  Renal U/S on 1/31 unrevealing.  Hydralazine stopped, clonidine held then resumed at lower dose.  Per nephro, thought this may represent new baseline, however then increased up to 2.04 on 2/10.  Hospitalist discussed with nephro, holding lisinopril.  Repeat BMP 2/12 with Cr improved to 1.89.  -Hospitalist following, appreciate assistance  -Continue holding ACEi.  Repeat BMP 2/17 with Cr stable at 1.82, but today worsened to 2.25.  Additional work-up ordered per hospitalist/nephrology, appreciate ongoing assistance.    -If worsening DISHA and elevated BP, low threshold for transfer to [place] for further work-up.  -Other nephrotoxic medications including amiloride, chlorthalidone, clonidine  -Monitor Cr twice weekly or more often as indicated  -F/u with nephrology as outpatient     Hypokalemia, resolved  -20 meq K BID; discontinued 2/1  -K stable WNL  -hyperaldosteronism workup completed while inpatient  -monitor with BMP    MSK  L Hip Pain  Reported increased L hip pain at ARU admission.  Hip xray 1/24 without evidence of fracture.  Started on scheduled Tylenol, topicals.  Ongoing improvements, not interfering with therapy.  -Continue Tylenol, voltaren, lidocaine patch PRN  -Continue ice, heat PRN    COVID - vaccination status  Booster administered on 2/15, no adverse effects noted.      2. Adjustment to disability:  Clinical psychology to eval and treat as needed   3. FEN: regular diet   4. Bowel: continent, PRN miralax and senna  5. Bladder: continent   6. DVT Prophylaxis: mechanical with SCDs  7. GI Prophylaxis: none   8. Code: Full  9. Disposition:  plan for extended rehab at TCU  10. ELOS:  2/18  11. Follow up Appointments on Discharge: PCP, neuro, nephrology, endo, genetics, and PM&R       Seen and discussed with Dr. Jean Paul Kohler, PM&R staff physician and discussed with JESS Berman PA-C  Physical Medicine & Rehabilitation

## 2022-02-18 NOTE — TELEPHONE ENCOUNTER
Pt scheduled for 3/2 with [facility].     RNCC will reach out to pt and/or ARU on Monday to inquire if pt is expected to discharge prior to that appt date as it is scheduled as in person.    Daniela CEDENO, RN, SCRN  RN Stroke Neurology Care Coordinator  [facility] Neuroscience Service Line     100% of the time

## 2022-02-18 NOTE — PLAN OF CARE
Occupational Therapy Discharge Summary    Reason for therapy discharge:    Discharged to transitional care facility.     Progress towards therapy goal(s). See goals on Care Plan in [facility] electronic health record for goal details.  Goals not met.  Barriers to achieving goals:   discharge from facility. Pt has been motivated to participate in therapies, but continues to perform significantly below baseline and will benefit from a longer course of rehabilitation therapies. Pt previous IND in I/ADLs and lived alone. Pt currently requires significant cuing for left-side attention and A d/t left-hemiplegia in ADLs and functional transfers.     ADLs:    Mobility: W/c based . Min SPT R side, Mod A L side.     Grooming: Set up supported sitting.    Dressing: UB- Mod A. LB- Max A.    Bathing: Max A seated on tub bench.     Toileting: Total A hygiene/clothing mgmt. SPT Ax1 W/C<>toilet.  IADLs: Previously IND. Needs assistance.  Vision/Cognition: L side inattention and L visual field cut. Mild cognitive deficits with executive functioning and attention.       Therapy recommendation(s):    Continued therapy is recommended.  Rationale/Recommendations:  Pt will benefit from continued skilled occupational therapy for left-side neuroreeducation, progress safety and independence in ADLs and functional transfers, visual compensation strategies, and functional cognition strategies to promote fall safety and maximize independence at discharge.

## 2022-02-18 NOTE — DISCHARGE INSTRUCTIONS
Follow-up Appointments    - Primary Care  Please contact the clinic below to schedule a hospital follow-up appointment as a new patient upon discharge from [facility].    Address  [facility]                          [address]                          [address], [address]                          [place] [address]  Phone   [phone]    - Neurology  You are scheduled to see Dr. Jane on Wednesday, March 2nd at 10:00 AM.    Address [facility]                          [address]                           [address]                          [place] [address]  Phone             [phone]    - Nephrology  You are scheduled to see Dr. Irving on Thursday, March 3rd at 1:10 PM.    Address [facility]                     [facility]                    [address] [place] [address] [address]                     [place] [address]  Phone  [phone]    - PM&R   You are scheduled to see Dr. Kohler on Friday, May 27th at 1:30 PM.     Address [facility] Physical Medicine [facility]    [facility]    [address] [place] [address] [address]    [place] [address]  Phone  [phone]    - Endocrinology  Please contact the clinic below to schedule an appointment.    Address [facility] End[facility]in[facility]    [facility]    [address] [place] [address] [address]    [place] [address]  Phone  [phone]    - Genetics  Please contact the clinic below to schedule an appointment.     Address  [facility] [address] [address]  Phone   [phone]                             ________________________________________________________________________________        [place] [facility] [place] [facility] [employer]  Additional resources and contact information online   Www.strokemn.org &  www.braininjurymn.org  Types of services that Stroke/Brain Injury Resource Facilitation offers include:  Emotional support in coping with a  new normal   Finding mental health support and counselors who understand brain injury and stroke  Finding a support group  Finding or re-connecting to rehabilitation services  Finding where and how to get a brain injury assessed  Finding or re-connecting with a primary care physician  Supporting caregivers by connecting them with resources  Education about diagnosis and symptoms -  Is this normal   Helping educate family and loved ones of the survivor s  invisible  symptoms  Figuring out the logistics of returning to work, vocational rehab and understanding ADA rights  If not going back to work, support in finding meaningful ways to structure your day and exploring volunteer options  Coaching on navigation the federal, state and [facility] health and disability service system with additional support and conference calls as needed  Help finding appropriate legal support for appealing and navigating Social Security Disability, providing advocacy on the individual s behalf as needed and connecting with cost effective alternatives to  as needed  Supporting parents/students with how to discuss return to school and sports with educators and connecting to a TBI specialist or parent advocate group if needed  Connecting to addiction (alcohol/drug/gambling/smoking) support and treatment services  Support with creative problem solving to life barriers.  *These are just a few examples of common things that Resource Facilitation can help with. They are not limited to what is listed here so if you are curious if they can help, just ask.   Call us at [phone] or [phone].      To reduce the risk of subsequent stroke there are several important factors including optimal management of anticoagulants, blood pressure, cholesterol, diabetes and smoking  "abstinence.    Anticoagulation:  You are on Aspirin    Blood Pressure:  Keeping your blood pressures less than 130/80 has been shown to reduce risk of recurrent stroke. Recording your blood pressure and heart rate twice daily in a log book can help you and your providers make decisions on optimal management. You are encouraged to bring your log book with you to your primary physician and/or cardiology doctor visits.    You are currently on amiloride, Coreg, chlorthalidone, clonidine to help control your blood pressure. Several lifestyle modifications have been associated with blood pressure reduction and are an important part of a comprehensive plan. These include: weight loss (if over-weight); a diet low in salt and cholesterol and rich in fruits and vegetables; regular aerobic physical activity and limited alcohol consumption.    Diabetes:  Optimal management of diabetes not only reduces risk of another stroke, it can help with healing process from your recent stroke. Blood glucose levels measure how well you're controlling your diabetes. An additional test \"hemoglobin A1C\" reflects how you have been overall with glucose control in the prior few months. This should be less than 7 though even lower below 6 is considered normal. Your recent Hgb A1C was [insert A1C]. This should be followed up with your primary provider and/or endocrinologist.    Your present plan is to check blood glucose consistently Before Meals and at Bedtime. These should be recorded along with date/time and any relevant notes such as food consumed, insulin/medication taken etc. This helps you and your providers make decisions on optimal management. You're encouraged to bring you log book with to your primary and/or endocrinology doctor visits.  Your current medications include Metformin (Glucophage) but this has recently been on hold due to DISHA. Specific doses and frequencies listed elsewhere.     Diet:  Diet and exercise are very important for " "diabetes regardless of being on medication or not. Be aware of and moderate your carbohydrate intake as instructed by doctor, dietitian or nurse.  Regular physical activity may be more difficult since your stroke though doing what you can on a daily basis is helpful.     Cholesterol:  Traditional target levels for LDL cholesterol or \"bad cholesterol\" is less than 130 however once you have had a stroke, your target LDL level is now less than 70. Additional recommendations such as increasing your HDL or \"good\" cholesterol and lowering your triglyceride level can also be important.    Your most recent lipid panel was   Lab Results   Component Value Date    CHOL 169 01/08/2022    CHOL 131 05/16/2014     Lab Results   Component Value Date    HDL 70 01/08/2022    HDL 53 05/16/2014     Lab Results   Component Value Date    LDL 75 01/08/2022    LDL 70 05/16/2014     Lab Results   Component Value Date    TRIG 118 01/08/2022    TRIG 38 05/16/2014     No results found for: CHOLHDLRATIO    This should be followed up in 2-3 months with your primary provider.    Smoking:  Finally one of the most important modifiable risk factors is to not smoke. This includes cigarettes, pipes, cigars, chewing tobacco and second hand smoke. Support through counseling, nicotine replacement, and oral smoking-cessation medications may all be helpful. Often people have been able to quit during their hospitalization but once returning to their familiar environment, the urges can be stronger. If this is the case, we encourage you to get support. There are numerous options, start by talking with your doctor.  "

## 2022-02-18 NOTE — PROGRESS NOTES
Nephrology Progress Note  02/18/2022       Mr Hall is a 46 yom with hx of HTN admitted for R PCA stroke on 1/8/2022, seen by Nephrology for evaluation of possible secondary HTN and has been in ARU (rehab) since 1/28.  Nephrology consulted for DISHA post CVA with HTN.      Interval History :   Mr Hall's Cr is up steadily since discharge from hospital to ARU.  Lisinopril has been stopped since 2/10 but Cr continues to increase.  No clear reason for DISHA, has been hypertensive which suggests against prerenal state and has no clear agent which could be causing AIN.  Checking serologies and other tests (see below) and will follow, ultimately may need to be re-admitted for DISHA and possible renal bx but holding for now as we are able to get initial tests at rehab.  Has no edema on exam so I stopped his Chlorthalidone, may have genetic component of HTN as mentioned in Dr Hall's note today.      Assessment & Recommendations:   DISHA-Had initial assessment in Jan, baseline Cr 1.0-1.2 and normal renal US with dopplers.  Cr was up initially thought to be due to hemodynamic injury, renal had stopped following formally when pt discharged to ARU with plan to follow up in renal clinic but has been there longer than expected and DISHA has continued to slowly worsen with Cr up to 2.2 today.  (see chart below)    Etiology of his continued worsening DISHA is not clear, BP's remain high but have been mostly SBP's of ~150 since stopping lisinopril on 2/10 (due to Cr up to 1.8), has hyaline casts in UA today but SBP's of 170 do not fit with true prerenal state, no meds that would be vasoconstrictive to produce prerenal state (tacro etc).  AIN fits the slow but steady progression but is not on any agents typical of this.  I have ordered serologies and repeat renal US to re-evaluate, ultimately he may need a renal bx to get definitive diagnosis but can start with these tests.     -Ordered new US, C3/C4, UPCR, immunofixation, CK, ANCA,  "plasma metanephrine, appreciate new UA and Bienvenido.     -Will follow, ultimately may need re-admission if Cr continues to worsen but holding today as we are able to do the above in rehab setting for now.      Cr past month:      Volume status-No edema on exam, stopping Chlorthalidone.      Electrolytes/pH-K 4.7, bicarb 26, no acute issue.      Ca/phos/pth-Ca 9.5, Mg and Phos reasonable.      Anemia-Hgb 13, no acute issue.     Nutrition-Taking PO    Time spent: 35 minutes on this date of encounter for chart review, physical exam, medical decision making and co-ordination of care.     Discussed with Dr Hall    Recommendations were communicated to primary team via verbal communication.     MARBELLA Dennis CNS  Clinical Nurse Specialist  [phone]      Review of Systems:   I reviewed the following systems:  Gen: No fevers or chills  CV: No CP at rest  Resp: No SOB at rest  GI: No N/V    Physical Exam:   I/O last 3 completed shifts:  In: -   Out: 200 [Urine:200]   BP (!) 186/105   Pulse 91   Temp 99.3  F (37.4  C) (Oral)   Resp 18   Ht 1.737 m (5' 8.4\")   Wt 70.5 kg (155 lb 8 oz)   SpO2 98%   BMI 23.37 kg/m       GENERAL APPEARANCE: A+O x3, in no distress.   EYES:  No scleral icterus, pupils equal  HENT: mouth without ulcers or lesions  PULM: lungs clear to auscultation, equal air movement, no cyanosis or clubbing  CV: regular rhythm, normal rate, no rub     -edema none  GI: soft, non-distended, non-tender.   MS: no evidence of inflammation in joints, no muscle tenderness  NEURO: L hemiparesis/neglect    Labs:   All labs reviewed by me  Electrolytes/Renal - Recent Labs   Lab Test 02/18/22  0959 02/18/22  0741 02/17/22  1755 02/17/22  0816 02/17/22  0534 02/14/22  1803 02/14/22  0902 01/28/22  0844 01/28/22  0428 01/27/22  0916 01/27/22  0428     --   --   --  138  --  141   < > 137  --  139   POTASSIUM 4.7  --   --   --  4.2  --  4.1   < > 3.7   < > 3.7  3.7   CHLORIDE 101  --   --   --  101  --  106  "  < > 103  --  103   CO2 26  --   --   --  31  --  29   < > 28  --  29   BUN 62*  --   --   --  50*  --  61*   < > 31*  --  30   CR 2.25*  --   --   --  1.82*  --  1.79*   < > 1.32*  --  1.56*   * 142* 142*   < > 157*   < > 169*   < > 96   < > 95   VALERIE 9.5  --   --   --  9.4  --  9.6   < > 9.0  --  9.5   MAG 2.4*  --   --   --   --   --   --   --  1.9  --  2.0   PHOS 4.1  --   --   --   --   --   --   --  4.2  --  3.8    < > = values in this interval not displayed.       CBC -   Recent Labs   Lab Test 02/18/22  0959 02/14/22  0902 02/07/22  0721   WBC 6.4 6.0 7.4   HGB 13.0* 13.0* 12.9*   * 146* 196       LFTs -   Recent Labs   Lab Test 01/17/22  0433 01/08/22  1843 04/25/14  1530   ALKPHOS 98 94 66   BILITOTAL 0.7 0.8 0.7   ALT 20 31 50*   AST 22 26 26   PROTTOTAL 7.8 8.5 7.1   ALBUMIN 2.9* 3.9 4.3       Iron Panel - No lab results found.        Current Medications:    aMILoride  10 mg Oral Daily     aspirin  81 mg Oral Daily     atorvastatin  40 mg Oral QPM     Baclofen  5 mg Oral TID     carvedilol  37.5 mg Oral BID w/meals     chlorthalidone  25 mg Oral Daily     cloNIDine  0.1 mg Oral TID     divalproex sodium extended-release  1,000 mg Oral Daily     lidocaine   Transdermal Q8H     [Held by provider] lisinopril  40 mg Oral Daily     [Held by provider] metFORMIN  500 mg Oral Daily with supper

## 2022-02-18 NOTE — PLAN OF CARE
Orientation: A/O x4  Bowel: Continent using toilet; LBM 2/15/22  Bladder: Continent using urinal  Pain: No complaints, lidocaine patch removed from L hip  Ambulation/Transfers: A1 S/P  Diet/ Liquids/ Pills: Regular, thin. Whole.   Tubes/ Lines/ Drains: None  Skin: Intact

## 2022-02-18 NOTE — DISCHARGE SUMMARY
[facility]   Acute Rehabilitation Unit  Discharge summary     Date of Admission: 1/28/2022  Date of Discharge: 2/19/2022  Disposition: [facility]  Primary Care Physician: No Ref-Primary, Physician  Attending physician: Jean Paul Kohler DO  Other significant provider(s): JOIE Mullen&R DAWNA, Aysha Sherwood MD      DISCHARGE DIAGNOSIS      Acute R PCA ischemic stroke with hemorrhagic transformation    L hemiparesis, L hemisensory deficits, L neglect/inattention, spasticity, impaired cognition    Possible seizures    Refractory hypertension    Hyperlipidemia    Type II diabetes mellitus     Acute kidney injury      BRIEF SUMMARY  Miriam Hall is a 46 year old male with a history of HTN who was admitted to [facility] 1/8-1/28/22 for right PCA stroke complicated by refractory HTN with concern for secondary HTN, DISHA, new dx of DM II, hypokalemia, and encephalopathy. Transferred to ARU for ongoing rehabilitation.  Internal medicine following for hypertension and DISHA.     [facility], however, due to new confusion/agitation, hypertension, low grade fever (99.3) worsening DISHA (2.25 from prior 1.82), and refusal to take medications, decision was made to hold patient on acute rehab through the weekend.  Unfortunately, the patient's agitation continued to worsen into the weekend, and Nephrology recommend a potential transfer nac to [place] at that time pending clinical stability.      On rounds on 2/19/22, he was found to be agitated, paranoid, aggressive toward staff, and making suicidal comments.  He also refused all PO medications and demanded to be discharged home.  He remained alert and oriented x3. At that point, patient was considered to be non-decisional, and also felt to be a threat to himself and potentially staff, and therefore he was placed on a 72 hour hold and transported back to [facility]      Vitals:    04/21/21 1450 04/21/21 1455 04/21/21 1509 04/21/21 1548   BP: 139/74 (!) 141/75 (!) 142/62 130/57   BP Location:   Right arm Right arm   Pulse: 80 90 86 85   Resp: 18 24 18 18   Temp:   97.1  F (36.2  C) 97.3  F (36.3  C)   TempSrc:   Oral Oral   SpO2: 100% 99% 93% 93%   Weight:       Height:         Time: 2369-6135  Reason for admission: Solitary Fibrous Tumor  Activity:  pt in bed, encouraged to ambulate  Pain:  Denies  Neuro: A/o x4, sleepy at this time  Cardiac: Reg  Respiratory:  Anterior clear spo2 in the 90s  GI/:  + BS, No BM voids appropriately  Diet: Regular  Lines:  DC  Incisions/Drains/Skin:  clean dry intact, right upper chest dressing  Lab:    Electrolyte Replacements:  NA     New changes this shift: patient was discharged after education was completed, questions were answered, patient was taught on how to administered lovenox, discharge medication are in the hospital pharmacy and pt has gone to collect them. Iv was removed with no complication.   Bank for further management of hypertension and DISHA.       REHABILITATION COURSE  PT: Pt will benefit from continued PT for neuro micheal, mobility training s/p stroke.  Pt has participated well with PT here at [facility] and appears motivated to progress.       Progress towards therapy goal(s). See goals on Care Plan in [facility] electronic health record for goal details.  Pt has been participating well with PT, but will benefit from a longer course of rehab to progress to a more independent level of function.  Pt had been I PTA and living alone.  Pt currently needs assistance adn cuing for safety and attention to his L side for all transfers.       Therapy recommendation(s):    Continued therapy is recommended.  Rationale/Recommendations:  Pt will benefit form continued PT for neuro micheal for attention to L, increased fucntional use and strengthening, neuro micheal for LUE, LE and trunk.    OT: Discharged to transitional care facility.      Progress towards therapy goal(s). See goals on Care Plan in [facility] electronic health record for goal details.  Goals not met.  Barriers to achieving goals:   discharge from facility. Pt has been motivated to participate in therapies, but continues to perform significantly below baseline and will benefit from a longer course of rehabilitation therapies. Pt previous IND in I/ADLs and lived alone. Pt currently requires significant cuing for left-side attention and A d/t left-hemiplegia in ADLs and functional transfers.      ADLs:    Mobility: W/c based . Min SPT R side, Mod A L side.     Grooming: Set up supported sitting.    Dressing: UB- Mod A. LB- Max A.    Bathing: Max A seated on tub bench.     Toileting: Total A hygiene/clothing mgmt. SPT Ax1 W/C<>toilet.  IADLs: Previously IND. Needs assistance.  Vision/Cognition: L side inattention and L visual field cut. Mild cognitive deficits with executive functioning and attention.     Therapy recommendation(s):    Continued therapy is recommended.   "Rationale/Recommendations:  Pt will benefit from continued skilled occupational therapy for left-side neuroreeducation, progress safety and independence in ADLs and functional transfers, visual compensation strategies, and functional cognition strategies to promote fall safety and maximize independence at discharge.    SLP: Discharged to transitional care facility.     Progress towards therapy goal(s). See goals on Care Plan in [facility] electronic health record for goal details.  Goals partially met.  Barriers to achieving goals:   discharge from facility.     Therapy recommendation(s):    Continued therapy is recommended.  Rationale/Recommendations:  To continue ongoing addressing of cognition.     Goal Outcome Evaluation: Patient has improved in utilization of compensatory strategies with memory (memory book) and visual strategies (highlighter, bookmark) and verbal cues (\"carriage return\") to assist in his left neglect and difficulties with left visual scanning. Patient is within normal limits for language but presents intermittently with difficulties in tangentiality and attention to task. With min cues, patient can appropriately be redirected. Patient is currently on a regular diet with thin liquids. Continue targeting cognitive deficits such as attention, memory, problem solving, and initiation in memory and visual scanning strategies.     MEDICAL COURSE    Neuro  Acute right PCA ischemic stroke   R P1 occlusion and R M2 Stenosis  Hemorrhagic transformation of ischemic infarct  Hypercoagulability and vasculitis panels negative.  Ziopatch completed while at ARU and returned by mail on 2/17.  Noted to have worsening LUE tone, started baclofen 5 mg TID on 2/10, tolerating well and improved tone.  - Continue aspirin 81mg   - Per neuro recommendations, continue depakote until outpatient f/u  - HTN, DM and HLD control as below for secondary prevention   - Continue PT/OT/SLP  - Follow up stroke neurology and PM&R after " discharge as well as genetics team      Possible seizures   EEG 1/12 concerning for subclinical seizures. Loaded with Keppra then transitioned to Depakote.   - Continue Depakote 1000 mg BID  - Seizure precautions  - Follow-up neurology for ongoing      CV  Refractory HTN  Possibly secondary to clinical hyperaldosteronism of several possible etiologies, nephrology and cardiology consulted but no clear etiology.  IM following at ARU to assist with management, with frequent input/assistance from nephrology.  Patient sensitive to medication changes, attempted multiple changes with balancing DISHA and elevated BP.  Scheduled hydralazine discontinued 2/1.  Lisinopril held since 2/10 due to DISHA.  Clonidine initially held but recently resumed and gradually up-titrated with improved BP and stable Cr.  - Continue amiloride 10mg daily, Carvedilol 37.5mg BID, chlorthalidone 25mg daily, clonidine 0.1 mg TID  - PRN hydralazine for SBP >160  - Monitor BP, per nephrology, short-term goal SBP ~140s-150s, will continue to adjust regimen in outpatient setting  - Close outpatient nephrology follow up   - Follow up with endocrinology for further evaluation for secondary hypertension       HLD  - Continue atorvastatin 40mg     Endo  Type 2 Diabetes, new diagnosis this admission  A1c 7.8 1/8.  Started on metformin 500 mg daily with good BG control, but held since 2/1 due to worsening Cr.  Sliding scale insulin discontinued 2/3 as he was not requiring.  -265 over last 24 hours.  - Continue to monitor BG  - Resume metformin as renal function allows     Renal  DISHA likely due to hypovolemia and medication induced.  While inpatient Cr up to peak of 1.56 on 1/27 and then improved to 1.3 on 1/28; however steadily at ARU and then stabilized in 1.7 range.  Hospitalist following at ARU, nephrology peripherally, appreciate assistance.  Renal U/S on 1/31 unrevealing.  Hydralazine stopped, clonidine held then resumed at lower dose.  Per nephro,  thought this may represent new baseline, however then increased up to 2.04 on 2/10.  Hospitalist discussed with nephro, holding lisinopril.  Repeat BMP 2/12 with Cr improved to 1.89, most recently stable at 1.8s on 2/17.  - Continue holding ACEi  - Other nephrotoxic medications including amiloride, chlorthalidone, clonidine  - Monitor Cr twice weekly or more often as indicated  - Close follow-up with nephrology as outpatient as scheduled     MSK  L Hip Pain  Reported increased L hip pain at ARU admission.  Hip xray 1/24 without evidence of fracture.  Started on scheduled Tylenol, topicals.  Ongoing improvements, not interfering with therapy.  - Continue Tylenol, voltaren, lidocaine patch PRN  - Continue ice, heat PRN     COVID - vaccination status  Booster administered on 2/15, no adverse effects noted.    PSYCH  Agitation  - Patient placed on 72 hour hold at 10 am on 2/19/22, which does not end until 2/25/22 at 10 am due to the fact that weekends and legal holidays do not count.   - IM Zyprexa 10 mg ordered to be used at time of transfer.     DISCHARGE MEDICATIONS  Current Discharge Medication List      START taking these medications    Details   Baclofen (LIORESAL) 5 MG tablet Take 1 tablet (5 mg) by mouth 3 times daily    Associated Diagnoses: Cerebrovascular accident (CVA) due to thrombosis of other precerebral artery (H)      diclofenac (VOLTAREN) 1 % topical gel Apply 2 g topically 4 times daily as needed for moderate pain    Associated Diagnoses: Cerebrovascular accident (CVA) due to thrombosis of other precerebral artery (H)      sennosides (SENOKOT) 8.6 MG tablet Take 1 tablet by mouth 2 times daily as needed for constipation    Associated Diagnoses: Cerebrovascular accident (CVA) due to thrombosis of other precerebral artery (H)         CONTINUE these medications which have CHANGED    Details   acetaminophen (TYLENOL) 325 MG tablet Take 2 tablets (650 mg) by mouth every 6 hours as needed for mild pain or  fever    Associated Diagnoses: Cerebrovascular accident (CVA) due to thrombosis of other precerebral artery (H)      aMILoride (MIDAMOR) 5 MG tablet Take 2 tablets (10 mg) by mouth daily    Associated Diagnoses: Hypertension, unspecified type      carvedilol (COREG) 12.5 MG tablet Take 3 tablets (37.5 mg) by mouth 2 times daily (with meals)    Associated Diagnoses: Hypertension, unspecified type      chlorthalidone (HYGROTON) 25 MG tablet Take 1 tablet (25 mg) by mouth daily    Associated Diagnoses: Hypertension, unspecified type      cloNIDine (CATAPRES) 0.1 MG tablet Take 1 tablet (0.1 mg) by mouth 3 times daily    Associated Diagnoses: Hypertension, unspecified type      divalproex sodium extended-release (DEPAKOTE ER) 500 MG 24 hr tablet Take 2 tablets (1,000 mg) by mouth daily    Associated Diagnoses: Cerebrovascular accident (CVA) due to thrombosis of other precerebral artery (H)      hydrALAZINE (APRESOLINE) 10 MG tablet Take 1.5 tablets (15 mg) by mouth 4 times daily as needed (SBP>160)    Associated Diagnoses: Hypertension, unspecified type         CONTINUE these medications which have NOT CHANGED    Details   aspirin (ASA) 81 MG chewable tablet Take 1 tablet (81 mg) by mouth daily    Associated Diagnoses: Acute ischemic right posterior cerebral artery (PCA) stroke (H)      melatonin 5 MG tablet Take 1 tablet (5 mg) by mouth nightly as needed for sleep    Associated Diagnoses: Acute ischemic right posterior cerebral artery (PCA) stroke (H)      atorvastatin (LIPITOR) 40 MG tablet Take 1 tablet (40 mg) by mouth every evening    Associated Diagnoses: Acute ischemic right posterior cerebral artery (PCA) stroke (H)         STOP taking these medications       lisinopril (ZESTRIL) 40 MG tablet Comments:   Reason for Stopping:         metFORMIN (GLUCOPHAGE) 500 MG tablet Comments:   Reason for Stopping:         potassium chloride (KLOR-CON) 20 MEQ packet Comments:   Reason for Stopping:                 DISCHARGE  INSTRUCTIONS AND FOLLOW UP  Discharge Procedure Orders   General info for SNF   Order Comments: Length of Stay Estimate: Short Term Care: Estimated # of Days <30  Condition at Discharge: Stable  Level of care:skilled   Rehabilitation Potential: Good  Admission H&P remains valid and up-to-date: Yes  Recent Chemotherapy: N/A  Use Nursing Home Standing Orders: Yes     Mantoux instructions   Order Comments: Give two-step Mantoux (PPD) Per Facility Policy Yes     Follow Up and recommended labs and tests   Order Comments: Follow up with senior living physician.  The following labs/tests are recommended: BMP at least twice weekly, CBC weekly or as indicated.    Follow up with nephrology as scheduled on 3/3.    Follow up with stroke neurology.    Follow up with endocrinology.    Follow up with genetics.    Follow up with PM&R.     Reason for your hospital stay   Order Comments: You were admitted for rehabilitation following your stroke.     Activity - Up with nursing assistance     Order Specific Question Answer Comments   Is discharge order? Yes      Physical Therapy Adult Consult   Order Comments: Evaluate and treat as clinically indicated.    Reason:  stroke     Occupational Therapy Adult Consult   Order Comments: Evaluate and treat as clinically indicated.    Reason:  stroke     Speech Language Path Adult Consult   Order Comments: Evaluate and treat as clinically indicated.    Reason:  stroke     Fall precautions     Seizure precautions     Diet   Order Comments: Follow this diet upon discharge: Orders Placed This Encounter      Room Service      Snacks/Supplements Adult: Ensure Enlive; Between Meals      Combination Diet Regular Diet     Order Specific Question Answer Comments   Is discharge order? Yes           PHYSICAL EXAMINATION    Most recent Vital Signs:   Vitals:    02/17/22 0650 02/17/22 0826 02/17/22 1346 02/17/22 1802   BP: (!) 155/90 (!) 149/89 (!) 152/88 (!) 172/97   BP Location: Right arm Right arm  Right  arm   Pulse: 65   87   Resp: 16   16   Temp: 98  F (36.7  C)   99.2  F (37.3  C)   TempSrc: Oral   Oral   SpO2: 96%   97%   Weight:       Height:           GENERAL: Alert and oriented x 3.  Agitated.   RESPIRATORY: Breathing non-labored on room air.    NEUROLOGICAL: L hemiparesis.   EXTREMITIES: No peripheral edema.  SKIN: No jaundice, lesions or abrasions.     60 minutes spent in discharge, including >50% in counseling and coordination of care, medication review and plan of care recommended on follow up.     Patient was evaluated on day of discharge by attending physician, Jean Paul Kohler DO, who agrees with plan of care.    Discharge summary was forwarded to No Ref-Primary, Physician (PCP) at the time of discharge, so as to bridge from hospital to outpatient care.     It was our pleasure to care for Miriam Hall during this hospitalization. Please do not hesitate to contact me should there be questions regarding the hospital course or discharge plan.          Linda Perez PA-C  Physical Medicine and Rehabilitation     Aysha Sherwood MD (Physician on call at time of discharge)

## 2022-02-18 NOTE — PROGRESS NOTES
Physician Attestation   I, Karoline Hall, saw and evaluated Miriam Hall as part of a shared visit.  I have reviewed and discussed with the advanced practice provider their history, physical and plan.    I personally reviewed the vital signs, medications, labs, and imaging.  Subjective:   The patient is well known by me as I saw him in late January 2022 for resistant hypertension and hypokalemia consult.  At that time he presented with ischemic stroke and left hemiparesis was also noted to have family history of stroke and hypertension as well.  At that time we did send 24-hour urine aldosterone and it turns out that his urine sodium is only 47 and urine aldosterone was only 5.9 suggesting low aldosterone.Interestingly, PRA was 0.2 and plasma aldosterone was 11. And he has normal adrenal gland on CT scan on 1/18/22. Dr. Reese saw him on 1/23 and noted small Cr rise, suspecting hemodynamics. However, Cr continued to rise and now at 2.25. In early Feb, BP were much better in the range of 120-140 but Cr noted 1.75 so lisinopril 40 mg was stopped. Since then BP increased and now about 150-170. Previously, his potassium were low but now is normal despite stopping lisinopril. 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 Since starting Amiloride, his BP have improved as well as potassium. Currently, he is on:  Coreg 37.5mg BID  Chlorthalidone 25mg Qday   Hydralazine 25mg TID  Clonidine 0.1mg TID  Amiloride 10mg  Today, he is a bit more confused. His BP is high and been receiving hydralazine as needed. We suggest the team to hold discharge and potentially transfer to [place] for further work-up.  The patient has UA done today which showed minimal protein and no red cells or white blood cell.  The only thing that positive is hyaline casts. On admission weight he was 81 kg and now 70 kg.      10 reviewed of systems are negative except as mentioned above.     Objective:  BP (!) 145/94 (BP Location:  "Right arm, Patient Position: Supine)   Pulse 85   Temp 97.5  F (36.4  C) (Oral)   Resp 16   Ht 1.737 m (5' 8.4\")   Wt 70.5 kg (155 lb 8 oz)   SpO2 100%   BMI 23.37 kg/m      Intake/Output Summary (Last 24 hours) at 2/18/2022 1601  Last data filed at 2/18/2022 0945  Gross per 24 hour   Intake --   Output 200 ml   Net -200 ml     I review PE from the primary team and discuss PE with Mr. Oliveira.     Problem list &Plan  # DISHA, stage 2; unclear etiology; baseline Cr 0.9-1.3  At this time the cause of DISHA remains elusive, one can blame hemodynamic shift but really it should not behave like progressive anemia. Differential is broad so we will check US kidneys and limited GN work-up. He has previous stroke so to make sure he has no neurogenic bladder with obstruction. UA is clean which may r/o GN but on the safe side we will check limited GN serologies. AIN can present with clean UA but all meds he is on are not common for AIN. Please check urine protein as well.   One possibility is that he may have chronic intravascularly depletion due to on 2 diuretics (Chlorthaildone and Amiloride). His weight is down 11 kg since admission so would try remove Chlorthalidone and see. Please check Renal panel daily.   # Severe resistant hypertension, suspect hyperaldosteronism versus BAYLEE-> particularly Liddle  # Hypokalemia; refractory; resolved after Amiloride  # Recent ischemic stroke with left hemiparesis; 1/22  # Family history of stroke and hypertension  24 hr urine aldosterone showed aldosterone of 5.2 despite low urine Na. In the setting of low Na intake, aldosterone be high. 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 PLEASE SEND THE PATIENT TO OUTPATIENT GENETIC TESTING. There is a gene panel for genetic hypertension.Would also repeat Plasma metanephrine as previously he has mildly elevated urine " normetanephrine. Please continue amiloride 10 at this time.   Current med  Coreg 37.5mg BID  Chlorthalidone 25mg Qday-> Please stop for now  Hydralazine 25mg TID  Clonidine 0.1mg TID  Amiloride 10mg  If Cr stable and BP still high, would try to re-introduce ACEi'ARB again    I spent 50 minutes chart review and coordinating care regarding diagnosis, laboratory/imaging results treatment plan.    Karoline Hall  Date of Service (when I saw the patient): February 18, 2022

## 2022-02-18 NOTE — PROGRESS NOTES
Pt planned to discharge to [facility] this morning. SWer faxed orders and ride scheduled for 10am. SWer received a call from pt friend Taniya a few minutes before 8am. Taniya expressed multiple concerns due to pt high anxiety and pt calling her multiple times. SWer listened and validated Taniya's concerns, updated the team and went to meet with pt at bedside. Pt was laying and appeared uncomfortable in bed. SWer asked pt about how he felt about discharge today, pt was very upset and crying. Pt began to share a story from yesterday when he was writing down stand up comedy jokes and thought he had offended a staff member who saw the joke. SWer encouraged pt not to let that work him up, practice deep breathing, and practice some imagery. SWer and pt talked through some of his other worries regarding discharge and support provided. SWer pulled up pictures of [facility] and showed pt on the phone. SWer offered to pay music for pt, pt appreciative and agreeable. SWer left pt room with plan to f/u before discharge.     SW notified by MD and PA to delay discharge pending lab results. Transport pushed to 2pm and [facility] updated. SWer later notified by MD that pt was on track to discharge and later told he wasn't ok to discharge due to other lab work that hadn't been completed. SWer cancelled the ride and [facility], PENDING medical clearance AND a new insurance authorization. SW will f/u on Monday, confirm medical clearance, confirm auth, set up transportation, and send new discharge orders/ppwk.     RNA notified SWer that pt was requesting assistance. SWer met with pt, pt asked SWer to take him outside to get fresh air. SWer notified pt at that time of his delay in discharge again. Pt expressed understanding and SWer reassured him that [facility] would keep the bed for him. SWer took pt outside for about 20 minutes. Shared information about pt  "growing up, living in [place], and his relationship with his brother. SWer asked pt how he was feeling. Pt started to share his frustration with seeing \"how they talk about my left neglect on my whiteboard\". Pt stated that he was having a hard time seeing his friends/family numbers on the whiteboard and feeling like someone had written them wrong on the board \"to test him\". SWer brought pt back up to his room. Pt was attempted to read the numbers off the board and could not read them correctly, despite 1-2x attempt. SWer offered to type the numbers on paper, print and leave at bedside. Pt very appreciative. Pt denied additional needs.     Taniya updated by this writer earlier this morning. Taniya at bedside later this afternoon when SW dropped off numbers. Taniya expressed appreciation and denied additional needs as well. SW will continue to follow.     [facility]--Pending medical clearance and insurance auth. [facility] WILL NOT accept over the weekend. Earliest discharge is Monday 02/21. SW will schedule transport once confirmed.   PH: [phone]  F: [phone]      VINCENZO Chua   [facility]   Direct Phone: [phone]  I   Pager: [phone]  I  Fax: [phone]    "

## 2022-02-18 NOTE — PLAN OF CARE
AOx4. Denies CP, SOB, and N/T. Ax1 stand pivot. W/C based. Voiding without difficulty in BR. LBM 2/15. Pt c/o pain in L hip; voltaren gel applied. Call light within reach and pt able to make needs known. Anticipated discharge this AM to [facility].

## 2022-02-18 NOTE — PROGRESS NOTES
"[facility]  Internal Medicine Daily Progress Note    ARU day#: 21    Assessment & Plan:   Miriam Hall is a 46 year old man with a history of HTN who was admitted to [facility] 1/8-1/28/22 for right PCA stroke c/b refractory HTN w/ concern for secondary HTN, DISHA, new dx of DM II, hypokalemia, and encephalopathy. Transferred to ARU for ongoing rehabilitation.  Internal medicine following for hypertension and DISHA.    Plan was to discharge to a TCU today, however, called by RN this AM who states that patient was acting \"off\" and not himself.  He had a low grade fever 99.3, seemed more anxious than usual and spit out his medications.  Labs remarkable for worsened Cr (2.25 from prior 1.82).  Recommend canceling discharge for medical work up and low threshold to re-admit to hospital if MS worsens, BP uncontrolled or DISHA worsens in next 24 hours.     #DISHA  Cr 1.3 on hospital admit, best of 0.94 on 1/20 and peak of 1.56 which improved to 1.3 with IVFB while in hospital.  While in ARU has been trending in 1.7 range.  Cr elevation was d/w nephrology last week and this week and attributed to relative hypotension as pt had normal but lower than his usual BP readings.  Lisinopril and Metformin have been held and despite his BP being rather robust, his Cr is again worsened today 2.25 (1.82).  Renal US 1/21 unrevealing .  No e/o retention on bladder scan this AM.  Eating ~50% of meals per nursing. UA does not appear infectious.   - Infectious workup as below  - Nephrology to see today   - Send urine Na, urine osm, urine Cr, CK, C3, C4, ANCA IgG, protein immunofixation, urine metanephrines   - If Cr worse again tomorrow, will possibly need to transfer back to hospital for further work up.   - Renal US   - Continue to hold Lisinopril and Metformin   - Recheck BMP in AM   - Avoid nephrotoxic agents and large swings in BPs  -  Follow up with Nephro Thursday March 3 at 1:10 PM with Payton Irving PA-C.  This " appointment has been booked for him and is in-person.      #Elevated temperature  99.3 this AM with associated mild change in MS  - Will pursue infectious workup with UA, blood cx.  - Procal and CRP      #Acute Right PCA Stroke  Presented w/ left hemiplegia and head CT showing right parieto-occipital infarct and CTA showing occlusion of proximal right PCA and high grade stenosis of M2 branch of right MCA. Was outside window for tPA. No thrombectomy performed d/t location of LVO in PCA. Repeat imaging 1/12 w/ hemorrhagic transformation. Source 2/2 possible intracranial atherosclerosis vs. ESUS. CARISA neg for LA thrombus. Residual deficits include L hemiplegia, L hemianopsia, mild dysarthria.  - Continue ASA, statin.   - Continue empiric Depakote (and seizure precautions) until neuro clinic f/u (had multiple vEEG neg for seizure activity but elevated risk d/t stroke burden and prior prolonged period of encephalopathy).   - BP management as below; goal < 130/80 eventually, however, monitoring renal fxn closely as this is very sensitive to BP changes.   - PM&R does not feel his MS changes this AM are c/w acute neurologic change.  Recommend pursuit of head CT for any acute change.   - Check ammonia   - Check hepatic panel      #HTN  Required 6 antihypertensives while inpatient to maintain SBP goal < 180. No clear etiology despite extensive workup and consultations with Cardiology and Nephrology. Discharged on amiloride, carvedilol, chlorthalidone, clonidine, lisinopril, hydralazine, but hydralazine was stopped 2/1 and clonidine 2/4 for low BPs. BP then began to rise and clonidine was restarted 2/6, increased to BID on 2/12.  Lisinopril stopped d/t DISHA on 2/11.  BP spiking again 2/14-2/15 (up to 190 systolic) and I d/w nephrology and increased Clonidine to TID with improvement into 150s.  BP goal is listed at <130/80 but per discussion w/ nephrology this week it seems as though his renal fxn is quite sensitive to abrupt BP  "fluctuations therefore favor slow reduction in BP over next 1-2 weeks.  OK with his SBP being in 150s for now until OP nephrology follow up.  Current BP  149/89.  - See nephro note from 2/17  - Continue amiloride 10 mg daily, carvedilol 37.5 mg BID, chlorthalidone 25 mg daily, clonidine 0.1 mg TID and PRN Hydralazine for SBP >170.   -  If BP remains elevated, can consider resumption of scheduled Hydralazine.   - Needs OP nephrology (see below), endocrinology, and genetics follow ups.       #DM II  New dx during hospital stay w/ HgbA1c 7.8%. Endocrinology saw IP and recommended metformin, which is currently on hold d/t DISHA. BG well controlled. DM educator met with patient today.   - Needs diabetic supplies before discharge (added to sticky note). Will resume metformin when CrCl allows.   - Needs and endocrinology appointment on discharge    Carolina Antunez, MPH, [facility]  Hospitalist Service  Pager [phone]  ________________________________________________________________    Subjective & Interval History:    Called by RN that patient was having severe anxiety this morning about transferring to a TCU.  He was sobbing and refused to take him medications and spit a few out.  I went up and assessed the patient.  At the time of my interview he stated he was feeling \"much better\" and was significantly more calm but PM&R team still felt he was \"not himself\".  He felt anxious about the transfer today and about being asked to take too many medications at once.  I ensured him all of his medications were safe to take and we are closely monitoring.  He was calm and appropriate on my exam and willing to take his medications.  Low grade temp noted at 99.3 with a worsened DISHA on BMP.     Last 24 hour care team notes reviewed.   ROS: 4 point ROS (including Respiratory, CV, GI and ) was performed and negative unless otherwise noted in HPI.     Medications: Reviewed in EPIC.    Physical Exam:    Blood pressure 97/66, pulse 96, " "temperature 97.8  F (36.6  C), temperature source Oral, resp. rate 16, height 1.651 m (5' 5\"), weight 56.6 kg (124 lb 12.5 oz), SpO2 99 %.    GENERAL: Alert and oriented x 3.  Pleasant and interactive, no apparent distress.   HEENT: Anicteric sclera. Mucous membranes moist.   CV: RRR. S1, S2. No murmurs appreciated.   RESPIRATORY: Effort normal on room air. Lungs CTAB with no wheezing, rales, rhonchi.   GI: Abdomen soft and non distended, bowel sounds present. No tenderness, rebound, guarding.   NEUROLOGICAL: L hemiparesis.  L sided neglect with gaze preference.  Increased tone in LUE.    EXTREMITIES: No peripheral edema. Intact bilateral pedal pulses.   SKIN: No jaundice. No rashes.     Lines/Tubes/Drains:  None       "

## 2022-02-19 ENCOUNTER — APPOINTMENT (OUTPATIENT)
Dept: NEUROLOGY | Facility: CLINIC | Age: 47
End: 2022-02-19
Attending: PHYSICIAN ASSISTANT
Payer: COMMERCIAL

## 2022-02-19 ENCOUNTER — APPOINTMENT (OUTPATIENT)
Dept: GENERAL RADIOLOGY | Facility: CLINIC | Age: 47
End: 2022-02-19
Attending: PHYSICIAN ASSISTANT
Payer: COMMERCIAL

## 2022-02-19 ENCOUNTER — APPOINTMENT (OUTPATIENT)
Dept: PHYSICAL THERAPY | Facility: CLINIC | Age: 47
End: 2022-02-19
Attending: PHYSICAL MEDICINE & REHABILITATION
Payer: COMMERCIAL

## 2022-02-19 ENCOUNTER — HOSPITAL ENCOUNTER (INPATIENT)
Facility: CLINIC | Age: 47
LOS: 18 days | Discharge: NURSING, REHAB OR LONG-TERM CARE | End: 2022-03-09
Attending: STUDENT IN AN ORGANIZED HEALTH CARE EDUCATION/TRAINING PROGRAM | Admitting: STUDENT IN AN ORGANIZED HEALTH CARE EDUCATION/TRAINING PROGRAM
Payer: COMMERCIAL

## 2022-02-19 ENCOUNTER — APPOINTMENT (OUTPATIENT)
Dept: CT IMAGING | Facility: CLINIC | Age: 47
End: 2022-02-19
Attending: PHYSICIAN ASSISTANT
Payer: COMMERCIAL

## 2022-02-19 VITALS
OXYGEN SATURATION: 100 % | TEMPERATURE: 96.8 F | HEIGHT: 68 IN | HEART RATE: 54 BPM | WEIGHT: 155.5 LBS | SYSTOLIC BLOOD PRESSURE: 105 MMHG | DIASTOLIC BLOOD PRESSURE: 66 MMHG | RESPIRATION RATE: 16 BRPM | BODY MASS INDEX: 23.57 KG/M2

## 2022-02-19 DIAGNOSIS — I15.9 SECONDARY HYPERTENSION: Primary | ICD-10-CM

## 2022-02-19 DIAGNOSIS — R40.4 TRANSIENT ALTERATION OF AWARENESS: ICD-10-CM

## 2022-02-19 DIAGNOSIS — I10 HYPERTENSION, UNSPECIFIED TYPE: ICD-10-CM

## 2022-02-19 DIAGNOSIS — R56.9 SEIZURE (H): ICD-10-CM

## 2022-02-19 DIAGNOSIS — I63.531 CEREBROVASCULAR ACCIDENT (CVA) DUE TO STENOSIS OF RIGHT POSTERIOR CEREBRAL ARTERY (H): ICD-10-CM

## 2022-02-19 DIAGNOSIS — I63.9 ISCHEMIC CEREBROVASCULAR ACCIDENT (CVA) (H): ICD-10-CM

## 2022-02-19 DIAGNOSIS — I15.8 OTHER SECONDARY HYPERTENSION: ICD-10-CM

## 2022-02-19 DIAGNOSIS — I63.09 CEREBROVASCULAR ACCIDENT (CVA) DUE TO THROMBOSIS OF OTHER PRECEREBRAL ARTERY (H): ICD-10-CM

## 2022-02-19 PROBLEM — R41.82 AMS (ALTERED MENTAL STATUS): Status: ACTIVE | Noted: 2022-02-19

## 2022-02-19 LAB
ALBUMIN SERPL-MCNC: 3.4 G/DL (ref 3.4–5)
ALP SERPL-CCNC: 68 U/L (ref 40–150)
ALT SERPL W P-5'-P-CCNC: 28 U/L (ref 0–70)
ANION GAP SERPL CALCULATED.3IONS-SCNC: 5 MMOL/L (ref 3–14)
ANION GAP SERPL CALCULATED.3IONS-SCNC: 6 MMOL/L (ref 3–14)
AST SERPL W P-5'-P-CCNC: 23 U/L (ref 0–45)
BILIRUB SERPL-MCNC: 0.4 MG/DL (ref 0.2–1.3)
BUN SERPL-MCNC: 55 MG/DL (ref 7–30)
BUN SERPL-MCNC: 56 MG/DL (ref 7–30)
CALCIUM SERPL-MCNC: 9.4 MG/DL (ref 8.5–10.1)
CALCIUM SERPL-MCNC: 9.4 MG/DL (ref 8.5–10.1)
CHLORIDE BLD-SCNC: 104 MMOL/L (ref 94–109)
CHLORIDE BLD-SCNC: 105 MMOL/L (ref 94–109)
CO2 SERPL-SCNC: 26 MMOL/L (ref 20–32)
CO2 SERPL-SCNC: 27 MMOL/L (ref 20–32)
CREAT SERPL-MCNC: 1.91 MG/DL (ref 0.66–1.25)
CREAT SERPL-MCNC: 2.06 MG/DL (ref 0.66–1.25)
CRP SERPL-MCNC: 16 MG/L (ref 0–8)
ERYTHROCYTE [DISTWIDTH] IN BLOOD BY AUTOMATED COUNT: 13.3 % (ref 10–15)
GFR SERPL CREATININE-BSD FRML MDRD: 39 ML/MIN/1.73M2
GFR SERPL CREATININE-BSD FRML MDRD: 43 ML/MIN/1.73M2
GLUCOSE BLD-MCNC: 135 MG/DL (ref 70–99)
GLUCOSE BLD-MCNC: 165 MG/DL (ref 70–99)
GLUCOSE BLDC GLUCOMTR-MCNC: 111 MG/DL (ref 70–99)
GLUCOSE BLDC GLUCOMTR-MCNC: 118 MG/DL (ref 70–99)
GLUCOSE BLDC GLUCOMTR-MCNC: 130 MG/DL (ref 70–99)
GLUCOSE BLDC GLUCOMTR-MCNC: 147 MG/DL (ref 70–99)
HCT VFR BLD AUTO: 39.9 % (ref 40–53)
HGB BLD-MCNC: 13.5 G/DL (ref 13.3–17.7)
LACTATE SERPL-SCNC: 1.3 MMOL/L (ref 0.7–2)
MCH RBC QN AUTO: 30.4 PG (ref 26.5–33)
MCHC RBC AUTO-ENTMCNC: 33.8 G/DL (ref 31.5–36.5)
MCV RBC AUTO: 90 FL (ref 78–100)
PLATELET # BLD AUTO: 150 10E3/UL (ref 150–450)
POTASSIUM BLD-SCNC: 4.1 MMOL/L (ref 3.4–5.3)
POTASSIUM BLD-SCNC: 4.2 MMOL/L (ref 3.4–5.3)
PROCALCITONIN SERPL-MCNC: 0.38 NG/ML
PROLACTIN SERPL-MCNC: 28 UG/L (ref 2–18)
PROT SERPL-MCNC: 7.9 G/DL (ref 6.8–8.8)
RBC # BLD AUTO: 4.44 10E6/UL (ref 4.4–5.9)
SARS-COV-2 RNA RESP QL NAA+PROBE: NEGATIVE
SODIUM SERPL-SCNC: 136 MMOL/L (ref 133–144)
SODIUM SERPL-SCNC: 137 MMOL/L (ref 133–144)
TSH SERPL DL<=0.005 MIU/L-ACNC: 1.14 MU/L (ref 0.4–4)
WBC # BLD AUTO: 6.2 10E3/UL (ref 4–11)

## 2022-02-19 PROCEDURE — 36415 COLL VENOUS BLD VENIPUNCTURE: CPT | Performed by: INTERNAL MEDICINE

## 2022-02-19 PROCEDURE — 71046 X-RAY EXAM CHEST 2 VIEWS: CPT | Mod: 26 | Performed by: RADIOLOGY

## 2022-02-19 PROCEDURE — 82088 ASSAY OF ALDOSTERONE: CPT | Performed by: INTERNAL MEDICINE

## 2022-02-19 PROCEDURE — 82040 ASSAY OF SERUM ALBUMIN: CPT | Performed by: PHYSICIAN ASSISTANT

## 2022-02-19 PROCEDURE — 250N000013 HC RX MED GY IP 250 OP 250 PS 637: Performed by: PHYSICIAN ASSISTANT

## 2022-02-19 PROCEDURE — 86140 C-REACTIVE PROTEIN: CPT | Performed by: PHYSICIAN ASSISTANT

## 2022-02-19 PROCEDURE — 999N000127 HC STATISTIC PERIPHERAL IV START W US GUIDANCE

## 2022-02-19 PROCEDURE — 250N000013 HC RX MED GY IP 250 OP 250 PS 637: Performed by: PHYSICAL MEDICINE & REHABILITATION

## 2022-02-19 PROCEDURE — 99239 HOSP IP/OBS DSCHRG MGMT >30: CPT | Performed by: PHYSICAL MEDICINE & REHABILITATION

## 2022-02-19 PROCEDURE — 97110 THERAPEUTIC EXERCISES: CPT | Mod: GP

## 2022-02-19 PROCEDURE — U0003 INFECTIOUS AGENT DETECTION BY NUCLEIC ACID (DNA OR RNA); SEVERE ACUTE RESPIRATORY SYNDROME CORONAVIRUS 2 (SARS-COV-2) (CORONAVIRUS DISEASE [COVID-19]), AMPLIFIED PROBE TECHNIQUE, MAKING USE OF HIGH THROUGHPUT TECHNOLOGIES AS DESCRIBED BY CMS-2020-01-R: HCPCS | Performed by: STUDENT IN AN ORGANIZED HEALTH CARE EDUCATION/TRAINING PROGRAM

## 2022-02-19 PROCEDURE — 70450 CT HEAD/BRAIN W/O DYE: CPT | Mod: 26 | Performed by: STUDENT IN AN ORGANIZED HEALTH CARE EDUCATION/TRAINING PROGRAM

## 2022-02-19 PROCEDURE — 83605 ASSAY OF LACTIC ACID: CPT | Performed by: PHYSICIAN ASSISTANT

## 2022-02-19 PROCEDURE — 250N000013 HC RX MED GY IP 250 OP 250 PS 637: Performed by: NURSE PRACTITIONER

## 2022-02-19 PROCEDURE — 85027 COMPLETE CBC AUTOMATED: CPT | Performed by: PHYSICIAN ASSISTANT

## 2022-02-19 PROCEDURE — 95711 VEEG 2-12 HR UNMONITORED: CPT

## 2022-02-19 PROCEDURE — 36415 COLL VENOUS BLD VENIPUNCTURE: CPT | Performed by: PHYSICIAN ASSISTANT

## 2022-02-19 PROCEDURE — 250N000013 HC RX MED GY IP 250 OP 250 PS 637

## 2022-02-19 PROCEDURE — 70450 CT HEAD/BRAIN W/O DYE: CPT

## 2022-02-19 PROCEDURE — 120N000002 HC R&B MED SURG/OB UMMC

## 2022-02-19 PROCEDURE — 95718 EEG PHYS/QHP 2-12 HR W/VEEG: CPT | Performed by: PSYCHIATRY & NEUROLOGY

## 2022-02-19 PROCEDURE — 99223 1ST HOSP IP/OBS HIGH 75: CPT | Mod: AI | Performed by: STUDENT IN AN ORGANIZED HEALTH CARE EDUCATION/TRAINING PROGRAM

## 2022-02-19 PROCEDURE — 84145 PROCALCITONIN (PCT): CPT | Performed by: PHYSICIAN ASSISTANT

## 2022-02-19 PROCEDURE — 71046 X-RAY EXAM CHEST 2 VIEWS: CPT

## 2022-02-19 PROCEDURE — 82310 ASSAY OF CALCIUM: CPT | Performed by: NURSE PRACTITIONER

## 2022-02-19 PROCEDURE — 84244 ASSAY OF RENIN: CPT | Performed by: INTERNAL MEDICINE

## 2022-02-19 PROCEDURE — 258N000003 HC RX IP 258 OP 636: Performed by: PHYSICIAN ASSISTANT

## 2022-02-19 PROCEDURE — 999N000175 HC STATISTIC STROKE CODE W/ ACCESS

## 2022-02-19 PROCEDURE — 84443 ASSAY THYROID STIM HORMONE: CPT | Performed by: PHYSICIAN ASSISTANT

## 2022-02-19 PROCEDURE — 84146 ASSAY OF PROLACTIN: CPT | Performed by: PHYSICIAN ASSISTANT

## 2022-02-19 PROCEDURE — 99207 PR APP CREDIT; MD BILLING SHARED VISIT: CPT | Performed by: PHYSICIAN ASSISTANT

## 2022-02-19 PROCEDURE — 99233 SBSQ HOSP IP/OBS HIGH 50: CPT | Performed by: NURSE PRACTITIONER

## 2022-02-19 RX ORDER — HYDRALAZINE HYDROCHLORIDE 25 MG/1
25 TABLET, FILM COATED ORAL EVERY 8 HOURS SCHEDULED
Status: DISCONTINUED | OUTPATIENT
Start: 2022-02-19 | End: 2022-02-19 | Stop reason: HOSPADM

## 2022-02-19 RX ORDER — DEXTROSE MONOHYDRATE 25 G/50ML
25-50 INJECTION, SOLUTION INTRAVENOUS
Status: DISCONTINUED | OUTPATIENT
Start: 2022-02-19 | End: 2022-03-09 | Stop reason: HOSPADM

## 2022-02-19 RX ORDER — HYDRALAZINE HYDROCHLORIDE 20 MG/ML
10 INJECTION INTRAMUSCULAR; INTRAVENOUS EVERY 4 HOURS PRN
Status: DISCONTINUED | OUTPATIENT
Start: 2022-02-19 | End: 2022-02-19 | Stop reason: HOSPADM

## 2022-02-19 RX ORDER — LISINOPRIL 5 MG/1
5 TABLET ORAL DAILY
Status: ON HOLD | DISCHARGE
Start: 2022-02-19 | End: 2022-03-09

## 2022-02-19 RX ORDER — ATORVASTATIN CALCIUM 40 MG/1
40 TABLET, FILM COATED ORAL EVERY EVENING
Status: DISCONTINUED | OUTPATIENT
Start: 2022-02-19 | End: 2022-03-09 | Stop reason: HOSPADM

## 2022-02-19 RX ORDER — HYDRALAZINE HYDROCHLORIDE 20 MG/ML
10 INJECTION INTRAMUSCULAR; INTRAVENOUS EVERY 4 HOURS PRN
Status: ON HOLD | DISCHARGE
Start: 2022-02-19 | End: 2022-03-09

## 2022-02-19 RX ORDER — NICOTINE POLACRILEX 4 MG
15-30 LOZENGE BUCCAL
Status: DISCONTINUED | OUTPATIENT
Start: 2022-02-19 | End: 2022-03-09 | Stop reason: HOSPADM

## 2022-02-19 RX ORDER — HYDRALAZINE HYDROCHLORIDE 25 MG/1
25 TABLET, FILM COATED ORAL EVERY 8 HOURS
Status: DISCONTINUED | OUTPATIENT
Start: 2022-02-19 | End: 2022-02-20

## 2022-02-19 RX ORDER — BACLOFEN 10 MG/1
2.5 TABLET ORAL 3 TIMES DAILY
Status: DISCONTINUED | OUTPATIENT
Start: 2022-02-19 | End: 2022-02-23

## 2022-02-19 RX ORDER — DIVALPROEX SODIUM 500 MG/1
1000 TABLET, EXTENDED RELEASE ORAL DAILY
Status: DISCONTINUED | OUTPATIENT
Start: 2022-02-19 | End: 2022-03-09 | Stop reason: HOSPADM

## 2022-02-19 RX ORDER — HYDRALAZINE HYDROCHLORIDE 20 MG/ML
10 INJECTION INTRAMUSCULAR; INTRAVENOUS EVERY 4 HOURS PRN
Status: DISCONTINUED | OUTPATIENT
Start: 2022-02-19 | End: 2022-03-09 | Stop reason: HOSPADM

## 2022-02-19 RX ORDER — ONDANSETRON 4 MG/1
4 TABLET, ORALLY DISINTEGRATING ORAL EVERY 6 HOURS PRN
Status: DISCONTINUED | OUTPATIENT
Start: 2022-02-19 | End: 2022-03-09 | Stop reason: HOSPADM

## 2022-02-19 RX ORDER — OLANZAPINE 10 MG/2ML
10 INJECTION, POWDER, FOR SOLUTION INTRAMUSCULAR 3 TIMES DAILY PRN
Status: DISCONTINUED | OUTPATIENT
Start: 2022-02-19 | End: 2022-02-19

## 2022-02-19 RX ORDER — OLANZAPINE 5 MG/1
5-10 TABLET, ORALLY DISINTEGRATING ORAL EVERY 6 HOURS PRN
Status: DISCONTINUED | OUTPATIENT
Start: 2022-02-19 | End: 2022-02-19 | Stop reason: HOSPADM

## 2022-02-19 RX ORDER — OLANZAPINE 10 MG/2ML
10 INJECTION, POWDER, FOR SOLUTION INTRAMUSCULAR
Status: DISCONTINUED | OUTPATIENT
Start: 2022-02-19 | End: 2022-02-19 | Stop reason: HOSPADM

## 2022-02-19 RX ORDER — ONDANSETRON 2 MG/ML
4 INJECTION INTRAMUSCULAR; INTRAVENOUS EVERY 6 HOURS PRN
Status: DISCONTINUED | OUTPATIENT
Start: 2022-02-19 | End: 2022-03-09 | Stop reason: HOSPADM

## 2022-02-19 RX ORDER — AMILORIDE HYDROCHLORIDE 5 MG/1
10 TABLET ORAL DAILY
Status: DISCONTINUED | OUTPATIENT
Start: 2022-02-19 | End: 2022-02-20

## 2022-02-19 RX ORDER — BACLOFEN 10 MG/1
5 TABLET ORAL 3 TIMES DAILY
Status: DISCONTINUED | OUTPATIENT
Start: 2022-02-19 | End: 2022-02-19

## 2022-02-19 RX ORDER — ASPIRIN 81 MG/1
81 TABLET, CHEWABLE ORAL DAILY
Status: DISCONTINUED | OUTPATIENT
Start: 2022-02-19 | End: 2022-03-09 | Stop reason: HOSPADM

## 2022-02-19 RX ORDER — OLANZAPINE 10 MG/2ML
5 INJECTION, POWDER, FOR SOLUTION INTRAMUSCULAR 3 TIMES DAILY PRN
Status: DISCONTINUED | OUTPATIENT
Start: 2022-02-19 | End: 2022-02-19

## 2022-02-19 RX ORDER — SENNOSIDES 8.6 MG
1 TABLET ORAL 2 TIMES DAILY PRN
Status: DISCONTINUED | OUTPATIENT
Start: 2022-02-19 | End: 2022-02-19

## 2022-02-19 RX ORDER — CLONIDINE HYDROCHLORIDE 0.1 MG/1
0.1 TABLET ORAL 3 TIMES DAILY
Status: DISCONTINUED | OUTPATIENT
Start: 2022-02-19 | End: 2022-03-09 | Stop reason: HOSPADM

## 2022-02-19 RX ORDER — LISINOPRIL 5 MG/1
5 TABLET ORAL DAILY
Status: DISCONTINUED | OUTPATIENT
Start: 2022-02-19 | End: 2022-02-19 | Stop reason: HOSPADM

## 2022-02-19 RX ORDER — LIDOCAINE 40 MG/G
CREAM TOPICAL
Status: DISCONTINUED | OUTPATIENT
Start: 2022-02-19 | End: 2022-03-09 | Stop reason: HOSPADM

## 2022-02-19 RX ORDER — AMOXICILLIN 250 MG
2 CAPSULE ORAL 2 TIMES DAILY PRN
Status: DISCONTINUED | OUTPATIENT
Start: 2022-02-19 | End: 2022-03-05

## 2022-02-19 RX ORDER — SODIUM CHLORIDE, SODIUM LACTATE, POTASSIUM CHLORIDE, CALCIUM CHLORIDE 600; 310; 30; 20 MG/100ML; MG/100ML; MG/100ML; MG/100ML
INJECTION, SOLUTION INTRAVENOUS CONTINUOUS
Status: DISCONTINUED | OUTPATIENT
Start: 2022-02-19 | End: 2022-02-22

## 2022-02-19 RX ORDER — LIDOCAINE 40 MG/G
CREAM TOPICAL
Status: DISCONTINUED | OUTPATIENT
Start: 2022-02-19 | End: 2022-02-19 | Stop reason: HOSPADM

## 2022-02-19 RX ORDER — ACETAMINOPHEN 325 MG/1
650 TABLET ORAL EVERY 6 HOURS PRN
Status: DISCONTINUED | OUTPATIENT
Start: 2022-02-19 | End: 2022-03-09 | Stop reason: HOSPADM

## 2022-02-19 RX ORDER — OLANZAPINE 10 MG/2ML
5 INJECTION, POWDER, FOR SOLUTION INTRAMUSCULAR 3 TIMES DAILY PRN
Status: ON HOLD | DISCHARGE
Start: 2022-02-19 | End: 2022-03-09

## 2022-02-19 RX ORDER — HYDRALAZINE HYDROCHLORIDE 25 MG/1
25 TABLET, FILM COATED ORAL EVERY 8 HOURS
Status: ON HOLD | DISCHARGE
Start: 2022-02-19 | End: 2022-03-09

## 2022-02-19 RX ORDER — AMOXICILLIN 250 MG
1 CAPSULE ORAL 2 TIMES DAILY PRN
Status: DISCONTINUED | OUTPATIENT
Start: 2022-02-19 | End: 2022-03-05

## 2022-02-19 RX ADMIN — DIVALPROEX SODIUM 1000 MG: 500 TABLET, FILM COATED, EXTENDED RELEASE ORAL at 20:58

## 2022-02-19 RX ADMIN — LISINOPRIL 5 MG: 5 TABLET ORAL at 11:39

## 2022-02-19 RX ADMIN — Medication 5 MG: at 11:41

## 2022-02-19 RX ADMIN — ASPIRIN 81 MG CHEWABLE TABLET 81 MG: 81 TABLET CHEWABLE at 11:42

## 2022-02-19 RX ADMIN — CLONIDINE HYDROCHLORIDE 0.1 MG: 0.1 TABLET ORAL at 21:02

## 2022-02-19 RX ADMIN — ASPIRIN 81 MG: 81 TABLET, CHEWABLE ORAL at 20:55

## 2022-02-19 RX ADMIN — AMILORIDE HYDROCLORIDE 10 MG: 5 TABLET ORAL at 11:41

## 2022-02-19 RX ADMIN — CARVEDILOL 37.5 MG: 25 TABLET, FILM COATED ORAL at 11:42

## 2022-02-19 RX ADMIN — Medication 12.5 MG: at 06:19

## 2022-02-19 RX ADMIN — DIVALPROEX SODIUM 1000 MG: 500 TABLET, FILM COATED, EXTENDED RELEASE ORAL at 11:41

## 2022-02-19 RX ADMIN — CLONIDINE HYDROCHLORIDE 0.1 MG: 0.1 TABLET ORAL at 11:39

## 2022-02-19 RX ADMIN — ATORVASTATIN CALCIUM 40 MG: 40 TABLET, FILM COATED ORAL at 20:58

## 2022-02-19 RX ADMIN — SODIUM CHLORIDE, POTASSIUM CHLORIDE, SODIUM LACTATE AND CALCIUM CHLORIDE: 600; 310; 30; 20 INJECTION, SOLUTION INTRAVENOUS at 20:51

## 2022-02-19 RX ADMIN — Medication 2.5 MG: at 21:16

## 2022-02-19 RX ADMIN — OLANZAPINE 10 MG: 5 TABLET, ORALLY DISINTEGRATING ORAL at 11:43

## 2022-02-19 ASSESSMENT — ACTIVITIES OF DAILY LIVING (ADL)
DIFFICULTY_EATING/SWALLOWING: NO
ADLS_ACUITY_SCORE: 15
FALL_HISTORY_WITHIN_LAST_SIX_MONTHS: YES
ADLS_ACUITY_SCORE: 15
ADLS_ACUITY_SCORE: 15
WALKING_OR_CLIMBING_STAIRS_DIFFICULTY: YES
ADLS_ACUITY_SCORE: 15
ADLS_ACUITY_SCORE: 16
ADLS_ACUITY_SCORE: 15
ADLS_ACUITY_SCORE: 16
ADLS_ACUITY_SCORE: 16
DIFFICULTY_COMMUNICATING: NO
TOILETING_ISSUES: NO
ADLS_ACUITY_SCORE: 15
ADLS_ACUITY_SCORE: 16
ADLS_ACUITY_SCORE: 15
WALKING_OR_CLIMBING_STAIRS: AMBULATION DIFFICULTY, REQUIRES EQUIPMENT;TRANSFERRING DIFFICULTY, REQUIRES EQUIPMENT
NUMBER_OF_TIMES_PATIENT_HAS_FALLEN_WITHIN_LAST_SIX_MONTHS: 1
ADLS_ACUITY_SCORE: 16
ADLS_ACUITY_SCORE: 15
ADLS_ACUITY_SCORE: 16
ADLS_ACUITY_SCORE: 15
CONCENTRATING,_REMEMBERING_OR_MAKING_DECISIONS_DIFFICULTY: NO
TRANSFERRING: 1-->ASSISTANCE (EQUIPMENT/PERSON) NEEDED
CHANGE_IN_FUNCTIONAL_STATUS_SINCE_ONSET_OF_CURRENT_ILLNESS/INJURY: NO
WEAR_GLASSES_OR_BLIND: NO
BATHING: 1-->ASSISTANCE NEEDED
ADLS_ACUITY_SCORE: 15
HEARING_DIFFICULTY_OR_DEAF: NO
ADLS_ACUITY_SCORE: 16
ADLS_ACUITY_SCORE: 15
ADLS_ACUITY_SCORE: 15
DRESS: 0-->ASSISTANCE NEEDED (DEVELOPMENTALLY APPROPRIATE)
DRESS: 1-->ASSISTANCE (EQUIPMENT/PERSON) NEEDED
TRANSFERRING: 1-->ASSISTANCE (EQUIPMENT/PERSON) NEEDED (NOT DEVELOPMENTALLY APPROPRIATE)
DRESSING/BATHING_DIFFICULTY: YES
DRESSING/BATHING: DRESSING DIFFICULTY, REQUIRES EQUIPMENT
ADLS_ACUITY_SCORE: 16
DOING_ERRANDS_INDEPENDENTLY_DIFFICULTY: YES
ADLS_ACUITY_SCORE: 15

## 2022-02-19 ASSESSMENT — VISUAL ACUITY
OU: BASELINE
OU: BASELINE

## 2022-02-19 NOTE — PLAN OF CARE
"BP (!) 151/91 (BP Location: Right arm, Patient Position: Supine)   Pulse 70   Temp 97.5  F (36.4  C) (Oral)   Resp 16   Ht 1.737 m (5' 8.4\")   Wt 70.5 kg (155 lb 8 oz)   SpO2 100%   BMI 23.37 kg/m       Pt alert and oriented, pleasant, seems in good spirits despite expressing frustration regarding hopes to discharge home yesterday not going as planned. Nursing assist x 2 to BR. Continent. Left sided weakness. Denies pain. No distress noted. Respirations regular, audible, clear. BP below order parameters for intervention. O2 sat >90% on room air. Skin intact. Turns/ repositions independently. Uses call light appropriately for assistance as needed. Call light within reach. Safety precautions in place. Will continue to monitor.                     "

## 2022-02-19 NOTE — H&P
[facility]    History and Physical - Hospitalist Service, GOLD TEAM        Date of Admission: 2/19/2022    Assessment & Plan   Miriam Hall is a 46 year old male with history of resistant HTN who was initially admitted to [facility] 1/8 to 1/28/2022 with R PCA stroke c/b refractory HTN with concern for secondary HTN further c/b DISHA, new DMII diagnosis, and encephalopathy. Stabilized and transferred to ARU 1/28/2022 for ongoing rehabilitation. Patient developed new AMS, agitation, and worsening DISHA 2/18 prompting transfer to [facility] 2/19/2022 for higher level of care     Agitation, toxic metabolic encephalopathy: Developed new waxing and waning confusion starting 2/18, worsened 2/19 prompting transfer from ARU to [facility]. Minimally responsive to sternal rub on admission prompting stroke code. Given 10 mg Zyprexa at 1143, previously no antipsychotics or sedating meds on board. Tmax 99.3 2/18, BP labile as below. Cr 2.06 (2.25), BUN 56. UA 2/18 bland. No recent TSH. CRP 16 2/18. Procal 0.37 in the setting of DISHA. Glucose, ammonia, WBC normal. No hypoxia. No seizure like activity. Etiology on AMS 2/2 developing infection (aspiration with minimal liquids on exam) vs recurrent CVA vs uremia vs seizure (although no activity note, not post-ictal) other. Zyprexa likely contributory to acute findings on presentation  - Head CT  - CBC, CMP, TSH, LA. Repeat UA/UC, CRP, procal  - CXR   - Follow cultures from 2/18  - Hold antibiotics for now, low threshold to start with changes  - Consider EEG pending course   - Hold DVT ppx for now   - Hold further Zyprexa for now  ** Addendum: Developed recurrent worsening mental status around 1830 with decreased RR and near obtunded state. Writer re-assessed patient. Initially patient did not have gag, but RN was able to elicit a gag reflex on third attempt. Patient then awoke and was appropriately irritated with care team. Etiology remains unclear but  continues to include the above as well as seizure, hypercarbia, CNS infection, medication induced, other  - Check prolactin, ABG  - Capnography   - EEG  - Decrease baclofen   - Transfer to   - Contact medicine stat with any changes in clinical status   - Consider LP pending course     DISHA on CKD: DISHA on admission to [facility] 1/2022 (peak 1.56) with worsening BL over the past several weeks, new BL around 1.7. Acute rise to 2.25 2/18 (1.82). Nephrology followed at ARU. Etiology initially thought 2/2 hypotension but no improvement with BP stabilization. Renal US 2/18 no acute findings. UA bland. CK normal. Urine osm 655, urine Na 49, urine Na:Cr 198. Other etiology considered includes AIN vs GN vs hypovolemia (down 11 kg from admission)  - Nephrology consult, call in am    - Daily weights, strict I&Os  - Follow ANCA, protein immunofixation, C3, C4, urine metanephrines   - Continue to hold chlorthalidone     Dysphagia: Noted on admission with thin liquids. Previously on regular diet  - SLP consult  - NPO for now, IVF 50 ml/hr due to renal disease     R PCA stroke: Presented with L hemiplegia. Head CT 1/8 R parieto-occipital infarct. CTA noted occlusion on proximal R PCA and high grade stenosis of M2 branch of R MCA. Outside window for tPA. Repeat imagine 1/12 with hemorrhagic transformation. Source 2/2 high grade atherosclerosis vs ESUS. CARISA no LA thrombus. Residual deficits include L hemiplegia, L hemianopsia, mild dysarthria. Acute AMS 2/19 prompting repeat head CT  - Continue ASA, statin   - Follow pending Ziopatch results   - HTN regimen as below   - Will need OP genetics given early age CVA    Refractory HTN: SBP>240 on initial presentation 1/2022. Requiring up to 6 meds for control at times without clear etiology. BP labile over the past several days. Discharged from TCU to [facility] 2/19 on amiloride, Coreg, clonidine, lisinopril, and scheduled and prn hydralazine. BP labile over the past 24 hours, ranging  90s-180s/60s-120s. 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 PTA metformin on hold due to DISHA. Glucose stable  - MSSI, hypoglycemia protocol     Elevated temperature: Tmax 99.3 2/18. Infectious workup as above. Afebrile on admission, monitor     Possible seizures: In the setting of CVA. EEG 1/12 concerning for subclinical seizures. Loaded with Keppra, transitioned to Depakote. No e/o seizure noted in the setting of AMS  - Continue Depakote and seizure precautions    Platelet defect: 2/2 ASA use. Monitor for bleeding     Diet:   NPO   DVT Prophylaxis: Pneumatic Compression Devices  Mendoza Catheter: Not present  Central Lines: None  Cardiac Monitoring: None  Code Status:   Full Code    Clinically Significant Risk Factors Present on Admission               # Platelet Defect: home medication list includes an antiplatelet medication   # Diabetes, type II: last A1C 7.8 % (Ref range: 0.0 - 5.6 %)      Disposition Plan   Expected Discharge: Pending workup   Anticipated discharge location:  Awaiting care coordination huddle  Delays:        The patient's care was discussed with the patient, nursing, neurology, and attending physician, Dr. Ingrid Lyle PA-C  Hospitalist Service, [facility]  Securely message with the Vocera Web Console (learn more here)  Text page via [facility] Paging/Directory   Please see signed in provider for up to date coverage information      ______________________________________________________________________    Chief Complaint   AMS    History is obtained from the patient and medical record     History of Present Illness   Miriam Hall is a 46 year old male with history of resistant HTN who was initially admitted to [facility] 1/8 to 1/28/2022 with R PCA stroke c/b  refractory HTN with concern for secondary HTN further c/b DISHA, new DMII diagnosis, and encephalopathy. Stabilized and transferred to ARU 1/28/2022 for ongoing rehabilitation. Patient developed new AMS, agitation, and worsening DISHA 2/18 prompting transfer to [facility] 2/19/2022 for higher level of care     Patient was progressing at ARU and was set to discharge to TCU on 2/18 at which time he developed agitation and AMS which were off his BL. Symptoms progressed 2/19 prompting admission to [facility]. Patient was initially incredibly difficult to arouse and minimally responsive to sternal rub prompting stroke code. Per d/w nursing AMS had progressive even since time of presentation a few minutes prior. During stroke code patient was able to be aroused with painful stimuli. He eventually woke up and reported feeling differing sensation in the left and right. He denied new left sided weakness. He reported difficulty with swallowing which was new for him. Remainder of HPI was limited due to AMS    Review of Systems    The 10 point Review of Systems is negative other than noted in the HPI or here.     Past Medical History    I have reviewed this patient's medical history and updated it with pertinent information if needed.   Past Medical History:   Diagnosis Date     Hypertension        Past Surgical History   I have reviewed this patient's surgical history and updated it with pertinent information if needed.  Past Surgical History:   Procedure Laterality Date     TONSILLECTOMY         Social History   I have reviewed this patient's social history and updated it with pertinent information if needed.  Social History     Tobacco Use     Smoking status: Never Smoker     Smokeless tobacco: Never Used   Substance Use Topics     Alcohol use: Yes     Alcohol/week: 1.7 standard drinks     Types: 2 Cans of beer per week     Comment: 1 weeklyt     Drug use: No       Family History   I have reviewed this patient's family history and updated  it with pertinent information if needed.  Family History   Problem Relation Age of Onset     Breast Cancer Mother      Diabetes Mother      Lipids Mother      Hypertension Mother      Hypertension Father      Heart Disease Father      Respiratory Father      Heart Disease Maternal Grandfather         CHF       Prior to Admission Medications   Prior to Admission Medications   Prescriptions Last Dose Informant Patient Reported? Taking?   Baclofen (LIORESAL) 5 MG tablet   No No   Sig: Take 1 tablet (5 mg) by mouth 3 times daily   OLANZapine (ZYPREXA) injection   No No   Sig: Inject 5 mg into the muscle 3 times daily as needed for agitation   aMILoride (MIDAMOR) 5 MG tablet   No No   Sig: Take 2 tablets (10 mg) by mouth daily   acetaminophen (TYLENOL) 325 MG tablet   No No   Sig: Take 2 tablets (650 mg) by mouth every 6 hours as needed for mild pain or fever   aspirin (ASA) 81 MG chewable tablet   No No   Sig: Take 1 tablet (81 mg) by mouth daily   atorvastatin (LIPITOR) 40 MG tablet   No No   Sig: Take 1 tablet (40 mg) by mouth every evening   carvedilol (COREG) 12.5 MG tablet   No No   Sig: Take 3 tablets (37.5 mg) by mouth 2 times daily (with meals)   cloNIDine (CATAPRES) 0.1 MG tablet   No No   Sig: Take 1 tablet (0.1 mg) by mouth 3 times daily   diclofenac (VOLTAREN) 1 % topical gel   No No   Sig: Apply 2 g topically 4 times daily as needed for moderate pain   divalproex sodium extended-release (DEPAKOTE ER) 500 MG 24 hr tablet   No No   Sig: Take 2 tablets (1,000 mg) by mouth daily   hydrALAZINE (APRESOLINE) 10 MG tablet   No No   Sig: Take 1.5 tablets (15 mg) by mouth 4 times daily as needed (SBP>160)   hydrALAZINE (APRESOLINE) 20 MG/ML injection   No No   Sig: Inject 0.5 mLs (10 mg) into the vein every 4 hours as needed for high blood pressure (SBP >170)   hydrALAZINE (APRESOLINE) 25 MG tablet   No No   Sig: Take 1 tablet (25 mg) by mouth every 8 hours   lisinopril (ZESTRIL) 5 MG tablet   No No   Sig: Take 1  "tablet (5 mg) by mouth daily   melatonin 5 MG tablet   No No   Sig: Take 1 tablet (5 mg) by mouth nightly as needed for sleep   sennosides (SENOKOT) 8.6 MG tablet   No No   Sig: Take 1 tablet by mouth 2 times daily as needed for constipation      Facility-Administered Medications: None     Allergies   Allergies   Allergen Reactions     Pcn [Penicillin G] Hives and Itching       Physical Exam   Vital Signs: Temp: 98.6  F (37  C) Temp src: Axillary BP: 112/70 Pulse: 65   Resp: 16        Weight: 156 lbs 4.9 oz    General Appearance: Initially lethargic and minimally responsive to sternal rub. Patient later responded to painful stimuli in the digits. When asked orientation questions reports \"in hell\" (as he was getting IV site accessed, recurrent painful stimuli)  HEENT: Anicteric sclera, MMM. Pupils restricted but reactive. Initially does not resist when writer opens eyelids  Respiratory: Breathing comfortably on room air, lungs CTAB without wheezing or crackles   Cardiovascular: RRR, S1, S2. No murmur noted  GI: Abdomen soft, non-tender with active bowel sounds. No guarding or rebound  Lymph/Hematologic: No peripheral edema, distal pulses palpable   Skin: No rash or jaundice   Neurologic: Left sided facial droop and weakness, full neuro exam per neurology team      Data   Data reviewed today: I reviewed all medications, new labs and imaging results over the last 24 hours  Recent Labs   Lab 02/19/22  1556 02/19/22  0744 02/19/22  0556 02/18/22  1835 02/18/22  1655 02/18/22  0959 02/17/22  0816 02/17/22  0534 02/14/22  1803 02/14/22  0902   WBC  --   --   --   --   --  6.4  --   --   --  6.0   HGB  --   --   --   --   --  13.0*  --   --   --  13.0*   MCV  --   --   --   --   --  89  --   --   --  89   PLT  --   --   --   --   --  129*  --   --   --  146*   NA  --   --  137  --   --  134  --  138  --  141   POTASSIUM  --   --  4.1  --   --  4.7  --  4.2  --  4.1   CHLORIDE  --   --  104  --   --  101  --  101  --  106 "   CO2  --   --  27  --   --  26  --  31  --  29   BUN  --   --  56*  --   --  62*  --  50*  --  61*   CR  --   --  2.06*  --   --  2.25*  --  1.82*  --  1.79*   ANIONGAP  --   --  6  --   --  7  --  6  --  6   VALERIE  --   --  9.4  --   --  9.5  --  9.4  --  9.6   * 147* 165*   < >  --  323*   < > 157*   < > 169*   ALBUMIN  --   --   --   --  3.5  --   --   --   --   --    PROTTOTAL  --   --   --   --  7.9  --   --   --   --   --    BILITOTAL  --   --   --   --  0.4  --   --   --   --   --    ALKPHOS  --   --   --   --  65  --   --   --   --   --    ALT  --   --   --   --  33  --   --   --   --   --    AST  --   --   --   --  23  --   --   --   --   --     < > = values in this interval not displayed.     No results found for this or any previous visit (from the past 24 hour(s)).

## 2022-02-19 NOTE — PLAN OF CARE
"Visited with patient after being alerted by bedside nurse of increased agitation. Patient did not remember writer, despite remembering me prior to this visit. Patient was increasingly agitated with a raised voice, shifting weight frequently, stating that staff had changed his medicines. He further stated he felt this was causing his kidney problems, and that staff were doing this with malicious intent to keep him here and make his health worse. Patient stated staff were changing items on his whiteboard, but did not elaborate further when asked.     Writer educated patient on possible complications of having hypertension which was uncontrolled, including another stroke. Asked pt if he would consider taking meds for hypertension and he declined. Pt said he wanted to leave unit immediately even if he had to \"drag himself out of here\". Therapeutic listening techniques were used by this writer. Pt declined to elaborate further, saying he was \"just done\" being on the unit. He agreed to not try to leave the bed as this writer looked for a solution for his concerns.     Writer offered him water, and he agreed, but only if it came from faucet because he stated \"At this point, I don't even trust that from you all.\" Writer verified patient saw her obtaining water from the faucet in the bathroom visually.   Providers notified.               "
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
## 2022-02-19 NOTE — PLAN OF CARE
"FOCUS/GOAL  Bowel management, Bladder management, Pain management, and Mobility    ASSESSMENT, INTERVENTIONS AND CONTINUING PLAN FOR GOAL:  Pt is alert and oriented. Regular diet and thin liquids; can take pills whole. Continent of B&B. LBM 2/15. Ax1 SPT. C/o pain; offered medications but pt refused. Given heat pack per Pt request with some relief. BPs fluctuated this shift. Given Hydralazine x1 to decrease BP. Given Senno to promote BM per Pt request. Pt denied n/t at the beginning of the shift but later reported numbness and a \"zinging\" sensation down the LLE when touched. Urine specimen collected - awaiting lab results. Call light within reach and bed alarms on. Pt is able to make needs known and calls appropriately. Will continue with POC.             "

## 2022-02-19 NOTE — PROGRESS NOTES
Nephrology Progress note 2/19/22    Spoke with primary team NP this morning who reports that patient remains encephalopathic ( refusing cares, combative) and difficult to manage in the TCU setting. Given his behavior unable to accurately assess his blood pressures which are elevated in the 180's/  He has refused his medications.     Creat this morning was 2.0 ( 2.2).  ml.   Labs from yesterday are pending.   Agree with transfer to [place] for higher level of care    - Added back his Hydralazine 25 mg TID    - Continue Amiloride 10 mg every day, Coreg 37.5 mg bid and Clonidine 0.1 mg TID    - Added Lisinopril 5 mg every day    - Continue daily chemistry labs/strict I/O    - Will reassess tomorrow    Cathleen Jacques CNP  Nephrology       1 or 2

## 2022-02-19 NOTE — PLAN OF CARE
Informed patient of intent to transfer to  at the [facility]. He was agreeable, but tearful and expressed that he was having feelings of fear he had not felt since he was 10. Pt tearful and apologizing to staff for prior behavior. Agreeable to take morning meds, and began laughing with staff, but was not able to elaborate on what he was laughing about. Pt belongings packed. Pt left floor with [facility] EMT staff via stretcher without incident.

## 2022-02-19 NOTE — PROGRESS NOTES
"[facility]  Transfer Triage Note    Date of call: 02/19/22  Time of call: 9:05 AM    Current Patient Location: ARU  Current Level of Care: Med Surg    Vitals: Temp: 96.8  F (36  C) Temp src: Oral BP: (!) 181/104 Pulse: 72   Resp: 16 SpO2: 100 % Height: 173.7 cm (5' 8.4\") Weight: 70.5 kg (155 lb 8 oz)  O2 Device: None (Room air) at    Diagnosis: recent stroke, altered mental status, agitation  Is COVID-19 a concern? No  Reason for requested transfer: Further diagnostic work up, management, and consultation for specialized care   Isolation Needs: None    Outside Records: Available  Additional records may be faxed to [phone].    Transfer accepted: Yes  Stability of Patient: Patient is vitally stable, with no critical labs, and will likely remain stable throughout the transfer process  Level of Care Needed: Med Surg  Telemetry Needed:  None  Expected Time of Arrival for Transfer: 0-8 hours  Arrival Location:  [facility]    Recommendations for Management and Stabilization: Not needed    Additional Comments: 46 year old with recent MCA stroke, severe hypertension, acute kidney injury, new diagnosis of Type 2 Diabetes Mellitus now in ARU. Over last few days has had more altered mental status with agitation, confusion, worsening acute kidney injury (better since yesterday).  Today extremely agitated, refusing all oral medications, oriented x2-3, paranoid; head CT pending but refused.     Needs transfer to [facility], no tele for ongoing management, renal consult, hypertension management.     Clark Baig MD      "

## 2022-02-19 NOTE — CONSULTS
"[facility]    Stroke Consult Note    Reason for Consult: Stroke Code     Chief Complaint: No chief complaint on file.      HPI  Miriam Hall is a 46 year old male 46 year old male with history of resistant HTN who was initially admitted to [facility] 1/8 to 1/28/2022 with R PCA stroke c/b refractory HTN with concern for secondary HTN further c/b DISHA, new DMII diagnosis, and encephalopathy.     Neurology service was called for stroke code. Patient has been encephalopathic for 3 days. Last well known was 2/17. Primary team is getting him back from ARU and mentions that he is considerably more somnolent and encephalopathic than during their last assesment. Per chart review, he was seen by our stroke team in January for a R occipital infarct and discharged alert and oriented with left sided hemiplegia and hemisensory loss.     At the bedside the patient is sleepy but arouses to noxious stimulation. Upon waking, he is alert, answer questions, following commands, and appears to have only his baseline deficits. Stroke team opted not to pursue further imaging given his exam findings. When asked about his arm and leg weakness: \"is this from your last stroke,\" he answers \"yes.\"    Imaging Findings  Impression: CT head  Evolving large right PCA territory infarct with increased prominence  of gyriform hyperdensities compatible with cortical laminar necrosis.  No superimposed new acute hemorrhage or acute infarct.     Thrombolytic Treatment   Not given due to low likely [place] of stroke.    Endovascular Treatment  Not initiated due to absence of proximal vessel occlusion    Impression   Encephalopathy    This is a 45yo man with a pmh of HTN, CKD and R occipital infarct that occurred recently in January. He suffered a R P1 occlusion and was found to have a known R M2 stenosis. He also had a hemorrhagic transformation of infarct with likely etiology large vessel atherosclerosis vs " ESUS. He does have a high baseline risk of stroke at this point. However upon further examination, once awoken, he returns to his baseline documented exam. He is noted by the bedside nurse to have become more encephalopathic after a dose of zyprexa he received recently. He is being admitted back to [facility] from the TCU due to this encephalopathy, although there is no note of a focal neurologic deficit. Overall suspicion for stroke is low. Primary team did opt to get a head CT, as they were intending to obtain one today regardless of the stroke code outcome that revealed evolving prior R PCA infarct. The patient endorses improvement in L arm sensation and notes no change in his exam. At this time he is not a candidate for thrombolysis considering recent stroke and symptoms not likely 2/2 stroke, will recommend to continue PTA ASA and statin with control of risk factors for future stroke prevention.    Recommendations  - Continue to workup for toxic metabolic causes of encephalopathy  - Neuro checks q 4H  - Delirium precautions  - Continue PTA ASA 81 mg daily  - Continue PTA atorvastatin 40 mg daily  - Target BP normotension  - Normoglycemia    Thank you for this consult. No further stroke evaluation is recommended, so we will sign off. Please contact us with any additional questions.    The patient was discussed with Stroke Staff is Dr. Blandon.    Lalo Anaya MD  Neurology Resident  [id number]  ______________________________________________________    Clinically Significant Risk Factors Present on Admission              # Platelet Defect: home medication list includes an antiplatelet medication       Past Medical History   Past Medical History:   Diagnosis Date     Hypertension      Past Surgical History   Past Surgical History:   Procedure Laterality Date     TONSILLECTOMY       Medications   Home Meds  Prior to Admission medications    Medication Sig Start Date End Date Taking? Authorizing Provider   acetaminophen  (TYLENOL) 325 MG tablet Take 2 tablets (650 mg) by mouth every 6 hours as needed for mild pain or fever 2/17/22   Linda Perez PA-C   aMILoride (MIDAMOR) 5 MG tablet Take 2 tablets (10 mg) by mouth daily 2/18/22   Linda Perez PA-C   aspirin (ASA) 81 MG chewable tablet Take 1 tablet (81 mg) by mouth daily 1/28/22   Nelson Laura MD   atorvastatin (LIPITOR) 40 MG tablet Take 1 tablet (40 mg) by mouth every evening 1/28/22   Nelson Laura MD   Baclofen (LIORESAL) 5 MG tablet Take 1 tablet (5 mg) by mouth 3 times daily 2/17/22   Linda Perez PA-C   carvedilol (COREG) 12.5 MG tablet Take 3 tablets (37.5 mg) by mouth 2 times daily (with meals) 2/17/22   Linda Perez PA-C   cloNIDine (CATAPRES) 0.1 MG tablet Take 1 tablet (0.1 mg) by mouth 3 times daily 2/17/22   Linda Perez PA-C   diclofenac (VOLTAREN) 1 % topical gel Apply 2 g topically 4 times daily as needed for moderate pain 2/17/22   Linda Perez PA-C   divalproex sodium extended-release (DEPAKOTE ER) 500 MG 24 hr tablet Take 2 tablets (1,000 mg) by mouth daily 2/18/22   Linda Perez PA-C   hydrALAZINE (APRESOLINE) 10 MG tablet Take 1.5 tablets (15 mg) by mouth 4 times daily as needed (SBP>160) 2/17/22   Linda Perez PA-C   hydrALAZINE (APRESOLINE) 20 MG/ML injection Inject 0.5 mLs (10 mg) into the vein every 4 hours as needed for high blood pressure (SBP >170) 2/19/22   Carolina Antunez NP   hydrALAZINE (APRESOLINE) 25 MG tablet Take 1 tablet (25 mg) by mouth every 8 hours 2/19/22   Carolina Antunez NP   lisinopril (ZESTRIL) 5 MG tablet Take 1 tablet (5 mg) by mouth daily 2/19/22   Carolina Antunez NP   melatonin 5 MG tablet Take 1 tablet (5 mg) by mouth nightly as needed for sleep 1/28/22   Nelson Laura MD   OLANZapine (ZYPREXA) injection Inject 5 mg into the muscle 3 times daily as needed for agitation 2/19/22   Carolina Antunez NP   sennosides (SENOKOT) 8.6 MG tablet Take 1 tablet by  mouth 2 times daily as needed for constipation 2/17/22   Linda Perez PA-C       Scheduled Meds    aMILoride  10 mg Oral Daily     aspirin  81 mg Oral Daily     atorvastatin  40 mg Oral QPM     Baclofen  5 mg Oral TID     carvedilol  37.5 mg Oral BID w/meals     cloNIDine  0.1 mg Oral TID     divalproex sodium extended-release  1,000 mg Oral Daily     hydrALAZINE  25 mg Oral Q8H     insulin aspart  1-7 Units Subcutaneous Q4H     sodium chloride (PF)  3 mL Intracatheter Q8H       Infusion Meds    lactated ringers         PRN Meds  acetaminophen, glucose **OR** dextrose **OR** glucagon, diclofenac, hydrALAZINE, lidocaine 4%, lidocaine (buffered or not buffered), melatonin, ondansetron **OR** ondansetron, senna-docusate **OR** senna-docusate, sodium chloride (PF)    Allergies   Allergies   Allergen Reactions     Pcn [Penicillin G] Hives and Itching     Family History   Family History   Problem Relation Age of Onset     Breast Cancer Mother      Diabetes Mother      Lipids Mother      Hypertension Mother      Hypertension Father      Heart Disease Father      Respiratory Father      Heart Disease Maternal Grandfather         CHF     Social History   Social History     Tobacco Use     Smoking status: Never Smoker     Smokeless tobacco: Never Used   Substance Use Topics     Alcohol use: Yes     Alcohol/week: 1.7 standard drinks     Types: 2 Cans of beer per week     Comment: 1 weeklyt     Drug use: No       Review of Systems   The 10 point Review of Systems is negative other than noted in the HPI or here.        PHYSICAL EXAMINATION  Temp:  [96.8  F (36  C)-98.6  F (37  C)] 97.7  F (36.5  C)  Pulse:  [54-82] 57  Resp:  [16] 16  BP: ()/() 104/64  SpO2:  [92 %-100 %] 98 %     Mental Status:  fully alert, attentive and oriented, follows commands, speech with intermittent dysarthria but fluent  Cranial Nerves:  visual fields intact, PERRL, EOMI with normal smooth pursuit, initially neglects left side but  can cross midline with encouragement for full lateral gaze, facial sensation intact and symmetric, facial movements symmetric, mild droop on left lower side, hearing not formally tested but intact to conversation, palate elevation symmetric and uvula midline, no dysarthria, shoulder shrug strong bilaterally, tongue protrusion midline  Motor:  no abnormal movements, normal tone throughout, normal muscle bulk, no pronator drift, normal rapid finger tapping on right, able to move right limbs spontaneously, strength 5/5 throughout upper and lower extremities on right side; 1/5 strength of upper and lower extremities on left side (can flex fingers & move proximal thigh)  Reflexes:  no clonus, toes downgoing  Sensory:  intact to light touch on right; diminished light touch on left face and diminished on L hand although notes improvement from prior; feels pressure/tingling on left extremities  Coordination:  FNF and HS intact without dysmetria on right only  Station/Gait:  unable to test    Dysphagia Screen  Per Nursing    Stroke Scales    NIHSS  Interval     Interval Comments     1a. Level of Consciousness 0-->Alert, keenly responsive   1b. LOC Questions 0-->Answers both questions correctly   1c. LOC Commands 0-->Performs both tasks correctly   2.   Best Gaze 1-->Partial gaze palsy, gaze is abnormal in one or both eyes, but forced deviation or total gaze paresis is not present   3.   Visual 2-->Complete hemianopia   4.   Facial Palsy 1-->Minor paralysis (flattened nasolabial fold, asymmetry on smiling)   5a. Motor Arm, Left 4-->No movement   5b. Motor Arm, Right 0-->No drift, limb holds 90 (or 45) degrees for full 10 secs   6a. Motor Leg, Left 4-->No movement   6b. Motor Leg, right 0-->No drift, leg holds 30 degree position for full 5 secs   7.   Limb Ataxia 0-->Absent   8.   Sensory 1-->Mild-to-moderate sensory loss, patient feels pinprick is less sharp or is dull on the affected side, or there is a loss of superficial  pain with pinprick, but patient is aware of being touched   9.   Best Language 0-->No aphasia, normal   10. Dysarthria 1-->Mild-to-moderate dysarthria, patient slurs at least some words and, at worst, can be understood with some difficulty   11. Extinction and Inattention  1-->Visual, tactile, auditory, spatial, or personal inattention or extinction to bilateral simultaneous stimulation in one of the sensory modalities   Total 15 (02/19/22 1635)       Modified Creston Score (Pre-morbid)  4-Moderately severe disability; unable to walk without assistance and unable to attend to own bodily    Imaging  I personally reviewed all imaging; relevant findings per HPI.     Lab Results Data   CBC  Recent Labs   Lab 02/19/22  1646 02/18/22  0959 02/14/22  0902   WBC 6.2 6.4 6.0   RBC 4.44 4.27* 4.26*   HGB 13.5 13.0* 13.0*   HCT 39.9* 37.8* 37.9*    129* 146*     Basic Metabolic Panel    Recent Labs   Lab 02/19/22  1757 02/19/22  1646 02/19/22  1556 02/19/22  0744 02/19/22  0556 02/18/22  1835 02/18/22  0959   NA  --  136  --   --  137  --  134   POTASSIUM  --  4.2  --   --  4.1  --  4.7   CHLORIDE  --  105  --   --  104  --  101   CO2  --  26  --   --  27  --  26   BUN  --  55*  --   --  56*  --  62*   CR  --  1.91*  --   --  2.06*  --  2.25*   * 135* 130*   < > 165*   < > 323*   VALERIE  --  9.4  --   --  9.4  --  9.5    < > = values in this interval not displayed.     Liver Panel  Recent Labs   Lab 02/19/22  1646 02/18/22  1655   PROTTOTAL 7.9 7.9   ALBUMIN 3.4 3.5   BILITOTAL 0.4 0.4   ALKPHOS 68 65   AST 23 23   ALT 28 33     INR    Recent Labs   Lab Test 01/18/22  1405 01/08/22  1843 01/08/22  1834   INR 1.06 0.95 1.0*      Lipid Profile    Recent Labs   Lab Test 01/08/22  1843 05/16/14  1506   CHOL 169 131   HDL 70 53   LDL 75 70   TRIG 118 38     A1C    Recent Labs   Lab Test 01/08/22  1843   A1C 7.8*     Troponin I  No results for input(s): TROPONIN, GHTROP in the last 168 hours.       Stroke Code Data Data    Stroke Code Data  (for stroke code without tele)  Stroke code activated 02/19/22   1547   First stroke provider response 02/19/22   1551   Last known normal 02/16/22       Time of discovery   (or onset of symptoms) 02/19/22   1545   Head CT read by Stroke Neuro Dr/Provider 02/19/22   1620   Was stroke code de-escalated? Yes 02/19/22 1626  symptoms not likely caused by stroke         I have personally spent a total of <30 minutes providing critical care services supervising this patient's stroke code activation.  I personally reviewed all lab values and radiology images.  I evaluated and directed care for the patient's critical condition.

## 2022-02-19 NOTE — PROGRESS NOTES
1500: Arrived to PCU 5A with EMS, increased somnolence and soft BPs (100s/60s) from what ARU verbal report had stated. Able to arose barely, could answer one question and then drift off again. Triage paged that pt had arrived.     1545: PA came to admit patient. Decreased response to sternal rub and won't open eyes. Stroke code called. . Pt able to arose with painful stimuli, able to answer questions and no apparent changes to baseline neuros. Neuro de-escalated stroke code. Head CT non-contrast obtained, no changes. Pt aspirated with small sip of water, NPO for SLP eval. Transfer orders canceled to higher level of care, pt appropriate to stay on current PCU.     Chest XR done, results pending. LA 1.3. Cr 1.91.     1815: Pt increasingly having long spells of apnea during somnolence, 1-2 breaths per min with up to 60 sec of apnea. Maintaining sats on RA, but placed on 2L O2. Provider paged. Gag reflex tested x3, only present on the 3rd try. RRT called. Pt aroused with vigorous painful stimuli and maintained alertness, which he had previously been unable to do. Joking with staff. Procal and prolactin ordered, ABG ordered, EEG ordered. Asymptomatic COVID swab done, pending. Awaiting urine for UA/Utox. Orders for 6B at this time placed. Friend and designated visitor Taniya came, updated.

## 2022-02-19 NOTE — PROGRESS NOTES
Stroke Code Nurse-Responder Note    Arrival Time to Stroke Code: 1555     Stroke Code Team interventions:    - De-escalated at 1627 by Dr. Emerson    ED/Bedside Nurse providing handoff: ANGELIC Ball RN     Time left for CT: 1615    Time arrived to next location (ED/Unit/IR): Back to 5A at 1620    ED/Bedside Nurse given handoff (name/time): ANGELIC Ball RN, RN

## 2022-02-19 NOTE — PROVIDER NOTIFICATION
Medicine triage Dr Baig notified by 7B charge of new onset agitation, confusion, requiring Code 21 and sedation with ACLS ride [place]. Spoke about concerns with safety being in 2 person room, transfer, and med surg placement. Was decided that med surg bed on 7B is safest for patient at this time. Pt will be quickly assessed by team upon arrival.

## 2022-02-19 NOTE — PROGRESS NOTES
"[facility]  Internal Medicine Daily Progress Note    ARU day#: 22    Assessment & Plan:   Miriam Hall is a 46 year old man with a history of HTN who was admitted to [facility] 1/8-1/28/22 for right PCA stroke c/b refractory HTN w/ concern for secondary HTN, DISHA, new dx of DM II, hypokalemia, and encephalopathy. Transferred to ARU for ongoing rehabilitation.  Internal medicine has been following for hypertension and DISHA.    Plan was to discharge to a TCU 2/18, however, on 2/18 he developed new confusion/agitation, hypertension, low grade fever (99.3) and worsening DISHA (2.25 from prior 1.82).  Nephrology evaluated patient in ARU, started workup for DISHA (see below), held chlorthalidone.  Nephrology recommend a potential transfer back to [facility] at that time pending clinical stability.     This AM, he continues to have intermittent confusion with agitation and paranoia.  He is refusing all PO medications and demanding to be discharged from the ARU.  Refusing to take any medications and is distrusting of everything staff is doing.  Medically, without his antihypertensives, I am concerned he will have HTN emergency and worsening DISHA.  I informed him that he medically needs to be transferred back to the [facility]-- he later informed the charge nurse he would \"rather be hit by a car\" than be transferred and he wants to be discharged home.  At this time, he is a danger to himself and possibly to staff therefore we will place a 72 hour hold (d/w Dr. Hogan from psych) if he continues to be agitated and refuses medications and transfer to the hospital for his safety.  5-10 mg PO Zyprexa ordered prior to transport and 10 mg IM Zyprexa ordered if needed.  A bed is available on 7D on [place].     Discussed case with Dr. Baig on [facility] and patient is accepted to internal medicine.     #Acute agitation  Unclear etiology.  Possibly toxic metabolic encephalopathy from DISHA/HTN +/- infection vs new onset " psychosis vs hospital acquired delirium.  Concern for safety given he is refusing all medications and exhibiting paranoid behavior. Ammonia and LFTs WNL  - Head CT ordered, unable to complete given agitation   - Zyprexa 5-10 mg PO q 6 hours PRN agitation.  One time 10 mg IM ordered as well.  - Place PIV  - Code 21 if needed   - 72 hour hold to be initiated if needed  - Consult Psychiatry on admission to [facility]     #DISHA  Cr 1.3 on hospital admit, best of 0.94 on 1/20 and peak of 1.56 which improved to 1.3 with IVFB while in hospital.  While in ARU has been trending in 1.7 range but abrupt rise on 2/18 to 2.2 (2.0) today.  Nephrology evaluated patient on 2/18.  Etiology of worsening DISHA somewhat puzzling.  Initially felt due to HD shift but it is progressive despite BP control.  Possible GN vs AIN.  UA is clean making GN less likely.  Renal US normal on 2.18.  Possible intravascular depletion due to diuretics.  Weight is down 11 kg since admission.  Chlorthalidone stopped on 2/18.    - Consult Nephrology on admission to [facility] (they are aware)  - BMP daily   - Strict I/O  - Daily weights   - Avoid nephrotoxic medications   - Follow urine studies, CK, C3, C4, ANCA IgG, protein immunofixation, urine metanephrines   - Continue to hold Metformin   - Follow up with Nephro Thursday March 3 at 1:10 PM with Payton Irving PA-C.  This appointment has been booked for him and is in-person.      #Elevated temperature  99.3 2/18 but infectious workup has remained unremarkable. Procal elevated but difficult to interpret in setting of DISHA   - Follow urine and blood cx     #Acute Right PCA Stroke  Presented w/ left hemiplegia and head CT showing right parieto-occipital infarct and CTA showing occlusion of proximal right PCA and high grade stenosis of M2 branch of right MCA. Was outside window for tPA. No thrombectomy performed d/t location of LVO in PCA. Repeat imaging 1/12 w/ hemorrhagic transformation. Source 2/2 possible  intracranial atherosclerosis vs. ESUS. CARISA neg for LA thrombus. Residual deficits include L hemiplegia, L hemianopsia, mild dysarthria.  - Head CT when able   - Consider neurology consult on [place]  - Continue ASA, statin.   - Continue empiric Depakote (and seizure precautions) until neuro clinic f/u (had multiple vEEG neg for seizure activity but elevated risk d/t stroke burden and prior prolonged period of encephalopathy).   - BP management as below; goal < 130/80 eventually, however, monitoring renal fxn closely as this is very sensitive to BP changes.      #Refractory HTN  Required 6 antihypertensives while inpatient to maintain SBP goal < 180. No clear etiology despite extensive workup and consultations with Cardiology and Nephrology. Discharged to TCU on amiloride, carvedilol, chlorthalidone, clonidine, lisinopril, hydralazine, but hydralazine was stopped 2/1 and clonidine 2/4 for low BPs. BP then began to rise and clonidine was restarted 2/6, increased to BID on 2/12 and further increased to TID on 2/15.  BPs continue to intermittently spike in 170s-180s requiring PO PRN Hydralazine.  Chlorthalidone stopped 2/18 per nephro due to concerns for hypovolemia.  Lisinopril restarted at 5 mg and Hydralazine at 25 mg TID on 2/19 per nephrology.  Refusing to take all PO meds this AM.   - Place PIV if able   - PRN IV Hydralazine   - Nephrology consult   - If amenale to taking PO meds: Continue amiloride 10 mg daily, carvedilol 37.5 mg BID, clonidine 0.1 mg TID, Lisionpril 5 mg PO daily and Hydralazine 25 mg q 8 hours.  PRN Hydralazine for SBP >170.   - NEEDS OP GENETIC FOLLOW UP for possible Liddle syndrome--> referral ordered in discharge navigator      #DM II  New dx during hospital stay w/ HgbA1c 7.8%. Endocrinology saw IP and recommended metformin, which is currently on hold d/t DISHA. BG well controlled. DM educator met with patient in ARU.   - Hold Metformin  - Needs and endocrinology appointment on  "discharge    Carolina Antunez, MPH, [facility]  Hospitalist Service  Pager [phone]  ________________________________________________________________    Subjective & Interval History:    Rohan is very agitated this morning yelling he wants to \"get the fuck out of this place!\" and discharge home.  He is paranoid, feels we are giving him too many medications and no longer helping him.  No longer recognizing certain staff members that he knows well.  He states he would rather \"jump in front of a car\" than be transferred to the hospital. No further ROS able to be done 2/2 agitation.  I have informed him that he needs to be transferred back to the hospital for medical safety.     Last 24 hour care team notes reviewed.   ROS: 4 point ROS (including Respiratory, CV, GI and ) was performed and negative unless otherwise noted in HPI.     Medications: Reviewed in EPIC.    Physical Exam:    Temp: 96.8  F (36  C) Temp src: Oral BP: (!) 181/104 Pulse: 72   Resp: 16 SpO2: 100 % O2 Device: None (Room air)    GENERAL:  Agitated, not answering questions.  Paranoid and anxious.   HEENT: Anicteric sclera. Mucous membranes moist.   CV: RRR. S1, S2. No murmurs appreciated.   RESPIRATORY: Effort normal on room air. Lungs CTAB with no wheezing, rales, rhonchi.   GI: Deferred.   NEUROLOGICAL: L hemiparesis.  L sided neglect with gaze preference.  Increased tone in LUE.    EXTREMITIES: Deferred.    Lines/Tubes/Drains:  None       "

## 2022-02-20 ENCOUNTER — APPOINTMENT (OUTPATIENT)
Dept: NEUROLOGY | Facility: CLINIC | Age: 47
End: 2022-02-20
Attending: PHYSICIAN ASSISTANT
Payer: COMMERCIAL

## 2022-02-20 ENCOUNTER — APPOINTMENT (OUTPATIENT)
Dept: SPEECH THERAPY | Facility: CLINIC | Age: 47
End: 2022-02-20
Attending: PHYSICIAN ASSISTANT
Payer: COMMERCIAL

## 2022-02-20 LAB
ALBUMIN SERPL-MCNC: 3 G/DL (ref 3.4–5)
ALBUMIN UR-MCNC: NEGATIVE MG/DL
ALP SERPL-CCNC: 60 U/L (ref 40–150)
ALT SERPL W P-5'-P-CCNC: 24 U/L (ref 0–70)
AMPHETAMINES UR QL SCN: NORMAL
ANION GAP SERPL CALCULATED.3IONS-SCNC: 11 MMOL/L (ref 3–14)
APPEARANCE UR: CLEAR
AST SERPL W P-5'-P-CCNC: 19 U/L (ref 0–45)
BARBITURATES UR QL: NORMAL
BASE EXCESS BLDV CALC-SCNC: -1.2 MMOL/L (ref -7.7–1.9)
BENZODIAZ UR QL: NORMAL
BILIRUB SERPL-MCNC: 0.5 MG/DL (ref 0.2–1.3)
BILIRUB UR QL STRIP: NEGATIVE
BUN SERPL-MCNC: 72 MG/DL (ref 7–30)
CALCIUM SERPL-MCNC: 9.4 MG/DL (ref 8.5–10.1)
CANNABINOIDS UR QL SCN: NORMAL
CHLORIDE BLD-SCNC: 104 MMOL/L (ref 94–109)
CO2 SERPL-SCNC: 23 MMOL/L (ref 20–32)
COCAINE UR QL: NORMAL
COLOR UR AUTO: ABNORMAL
CREAT SERPL-MCNC: 2.95 MG/DL (ref 0.66–1.25)
ERYTHROCYTE [DISTWIDTH] IN BLOOD BY AUTOMATED COUNT: 13.8 % (ref 10–15)
ETHANOL UR QL SCN: NORMAL
GFR SERPL CREATININE-BSD FRML MDRD: 26 ML/MIN/1.73M2
GLUCOSE BLD-MCNC: 141 MG/DL (ref 70–99)
GLUCOSE BLDC GLUCOMTR-MCNC: 115 MG/DL (ref 70–99)
GLUCOSE BLDC GLUCOMTR-MCNC: 127 MG/DL (ref 70–99)
GLUCOSE BLDC GLUCOMTR-MCNC: 135 MG/DL (ref 70–99)
GLUCOSE BLDC GLUCOMTR-MCNC: 145 MG/DL (ref 70–99)
GLUCOSE BLDC GLUCOMTR-MCNC: 172 MG/DL (ref 70–99)
GLUCOSE BLDC GLUCOMTR-MCNC: 183 MG/DL (ref 70–99)
GLUCOSE UR STRIP-MCNC: NEGATIVE MG/DL
HCO3 BLDV-SCNC: 25 MMOL/L (ref 21–28)
HCT VFR BLD AUTO: 37.9 % (ref 40–53)
HGB BLD-MCNC: 12.5 G/DL (ref 13.3–17.7)
HGB UR QL STRIP: NEGATIVE
HYALINE CASTS: 12 /LPF
KETONES UR STRIP-MCNC: NEGATIVE MG/DL
LEUKOCYTE ESTERASE UR QL STRIP: NEGATIVE
MCH RBC QN AUTO: 29.9 PG (ref 26.5–33)
MCHC RBC AUTO-ENTMCNC: 33 G/DL (ref 31.5–36.5)
MCV RBC AUTO: 91 FL (ref 78–100)
MUCOUS THREADS #/AREA URNS LPF: PRESENT /LPF
NITRATE UR QL: NEGATIVE
O2/TOTAL GAS SETTING VFR VENT: 0 %
OPIATES UR QL SCN: NORMAL
PCO2 BLDV: 45 MM HG (ref 40–50)
PCP UR QL SCN: NORMAL
PH BLDV: 7.35 [PH] (ref 7.32–7.43)
PH UR STRIP: 5 [PH] (ref 5–7)
PLATELET # BLD AUTO: 164 10E3/UL (ref 150–450)
PO2 BLDV: 53 MM HG (ref 25–47)
POTASSIUM BLD-SCNC: 4.6 MMOL/L (ref 3.4–5.3)
PROT SERPL-MCNC: 7.2 G/DL (ref 6.8–8.8)
RBC # BLD AUTO: 4.18 10E6/UL (ref 4.4–5.9)
RBC URINE: <1 /HPF
SODIUM SERPL-SCNC: 138 MMOL/L (ref 133–144)
SP GR UR STRIP: 1.02 (ref 1–1.03)
UROBILINOGEN UR STRIP-MCNC: NORMAL MG/DL
WBC # BLD AUTO: 6 10E3/UL (ref 4–11)
WBC URINE: <1 /HPF

## 2022-02-20 PROCEDURE — 95714 VEEG EA 12-26 HR UNMNTR: CPT

## 2022-02-20 PROCEDURE — 250N000012 HC RX MED GY IP 250 OP 636 PS 637: Performed by: PHYSICIAN ASSISTANT

## 2022-02-20 PROCEDURE — 80053 COMPREHEN METABOLIC PANEL: CPT | Performed by: PHYSICIAN ASSISTANT

## 2022-02-20 PROCEDURE — 250N000013 HC RX MED GY IP 250 OP 250 PS 637

## 2022-02-20 PROCEDURE — 95720 EEG PHY/QHP EA INCR W/VEEG: CPT | Mod: GC | Performed by: PSYCHIATRY & NEUROLOGY

## 2022-02-20 PROCEDURE — 99207 PR CDG-MDM COMPONENT: MEETS MODERATE - UP CODED: CPT | Performed by: PSYCHIATRY & NEUROLOGY

## 2022-02-20 PROCEDURE — 81001 URINALYSIS AUTO W/SCOPE: CPT | Performed by: PHYSICIAN ASSISTANT

## 2022-02-20 PROCEDURE — 99254 IP/OBS CNSLTJ NEW/EST MOD 60: CPT | Mod: GC | Performed by: PSYCHIATRY & NEUROLOGY

## 2022-02-20 PROCEDURE — 80307 DRUG TEST PRSMV CHEM ANLYZR: CPT | Performed by: PHYSICIAN ASSISTANT

## 2022-02-20 PROCEDURE — 99233 SBSQ HOSP IP/OBS HIGH 50: CPT | Performed by: INTERNAL MEDICINE

## 2022-02-20 PROCEDURE — 92610 EVALUATE SWALLOWING FUNCTION: CPT | Mod: GN

## 2022-02-20 PROCEDURE — 120N000002 HC R&B MED SURG/OB UMMC

## 2022-02-20 PROCEDURE — 250N000013 HC RX MED GY IP 250 OP 250 PS 637: Performed by: PHYSICIAN ASSISTANT

## 2022-02-20 PROCEDURE — 85014 HEMATOCRIT: CPT | Performed by: PHYSICIAN ASSISTANT

## 2022-02-20 PROCEDURE — 82803 BLOOD GASES ANY COMBINATION: CPT | Performed by: INTERNAL MEDICINE

## 2022-02-20 PROCEDURE — 250N000013 HC RX MED GY IP 250 OP 250 PS 637: Performed by: PSYCHIATRY & NEUROLOGY

## 2022-02-20 PROCEDURE — 36415 COLL VENOUS BLD VENIPUNCTURE: CPT | Performed by: INTERNAL MEDICINE

## 2022-02-20 PROCEDURE — 99233 SBSQ HOSP IP/OBS HIGH 50: CPT

## 2022-02-20 PROCEDURE — 36415 COLL VENOUS BLD VENIPUNCTURE: CPT | Performed by: PHYSICIAN ASSISTANT

## 2022-02-20 PROCEDURE — 99253 IP/OBS CNSLTJ NEW/EST LOW 45: CPT | Performed by: PSYCHIATRY & NEUROLOGY

## 2022-02-20 PROCEDURE — 250N000013 HC RX MED GY IP 250 OP 250 PS 637: Performed by: HOSPITALIST

## 2022-02-20 RX ORDER — ACETAZOLAMIDE 125 MG/1
125 TABLET ORAL
Status: DISCONTINUED | OUTPATIENT
Start: 2022-02-20 | End: 2022-02-20

## 2022-02-20 RX ORDER — LANOLIN ALCOHOL/MO/W.PET/CERES
3 CREAM (GRAM) TOPICAL DAILY
Status: DISCONTINUED | OUTPATIENT
Start: 2022-02-20 | End: 2022-03-09 | Stop reason: HOSPADM

## 2022-02-20 RX ORDER — LIDOCAINE 4 G/G
1 PATCH TOPICAL
Status: DISCONTINUED | OUTPATIENT
Start: 2022-02-20 | End: 2022-03-09 | Stop reason: HOSPADM

## 2022-02-20 RX ORDER — CARVEDILOL 25 MG/1
25 TABLET ORAL 2 TIMES DAILY WITH MEALS
Status: DISCONTINUED | OUTPATIENT
Start: 2022-02-20 | End: 2022-03-09 | Stop reason: HOSPADM

## 2022-02-20 RX ADMIN — LIDOCAINE 1 PATCH: 560 PATCH PERCUTANEOUS; TOPICAL; TRANSDERMAL at 08:46

## 2022-02-20 RX ADMIN — Medication 2.5 MG: at 12:35

## 2022-02-20 RX ADMIN — Medication 3 MG: at 20:59

## 2022-02-20 RX ADMIN — ACETAMINOPHEN 650 MG: 325 TABLET, FILM COATED ORAL at 20:59

## 2022-02-20 RX ADMIN — CLONIDINE HYDROCHLORIDE 0.1 MG: 0.1 TABLET ORAL at 21:07

## 2022-02-20 RX ADMIN — ATORVASTATIN CALCIUM 40 MG: 40 TABLET, FILM COATED ORAL at 20:59

## 2022-02-20 RX ADMIN — ACETAMINOPHEN 650 MG: 325 TABLET, FILM COATED ORAL at 06:16

## 2022-02-20 RX ADMIN — INSULIN ASPART 1 UNITS: 100 INJECTION, SOLUTION INTRAVENOUS; SUBCUTANEOUS at 18:08

## 2022-02-20 RX ADMIN — Medication 2.5 MG: at 18:08

## 2022-02-20 RX ADMIN — DIVALPROEX SODIUM 1000 MG: 500 TABLET, FILM COATED, EXTENDED RELEASE ORAL at 12:35

## 2022-02-20 RX ADMIN — CLONIDINE HYDROCHLORIDE 0.1 MG: 0.1 TABLET ORAL at 12:34

## 2022-02-20 RX ADMIN — ASPIRIN 81 MG: 81 TABLET, CHEWABLE ORAL at 12:34

## 2022-02-20 ASSESSMENT — ACTIVITIES OF DAILY LIVING (ADL)
ADLS_ACUITY_SCORE: 18
ADLS_ACUITY_SCORE: 10
ADLS_ACUITY_SCORE: 18
ADLS_ACUITY_SCORE: 10
ADLS_ACUITY_SCORE: 18

## 2022-02-20 NOTE — PROGRESS NOTES
Nephrology Progress Note  02/20/2022     Mr Hall is a 46 yom with hx of HTN admitted for R PCA stroke on 1/8/2022, seen by Nephrology for evaluation of possible secondary HTN and has been in ARU (rehab) since 1/28.  Nephrology consulted for DISHA post CVA with HTN. Baseline creat 0.9-1.3      Assessment & Recommendations:     1. DISHA - Creat was trending down over previous 48 hrs, but now bumped up following neuro event yesterday/need for NPO status/abrupt drop in blood pressures. He is oliguric   - Baseline creat 0.9-1.3   - Creat today 2.9 from 1.9, 2.0, 2.2.    - Renal US normal   - Labs still pending include Metanephrines, renin/marychuy, C3, c4, ANCA and SPEP.    - Agree with IVF   - Continue daily renal panel     2. HTN - Was very out of control at ARU with SBP in the 180's/ but he was agitated. Following neuro event yesterday b/ps have been quite low acutely now ranging in the low 100's/  Currently on Hydralazine 25 mg TID, Clonidine 0.1 mg TID, Amiloride 10 mg every day and Coreg 37.5 mg BID. The lisinopril 5 mg started yesterday has been discontinued   - Discontinue Hydralazine and Chlorthalidone   - Add hold parameters to Clonidine and Coreg for SBP < 110/   - He has not been given any b/p meds this morning and IVF is running and b/p 106/66    3. Volume status - Likely hypovolemic. No edema. No hypoxia.   IVF running at 100/ hr. B/P low. UO low.    - Hold Amiloride for now   - Strict I/O   - Daily weights    4. Electrolytes - No acute concerns. K 4.6, Na 138      Recommendations were communicated to primary team via progress note    Cathleen Jacques NP   Division of Renal Disease and Hypertension  [facility]  [employer] Web Console    Interval History :   Nursing and provider notes from last 24 hours reviewed.  Transferred from ARU given need for higher level of care.   Stroke code/RRT code called yesterday evening due to decreased LOC and possible need for intubation.   Ultimately he was responsive  and avoided transfer to ICU/intubation but has remained somnolent until this morning per nursing. Currently lethargic but rousable.   Creat bumped from 1.9 to 2.9  Blood pressures soft since transfer  He is oliguric    Review of Systems:   I reviewed the following systems:  GI: Has been NPO since transfer yesterday from ARU. IVF started yesterday evening  Neuro:  Lethargic.   Constitutional:  no fever or chills  CV: denies dyspnea,CP  or edema.      Physical Exam:   No intake/output data recorded.   /66 (BP Location: Right arm)   Pulse 71   Temp 98.7  F (37.1  C) (Oral)   Resp 16   Wt 70.9 kg (156 lb 4.9 oz)   SpO2 92%   BMI 23.49 kg/m       GENERAL APPEARANCE: Lethargic  EYES:  no scleral icterus, pupils equal  PULM: lungs CTA. On supplemental oxygen   CV: RRR     -edema - none  GI: soft, NT, Non distended  INTEGUMENT: no cyanosis  NEURO:  Lethargic but rousable  : No [place]    Labs:   All labs reviewed by me  Electrolytes/Renal - Recent Labs   Lab Test 02/20/22  0850 02/20/22  0707 02/20/22  0455 02/19/22  1757 02/19/22  1646 02/19/22  0744 02/19/22  0556 02/18/22  1835 02/18/22  0959 01/28/22  0844 01/28/22  0428 01/27/22  0916 01/27/22  0428   NA  --  138  --   --  136  --  137  --  134   < > 137  --  139   POTASSIUM  --  4.6  --   --  4.2  --  4.1  --  4.7   < > 3.7   < > 3.7  3.7   CHLORIDE  --  104  --   --  105  --  104  --  101   < > 103  --  103   CO2  --  23  --   --  26  --  27  --  26   < > 28  --  29   BUN  --  72*  --   --  55*  --  56*  --  62*   < > 31*  --  30   CR  --  2.95*  --   --  1.91*  --  2.06*  --  2.25*   < > 1.32*  --  1.56*   * 141* 135*   < > 135*   < > 165*   < > 323*   < > 96   < > 95   VALERIE  --  9.4  --   --  9.4  --  9.4  --  9.5   < > 9.0  --  9.5   MAG  --   --   --   --   --   --   --   --  2.4*  --  1.9  --  2.0   PHOS  --   --   --   --   --   --   --   --  4.1  --  4.2  --  3.8    < > = values in this interval not displayed.       CBC -   Recent Labs   Lab  Test 02/20/22  0707 02/19/22  1646 02/18/22  0959   WBC 6.0 6.2 6.4   HGB 12.5* 13.5 13.0*    150 129*       LFTs -   Recent Labs   Lab Test 02/20/22  0707 02/19/22  1646 02/18/22  1655   ALKPHOS 60 68 65   BILITOTAL 0.5 0.4 0.4   ALT 24 28 33   AST 19 23 23   PROTTOTAL 7.2 7.9 7.9   ALBUMIN 3.0* 3.4 3.5       Iron Panel - No lab results found.      Imaging:    US RENAL COMPLETE   2/18/2022 2:23 PM       HISTORY: DISHA, evaluate structure and for hydro.     FINDINGS: The right kidney measures 9.5 cm in length. The left kidney  measures 9.9 cm in length. The kidneys are normal in size, shape,  position, and echogenicity. There is no hydronephrosis. The bladder is  well filled and normal.                                                                      IMPRESSION: Normal renal ultrasound.     KIARA LLAMAS MD     Current Medications:    aspirin  81 mg Oral Daily     atorvastatin  40 mg Oral QPM     Baclofen  2.5 mg Oral TID     carvedilol  37.5 mg Oral BID w/meals     cloNIDine  0.1 mg Oral TID     divalproex sodium extended-release  1,000 mg Oral Daily     insulin aspart  1-7 Units Subcutaneous Q4H     lidocaine  1 patch Transdermal Q24H    And     lidocaine   Transdermal Q8H     sodium chloride (PF)  3 mL Intracatheter Q8H       lactated ringers 100 mL/hr at 02/20/22 0841     Cathleen Jacques, GORDON

## 2022-02-20 NOTE — PROGRESS NOTES
[facility]    Medicine Progress Note - Hospitalist Service, GOLD TEAM 7    Date of Admission:  2/19/2022    Assessment & Plan          Miriam Hall is a 46 year old male with history of severe resistant HTN and longstanding DMII who was initially admitted to [facility] 1/8 - 1/28/2022 with R PCA stroke c/b refractory HTN and DISHA, and encephalopathy. Stabilized and transferred to ARU 1/28/2022 for ongoing rehabilitation. Patient developed new AMS, agitation, paranoia and worsening DISHA 2/18 prompting transfer to [place].      Agitation  Paranoia   Toxic metabolic encephalopathy  Pt developed new waxing and waning confusion, agitation and paranoia starting 2/18, worsened 2/19 prompting transfer from ARU to [facility]. Received 10 mg olanzapine and patient became obtunded prompting stroke code, repeat CT with known and evolving stroke without new CVA or hemorrhage. Unclear etiology of decompensation, possible personality change as effect of stroke vs medication effect, less likely sepsis as afebrile, HDS, normal WBC, negative UA, CXR.   - Head CT: evolving stroke   - UA negative, CXR with streaky opacities c/w atelectasis  - Procal 0.3, minimally elevated   - WBC normal   - BCx NGTD  - No indication for abx as of now, will continue to monitor   - Spot EEG, prolactin elevated  - Hold further Zyprexa for now  - Will consult Psych given personality changes and poor response to Zyprexa   - May need to touch base with Neurology to see if this is result of stroke      DISHA on CKD  Resistant HTN  Patient has had resistant longstanding HTN while on multidrug regimen. Recent DISHA and new BL Cr around 1.5-1.7, was 2.2 on transfer and leno to 2.9 on 2/20 in the setting of ACEi use.  Discharged from TCU to [facility] 2/19 on amiloride, Coreg, clonidine, lisinopril, and scheduled and prn hydralazine.    - Blood pressure this -110, down from 160-180's recently   - Hold most antihypertensives,  decrease carvedilol to 25 BID, cont clonidine   - Hold ACEi and diuretics, amiloride, hydralazine  - Nephrology consulted, appreciate rec's   - Daily weights, strict I&Os    Hypopnea   Patient was noted to have low RR (as low as 6) and long pauses of respiration. Normal oxygenation and normal gases   - Cont to monitor clinically. If concern for somnolence, check VBG       Concern for Seizures:   In the setting of CVA. EEG 1/12 concerning for subclinical seizures. Loaded with Keppra, transitioned to Depakote. No e/o seizure noted in the setting of AMS  - Continue Depakote and seizure precautions  - Spot EEG as above    R PCA stroke: Presented with L hemiplegia. Head CT 1/8 R parieto-occipital infarct. CTA noted occlusion on proximal R PCA and high grade stenosis of M2 branch of R MCA. Outside window for tPA. Repeat imagine 1/12 with hemorrhagic transformation. Source 2/2 high grade atherosclerosis vs ESUS. CARISA no LA thrombus. Residual deficits include L hemiplegia, L hemianopsia, mild dysarthria. Acute AMS 2/19 prompting repeat head CT  - Continue ASA, statin   - Follow pending Ziopatch results   - HTN regimen as below   - Will need OP genetics given early age CVA     Dysphagia:   Noted on admission with thin liquids. Previously on regular diet  - SLP consulted, ok with regular diet with 1:1      DMII:   A1c 7.8. PTA metformin on hold due to DISHA. Glucose stable  - MSSI, hypoglycemia protocol         Diet: Regular Diet Adult Thin Liquids (level 0)    DVT Prophylaxis: Pneumatic Compression Devices and Anti-embolisim stockings (TEDs)  Mendoza Catheter: Not present  Central Lines: None  Cardiac Monitoring: None  Code Status: Full Code      Disposition Plan   Expected Discharge:   Anticipated discharge location:  Awaiting care coordination huddle  Delays:           The patient's care was discussed with the Bedside Nurse and Patient.    Vinny Way MD  Hospitalist Service, GOLD TEAM [address] [place] [address]  [facility]  Securely message with the Vocera Web Console (learn more here)  Text page via [facility] Paging/Directory   Please see signed in provider for up to date coverage information      Clinically Significant Risk Factors Present on Admission               # Platelet Defect: home medication list includes an antiplatelet medication   # Diabetes, type II: last A1C 7.8 % (Ref range: 0.0 - 5.6 %)      ______________________________________________________________________    Interval History   No acute events, patient is arousable this AM oriented and apppropriate but does have swings where he requests to be discharged and becomes agitated, denies pain. Hypopnea noted but gases are normal     4 point ROS unremarkable     Data reviewed today: I reviewed all medications, new labs and imaging results over the last 24 hours. I personally reviewed no images or EKG's today.    Physical Exam   Vital Signs: Temp: 98.7  F (37.1  C) Temp src: Oral BP: 106/66 Pulse: 71   Resp: 16 SpO2: 92 % O2 Device: None (Room air) Oxygen Delivery: 2.5 LPM  Weight: 156 lbs 4.9 oz    Constitutional: Sleeping but arousable, oriented, in no apparent distress  Respiratory: Clear to auscultation bilaterally  Cardiovascular: Regular rate and rhythm, normal S1 and S2, and no murmur noted, no edema  GI: Normal bowel sounds, soft, non-distended, non-tender  Skin/Integumen: No rashes, no cyanosis      Data   Recent Labs   Lab 02/20/22  0850 02/20/22  0707 02/20/22  0455 02/19/22  1757 02/19/22  1646 02/19/22  0744 02/19/22  0556 02/18/22  1655 02/18/22  0959   WBC  --  6.0  --   --  6.2  --   --   --  6.4   HGB  --  12.5*  --   --  13.5  --   --   --  13.0*   MCV  --  91  --   --  90  --   --   --  89   PLT  --  164  --   --  150  --   --   --  129*   NA  --  138  --   --  136  --  137  --  134   POTASSIUM  --  4.6  --   --  4.2  --  4.1  --  4.7   CHLORIDE  --  104  --   --  105  --  104  --  101   CO2  --  23  --   --  26  --  27  --  26    BUN  --  72*  --   --  55*  --  56*  --  62*   CR  --  2.95*  --   --  1.91*  --  2.06*  --  2.25*   ANIONGAP  --  11  --   --  5  --  6  --  7   VALERIE  --  9.4  --   --  9.4  --  9.4  --  9.5   * 141* 135*   < > 135*   < > 165*   < > 323*   ALBUMIN  --  3.0*  --   --  3.4  --   --    < >  --    PROTTOTAL  --  7.2  --   --  7.9  --   --    < >  --    BILITOTAL  --  0.5  --   --  0.4  --   --    < >  --    ALKPHOS  --  60  --   --  68  --   --    < >  --    ALT  --  24  --   --  28  --   --    < >  --    AST  --  19  --   --  23  --   --    < >  --     < > = values in this interval not displayed.     Recent Results (from the past 24 hour(s))   CT Head w/o Contrast    Narrative    CT HEAD W/O CONTRAST 2/19/2022 4:20 PM    History: Cerebral hemorrhage suspected     Comparison: 1/18/2022 MR, 1/16/2022    Technique: Using multidetector thin collimation helical acquisition  technique, axial, coronal and sagittal CT images from the skull base  to the vertex were obtained without intravenous contrast.   (topogram) image(s) also obtained and reviewed.    Findings: Large hypodensity compatible with an evolving right PCA  territory infarct with continued surrounding vasogenic edema with  increased encephalomalacia, volume loss and ex vacuo dilatation of the  posterior horn right lateral ventricle. Curvilinear gyriform  hyperintensities within the evolving infarct most consistent with  cortical laminar necrosis. No new acute infarct or acute intracranial  hemorrhage. Basal cisterns are clear.    The bony calvaria and the bones of the skull base are normal. The  visualized portions of the paranasal sinuses and mastoid air cells are  clear, aside from a small mucous retention cyst in the right maxillary  sinus.      Impression    Impression:  Evolving large right PCA territory infarct with increased prominence  of gyriform hyperdensities compatible with cortical laminar necrosis.  No superimposed new acute hemorrhage  or acute infarct.     I have personally reviewed the examination and initial interpretation  and I agree with the findings.    SARATH SANTORO MD         SYSTEM ID:  [id number]   X-ray Chest 2 vws*    Narrative    EXAMINATION: XR CHEST 2 VW on 2/19/2022 5:45 PM.    INDICATION: AMS, aspiration noted at bedside.    COMPARISON: CT dated 1/18/2022. Radiograph dated 1/18/2022    FINDINGS: PA and lateral upright views of the chest. Trachea is clear.  Cardiac mediastinal silhouette is mildly enlarged and stable.  Pulmonary vasculature is distinct. No appreciable pneumothorax or  pleural effusion. Low lung volumes. Bibasilar and retrocardiac streaky  opacities. Upper abdomen is unremarkable. No acute osseous  abnormality.      Impression    IMPRESSION:  Bibasilar and retrocardiac streaky opacities likely represent  atelectasis.  If high concern for aspiration, CT could be obtained which is more  sensitive    I have personally reviewed the examination and initial interpretation  and I agree with the findings.    JUAN FRANCISCO CARRERO MD         SYSTEM ID:  [id number]

## 2022-02-20 NOTE — CODE/RAPID RESPONSE
"Rapid Response Team Note    Assessment   In assessment a rapid response was called on Miriam Hall due to inability to protect airway. This presentation is likely due to medication effects (Zyprexa vs accumulating baclofen) vs subclinical seizure vs unclear neurologic process and worsened by History of CVA and Seizure disorder.     Brief Summary of events leading to rapid response:   Rohan was admitted this afternoon from ARU for acute mental status changes. Recent hx CVA. A stroke code was called on him earlier for evaluation and determined he did not have an acute stroke at that time. He does have a history of seizures, has had no witnessed seizure activity. He received Zyprexa 10mg PO ~1130 and is on baclofen 5mg TID chronically (though it was not ordered as continued on his recent discharge summary). He has not received any other potentially sedating medications. He has had impaired renal function since his recent stroke, though appears improved and closer to baseline today. No other significant lab abnormalities noted. CTH unremarkable for acute process.    RRT called after pt was noted to have RR 1-2 w/ prolonged apnea and lack of gag reflex; primary team present at bedside and confirmed lack of gag reflex x 2 and prepared to call Code Blue for intubation, however pt did have a gag reflex on 3rd attempt and woke spontaneously, prompting RRT instead. On RRT arrival, pt is somnolent, awakens quickly to sternal rub. Provider attempted to repeat gag reflex testing and pt closed his mouth around the [place]. He is somewhat drowsy and states he feels \"drunk\" but at present is able to make his needs known and answer basic questions, states to the provider \"You're annoying!\" and able to make jokes w/ provider and staff. Provider is seeking RRT input on appropriate disposition ([facility] vs ICU). Agree prior patient condition would certainly warrant ICU/intubation for airway protection, however at present this is not " indicated. Definitely would benefit from further work-up and closer monitoring, [facility] level appropriate at this time.    Plan   -  Agree w/ primary team plan of transfer to [facility], capnography, check procal, VBG and EEG. Hold Zyprexa, dose-reduction of Baclofen.  -  The Internal Medicine primary team was present at the bedside and managing patient's care.  -  Disposition: The patient will be transferred to [facility].  -  Reassessment and plan follow-up will be performed by the primary team. Rapid response team will monitor this patient closely, as well.      MICHELLE ONOFRE PA  [facility] RRT AMCOM Job Code Contact #[phone]    Hospital Course     Admission Diagnosis:   AMS (altered mental status) [R41.82]     Physical Exam   Temp: 97.7  F (36.5  C) Temp  Min: 96.8  F (36  C)  Max: 98.6  F (37  C)  Resp: 16 Resp  Min: 16  Max: 16  SpO2: 98 % SpO2  Min: 92 %  Max: 100 %  Pulse: 57 Pulse  Min: 54  Max: 82    No data recorded  BP: 104/64 Systolic (24hrs), Av , Min:99 , Max:186   Diastolic (24hrs), Av, Min:59, Max:120     I/Os: No intake/output data recorded.     Exam:   General: nontoxic in appearance  Mental Status: baseline mental status. Was observed to have altered mental status as noted above, however returned to baseline with minimal intervention.  Resp: No increased WOB noted.    Significant Results and Procedures   Lactic Acid:   Recent Labs   Lab Test 22  1646 22  0613 22  0812   LACT 1.3 0.9 1.4     CBC:   Recent Labs   Lab Test 22  1646 22  0959 22  0902   WBC 6.2 6.4 6.0   HGB 13.5 13.0* 13.0*   HCT 39.9* 37.8* 37.9*    129* 146*

## 2022-02-20 NOTE — PLAN OF CARE
Goal Outcome Evaluation:  2/20/2022 0735 by Silvino Mejia RN  Outcome: Ongoing, Progressing  Flowsheets (Taken 2/20/2022 0735)  Plan of Care Reviewed With: patient  Outcome Evaluation: EEG monitoring overnight.  Pt. had one episode of disorientation to Place and situation overnight otherwise has been AxOx4. Pt. VSS ex soft pressures at times SBP 90s. C/o L thigh pain, Lidocaine patch ordered.  Overall Patient Progress: improving

## 2022-02-20 NOTE — PROVIDER NOTIFICATION
02/19/22 1800   Call Information   Date of Call 02/19/22   Time of Call 1822   Name of person requesting the team Nicole ORO   Title of person requesting team RN   RRT Arrival time 1825   Time RRT ended 1834   Reason for call   Type of RRT Adult   Primary reason for call Staff concerned;Respiratory   Respiratory Rate less than 8;Respiratory pattern change  (apeneac and difficult to rouse)   Was patient transferred from the ED, ICU, or PACU within last 24 hours prior to RRT call? No   SBAR   Situation Pt difficult to wake upon RN finding him apeneac. Initially without gag reflex.     Background Admitted from acute rehab after CVA with increasing encephalopathy. Give Zyprexa this morning for agitation with lingering somnolence.   Notable History/Conditions Neurological;Hypertension   Assessment After repeted stimulation, patient awake and communicative, but remains lethergic with some slurred speech.   Interventions Other (describe)  ([facility] transfer, EEG monitoring )   Patient Outcome   Patient Outcome Transferred to  ([facility])   RRT Team   Attending/Primary/Covering Physician Gold 1   Date Attending Physician notified 02/19/22   Time Attending Physician notified 1822   Physician(s) MARITZA Jeffrey   Lead CHELSEA ORO   RT n/a   Post RRT Intervention Assessment   Post RRT Assessment Stable/Improved   Date Follow Up Done 02/19/22   Time Follow Up Done 2000   Comments EEG monitor being placed when came to recheck

## 2022-02-20 NOTE — CONSULTS
"          Initial Psychiatric Consult   Consult date: February 20, 2022         Reason for Consult, requesting source:    AMS  Requesting source: [address]    Labs and imaging reviewed. Discussed with nursing.         HPI:   From H and P 2/19:   \"Miriam Hall is a 46 year old male with history of resistant HTN who was initially admitted to [facility] 1/8 to 1/28/2022 with R PCA stroke c/b refractory HTN with concern for secondary HTN further c/b DISHA, new DMII diagnosis, and encephalopathy. Stabilized and transferred to ARU 1/28/2022 for ongoing rehabilitation. Patient developed new AMS, agitation, and worsening DISHA 2/18 prompting transfer to [facility] 2/19/2022 for higher level of care \"    On the 19th at about 1100 he received 10 mg of Zyprexa IM, and this apparently led to more somnolence and confusion. No new CNS active meds since then. Is on Depakote.   When I first saw him late morning he was quite tired, confused and was not the best historian.  After lunch he did look a lot better but continued to have a very restricted affect and said he did feel somewhat depressed.  He can vaguely recall being more confused while at acute rehab facility.  He denies any visual hallucinations.  According to nursing he has not been agitated. He is feeling somewhat anxious.   VEEG underway.          Past Psychiatric History:   He said that he has had some anxiety issues in the past but no medications used and no treatments for depression.        Substance Use and History:   He denies heavy alcohol use, no use of drugs or tobacco.         Past Medical History:   PAST MEDICAL HISTORY:   Past Medical History:   Diagnosis Date     Hypertension        PAST SURGICAL HISTORY:   Past Surgical History:   Procedure Laterality Date     TONSILLECTOMY             Family History:   FAMILY HISTORY:   Family History   Problem Relation Age of Onset     Breast Cancer Mother      Diabetes Mother      Lipids Mother      Hypertension Mother      " Hypertension Father      Heart Disease Father      Respiratory Father      Heart Disease Maternal Grandfather         CHF           Social History:   He grew up in the [place] area and works in the [employer] field.  Never  and no children, and he has been living alone.          Physical ROS:   The 10 point Review of Systems is negative other than noted in the HPI or here.           Medications:       aspirin  81 mg Oral Daily     atorvastatin  40 mg Oral QPM     Baclofen  2.5 mg Oral TID     carvedilol  25 mg Oral BID w/meals     cloNIDine  0.1 mg Oral TID     divalproex sodium extended-release  1,000 mg Oral Daily     insulin aspart  1-7 Units Subcutaneous Q4H     lidocaine  1 patch Transdermal Q24H    And     lidocaine   Transdermal Q8H     sodium chloride (PF)  3 mL Intracatheter Q8H              Allergies:     Allergies   Allergen Reactions     Pcn [Penicillin G] Hives and Itching          Labs:     Recent Results (from the past 48 hour(s))   Ammonia    Collection Time: 02/18/22  4:55 PM   Result Value Ref Range    Ammonia 14 10 - 50 umol/L   Hepatic panel    Collection Time: 02/18/22  4:55 PM   Result Value Ref Range    Bilirubin Total 0.4 0.2 - 1.3 mg/dL    Bilirubin Direct 0.1 0.0 - 0.2 mg/dL    Protein Total 7.9 6.8 - 8.8 g/dL    Albumin 3.5 3.4 - 5.0 g/dL    Alkaline Phosphatase 65 40 - 150 U/L    AST 23 0 - 45 U/L    ALT 33 0 - 70 U/L   Osmolality    Collection Time: 02/18/22  4:55 PM   Result Value Ref Range    Osmolality Blood 313 (H) 275 - 295 mmol/kg   Blood Culture Arm, Right    Collection Time: 02/18/22  4:55 PM    Specimen: Arm, Right; Blood   Result Value Ref Range    Culture No growth after 1 day    Blood Culture Hand, Right    Collection Time: 02/18/22  5:05 PM    Specimen: Hand, Right; Blood   Result Value Ref Range    Culture No growth after 1 day    Glucose by meter    Collection Time: 02/18/22  6:35 PM   Result Value Ref Range    GLUCOSE BY METER POCT 140 (H) 70 - 99 mg/dL   Basic metabolic  panel    Collection Time: 02/19/22  5:56 AM   Result Value Ref Range    Sodium 137 133 - 144 mmol/L    Potassium 4.1 3.4 - 5.3 mmol/L    Chloride 104 94 - 109 mmol/L    Carbon Dioxide (CO2) 27 20 - 32 mmol/L    Anion Gap 6 3 - 14 mmol/L    Urea Nitrogen 56 (H) 7 - 30 mg/dL    Creatinine 2.06 (H) 0.66 - 1.25 mg/dL    Calcium 9.4 8.5 - 10.1 mg/dL    Glucose 165 (H) 70 - 99 mg/dL    GFR Estimate 39 (L) >60 mL/min/1.73m2   Glucose by meter    Collection Time: 02/19/22  7:44 AM   Result Value Ref Range    GLUCOSE BY METER POCT 147 (H) 70 - 99 mg/dL   Glucose by meter    Collection Time: 02/19/22  3:56 PM   Result Value Ref Range    GLUCOSE BY METER POCT 130 (H) 70 - 99 mg/dL   CBC with platelets    Collection Time: 02/19/22  4:46 PM   Result Value Ref Range    WBC Count 6.2 4.0 - 11.0 10e3/uL    RBC Count 4.44 4.40 - 5.90 10e6/uL    Hemoglobin 13.5 13.3 - 17.7 g/dL    Hematocrit 39.9 (L) 40.0 - 53.0 %    MCV 90 78 - 100 fL    MCH 30.4 26.5 - 33.0 pg    MCHC 33.8 31.5 - 36.5 g/dL    RDW 13.3 10.0 - 15.0 %    Platelet Count 150 150 - 450 10e3/uL   Comprehensive metabolic panel    Collection Time: 02/19/22  4:46 PM   Result Value Ref Range    Sodium 136 133 - 144 mmol/L    Potassium 4.2 3.4 - 5.3 mmol/L    Chloride 105 94 - 109 mmol/L    Carbon Dioxide (CO2) 26 20 - 32 mmol/L    Anion Gap 5 3 - 14 mmol/L    Urea Nitrogen 55 (H) 7 - 30 mg/dL    Creatinine 1.91 (H) 0.66 - 1.25 mg/dL    Calcium 9.4 8.5 - 10.1 mg/dL    Glucose 135 (H) 70 - 99 mg/dL    Alkaline Phosphatase 68 40 - 150 U/L    AST 23 0 - 45 U/L    ALT 28 0 - 70 U/L    Protein Total 7.9 6.8 - 8.8 g/dL    Albumin 3.4 3.4 - 5.0 g/dL    Bilirubin Total 0.4 0.2 - 1.3 mg/dL    GFR Estimate 43 (L) >60 mL/min/1.73m2   TSH with free T4 reflex    Collection Time: 02/19/22  4:46 PM   Result Value Ref Range    TSH 1.14 0.40 - 4.00 mU/L   Procalcitonin    Collection Time: 02/19/22  4:46 PM   Result Value Ref Range    Procalcitonin 0.38 (H) <0.05 ng/mL   Lactic acid whole blood     Collection Time: 02/19/22  4:46 PM   Result Value Ref Range    Lactic Acid 1.3 0.7 - 2.0 mmol/L   CRP inflammation    Collection Time: 02/19/22  4:46 PM   Result Value Ref Range    CRP Inflammation 16.0 (H) 0.0 - 8.0 mg/L   Glucose by meter    Collection Time: 02/19/22  5:57 PM   Result Value Ref Range    GLUCOSE BY METER POCT 111 (H) 70 - 99 mg/dL   Asymptomatic COVID-19 Virus (Coronavirus) by PCR Nasopharyngeal    Collection Time: 02/19/22  6:43 PM    Specimen: Nasopharyngeal; Swab   Result Value Ref Range    SARS CoV2 PCR Negative Negative, Testing sent to reference lab. Results will be returned via unsolicited result   Prolactin    Collection Time: 02/19/22  6:49 PM   Result Value Ref Range    Prolactin 28 (H) 2 - 18 ug/L   Glucose by meter    Collection Time: 02/19/22 10:13 PM   Result Value Ref Range    GLUCOSE BY METER POCT 118 (H) 70 - 99 mg/dL   Glucose by meter    Collection Time: 02/20/22  2:00 AM   Result Value Ref Range    GLUCOSE BY METER POCT 127 (H) 70 - 99 mg/dL   Glucose by meter    Collection Time: 02/20/22  4:55 AM   Result Value Ref Range    GLUCOSE BY METER POCT 135 (H) 70 - 99 mg/dL   Comprehensive metabolic panel    Collection Time: 02/20/22  7:07 AM   Result Value Ref Range    Sodium 138 133 - 144 mmol/L    Potassium 4.6 3.4 - 5.3 mmol/L    Chloride 104 94 - 109 mmol/L    Carbon Dioxide (CO2) 23 20 - 32 mmol/L    Anion Gap 11 3 - 14 mmol/L    Urea Nitrogen 72 (H) 7 - 30 mg/dL    Creatinine 2.95 (H) 0.66 - 1.25 mg/dL    Calcium 9.4 8.5 - 10.1 mg/dL    Glucose 141 (H) 70 - 99 mg/dL    Alkaline Phosphatase 60 40 - 150 U/L    AST 19 0 - 45 U/L    ALT 24 0 - 70 U/L    Protein Total 7.2 6.8 - 8.8 g/dL    Albumin 3.0 (L) 3.4 - 5.0 g/dL    Bilirubin Total 0.5 0.2 - 1.3 mg/dL    GFR Estimate 26 (L) >60 mL/min/1.73m2   CBC with platelets    Collection Time: 02/20/22  7:07 AM   Result Value Ref Range    WBC Count 6.0 4.0 - 11.0 10e3/uL    RBC Count 4.18 (L) 4.40 - 5.90 10e6/uL    Hemoglobin 12.5 (L)  "13.3 - 17.7 g/dL    Hematocrit 37.9 (L) 40.0 - 53.0 %    MCV 91 78 - 100 fL    MCH 29.9 26.5 - 33.0 pg    MCHC 33.0 31.5 - 36.5 g/dL    RDW 13.8 10.0 - 15.0 %    Platelet Count 164 150 - 450 10e3/uL   Glucose by meter    Collection Time: 02/20/22  8:50 AM   Result Value Ref Range    GLUCOSE BY METER POCT 115 (H) 70 - 99 mg/dL   Blood gas venous    Collection Time: 02/20/22  9:46 AM   Result Value Ref Range    pH Venous 7.35 7.32 - 7.43    pCO2 Venous 45 40 - 50 mm Hg    pO2 Venous 53 (H) 25 - 47 mm Hg    Bicarbonate Venous 25 21 - 28 mmol/L    Base Excess/Deficit (+/-) -1.2 -7.7 - 1.9 mmol/L    FIO2 0           Physical and Psychiatric Examination:     /59 (BP Location: Right arm, Patient Position: Supine)   Pulse 76   Temp 97.9  F (36.6  C) (Oral)   Resp 16   Wt 70.9 kg (156 lb 4.9 oz)   SpO2 97%   BMI 23.49 kg/m    Weight is 156 lbs 4.9 oz  Body mass index is 23.49 kg/m .    Physical Exam:    I have reviewed the physical exam as documented by by the medical team and agree with findings and assessment and have no additional findings to add at this time.    Mental Status Exam:  Sitting in bedside doris, is well groomed, pleasant, cooperative. Speech fluent. L sided mobility problems noted, wearing hand splint. Associations tight. Mood is \"fair.\"  Affect quite restricted. Thought process circumstantial. Thought content negative for suicidal thoughts or delusions. Oriented to person only.  Recent and remote memory, attention span and concentration are impaired. Fund of knowledge, use of language appropriate. Insight and judgment fair.              DSM-5 Diagnosis:   311 (F32.9) Unspecified Depressive Disorder    unspecified neurocognitive disorder  Possible delirium           Assessment:   I am not certain of the etiology of the AMS, but it now appears to be mostly clearing. Labs look ok, so delirium seems unlikely. He may have had a reaction to IM Zyprexa. Due to this possible reaction it is probably best " "to avoid antipsychotics.   Melatonin has helped sleep in the past, and he would like it at 6:30 pm          Summary of Recommendations:   I took the liberty of changing his melatonin to 3 mg at 1800  Will hold on antidepressants and antipsychotics for now  Page me or re-consult psychiatry as needed.     Duong Hogan M.D.   [facility]   Contact information available via [facility] Paging/Directory.  If I am not available, then [facility] intake ([phone]) should know who   Is on call        \"This dictation was performed with voice recognition software and may contain errors,  omissions and inadvertent word substitution.\"           "

## 2022-02-20 NOTE — PROGRESS NOTES
02/20/22 1005   General Information   Onset of Illness/Injury or Date of Surgery 02/19/22   Referring Physician Park Lyle PA-C   Patient/Family Therapy Goal Statement (SLP) None stated   Pertinent History of Current Problem Pt is a 46 year old male with history of resistant HTN who was initially admitted to [facility] 1/8 to 1/28/2022 with R PCA stroke c/b refractory HTN with concern for secondary HTN further c/b DISHA, new DMII diagnosis, and encephalopathy. Stabilized and transferred to ARU 1/28/2022 for ongoing rehabilitation. Patient developed new AMS, agitation, and worsening DISHA 2/18 prompting transfer to [facility] 2/19/2022 for higher level of care. Stroke code called last night, clinical swallow eval completed today to assess swallow function given c/f aspiration.    General Observations Alert, paranoid, requires encouragement to participate   Past History of Dysphagia When at rehab, pt was tolerating a regular diet/thin liquids.    Type of Evaluation   Type of Evaluation Swallow Evaluation   Oral Motor   Oral Musculature anomalies present   Structural Abnormalities none present   Mucosal Quality good   Dentition (Oral Motor)   Dentition (Oral Motor) natural dentition   Facial Symmetry (Oral Motor)   Facial Symmetry (Oral Motor) left side impairment   Tongue Function (Oral Motor)   Tongue ROM (Oral Motor) WNL   Cough/Swallow/Gag Reflex (Oral Motor)   Soft Palate/Velum (Oral Motor) unable/difficult to assess   Vocal Quality/Secretion Management (Oral Motor)   Comment, Vocal Quality/Secretion Management (Oral Motor) persisting mild dysarthria   General Swallowing Observations   Current Diet/Method of Nutritional Intake (General Swallowing Observations, NIS) NPO   Respiratory Support (General Swallowing Observations) none   Swallowing Evaluation Clinical swallow evaluation   Clinical Swallow Evaluation   Feeding Assistance frequent cues/help required   Clinical Swallow Evaluation Textures Trialed thin  "liquids;pureed;solid foods   Clinical Swallow Eval: Thin Liquid Texture Trial   Mode of Presentation, Thin Liquids straw;fed by clinician   Volume of Liquid or Food Presented 4oz thin water   Oral Phase of Swallow delayed AP movement  (prolonged holding of bolus)   Pharyngeal Phase of Swallow intact   Diagnostic Statement Pt required much encouragement but swallowed water without difficulty.    Clinical Swallow Evaluation: Puree Solid Texture Trial   Diagnostic Statement Pt refused to take a bite of applesauce, stating \"applesauce shouldn't be crunchy\" and sniffing the applesauce repeatedly before refusing.    Clinical Swallow Evaluation: Solid Food Texture Trial   Mode of Presentation self-fed   Volume Presented small bites of cracker   Oral Phase WFL   Pharyngeal Phase intact   Diagnostic Statement Pt declined to take larger bites of cracker, but small bites were functional w/o s/sx of aspiration.   Esophageal Phase of Swallow   Patient reports or presents with symptoms of esophageal dysphagia No   Swallowing Recommendations   Diet Consistency Recommendations regular diet;thin liquids (level 0)   Supervision Level for Intake 1:1 supervision needed   Mode of Delivery Recommendations bolus size, small;food moistened;slow rate of intake   Monitoring/Assistance Required (Eating/Swallowing) stop eating activities when fatigue is present;monitor for cough or change in vocal quality with intake   Recommended Feeding/Eating Techniques (Swallow Eval) maintain upright sitting position for eating   Medication Administration Recommendations, Swallowing (SLP) as tolerated   Instrumental Assessment Recommendations instrumental evaluation not recommended at this time   Cognition   Cognitive Status Impaired, worse than baseline   General Therapy Interventions   Planned Therapy Interventions Dysphagia Treatment   Dysphagia treatment Instruction of safe swallow strategies;Compensatory strategies for swallowing   Clinical Impression "   Criteria for Skilled Therapeutic Interventions Met (SLP Eval) Yes, treatment indicated   SLP Diagnosis Dysphagia in setting of AMS   Rehab Potential (SLP Eval) good, to achieve stated therapy goals   Predicted Duration of Therapy Intervention (SLP Eval) 1 week   Risks & Benefits of therapy have been explained evaluation/treatment results reviewed;care plan/treatment goals reviewed;risks/benefits reviewed;current/potential barriers reviewed;participants voiced agreement with care plan;participants included;patient   Clinical Impression Comments   Clinical swallow eval completed per MD order. Pt presents with mild dysphagia in setting of AMS. Oral mech exam remarkable for L facial weakness. Pt noted to be somewhat paranoid during eval, distrusting of what clinician was saying. Assessed with thin liquids and cracker; pt adamantly declined trials of applesauce. Oral phase characterized by oral holding of liquid bolus. Pharyngeal phase WFL, no overt s/sx of aspiration with any consistency. Recommend regular diet/thin liquids with 1:1 assistance. Pt should swallow what is in his mouth before taking another bite/sip, and should be fully upright and alert when eating/drinking. SLP will follow to ensure diet tolerance in setting of AMS.     Therapy Plan Review/Discharge Plan (SLP)   Demonstrates Need for Referral to Another Service (SLP) clinical nutrition services/dietitian   SLP Discharge Planning   SLP Discharge Recommendation Transitional Care Facility;home with home care speech therapy   SLP Rationale for DC Rec Below baseline swallow function   SLP Brief overview of current status  Recommend regular diet/thin liquids with 1:1 assistance. Pt should swallow what is in his mouth before taking another bite/sip, and should be fully upright and alert when eating/drinking. SLP will follow to ensure diet tolerance in setting of AMS.    Total Evaluation Time   Total Evaluation Time (Minutes) 18   SLP Goals   Therapy Frequency  (SLP Eval) 4 times/wk   SLP Predicated Duration/Target Date for Goal Attainment 02/27/22   SLP Goals Swallow   SLP: Safely tolerate diet without signs/symptoms of aspiration Regular diet;Thin liquids;With use of swallow precautions;Independently

## 2022-02-21 ENCOUNTER — APPOINTMENT (OUTPATIENT)
Dept: OCCUPATIONAL THERAPY | Facility: CLINIC | Age: 47
End: 2022-02-21
Attending: PHYSICIAN ASSISTANT
Payer: COMMERCIAL

## 2022-02-21 ENCOUNTER — APPOINTMENT (OUTPATIENT)
Dept: MRI IMAGING | Facility: CLINIC | Age: 47
End: 2022-02-21
Attending: STUDENT IN AN ORGANIZED HEALTH CARE EDUCATION/TRAINING PROGRAM
Payer: COMMERCIAL

## 2022-02-21 ENCOUNTER — APPOINTMENT (OUTPATIENT)
Dept: PHYSICAL THERAPY | Facility: CLINIC | Age: 47
End: 2022-02-21
Attending: PHYSICIAN ASSISTANT
Payer: COMMERCIAL

## 2022-02-21 ENCOUNTER — APPOINTMENT (OUTPATIENT)
Dept: NEUROLOGY | Facility: CLINIC | Age: 47
End: 2022-02-21
Attending: PHYSICIAN ASSISTANT
Payer: COMMERCIAL

## 2022-02-21 LAB
ALBUMIN SERPL-MCNC: 2.9 G/DL (ref 3.4–5)
ALBUMIN UR-MCNC: NEGATIVE MG/DL
ALP SERPL-CCNC: 60 U/L (ref 40–150)
ALT SERPL W P-5'-P-CCNC: 21 U/L (ref 0–70)
ANCA AB PATTERN SER IF-IMP: NORMAL
ANION GAP SERPL CALCULATED.3IONS-SCNC: 5 MMOL/L (ref 3–14)
APPEARANCE UR: CLEAR
AST SERPL W P-5'-P-CCNC: 18 U/L (ref 0–45)
BILIRUB SERPL-MCNC: 0.3 MG/DL (ref 0.2–1.3)
BILIRUB UR QL STRIP: NEGATIVE
BUN SERPL-MCNC: 65 MG/DL (ref 7–30)
C-ANCA TITR SER IF: NORMAL {TITER}
C3 SERPL-MCNC: 161 MG/DL (ref 81–157)
C4 SERPL-MCNC: 23 MG/DL (ref 13–39)
CALCIUM SERPL-MCNC: 9 MG/DL (ref 8.5–10.1)
CHLORIDE BLD-SCNC: 108 MMOL/L (ref 94–109)
CO2 SERPL-SCNC: 26 MMOL/L (ref 20–32)
COLOR UR AUTO: NORMAL
CREAT SERPL-MCNC: 2.23 MG/DL (ref 0.66–1.25)
CREAT UR-MCNC: 96 MG/DL
ERYTHROCYTE [DISTWIDTH] IN BLOOD BY AUTOMATED COUNT: 13.6 % (ref 10–15)
GFR SERPL CREATININE-BSD FRML MDRD: 36 ML/MIN/1.73M2
GLUCOSE BLD-MCNC: 151 MG/DL (ref 70–99)
GLUCOSE BLDC GLUCOMTR-MCNC: 115 MG/DL (ref 70–99)
GLUCOSE BLDC GLUCOMTR-MCNC: 125 MG/DL (ref 70–99)
GLUCOSE BLDC GLUCOMTR-MCNC: 145 MG/DL (ref 70–99)
GLUCOSE BLDC GLUCOMTR-MCNC: 152 MG/DL (ref 70–99)
GLUCOSE BLDC GLUCOMTR-MCNC: 159 MG/DL (ref 70–99)
GLUCOSE UR STRIP-MCNC: NEGATIVE MG/DL
HCT VFR BLD AUTO: 35.9 % (ref 40–53)
HGB BLD-MCNC: 12.1 G/DL (ref 13.3–17.7)
HGB UR QL STRIP: NEGATIVE
KETONES UR STRIP-MCNC: NEGATIVE MG/DL
LEUKOCYTE ESTERASE UR QL STRIP: NEGATIVE
MAGNESIUM SERPL-MCNC: 2.1 MG/DL (ref 1.8–2.6)
MCH RBC QN AUTO: 30.3 PG (ref 26.5–33)
MCHC RBC AUTO-ENTMCNC: 33.7 G/DL (ref 31.5–36.5)
MCV RBC AUTO: 90 FL (ref 78–100)
NITRATE UR QL: NEGATIVE
PH UR STRIP: 5 [PH] (ref 5–7)
PHOSPHATE SERPL-MCNC: 4.2 MG/DL (ref 2.5–4.5)
PLATELET # BLD AUTO: 163 10E3/UL (ref 150–450)
POTASSIUM BLD-SCNC: 3.8 MMOL/L (ref 3.4–5.3)
PROT ELPH PNL UR ELPH: NORMAL
PROT SERPL-MCNC: 6.8 G/DL (ref 6.8–8.8)
PROT UR-MCNC: 0.2 G/L
PROT/CREAT 24H UR: 0.21 G/G CR (ref 0–0.2)
RBC # BLD AUTO: 4 10E6/UL (ref 4.4–5.9)
RBC URINE: <1 /HPF
SODIUM SERPL-SCNC: 139 MMOL/L (ref 133–144)
SP GR UR STRIP: 1.01 (ref 1–1.03)
UROBILINOGEN UR STRIP-MCNC: NORMAL MG/DL
WBC # BLD AUTO: 6.7 10E3/UL (ref 4–11)
WBC URINE: <1 /HPF

## 2022-02-21 PROCEDURE — 84100 ASSAY OF PHOSPHORUS: CPT | Performed by: INTERNAL MEDICINE

## 2022-02-21 PROCEDURE — 99233 SBSQ HOSP IP/OBS HIGH 50: CPT | Performed by: INTERNAL MEDICINE

## 2022-02-21 PROCEDURE — 250N000013 HC RX MED GY IP 250 OP 250 PS 637: Performed by: INTERNAL MEDICINE

## 2022-02-21 PROCEDURE — 258N000003 HC RX IP 258 OP 636: Performed by: INTERNAL MEDICINE

## 2022-02-21 PROCEDURE — 86160 COMPLEMENT ANTIGEN: CPT | Performed by: CLINICAL NURSE SPECIALIST

## 2022-02-21 PROCEDURE — 120N000002 HC R&B MED SURG/OB UMMC

## 2022-02-21 PROCEDURE — 250N000013 HC RX MED GY IP 250 OP 250 PS 637

## 2022-02-21 PROCEDURE — 70551 MRI BRAIN STEM W/O DYE: CPT

## 2022-02-21 PROCEDURE — 95718 EEG PHYS/QHP 2-12 HR W/VEEG: CPT | Mod: GC | Performed by: PSYCHIATRY & NEUROLOGY

## 2022-02-21 PROCEDURE — 97535 SELF CARE MNGMENT TRAINING: CPT | Mod: GO

## 2022-02-21 PROCEDURE — 97110 THERAPEUTIC EXERCISES: CPT | Mod: GO

## 2022-02-21 PROCEDURE — 250N000013 HC RX MED GY IP 250 OP 250 PS 637: Performed by: HOSPITALIST

## 2022-02-21 PROCEDURE — 81001 URINALYSIS AUTO W/SCOPE: CPT | Performed by: PHYSICIAN ASSISTANT

## 2022-02-21 PROCEDURE — 99232 SBSQ HOSP IP/OBS MODERATE 35: CPT | Mod: GC | Performed by: PSYCHIATRY & NEUROLOGY

## 2022-02-21 PROCEDURE — 80053 COMPREHEN METABOLIC PANEL: CPT | Performed by: INTERNAL MEDICINE

## 2022-02-21 PROCEDURE — 36415 COLL VENOUS BLD VENIPUNCTURE: CPT | Performed by: INTERNAL MEDICINE

## 2022-02-21 PROCEDURE — 84156 ASSAY OF PROTEIN URINE: CPT | Performed by: CLINICAL NURSE SPECIALIST

## 2022-02-21 PROCEDURE — 85027 COMPLETE CBC AUTOMATED: CPT | Performed by: INTERNAL MEDICINE

## 2022-02-21 PROCEDURE — 250N000013 HC RX MED GY IP 250 OP 250 PS 637: Performed by: PHYSICIAN ASSISTANT

## 2022-02-21 PROCEDURE — 36415 COLL VENOUS BLD VENIPUNCTURE: CPT | Performed by: CLINICAL NURSE SPECIALIST

## 2022-02-21 PROCEDURE — 97162 PT EVAL MOD COMPLEX 30 MIN: CPT | Mod: GP | Performed by: PHYSICAL THERAPIST

## 2022-02-21 PROCEDURE — 999N000111 HC STATISTIC OT IP EVAL DEFER

## 2022-02-21 PROCEDURE — 70551 MRI BRAIN STEM W/O DYE: CPT | Mod: 26 | Performed by: RADIOLOGY

## 2022-02-21 PROCEDURE — 83735 ASSAY OF MAGNESIUM: CPT | Performed by: INTERNAL MEDICINE

## 2022-02-21 PROCEDURE — 95711 VEEG 2-12 HR UNMONITORED: CPT

## 2022-02-21 PROCEDURE — 97530 THERAPEUTIC ACTIVITIES: CPT | Mod: GP | Performed by: PHYSICAL THERAPIST

## 2022-02-21 PROCEDURE — 97165 OT EVAL LOW COMPLEX 30 MIN: CPT | Mod: GO

## 2022-02-21 RX ORDER — RISPERIDONE 0.5 MG/1
0.5 TABLET, ORALLY DISINTEGRATING ORAL 2 TIMES DAILY
Status: DISCONTINUED | OUTPATIENT
Start: 2022-02-21 | End: 2022-03-09 | Stop reason: HOSPADM

## 2022-02-21 RX ADMIN — ACETAMINOPHEN 650 MG: 325 TABLET, FILM COATED ORAL at 18:47

## 2022-02-21 RX ADMIN — CLONIDINE HYDROCHLORIDE 0.1 MG: 0.1 TABLET ORAL at 11:13

## 2022-02-21 RX ADMIN — SODIUM CHLORIDE, POTASSIUM CHLORIDE, SODIUM LACTATE AND CALCIUM CHLORIDE: 600; 310; 30; 20 INJECTION, SOLUTION INTRAVENOUS at 18:47

## 2022-02-21 RX ADMIN — RISPERIDONE 0.5 MG: 0.5 TABLET, ORALLY DISINTEGRATING ORAL at 16:05

## 2022-02-21 RX ADMIN — DIVALPROEX SODIUM 1000 MG: 500 TABLET, FILM COATED, EXTENDED RELEASE ORAL at 11:13

## 2022-02-21 RX ADMIN — LIDOCAINE 1 PATCH: 560 PATCH PERCUTANEOUS; TOPICAL; TRANSDERMAL at 11:11

## 2022-02-21 RX ADMIN — CARVEDILOL 25 MG: 25 TABLET, FILM COATED ORAL at 11:13

## 2022-02-21 RX ADMIN — ASPIRIN 81 MG: 81 TABLET, CHEWABLE ORAL at 11:13

## 2022-02-21 RX ADMIN — CARVEDILOL 25 MG: 25 TABLET, FILM COATED ORAL at 18:36

## 2022-02-21 RX ADMIN — Medication 2.5 MG: at 11:13

## 2022-02-21 RX ADMIN — SODIUM CHLORIDE, POTASSIUM CHLORIDE, SODIUM LACTATE AND CALCIUM CHLORIDE: 600; 310; 30; 20 INJECTION, SOLUTION INTRAVENOUS at 08:59

## 2022-02-21 ASSESSMENT — ACTIVITIES OF DAILY LIVING (ADL)
ADLS_ACUITY_SCORE: 18
ADLS_ACUITY_SCORE: 17
ADLS_ACUITY_SCORE: 17
ADLS_ACUITY_SCORE: 16
ADLS_ACUITY_SCORE: 18
ADLS_ACUITY_SCORE: 18
ADLS_ACUITY_SCORE: 17
ADLS_ACUITY_SCORE: 18
ADLS_ACUITY_SCORE: 16
ADLS_ACUITY_SCORE: 18
ADLS_ACUITY_SCORE: 17
ADLS_ACUITY_SCORE: 16
ADLS_ACUITY_SCORE: 18
ADLS_ACUITY_SCORE: 17
ADLS_ACUITY_SCORE: 18
PREVIOUS_RESPONSIBILITIES: MEAL PREP;HOUSEKEEPING;LAUNDRY;SHOPPING;YARDWORK;MEDICATION MANAGEMENT;FINANCES;DRIVING;WORK
ADLS_ACUITY_SCORE: 18
ADLS_ACUITY_SCORE: 16
ADLS_ACUITY_SCORE: 18
ADLS_ACUITY_SCORE: 16
ADLS_ACUITY_SCORE: 17

## 2022-02-21 NOTE — PROGRESS NOTES
"   02/21/22 1341   Quick Adds   Type of Visit Initial Occupational Therapy Evaluation   Living Environment   People in Home alone   Current Living Arrangements apartment   Living Environment Comments Per chart review and previous OT phoebeal, pt lives in [facility] [place] with street parking. Walk-in shower present. Pt reports having excellent friends and family to support him.    Self-Care   Usual Activity Tolerance excellent   Current Activity Tolerance fair   Activity/Exercise/Self-Care Comment Pt reports doing \"computer work\" at baseline. Per chart review, pt was IND with self care at baseline.   Instrumental Activities of Daily Living (IADL)   Previous Responsibilities meal prep;housekeeping;laundry;shopping;yardwork;medication management;finances;driving;work   IADL Comments Per chart review and discussion with pt, IND with IADL at baseline. Pt was working in IT at  baseline.   General Information   Onset of Illness/Injury or Date of Surgery 02/19/22   Referring Physician Park Lyle PA-C   Patient/Family Therapy Goal Statement (OT) not stated   Additional Occupational Profile Info/Pertinent History of Current Problem Miriam Hall is a 46 year old male with history of severe resistant HTN and longstanding DMII who was initially admitted to [facility] 1/8 - 1/28/2022 with R PCA stroke c/b refractory HTN and DISHA, and encephalopathy. Stabilized and transferred to ARU 1/28/2022 for ongoing rehabilitation. Patient developed new AMS, agitation, paranoia and worsening DISHA 2/18 prompting transfer to [place].    Existing Precautions/Restrictions fall   General Observations and Info Pt sitting on commode upon arrival, agreeable to therapy.   Cognitive Status Examination   Orientation Status person;place   Affect/Mental Status (Cognitive) low arousal/lethargic   Follows Commands delayed response/completion   Cognitive Status Comments Able to follow basic commands appropriately with extended time " to allow for IND. Would benefit from further cognitive testing.   Visual Perception   Visual Impairment/Limitations peripheral vision impaired left   Visual Processing Deficit visual attention, left;aneglique-inattention/neglect, left   Sensory   Sensory Comments Pt reports ability to identify light touch on posterior portion of L wrist intermittenly. Lack of sensation on LLE. Would benefit from further sensation testing.   Range of Motion Comprehensive   Comment, General Range of Motion LUE AROM limited due to hemiparesis   Strength Comprehensive (MMT)   Comment, General Manual Muscle Testing (MMT) Assessment L hemiparesis   Clinical Impression   Criteria for Skilled Therapeutic Interventions Met (OT) Yes, treatment indicated   OT Diagnosis OT order for recent CVA   Influenced by the following impairments strength, activity tolerance, fatigue   OT Problem List-Impairments impacting ADL problems related to;activity tolerance impaired;balance;cognition;mobility;motor control;muscle tone;range of motion (ROM);sensation;strength;postural control;pain   Assessment of Occupational Performance 3-5 Performance Deficits   Identified Performance Deficits dressing, bathing, toileting, home mgmt   Planned Therapy Interventions (OT) ADL retraining;IADL retraining;cognition;strengthening;ROM;transfer training;visual perception;home program guidelines;progressive activity/exercise;risk factor education;neuromuscular re-education   Clinical Decision Making Complexity (OT) low complexity   Predicted Duration of Therapy 2 weeks   Anticipated Equipment Needs Upon Discharge (OT)   (TBD with further therapy)   OT Discharge Planning   OT Discharge Recommendation (DC Rec) Acute Rehab Center-Motivated patient will benefit from intensive, interdisciplinary therapy.  Anticipate will be able to tolerate 3 hours of therapy per day   OT Rationale for DC Rec Pt motivated for therapy and is well below functional baseline. Would benefit from ARU with  neuro focus to maximize progress following CVA.   OT Brief overview of current status Ax2 for pivot to the L (L sided hemiparesis), Ax1 for pivot to the R   Total Evaluation Time (Minutes)   Total Evaluation Time (Minutes) 5   OT Goals   Therapy Frequency (OT) 6 times/wk   OT Predicated Duration/Target Date for Goal Attainment 03/11/22   OT: Hygiene/Grooming minimal assist;from wheelchair   OT: Upper Body Dressing Modified independent   OT: Toilet Transfer/Toileting Minimal assist;using adaptive equipment   OT: Cognitive Patient/caregiver will verbalize understanding of cognitive assessment results/recommendations as needed for safe discharge planning

## 2022-02-21 NOTE — PLAN OF CARE
Goal Outcome Evaluation:    Plan of Care Reviewed With: patient     Overall Patient Progress: improving    Outcome Evaluation: EEG removed this shift.  Pt started the shift upset with staff and wanting to leave.  Later, pt became more tearful and apologized for his agitation during the morning.  Pt is making progress with transfers and has been up in the chair most of the shift.  Bed alarm is on.  Initially refused meds but took them around 1130 this morning after he was given some time. Pt A&O x 3 today and was disoriented to time this AM.

## 2022-02-21 NOTE — PLAN OF CARE
Goal Outcome Evaluation:    Plan of Care Reviewed With: patient, spouse     Overall Patient Progress: improving    Outcome Evaluation: EEG still in place. Pt mentation flucuates- sad, paranoid, angry, calm/cooperative. When paranoid- pt states we are all against him getting better and tearful and asking what he did wrong for people to turn against him.  Pt also angry that we have stopped caring for him.  Pt also calm, orientated to place and person- thanking writer for sticking with him during his 'manic' episodes and being so emotional. Creatinine: 2.95. Nephrology and Psychiatric Md's rounded on pt. No PRN's for anxious/agitation in place yet.  LR 100ml/hr. Pt up in chair all afternoon- tennis shoes on with brace on left foot. Brace for Left hand on as well. Call light within reach, frequently rounded on, bed alarm on.

## 2022-02-21 NOTE — TELEPHONE ENCOUNTER
Pt was transferred from [facility] to [facility] for AMS. Will refrain from any schedule changes.     Given that there was a neurology referral placed and our stroke care coordination team was not originally contacted to schedule follow-up, it is assumed that the pt can follow-up with general neurology once discharged.     Routing to [facility] [place] coordinators. Please reach out to pt once he is discharged to schedule his follow-up with a general neurologist.     Daniela CEDENO, RN, SCRN  RN Stroke Neurology Care Coordinator  [facility] Neuroscience Service Line

## 2022-02-21 NOTE — PROGRESS NOTES
[facility]    Medicine Progress Note - Hospitalist Service, GOLD TEAM 7    Date of Admission:  2/19/2022    Assessment & Plan          Miriam Hall is a 46 year old male with history of severe resistant HTN and longstanding DMII who was initially admitted to [facility] 1/8 - 1/28/2022 with R PCA stroke c/b refractory HTN and DISHA, and encephalopathy. Stabilized and transferred to ARU 1/28/2022 for ongoing rehabilitation. Patient developed new AMS, agitation, paranoia and worsening DISHA 2/18 prompting transfer to [place].      Agitation  Paranoia and personality change  Toxic metabolic encephalopathy  Pt developed new waxing and waning confusion, agitation and paranoia starting 2/18, worsened 2/19 prompting transfer from ARU to [facility]. Received 10 mg olanzapine and patient became obtunded prompting stroke code, repeat CT with known and evolving stroke without new CVA or hemorrhage. Unclear etiology of decompensation, possible personality change as effect of stroke vs medication effect, less likely sepsis as afebrile, HDS, normal WBC, negative UA, CXR.   - Head CT: evolving stroke   - UA negative, CXR with streaky opacities c/w atelectasis  - Procal 0.3, minimally elevated   - WBC normal   - BCx NGTD  - No indication for abx as of now, will continue to monitor   - Spot EEG, prolactin elevated  - Hold further Zyprexa for now  - Seen by Psych, did not rec medications. Will discuss PRN for mood swings  - Discussed with Neurology  => Please step out and give patient time to calm down if he is feeling agitated or anxious. He is usually redirectable and consolable.     Addendum  Briefly discussed with Psych, recommended risperidone 0.5 mg BID to help with mood     DISHA on CKD, improving  Resistant HTN  Patient has long hx of resistant HTN while on multidrug regimen. Recent DISHA and new BL Cr around 1.5-1.7, was 2.2 on transfer and leno to 2.9 on 2/20 in the setting of ACEi use, now  improving. Discharged from TCU on amiloride, Coreg, clonidine, lisinopril, and scheduled and prn hydralazine.    - Blood pressure was low normal and antihypertensives held/decreased   - Hold most antihypertensives, decrease carvedilol to 25 BID, cont clonidine   - Hold ACEi and diuretics, amiloride, hydralazine  - Nephrology consulted, appreciate rec's   - Daily weights, strict I&Os    Hypopnea   Patient was noted to have low RR (as low as 6) and long pauses of respiration prompting RRTs and stoke code following 10 IM Zyprexa. Normal oxygenation and normal gases   - Resolved  - Avoid Zyprexa    Concern for Seizures:   In the setting of CVA. EEG 1/12 concerning for subclinical seizures. Loaded with Keppra, transitioned to Depakote. No e/o seizure noted in the setting of AMS  - Continue Depakote and seizure precautions  - Spot EEG as above    R PCA stroke  Presented with L hemiplegia. Head CT 1/8 R parieto-occipital infarct. CTA noted occlusion on proximal R PCA and high grade stenosis of M2 branch of R MCA. Outside window for tPA. Repeat imagine 1/12 with hemorrhagic transformation. Source 2/2 high grade atherosclerosis vs ESUS. CARISA no LA thrombus. Residual deficits include L hemiplegia, L hemianopsia, mild dysarthria. Acute AMS 2/19, repeat CT negative for new findings   - Continue ASA, statin   - Follow pending Ziopatch results   - HTN regimen as below      Dysphagia:   Noted on admission with thin liquids. Previously on regular diet  - SLP consulted, ok with regular diet with 1:1      DMII:   A1c 7.8. PTA metformin on hold due to DISHA. Glucose stable  - MSSI, hypoglycemia protocol         Diet: Regular Diet Adult Thin Liquids (level 0)    DVT Prophylaxis: Pneumatic Compression Devices and Anti-embolisim stockings (TEDs)  Mendoza Catheter: Not present  Central Lines: None  Cardiac Monitoring: None  Code Status: Full Code      Disposition Plan   Expected Discharge: 02/25/2022  Anticipated discharge location:  Awaiting  care coordination huddle  Delays:           The patient's care was discussed with the Bedside Nurse and Patient.    Vinny Way MD  Hospitalist Service, GOLD TEAM [address] [place] [address]  Securely message with the Vocera Web Console (learn more here)  Text page via [facility] Paging/Directory   Please see signed in provider for up to date coverage information      Clinically Significant Risk Factors Present on Admission              # Diabetes, type II: last A1C 7.8 % (Ref range: 0.0 - 5.6 %)      ______________________________________________________________________    Interval History   Patient continues to have intermittent agitation and refusal of cares. Does became anxious and cries at times. States he wants to leave and just go out to the street. He is redirectable    4 point ROS unremarkable     Data reviewed today: I reviewed all medications, new labs and imaging results over the last 24 hours. I personally reviewed no images or EKG's today.    Physical Exam   Vital Signs: Temp: 98.9  F (37.2  C) Temp src: Oral BP: (!) 162/95 Pulse: 76   Resp: 16 SpO2: 97 % O2 Device: None (Room air)    Weight: 156 lbs 4.9 oz    Constitutional: Alert, Oriented  Respiratory: Clear to auscultation bilaterally  Cardiovascular: Regular rate and rhythm,  GI: Normal bowel sounds, soft, non-distended, non-tender  Skin/Integumen: No rashes, no cyanosis      Data   Recent Labs   Lab 02/21/22  1210 02/21/22  0954 02/21/22  0905 02/20/22  0850 02/20/22  0707 02/19/22  1757 02/19/22  1646   WBC  --  6.7  --   --  6.0  --  6.2   HGB  --  12.1*  --   --  12.5*  --  13.5   MCV  --  90  --   --  91  --  90   PLT  --  163  --   --  164  --  150   NA  --  139  --   --  138  --  136   POTASSIUM  --  3.8  --   --  4.6  --  4.2   CHLORIDE  --  108  --   --  104  --  105   CO2  --  26  --   --  23  --  26   BUN  --  65*  --   --  72*  --  55*   CR  --  2.23*  --   --  2.95*  --  1.91*   ANIONGAP  --  5  --   --   11  --  5   VALERIE  --  9.0  --   --  9.4  --  9.4   * 151* 152*   < > 141*   < > 135*   ALBUMIN  --  2.9*  --   --  3.0*  --  3.4   PROTTOTAL  --  6.8  --   --  7.2  --  7.9   BILITOTAL  --  0.3  --   --  0.5  --  0.4   ALKPHOS  --  60  --   --  60  --  68   ALT  --  21  --   --  24  --  28   AST  --  18  --   --  19  --  23    < > = values in this interval not displayed.     No results found for this or any previous visit (from the past 24 hour(s)).

## 2022-02-21 NOTE — PROGRESS NOTES
Nephrology Progress Note  02/21/2022       Mr Hall is a 46 yom with hx of HTN admitted for R PCA stroke on 1/8/2022, seen by Nephrology for evaluation of possible secondary HTN and has been in ARU (rehab) since 1/28.  Nephrology consulted for DISHA post CVA with HTN.      Interval History :   Mr Hall's Cr is much improved this am after 1L of LR yesterday suggesting some prerenal component to his elevated Cr despite HTN.  Sent C3/C4, UPCR to see if there is any reason to consider bx as etiology of DISHA is still unclear.  If PO intake is low we can consider some ongoing IVF.      Assessment & Recommendations:   DISHA-Had initial assessment in Jan, baseline Cr 1.0-1.2 and normal renal US with dopplers.  Cr was up initially thought to be due to hemodynamic injury, renal had stopped following formally when pt discharged to ARU with plan to follow up in renal clinic but has been there longer than expected and DISHA has continued to slowly worsen.  Etiology of his continued worsening DISHA is not clear, BP's remain high but have been mostly SBP's of ~150 since stopping lisinopril on 2/10 (due to Cr up to 1.8), has hyaline casts in UA today but SBP's of 170 do not fit with true prerenal state, no meds that would be vasoconstrictive to produce prerenal state (tacro etc).  AIN fits the slow but steady progression but is not on any agents typical of this.  Cr down today with holding diuretic over the weekend, continuing to follow.  Ultimately may need renal bx.                -Ordered C3/C4, UPCR, plasma metanephrine pending.                -Cr improved today after fluid bolus yesterday suggests hemodynamic/prerenal component despite general HTN.      -Will consider renal bx if he continues to have elevated Cr in order to provide proper treatment.     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   Likely some component of hypovolemia to his elevated Cr recently.         Electrolytes/pH-K 4.7, bicarb 26, no acute issue.       Ca/phos/pth-Ca 9.5, Mg and Phos reasonable.       Anemia-Hgb 12.1, no acute issue.      Nutrition-Taking PO     Time spent: 25 minutes on this date of encounter for chart review, physical exam, medical decision making and co-ordination of care.      Discussed with Dr Baxter     Recommendations were communicated to primary team via verbal communication.     MARBELLA Dennis CNS  Clinical Nurse Specialist  [phone]    Review of Systems:   I reviewed the following systems:  Gen: No fevers or chills  CV: No CP at rest  Resp: No SOB at rest  GI: No N/V    Physical Exam:   I/O last 3 completed shifts:  In: 2010 [P.O.:960; I.V.:1050]  Out: 1250 [Urine:1250]   BP (!) 162/95   Pulse 76   Temp 98.9  F (37.2  C) (Oral)   Resp 16   Wt 70.9 kg (156 lb 4.9 oz)   SpO2 97%   BMI 23.49 kg/m       GENERAL APPEARANCE: Minimally interactive on interview, in no distress.   EYES:  No scleral icterus, pupils equal  PULM: lungs clear to auscultation, equal air movement, no cyanosis or clubbing  CV: regular rhythm, normal rate, no rub     -JVP not elevated     -edema none  GI: soft, non-tender, non-distended  MS: no evidence of inflammation in joints, no muscle tenderness  NEURO: Lethargic, minimally interactive.     Labs:   All labs reviewed by me  Electrolytes/Renal - Recent Labs   Lab Test 02/21/22  1210 02/21/22  0954 02/21/22  0905 02/20/22  0850 02/20/22  0707 02/19/22  1757 02/19/22  1646 02/18/22  1835 02/18/22  0959 01/28/22  0844 01/28/22  0428 01/27/22  0916 01/27/22  0428   NA  --  139  --   --  138  --  136   < > 134   < > 137  --  139   POTASSIUM  --  3.8  --   --  4.6  --  4.2   < > 4.7   < > 3.7   < > 3.7  3.7   CHLORIDE  --  108  --   --  104  --  105   < > 101   < > 103  --  103   CO2  --  26  --   --  23  --  26   < > 26   < > 28  --  29   BUN  --  65*  --   --  72*  --  55*   <  > 62*   < > 31*  --  30   CR  --  2.23*  --   --  2.95*  --  1.91*   < > 2.25*   < > 1.32*  --  1.56*   * 151* 152*   < > 141*   < > 135*   < > 323*   < > 96   < > 95   VALERIE  --  9.0  --   --  9.4  --  9.4   < > 9.5   < > 9.0  --  9.5   MAG  --   --   --   --   --   --   --   --  2.4*  --  1.9  --  2.0   PHOS  --  4.2  --   --   --   --   --   --  4.1  --  4.2  --  3.8    < > = values in this interval not displayed.       CBC -   Recent Labs   Lab Test 02/21/22  0954 02/20/22  0707 02/19/22  1646   WBC 6.7 6.0 6.2   HGB 12.1* 12.5* 13.5    164 150       LFTs -   Recent Labs   Lab Test 02/21/22  0954 02/20/22  0707 02/19/22  1646   ALKPHOS 60 60 68   BILITOTAL 0.3 0.5 0.4   ALT 21 24 28   AST 18 19 23   PROTTOTAL 6.8 7.2 7.9   ALBUMIN 2.9* 3.0* 3.4       Iron Panel - No lab results found.        Current Medications:    aspirin  81 mg Oral Daily     atorvastatin  40 mg Oral QPM     Baclofen  2.5 mg Oral TID     carvedilol  25 mg Oral BID w/meals     cloNIDine  0.1 mg Oral TID     divalproex sodium extended-release  1,000 mg Oral Daily     insulin aspart  1-7 Units Subcutaneous TID AC     insulin aspart  1-5 Units Subcutaneous At Bedtime     lidocaine  1 patch Transdermal Q24H    And     lidocaine   Transdermal Q8H     melatonin  3 mg Oral Daily     sodium chloride (PF)  3 mL Intracatheter Q8H       lactated ringers 100 mL/hr at 02/21/22 0859       I, Kajal Baxter, saw and evaluated this patient as part of a shared visit.  I have reviewed and discussed with the advanced practice provider their history, physical and plan.    I personally reviewed the vital signs, medications, labs and imaging.    My key history or physical exam findings:  DISHA in setting of hypertensive emergency, hemodynamics effect, after presenting with CVA  Key management decisions made by me:  Control blood pressure to 140, avoid hypotension, fluids given given low BP leading to improved renal function  Awaiting renin/ marychuy  and some other serology    Kajal Remington Baxter  Date of Service (when I saw the patient): 2/21

## 2022-02-21 NOTE — CONSULTS
"[facility]    Stroke Progress Note    Interval Events- Intermittent agitation and paranoia    HPI Summary  Miriam Hall is a 46 year old male 46 year old male with history of resistant HTN who was initially admitted to [facility] 1/8 to 1/28/2022 with R PCA stroke c/b refractory HTN with concern for secondary HTN further c/b DISHA, new DMII diagnosis, and encephalopathy.      Neurology service was called for stroke code. Patient has been encephalopathic for 3 days. Last well known was 2/17. Primary team is getting him back from ARU and mentions that he is considerably more somnolent and encephalopathic than during their last assesment. Per chart review, he was seen by our stroke team in January for a R occipital infarct and discharged alert and oriented with left sided hemiplegia and hemisensory loss.      At the bedside the patient is sleepy but arouses to noxious stimulation. Upon waking, he is alert, answer questions, following commands, and appears to have only his baseline deficits. Stroke team opted not to pursue further imaging given his exam findings. When asked about his arm and leg weakness: \"is this from your last stroke,\" he answers \"yes.\"     Imaging Findings  Impression: CT head  Evolving large right PCA territory infarct with increased prominence  of gyriform hyperdensities compatible with cortical laminar necrosis.  No superimposed new acute hemorrhage or acute infarct.      Thrombolytic Treatment   Not given due to low likely [place] of stroke.     Endovascular Treatment  Not initiated due to absence of proximal vessel occlusion    Stroke Evaluation Summarized    MRI/Head CT CT head 2/19  Evolving large right PCA territory infarct with increased prominence  of gyriform hyperdensities compatible with cortical laminar necrosis.  No superimposed new acute hemorrhage or acute infarct.    Intracranial Vasculature CTA 1/12/22  1.  No significant change compared " with 01/08/2022.  2.  Occlusion of the proximal right posterior cerebral artery at the P1/P2 junction.  3.  Moderate to severe nonflow limiting stenosis at the origin of the posterior right M2 branch vessel of the middle cerebral artery   Cervical Vasculature CTA 1/12/2022  1.  Normal neck CTA.     CARISA 1/8/22 Global and regional left ventricular function is normal with an EF of 55-60%.  Severe concentric left ventricular hypertrophy is present.  Global right ventricular function is normal.  The atrial septum is intact as assessed by color Doppler and agitated saline  bubble study .  The left atrial appendage is free of thrombus.  No significant valvular abnormalities present.  No pericardial effusion is present.   EKG/Telemetry    Other Testing      LDL  No lab value available in past 30 days   A1C  No lab value available in past 30 days   Troponin No lab value available in past 48 hrs       Impression   # Toxic Metabolic Encephalopathy  # Mood disorder  This is a 45yo man with a pmh of HTN, CKD and R occipital infarct that occurred recently in January. He suffered a R P1 occlusion and was found to have a known R M2 stenosis. He also had a hemorrhagic transformation of infarct with likely etiology large vessel atherosclerosis vs ESUS. He does have a high baseline risk of stroke. He was being admitted back to [facility] from the TCU due to this encephalopathy, although there is no note of a focal neurologic deficit. On arrival stroke code was called for AMS upon further examination, once awoken, he returned to his baseline documented exam. He was noted by the bedside nurse to have become more encephalopathic after a dose of zyprexa he received recently.  Overall suspicion for stroke is low. Primary team did opt to get a head CT, as they were intending to obtain one today regardless of the stroke code outcome that revealed evolving prior R PCA infarct. The patient endorsed improvement in L arm sensation and notes no change  in his exam. He was not a candidate for thrombolysis considering recent stroke and symptoms not likely 2/2 stroke.     He continues to have episodes of agitation with paranoia, as per social work note prior to transfer he was agitated and paranoid. He expressed psychosocial stress and baseline anxiety that is worsened in the setting of his brothers death 2/2 infection in 2017 and his mother being in hospital for fall. He expressed frustration of not being able to leave the hospital. His AMS is unlikely to be a cause of prior R PCA stroke considering he had improvement for a month and now has worsened in the past few days. Ammonia level wnl and have low concern for valproate induced hyperammonemia. Will recommend valproate level as that helps with mood and a sub optimal level may lead to mood changes. On neurological exam he has mild improvement in L neglect and L sensation. Have low concern for new stroke considering some improvement on neurological exam, although cannot rule out and will recommend MRI brain with and without contrast to rule out stroke and alternate etiology. May consider expanding work up to vasculitic and autoimmune central causes if MRI is -ve and he persists to have worsening agitation.     Recommendations  - MRI brain with and without contrast to rule out stroke and alternate etiology (ordered for you)  - Will recommend valproate level as that helps with mood and a sub optimal level may lead to mood changes (ordered for you)  - Hypercoagulability panel   - Continue to workup for toxic metabolic causes of encephalopathy  - Neuro checks q 4H  - Delirium precautions  - Continue PTA ASA 81 mg daily  - Continue PTA atorvastatin 40 mg daily  - Target BP normotension  - Normoglycemia     Thank you for this consult. No further stroke evaluation is recommended, so we will sign off. Please contact us with any additional questions.     The patient was discussed with Stroke Staff is Dr. Blandon.     Lalo  radha Anaya MD  Neurology Resident  [id number]  ______________________________________________________    Clinically Significant Risk Factors Present on Admission                 Medications   Scheduled Meds    aspirin  81 mg Oral Daily     atorvastatin  40 mg Oral QPM     Baclofen  2.5 mg Oral TID     carvedilol  25 mg Oral BID w/meals     cloNIDine  0.1 mg Oral TID     divalproex sodium extended-release  1,000 mg Oral Daily     insulin aspart  1-7 Units Subcutaneous TID AC     insulin aspart  1-5 Units Subcutaneous At Bedtime     lidocaine  1 patch Transdermal Q24H    And     lidocaine   Transdermal Q8H     melatonin  3 mg Oral Daily     risperiDONE  0.5 mg Sublingual BID     sodium chloride (PF)  3 mL Intracatheter Q8H       Infusion Meds    lactated ringers 100 mL/hr at 02/21/22 1400       PRN Meds  acetaminophen, glucose **OR** dextrose **OR** glucagon, diclofenac, hydrALAZINE, lidocaine 4%, lidocaine (buffered or not buffered), ondansetron **OR** ondansetron, senna-docusate **OR** senna-docusate, sodium chloride (PF)       PHYSICAL EXAMINATION  Temp:  [98.4  F (36.9  C)-98.9  F (37.2  C)] 98.4  F (36.9  C)  Pulse:  [64-79] 74  Resp:  [16] 16  BP: (108-162)/(73-95) 120/75  SpO2:  [95 %-97 %] 96 %      Mental Status: Drowsy, attentive and oriented, follows commands, speech with intermittent dysarthria but fluent  Cranial Nerves:  visual fields intact, PERRL, EOMI with normal smooth pursuit, Neglects left side but can cross midline with encouragement for full lateral gaze, facial sensation intact and symmetric, facial movements symmetric when active, mild droop on left lower side, hearing not formally tested but intact to conversation, palate elevation symmetric and uvula midline, no dysarthria, shoulder shrug strong bilaterally, tongue protrusion midline  Motor:  no abnormal movements, normal tone throughout, normal muscle bulk, no pronator drift, normal rapid finger tapping on right, able to move right limbs  spontaneously, strength 5/5 throughout upper and lower extremities on right side; 1/5 strength of upper and lower extremities on left side (can flex fingers & move proximal thigh)  Reflexes:  no clonus, toes downgoing  Sensory:  intact to light touch on right; diminished light touch on left face and diminished on L hand although notes improvement from prior; feels pressure/tingling on left extremities  Coordination:  FNF and HS intact without dysmetria on right only  Station/Gait:  unable to test    Stroke Scales    NIHSS  Interval     Interval Comments     1a. Level of Consciousness 0-->Alert, keenly responsive   1b. LOC Questions 0-->Answers both questions correctly   1c. LOC Commands 0-->Performs both tasks correctly   2.   Best Gaze 1-->Partial gaze palsy, gaze is abnormal in one or both eyes, but forced deviation or total gaze paresis is not present   3.   Visual 2-->Complete hemianopia   4.   Facial Palsy 1-->Minor paralysis (flattened nasolabial fold, asymmetry on smiling)   5a. Motor Arm, Left 3-->No effort against gravity, limb falls   5b. Motor Arm, Right 0-->No drift, limb holds 90 (or 45) degrees for full 10 secs   6a. Motor Leg, Left 3-->No effort against gravity, leg falls to bed immediately   6b. Motor Leg, right 0-->No drift, leg holds 30 degree position for full 5 secs   7.   Limb Ataxia 0-->Absent   8.   Sensory 1-->Mild-to-moderate sensory loss, patient feels pinprick is less sharp or is dull on the affected side, or there is a loss of superficial pain with pinprick, but patient is aware of being touched   9.   Best Language 0-->No aphasia, normal   10. Dysarthria 1-->Mild-to-moderate dysarthria, patient slurs at least some words and, at worst, can be understood with some difficulty   11. Extinction and Inattention  1-->Visual, tactile, auditory, spatial, or personal inattention or extinction to bilateral simultaneous stimulation in one of the sensory modalities   Total 13 (02/21/22 2416)            Imaging  I personally reviewed all imaging; relevant findings per HPI.     Lab Results Data   CBC  Recent Labs   Lab 02/21/22  0954 02/20/22  0707 02/19/22  1646   WBC 6.7 6.0 6.2   RBC 4.00* 4.18* 4.44   HGB 12.1* 12.5* 13.5   HCT 35.9* 37.9* 39.9*    164 150     Basic Metabolic Panel    Recent Labs   Lab 02/21/22  1210 02/21/22  0954 02/21/22  0905 02/20/22  0850 02/20/22  0707 02/19/22  1757 02/19/22  1646   NA  --  139  --   --  138  --  136   POTASSIUM  --  3.8  --   --  4.6  --  4.2   CHLORIDE  --  108  --   --  104  --  105   CO2  --  26  --   --  23  --  26   BUN  --  65*  --   --  72*  --  55*   CR  --  2.23*  --   --  2.95*  --  1.91*   * 151* 152*   < > 141*   < > 135*   VALERIE  --  9.0  --   --  9.4  --  9.4    < > = values in this interval not displayed.     Liver Panel  Recent Labs   Lab 02/21/22  0954 02/20/22  0707 02/19/22  1646   PROTTOTAL 6.8 7.2 7.9   ALBUMIN 2.9* 3.0* 3.4   BILITOTAL 0.3 0.5 0.4   ALKPHOS 60 60 68   AST 18 19 23   ALT 21 24 28     INR    Recent Labs   Lab Test 01/18/22  1405 01/08/22  1843 01/08/22  1834   INR 1.06 0.95 1.0*      Lipid Profile    Recent Labs   Lab Test 01/08/22  1843 05/16/14  1506   CHOL 169 131   HDL 70 53   LDL 75 70   TRIG 118 38     A1C    Recent Labs   Lab Test 01/08/22  1843   A1C 7.8*     Troponin I  No results for input(s): TROPONIN, GHTROP in the last 168 hours.

## 2022-02-21 NOTE — CONSULTS
Care Management Initial Consult    General Information  Assessment completed with: Chart review   Patient admitted to [facility] on 1/08/2022, then discharged to  ARU on 1/30/2022, on 2/19/2022 patient was set to discharge to [facility] but was found aggitated and was admitted back to [facility] on 02/29/2022.    Per ARU docs on 2/19/2022:       PSYCH  Agitation  - Patient placed on 72 hour hold at 10 am on 2/19/22, which does not end until 2/25/22 at 10 am due to the fact that weekends and legal holidays do not count.     Primary Care Provider verified and updated as needed:   No PCP will need one before discharge, per ARU notes Patient would like to go to [facility]  Readmission within the last 30 days:   No   Advance Care Planning:     No ACP docs, per notes interested in completing HCD     Communication Assessment  Patient's communication style: spoken language (English or Bilingual)    Hearing Difficulty or Deaf: no   Wear Glasses or Blind: no    Cognitive  Cognitive/Neuro/Behavioral: WDL  Level of Consciousness: alert  Arousal Level: opens eyes spontaneously  Orientation: disoriented to, time  Mood/Behavior: calm, cooperative  Best Language: 0 - No aphasia  Speech: slurred    Living Environment:   People in home:    alone    Current living Arrangements:  Apartment on 2nd level in [facility]      Able to return to prior arrangements:       Family/Social Support:  Care provided by:  self  Provides care for:  No one  Description of Support System:    Per ARU note on 1/25/2022. Patients brother, Davi, lives in [place] and can come and stay with patient to provide support at discharge if needed. Friend/ex-girlfriend, Taniya, is the most involved per Pt and able to help. Also has friends, Jordyn and Deirdre.      Current Resources:   Patient receiving home care services:  No documentation of agency  Community Resources:    Equipment currently used at home: wheelchair, manual, walker, angelique  Supplies  currently used at home:      Employment/Financial:  Employment Status:     Per ARU notes, works in [employer] for [employer] area [email or web address], in person  Financial Concerns:   No concerns at this time, potential for need of FMLA paperwork    Lifestyle & Psychosocial Needs:  Social Determinants of Health     Tobacco Use: Not on file   Alcohol Use: Not on file   Financial Resource Strain: Not on file   Food Insecurity: Not on file   Transportation Needs: Not on file   Physical Activity: Not on file   Stress: Not on file   Social Connections: Not on file   Intimate Partner Violence: Not on file   Depression: Not on file   Housing Stability: Not on file       Functional Status:  Prior to admission patient needed assistance:    IND prior to [facility] admission on 1/08/2022     Mental Health Status:        Chemical Dependency Status:        Values/Beliefs:  Spiritual, Cultural Beliefs, Quaker Practices, Values that affect care:                 Additional Information:  Patient is a 46 year old male with history of resistant HTN who was initially admitted to [facility] 1/8 to 1/28/2022 with R PCA stroke c/b refractory HTN with concern for secondary HTN further c/b DISHA, new DMII diagnosis, and encephalopathy. Stabilized and transferred to ARU 1/28/2022 for ongoing rehabilitation. Patient developed new AMS, agitation, and worsening DISHA 2/18 prompting transfer to [facility] 2/19/2022 for higher level of care     OT recs as of this date are for ARU.        Angélica Davidson, CHELSEA Davidson RN, BSN  5B RN Care Coordinator  [phone] phone  [phone] pager    Weekend or Holiday Care Coordinator [phone] pager  Job Code [id number]

## 2022-02-21 NOTE — PLAN OF CARE
Goal Outcome Evaluation:    Plan of Care Reviewed With: patient     Overall Patient Progress: improving    Outcome Evaluation: EEG still in place. AxOx4 VSS. Patient received melatonin at bedtime and slept most of night. When woken, patient pleasant, AxOx4, oral cares completed. Noted couple of episodes of quick desaturation into mid 80s then back up to upper 90s quickly while sleeping. Transferred to [facility], Medium BM. Holding Zyprexa. Creatinine: 2.95.

## 2022-02-22 ENCOUNTER — APPOINTMENT (OUTPATIENT)
Dept: PHYSICAL THERAPY | Facility: CLINIC | Age: 47
End: 2022-02-22
Attending: STUDENT IN AN ORGANIZED HEALTH CARE EDUCATION/TRAINING PROGRAM
Payer: COMMERCIAL

## 2022-02-22 ENCOUNTER — APPOINTMENT (OUTPATIENT)
Dept: MRI IMAGING | Facility: CLINIC | Age: 47
End: 2022-02-22
Attending: STUDENT IN AN ORGANIZED HEALTH CARE EDUCATION/TRAINING PROGRAM
Payer: COMMERCIAL

## 2022-02-22 LAB
ALBUMIN SERPL-MCNC: 3.2 G/DL (ref 3.4–5)
ALP SERPL-CCNC: 65 U/L (ref 40–150)
ALT SERPL W P-5'-P-CCNC: 22 U/L (ref 0–70)
ANION GAP SERPL CALCULATED.3IONS-SCNC: 7 MMOL/L (ref 3–14)
ANNOTATION COMMENT IMP: NORMAL
AST SERPL W P-5'-P-CCNC: 22 U/L (ref 0–45)
ATRIAL RATE - MUSE: 92 BPM
BILIRUB SERPL-MCNC: 0.5 MG/DL (ref 0.2–1.3)
BUN SERPL-MCNC: 48 MG/DL (ref 7–30)
C3 SERPL-MCNC: 145 MG/DL (ref 81–157)
C4 SERPL-MCNC: 22 MG/DL (ref 13–39)
CALCIUM SERPL-MCNC: 9.5 MG/DL (ref 8.5–10.1)
CHLORIDE BLD-SCNC: 106 MMOL/L (ref 94–109)
CO2 SERPL-SCNC: 25 MMOL/L (ref 20–32)
CREAT SERPL-MCNC: 1.56 MG/DL (ref 0.66–1.25)
DIASTOLIC BLOOD PRESSURE - MUSE: NORMAL MMHG
ERYTHROCYTE [DISTWIDTH] IN BLOOD BY AUTOMATED COUNT: 13.5 % (ref 10–15)
GFR SERPL CREATININE-BSD FRML MDRD: 55 ML/MIN/1.73M2
GLUCOSE BLD-MCNC: 157 MG/DL (ref 70–99)
GLUCOSE BLDC GLUCOMTR-MCNC: 100 MG/DL (ref 70–99)
GLUCOSE BLDC GLUCOMTR-MCNC: 142 MG/DL (ref 70–99)
GLUCOSE BLDC GLUCOMTR-MCNC: 170 MG/DL (ref 70–99)
GLUCOSE BLDC GLUCOMTR-MCNC: 196 MG/DL (ref 70–99)
HCT VFR BLD AUTO: 37.6 % (ref 40–53)
HGB BLD-MCNC: 12.6 G/DL (ref 13.3–17.7)
HOLD SPECIMEN: NORMAL
HOLD SPECIMEN: NORMAL
INTERPRETATION ECG - MUSE: NORMAL
MAGNESIUM SERPL-MCNC: 1.9 MG/DL (ref 1.8–2.6)
MCH RBC QN AUTO: 29.8 PG (ref 26.5–33)
MCHC RBC AUTO-ENTMCNC: 33.5 G/DL (ref 31.5–36.5)
MCV RBC AUTO: 89 FL (ref 78–100)
METANEPHS SERPL-SCNC: 0.26 NMOL/L
NORMETANEPHRINE SERPL-SCNC: 0.62 NMOL/L
P AXIS - MUSE: 89 DEGREES
PHOSPHATE SERPL-MCNC: 2.6 MG/DL (ref 2.5–4.5)
PLATELET # BLD AUTO: 185 10E3/UL (ref 150–450)
POTASSIUM BLD-SCNC: 3.4 MMOL/L (ref 3.4–5.3)
PR INTERVAL - MUSE: 228 MS
PROT SERPL-MCNC: 7.6 G/DL (ref 6.8–8.8)
QRS DURATION - MUSE: 118 MS
QT - MUSE: 384 MS
QTC - MUSE: 472 MS
R AXIS - MUSE: -26 DEGREES
RBC # BLD AUTO: 4.23 10E6/UL (ref 4.4–5.9)
RENIN PLAS-CCNC: 7.7 NG/ML/HR
SODIUM SERPL-SCNC: 138 MMOL/L (ref 133–144)
SYSTOLIC BLOOD PRESSURE - MUSE: NORMAL MMHG
T AXIS - MUSE: 86 DEGREES
TROPONIN I SERPL HS-MCNC: 102 NG/L
TROPONIN I SERPL HS-MCNC: 120 NG/L
TROPONIN I SERPL HS-MCNC: 122 NG/L
VENTRICULAR RATE- MUSE: 91 BPM
WBC # BLD AUTO: 8 10E3/UL (ref 4–11)

## 2022-02-22 PROCEDURE — 250N000013 HC RX MED GY IP 250 OP 250 PS 637: Performed by: PHYSICIAN ASSISTANT

## 2022-02-22 PROCEDURE — 80165 DIPROPYLACETIC ACID FREE: CPT

## 2022-02-22 PROCEDURE — 250N000013 HC RX MED GY IP 250 OP 250 PS 637: Performed by: STUDENT IN AN ORGANIZED HEALTH CARE EDUCATION/TRAINING PROGRAM

## 2022-02-22 PROCEDURE — 70544 MR ANGIOGRAPHY HEAD W/O DYE: CPT | Mod: 26 | Performed by: RADIOLOGY

## 2022-02-22 PROCEDURE — 84484 ASSAY OF TROPONIN QUANT: CPT | Performed by: STUDENT IN AN ORGANIZED HEALTH CARE EDUCATION/TRAINING PROGRAM

## 2022-02-22 PROCEDURE — 999N000248 HC STATISTIC IV INSERT WITH US BY RN

## 2022-02-22 PROCEDURE — 83735 ASSAY OF MAGNESIUM: CPT | Performed by: INTERNAL MEDICINE

## 2022-02-22 PROCEDURE — 93010 ELECTROCARDIOGRAM REPORT: CPT | Performed by: INTERNAL MEDICINE

## 2022-02-22 PROCEDURE — 93005 ELECTROCARDIOGRAM TRACING: CPT

## 2022-02-22 PROCEDURE — 70544 MR ANGIOGRAPHY HEAD W/O DYE: CPT

## 2022-02-22 PROCEDURE — 250N000013 HC RX MED GY IP 250 OP 250 PS 637: Performed by: HOSPITALIST

## 2022-02-22 PROCEDURE — 36415 COLL VENOUS BLD VENIPUNCTURE: CPT | Performed by: INTERNAL MEDICINE

## 2022-02-22 PROCEDURE — 36415 COLL VENOUS BLD VENIPUNCTURE: CPT

## 2022-02-22 PROCEDURE — 70549 MR ANGIOGRAPH NECK W/O&W/DYE: CPT

## 2022-02-22 PROCEDURE — 36415 COLL VENOUS BLD VENIPUNCTURE: CPT | Performed by: STUDENT IN AN ORGANIZED HEALTH CARE EDUCATION/TRAINING PROGRAM

## 2022-02-22 PROCEDURE — 99233 SBSQ HOSP IP/OBS HIGH 50: CPT | Performed by: STUDENT IN AN ORGANIZED HEALTH CARE EDUCATION/TRAINING PROGRAM

## 2022-02-22 PROCEDURE — A9585 GADOBUTROL INJECTION: HCPCS | Performed by: STUDENT IN AN ORGANIZED HEALTH CARE EDUCATION/TRAINING PROGRAM

## 2022-02-22 PROCEDURE — 99232 SBSQ HOSP IP/OBS MODERATE 35: CPT | Performed by: CLINICAL NURSE SPECIALIST

## 2022-02-22 PROCEDURE — 255N000002 HC RX 255 OP 636: Performed by: STUDENT IN AN ORGANIZED HEALTH CARE EDUCATION/TRAINING PROGRAM

## 2022-02-22 PROCEDURE — 250N000011 HC RX IP 250 OP 636: Performed by: PHYSICIAN ASSISTANT

## 2022-02-22 PROCEDURE — 250N000011 HC RX IP 250 OP 636: Performed by: STUDENT IN AN ORGANIZED HEALTH CARE EDUCATION/TRAINING PROGRAM

## 2022-02-22 PROCEDURE — 97530 THERAPEUTIC ACTIVITIES: CPT | Mod: GP

## 2022-02-22 PROCEDURE — 258N000003 HC RX IP 258 OP 636: Performed by: STUDENT IN AN ORGANIZED HEALTH CARE EDUCATION/TRAINING PROGRAM

## 2022-02-22 PROCEDURE — 250N000013 HC RX MED GY IP 250 OP 250 PS 637: Performed by: INTERNAL MEDICINE

## 2022-02-22 PROCEDURE — 80053 COMPREHEN METABOLIC PANEL: CPT | Performed by: INTERNAL MEDICINE

## 2022-02-22 PROCEDURE — 250N000013 HC RX MED GY IP 250 OP 250 PS 637

## 2022-02-22 PROCEDURE — 120N000002 HC R&B MED SURG/OB UMMC

## 2022-02-22 PROCEDURE — 84100 ASSAY OF PHOSPHORUS: CPT | Performed by: INTERNAL MEDICINE

## 2022-02-22 PROCEDURE — 85027 COMPLETE CBC AUTOMATED: CPT | Performed by: INTERNAL MEDICINE

## 2022-02-22 PROCEDURE — 70549 MR ANGIOGRAPH NECK W/O&W/DYE: CPT | Mod: 26 | Performed by: RADIOLOGY

## 2022-02-22 RX ORDER — LORAZEPAM 2 MG/ML
1-2 INJECTION INTRAMUSCULAR ONCE
Status: COMPLETED | OUTPATIENT
Start: 2022-02-22 | End: 2022-02-22

## 2022-02-22 RX ORDER — AMLODIPINE BESYLATE 5 MG/1
5 TABLET ORAL DAILY
Status: DISCONTINUED | OUTPATIENT
Start: 2022-02-22 | End: 2022-03-03

## 2022-02-22 RX ORDER — CLONIDINE HYDROCHLORIDE 0.1 MG/1
0.1 TABLET ORAL ONCE
Status: DISCONTINUED | OUTPATIENT
Start: 2022-02-22 | End: 2022-02-22

## 2022-02-22 RX ORDER — GADOBUTROL 604.72 MG/ML
7.5 INJECTION INTRAVENOUS ONCE
Status: COMPLETED | OUTPATIENT
Start: 2022-02-22 | End: 2022-02-22

## 2022-02-22 RX ORDER — CLONIDINE HYDROCHLORIDE 0.1 MG/1
0.1 TABLET ORAL ONCE
Status: COMPLETED | OUTPATIENT
Start: 2022-02-22 | End: 2022-02-22

## 2022-02-22 RX ORDER — SODIUM CHLORIDE, SODIUM LACTATE, POTASSIUM CHLORIDE, CALCIUM CHLORIDE 600; 310; 30; 20 MG/100ML; MG/100ML; MG/100ML; MG/100ML
INJECTION, SOLUTION INTRAVENOUS CONTINUOUS
Status: ACTIVE | OUTPATIENT
Start: 2022-02-22 | End: 2022-02-22

## 2022-02-22 RX ORDER — HYDRALAZINE HYDROCHLORIDE 20 MG/ML
10 INJECTION INTRAMUSCULAR; INTRAVENOUS ONCE
Status: COMPLETED | OUTPATIENT
Start: 2022-02-22 | End: 2022-02-22

## 2022-02-22 RX ADMIN — LIDOCAINE 1 PATCH: 560 PATCH PERCUTANEOUS; TOPICAL; TRANSDERMAL at 08:29

## 2022-02-22 RX ADMIN — HYDRALAZINE HYDROCHLORIDE 10 MG: 20 INJECTION INTRAMUSCULAR; INTRAVENOUS at 02:37

## 2022-02-22 RX ADMIN — RISPERIDONE 0.5 MG: 0.5 TABLET, ORALLY DISINTEGRATING ORAL at 20:20

## 2022-02-22 RX ADMIN — DIVALPROEX SODIUM 1000 MG: 500 TABLET, FILM COATED, EXTENDED RELEASE ORAL at 08:29

## 2022-02-22 RX ADMIN — RISPERIDONE 0.5 MG: 0.5 TABLET, ORALLY DISINTEGRATING ORAL at 08:29

## 2022-02-22 RX ADMIN — CLONIDINE HYDROCHLORIDE 0.1 MG: 0.1 TABLET ORAL at 14:04

## 2022-02-22 RX ADMIN — SODIUM CHLORIDE, POTASSIUM CHLORIDE, SODIUM LACTATE AND CALCIUM CHLORIDE: 600; 310; 30; 20 INJECTION, SOLUTION INTRAVENOUS at 14:42

## 2022-02-22 RX ADMIN — CARVEDILOL 25 MG: 25 TABLET, FILM COATED ORAL at 08:29

## 2022-02-22 RX ADMIN — LORAZEPAM 1 MG: 2 INJECTION INTRAMUSCULAR; INTRAVENOUS at 15:11

## 2022-02-22 RX ADMIN — GADOBUTROL 7.5 ML: 604.72 INJECTION INTRAVENOUS at 15:57

## 2022-02-22 RX ADMIN — AMLODIPINE BESYLATE 5 MG: 5 TABLET ORAL at 11:10

## 2022-02-22 RX ADMIN — CLONIDINE HYDROCHLORIDE 0.1 MG: 0.1 TABLET ORAL at 08:29

## 2022-02-22 RX ADMIN — SODIUM CHLORIDE, POTASSIUM CHLORIDE, SODIUM LACTATE AND CALCIUM CHLORIDE: 600; 310; 30; 20 INJECTION, SOLUTION INTRAVENOUS at 11:10

## 2022-02-22 RX ADMIN — CLONIDINE HYDROCHLORIDE 0.1 MG: 0.1 TABLET ORAL at 20:20

## 2022-02-22 RX ADMIN — SODIUM CHLORIDE 100 ML: 9 INJECTION, SOLUTION INTRAVENOUS at 15:58

## 2022-02-22 RX ADMIN — Medication 2.5 MG: at 14:05

## 2022-02-22 RX ADMIN — Medication 2.5 MG: at 08:29

## 2022-02-22 RX ADMIN — ASPIRIN 81 MG: 81 TABLET, CHEWABLE ORAL at 08:29

## 2022-02-22 RX ADMIN — Medication 2.5 MG: at 20:21

## 2022-02-22 RX ADMIN — CARVEDILOL 25 MG: 25 TABLET, FILM COATED ORAL at 18:56

## 2022-02-22 RX ADMIN — ATORVASTATIN CALCIUM 40 MG: 40 TABLET, FILM COATED ORAL at 20:20

## 2022-02-22 RX ADMIN — HYDRALAZINE HYDROCHLORIDE 10 MG: 20 INJECTION INTRAMUSCULAR; INTRAVENOUS at 05:01

## 2022-02-22 RX ADMIN — ATORVASTATIN CALCIUM 40 MG: 40 TABLET, FILM COATED ORAL at 01:09

## 2022-02-22 RX ADMIN — CLONIDINE HYDROCHLORIDE 0.1 MG: 0.1 TABLET ORAL at 01:09

## 2022-02-22 ASSESSMENT — ACTIVITIES OF DAILY LIVING (ADL)
ADLS_ACUITY_SCORE: 21
ADLS_ACUITY_SCORE: 17
ADLS_ACUITY_SCORE: 21

## 2022-02-22 NOTE — PROGRESS NOTES
"Brief Medicine Note:    Cross cover notified patient requesting to leave AMA. Seen at bedside. Patient appears to be having visual hallucinations and extreme paranoia. Believes if he tells writer why he wants to leave it will put me in danger and only agreed to tell me if I came back with 2 police officers. Wants to leave this place because he is afraid and states there is a camera in the wall watching him and also gives a middle finger and says \"eff you\" to suspect hallucination.    At this time, it does not appear that patient has mental capacity and is refusing his medications. He is holdable if attempts to leave.    Discussed with Charge and bedside RN. Will give patient time to calm down. If attempts to leave, he is holdable at this time and will call code 21 and place 72 hour hold at that time.    Iva Gamble PA-C  Internal Medicine MONIKA Hospitalist  [facility]      "

## 2022-02-22 NOTE — PLAN OF CARE
Goal Outcome Evaluation:    Plan of Care Reviewed With: patient     Overall Patient Progress: no change    Outcome Evaluation: Pt's mood labile. Easily agitated. Refused MRI and evening meds. MD assessed at bedside. Pt hypertensive and given prn hydralazine as well as 1x dose of hydralazine. EKG and trop ordered.    Time: 9308-0179    Reason for admission: stroke, DISHA  Activity: A2/GB; L sided hemiparesis   Diet: reg  Pain: c/o headache; refused intervention  Respiratory: lung sounds clear/equal bilaterally; room air  Cardiovascular: WDL ex intermittently tachy  GI: last bm 2/21  : WDL; urinal at bedside  Skin: intact  Neurologic: oriented to self; confused, paranoia, hallucinations  Labs: trop 102, repeat trop pending  Lines: R PIV infusing LR @ 100 ml/hr    New changes this shift: hypertensive, prn hydralazine given as well as 1x dose for persistent hypertension, pt c/o headache and chest pain; EKG and trop ordered as well as repeat trop, labile mood including agitation and paranoia; refused MRI (checklist complete), MD assessed at bedside     Plan: continue to monitor and follow POC

## 2022-02-22 NOTE — PROGRESS NOTES
"   02/21/22 1016   Quick Adds   Type of Visit Initial PT Evaluation   Living Environment   People in Home alone   Current Living Arrangements apartment   Home Accessibility stairs to enter home   Number of Stairs, Main Entrance other (see comments)   Stair Railings, Main Entrance railings on both sides of stairs   Transportation Anticipated family or friend will provide   Self-Care   Usual Activity Tolerance excellent   Current Activity Tolerance fair   Regular Exercise No   Equipment Currently Used at Home none   Fall history within last six months yes   Number of times patient has fallen within last six months 1   Activity/Exercise/Self-Care Comment 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 Stabilized and transferred to ARU 1/28/2022 for ongoing rehabilitation. Patient developed new AMS, agitation, and worsening DISHA 2/18 prompting transfer to [facility] 2/19/2022 for higher level of care    Existing Precautions/Restrictions fall   General Observations Pt supine upon PTs arrival; agreeable to PT session; labile & frustrated to be back in the hospital   Cognition   Orientation Status (Cognition) person;place;situation   Cognitive Status Comments Pt requests that PT defer cognitive questioning as \"everyone comes in here & asks me the same questions\"   Pain Assessment   Patient Currently in Pain No   Posture    Posture Forward head position "   Posture Comments midline shifted to right due to left neglect   Range of Motion (ROM)   ROM Comment left L/E ROM assessment limited due to discomfort expressed by pt   Strength (Manual Muscle Testing)   Strength Comments right L/E 5/5; pt with limited tolerance for testing of left L/E - able to bear weigh during standing & transfer with use of pre-nereyda AFO on left   Bed Mobility   Comment, (Bed Mobility) Supine to right sidelying with assist to manage left U/E & L/E; right sidelying to sitting with min/mod assist   Transfers   Comment, (Transfers) Bed to armchair with use of left AFO & transfer toward pt's left side with CGA from PT; sit to stand from armchair with min assist; mod assist to facilitate left hip & knee ext in standing   Gait/Stairs (Locomotion)   Comment, (Gait/Stairs) not appropriate at this time   Balance   Balance Comments sitting - good/fair at EOB iwth close SBA; weight shifted toward right due to left neglect   Sensory Examination   Sensory Perception Comments left L/E sensory changes - pt unable to detect positioning of left ankle & left knee   Clinical Impression   Criteria for Skilled Therapeutic Intervention Yes, treatment indicated   PT Diagnosis (PT) impaired mobility   Influenced by the following impairments decreased strength, decreased ROM, decreased activity tolerance, left sided neglect   Functional limitations due to impairments decreased independence with bed mobility, transfers & gait   Clinical Presentation (PT Evaluation Complexity) Evolving/Changing   Clinical Presentation Rationale based upon subjective information provided, objective exam findings, medical history & clinical judgement   Clinical Decision Making (Complexity) moderate complexity   Planned Therapy Interventions (PT) balance training;bed mobility training;gait training;neuromuscular re-education;orthotic fitting/training;strengthening;stretching;transfer training;wheelchair management/propulsion training   Risk &  Benefits of therapy have been explained evaluation/treatment results reviewed;care plan/treatment goals reviewed;participants voiced agreement with care plan;participants included;patient   PT Discharge Planning   PT Discharge Recommendation (DC Rec) Acute Rehab Center-Motivated patient will benefit from intensive, interdisciplinary therapy.  Anticipate will be able to tolerate 3 hours of therapy per day   PT Rationale for DC Rec Pt with deficits related to left hemiplegia; highly motivated to progress mobility skills; good participation with therapy; has multi-disciplinary rehab needs and appears able to tolerate 3 hours of therapy daily   PT Brief overview of current status min/mod assist supine to sit; min assist bed to armchair with transfer toward pt's right side; mod assist for transfer to pt's left side   Total Evaluation Time   Total Evaluation Time (Minutes) 10   Physical Therapy Goals   PT Frequency Daily   PT Predicated Duration/Target Date for Goal Attainment 03/28/22   PT: Bed Mobility Modified independent;Supine to/from sit;Rolling   PT: Transfers Modified independent;Sit to/from stand;Bed to/from chair;Assistive device   PT: Gait Minimal assist;Ba walker;50 feet   PT: Wheelchair Mobility Greater than 500 feet;Caregiver SBA;manual wheelchair

## 2022-02-22 NOTE — PROVIDER NOTIFICATION
"Pt very agitated and being verbally aggressive to staff.  This writer (charge RN) went to bedside to assess situation.  Pt then became very agitated with writer, insisting on leaving \"this fake hospital\" stating he would \"crawl out\" if he needed to, requesting number to admissions at [facility] and [facility].  Writer attempted to defuse situation but pt not redirectable and very argumentative.  Cross cover paged to talk with pt.  In the mean time pt proceeded to urinate on the floor, staff cleaned floor and ES paged to mop floor, cross cover came to talk to pt.  After that conversation pt threw chair alarm box towards the door because it was \"in a pool of water\".  Writer asked pt to not throw equipment and call light use reinforced.   "

## 2022-02-22 NOTE — PROGRESS NOTES
[facility]    Medicine Progress Note - Hospitalist Service, GOLD TEAM 7    Date of Admission:  2/19/2022    Assessment & Plan          Miriam Hall is a 46 year old male with history of severe resistant HTN and longstanding DMII who was initially admitted to [facility] 1/8 - 1/28/2022 with R PCA stroke c/b refractory HTN and DISHA, and encephalopathy. Stabilized and transferred to ARU 1/28/2022 for ongoing rehabilitation. Patient developed new AMS, agitation, paranoia and worsening DISHA 2/18 prompting transfer to [place].     Interval Changes:  -Repeat MRI attempt with pre-medication  -Add amlodipine for blood pressure control   -ESR, CRP  -Stop IV fluids  -Troponin mildy elevated from CKD and recent ischemic insult approx 1 month prior. Likely no new ischemic insult, if recurrent chest pain will obtain ECG but will stop trending troponin.      Agitation  Paranoia and personality change  Toxic metabolic encephalopathy  Pt developed new waxing and waning confusion, agitation and paranoia starting 2/18, worsened 2/19 prompting transfer from ARU to [facility]. Received 10 mg olanzapine and patient became obtunded prompting stroke code, repeat CT with known and evolving stroke without new CVA or hemorrhage.   -Unclear etiology of decompensation, possible personality change as effect of stroke vs medication effect, less likely sepsis as afebrile, HDS, normal WBC, negative UA, CXR.   - Head CT: evolving stroke   - UA negative, CXR with streaky opacities c/w atelectasis  - Spot EEG, prolactin elevated  Plan:  >MRI brain re-attempt on 2/22, patient termianted early on 2/21   -ESR, CRP  -Neurology consulted   => Please step out and give patient time to calm down if he is feeling agitated or anxious. He is usually redirectable and consolable.        DISHA on CKD, improving  Resistant HTN  Patient has long hx of resistant HTN while on multidrug regimen. Recent DISHA and new BL Cr around  1.5-1.7, was 2.2 on transfer and leno to 2.9 on 2/20 in the setting of ACEi use, now improving. Discharged from TCU on amiloride, Coreg, clonidine, lisinopril, and scheduled and prn hydralazine.    -ON admission blood pressure was on low normal  Plan:  -carvedilol to 25 BID, cont clonidine   -Start amlodipine 5 mg   - Hold ACEi and diuretics, amiloride  - Nephrology consulted, appreciate rec's   - Daily weights, strict I&Os    Hypopnea secondary to olanzapine - resolved.   Patient was noted to have low RR (as low as 6) and long pauses of respiration prompting RRTs and stoke code following 10 IM Zyprexa. Normal oxygenation and normal gases. Avoid zyprexa    Concern for Seizures:   In the setting of CVA. EEG 1/12 concerning for subclinical seizures. Loaded with Keppra, transitioned to Depakote. No e/o seizure noted in the setting of AMS  - Continue Depakote and seizure precautions    R PCA stroke  Presented with L hemiplegia. Head CT 1/8 R parieto-occipital infarct. CTA noted occlusion on proximal R PCA and high grade stenosis of M2 branch of R MCA. Outside window for tPA. Repeat imagine 1/12 with hemorrhagic transformation. Source 2/2 high grade atherosclerosis vs ESUS. CARISA no LA thrombus. Residual deficits include L hemiplegia, L hemianopsia, mild dysarthria.   -Acute AMS 2/19, repeat CT negative for new findings   Plan:  - Continue ASA, statin   - Follow pending Ziopatch results      Troponin elevation  -elevated to 120s on 2/22 in setting of uncontrolled hypertension and CKD. Decreased from 2 weeks prior to admission, likely elevated from CKD and recent ischemic insult. ECG with no ischemic changes  Plan:  >Stop monitoring, repeat ECG      Dysphagia:   Noted on admission with thin liquids. Previously on regular diet  - SLP consulted, ok with regular diet with 1:1      DMII:   A1c 7.8. PTA metformin on hold due to DISHA. Glucose stable  - MSSI, hypoglycemia protocol       Diet: Regular Diet Adult Thin Liquids (level  0)    DVT Prophylaxis: Pneumatic Compression Devices  Mendoza Catheter: Not present  Central Lines: None  Cardiac Monitoring: None  Code Status: Full Code      Disposition Plan   Expected Discharge: 02/25/2022     Anticipated discharge location:  Awaiting care coordination huddle  Delays:          The patient's care was discussed with the Bedside Nurse, Care Coordinator/ and Patient.    Christiano Tapia MD  Hospitalist Service, [address]  Securely message with the Vocera Web Console (learn more here)  Text page via [facility] Paging/Directory   Please see signed in provider for up to date coverage information      Clinically Significant Risk Factors Present on Admission             # Diabetes, type II: last A1C 7.8 % (Ref range: 0.0 - 5.6 %)      ______________________________________________________________________    Interval History   Patient reports frustration with MRI. He is overall irritated with his hospital stay. He feels like he is getting mixed messages. He denies fever or chills. No chest pain. No shortness of breath. No abdominal pain. NO nausea or vomiting.     Data reviewed today: I reviewed all medications, new labs and imaging results over the last 24 hours. I personally reviewed no images or EKG's today.    Physical Exam   Vital Signs: Temp: 98.1  F (36.7  C) Temp src: Oral BP: (!) 164/79 Pulse: 102   Resp: 16 SpO2: 99 % O2 Device: None (Room air)    Weight: 156 lbs 4.9 oz  Constitutional: alert, oriented, no acute distress  Eyes: no icterus  Respiratory: CTAB, no wheezing or respiratory distress  Cardiovascular: RRR, no rub or murmur  GI: soft, non-tender, non-distended, bowel sounds present   Musculoskeletal: left arm flexion and extension 1/5 compared to right  Neurologic: decreased sensation on left  Upper extremity     Data   Recent Labs   Lab 02/22/22  1245 02/22/22  0916 02/22/22  0323 02/21/22  1210 02/21/22  0954  02/20/22  0850 02/20/22  0707   WBC  --   --  8.0  --  6.7  --  6.0   HGB  --   --  12.6*  --  12.1*  --  12.5*   MCV  --   --  89  --  90  --  91   PLT  --   --  185  --  163  --  164   NA  --   --  138  --  139  --  138   POTASSIUM  --   --  3.4  --  3.8  --  4.6   CHLORIDE  --   --  106  --  108  --  104   CO2  --   --  25  --  26  --  23   BUN  --   --  48*  --  65*  --  72*   CR  --   --  1.56*  --  2.23*  --  2.95*   ANIONGAP  --   --  7  --  5  --  11   VALERIE  --   --  9.5  --  9.0  --  9.4   * 170* 157*   < > 151*   < > 141*   ALBUMIN  --   --  3.2*  --  2.9*  --  3.0*   PROTTOTAL  --   --  7.6  --  6.8  --  7.2   BILITOTAL  --   --  0.5  --  0.3  --  0.5   ALKPHOS  --   --  65  --  60  --  60   ALT  --   --  22  --  21  --  24   AST  --   --  22  --  18  --  19    < > = values in this interval not displayed.

## 2022-02-22 NOTE — PROGRESS NOTES
Care Management Follow Up    Length of Stay (days): 3    Expected Discharge Date: 02/25/2022     Concerns to be Addressed:       Patient plan of care discussed at interdisciplinary rounds: Yes    Anticipated Discharge Disposition:  TCU     Anticipated Discharge Services:  TCU  Anticipated Discharge DME:  NA    Patient/family educated on Medicare website which has current facility and service quality ratings:      [facility] Farooq [facility]    Education Provided on the Discharge Plan:  No  Patient/Family in Agreement with the Plan:  Pt was in agreement prior to mentation change and return to hospital.    Referrals Placed by CM/SW:  Marlon Farooq  Private pay costs discussed: Not applicable    Additional Information:  Pt was previously at [facility], [facility], however, pt transferred back to the hospital for work up and mentation/behavior changes. Spoke with Marlon ortiz (P: [phone]) who have been following pts case through EHR. They state as long as pts mentation clears and behaviors resolve they should be able to accept pt back whenever medically stable.    ML Marx

## 2022-02-22 NOTE — PLAN OF CARE
Goal Outcome Evaluation:    Plan of Care Reviewed With: patient     Overall Patient Progress: no change    Outcome Evaluation: Pt's mood improved today.  Some paranoia noted but no aggressive behaviors.  MRI scheduled for this afternoon.  IV Ativan ordered for before he goes to help with agitation.  Continue to monitor moods and behavior.

## 2022-02-22 NOTE — PLAN OF CARE
Goal Outcome Evaluation:    Plan of Care Reviewed With: patient     Overall Patient Progress: improving    Outcome Evaluation:     Time: 15:00-19:00     Reason for admit: Altered mental status  Vitals: WNL  Activity: Activity as tolerated, AO1-2  Neuro: Alert disoriented to time  Mood/Behavior: Calm and cooperative  Lines/Drains: R PIV infusing to LR at 100 mL/hr  Cardiac: WNL  Resp: Lung sounds clear  Diet: Regular diet, with good appetite, needs tray set up  GI/: Able to void without any difficulty, LBM 2/21/2022  Skin: Skin is dry, bu no skin breakdown noted  Pain: No c/o pain this shift     New this shift: Will undergo MRI, checklist sent to MRI already, copy in pt.'s chart. Bed shampoo done prior to MRI to remove EEG glue.     Plan: Continue with POC.

## 2022-02-22 NOTE — PLAN OF CARE
Patient refusing IV insertion and getting agitated. RN and charge nurse aware and present in the room.

## 2022-02-22 NOTE — PROGRESS NOTES
Nephrology Progress Note  02/22/2022           Mr Hall is a 46 yom with hx of HTN admitted for R PCA stroke on 1/8/2022, seen by Nephrology for evaluation of possible secondary HTN and has been in ARU (rehab) since 1/28.  Nephrology consulted for DISHA post CVA with HTN.       Interval History :   Mr Hall's Cr is much improved this am after 1L of LR 2/20 suggesting he had prerenal component despite significant HTN.  Trending away from needing HD or renal bx, will continue to follow peripherally and plan to see him in HTN clinic.  Possibly has Liddle syndrome but this can be addressed as outpt.  Last 2 BP's 153/90, would not make changes at this point to his HTN regimen.     Assessment & Recommendations:   DISHA-Had initial assessment in Jan, baseline Cr 1.0-1.2 and normal renal US with dopplers.  Cr was up initially thought to be due to hemodynamic injury, renal had stopped following formally when pt discharged to ARU with plan to follow up in renal clinic but has been there longer than expected and DISHA has continued to slowly worsen.  Etiology of his continued worsening DISHA is not clear, BP's remain high but have been mostly SBP's of ~150 since stopping lisinopril on 2/10 (due to Cr up to 1.8), has hyaline casts in UA today but SBP's of 170 do not fit with true prerenal state, no meds that would be vasoconstrictive to produce prerenal state (tacro etc).  AIN fits the slow but steady progression but is not on any agents typical of this.  Cr down x2 days with holding diuretic over the weekend and giving a bit of IVF, continuing to follow.                  -UPCR WNL, C3/C4 normal, plasma metanephrine pending.                -Cr improved again today after fluid bolus suggests hemodynamic/prerenal component despite general HTN.                  -Renal fx improving with IVF, would not consider bx in this scenario.                 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        HTN-Last BP's reasonable, on Amlodipine 5mg Qday, Coreg 25mg BID, clonidine tab 0.1mg TID and hydralazine PRN.      Volume status-No edema on exam, stopped Chlorthalidone, did respond to 1L of LR 2/20 and Cr is down again today.       Electrolytes/pH-K 3.4, bicarb 25, no acute issue.       Ca/phos/pth-Ca 9.5, Mg and Phos reasonable.       Anemia-Hgb 12.6, no acute issue.      Nutrition-Taking PO     Time spent: 25 minutes on this date of encounter for chart review, physical exam, medical decision making and co-ordination of care.      Discussed with Dr Baxter     Recommendations were communicated to primary team via verbal communication.        MARBELLA Dennis CNS  Clinical Nurse Specialist  [phone]    Review of Systems:   I reviewed the following systems:  Gen: No fevers or chills  CV: No CP at rest  Resp: No SOB at rest  GI: No N/V    Physical Exam:   I/O last 3 completed shifts:  In: 240 [P.O.:240]  Out: 500 [Urine:500]   BP (!) 153/90   Pulse 78   Temp 98.2  F (36.8  C) (Oral)   Resp 18   Wt 70.9 kg (156 lb 4.9 oz)   SpO2 98%   BMI 23.49 kg/m       GENERAL APPEARANCE: Minimally interactive on interview, in no distress.   EYES:  No scleral icterus, pupils equal  PULM: lungs clear to auscultation, equal air movement, no cyanosis or clubbing  CV: regular rhythm, normal rate, no rub     -JVP not elevated     -edema none  GI: soft, non-tender, non-distended  MS: no evidence of inflammation in joints, no muscle tenderness  NEURO: Lethargic, minimally interactive.     Labs:   All labs reviewed by me  Electrolytes/Renal - Recent Labs   Lab Test 02/22/22  1245 02/22/22  0916 02/22/22  0323 02/21/22  1210 02/21/22  0954 02/20/22  0850 02/20/22  0707 02/18/22  1835 02/18/22  0959   NA  --   --  138  --  139  --  138   < > 134   POTASSIUM  --   --  3.4  --  3.8  --  4.6   < > 4.7   CHLORIDE  --   --  106  --  108  --  104   < > 101   CO2  --   --  25  --  26  --  23   < > 26   BUN  --   --  48*  --   65*  --  72*   < > 62*   CR  --   --  1.56*  --  2.23*  --  2.95*   < > 2.25*   * 170* 157*   < > 151*   < > 141*   < > 323*   VALERIE  --   --  9.5  --  9.0  --  9.4   < > 9.5   MAG  --   --  1.9  --  2.1  --   --   --  2.4*   PHOS  --   --  2.6  --  4.2  --   --   --  4.1    < > = values in this interval not displayed.       CBC -   Recent Labs   Lab Test 02/22/22 0323 02/21/22  0954 02/20/22  0707   WBC 8.0 6.7 6.0   HGB 12.6* 12.1* 12.5*    163 164       LFTs -   Recent Labs   Lab Test 02/22/22 0323 02/21/22  0954 02/20/22  0707   ALKPHOS 65 60 60   BILITOTAL 0.5 0.3 0.5   ALT 22 21 24   AST 22 18 19   PROTTOTAL 7.6 6.8 7.2   ALBUMIN 3.2* 2.9* 3.0*       Iron Panel - No lab results found.        Current Medications:    amLODIPine  5 mg Oral Daily     aspirin  81 mg Oral Daily     atorvastatin  40 mg Oral QPM     Baclofen  2.5 mg Oral TID     carvedilol  25 mg Oral BID w/meals     cloNIDine  0.1 mg Oral TID     divalproex sodium extended-release  1,000 mg Oral Daily     insulin aspart  1-7 Units Subcutaneous TID AC     insulin aspart  1-5 Units Subcutaneous At Bedtime     lidocaine  1 patch Transdermal Q24H    And     lidocaine   Transdermal Q8H     LORazepam  1-2 mg Intravenous Once     melatonin  3 mg Oral Daily     risperiDONE  0.5 mg Sublingual BID     sodium chloride (PF)  3 mL Intracatheter Q8H       lactated ringers 100 mL/hr at 02/22/22 1110

## 2022-02-22 NOTE — PROGRESS NOTES
"      [facility]    Stroke Progress Note    Interval Events- Patient very agitated overnight. Per RN note, concern for paranoia and visual hallucinations.   - MRI brain unrevealing of any new strokes.   - This AM, patient is alert, fully oriented and appropriate.    HPI Summary  Miriam Hall is a 46 year old male 46 year old male with history of resistant HTN who was initially admitted to [facility] 1/8 to 1/28/2022 with R PCA stroke c/b refractory HTN with concern for secondary HTN further c/b DISHA, new DMII diagnosis, and encephalopathy.      Neurology service was called for stroke code. Patient has been encephalopathic for 3 days. Last well known was 2/17. Primary team is getting him back from ARU and mentions that he is considerably more somnolent and encephalopathic than during their last assesment. Per chart review, he was seen by our stroke team in January for a R occipital infarct and discharged alert and oriented with left sided hemiplegia and hemisensory loss.      At the bedside, the patient was sleepy but aroused to noxious stimulation. Upon waking, he was alert, answered questions, followed commands, and appeared to have only his baseline deficits. Stroke team opted not to pursue further imaging given his exam findings. When asked about his arm and leg weakness: \"is this from your last stroke,\" he answers \"yes.\"     Imaging Findings  CT Head w/o contrast (2/19/2022):  Impression:  Evolving large right PCA territory infarct with increased prominence  of gyriform hyperdensities compatible with cortical laminar necrosis.  No superimposed new acute hemorrhage or acute infarct.     MRI Brain w/o contrast (2/21/2022):  Impression:  Limited evaluation demonstrates continued evolution of right posterior  cerebral territory infarct.     Thrombolytic Treatment   Not given due to low likelihood of stroke.     Endovascular Treatment  Not initiated due to absence of " proximal vessel occlusion.    Stroke Evaluation Summarized    MRI/Head CT CT head 2/19  Evolving large right PCA territory infarct with increased prominence  of gyriform hyperdensities compatible with cortical laminar necrosis.  No superimposed new acute hemorrhage or acute infarct.     MRI Brain (2/21/2022)  Impression: Limited evaluation demonstrates continued evolution of R PCA territory infarct.   Intracranial Vasculature CTA 1/12/22  1.  No significant change compared with 01/08/2022.  2.  Occlusion of the proximal right posterior cerebral artery at the P1/P2 junction.  3.  Moderate to severe nonflow limiting stenosis at the origin of the posterior right M2 branch vessel of the middle cerebral artery   Cervical Vasculature CTA 1/12/2022  1.  Normal neck CTA.     CARISA 1/8/22 Global and regional left ventricular function is normal with an EF of 55-60%.  Severe concentric left ventricular hypertrophy is present.  Global right ventricular function is normal.  The atrial septum is intact as assessed by color Doppler and agitated saline  bubble study .  The left atrial appendage is free of thrombus.  No significant valvular abnormalities present.  No pericardial effusion is present.   EKG/Telemetry Sinus rhythm with 1st degree AV block.    Other Testing      LDL  No lab value available in past 30 days   A1C  No lab value available in past 30 days   Troponin 2/22/2022: 102 ng/L (H)       Impression   # Toxic Metabolic Encephalopathy  # Mood disorder  This is a 45yo man with a pmh of HTN, CKD and R occipital infarct that occurred recently in January. He had initially presented with L sided weakness and L homonymous hemianopia, after which he was found to have a R PCA occlusion with an established infarct in the associated territory. He was not an intervention candidate at that time. Etiology of his stroke was thought to be ESUS versus large vessel atherosclerosis.     On 2/19/22, patient returned to the hospital from  his TCU for evaluation of AMS. Stroke code was activated upon his arrival although his exam by the stroke team was noted to be similar to that noted at the time of his previous discharge. CT head was obtained and demonstrated evolution of his known R PCA infarct. No acute interventions were employed as the patient had contraindications to both thrombolytics and endovascular treatment.     He continues to have episodes of agitation with paranoia, as per social work note prior to transfer he was agitated and paranoid. He expressed psychosocial stress and baseline anxiety that is worsened in the setting of his brothers death 2/2 infection in 2017 and his mother being in hospital for fall. He expressed frustration of not being able to leave the hospital. His R PCA strokes is unlikely to be the cause of his AMS considering he had improvement for a month and now has worsened in the past few days. Ammonia level wnl and have low concern for valproate induced hyperammonemia. However, subtherapeutic VPA level may lead to mood changes and checking this level would be appropriate at this time.     Given the findings of his MRI brain, new stroke can be definitively ruled out at this time.     Recommendations  - No further stroke work-up indicated at this time  - Continue to workup for toxic metabolic causes of encephalopathy  - Delirium precautions  - Continue PTA ASA 81 mg daily  - Continue PTA atorvastatin 40 mg daily  - Target BP normotension  - Normoglycemia     Thank you for this consult. No further stroke evaluation is recommended, so we will sign off. Please contact us with any additional questions.     The patient was discussed with Stroke Staff is Dr. Blandon.     Jose Puente MD  Neurology Resident  ASCOM: [id number]  ______________________________________________________    Clinically Significant Risk Factors Present on Admission                 Medications   Scheduled Meds    aspirin  81 mg Oral Daily     atorvastatin  40  mg Oral QPM     Baclofen  2.5 mg Oral TID     carvedilol  25 mg Oral BID w/meals     cloNIDine  0.1 mg Oral TID     divalproex sodium extended-release  1,000 mg Oral Daily     insulin aspart  1-7 Units Subcutaneous TID AC     insulin aspart  1-5 Units Subcutaneous At Bedtime     lidocaine  1 patch Transdermal Q24H    And     lidocaine   Transdermal Q8H     melatonin  3 mg Oral Daily     risperiDONE  0.5 mg Sublingual BID     sodium chloride (PF)  3 mL Intracatheter Q8H       Infusion Meds    lactated ringers Stopped (02/21/22 6239)       PRN Meds  acetaminophen, glucose **OR** dextrose **OR** glucagon, diclofenac, hydrALAZINE, lidocaine 4%, lidocaine (buffered or not buffered), ondansetron **OR** ondansetron, senna-docusate **OR** senna-docusate, sodium chloride (PF)       PHYSICAL EXAMINATION  Temp:  [98.4  F (36.9  C)-98.9  F (37.2  C)] 98.8  F (37.1  C)  Pulse:  [70-87] 87  Resp:  [16] 16  BP: (120-197)/() 183/93  SpO2:  [96 %-100 %] 100 %      Mental Status:Awake, alert, attentive and oriented, follows commands, speech with intermittent dysarthria but mostly fluent  Cranial Nerves:  PERRL, EOMI with normal smooth pursuit, Neglects left side but can cross midline with encouragement for full lateral gaze, mild facial droop on left lower side, hearing not formally tested but intact to conversation, tongue protrusion midline  Motor:  no abnormal movements, normal muscle bulk, able to move right limbs spontaneously, strength 5/5 throughout upper and lower extremities on right side; 1/5 strength of upper and lower extremities on left side (can flex fingers & move proximal thigh)  Sensory:  intact to light touch on right; slightly diminished over the LLE   Coordination:  FNF and HS intact without dysmetria on right only  Station/Gait:  unable to test      Imaging  I personally reviewed all imaging; relevant findings per HPI.     Lab Results Data   CBC  Recent Labs   Lab 02/22/22  0323 02/21/22  0953  02/20/22  0707   WBC 8.0 6.7 6.0   RBC 4.23* 4.00* 4.18*   HGB 12.6* 12.1* 12.5*   HCT 37.6* 35.9* 37.9*    163 164     Basic Metabolic Panel    Recent Labs   Lab 02/22/22  0323 02/21/22  2245 02/21/22  1803 02/21/22  1210 02/21/22  0954 02/20/22  0850 02/20/22  0707     --   --   --  139  --  138   POTASSIUM 3.4  --   --   --  3.8  --  4.6   CHLORIDE 106  --   --   --  108  --  104   CO2 25  --   --   --  26  --  23   BUN 48*  --   --   --  65*  --  72*   CR 1.56*  --   --   --  2.23*  --  2.95*   * 159* 115*   < > 151*   < > 141*   VALERIE 9.5  --   --   --  9.0  --  9.4    < > = values in this interval not displayed.     Liver Panel  Recent Labs   Lab 02/22/22  0323 02/21/22  0954 02/20/22  0707   PROTTOTAL 7.6 6.8 7.2   ALBUMIN 3.2* 2.9* 3.0*   BILITOTAL 0.5 0.3 0.5   ALKPHOS 65 60 60   AST 22 18 19   ALT 22 21 24     INR    Recent Labs   Lab Test 01/18/22  1405 01/08/22  1843 01/08/22  1834   INR 1.06 0.95 1.0*      Lipid Profile    Recent Labs   Lab Test 01/08/22  1843 05/16/14  1506   CHOL 169 131   HDL 70 53   LDL 75 70   TRIG 118 38     A1C    Recent Labs   Lab Test 01/08/22  1843   A1C 7.8*     Troponin I  No results for input(s): TROPONIN, GHTROP in the last 168 hours.

## 2022-02-22 NOTE — PLAN OF CARE
Goal Outcome Evaluation:    Plan of Care Reviewed With: interdisciplinary team     Overall Patient Progress: no change    Outcome Evaluation: Pt's mood labile. Easily agitated. Refused MRI and evening meds. MD assessed at bedside. Pt hypertensive and given prn hydralazine as well as 1x dose of hydralazine. EKG and trop ordered.

## 2022-02-23 ENCOUNTER — APPOINTMENT (OUTPATIENT)
Dept: OCCUPATIONAL THERAPY | Facility: CLINIC | Age: 47
End: 2022-02-23
Attending: STUDENT IN AN ORGANIZED HEALTH CARE EDUCATION/TRAINING PROGRAM
Payer: COMMERCIAL

## 2022-02-23 ENCOUNTER — APPOINTMENT (OUTPATIENT)
Dept: PHYSICAL THERAPY | Facility: CLINIC | Age: 47
End: 2022-02-23
Attending: STUDENT IN AN ORGANIZED HEALTH CARE EDUCATION/TRAINING PROGRAM
Payer: COMMERCIAL

## 2022-02-23 LAB
ALBUMIN SERPL-MCNC: 2.6 G/DL (ref 3.4–5)
ALDOST SERPL-MCNC: 68.6 NG/DL (ref 0–31)
ALP SERPL-CCNC: 57 U/L (ref 40–150)
ALT SERPL W P-5'-P-CCNC: 17 U/L (ref 0–70)
ANION GAP SERPL CALCULATED.3IONS-SCNC: 6 MMOL/L (ref 3–14)
AST SERPL W P-5'-P-CCNC: 22 U/L (ref 0–45)
BACTERIA BLD CULT: NO GROWTH
BACTERIA BLD CULT: NO GROWTH
BILIRUB SERPL-MCNC: 0.5 MG/DL (ref 0.2–1.3)
BUN SERPL-MCNC: 38 MG/DL (ref 7–30)
CALCIUM SERPL-MCNC: 9 MG/DL (ref 8.5–10.1)
CHLORIDE BLD-SCNC: 107 MMOL/L (ref 94–109)
CO2 SERPL-SCNC: 30 MMOL/L (ref 20–32)
CREAT SERPL-MCNC: 1.51 MG/DL (ref 0.66–1.25)
CRP SERPL-MCNC: 16 MG/L (ref 0–8)
ERYTHROCYTE [DISTWIDTH] IN BLOOD BY AUTOMATED COUNT: 13.5 % (ref 10–15)
ERYTHROCYTE [SEDIMENTATION RATE] IN BLOOD BY WESTERGREN METHOD: 30 MM/HR (ref 0–15)
GFR SERPL CREATININE-BSD FRML MDRD: 57 ML/MIN/1.73M2
GLUCOSE BLD-MCNC: 116 MG/DL (ref 70–99)
GLUCOSE BLDC GLUCOMTR-MCNC: 126 MG/DL (ref 70–99)
GLUCOSE BLDC GLUCOMTR-MCNC: 145 MG/DL (ref 70–99)
GLUCOSE BLDC GLUCOMTR-MCNC: 160 MG/DL (ref 70–99)
GLUCOSE BLDC GLUCOMTR-MCNC: 92 MG/DL (ref 70–99)
HCT VFR BLD AUTO: 34.5 % (ref 40–53)
HGB BLD-MCNC: 11.4 G/DL (ref 13.3–17.7)
MAGNESIUM SERPL-MCNC: 2.1 MG/DL (ref 1.6–2.3)
MCH RBC QN AUTO: 30.1 PG (ref 26.5–33)
MCHC RBC AUTO-ENTMCNC: 33 G/DL (ref 31.5–36.5)
MCV RBC AUTO: 91 FL (ref 78–100)
PHOSPHATE SERPL-MCNC: 3.9 MG/DL (ref 2.5–4.5)
PLATELET # BLD AUTO: 168 10E3/UL (ref 150–450)
POTASSIUM BLD-SCNC: 3.3 MMOL/L (ref 3.4–5.3)
PROT SERPL-MCNC: 6.3 G/DL (ref 6.8–8.8)
RBC # BLD AUTO: 3.79 10E6/UL (ref 4.4–5.9)
SODIUM SERPL-SCNC: 143 MMOL/L (ref 133–144)
VALPROATE FREE MFR SERPL: 30 %
VALPROATE FREE SERPL-MCNC: 11 UG/ML
VALPROATE SERPL-MCNC: 36 UG/ML
WBC # BLD AUTO: 6.1 10E3/UL (ref 4–11)

## 2022-02-23 PROCEDURE — 85027 COMPLETE CBC AUTOMATED: CPT | Performed by: INTERNAL MEDICINE

## 2022-02-23 PROCEDURE — 120N000002 HC R&B MED SURG/OB UMMC

## 2022-02-23 PROCEDURE — 36415 COLL VENOUS BLD VENIPUNCTURE: CPT | Performed by: STUDENT IN AN ORGANIZED HEALTH CARE EDUCATION/TRAINING PROGRAM

## 2022-02-23 PROCEDURE — 250N000013 HC RX MED GY IP 250 OP 250 PS 637: Performed by: INTERNAL MEDICINE

## 2022-02-23 PROCEDURE — 97535 SELF CARE MNGMENT TRAINING: CPT | Mod: GO | Performed by: OCCUPATIONAL THERAPIST

## 2022-02-23 PROCEDURE — 97530 THERAPEUTIC ACTIVITIES: CPT | Mod: GP

## 2022-02-23 PROCEDURE — 250N000013 HC RX MED GY IP 250 OP 250 PS 637

## 2022-02-23 PROCEDURE — 250N000013 HC RX MED GY IP 250 OP 250 PS 637: Performed by: STUDENT IN AN ORGANIZED HEALTH CARE EDUCATION/TRAINING PROGRAM

## 2022-02-23 PROCEDURE — 84100 ASSAY OF PHOSPHORUS: CPT | Performed by: STUDENT IN AN ORGANIZED HEALTH CARE EDUCATION/TRAINING PROGRAM

## 2022-02-23 PROCEDURE — 82040 ASSAY OF SERUM ALBUMIN: CPT | Performed by: INTERNAL MEDICINE

## 2022-02-23 PROCEDURE — 99233 SBSQ HOSP IP/OBS HIGH 50: CPT | Performed by: STUDENT IN AN ORGANIZED HEALTH CARE EDUCATION/TRAINING PROGRAM

## 2022-02-23 PROCEDURE — 83735 ASSAY OF MAGNESIUM: CPT | Performed by: STUDENT IN AN ORGANIZED HEALTH CARE EDUCATION/TRAINING PROGRAM

## 2022-02-23 PROCEDURE — 250N000013 HC RX MED GY IP 250 OP 250 PS 637: Performed by: PHYSICIAN ASSISTANT

## 2022-02-23 PROCEDURE — 85652 RBC SED RATE AUTOMATED: CPT | Performed by: STUDENT IN AN ORGANIZED HEALTH CARE EDUCATION/TRAINING PROGRAM

## 2022-02-23 PROCEDURE — 250N000013 HC RX MED GY IP 250 OP 250 PS 637: Performed by: PSYCHIATRY & NEUROLOGY

## 2022-02-23 PROCEDURE — 80053 COMPREHEN METABOLIC PANEL: CPT | Performed by: INTERNAL MEDICINE

## 2022-02-23 PROCEDURE — 86140 C-REACTIVE PROTEIN: CPT | Performed by: STUDENT IN AN ORGANIZED HEALTH CARE EDUCATION/TRAINING PROGRAM

## 2022-02-23 PROCEDURE — 97116 GAIT TRAINING THERAPY: CPT | Mod: GP

## 2022-02-23 RX ORDER — LORAZEPAM 0.5 MG/1
0.5 TABLET ORAL ONCE
Status: COMPLETED | OUTPATIENT
Start: 2022-02-23 | End: 2022-02-23

## 2022-02-23 RX ORDER — POTASSIUM CHLORIDE 750 MG/1
40 TABLET, EXTENDED RELEASE ORAL ONCE
Status: COMPLETED | OUTPATIENT
Start: 2022-02-23 | End: 2022-02-23

## 2022-02-23 RX ORDER — BACLOFEN 10 MG/1
2.5 TABLET ORAL 2 TIMES DAILY
Status: DISCONTINUED | OUTPATIENT
Start: 2022-02-23 | End: 2022-02-24

## 2022-02-23 RX ADMIN — Medication 3 MG: at 20:59

## 2022-02-23 RX ADMIN — RISPERIDONE 0.5 MG: 0.5 TABLET, ORALLY DISINTEGRATING ORAL at 20:59

## 2022-02-23 RX ADMIN — SITAGLIPTIN 50 MG: 50 TABLET, FILM COATED ORAL at 17:59

## 2022-02-23 RX ADMIN — Medication 2.5 MG: at 20:59

## 2022-02-23 RX ADMIN — CARVEDILOL 25 MG: 25 TABLET, FILM COATED ORAL at 17:59

## 2022-02-23 RX ADMIN — ASPIRIN 81 MG: 81 TABLET, CHEWABLE ORAL at 10:25

## 2022-02-23 RX ADMIN — Medication 2.5 MG: at 10:25

## 2022-02-23 RX ADMIN — DIVALPROEX SODIUM 1000 MG: 500 TABLET, FILM COATED, EXTENDED RELEASE ORAL at 10:24

## 2022-02-23 RX ADMIN — CARVEDILOL 25 MG: 25 TABLET, FILM COATED ORAL at 11:06

## 2022-02-23 RX ADMIN — RISPERIDONE 0.5 MG: 0.5 TABLET, ORALLY DISINTEGRATING ORAL at 10:25

## 2022-02-23 RX ADMIN — CLONIDINE HYDROCHLORIDE 0.1 MG: 0.1 TABLET ORAL at 16:26

## 2022-02-23 RX ADMIN — ATORVASTATIN CALCIUM 40 MG: 40 TABLET, FILM COATED ORAL at 20:59

## 2022-02-23 RX ADMIN — CLONIDINE HYDROCHLORIDE 0.1 MG: 0.1 TABLET ORAL at 21:00

## 2022-02-23 RX ADMIN — DICLOFENAC SODIUM 2 G: 10 GEL TOPICAL at 10:24

## 2022-02-23 RX ADMIN — POTASSIUM CHLORIDE 40 MEQ: 750 TABLET, EXTENDED RELEASE ORAL at 11:06

## 2022-02-23 RX ADMIN — AMLODIPINE BESYLATE 5 MG: 5 TABLET ORAL at 10:25

## 2022-02-23 RX ADMIN — ACETAMINOPHEN 650 MG: 325 TABLET, FILM COATED ORAL at 09:24

## 2022-02-23 RX ADMIN — LORAZEPAM 0.5 MG: 0.5 TABLET ORAL at 17:59

## 2022-02-23 RX ADMIN — CLONIDINE HYDROCHLORIDE 0.1 MG: 0.1 TABLET ORAL at 10:25

## 2022-02-23 RX ADMIN — ACETAMINOPHEN 650 MG: 325 TABLET, FILM COATED ORAL at 00:33

## 2022-02-23 ASSESSMENT — ACTIVITIES OF DAILY LIVING (ADL)
ADLS_ACUITY_SCORE: 21
ADLS_ACUITY_SCORE: 23
ADLS_ACUITY_SCORE: 21
ADLS_ACUITY_SCORE: 23
ADLS_ACUITY_SCORE: 21
ADLS_ACUITY_SCORE: 23
ADLS_ACUITY_SCORE: 21
ADLS_ACUITY_SCORE: 23
ADLS_ACUITY_SCORE: 21
ADLS_ACUITY_SCORE: 21
ADLS_ACUITY_SCORE: 23
ADLS_ACUITY_SCORE: 23

## 2022-02-23 NOTE — PROGRESS NOTES
Brief Nephrology Note        Mr Rafael's Cr is again stable/slightly improved, no electrolyte issues.  Last few BP's are normotensive on amlodipine 5mg, Coreg 25mg and clonidine 0.1mg.  Has not needed PRN hydralazine.  Considered recommending some gentle IVF but he was eating and drinking on my visit and endorses reasonable intake/appetite.  Plan continues to be following up in HTN clinic, can schedule in genetics clinic for possible Liddle's syndrome.     Andrea Oliveira, MARBELLA CNS  Clinical Nurse Specialist  [phone]

## 2022-02-23 NOTE — PLAN OF CARE
Goal Outcome Evaluation:    Plan of Care Reviewed With: friend     Overall Patient Progress: no change    Outcome Evaluation: Pt lethargic and slept majority of shift. Neuros unchanged.    Time: 6591-6885    Reason for admission: CVA, DISHA  Activity: A2/GB  Diet: reg thin liq  Pain: L hip pain  Respiratory: lung sounds clear/equal bilaterally  Cardiovascular: WDL  GI: no bm overnight  : WDL  Skin: intact  Neurologic: lethargic, disoriented to situation, calm/cooperative, L sided hemiparesis  Lines: R PIV SL    New changes this shift: VSS ex HTN on room air, up to bedside commode x1 overnight, ensures ordered to supplement meals d/t poor appetite, writer spoke with visitor about hospital course     Plan: continue to monitor mentation and behavior, discharge to [facility] pending hospital course progression

## 2022-02-23 NOTE — PROGRESS NOTES
Brief Medicine Note:    Cross cover notified patient agitated, swinging call lights and ripping cords. Unable to give zyprexa due to significant obtundation with prior dose.     - Trial PO ativan 5 mg x 1    Iva Gamble PA-C  Internal Medicine [facility] Hospitalist  [facility]

## 2022-02-23 NOTE — PLAN OF CARE
VSS on RA.  Mostly slept after returning from having MRI done.  .  PIV SL.  Denies pain and nausea.  Incontinent of urine  x1.  Continue plan of care.

## 2022-02-23 NOTE — PROGRESS NOTES
[facility]    Medicine Progress Note - Hospitalist Service, GOLD TEAM 7    Date of Admission:  2/19/2022    Assessment & Plan          Miriam Hall is a 46 year old male with history of severe resistant HTN and longstanding DMII who was initially admitted to [facility] 1/8 - 1/28/2022 with R PCA stroke c/b refractory HTN and DISHA, and encephalopathy. Stabilized and transferred to ARU 1/28/2022 for ongoing rehabilitation. Patient developed new AMS, agitation, paranoia and worsening DISHA 2/18 prompting transfer to [place].     Interval Changes:  -Discussed with neurology and favor behavior changes represent improvement since stroke with resolving aphasia and MRA findings are stable from previous. Lower concern for autoimmune encephalitis or paraneoplastic syndrome cause behavior changes  -Follow up in hypertension clinic ordered  -Sitagliptan 50 mg for diabetes  -Decrease baclofen to 2.5 mg BID, caution with resolving DISHA     Agitation  Paranoia and personality change  Toxic metabolic encephalopathy  Pt developed new waxing and waning confusion, agitation and paranoia starting 2/18, worsened 2/19 prompting transfer from ARU to [facility]. Received 10 mg olanzapine and patient became obtunded prompting stroke code, repeat CT with known and evolving stroke without new CVA or hemorrhage.   -Unclear etiology of decompensation, possible personality change as effect of stroke vs medication effect, less likely sepsis as afebrile, HDS, normal WBC, negative UA, CXR.   - Head CT: evolving stroke   - UA negative, CXR with streaky opacities c/w atelectasis  - Spot EEG, prolactin elevated  -MRI (2/21) - continued evolution of stroke  -MRA Head (2/22) - lack fo flow in right posterior cerebral artery and high grade narrowing of right MCA posterior division. Per neurology similar to previous study  -DDx - favor behavior changes likely from resolving stroke and improvement vs paraneoplastic or  autoimmune encephalitis vs baclofen toxicity   Plan:  -Neurology consulted   -Psych consulted, risperdal 0.5 mg BID  => Please step out and give patient time to calm down if he is feeling agitated or anxious. He is usually redirectable and consolable.     DISHA on CKD, improving  Resistant HTN  Patient has long hx of resistant HTN while on multidrug regimen. Recent DISHA and new BL Cr around 1.5-1.7, was 2.2 on transfer and leno to 2.9 on 2/20 in the setting of ACEi use, now improving. Discharged from TCU on amiloride, Coreg, clonidine, lisinopril, and scheduled and prn hydralazine.    -ON admission blood pressure was on low normal  Plan:  -carvedilol to 25 BID, cont clonidine   -Start amlodipine 5 mg   - Hold ACEi and diuretics, amiloride  - Nephrology consulted, appreciate rec's   -Follow up in hypertension clinic (ordered)    Hypopnea secondary to olanzapine - resolved.   Patient was noted to have low RR (as low as 6) and long pauses of respiration prompting RRTs and stoke code following 10 IM Zyprexa. Normal oxygenation and normal gases. Avoid zyprexa    Concern for Seizures:   In the setting of CVA. EEG 1/12 concerning for subclinical seizures. Loaded with Keppra, transitioned to Depakote. No e/o seizure noted in the setting of AMS  - Continue Depakote and seizure precautions    R PCA stroke  Presented with L hemiplegia. Head CT 1/8 R parieto-occipital infarct. CTA noted occlusion on proximal R PCA and high grade stenosis of M2 branch of R MCA. Outside window for tPA. Repeat imagine 1/12 with hemorrhagic transformation. Source 2/2 high grade atherosclerosis vs ESUS. CARISA no LA thrombus. Residual deficits include L hemiplegia, L hemianopsia, mild dysarthria.   -Acute AMS 2/19, repeat CT negative for new findings   Plan:  - Continue ASA, statin   - Follow pending Ziopatch results      Troponin elevation  -elevated to 120s on 2/22 in setting of uncontrolled hypertension and CKD. Decreased from 2 weeks prior to  admission, likely elevated from CKD and recent ischemic insult. ECG with no ischemic changes  Plan:  >Stop monitoring, repeat ECG      Dysphagia:   Noted on admission with thin liquids. Previously on regular diet  - SLP consulted, ok with regular diet with 1:1      DMII:   A1c 7.8. PTA metformin on hold due to DISHA. Glucose stable  - MSSI, hypoglycemia protocol  -Start sitagliptan, if no insulin use over 24-48 hours will stop finger stick checks       Diet: Regular Diet Adult Thin Liquids (level 0)  Snacks/Supplements Adult: Ensure Enlive; Between Meals    DVT Prophylaxis: Pneumatic Compression Devices  Mendoza Catheter: Not present  Central Lines: None  Cardiac Monitoring: None  Code Status: Full Code      Disposition Plan   Expected Discharge: 02/25/2022     Anticipated discharge location:  Awaiting care coordination huddle  Delays:          The patient's care was discussed with the Bedside Nurse, Care Coordinator/ and Patient.    Christiano Tapia MD  Hospitalist Service, [address]  Securely message with the Vocera Web Console (learn more here)  Text page via [facility] Paging/Directory   Please see signed in provider for up to date coverage information      Clinically Significant Risk Factors Present on Admission             # Diabetes, type II: last A1C 7.8 % (Ref range: 0.0 - 5.6 %)      ______________________________________________________________________    Interval History   Patient notes somnolence this morning. He denies fever or chills. He had some chest tightness this mornign which improved. He denies shortness of breath. No nausea or vomiting. No abdominal pain. He states he is eating well.     Data reviewed today: I reviewed all medications, new labs and imaging results over the last 24 hours. I personally reviewed no images or EKG's today.    Physical Exam   Vital Signs: Temp: 99.1  F (37.3  C) Temp src: Axillary BP: 115/74 Pulse: 85    Resp: 16 SpO2: 96 % O2 Device: None (Room air)    Weight: 155 lbs 6.79 oz  Constitutional: alert, oriented, no acute distress  Eyes: no icterus, pupils equal and reactive to light bilaterally, no nystagmus, but left visual field cut  Respiratory: CTAB, no wheezing or respiratory distress  Cardiovascular: RRR, normal s1, s2, s4 present,  no rub or murmur  GI: soft, non-tender, non-distended, bowel sounds present   Musculoskeletal: left arm flexion and extension 1/5 compared to right  Neurologic: decreased sensation on left  Upper extremity     Data   Recent Labs   Lab 02/23/22  0207 02/22/22  2137 02/22/22  1843 02/22/22  0916 02/22/22  0323 02/21/22  1210 02/21/22  0954 02/20/22  0850 02/20/22  0707   WBC  --   --   --   --  8.0  --  6.7  --  6.0   HGB  --   --   --   --  12.6*  --  12.1*  --  12.5*   MCV  --   --   --   --  89  --  90  --  91   PLT  --   --   --   --  185  --  163  --  164   NA  --   --   --   --  138  --  139  --  138   POTASSIUM  --   --   --   --  3.4  --  3.8  --  4.6   CHLORIDE  --   --   --   --  106  --  108  --  104   CO2  --   --   --   --  25  --  26  --  23   BUN  --   --   --   --  48*  --  65*  --  72*   CR  --   --   --   --  1.56*  --  2.23*  --  2.95*   ANIONGAP  --   --   --   --  7  --  5  --  11   VALERIE  --   --   --   --  9.5  --  9.0  --  9.4   GLC 92 196* 100*   < > 157*   < > 151*   < > 141*   ALBUMIN  --   --   --   --  3.2*  --  2.9*  --  3.0*   PROTTOTAL  --   --   --   --  7.6  --  6.8  --  7.2   BILITOTAL  --   --   --   --  0.5  --  0.3  --  0.5   ALKPHOS  --   --   --   --  65  --  60  --  60   ALT  --   --   --   --  22  --  21  --  24   AST  --   --   --   --  22  --  18  --  19    < > = values in this interval not displayed.

## 2022-02-24 ENCOUNTER — APPOINTMENT (OUTPATIENT)
Dept: SPEECH THERAPY | Facility: CLINIC | Age: 47
End: 2022-02-24
Attending: STUDENT IN AN ORGANIZED HEALTH CARE EDUCATION/TRAINING PROGRAM
Payer: COMMERCIAL

## 2022-02-24 ENCOUNTER — APPOINTMENT (OUTPATIENT)
Dept: PHYSICAL THERAPY | Facility: CLINIC | Age: 47
End: 2022-02-24
Attending: STUDENT IN AN ORGANIZED HEALTH CARE EDUCATION/TRAINING PROGRAM
Payer: COMMERCIAL

## 2022-02-24 ENCOUNTER — APPOINTMENT (OUTPATIENT)
Dept: OCCUPATIONAL THERAPY | Facility: CLINIC | Age: 47
End: 2022-02-24
Attending: STUDENT IN AN ORGANIZED HEALTH CARE EDUCATION/TRAINING PROGRAM
Payer: COMMERCIAL

## 2022-02-24 LAB
ALBUMIN SERPL-MCNC: 3.3 G/DL (ref 3.4–5)
ALBUMIN UR-MCNC: 20 MG/DL
ALP SERPL-CCNC: 66 U/L (ref 40–150)
ALT SERPL W P-5'-P-CCNC: 19 U/L (ref 0–70)
ANION GAP SERPL CALCULATED.3IONS-SCNC: 7 MMOL/L (ref 3–14)
APPEARANCE UR: CLEAR
AST SERPL W P-5'-P-CCNC: 24 U/L (ref 0–45)
BILIRUB SERPL-MCNC: 0.4 MG/DL (ref 0.2–1.3)
BILIRUB UR QL STRIP: NEGATIVE
BUN SERPL-MCNC: 40 MG/DL (ref 7–30)
CALCIUM SERPL-MCNC: 9.6 MG/DL (ref 8.5–10.1)
CHLORIDE BLD-SCNC: 107 MMOL/L (ref 94–109)
CO2 SERPL-SCNC: 31 MMOL/L (ref 20–32)
COLOR UR AUTO: ABNORMAL
CREAT SERPL-MCNC: 1.54 MG/DL (ref 0.66–1.25)
ERYTHROCYTE [DISTWIDTH] IN BLOOD BY AUTOMATED COUNT: 13.7 % (ref 10–15)
GFR SERPL CREATININE-BSD FRML MDRD: 56 ML/MIN/1.73M2
GLUCOSE BLD-MCNC: 103 MG/DL (ref 70–99)
GLUCOSE BLDC GLUCOMTR-MCNC: 121 MG/DL (ref 70–99)
GLUCOSE BLDC GLUCOMTR-MCNC: 135 MG/DL (ref 70–99)
GLUCOSE BLDC GLUCOMTR-MCNC: 158 MG/DL (ref 70–99)
GLUCOSE BLDC GLUCOMTR-MCNC: 97 MG/DL (ref 70–99)
GLUCOSE UR STRIP-MCNC: 50 MG/DL
HCT VFR BLD AUTO: 39.9 % (ref 40–53)
HGB BLD-MCNC: 13 G/DL (ref 13.3–17.7)
HGB UR QL STRIP: NEGATIVE
HYALINE CASTS: 4 /LPF
KETONES UR STRIP-MCNC: ABNORMAL MG/DL
LEUKOCYTE ESTERASE UR QL STRIP: NEGATIVE
MCH RBC QN AUTO: 30.1 PG (ref 26.5–33)
MCHC RBC AUTO-ENTMCNC: 32.6 G/DL (ref 31.5–36.5)
MCV RBC AUTO: 92 FL (ref 78–100)
MUCOUS THREADS #/AREA URNS LPF: PRESENT /LPF
NITRATE UR QL: NEGATIVE
PH UR STRIP: 5 [PH] (ref 5–7)
PLATELET # BLD AUTO: 199 10E3/UL (ref 150–450)
POTASSIUM BLD-SCNC: 3.2 MMOL/L (ref 3.4–5.3)
PROT SERPL-MCNC: 7.4 G/DL (ref 6.8–8.8)
RBC # BLD AUTO: 4.32 10E6/UL (ref 4.4–5.9)
RBC URINE: <1 /HPF
SODIUM SERPL-SCNC: 145 MMOL/L (ref 133–144)
SP GR UR STRIP: 1.02 (ref 1–1.03)
SQUAMOUS EPITHELIAL: <1 /HPF
TRANSITIONAL EPI: <1 /HPF
UROBILINOGEN UR STRIP-MCNC: NORMAL MG/DL
WBC # BLD AUTO: 7.8 10E3/UL (ref 4–11)
WBC URINE: 1 /HPF

## 2022-02-24 PROCEDURE — 97530 THERAPEUTIC ACTIVITIES: CPT | Mod: GO

## 2022-02-24 PROCEDURE — 250N000013 HC RX MED GY IP 250 OP 250 PS 637: Performed by: STUDENT IN AN ORGANIZED HEALTH CARE EDUCATION/TRAINING PROGRAM

## 2022-02-24 PROCEDURE — 250N000013 HC RX MED GY IP 250 OP 250 PS 637: Performed by: PHYSICIAN ASSISTANT

## 2022-02-24 PROCEDURE — 85027 COMPLETE CBC AUTOMATED: CPT | Performed by: INTERNAL MEDICINE

## 2022-02-24 PROCEDURE — 99233 SBSQ HOSP IP/OBS HIGH 50: CPT | Performed by: STUDENT IN AN ORGANIZED HEALTH CARE EDUCATION/TRAINING PROGRAM

## 2022-02-24 PROCEDURE — 97530 THERAPEUTIC ACTIVITIES: CPT | Mod: GP

## 2022-02-24 PROCEDURE — 81001 URINALYSIS AUTO W/SCOPE: CPT | Performed by: CLINICAL NURSE SPECIALIST

## 2022-02-24 PROCEDURE — 250N000013 HC RX MED GY IP 250 OP 250 PS 637: Performed by: INTERNAL MEDICINE

## 2022-02-24 PROCEDURE — 36415 COLL VENOUS BLD VENIPUNCTURE: CPT | Performed by: INTERNAL MEDICINE

## 2022-02-24 PROCEDURE — 120N000002 HC R&B MED SURG/OB UMMC

## 2022-02-24 PROCEDURE — 97535 SELF CARE MNGMENT TRAINING: CPT | Mod: GO

## 2022-02-24 PROCEDURE — 80053 COMPREHEN METABOLIC PANEL: CPT | Performed by: INTERNAL MEDICINE

## 2022-02-24 PROCEDURE — 250N000013 HC RX MED GY IP 250 OP 250 PS 637: Performed by: HOSPITALIST

## 2022-02-24 PROCEDURE — 250N000013 HC RX MED GY IP 250 OP 250 PS 637

## 2022-02-24 PROCEDURE — 92526 ORAL FUNCTION THERAPY: CPT | Mod: GN

## 2022-02-24 RX ORDER — POTASSIUM CHLORIDE 750 MG/1
40 TABLET, EXTENDED RELEASE ORAL ONCE
Status: COMPLETED | OUTPATIENT
Start: 2022-02-24 | End: 2022-02-24

## 2022-02-24 RX ORDER — LORAZEPAM 0.5 MG/1
0.5 TABLET ORAL 2 TIMES DAILY PRN
Status: DISCONTINUED | OUTPATIENT
Start: 2022-02-24 | End: 2022-03-09 | Stop reason: HOSPADM

## 2022-02-24 RX ORDER — BACLOFEN 10 MG/1
2.5 TABLET ORAL DAILY
Status: DISCONTINUED | OUTPATIENT
Start: 2022-02-25 | End: 2022-03-09 | Stop reason: HOSPADM

## 2022-02-24 RX ADMIN — CLONIDINE HYDROCHLORIDE 0.1 MG: 0.1 TABLET ORAL at 08:20

## 2022-02-24 RX ADMIN — ATORVASTATIN CALCIUM 40 MG: 40 TABLET, FILM COATED ORAL at 19:23

## 2022-02-24 RX ADMIN — SITAGLIPTIN 50 MG: 50 TABLET, FILM COATED ORAL at 08:21

## 2022-02-24 RX ADMIN — CARVEDILOL 25 MG: 25 TABLET, FILM COATED ORAL at 18:48

## 2022-02-24 RX ADMIN — CLONIDINE HYDROCHLORIDE 0.1 MG: 0.1 TABLET ORAL at 19:23

## 2022-02-24 RX ADMIN — RISPERIDONE 0.5 MG: 0.5 TABLET, ORALLY DISINTEGRATING ORAL at 08:20

## 2022-02-24 RX ADMIN — DIVALPROEX SODIUM 1000 MG: 500 TABLET, FILM COATED, EXTENDED RELEASE ORAL at 08:21

## 2022-02-24 RX ADMIN — Medication 2.5 MG: at 08:20

## 2022-02-24 RX ADMIN — RISPERIDONE 0.5 MG: 0.5 TABLET, ORALLY DISINTEGRATING ORAL at 19:24

## 2022-02-24 RX ADMIN — CLONIDINE HYDROCHLORIDE 0.1 MG: 0.1 TABLET ORAL at 14:33

## 2022-02-24 RX ADMIN — ACETAMINOPHEN 650 MG: 325 TABLET, FILM COATED ORAL at 14:55

## 2022-02-24 RX ADMIN — POTASSIUM CHLORIDE 40 MEQ: 750 TABLET, EXTENDED RELEASE ORAL at 08:21

## 2022-02-24 RX ADMIN — AMLODIPINE BESYLATE 5 MG: 5 TABLET ORAL at 08:20

## 2022-02-24 RX ADMIN — ASPIRIN 81 MG: 81 TABLET, CHEWABLE ORAL at 08:20

## 2022-02-24 RX ADMIN — ACETAMINOPHEN 650 MG: 325 TABLET, FILM COATED ORAL at 08:19

## 2022-02-24 RX ADMIN — SENNOSIDES AND DOCUSATE SODIUM 1 TABLET: 8.6; 5 TABLET ORAL at 19:24

## 2022-02-24 RX ADMIN — LIDOCAINE 1 PATCH: 560 PATCH PERCUTANEOUS; TOPICAL; TRANSDERMAL at 08:22

## 2022-02-24 RX ADMIN — CARVEDILOL 25 MG: 25 TABLET, FILM COATED ORAL at 08:20

## 2022-02-24 ASSESSMENT — ACTIVITIES OF DAILY LIVING (ADL)
ADLS_ACUITY_SCORE: 23

## 2022-02-24 NOTE — PLAN OF CARE
Goal Outcome Evaluation:    Plan of Care Reviewed With: patient     Overall Patient Progress: improving    Outcome Evaluation: Mental status/mood. Worked with physical therapy, achieved transfer to wheelchair with an assist of two, assistive device, and gait belt. Plan to discharge d/c back to TCU vs ARU when bed available. Tearful this evening, RN offered support.

## 2022-02-24 NOTE — PLAN OF CARE
Goal Outcome Evaluation:    Plan of Care Reviewed With: patient     Overall Patient Progress: no change    Outcome Evaluation: Labile mood. Extremely aggitated and paranoid this shift. RN attempted to provide support and active listening but pt continued to yell and become destructive to hospital property. Pt ripped chair alarm cord in half. Pt given 1x dose of ativan this evening.  and 126 today. Insulin given per MAR. Fair appetite. LBM 2/22. Voids spontaneously in bedside urinal. Heavy assist x2. Does not call appropriately to make needs known.

## 2022-02-24 NOTE — PROGRESS NOTES
Vital signs:  Temp: 98.5  F (36.9  C) Temp src: Oral BP: (!) 168/94 Pulse: 91   Resp: 18 SpO2: 98 % O2 Device: None (Room air) Oxygen Delivery: 2.5 LPM       Pt alert and oriented X3m not oriented to situation, lethargic, sat in chair at beginning of shift, transferred to bed and slept without disturbance, was calm, cooperative, pleasant, spontaneous voiding, no BM, A2, RPIV SL, reports pain but refused intervention, vitals WDL, Cardiac WDL, on 2.5L O2, satting > 95,     Plan: To be discharged to TCU depending on mentation

## 2022-02-24 NOTE — PLAN OF CARE
Speech Language Therapy Discharge Summary    Reason for therapy discharge:    Inpatient SLP goals met.     Progress towards therapy goal(s). See goals on Care Plan in [facility] electronic health record for goal details.  Goals met  Cog/ling/speech evaluation at next level of care with SLP.     Therapy recommendation(s):    Inpatient SLP goals met - pt is tolerating a regular diet and thin liquids. Cog/ling evaluation at next level of care, no further acute care services required.

## 2022-02-24 NOTE — PROGRESS NOTES
BRIEF NOTE:     [facility] SWer (this writer) has received daily calls from pt friend/emergency contact Taniya since readmitting to [facility] for further w/u. Last call was today at 1pm. This SWer familiar with pt and psychosocial background and followed during entire ARU stay.     Taniya expressing worry about pt and requesting callback from unit SWer. Taniya unsure how and when to tell pt about his mom (see below for more information). Taniya also requesting information on what w/u is being doing and considered. Taniya has questions as far as decision-making and next steps with that process, if needed.     This writer sent message yesterday  to Lexy Marx and requested that someone call Taniya to discuss/update. Heaven informed this writer that MARLENA Rojas covering the unit and message would be forwarded.     While pt was on ARU, SLP was assisting pt with completing HCD/POA during sessions. Unsure if HCD/POA had been filled out all the way, SWer had asked [facility] to assist and follow-up when pt got there. Pt ultimately re-admitted back to [facility] and never went to [facility]. During discussions with pt, pt stated that he would want Jordyn, Deirdre's mom, and Taniya to make medical decisions but that he wanted to discuss it further with Jordyn. Pt's NOK was his mother who recently passed away. Pt DOES NOT KNOW this and Taniya prefers and planning to tell pt once his mentation has improved and when the team feels that pt can process this information more appropriately. Especially since pt has increased paranoia and suspicion of medical treatment/providers since 2022. Pt 1/2 brother (dad's side) Davi [place] and would be the NOK. Taniya is in contact with pt extended family about his mom's  arrangements and plans.     -------------------------------------------------------------------------------------------------------------------------------  ADDENDUM:  Called and spoke with Taniya today at 02/24 at 1:30pm. Confirmed that Taniya has the completed HCD (not notarized) which lists herself as primary, Jordyn as secondary and brother Davi as third medical decision makers. Tnaiya would like more information on if pt can sign in the presence of notary and to make the document official. Taniya scanned/emailed the HCD to this writer, HCD forwarded to [facility] Lexy Bingham. Discussed with Taniya that POA would have to be a notary outside the hospital but she can arrange Taniya stated that although staff has been asking her for collateral information, pt has been signing his own consents (ex: MRI). List of mobile notaries sent to Taniya if needed. Spoke with Floor Lexy Bingham on the phone and provided update and background information.     This writer is requesting that floor SWer and medical team update TATYANA, brother Davi and Taniya. This writer available if questions or needs arise.     Also, a package was delivered for pt today to the ARU unit. ARU HUC set up a  to be delivered to 5A later today.     Rhiannon Shah [facility]   [facility]   Direct Phone: [phone]  I   Pager: [phone]  I  Fax: [phone]

## 2022-02-24 NOTE — PLAN OF CARE
Goal Outcome Evaluation:    Plan of Care Reviewed With: patient     Overall Patient Progress: no change    Outcome Evaluation: Pleasant and cooperative affect.  Alert and oriented.  Occasionally conversation wanders.  Denies pain.  Left sided weakness due to stroke.  Plan to d/c back to TCU vs ARU when bed available.

## 2022-02-24 NOTE — PROGRESS NOTES
Care Management Follow Up    Length of Stay (days): 5    Expected Discharge Date: 02/25/2022     Concerns to be Addressed:       Patient plan of care discussed at interdisciplinary rounds: Yes    Anticipated Discharge Disposition:       Anticipated Discharge Services:    Anticipated Discharge DME:      Patient/family educated on Medicare website which has current facility and service quality ratings:    Education Provided on the Discharge Plan:    Patient/Family in Agreement with the Plan:      Referrals Placed by CM/SW:    Private pay costs discussed: Not applicable    Additional Information:  MARLENA spoke with Rhiannon Shah [facility] SWer and Rhiannon provided significant background information to this writer about pt. Rhiannon reported that Taniya, pt's friend/emergency contact, was requesting connection with [facility] SWer.      Rhiannon reported concern for pt's decision making ability.  This writer paged Dr. Tapia and inquired whether a psych consult might be necessary for pt.  Dr. Tapia reported that he did not see current concern for pt's decision making ability but would be happy to put in an order for a psych consult for pt's decision making tomorrow.  Rhiannon sent this writer, through email, the HCD to get notarized by [employer].  MARLENA followed up with [employer] for pt's HCD document.  Per [employer]-Pt's HCD document may need to be re-done due to handwriting inconsistentcy, Taniya and Jordyn did not have their last names documented, and decisional capacity has been a concern.  Taniya reported that Dr. Tapia will change some meds for pt which may help with pt's condition. MARLENA will continue to follow and help with HCD documents.    This writer called Taniya([phone]), pt's friend, and spoke with her over the phone.  Taniya reported background information about her relationship with pt and also her experience with pt's stay at [facility] and subsequent hospitalization.  Taniya is very supportive and has been arranging pt's  move from his apartment, paying his rent through a gofundme, calling and supporting pt consistently.   Taniya reported that she is in the process of obtaining POA and has been provided resources on mobile notaries. Taniya reported that having POA would help the process in discontinuing services pt no longer needs. Taniya would like updates on pt's condition as pt still does not know of his mother's passing and currently is not in a position to receive that information.      SW offered to introduce self to pt at bedside and Taniya reported being agreeable to that.  SW attempted to go to pt's room but pt was with PT/OT.      SW will continue to follow up with discharge planning and resources.    ML Rojas, SW  Unit [facility]   Office: [phone]   Pager: [phone]  [email or web address]

## 2022-02-24 NOTE — PROGRESS NOTES
Brief Nephrology Note      Mr Rafael's Cr is stable the past 72h at 1.5.  BP's up and down, as high as SBP's in 160's this am, would be permissive with HTN for now as he has hemodynamically sensitive renal fx.  I rechecked UA today and he did have some continued hyaline casts, he is taking PO and has mildly high Na so should correct this with more H2O intake.         Andrea Oliveira, MARBELLA CNS  Clinical Nurse Specialist  [phone]

## 2022-02-24 NOTE — PROGRESS NOTES
[facility]    Medicine Progress Note - Hospitalist Service, GOLD TEAM 7    Date of Admission:  2/19/2022    Assessment & Plan          Miriam Hall is a 46 year old male with history of severe resistant HTN and longstanding DMII who was initially admitted to [facility] 1/8 - 1/28/2022 with R PCA stroke c/b refractory HTN and DISHA, and encephalopathy. Stabilized and transferred to ARU 1/28/2022 for ongoing rehabilitation. Patient developed new AMS, agitation, paranoia and worsening DISHA 2/18 prompting transfer to [place].     Interval Changes:  -Decrease baclofen to 2.5 mg daily  -Ativan 0.5 mg BID prn for outbursts   -Family updated via phone this afternoon. Of note, patients mother has passed and patient has not been informed. Family wishing to delay this until he is out of the hospital given his agitation and paranoia.   -Potassium replacement     Agitation  Paranoia and personality change  Toxic metabolic encephalopathy  Pt developed new waxing and waning confusion, agitation and paranoia starting 2/18, worsened 2/19 prompting transfer from ARU to [facility]. Received 10 mg olanzapine and patient became obtunded prompting stroke code, repeat CT with known and evolving stroke without new CVA or hemorrhage.   -Unclear etiology of decompensation, possible personality change as effect of stroke vs medication effect, less likely sepsis as afebrile, HDS, normal WBC, negative UA, CXR.   - Head CT: evolving stroke   - UA negative, CXR with streaky opacities c/w atelectasis  - Spot EEG, prolactin elevated  -MRI (2/21) - continued evolution of stroke  -MRA Head (2/22) - lack fo flow in right posterior cerebral artery and high grade narrowing of right MCA posterior division. Per neurology similar to previous study  -DDx - favor behavior changes likely from resolving stroke and improvement vs paraneoplastic or autoimmune encephalitis vs baclofen toxicity   Plan:  -Neurology consulted    -Psych consulted, risperdal 0.5 mg BID  -Plan for repeat psych consultation in AM to assist with medication for behavioral moderation on discharge   -Decrease baclofen to 2.5 mg daily given tenous renal function and ability to cause behavior changes in CKD   => Please step out and give patient time to calm down if he is feeling agitated or anxious. He is usually redirectable and consolable.     DISHA on CKD, improving  Resistant HTN  Patient has long hx of resistant HTN while on multidrug regimen. Recent DISHA and new BL Cr around 1.5-1.7, was 2.2 on transfer and leno to 2.9 on 2/20 in the setting of ACEi use, now improving. Discharged from TCU on amiloride, Coreg, clonidine, lisinopril, and scheduled and prn hydralazine.    -ON admission blood pressure was on low normal  Plan:  -carvedilol to 25 BID, cont clonidine   -Start amlodipine 5 mg   - Hold ACEi and diuretics, amiloride  - Nephrology consulted, appreciate rec's   -Follow up in hypertension clinic (ordered)    Hypopnea secondary to olanzapine - resolved.   Patient was noted to have low RR (as low as 6) and long pauses of respiration prompting RRTs and stoke code following 10 IM Zyprexa. Normal oxygenation and normal gases. Avoid zyprexa    Concern for Seizures:   In the setting of CVA. EEG 1/12 concerning for subclinical seizures. Loaded with Keppra, transitioned to Depakote. No e/o seizure noted in the setting of AMS  - Continue Depakote and seizure precautions    R PCA stroke  Presented with L hemiplegia. Head CT 1/8 R parieto-occipital infarct. CTA noted occlusion on proximal R PCA and high grade stenosis of M2 branch of R MCA. Outside window for tPA. Repeat imagine 1/12 with hemorrhagic transformation. Source 2/2 high grade atherosclerosis vs ESUS. CARISA no LA thrombus. Residual deficits include L hemiplegia, L hemianopsia, mild dysarthria.   -Acute AMS 2/19, repeat CT negative for new findings   Plan:  - Continue ASA, statin   - Follow pending Curly  results      Troponin elevation  -elevated to 120s on 2/22 in setting of uncontrolled hypertension and CKD. Decreased from 2 weeks prior to admission, likely elevated from CKD and recent ischemic insult. ECG with no ischemic changes  Plan:  >Stop monitoring     Dysphagia:   Noted on admission with thin liquids. Previously on regular diet  - SLP consulted, ok with regular diet with 1:1      DMII:   A1c 7.8. PTA metformin on hold due to DISHA. Glucose stable  - MSSI, hypoglycemia protocol  -Increase sitagliptan to 100 mg daily , if no insulin usage in 24 hours will stop finger sticks    Hypokalemia - replaced, check daily       Diet: Regular Diet Adult Thin Liquids (level 0)  Snacks/Supplements Adult: Ensure Enlive; Between Meals    DVT Prophylaxis: Pneumatic Compression Devices  Mendoza Catheter: Not present  Central Lines: None  Cardiac Monitoring: None  Code Status: Full Code      Disposition Plan   Expected Discharge: 02/25/2022     Anticipated discharge location:  Awaiting care coordination huddle  Delays:          The patient's care was discussed with the Bedside Nurse, Care Coordinator/, Patient and Patient's Family.    Christiano Tapia MD  Hospitalist Service, [address]  Securely message with the Vocera Web Console (learn more here)  Text page via [facility] Paging/Directory   Please see signed in provider for up to date coverage information      Clinically Significant Risk Factors Present on Admission                    ______________________________________________________________________    Interval History   Patient states he does not remember agitation overnight. This morning he states he feels his agitation is improving overall and he is less anxious. No fever or chills. No chest pain. No shortness of breath. No nausea or vomiting. No abdominal pain.     Data reviewed today: I reviewed all medications, new labs and imaging results over  the last 24 hours. I personally reviewed no images or EKG's today.    Physical Exam   Vital Signs: Temp: 98.5  F (36.9  C) Temp src: Oral BP: (!) 168/94 Pulse: 91   Resp: 18 SpO2: 98 % O2 Device: None (Room air)    Weight: 155 lbs 6.79 oz  Constitutional: alert, oriented, no acute distress  Eyes: no icterus, pupils equal and reactive to light bilaterally, no nystagmus, but left visual field cut  Respiratory: CTAB, no wheezing or respiratory distress  Cardiovascular: RRR, normal s1, s2, s4 present,  no rub or murmur  GI: soft, non-tender, non-distended, bowel sounds present   Musculoskeletal: left arm flexion and extension 1/5 compared to right  Neurologic: decreased sensation on left  Upper extremity     Data   Recent Labs   Lab 02/23/22  2102 02/23/22  1805 02/23/22  1059 02/23/22  0716 02/22/22  0916 02/22/22  0323 02/21/22  1210 02/21/22  0954   WBC  --   --   --  6.1  --  8.0  --  6.7   HGB  --   --   --  11.4*  --  12.6*  --  12.1*   MCV  --   --   --  91  --  89  --  90   PLT  --   --   --  168  --  185  --  163   NA  --   --   --  143  --  138  --  139   POTASSIUM  --   --   --  3.3*  --  3.4  --  3.8   CHLORIDE  --   --   --  107  --  106  --  108   CO2  --   --   --  30  --  25  --  26   BUN  --   --   --  38*  --  48*  --  65*   CR  --   --   --  1.51*  --  1.56*  --  2.23*   ANIONGAP  --   --   --  6  --  7  --  5   VALERIE  --   --   --  9.0  --  9.5  --  9.0   * 126* 145* 116*   < > 157*   < > 151*   ALBUMIN  --   --   --  2.6*  --  3.2*  --  2.9*   PROTTOTAL  --   --   --  6.3*  --  7.6  --  6.8   BILITOTAL  --   --   --  0.5  --  0.5  --  0.3   ALKPHOS  --   --   --  57  --  65  --  60   ALT  --   --   --  17  --  22  --  21   AST  --   --   --  22  --  22  --  18    < > = values in this interval not displayed.

## 2022-02-25 ENCOUNTER — APPOINTMENT (OUTPATIENT)
Dept: PHYSICAL THERAPY | Facility: CLINIC | Age: 47
End: 2022-02-25
Attending: STUDENT IN AN ORGANIZED HEALTH CARE EDUCATION/TRAINING PROGRAM
Payer: COMMERCIAL

## 2022-02-25 LAB
GLUCOSE BLDC GLUCOMTR-MCNC: 113 MG/DL (ref 70–99)
GLUCOSE BLDC GLUCOMTR-MCNC: 123 MG/DL (ref 70–99)
GLUCOSE BLDC GLUCOMTR-MCNC: 130 MG/DL (ref 70–99)
GLUCOSE BLDC GLUCOMTR-MCNC: 131 MG/DL (ref 70–99)
GLUCOSE BLDC GLUCOMTR-MCNC: 146 MG/DL (ref 70–99)
GLUCOSE BLDC GLUCOMTR-MCNC: 147 MG/DL (ref 70–99)

## 2022-02-25 PROCEDURE — 250N000013 HC RX MED GY IP 250 OP 250 PS 637

## 2022-02-25 PROCEDURE — 99232 SBSQ HOSP IP/OBS MODERATE 35: CPT | Performed by: PSYCHIATRY & NEUROLOGY

## 2022-02-25 PROCEDURE — 120N000002 HC R&B MED SURG/OB UMMC

## 2022-02-25 PROCEDURE — 99232 SBSQ HOSP IP/OBS MODERATE 35: CPT | Performed by: STUDENT IN AN ORGANIZED HEALTH CARE EDUCATION/TRAINING PROGRAM

## 2022-02-25 PROCEDURE — 250N000011 HC RX IP 250 OP 636: Performed by: PHYSICIAN ASSISTANT

## 2022-02-25 PROCEDURE — 250N000013 HC RX MED GY IP 250 OP 250 PS 637: Performed by: PHYSICIAN ASSISTANT

## 2022-02-25 PROCEDURE — 97530 THERAPEUTIC ACTIVITIES: CPT | Mod: GP

## 2022-02-25 PROCEDURE — 250N000013 HC RX MED GY IP 250 OP 250 PS 637: Performed by: STUDENT IN AN ORGANIZED HEALTH CARE EDUCATION/TRAINING PROGRAM

## 2022-02-25 PROCEDURE — 250N000013 HC RX MED GY IP 250 OP 250 PS 637: Performed by: PSYCHIATRY & NEUROLOGY

## 2022-02-25 PROCEDURE — 250N000013 HC RX MED GY IP 250 OP 250 PS 637: Performed by: INTERNAL MEDICINE

## 2022-02-25 RX ADMIN — CLONIDINE HYDROCHLORIDE 0.1 MG: 0.1 TABLET ORAL at 15:12

## 2022-02-25 RX ADMIN — ATORVASTATIN CALCIUM 40 MG: 40 TABLET, FILM COATED ORAL at 20:06

## 2022-02-25 RX ADMIN — ASPIRIN 81 MG: 81 TABLET, CHEWABLE ORAL at 09:21

## 2022-02-25 RX ADMIN — AMLODIPINE BESYLATE 5 MG: 5 TABLET ORAL at 09:21

## 2022-02-25 RX ADMIN — CARVEDILOL 25 MG: 25 TABLET, FILM COATED ORAL at 09:21

## 2022-02-25 RX ADMIN — Medication 3 MG: at 20:06

## 2022-02-25 RX ADMIN — CARVEDILOL 25 MG: 25 TABLET, FILM COATED ORAL at 18:23

## 2022-02-25 RX ADMIN — SITAGLIPTIN 100 MG: 100 TABLET, FILM COATED ORAL at 09:20

## 2022-02-25 RX ADMIN — RISPERIDONE 0.5 MG: 0.5 TABLET, ORALLY DISINTEGRATING ORAL at 20:06

## 2022-02-25 RX ADMIN — DIVALPROEX SODIUM 1000 MG: 500 TABLET, FILM COATED, EXTENDED RELEASE ORAL at 09:21

## 2022-02-25 RX ADMIN — ACETAMINOPHEN 650 MG: 325 TABLET, FILM COATED ORAL at 05:47

## 2022-02-25 RX ADMIN — HYDRALAZINE HYDROCHLORIDE 10 MG: 20 INJECTION INTRAMUSCULAR; INTRAVENOUS at 06:30

## 2022-02-25 RX ADMIN — RISPERIDONE 0.5 MG: 0.5 TABLET, ORALLY DISINTEGRATING ORAL at 09:20

## 2022-02-25 RX ADMIN — CLONIDINE HYDROCHLORIDE 0.1 MG: 0.1 TABLET ORAL at 20:06

## 2022-02-25 RX ADMIN — CLONIDINE HYDROCHLORIDE 0.1 MG: 0.1 TABLET ORAL at 09:21

## 2022-02-25 RX ADMIN — Medication 2.5 MG: at 09:21

## 2022-02-25 ASSESSMENT — ACTIVITIES OF DAILY LIVING (ADL)
ADLS_ACUITY_SCORE: 25
ADLS_ACUITY_SCORE: 23
ADLS_ACUITY_SCORE: 23
ADLS_ACUITY_SCORE: 25
ADLS_ACUITY_SCORE: 25
ADLS_ACUITY_SCORE: 23
ADLS_ACUITY_SCORE: 23
ADLS_ACUITY_SCORE: 25
ADLS_ACUITY_SCORE: 21
ADLS_ACUITY_SCORE: 21
ADLS_ACUITY_SCORE: 23
ADLS_ACUITY_SCORE: 23
ADLS_ACUITY_SCORE: 21
ADLS_ACUITY_SCORE: 25
ADLS_ACUITY_SCORE: 25
ADLS_ACUITY_SCORE: 23
ADLS_ACUITY_SCORE: 25
ADLS_ACUITY_SCORE: 25
ADLS_ACUITY_SCORE: 23
ADLS_ACUITY_SCORE: 25

## 2022-02-25 NOTE — PLAN OF CARE
Goal Outcome Evaluation:    Plan of Care Reviewed With: patient     Overall Patient Progress: improving    Outcome Evaluation: Axox4, sad/depressed/flat, psych consult ordered. Patient slept through the night. AM , hydralazine given. Hygiene cares completed this morning, patient appreciative, mood improved after. Awaiting TCU vs ARU placement.

## 2022-02-25 NOTE — PROGRESS NOTES
Care Management Follow Up    Length of Stay (days): 6    Expected Discharge Date: 02/28/2022     Concerns to be Addressed:       Patient plan of care discussed at interdisciplinary rounds: Yes    Anticipated Discharge Disposition:       Anticipated Discharge Services:    Anticipated Discharge DME:      Patient/family educated on Medicare website which has current facility and service quality ratings:    Education Provided on the Discharge Plan:    Patient/Family in Agreement with the Plan:      Referrals Placed by CM/SW:  [facility]    Private pay costs discussed: Not applicable    Additional Information:  MARLENA spoke with Taniya, Pt's friend, over the phone to give support, updates about pt, and also to verify information for HCD directive.     MARLENA spoke with pt at bedside, introduced self, and explained role in pt's care.  MRALENA explained the health care directive form and explained why we needed to do the form again(The form was incomplete the first time HCD was filled out.)  MARLENA read the questions on the form and directed where the pt needed to begin filling out the form.  Pt completed the form and MARLENA will contact [facility] to set up a notary appointment next week.    MARLENA spoke with pt and provided support and words of encouragement to pt. Pt reported some straining with his eyes as he looked at the form.  SW encouraged pt to pause, close eyes, and rest eyes until pt felt ready to continue filling out the form. Pt was able to continue filling out the form.  MARLENA thanked pt for time and explained that SW will come back Monday to set up a notary appointment.     MARLENA contacted [facility] and spoke with Nicole in admission.  Facility was previously concerned about pt's behavior.  Nicole asked MARLENA to fax over recent dr, nursing, and therapy notes.  MARLENA faxed over the documents to ([phone]).    MARLENA will continue to follow for discharge planning and resources.    ML Rojas, LGSW  [address] Social  Worker  Office: [phone]   Pager: [phone]  [email or web address]

## 2022-02-25 NOTE — PROGRESS NOTES
[facility]    Medicine Progress Note - Hospitalist Service, GOLD TEAM 7    Date of Admission:  2/19/2022    Assessment & Plan          Miriam Hall is a 46 year old male with history of severe resistant HTN and longstanding DMII who was initially admitted to [facility] 1/8 - 1/28/2022 with R PCA stroke c/b refractory HTN and DISHA, and encephalopathy. Stabilized and transferred to ARU 1/28/2022 for ongoing rehabilitation. Patient developed new AMS, agitation, paranoia and worsening DISHA 2/18 prompting transfer to [place].     Interval Changes:  -Psych consultation for agitation and anxiety management  -Patients behavior continues to improve with almost resolved outbursts and mood controlled on current regiment.   -Stop sliding scale insulin and glucose checks with controlled glucose levels and minimal needs over past 48 hours  -Stop continious pulse ox     Agitation  Paranoia and personality change  Toxic metabolic encephalopathy  Pt developed new waxing and waning confusion, agitation and paranoia starting 2/18, worsened 2/19 prompting transfer from ARU to [facility]. Received 10 mg olanzapine and patient became obtunded prompting stroke code, repeat CT with known and evolving stroke without new CVA or hemorrhage.   -Unclear etiology of decompensation, possible personality change as effect of stroke vs medication effect, less likely sepsis as afebrile, HDS, normal WBC, negative UA, CXR.   - Head CT: evolving stroke   - UA negative, CXR with streaky opacities c/w atelectasis  - Spot EEG, prolactin elevated  -MRI (2/21) - continued evolution of stroke  -MRA Head (2/22) - lack fo flow in right posterior cerebral artery and high grade narrowing of right MCA posterior division. Per neurology similar to previous study  -DDx - favor behavior changes likely from resolving stroke and improvement vs paraneoplastic or autoimmune encephalitis vs baclofen toxicity   Plan:  -Neurology  consulted   -Psych consulted, risperdal 0.5 mg BID  -Ativan 0.5 mg BID prn for break thru agitation or anxiety  -Decrease baclofen to 2.5 mg daily given tenous renal function and ability to cause behavior changes in CKD   => Please step out and give patient time to calm down if he is feeling agitated or anxious. He is usually redirectable and consolable.     DISHA on CKD, improving  Resistant HTN  Patient has long hx of resistant HTN while on multidrug regimen. Recent DISHA and new BL Cr around 1.5-1.7, was 2.2 on transfer and leno to 2.9 on 2/20 in the setting of ACEi use, now improving. Discharged from TCU on amiloride, Coreg, clonidine, lisinopril, and scheduled and prn hydralazine.    -ON admission blood pressure was on low normal  Plan:  -carvedilol to 25 BID, cont clonidine   -Start amlodipine 5 mg   - Hold ACEi and diuretics, amiloride  - Nephrology consulted, appreciate rec's   -Follow up in hypertension clinic (ordered)    Hypopnea secondary to olanzapine - resolved.   Patient was noted to have low RR (as low as 6) and long pauses of respiration prompting RRTs and stoke code following 10 IM Zyprexa. Normal oxygenation and normal gases. Avoid zyprexa    Concern for Seizures:   In the setting of CVA. EEG 1/12 concerning for subclinical seizures. Loaded with Keppra, transitioned to Depakote. No e/o seizure noted in the setting of AMS  - Continue Depakote and seizure precautions    R PCA stroke  Presented with L hemiplegia. Head CT 1/8 R parieto-occipital infarct. CTA noted occlusion on proximal R PCA and high grade stenosis of M2 branch of R MCA. Outside window for tPA. Repeat imagine 1/12 with hemorrhagic transformation. Source 2/2 high grade atherosclerosis vs ESUS. CARISA no LA thrombus. Residual deficits include L hemiplegia, L hemianopsia, mild dysarthria.   -Acute AMS 2/19, repeat CT negative for new findings   Plan:  - Continue ASA, statin   - Follow pending [place]iop[place] results      Troponin elevation  -elevated  to 120s on 2/22 in setting of uncontrolled hypertension and CKD. Decreased from 2 weeks prior to admission, likely elevated from CKD and recent ischemic insult. ECG with no ischemic changes  Plan:  >Stop monitoring     Dysphagia:   Noted on admission with thin liquids. Previously on regular diet  - SLP consulted, ok with regular diet with 1:1      DMII:   A1c 7.8. PTA metformin on hold due to DISHA. Glucose stable  - MSSI, hypoglycemia protocol  -Sitagliptan 100 mg  -Stop sliding scale insulin and POC glucose checks     Hypokalemia - replaced, check daily       Diet: Regular Diet Adult Thin Liquids (level 0)  Snacks/Supplements Adult: Ensure Enlive; Between Meals  Room Service    DVT Prophylaxis: Pneumatic Compression Devices  Mendoza Catheter: Not present  Central Lines: None  Cardiac Monitoring: None  Code Status: Full Code      Disposition Plan   Expected Discharge: 02/25/2022     Anticipated discharge location:  Awaiting care coordination huddle  Delays:          The patient's care was discussed with the Bedside Nurse, Care Coordinator/, Patient and Patient's Family.    Christiano Tapia MD  Hospitalist Service, [address]  Securely message with the Vocera Web Console (learn more here)  Text page via [facility] Paging/Directory   Please see signed in provider for up to date coverage information      Clinically Significant Risk Factors Present on Admission                    ______________________________________________________________________    Interval History   Patient reports he is feeling much better today. He was able to shower and feels different. Reports agitation and anxiety is improving. He denies fever or chills. No chest pain or shortness of breath. Discussed plan for psych consultation this morning to assist with his mood and anxiety.     Data reviewed today: I reviewed all medications, new labs and imaging results over the last 24  hours. I personally reviewed no images or EKG's today.    Physical Exam   Vital Signs: Temp: 97.8  F (36.6  C) Temp src: Oral BP: (!) 163/84 Pulse: 72   Resp: 18 SpO2: 98 % O2 Device: None (Room air)    Weight: 155 lbs 6.79 oz  Constitutional: alert, oriented, no acute distress  Eyes: no icterus, pupils equal and reactive to light bilaterally, no nystagmus, but left visual field cut  Respiratory: CTAB, no wheezing or respiratory distress  Cardiovascular: RRR, normal s1, s2, s4 present,  no rub or murmur  GI: soft, non-tender, non-distended, bowel sounds present   Musculoskeletal: left arm flexion and extension 1/5 compared to right  Neurologic: decreased sensation on left  Upper extremity   Psych: able to complete concentration tasks     Data   Recent Labs   Lab 02/24/22  2121 02/24/22  1830 02/24/22  1217 02/24/22  0757 02/24/22  0710 02/23/22  1059 02/23/22  0716 02/22/22  0916 02/22/22  0323   WBC  --   --   --   --  7.8  --  6.1  --  8.0   HGB  --   --   --   --  13.0*  --  11.4*  --  12.6*   MCV  --   --   --   --  92  --  91  --  89   PLT  --   --   --   --  199  --  168  --  185   NA  --   --   --   --  145*  --  143  --  138   POTASSIUM  --   --   --   --  3.2*  --  3.3*  --  3.4   CHLORIDE  --   --   --   --  107  --  107  --  106   CO2  --   --   --   --  31  --  30  --  25   BUN  --   --   --   --  40*  --  38*  --  48*   CR  --   --   --   --  1.54*  --  1.51*  --  1.56*   ANIONGAP  --   --   --   --  7  --  6  --  7   VALERIE  --   --   --   --  9.6  --  9.0  --  9.5   * 121* 158*   < > 103*   < > 116*   < > 157*   ALBUMIN  --   --   --   --  3.3*  --  2.6*  --  3.2*   PROTTOTAL  --   --   --   --  7.4  --  6.3*  --  7.6   BILITOTAL  --   --   --   --  0.4  --  0.5  --  0.5   ALKPHOS  --   --   --   --  66  --  57  --  65   ALT  --   --   --   --  19  --  17  --  22   AST  --   --   --   --  24  --  22  --  22    < > = values in this interval not displayed.

## 2022-02-25 NOTE — CONSULTS
Psychiatry Consultation; Follow up              Reason for Consult, requesting source:    RX for agitation, capacity  Requesting source: Christiano Tapia    Labs and imaging reviewed, discussed with nursing.                Interim history:    Miriam Hall is a 46 year old male 46 year old male with history of resistant HTN who was initially admitted to [facility] 1/8 to 1/28/2022 with R PCA stroke c/b refractory HTN with concern for secondary HTN further c/b DISHA, new DMII diagnosis, and encephalopathy.   Admission he has had intermittent problems with confusion or mild agitation.  However, recently he is much improved and for the most part clear.  He did have an episode he is very tearful last night, but for the most part his behaviors been very appropriate.  Patient time today discussing how this stroke will impact his wife.  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  He has a friend Taniya who is helping him with some things and he may make out a healthcare directive with her his decision maker.  There is also some rumor of her becoming his guardian.  He said for the most part his mood is pretty good and  he does usually sleep quite well especially if he is able to take some melatonin about 8 PM.           Current Medications:       amLODIPine  5 mg Oral Daily     aspirin  81 mg Oral Daily     atorvastatin  40 mg Oral QPM     Baclofen  2.5 mg Oral Daily     carvedilol  25 mg Oral BID w/meals     cloNIDine  0.1 mg Oral TID     divalproex sodium extended-release  1,000 mg Oral Daily     insulin aspart  1-7 Units Subcutaneous TID AC     insulin aspart  1-5 Units Subcutaneous At Bedtime     lidocaine  1 patch Transdermal Q24H    And     lidocaine   Transdermal Q8H     melatonin  3 mg Oral Daily     risperiDONE  0.5 mg Sublingual BID     sitagliptin  100 mg Oral Daily     sodium chloride (PF)  3 mL Intracatheter Q8H             "MSE:   Lying quietly in the hospital bed, is well groomed, pleasant, cooperative. Speech fluent. Left sided weakness noted. Associations tight. Mood is \"fair.\"  Affect slightly restricted. Thought process logical, linear. Thought content negative for suicidal thoughts or delusions. Oriented x3.  Recent and remote memory, attention span and concentration are grossly intact. Fund of knowledge, use of language appropriate. Insight and judgment good.     Vital signs:  Temp: 98.6  F (37  C) Temp src: Oral BP: (!) 169/93 Pulse: (!) 18   Resp: 18 SpO2: 97 % O2 Device: None (Room air) Oxygen Delivery: 2.5 LPM   Weight: 70.5 kg (155 lb 6.8 oz)  Estimated body mass index is 23.36 kg/m  as calculated from the following:    Height as of 1/28/22: 1.737 m (5' 8.4\").    Weight as of this encounter: 70.5 kg (155 lb 6.8 oz).            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  Therefore, in my opinion, he does have intact decision-making capacity.  He seems to be doing better since low-dose Risperdal was started and the other day he responded well to small amount of Ativan when he was slightly agitated.  Do not see any behaviors that would preclude placement in a rehabilitation facility.  I think that it is appropriate for him to have input from his friend Taniya regarding help with decisions.           Summary of Recommendations:   For now I would continue Risperdal 0.5 mg twice a day and 3 mg of melatonin at 8 PM  I do not see a problem with using 0.5 mg of Ativan up to twice a day for anxiety or agitation.  Page me or re-consult psychiatry as needed.     Duong Hogan M.D.   [facility]   Contact information available via [facility] Paging/Directory.  If I am not available, then [facility] intake ([phone]) should know " "who   Is on call          \"Much or all of the text in this note was generated through the use of Dragon Dictate voice to text software. Errors in spelling or words which appear to be out of contact are unintentional, may be present due having escaped editing\"           "

## 2022-02-26 ENCOUNTER — APPOINTMENT (OUTPATIENT)
Dept: OCCUPATIONAL THERAPY | Facility: CLINIC | Age: 47
End: 2022-02-26
Attending: STUDENT IN AN ORGANIZED HEALTH CARE EDUCATION/TRAINING PROGRAM
Payer: COMMERCIAL

## 2022-02-26 LAB
ALBUMIN SERPL-MCNC: 2.7 G/DL (ref 3.4–5)
ALP SERPL-CCNC: 66 U/L (ref 40–150)
ALT SERPL W P-5'-P-CCNC: 18 U/L (ref 0–70)
ANION GAP SERPL CALCULATED.3IONS-SCNC: 8 MMOL/L (ref 3–14)
AST SERPL W P-5'-P-CCNC: 26 U/L (ref 0–45)
BILIRUB SERPL-MCNC: 0.4 MG/DL (ref 0.2–1.3)
BUN SERPL-MCNC: 32 MG/DL (ref 7–30)
CALCIUM SERPL-MCNC: 9.1 MG/DL (ref 8.5–10.1)
CHLORIDE BLD-SCNC: 104 MMOL/L (ref 94–109)
CO2 SERPL-SCNC: 30 MMOL/L (ref 20–32)
CREAT SERPL-MCNC: 1.45 MG/DL (ref 0.66–1.25)
ERYTHROCYTE [DISTWIDTH] IN BLOOD BY AUTOMATED COUNT: 13.4 % (ref 10–15)
GFR SERPL CREATININE-BSD FRML MDRD: 60 ML/MIN/1.73M2
GLUCOSE BLD-MCNC: 155 MG/DL (ref 70–99)
HCT VFR BLD AUTO: 34.4 % (ref 40–53)
HGB BLD-MCNC: 11.3 G/DL (ref 13.3–17.7)
MCH RBC QN AUTO: 30.1 PG (ref 26.5–33)
MCHC RBC AUTO-ENTMCNC: 32.8 G/DL (ref 31.5–36.5)
MCV RBC AUTO: 92 FL (ref 78–100)
PLATELET # BLD AUTO: 214 10E3/UL (ref 150–450)
POTASSIUM BLD-SCNC: 3 MMOL/L (ref 3.4–5.3)
PROT SERPL-MCNC: 6.5 G/DL (ref 6.8–8.8)
RBC # BLD AUTO: 3.76 10E6/UL (ref 4.4–5.9)
SODIUM SERPL-SCNC: 142 MMOL/L (ref 133–144)
WBC # BLD AUTO: 7.2 10E3/UL (ref 4–11)

## 2022-02-26 PROCEDURE — 120N000002 HC R&B MED SURG/OB UMMC

## 2022-02-26 PROCEDURE — 97763 ORTHC/PROSTC MGMT SBSQ ENC: CPT | Mod: GO

## 2022-02-26 PROCEDURE — 250N000013 HC RX MED GY IP 250 OP 250 PS 637: Performed by: STUDENT IN AN ORGANIZED HEALTH CARE EDUCATION/TRAINING PROGRAM

## 2022-02-26 PROCEDURE — 85027 COMPLETE CBC AUTOMATED: CPT | Performed by: STUDENT IN AN ORGANIZED HEALTH CARE EDUCATION/TRAINING PROGRAM

## 2022-02-26 PROCEDURE — 80053 COMPREHEN METABOLIC PANEL: CPT | Performed by: STUDENT IN AN ORGANIZED HEALTH CARE EDUCATION/TRAINING PROGRAM

## 2022-02-26 PROCEDURE — 250N000011 HC RX IP 250 OP 636: Performed by: PHYSICIAN ASSISTANT

## 2022-02-26 PROCEDURE — 250N000013 HC RX MED GY IP 250 OP 250 PS 637: Performed by: PHYSICIAN ASSISTANT

## 2022-02-26 PROCEDURE — 36415 COLL VENOUS BLD VENIPUNCTURE: CPT | Performed by: STUDENT IN AN ORGANIZED HEALTH CARE EDUCATION/TRAINING PROGRAM

## 2022-02-26 PROCEDURE — 99233 SBSQ HOSP IP/OBS HIGH 50: CPT | Performed by: STUDENT IN AN ORGANIZED HEALTH CARE EDUCATION/TRAINING PROGRAM

## 2022-02-26 PROCEDURE — 250N000013 HC RX MED GY IP 250 OP 250 PS 637

## 2022-02-26 PROCEDURE — 250N000013 HC RX MED GY IP 250 OP 250 PS 637: Performed by: INTERNAL MEDICINE

## 2022-02-26 PROCEDURE — 250N000013 HC RX MED GY IP 250 OP 250 PS 637: Performed by: PSYCHIATRY & NEUROLOGY

## 2022-02-26 RX ORDER — LABETALOL HYDROCHLORIDE 5 MG/ML
10 INJECTION, SOLUTION INTRAVENOUS EVERY 4 HOURS PRN
Status: DISCONTINUED | OUTPATIENT
Start: 2022-02-26 | End: 2022-03-09 | Stop reason: HOSPADM

## 2022-02-26 RX ORDER — POTASSIUM CHLORIDE 750 MG/1
40 TABLET, EXTENDED RELEASE ORAL ONCE
Status: COMPLETED | OUTPATIENT
Start: 2022-02-26 | End: 2022-02-26

## 2022-02-26 RX ADMIN — Medication 2.5 MG: at 07:43

## 2022-02-26 RX ADMIN — RISPERIDONE 0.5 MG: 0.5 TABLET, ORALLY DISINTEGRATING ORAL at 07:43

## 2022-02-26 RX ADMIN — SITAGLIPTIN 100 MG: 100 TABLET, FILM COATED ORAL at 07:43

## 2022-02-26 RX ADMIN — CLONIDINE HYDROCHLORIDE 0.1 MG: 0.1 TABLET ORAL at 14:25

## 2022-02-26 RX ADMIN — CARVEDILOL 25 MG: 25 TABLET, FILM COATED ORAL at 18:35

## 2022-02-26 RX ADMIN — POTASSIUM CHLORIDE 40 MEQ: 750 TABLET, EXTENDED RELEASE ORAL at 08:42

## 2022-02-26 RX ADMIN — ASPIRIN 81 MG: 81 TABLET, CHEWABLE ORAL at 07:43

## 2022-02-26 RX ADMIN — CLONIDINE HYDROCHLORIDE 0.1 MG: 0.1 TABLET ORAL at 20:26

## 2022-02-26 RX ADMIN — ATORVASTATIN CALCIUM 40 MG: 40 TABLET, FILM COATED ORAL at 20:26

## 2022-02-26 RX ADMIN — AMLODIPINE BESYLATE 5 MG: 5 TABLET ORAL at 07:43

## 2022-02-26 RX ADMIN — HYDRALAZINE HYDROCHLORIDE 10 MG: 20 INJECTION INTRAMUSCULAR; INTRAVENOUS at 14:30

## 2022-02-26 RX ADMIN — DIVALPROEX SODIUM 1000 MG: 500 TABLET, FILM COATED, EXTENDED RELEASE ORAL at 07:43

## 2022-02-26 RX ADMIN — CARVEDILOL 25 MG: 25 TABLET, FILM COATED ORAL at 07:43

## 2022-02-26 RX ADMIN — LORAZEPAM 0.5 MG: 0.5 TABLET ORAL at 15:26

## 2022-02-26 RX ADMIN — CLONIDINE HYDROCHLORIDE 0.1 MG: 0.1 TABLET ORAL at 07:43

## 2022-02-26 RX ADMIN — RISPERIDONE 0.5 MG: 0.5 TABLET, ORALLY DISINTEGRATING ORAL at 20:25

## 2022-02-26 RX ADMIN — Medication 3 MG: at 20:26

## 2022-02-26 ASSESSMENT — ACTIVITIES OF DAILY LIVING (ADL)
ADLS_ACUITY_SCORE: 25

## 2022-02-26 NOTE — PROGRESS NOTES
[facility]    Medicine Progress Note - Hospitalist Service, GOLD TEAM 7    Date of Admission:  2/19/2022    Assessment & Plan          Miriam Hall is a 46 year old male with history of severe resistant HTN and longstanding DMII who was initially admitted to [facility] 1/8 - 1/28/2022 with R PCA stroke c/b refractory HTN and DISHA, and encephalopathy. Stabilized and transferred to ARU 1/28/2022 for ongoing rehabilitation. Patient developed new AMS, agitation, paranoia and worsening DISHA 2/18 prompting transfer to [place].     Interval Changes:  -This AM worsening facial numbness but resolved quickly and discused repeat imaging with patient. He declined this given improvement will continue to closely monitor  -Add labetalol prn for elevated BP     Agitation  Paranoia and personality change  Toxic metabolic encephalopathy  Pt developed new waxing and waning confusion, agitation and paranoia starting 2/18, worsened 2/19 prompting transfer from ARU to [facility]. Received 10 mg olanzapine and patient became obtunded prompting stroke code, repeat CT with known and evolving stroke without new CVA or hemorrhage.   -Unclear etiology of decompensation, possible personality change as effect of stroke vs medication effect, less likely sepsis as afebrile, HDS, normal WBC, negative UA, CXR.   - Head CT: evolving stroke   - UA negative, CXR with streaky opacities c/w atelectasis  - Spot EEG, prolactin elevated  -MRI (2/21) - continued evolution of stroke  -MRA Head (2/22) - lack fo flow in right posterior cerebral artery and high grade narrowing of right MCA posterior division. Per neurology similar to previous study  -DDx - favor behavior changes likely from resolving stroke and improvement vs paraneoplastic or autoimmune encephalitis vs baclofen toxicity   Plan:  -Neurology consulted   -Psych consulted, risperdal 0.5 mg BID  -Ativan 0.5 mg BID prn for break thru agitation or  anxiety  -Decrease baclofen to 2.5 mg daily given tenous renal function and ability to cause behavior changes in CKD   => Please step out and give patient time to calm down if he is feeling agitated or anxious. He is usually redirectable and consolable.     Recent PCA stroke - January 2022  -Presented with L hemiplegia. Head CT 1/8 R parieto-occipital infarct. CTA noted occlusion on proximal R PCA and high grade stenosis of M2 branch of R MCA. Outside window for tPA. Repeat imagine 1/12 with hemorrhagic transformation. Source 2/2 high grade atherosclerosis vs ESUS. CARISA no LA thrombus. Residual deficits include L hemiplegia, L hemianopsia, mild dysarthria.   -Code stroke called on 2/17 on repeat presenation  -CT Head (2/19) - evolution of PCA stroke, no hemorrhage  -MRI brain - (2/21) - evotion of right PCA stroke  -MRA brain (2/22) - similar narrowing of MCA and occlusion of right PCA  Plan:  >Neuro stroke consulted during hospital stay, have signed off  >Follow up of zio patch    DISHA on CKD, improving  Resistant HTN  Patient has long hx of resistant HTN while on multidrug regimen. Recent DISHA and new BL Cr around 1.5-1.7, was 2.2 on transfer and leno to 2.9 on 2/20 in the setting of ACEi use, now improving. Discharged from TCU on amiloride, Coreg, clonidine, lisinopril, and scheduled and prn hydralazine.    -ON admission blood pressure was on low normal  Plan:  -carvedilol to 25 BID, cont clonidine   -Start amlodipine 5 mg   >Labetalol prn for elevated BP  - Hold ACEi and diuretics, amiloride  - Nephrology consulted, appreciate rec's   -Follow up in hypertension clinic (ordered)    Hypopnea secondary to olanzapine - resolved.   Patient was noted to have low RR (as low as 6) and long pauses of respiration prompting RRTs and stoke code following 10 IM Zyprexa. Normal oxygenation and normal gases. Avoid zyprexa    Concern for Seizures:   In the setting of CVA. EEG 1/12 concerning for subclinical seizures. Loaded with  Keppra, transitioned to Depakote. No e/o seizure noted in the setting of AMS  - Continue Depakote and seizure precautions     Troponin elevation  -elevated to 120s on 2/22 in setting of uncontrolled hypertension and CKD. Decreased from 2 weeks prior to admission, likely elevated from CKD and recent ischemic insult. ECG with no ischemic changes  Plan:  >Stop monitoring     Dysphagia:   Noted on admission with thin liquids. Previously on regular diet  - SLP consulted, ok with regular diet with 1:1      DMII:   A1c 7.8. PTA metformin on hold due to DISHA. Glucose stable  - MSSI, hypoglycemia protocol  -Sitagliptan 100 mg  -Stop sliding scale insulin and POC glucose checks     Hypokalemia - replaced, check daily       Diet: Regular Diet Adult Thin Liquids (level 0)  Snacks/Supplements Adult: Ensure Enlive; Between Meals  Room Service    DVT Prophylaxis: Pneumatic Compression Devices  Mendoza Catheter: Not present  Central Lines: None  Cardiac Monitoring: None  Code Status: Full Code      Disposition Plan   Expected Discharge: 02/28/2022     Anticipated discharge location:  Awaiting care coordination huddle  Delays:          The patient's care was discussed with the Bedside Nurse, Care Coordinator/, Patient and Patient's Family.    Christiano Tapia MD  Hospitalist Service, [address]  Securely message with the Vocera Web Console (learn more here)  Text page via [facility] Paging/Directory   Please see signed in provider for up to date coverage information      Clinically Significant Risk Factors Present on Admission                    ______________________________________________________________________    Interval History   This morning the patient described facial numbness. He reports he thought were numbing cream on his phone. [place] through out interview this had resolved. He denies worsening weakness or numbness elsewhere. He is wondering if he will  keep hitting set backs prior to being able to leave the hospital. He denies fever or chillls. No chest pain. No shortness of breath. No nausea or vomiting.     Data reviewed today: I reviewed all medications, new labs and imaging results over the last 24 hours. I personally reviewed no images or EKG's today.    Physical Exam   Vital Signs: Temp: 98  F (36.7  C) Temp src: Oral BP: 126/68 Pulse: 77   Resp: 18 SpO2: 96 % O2 Device: None (Room air)    Weight: 155 lbs 6.79 oz  Constitutional: alert, oriented, no acute distress  Eyes: no icterus, pupils equal and reactive to light bilaterally, no nystagmus, but left visual field cut  Respiratory: CTAB, no wheezing or respiratory distress  Cardiovascular: RRR, normal s1, s2, s4 present,  no rub or murmur  GI: soft, non-tender, non-distended, bowel sounds present   Musculoskeletal: left arm flexion and extension 1/5 compared to right  Neurologic:   CN III, IV, VI - EOMI to right but left visual field cut limits tracking  Cn V - decreased sensation to light touch in V1-V3 on left compared to right  CN VII - facial expression asymmetric with left facial dropp  Cn IX, X - palatal elevation equal  Cn XII - tongue protrusion without deviation  Finger to nose intact on the right, unable to test on left    Data   Recent Labs   Lab 02/25/22 2229 02/25/22 2000 02/25/22  1822 02/24/22  0757 02/24/22  0710 02/23/22  1059 02/23/22  0716 02/22/22  0916 02/22/22  0323   WBC  --   --   --   --  7.8  --  6.1  --  8.0   HGB  --   --   --   --  13.0*  --  11.4*  --  12.6*   MCV  --   --   --   --  92  --  91  --  89   PLT  --   --   --   --  199  --  168  --  185   NA  --   --   --   --  145*  --  143  --  138   POTASSIUM  --   --   --   --  3.2*  --  3.3*  --  3.4   CHLORIDE  --   --   --   --  107  --  107  --  106   CO2  --   --   --   --  31  --  30  --  25   BUN  --   --   --   --  40*  --  38*  --  48*   CR  --   --   --   --  1.54*  --  1.51*  --  1.56*   ANIONGAP  --   --   --   --   7  --  6  --  7   VALERIE  --   --   --   --  9.6  --  9.0  --  9.5   * 146* 130*   < > 103*   < > 116*   < > 157*   ALBUMIN  --   --   --   --  3.3*  --  2.6*  --  3.2*   PROTTOTAL  --   --   --   --  7.4  --  6.3*  --  7.6   BILITOTAL  --   --   --   --  0.4  --  0.5  --  0.5   ALKPHOS  --   --   --   --  66  --  57  --  65   ALT  --   --   --   --  19  --  17  --  22   AST  --   --   --   --  24  --  22  --  22    < > = values in this interval not displayed.

## 2022-02-26 NOTE — PLAN OF CARE
Goal Outcome Evaluation:    Plan of Care Reviewed With: patient     Overall Patient Progress: improving    Outcome Evaluation: Pt alert and oriented x4 (Can be confused on the time of day, but easily redirected). Pleasant/calm overnight and calling appropriately to make needs known. Currently denies pain, Lidocaine patch in place on L thigh. Stable on RA, no SOB reported. Regular diet, pt reports poor appetite. Encouraged to drink ensure overnight which pt accepted. Up to the commode with A2 and gaitbelt. R PIV SL.     BP (!) 143/79 (BP Location: Right arm)   Pulse 76   Temp 98.3  F (36.8  C) (Oral)   Resp 18   Wt 70.5 kg (155 lb 6.8 oz)   SpO2 99%   BMI 23.36 kg/m

## 2022-02-26 NOTE — PLAN OF CARE
Goal Outcome Evaluation:    Plan of Care Reviewed With: patient     Overall Patient Progress: no change    Outcome Evaluation:   Pt tearful about current condition and goals going forward. Spiritual consult entered. Also reports feeling anxiety, PRN ativan given. Hypertensive 204/100 PRN hydralazine given recheck 154/83. Up to bathroom A2 W/ GB resulted in smear

## 2022-02-26 NOTE — PROGRESS NOTES
"SPIRITUAL HEALTH SERVICES  [facility] ([place]) 5A  ON-CALL VISIT     REFERRAL SOURCE: Consult, pt request     Pt was teary on and off as he shared some of his medical narrative. Tears came more heavily when he spoke of needing to make changes in his life and allow others into his life on a heart level. Pt has family and friend support. Pt expresses gratitude as well as - \"how will I ever pay them back, but I will.\" When asked if he follows a particular andreina tradition, pt stated \"no, my family doesn't, but I think that is going to change.\" Pt affirmed he would like a prayer.    Introduced spiritual health services, provided listening, reflective conversation and prayers in support of pt's healing journey      PLAN: Chaplains remain available per pt/family/staff request.     Rev. Harper Wilkerson MDiv, [facility]  Staff    Pager [phone]  * [facility] remains available 24/7 for emergent requests/referrals, either by having the switchboard page the on-call  or by entering an ASAP/STAT consult in Epic (this will also page the on-call ).*      "

## 2022-02-26 NOTE — PLAN OF CARE
Goal Outcome Evaluation:    Plan of Care Reviewed With: patient     Overall Patient Progress: improving    Outcome Evaluation: Pt anxious in early am, once resolved pt was cooperative and agreable for shift and cares

## 2022-02-27 LAB — SARS-COV-2 RNA RESP QL NAA+PROBE: NEGATIVE

## 2022-02-27 PROCEDURE — 250N000013 HC RX MED GY IP 250 OP 250 PS 637: Performed by: STUDENT IN AN ORGANIZED HEALTH CARE EDUCATION/TRAINING PROGRAM

## 2022-02-27 PROCEDURE — U0005 INFEC AGEN DETEC AMPLI PROBE: HCPCS | Performed by: STUDENT IN AN ORGANIZED HEALTH CARE EDUCATION/TRAINING PROGRAM

## 2022-02-27 PROCEDURE — 99232 SBSQ HOSP IP/OBS MODERATE 35: CPT | Performed by: STUDENT IN AN ORGANIZED HEALTH CARE EDUCATION/TRAINING PROGRAM

## 2022-02-27 PROCEDURE — 99207 PR CDG-MDM COMPONENT: MEETS MODERATE - UP CODED: CPT | Performed by: STUDENT IN AN ORGANIZED HEALTH CARE EDUCATION/TRAINING PROGRAM

## 2022-02-27 PROCEDURE — 250N000013 HC RX MED GY IP 250 OP 250 PS 637: Performed by: INTERNAL MEDICINE

## 2022-02-27 PROCEDURE — 120N000002 HC R&B MED SURG/OB UMMC

## 2022-02-27 PROCEDURE — 250N000013 HC RX MED GY IP 250 OP 250 PS 637: Performed by: PHYSICIAN ASSISTANT

## 2022-02-27 PROCEDURE — 250N000013 HC RX MED GY IP 250 OP 250 PS 637

## 2022-02-27 RX ADMIN — ACETAMINOPHEN 650 MG: 325 TABLET, FILM COATED ORAL at 12:19

## 2022-02-27 RX ADMIN — CLONIDINE HYDROCHLORIDE 0.1 MG: 0.1 TABLET ORAL at 20:03

## 2022-02-27 RX ADMIN — RISPERIDONE 0.5 MG: 0.5 TABLET, ORALLY DISINTEGRATING ORAL at 20:09

## 2022-02-27 RX ADMIN — ASPIRIN 81 MG: 81 TABLET, CHEWABLE ORAL at 08:05

## 2022-02-27 RX ADMIN — ATORVASTATIN CALCIUM 40 MG: 40 TABLET, FILM COATED ORAL at 20:03

## 2022-02-27 RX ADMIN — CLONIDINE HYDROCHLORIDE 0.1 MG: 0.1 TABLET ORAL at 14:54

## 2022-02-27 RX ADMIN — CARVEDILOL 25 MG: 25 TABLET, FILM COATED ORAL at 18:13

## 2022-02-27 RX ADMIN — CARVEDILOL 25 MG: 25 TABLET, FILM COATED ORAL at 08:05

## 2022-02-27 RX ADMIN — SITAGLIPTIN 100 MG: 100 TABLET, FILM COATED ORAL at 08:05

## 2022-02-27 RX ADMIN — Medication 2.5 MG: at 08:05

## 2022-02-27 RX ADMIN — AMLODIPINE BESYLATE 5 MG: 5 TABLET ORAL at 08:05

## 2022-02-27 RX ADMIN — DIVALPROEX SODIUM 1000 MG: 500 TABLET, FILM COATED, EXTENDED RELEASE ORAL at 08:05

## 2022-02-27 RX ADMIN — CLONIDINE HYDROCHLORIDE 0.1 MG: 0.1 TABLET ORAL at 08:05

## 2022-02-27 RX ADMIN — RISPERIDONE 0.5 MG: 0.5 TABLET, ORALLY DISINTEGRATING ORAL at 08:05

## 2022-02-27 ASSESSMENT — ACTIVITIES OF DAILY LIVING (ADL)
ADLS_ACUITY_SCORE: 18
ADLS_ACUITY_SCORE: 25
ADLS_ACUITY_SCORE: 25
ADLS_ACUITY_SCORE: 20
ADLS_ACUITY_SCORE: 25
ADLS_ACUITY_SCORE: 20
ADLS_ACUITY_SCORE: 18
ADLS_ACUITY_SCORE: 20
ADLS_ACUITY_SCORE: 18
ADLS_ACUITY_SCORE: 20
ADLS_ACUITY_SCORE: 18
ADLS_ACUITY_SCORE: 20
ADLS_ACUITY_SCORE: 25
ADLS_ACUITY_SCORE: 20
ADLS_ACUITY_SCORE: 18
ADLS_ACUITY_SCORE: 25
ADLS_ACUITY_SCORE: 18
ADLS_ACUITY_SCORE: 20
ADLS_ACUITY_SCORE: 25

## 2022-02-27 NOTE — PROGRESS NOTES
[facility]    Medicine Progress Note - Hospitalist Service, GOLD TEAM 7    Date of Admission:  2/19/2022    Assessment & Plan          Miriam Hall is a 46 year old male with history of severe resistant HTN and longstanding DMII who was initially admitted to [facility] 1/8 - 1/28/2022 with R PCA stroke c/b refractory HTN and DISHA, and encephalopathy. Stabilized and transferred to ARU 1/28/2022 for ongoing rehabilitation. Patient developed new AMS, agitation, paranoia and worsening DISHA 2/18 prompting transfer to [place].     Interval Changes:  -stable to discharge to TCU     Agitation  Paranoia and personality change  Toxic metabolic encephalopathy  Pt developed new waxing and waning confusion, agitation and paranoia starting 2/18, worsened 2/19 prompting transfer from ARU to [facility]. Received 10 mg olanzapine and patient became obtunded prompting stroke code, repeat CT with known and evolving stroke without new CVA or hemorrhage.   -Unclear etiology of decompensation, possible personality change as effect of stroke vs medication effect, less likely sepsis as afebrile, HDS, normal WBC, negative UA, CXR.   - Head CT: evolving stroke   - UA negative, CXR with streaky opacities c/w atelectasis  - Spot EEG, prolactin elevated  -MRI (2/21) - continued evolution of stroke  -MRA Head (2/22) - lack fo flow in right posterior cerebral artery and high grade narrowing of right MCA posterior division. Per neurology similar to previous study  -DDx - favor behavior changes likely from resolving stroke and improvement vs paraneoplastic or autoimmune encephalitis vs baclofen toxicity   Plan:  -Neurology consulted   -Psych consulted, risperdal 0.5 mg BID  -Ativan 0.5 mg BID prn for break thru agitation or anxiety  -Decrease baclofen to 2.5 mg daily given tenous renal function and ability to cause behavior changes in CKD   => Please step out and give patient time to calm down if he is feeling  agitated or anxious. He is usually redirectable and consolable.     Recent PCA stroke - January 2022  -Presented with L hemiplegia. Head CT 1/8 R parieto-occipital infarct. CTA noted occlusion on proximal R PCA and high grade stenosis of M2 branch of R MCA. Outside window for tPA. Repeat imagine 1/12 with hemorrhagic transformation. Source 2/2 high grade atherosclerosis vs ESUS. CARISA no LA thrombus. Residual deficits include L hemiplegia, L hemianopsia, mild dysarthria.   -Code stroke called on 2/17 on repeat presenation  -CT Head (2/19) - evolution of PCA stroke, no hemorrhage  -MRI brain - (2/21) - evotion of right PCA stroke  -MRA brain (2/22) - similar narrowing of MCA and occlusion of right PCA  Plan:  >Neuro stroke consulted during hospital stay, have signed off  >Follow up of zio patch    DISHA on CKD, improving  Resistant HTN  Patient has long hx of resistant HTN while on multidrug regimen. Recent DISHA and new BL Cr around 1.5-1.7, was 2.2 on transfer and leno to 2.9 on 2/20 in the setting of ACEi use, now improving. Discharged from TCU on amiloride, Coreg, clonidine, lisinopril, and scheduled and prn hydralazine.    -ON admission blood pressure was on low normal  Plan:  -carvedilol to 25 BID, cont clonidine   -Start amlodipine 5 mg   >Labetalol prn for elevated BP  - Hold ACEi and diuretics, amiloride  - Nephrology consulted, appreciate rec's   -Follow up in hypertension clinic (ordered)    Hypopnea secondary to olanzapine - resolved.   Patient was noted to have low RR (as low as 6) and long pauses of respiration prompting RRTs and stoke code following 10 IM Zyprexa. Normal oxygenation and normal gases. Avoid zyprexa    Concern for Seizures:   In the setting of CVA. EEG 1/12 concerning for subclinical seizures. Loaded with Keppra, transitioned to Depakote. No e/o seizure noted in the setting of AMS  - Continue Depakote and seizure precautions     Troponin elevation  -elevated to 120s on 2/22 in setting of  uncontrolled hypertension and CKD. Decreased from 2 weeks prior to admission, likely elevated from CKD and recent ischemic insult. ECG with no ischemic changes  Plan:  >Stop monitoring     Dysphagia:   Noted on admission with thin liquids. Previously on regular diet  - SLP consulted, ok with regular diet with 1:1      DMII:   A1c 7.8. PTA metformin on hold due to DISHA. Glucose stable  - MSSI, hypoglycemia protocol  -Sitagliptan 100 mg  -Stop sliding scale insulin and POC glucose checks     Hypokalemia - replaced, check daily       Diet: Regular Diet Adult Thin Liquids (level 0)  Snacks/Supplements Adult: Ensure Enlive; Between Meals  Room Service    DVT Prophylaxis: Pneumatic Compression Devices  Mendoza Catheter: Not present  Central Lines: None  Cardiac Monitoring: None  Code Status: Full Code      Disposition Plan   Expected Discharge: 02/28/2022     Anticipated discharge location:  Awaiting care coordination huddle  Delays:          The patient's care was discussed with the Bedside Nurse, Care Coordinator/, Patient and Patient's Family.    Christiano Tapia MD  Hospitalist Service, [address]  Securely message with the Vocera Web Console (learn more here)  Text page via [facility] Paging/Directory   Please see signed in provider for up to date coverage information      Clinically Significant Risk Factors Present on Admission                    ______________________________________________________________________    Interval History   No acute events overnight. Feels his anxiety is under better control. He also feels less agitated. States he has been sleeping well at night, almost too well. His friend is coming today and is planning to bring him outside. No chest pain. No shortness of breath. No abdominal pain.     Data reviewed today: I reviewed all medications, new labs and imaging results over the last 24 hours. I personally reviewed no images  or EKG's today.    Physical Exam   Vital Signs: Temp: 98.4  F (36.9  C) Temp src: Axillary BP: (!) 148/81 Pulse: 72   Resp: 16 SpO2: 97 % O2 Device: None (Room air)    Weight: 155 lbs 3.26 oz  Constitutional: alert, oriented, no acute distress  Eyes: no icterus, pupils equal and reactive to light bilaterally, no nystagmus, but left visual field cut  Respiratory: CTAB, no wheezing or respiratory distress  Cardiovascular: RRR, normal s1, s2, s4 present,  no rub or murmur  GI: soft, non-tender, non-distended, bowel sounds present   Musculoskeletal: left arm flexion and extension 1/5 compared to right  Neurologic:   Decreased sensation on left side of face,  Left facial droop     Data   Recent Labs   Lab 02/26/22  0543 02/25/22  2229 02/25/22 2000 02/24/22  0757 02/24/22  0710 02/23/22  1059 02/23/22  0716   WBC 7.2  --   --   --  7.8  --  6.1   HGB 11.3*  --   --   --  13.0*  --  11.4*   MCV 92  --   --   --  92  --  91     --   --   --  199  --  168     --   --   --  145*  --  143   POTASSIUM 3.0*  --   --   --  3.2*  --  3.3*   CHLORIDE 104  --   --   --  107  --  107   CO2 30  --   --   --  31  --  30   BUN 32*  --   --   --  40*  --  38*   CR 1.45*  --   --   --  1.54*  --  1.51*   ANIONGAP 8  --   --   --  7  --  6   VALERIE 9.1  --   --   --  9.6  --  9.0   * 123* 146*   < > 103*   < > 116*   ALBUMIN 2.7*  --   --   --  3.3*  --  2.6*   PROTTOTAL 6.5*  --   --   --  7.4  --  6.3*   BILITOTAL 0.4  --   --   --  0.4  --  0.5   ALKPHOS 66  --   --   --  66  --  57   ALT 18  --   --   --  19  --  17   AST 26  --   --   --  24  --  22    < > = values in this interval not displayed.

## 2022-02-27 NOTE — PLAN OF CARE
Goal Outcome Evaluation:    Plan of Care Reviewed With: patient     Overall Patient Progress: no change    Outcome Evaluation: No acute changes overnight. Pt stated that he feels like facial tingling is improving, but still noticing it a little bit on his L side. Asymptomatic Covid swab collected this AM, results processing. No changes noted on neuro exam. Pt denies pain and remains stable on RA.    BP (!) 148/81 (BP Location: Right arm, Patient Position: Semi-Bell's)   Pulse 72   Temp 98.4  F (36.9  C) (Axillary)   Resp 16   Wt 70.4 kg (155 lb 3.3 oz)   SpO2 97%   BMI 23.32 kg/m

## 2022-02-28 ENCOUNTER — APPOINTMENT (OUTPATIENT)
Dept: OCCUPATIONAL THERAPY | Facility: CLINIC | Age: 47
End: 2022-02-28
Attending: STUDENT IN AN ORGANIZED HEALTH CARE EDUCATION/TRAINING PROGRAM
Payer: COMMERCIAL

## 2022-02-28 ENCOUNTER — APPOINTMENT (OUTPATIENT)
Dept: PHYSICAL THERAPY | Facility: CLINIC | Age: 47
End: 2022-02-28
Attending: STUDENT IN AN ORGANIZED HEALTH CARE EDUCATION/TRAINING PROGRAM
Payer: COMMERCIAL

## 2022-02-28 ENCOUNTER — DOCUMENTATION ONLY (OUTPATIENT)
Dept: OTHER | Facility: CLINIC | Age: 47
End: 2022-02-28
Payer: COMMERCIAL

## 2022-02-28 LAB
ALBUMIN SERPL-MCNC: 2.7 G/DL (ref 3.4–5)
ALP SERPL-CCNC: 64 U/L (ref 40–150)
ALT SERPL W P-5'-P-CCNC: 24 U/L (ref 0–70)
ANION GAP SERPL CALCULATED.3IONS-SCNC: 4 MMOL/L (ref 3–14)
AST SERPL W P-5'-P-CCNC: 30 U/L (ref 0–45)
BILIRUB SERPL-MCNC: 0.3 MG/DL (ref 0.2–1.3)
BUN SERPL-MCNC: 29 MG/DL (ref 7–30)
CALCIUM SERPL-MCNC: 8.8 MG/DL (ref 8.5–10.1)
CHLORIDE BLD-SCNC: 105 MMOL/L (ref 94–109)
CO2 SERPL-SCNC: 31 MMOL/L (ref 20–32)
CREAT SERPL-MCNC: 1.24 MG/DL (ref 0.66–1.25)
ERYTHROCYTE [DISTWIDTH] IN BLOOD BY AUTOMATED COUNT: 13.4 % (ref 10–15)
GFR SERPL CREATININE-BSD FRML MDRD: 72 ML/MIN/1.73M2
GLUCOSE BLD-MCNC: 164 MG/DL (ref 70–99)
HCT VFR BLD AUTO: 34.3 % (ref 40–53)
HGB BLD-MCNC: 11 G/DL (ref 13.3–17.7)
MCH RBC QN AUTO: 29.1 PG (ref 26.5–33)
MCHC RBC AUTO-ENTMCNC: 32.1 G/DL (ref 31.5–36.5)
MCV RBC AUTO: 91 FL (ref 78–100)
PLATELET # BLD AUTO: 237 10E3/UL (ref 150–450)
POTASSIUM BLD-SCNC: 3 MMOL/L (ref 3.4–5.3)
PROT SERPL-MCNC: 6.5 G/DL (ref 6.8–8.8)
RBC # BLD AUTO: 3.78 10E6/UL (ref 4.4–5.9)
SODIUM SERPL-SCNC: 140 MMOL/L (ref 133–144)
WBC # BLD AUTO: 7.3 10E3/UL (ref 4–11)

## 2022-02-28 PROCEDURE — 97530 THERAPEUTIC ACTIVITIES: CPT | Mod: GP | Performed by: REHABILITATION PRACTITIONER

## 2022-02-28 PROCEDURE — 99232 SBSQ HOSP IP/OBS MODERATE 35: CPT | Performed by: STUDENT IN AN ORGANIZED HEALTH CARE EDUCATION/TRAINING PROGRAM

## 2022-02-28 PROCEDURE — 120N000002 HC R&B MED SURG/OB UMMC

## 2022-02-28 PROCEDURE — 97110 THERAPEUTIC EXERCISES: CPT | Mod: GP | Performed by: REHABILITATION PRACTITIONER

## 2022-02-28 PROCEDURE — 250N000013 HC RX MED GY IP 250 OP 250 PS 637: Performed by: PHYSICIAN ASSISTANT

## 2022-02-28 PROCEDURE — 250N000013 HC RX MED GY IP 250 OP 250 PS 637: Performed by: STUDENT IN AN ORGANIZED HEALTH CARE EDUCATION/TRAINING PROGRAM

## 2022-02-28 PROCEDURE — 85027 COMPLETE CBC AUTOMATED: CPT | Performed by: STUDENT IN AN ORGANIZED HEALTH CARE EDUCATION/TRAINING PROGRAM

## 2022-02-28 PROCEDURE — 97530 THERAPEUTIC ACTIVITIES: CPT | Mod: GO

## 2022-02-28 PROCEDURE — 97535 SELF CARE MNGMENT TRAINING: CPT | Mod: GO

## 2022-02-28 PROCEDURE — 36415 COLL VENOUS BLD VENIPUNCTURE: CPT | Performed by: STUDENT IN AN ORGANIZED HEALTH CARE EDUCATION/TRAINING PROGRAM

## 2022-02-28 PROCEDURE — 80053 COMPREHEN METABOLIC PANEL: CPT | Performed by: STUDENT IN AN ORGANIZED HEALTH CARE EDUCATION/TRAINING PROGRAM

## 2022-02-28 PROCEDURE — 250N000013 HC RX MED GY IP 250 OP 250 PS 637: Performed by: HOSPITALIST

## 2022-02-28 PROCEDURE — 250N000013 HC RX MED GY IP 250 OP 250 PS 637

## 2022-02-28 PROCEDURE — 250N000011 HC RX IP 250 OP 636: Performed by: PHYSICIAN ASSISTANT

## 2022-02-28 PROCEDURE — 250N000013 HC RX MED GY IP 250 OP 250 PS 637: Performed by: INTERNAL MEDICINE

## 2022-02-28 RX ORDER — POTASSIUM CHLORIDE 750 MG/1
40 TABLET, EXTENDED RELEASE ORAL ONCE
Status: COMPLETED | OUTPATIENT
Start: 2022-02-28 | End: 2022-02-28

## 2022-02-28 RX ADMIN — RISPERIDONE 0.5 MG: 0.5 TABLET, ORALLY DISINTEGRATING ORAL at 08:42

## 2022-02-28 RX ADMIN — AMLODIPINE BESYLATE 5 MG: 5 TABLET ORAL at 08:41

## 2022-02-28 RX ADMIN — CLONIDINE HYDROCHLORIDE 0.1 MG: 0.1 TABLET ORAL at 21:51

## 2022-02-28 RX ADMIN — CLONIDINE HYDROCHLORIDE 0.1 MG: 0.1 TABLET ORAL at 14:11

## 2022-02-28 RX ADMIN — HYDRALAZINE HYDROCHLORIDE 10 MG: 20 INJECTION INTRAMUSCULAR; INTRAVENOUS at 22:08

## 2022-02-28 RX ADMIN — POTASSIUM CHLORIDE 40 MEQ: 750 TABLET, EXTENDED RELEASE ORAL at 10:13

## 2022-02-28 RX ADMIN — CARVEDILOL 25 MG: 25 TABLET, FILM COATED ORAL at 18:15

## 2022-02-28 RX ADMIN — CLONIDINE HYDROCHLORIDE 0.1 MG: 0.1 TABLET ORAL at 08:42

## 2022-02-28 RX ADMIN — ATORVASTATIN CALCIUM 40 MG: 40 TABLET, FILM COATED ORAL at 21:51

## 2022-02-28 RX ADMIN — RISPERIDONE 0.5 MG: 0.5 TABLET, ORALLY DISINTEGRATING ORAL at 21:51

## 2022-02-28 RX ADMIN — ASPIRIN 81 MG: 81 TABLET, CHEWABLE ORAL at 08:41

## 2022-02-28 RX ADMIN — CARVEDILOL 25 MG: 25 TABLET, FILM COATED ORAL at 08:41

## 2022-02-28 RX ADMIN — SENNOSIDES AND DOCUSATE SODIUM 1 TABLET: 8.6; 5 TABLET ORAL at 04:56

## 2022-02-28 RX ADMIN — DIVALPROEX SODIUM 1000 MG: 500 TABLET, FILM COATED, EXTENDED RELEASE ORAL at 08:42

## 2022-02-28 RX ADMIN — Medication 2.5 MG: at 08:41

## 2022-02-28 RX ADMIN — LIDOCAINE 1 PATCH: 560 PATCH PERCUTANEOUS; TOPICAL; TRANSDERMAL at 08:44

## 2022-02-28 RX ADMIN — SITAGLIPTIN 100 MG: 100 TABLET, FILM COATED ORAL at 08:41

## 2022-02-28 ASSESSMENT — ACTIVITIES OF DAILY LIVING (ADL)
ADLS_ACUITY_SCORE: 26
ADLS_ACUITY_SCORE: 14
ADLS_ACUITY_SCORE: 14
ADLS_ACUITY_SCORE: 18
ADLS_ACUITY_SCORE: 26
ADLS_ACUITY_SCORE: 14
ADLS_ACUITY_SCORE: 26
ADLS_ACUITY_SCORE: 14
ADLS_ACUITY_SCORE: 14
ADLS_ACUITY_SCORE: 26
ADLS_ACUITY_SCORE: 26
ADLS_ACUITY_SCORE: 14

## 2022-02-28 NOTE — PLAN OF CARE
Goal Outcome Evaluation:    Plan of Care Reviewed With: patient     Overall Patient Progress: improving    Outcome Evaluation: Pt was calm, cooperative and pleasant overnight. A&Ox4, and calling appropriatly to make need known. Only changes in neuro assessments were some small improvements compared to last couple nights with this pt. He was able to slightly shrug L shoulder today when previously no movement was noted. Also was able to swing L leg slighly on the edge of the bed when previously no movement was noted. Pt also had BM today, but stated he still feels constipated. PRN senna given and will continue to monitor.       BP (!) 147/80 (BP Location: Right arm)   Pulse 78   Temp 98.7  F (37.1  C) (Oral)   Resp 16   Wt 70.4 kg (155 lb 3.3 oz)   SpO2 97%   BMI 23.32 kg/m

## 2022-02-28 NOTE — PLAN OF CARE
"Goal Outcome Evaluation:    Plan of Care Reviewed With: patient     Overall Patient Progress: improving    Outcome Evaluation:   Pt calm, cooperative and pleasant. A&Ox4, and calling appropriatly to make need known. Pt reported one instance this AM where he \"woke up and was completely disoriented,\" he further stated \"I didn't know where I was, what time it was.\" Patient stated that he soon remembered he was in the hospital and that it was Monday morning. RN reoriented patient. Patient became agitated this evening x2, RN able to redirect patient and patient calmed down after therapeutic talk and reorientation. RN noted patient able to slightly squeeze fingers on L side and slight shrug left sided shoulder, which is an improvement in neuros. Patient participated in PT and OT cares. Intermittent discomfort in left hip with activity, lidocaine in place for pain control, patient states effectiveness. Poor appetite this AM and afternoon but patient states he usually feels hungry later in the day, on room service with assist. Pt also had BM this AM, but stated he still feels constipated. TOMMIE rapp given this AM and will continue to monitor. PCDs ordered, pending arrival of unit to floor.     Plan: MAR[facility]NA spoke with [facility] today, no bed availability for 3-4 weeks. Referrals placed for [facility], yet liaison for [facility] informed MARLENA that pt is not a candidate for ARU/TCU as he would need a longer and slower rehab course. MARLENA is looking into other options for patient.         "

## 2022-02-28 NOTE — PLAN OF CARE
Goal Outcome Evaluation:    Plan of Care Reviewed With: patient     Overall Patient Progress: no change    Outcome Evaluation: Up to bathroom 1x using WC and voided. Off unit 1x with friend. No PRN needed this shift for BP or anxiety. Regular diet with fair appetite     /75   Pulse 85   Temp 98.5  F (36.9  C) (Oral)   Resp 16   Wt 70.4 kg (155 lb 3.3 oz)   SpO2 99%   BMI 23.32 kg/m

## 2022-02-28 NOTE — PROGRESS NOTES
"Care Management Follow Up    Length of Stay (days): 9    Expected Discharge Date: 02/28/2022     Concerns to be Addressed: coping/stress     Patient plan of care discussed at interdisciplinary rounds: Yes    Anticipated Discharge Disposition: TCU     Anticipated Discharge Services: TBD    Anticipated Discharge DME:  TBD    Patient/family educated on Medicare website which has current facility and service quality ratings:Not applicable    Education Provided on the Discharge Plan:TBD    Patient/Family in Agreement with the Plan:Yes      Referrals Placed by CM/SW:        [facility]  [address] [place]  [address]  P: [phone]  F: [phone]  2/28 [facility] unable to accommodate pt for lack of bed availability.  Facility anticipates no beds for 3-4 weeks.       [facility]  [address] [address]   [phone]  Liaison: Mony Shaffer  2/28 per liaison: \"He needs a longer slow rehab course, also lacks safe long term disch plan. For those reasons he does not meet  TCU criteria. He should disch to a community TCU.\"      Private pay costs discussed: Not applicable    Additional Information:  MARLENA spoke with pt at bedside about [facility] inability to accept pt to facility due to no beds being available for 3-4 weeks.  MARLENA spoke with Taniya, pt's friend, by phone, to update about placement after discharge. Taniya inquired if pt would be able to go back to  ARU.  Taniya reported that pt had been there previously and that it would be a good option due to familiarity of facility and staff.  MARLENA spoke with pt at bedside and asked pt if a referral to the  ARU would be an option that pt would like to consider.  Pt reported that MARLENA could go ahead and make the referral and then see what other options are available.  MARLENA set up an appointment with zain alatorre at 2p.m. with pt and pt signed the HCD document.  MARLENA emailed zain alatorre the " document and confirmed that they have received the document.    MARLENA spoke with Mony Shaffer, liaison for [facility].  Mony reported that pt is not a candidate for ARU/TCU as he would need a longer and slower rehab course. MARLENA will look into other options and present them to pt.       will continue to follow for discharge planning, support, and resources.    ML Rojas, [facility]  [address]   Office: [phone]   Pager: [phone]  [email or web address]

## 2022-02-28 NOTE — PROGRESS NOTES
[facility]    Medicine Progress Note - Hospitalist Service, GOLD TEAM 7    Date of Admission:  2/19/2022    Assessment & Plan          Miriam Hall is a 46 year old male with history of severe resistant HTN and longstanding DMII who was initially admitted to [facility] 1/8 - 1/28/2022 with R PCA stroke c/b refractory HTN and DISHA, and encephalopathy. Stabilized and transferred to ARU 1/28/2022 for ongoing rehabilitation. Patient developed new AMS, agitation, paranoia and worsening DISHA 2/18 prompting transfer to [place].     Interval Changes:  -stable to discharge to TCU     Agitation  Paranoia and personality change  Toxic metabolic encephalopathy  Pt developed new waxing and waning confusion, agitation and paranoia starting 2/18, worsened 2/19 prompting transfer from ARU to [facility]. Received 10 mg olanzapine and patient became obtunded prompting stroke code, repeat CT with known and evolving stroke without new CVA or hemorrhage.   -Unclear etiology of decompensation, possible personality change as effect of stroke vs medication effect, less likely sepsis as afebrile, HDS, normal WBC, negative UA, CXR.   - Head CT: evolving stroke   - UA negative, CXR with streaky opacities c/w atelectasis  - Spot EEG, prolactin elevated  -MRI (2/21) - continued evolution of stroke  -MRA Head (2/22) - lack fo flow in right posterior cerebral artery and high grade narrowing of right MCA posterior division. Per neurology similar to previous study  -DDx - favor behavior changes likely from resolving stroke and improvement vs baclofen toxicity in CKD  Plan:  -Neurology consulted   -Psych consulted, risperdal 0.5 mg BID  -Ativan 0.5 mg BID prn for break thru agitation or anxiety  -Decrease baclofen to 2.5 mg daily given tenous renal function and ability to cause behavior changes in CKD   => Please step out and give patient time to calm down if he is feeling agitated or anxious. He is usually  redirectable and consolable.     Recent PCA stroke - January 2022  -Presented with L hemiplegia. Head CT 1/8 R parieto-occipital infarct. CTA noted occlusion on proximal R PCA and high grade stenosis of M2 branch of R MCA. Outside window for tPA. Repeat imagine 1/12 with hemorrhagic transformation. Source 2/2 high grade atherosclerosis vs ESUS. CARISA no LA thrombus. Residual deficits include L hemiplegia, L hemianopsia, mild dysarthria.   -Code stroke called on 2/17 on repeat presenation  -CT Head (2/19) - evolution of PCA stroke, no hemorrhage  -MRI brain - (2/21) - evotion of right PCA stroke  -MRA brain (2/22) - similar narrowing of MCA and occlusion of right PCA  Plan:  >Neuro stroke consulted during hospital stay, have signed off  >Follow up of zio patch    DISHA on CKD, improving  Resistant HTN  Patient has long hx of resistant HTN while on multidrug regimen. Recent DISHA and new BL Cr around 1.5-1.7, was 2.2 on transfer and leno to 2.9 on 2/20 in the setting of ACEi use, now improving. Discharged from TCU on amiloride, Coreg, clonidine, lisinopril, and scheduled and prn hydralazine.    -ON admission blood pressure was on low normal  Plan:  -carvedilol to 25 BID, cont clonidine   -Start amlodipine 5 mg   >Labetalol prn for elevated BP  - Hold ACEi and diuretics, amiloride  - Nephrology consulted, appreciate rec's   -Follow up in hypertension clinic (ordered)    Hypopnea secondary to olanzapine - resolved.   Patient was noted to have low RR (as low as 6) and long pauses of respiration prompting RRTs and stoke code following 10 IM Zyprexa. Normal oxygenation and normal gases. Avoid zyprexa    Concern for Seizures:   In the setting of CVA. EEG 1/12 concerning for subclinical seizures. Loaded with Keppra, transitioned to Depakote. No e/o seizure noted in the setting of AMS  - Continue Depakote and seizure precautions     Troponin elevation  -elevated to 120s on 2/22 in setting of uncontrolled hypertension and CKD.  Decreased from 2 weeks prior to admission, likely elevated from CKD and recent ischemic insult. ECG with no ischemic changes  Plan:  >Stop monitoring     Dysphagia:   Noted on admission with thin liquids. Previously on regular diet  - SLP consulted, ok with regular diet with 1:1      DMII:   A1c 7.8. PTA metformin on hold due to DISHA. Glucose stable  - MSSI, hypoglycemia protocol  -Sitagliptan 100 mg  -Stop sliding scale insulin and POC glucose checks     Hypokalemia - replaced, check daily       Diet: Regular Diet Adult Thin Liquids (level 0)  Snacks/Supplements Adult: Ensure Enlive; Between Meals  Room Service    DVT Prophylaxis: Pneumatic Compression Devices  Mendoza Catheter: Not present  Central Lines: None  Cardiac Monitoring: None  Code Status: Full Code      Disposition Plan   Expected Discharge: 03/01/2022     Anticipated discharge location:  Awaiting care coordination huddle  Delays: Placement - LTAC/ARU          The patient's care was discussed with the Bedside Nurse, Care Coordinator/, Patient and Patient's Family.    Christiano Tapia MD  Hospitalist Service, [address]  Securely message with the Vocera Web Console (learn more here)  Text page via [facility] Paging/Directory   Please see signed in provider for up to date coverage information      Clinically Significant Risk Factors Present on Admission                    ______________________________________________________________________    Interval History   No acute events overnight. No fever or chills. No chest pain. No shortness of breath. No nausea or vomiting. Working with case management on disposition options.     Data reviewed today: I reviewed all medications, new labs and imaging results over the last 24 hours. I personally reviewed no images or EKG's today.    Physical Exam   Vital Signs: Temp: 98.7  F (37.1  C) Temp src: Oral BP: (!) 151/101 Pulse: 78   Resp: 16 SpO2: 97 %  O2 Device: None (Room air)    Weight: 155 lbs 3.26 oz  Constitutional: alert, oriented, no acute distress  Eyes: no icterus, pupils equal and reactive to light bilaterally, no nystagmus, but left visual field cut  Respiratory: CTAB, no wheezing or respiratory distress  Cardiovascular: RRR, normal s1, s2, s4 present,  no rub or murmur  GI: soft, non-tender, non-distended, bowel sounds present   Musculoskeletal: left arm flexion and extension 1/5 compared to right  Neurologic:   Decreased sensation on left side of face,  Left facial droop     Data   Recent Labs   Lab 02/28/22  0706 02/26/22  0543 02/25/22  2229 02/24/22  0757 02/24/22  0710   WBC 7.3 7.2  --   --  7.8   HGB 11.0* 11.3*  --   --  13.0*   MCV 91 92  --   --  92    214  --   --  199    142  --   --  145*   POTASSIUM 3.0* 3.0*  --   --  3.2*   CHLORIDE 105 104  --   --  107   CO2 31 30  --   --  31   BUN 29 32*  --   --  40*   CR 1.24 1.45*  --   --  1.54*   ANIONGAP 4 8  --   --  7   VALERIE 8.8 9.1  --   --  9.6   * 155* 123*   < > 103*   ALBUMIN 2.7* 2.7*  --   --  3.3*   PROTTOTAL 6.5* 6.5*  --   --  7.4   BILITOTAL 0.3 0.4  --   --  0.4   ALKPHOS 64 66  --   --  66   ALT 24 18  --   --  19   AST 30 26  --   --  24    < > = values in this interval not displayed.

## 2022-03-01 ENCOUNTER — APPOINTMENT (OUTPATIENT)
Dept: PHYSICAL THERAPY | Facility: CLINIC | Age: 47
End: 2022-03-01
Attending: STUDENT IN AN ORGANIZED HEALTH CARE EDUCATION/TRAINING PROGRAM
Payer: COMMERCIAL

## 2022-03-01 ENCOUNTER — APPOINTMENT (OUTPATIENT)
Dept: OCCUPATIONAL THERAPY | Facility: CLINIC | Age: 47
End: 2022-03-01
Attending: STUDENT IN AN ORGANIZED HEALTH CARE EDUCATION/TRAINING PROGRAM
Payer: COMMERCIAL

## 2022-03-01 PROCEDURE — 250N000013 HC RX MED GY IP 250 OP 250 PS 637: Performed by: PHYSICIAN ASSISTANT

## 2022-03-01 PROCEDURE — 97110 THERAPEUTIC EXERCISES: CPT | Mod: GO

## 2022-03-01 PROCEDURE — 120N000002 HC R&B MED SURG/OB UMMC

## 2022-03-01 PROCEDURE — 250N000013 HC RX MED GY IP 250 OP 250 PS 637

## 2022-03-01 PROCEDURE — 250N000013 HC RX MED GY IP 250 OP 250 PS 637: Performed by: STUDENT IN AN ORGANIZED HEALTH CARE EDUCATION/TRAINING PROGRAM

## 2022-03-01 PROCEDURE — 250N000013 HC RX MED GY IP 250 OP 250 PS 637: Performed by: INTERNAL MEDICINE

## 2022-03-01 PROCEDURE — 97535 SELF CARE MNGMENT TRAINING: CPT | Mod: GO

## 2022-03-01 PROCEDURE — 97116 GAIT TRAINING THERAPY: CPT | Mod: GP

## 2022-03-01 PROCEDURE — 250N000013 HC RX MED GY IP 250 OP 250 PS 637: Performed by: PSYCHIATRY & NEUROLOGY

## 2022-03-01 PROCEDURE — 99232 SBSQ HOSP IP/OBS MODERATE 35: CPT | Performed by: STUDENT IN AN ORGANIZED HEALTH CARE EDUCATION/TRAINING PROGRAM

## 2022-03-01 PROCEDURE — 250N000013 HC RX MED GY IP 250 OP 250 PS 637: Performed by: HOSPITALIST

## 2022-03-01 RX ADMIN — CLONIDINE HYDROCHLORIDE 0.1 MG: 0.1 TABLET ORAL at 10:01

## 2022-03-01 RX ADMIN — ACETAMINOPHEN 650 MG: 325 TABLET, FILM COATED ORAL at 04:32

## 2022-03-01 RX ADMIN — SITAGLIPTIN 100 MG: 100 TABLET, FILM COATED ORAL at 10:01

## 2022-03-01 RX ADMIN — ATORVASTATIN CALCIUM 40 MG: 40 TABLET, FILM COATED ORAL at 21:16

## 2022-03-01 RX ADMIN — ASPIRIN 81 MG: 81 TABLET, CHEWABLE ORAL at 10:01

## 2022-03-01 RX ADMIN — CLONIDINE HYDROCHLORIDE 0.1 MG: 0.1 TABLET ORAL at 14:22

## 2022-03-01 RX ADMIN — RISPERIDONE 0.5 MG: 0.5 TABLET, ORALLY DISINTEGRATING ORAL at 10:02

## 2022-03-01 RX ADMIN — Medication 3 MG: at 21:16

## 2022-03-01 RX ADMIN — AMLODIPINE BESYLATE 5 MG: 5 TABLET ORAL at 10:01

## 2022-03-01 RX ADMIN — RISPERIDONE 0.5 MG: 0.5 TABLET, ORALLY DISINTEGRATING ORAL at 21:16

## 2022-03-01 RX ADMIN — Medication 2.5 MG: at 10:02

## 2022-03-01 RX ADMIN — DIVALPROEX SODIUM 1000 MG: 500 TABLET, FILM COATED, EXTENDED RELEASE ORAL at 10:01

## 2022-03-01 RX ADMIN — CARVEDILOL 25 MG: 25 TABLET, FILM COATED ORAL at 17:25

## 2022-03-01 RX ADMIN — LIDOCAINE 1 PATCH: 560 PATCH PERCUTANEOUS; TOPICAL; TRANSDERMAL at 10:02

## 2022-03-01 RX ADMIN — CARVEDILOL 25 MG: 25 TABLET, FILM COATED ORAL at 10:01

## 2022-03-01 RX ADMIN — CLONIDINE HYDROCHLORIDE 0.1 MG: 0.1 TABLET ORAL at 21:16

## 2022-03-01 ASSESSMENT — ACTIVITIES OF DAILY LIVING (ADL)
ADLS_ACUITY_SCORE: 26
ADLS_ACUITY_SCORE: 26
ADLS_ACUITY_SCORE: 23
ADLS_ACUITY_SCORE: 23
ADLS_ACUITY_SCORE: 26
ADLS_ACUITY_SCORE: 23
ADLS_ACUITY_SCORE: 26
ADLS_ACUITY_SCORE: 26
ADLS_ACUITY_SCORE: 23
ADLS_ACUITY_SCORE: 26
ADLS_ACUITY_SCORE: 23
ADLS_ACUITY_SCORE: 27
ADLS_ACUITY_SCORE: 26

## 2022-03-01 NOTE — PLAN OF CARE
Goal Outcome Evaluation:    Overall Patient Progress: no change    Outcome Evaluation: patient Calm, accepting overnight. disorientated only once 2/2 just waking up, able to reorientated himself. Pt. up to WC at 0445. Patient awaiting placement: See SW note.

## 2022-03-01 NOTE — PLAN OF CARE
Time: 0700 - 1930    Goal Outcome Evaluation:    Plan of Care Reviewed With: patient     Overall Patient Progress: no change    Outcome Evaluation: A&Ox4, pt calm and cooperative this shift. Assist of 2, pt up in wheelchair for much of shift, ambulated in hallway with PT this afternoon. Regular diet with room service assist, pt tolerating well. HTN managed well with scheduled antihypertensives. Pt medically stable for discharge and SW following for TCU placement; [facility] has no available beds for several weeks, per SW note.

## 2022-03-01 NOTE — PROGRESS NOTES
"CLINICAL NUTRITION SERVICES - ASSESSMENT NOTE     Nutrition Prescription    RECOMMENDATIONS FOR MDs/PROVIDERS TO ORDER:  None at this time     Malnutrition Status:    Moderate malnutrition in the context of acute illness    Recommendations already ordered by Registered Dietitian (RD):  Continue Ensure Enlive between meals    Future/Additional Recommendations:  Encourage patient to consume at least 75% of meals TID or the equivalent with snacks/supplements.  If consuming less than this offer supplements, scheduled snacks, and/or consider ordering calorie counts to assess PO intake adequacy.       REASON FOR ASSESSMENT  Miriam Hall is a/an 46 year old male assessed by the dietitian for [facility]    NUTRITION HISTORY  Nutrition hx per recent RD assessment on 2/7, \"Pt states he has had no loss of appetite, and is usually eating 3 meals a day. States that he is normally a 'grazer' at home, but feels he has been eating a lot while admitted. Pt has lost about 11-12 lbs from his UBW of 175 lbs which pt states he was at prior to his stroke (1/8). Pt was agreeable to begin receiving Ensure Enlive BID, which will now be provided at 10am and 2pm daily.\"    Per chart, PMH includes severe resistant HTN, T2DM, recent stroke; admit from ARU due to new AMS, agitation, paranoia, and worsening DISHA.    CURRENT NUTRITION ORDERS  Diet: Regular + Ensure Enlive between meals + Room Service with Assist    Intake/Tolerance: Intermittent poor appetite since admit, sometimes fair-good;  per chart pt has been accepting of Ensure when nursing has suggested it.  Eating mostly 50% of meals documented, at times 25-75%.    LABS  Labs reviewed  - K+ was low yesterday (3 mmol/L), PO replacemnetgiven    MEDICATIONS  Medications reviewed    ANTHROPOMETRICS  Height: 173.7 cm (5' 8.4\")  Most Recent Weight: 71.3 kg (157 lb 3 oz)    IBW: 70 kg   BMI: Normal BMI  Weight History: stable ~70-71 kg this admit since 2/19; PTA pt is down 15 lb in 3 weeks (8.8% wt " loss), but limited recent wt hx available  Wt Readings from Last 10 Encounters:   02/28/22 71.3 kg (157 lb 3 oz)   02/16/22 70.5 kg (155 lb 8 oz)   01/28/22 77.3 kg (170 lb 6.7 oz)   05/13/14 86.2 kg (190 lb)   04/25/14 89.5 kg (197 lb 6.4 oz)   10/18/11 89.8 kg (198 lb)   07/29/11 91.9 kg (202 lb 9.6 oz)   04/22/11 88 kg (194 lb)      Dosing Weight: 70 kg (actual)    ASSESSED NUTRITION NEEDS  Estimated Energy Needs: 8152-6578 kcals/day (25 - 30 kcals/kg)  Justification: Maintenance  Estimated Protein Needs: 70-84 grams protein/day (1 - 1.2 grams of pro/kg)  Justification: Hypercatabolism with acute illness, repletion  Estimated Fluid Needs: (1 mL/kcal)   Justification: Maintenance, or other per provider pending fluid status    PHYSICAL FINDINGS  See malnutrition section below.    MALNUTRITION  % Intake: Difficult to assses  % Weight Loss: > 5% in 1 month (severe)  Subcutaneous Fat Loss: Facial region: mild observed today (moderate per RD note on 2/14)  Muscle Loss: Temporal: mild observed today (moderate per RD note on 2/14)  Fluid Accumulation/Edema: None noted per chart review  Malnutrition Diagnosis: Moderate malnutrition in the context of acute illness    NUTRITION DIAGNOSIS  Inadequate oral intake related to variable appetite as evidenced by eating mostly 50% of meals documented the past 10 days but ranging 25-75%      INTERVENTIONS  Implementation  Nutrition Education: Unable to complete due to pt with other cares during attempts to visit yesterday and today      Goals  Patient to consume % of nutritionally adequate meal trays TID, or the equivalent with supplements/snacks.     Monitoring/Evaluation  Progress toward goals will be monitored and evaluated per protocol.     Brittney Tello, RD, , LD  Weekday Pager: [phone]  Weekday Units covered: 7C (all beds) and 5A (beds 5201 through 5211-2)  Weekend/Holiday RD Pager: [phone]

## 2022-03-01 NOTE — PROGRESS NOTES
Care Management Follow Up    Length of Stay (days): 10    Expected Discharge Date: 03/01/2022     Concerns to be Addressed: coping/stress, discharge planning    Patient plan of care discussed at interdisciplinary rounds: Yes    Anticipated Discharge Disposition: TCU     Anticipated Discharge Services: Transportation    Anticipated Discharge DME: TBD    Patient/family educated on Medicare website which has current facility and service quality ratings:  No    Education Provided on the Discharge Plan:Yes      Patient/Family in Agreement with the Plan:Yes      Referrals Placed by CM/SW:TCU  3/1 [facility]  [address] [address]  Phone: [phone]  Fax: [phone]  3/1 Declined due to being unable to meet pt's needs.    3/1 [id number] [place] [address] [phone]  3/1 Declines due to being unable to meet pt's needs.    3/1 MARLENA put in Specialty Care Coordination Referral for new stroke patients. MARLENA called and left  for Emelina Alba: Ambulatory Stroke [phone]      Private pay costs discussed: Not applicable    Additional Information:  MARLENA spoke with pt at bedside to inform pt that  ARU and TCU have declined pt due to not being able to meet his current needs. MARLENA received permission from pt to pursue other referrals and MARLENA will continue to update pt on referrals.    MARLENA sent referrals for [facility] and [facility].  [facility] care declined pt due to being unable to meet his needs.  Radhika Mullen from [facility] has also declined due to memory care issues and complex medical needs.     MARLENA put in Specialty Care Coordination Referral for new stroke patients.  MARLENA spoke with Emelina Alba: Ambulatory Stroke -[phone].  She explained her role to MARLENA.  Emelina Alba reported that after a referral is made to her, that she remains on the periphery when pt is transferred to TCU.  She informed SW to  let the TCU know that Emelina Alba will be available for support, education, and follow up for pt.  After pt leaves TCU, Emelina Alba will follow pt to inform and education pt for needs related to pt's stroke history.     will continue to follow for discharge planning, support, and resources.      ML Rojas, [facility]  [address]   Office: [phone]   Pager: [phone]  [email or web address]

## 2022-03-02 ENCOUNTER — APPOINTMENT (OUTPATIENT)
Dept: PHYSICAL THERAPY | Facility: CLINIC | Age: 47
End: 2022-03-02
Attending: STUDENT IN AN ORGANIZED HEALTH CARE EDUCATION/TRAINING PROGRAM
Payer: COMMERCIAL

## 2022-03-02 LAB
ALBUMIN SERPL-MCNC: 3.1 G/DL (ref 3.4–5)
ALP SERPL-CCNC: 69 U/L (ref 40–150)
ALT SERPL W P-5'-P-CCNC: 29 U/L (ref 0–70)
ANION GAP SERPL CALCULATED.3IONS-SCNC: 10 MMOL/L (ref 3–14)
AST SERPL W P-5'-P-CCNC: 33 U/L (ref 0–45)
BILIRUB SERPL-MCNC: 0.4 MG/DL (ref 0.2–1.3)
BUN SERPL-MCNC: 25 MG/DL (ref 7–30)
CALCIUM SERPL-MCNC: 9.5 MG/DL (ref 8.5–10.1)
CHLORIDE BLD-SCNC: 104 MMOL/L (ref 94–109)
CO2 SERPL-SCNC: 29 MMOL/L (ref 20–32)
CREAT SERPL-MCNC: 1.42 MG/DL (ref 0.66–1.25)
ERYTHROCYTE [DISTWIDTH] IN BLOOD BY AUTOMATED COUNT: 13.8 % (ref 10–15)
GFR SERPL CREATININE-BSD FRML MDRD: 61 ML/MIN/1.73M2
GLUCOSE BLD-MCNC: 130 MG/DL (ref 70–99)
HCT VFR BLD AUTO: 35.3 % (ref 40–53)
HGB BLD-MCNC: 11.8 G/DL (ref 13.3–17.7)
MCH RBC QN AUTO: 30.5 PG (ref 26.5–33)
MCHC RBC AUTO-ENTMCNC: 33.4 G/DL (ref 31.5–36.5)
MCV RBC AUTO: 91 FL (ref 78–100)
PLATELET # BLD AUTO: 268 10E3/UL (ref 150–450)
POTASSIUM BLD-SCNC: 3.1 MMOL/L (ref 3.4–5.3)
PROT SERPL-MCNC: 7.2 G/DL (ref 6.8–8.8)
RBC # BLD AUTO: 3.87 10E6/UL (ref 4.4–5.9)
SODIUM SERPL-SCNC: 143 MMOL/L (ref 133–144)
WBC # BLD AUTO: 6.4 10E3/UL (ref 4–11)

## 2022-03-02 PROCEDURE — 250N000013 HC RX MED GY IP 250 OP 250 PS 637: Performed by: PSYCHIATRY & NEUROLOGY

## 2022-03-02 PROCEDURE — 250N000013 HC RX MED GY IP 250 OP 250 PS 637: Performed by: STUDENT IN AN ORGANIZED HEALTH CARE EDUCATION/TRAINING PROGRAM

## 2022-03-02 PROCEDURE — 97116 GAIT TRAINING THERAPY: CPT | Mod: GP

## 2022-03-02 PROCEDURE — 120N000002 HC R&B MED SURG/OB UMMC

## 2022-03-02 PROCEDURE — 99232 SBSQ HOSP IP/OBS MODERATE 35: CPT | Performed by: STUDENT IN AN ORGANIZED HEALTH CARE EDUCATION/TRAINING PROGRAM

## 2022-03-02 PROCEDURE — 80053 COMPREHEN METABOLIC PANEL: CPT | Performed by: STUDENT IN AN ORGANIZED HEALTH CARE EDUCATION/TRAINING PROGRAM

## 2022-03-02 PROCEDURE — 250N000013 HC RX MED GY IP 250 OP 250 PS 637: Performed by: PHYSICIAN ASSISTANT

## 2022-03-02 PROCEDURE — 85014 HEMATOCRIT: CPT | Performed by: STUDENT IN AN ORGANIZED HEALTH CARE EDUCATION/TRAINING PROGRAM

## 2022-03-02 PROCEDURE — 250N000013 HC RX MED GY IP 250 OP 250 PS 637: Performed by: NURSE PRACTITIONER

## 2022-03-02 PROCEDURE — 250N000013 HC RX MED GY IP 250 OP 250 PS 637: Performed by: HOSPITALIST

## 2022-03-02 PROCEDURE — 99207 PR CDG-CUT & PASTE-POTENTIAL IMPACT ON LEVEL: CPT | Performed by: STUDENT IN AN ORGANIZED HEALTH CARE EDUCATION/TRAINING PROGRAM

## 2022-03-02 PROCEDURE — 250N000013 HC RX MED GY IP 250 OP 250 PS 637

## 2022-03-02 PROCEDURE — 250N000013 HC RX MED GY IP 250 OP 250 PS 637: Performed by: INTERNAL MEDICINE

## 2022-03-02 PROCEDURE — 97530 THERAPEUTIC ACTIVITIES: CPT | Mod: GP

## 2022-03-02 PROCEDURE — 36415 COLL VENOUS BLD VENIPUNCTURE: CPT | Performed by: STUDENT IN AN ORGANIZED HEALTH CARE EDUCATION/TRAINING PROGRAM

## 2022-03-02 RX ORDER — POTASSIUM CHLORIDE 750 MG/1
40 TABLET, EXTENDED RELEASE ORAL ONCE
Status: COMPLETED | OUTPATIENT
Start: 2022-03-02 | End: 2022-03-02

## 2022-03-02 RX ORDER — POLYETHYLENE GLYCOL 3350 17 G/17G
17 POWDER, FOR SOLUTION ORAL 2 TIMES DAILY PRN
Status: DISCONTINUED | OUTPATIENT
Start: 2022-03-02 | End: 2022-03-05

## 2022-03-02 RX ADMIN — CARVEDILOL 25 MG: 25 TABLET, FILM COATED ORAL at 17:43

## 2022-03-02 RX ADMIN — LIDOCAINE 1 PATCH: 560 PATCH PERCUTANEOUS; TOPICAL; TRANSDERMAL at 08:13

## 2022-03-02 RX ADMIN — ASPIRIN 81 MG: 81 TABLET, CHEWABLE ORAL at 07:58

## 2022-03-02 RX ADMIN — Medication 2.5 MG: at 07:58

## 2022-03-02 RX ADMIN — ATORVASTATIN CALCIUM 40 MG: 40 TABLET, FILM COATED ORAL at 20:46

## 2022-03-02 RX ADMIN — Medication 3 MG: at 20:46

## 2022-03-02 RX ADMIN — POTASSIUM CHLORIDE 40 MEQ: 750 TABLET, EXTENDED RELEASE ORAL at 09:59

## 2022-03-02 RX ADMIN — CLONIDINE HYDROCHLORIDE 0.1 MG: 0.1 TABLET ORAL at 20:45

## 2022-03-02 RX ADMIN — AMLODIPINE BESYLATE 5 MG: 5 TABLET ORAL at 07:58

## 2022-03-02 RX ADMIN — CLONIDINE HYDROCHLORIDE 0.1 MG: 0.1 TABLET ORAL at 14:10

## 2022-03-02 RX ADMIN — DIVALPROEX SODIUM 1000 MG: 500 TABLET, FILM COATED, EXTENDED RELEASE ORAL at 07:58

## 2022-03-02 RX ADMIN — SITAGLIPTIN 100 MG: 100 TABLET, FILM COATED ORAL at 07:58

## 2022-03-02 RX ADMIN — ACETAMINOPHEN 650 MG: 325 TABLET, FILM COATED ORAL at 08:13

## 2022-03-02 RX ADMIN — MAGNESIUM HYDROXIDE 30 ML: 400 SUSPENSION ORAL at 07:58

## 2022-03-02 RX ADMIN — RISPERIDONE 0.5 MG: 0.5 TABLET, ORALLY DISINTEGRATING ORAL at 20:46

## 2022-03-02 RX ADMIN — CLONIDINE HYDROCHLORIDE 0.1 MG: 0.1 TABLET ORAL at 07:58

## 2022-03-02 RX ADMIN — RISPERIDONE 0.5 MG: 0.5 TABLET, ORALLY DISINTEGRATING ORAL at 07:58

## 2022-03-02 RX ADMIN — CARVEDILOL 25 MG: 25 TABLET, FILM COATED ORAL at 07:58

## 2022-03-02 ASSESSMENT — ACTIVITIES OF DAILY LIVING (ADL)
ADLS_ACUITY_SCORE: 19
ADLS_ACUITY_SCORE: 23
ADLS_ACUITY_SCORE: 19
ADLS_ACUITY_SCORE: 23
ADLS_ACUITY_SCORE: 19
ADLS_ACUITY_SCORE: 23
ADLS_ACUITY_SCORE: 23
ADLS_ACUITY_SCORE: 19
ADLS_ACUITY_SCORE: 19
ADLS_ACUITY_SCORE: 23
ADLS_ACUITY_SCORE: 19
ADLS_ACUITY_SCORE: 19
ADLS_ACUITY_SCORE: 23
ADLS_ACUITY_SCORE: 23
ADLS_ACUITY_SCORE: 19

## 2022-03-02 NOTE — PROGRESS NOTES
[facility]    Medicine Progress Note - Hospitalist Service, GOLD TEAM 7    Date of Admission:  2/19/2022    Assessment & Plan     Miriam Hall is a 46 year old male with history of severe resistant HTN and longstanding DMII who was initially admitted to [facility] 1/8 - 1/28/2022 with R PCA stroke c/b refractory HTN and DISHA, and encephalopathy. Stabilized and transferred to ARU 1/28/2022 for ongoing rehabilitation. Patient developed new AMS, agitation, paranoia and worsening DISHA 2/18 prompting transfer to [place]. Now medically stable for discharge to ARU vs TCU.        Agitation, resolved  Paranoia and personality change  Toxic metabolic encephalopathy, resolved  Pt developed new waxing and waning confusion, agitation and paranoia starting 2/18, worsened 2/19 prompting transfer from ARU to [facility]. Received 10 mg olanzapine and patient became obtunded prompting stroke code, repeat CT with known and evolving stroke without new CVA or hemorrhage. Unclear etiology of decompensation, but neurology feels could be possible personality change as effect of stroke vs medication effect, less likely sepsis as afebrile, HDS, normal WBC, negative UA, CXR. DDx - favor behavior changes likely from resolving stroke and improvement vs baclofen toxicity in CKD.  - Neurology consulted   - Head CT: evolving stroke   - UA negative, CXR with streaky opacities c/w atelectasis  - Spot EEG, prolactin elevated  -MRI (2/21) - continued evolution of stroke  -MRA Head (2/22) - lack fo flow in right posterior cerebral artery and high grade narrowing of right MCA posterior division. Per neurology similar to previous study.  -Psych consulted, risperdal 0.5 mg BID  -Ativan 0.5 mg BID prn for break thru agitation or anxiety  -Decrease baclofen to 2.5 mg daily given tenous renal function and ability to cause behavior changes in CKD   => Please step out and give patient time to calm down if he is feeling  agitated or anxious. He is usually redirectable and consolable.      Recent PCA stroke - January 2022  Presented with L hemiplegia. Head CT 1/8 R parieto-occipital infarct. CTA noted occlusion on proximal R PCA and high grade stenosis of M2 branch of R MCA. Outside window for tPA. Repeat imagine 1/12 with hemorrhagic transformation. Source 2/2 high grade atherosclerosis vs ESUS. CARISA no LA thrombus. Residual deficits include L hemiplegia, L hemianopsia, mild dysarthria.   - Neuro stroke consulted during hospital stay, have signed off  -Code stroke called on 2/17 on repeat presentation  -CT Head (2/19) - evolution of PCA stroke, no hemorrhage  -MRI brain - (2/21) - evotion of right PCA stroke  -MRA brain (2/22) - similar narrowing of MCA and occlusion of right PCA  -Follow up of Zio patch     DISHA on CKD, DISHA resolved  Resistant HTN  Patient has long hx of resistant HTN while on multidrug regimen. Recent DISHA and new BL Cr around 1.5-1.7, was 2.2 on transfer and leno to 2.9 on 2/20 in the setting of ACEi use, now improving. Discharged from TCU on amiloride, Coreg, clonidine, lisinopril, and scheduled and prn hydralazine.    - carvedilol to 25 BID, cont clonidine   - Started amlodipine 5 mg   >Labetalol prn for elevated BP  - Hold ACEi and diuretics, amiloride  - Nephrology consulted, appreciate rec's   -Follow up in hypertension clinic (ordered)     Hypopnea secondary to olanzapine - resolved  Patient was noted to have low RR (as low as 6) and long pauses of respiration prompting RRTs and stoke code following 10 IM Zyprexa. Normal oxygenation and normal gases. Avoid zyprexa  Now resolved.     Concern for Seizures  In the setting of CVA. EEG 1/12 concerning for subclinical seizures. Loaded with Keppra, transitioned to Depakote. No e/o seizure noted in the setting of AMS  - Continue Depakote and seizure precautions     Troponin elevation  -elevated to 120s on 2/22 in setting of uncontrolled hypertension and CKD. Decreased  from 2 weeks prior to admission, likely elevated from CKD and recent ischemic insult. ECG with no ischemic changes  - will stop trending unless clinical changes     Dysphagia  Moderate malnutrition in context of acute illness  Noted on admission with thin liquids. Previously on regular diet  - SLP consulted, ok with regular diet with 1:1   - continue Ensure Enlive between meals  - start calorie counts 3/3, family concerned he is not eating     DMII  A1c 7.8. PTA metformin on hold due to DISHA. Glucose stable  - MSSI, hypoglycemia protocol  -Sitagliptan 100 mg  -Stop sliding scale insulin and POC glucose checks      Hypokalemia - replacing, check PRN        Diet: Regular Diet Adult Thin Liquids (level 0)  Snacks/Supplements Adult: Ensure Enlive; Between Meals  Room Service    DVT Prophylaxis: Pneumatic Compression Devices  Mendoza Catheter: Not present  Central Lines: None  Cardiac Monitoring: None  Code Status: Full Code      Disposition Plan   Expected Discharge:  Medically ready for discharge  Anticipated discharge location:  Awaiting care coordination huddle  Delays:     Placement - LTAC/ARU            The patient's care was discussed with the Bedside Nurse and Patient.    Aiden Merino MD (Sally)  Internal Medicine/Pediatrics  Hospitalist    Hospitalist Service, [address]  Securely message with the Vocera Web Console (learn more here)  Text page via [facility] Paging/Directory   Please see signed in provider for up to date coverage information    ______________________________________________________________________    Interval History   No acute events overnight. He is doing well. No regular BM in a few days, feeling constipated, trying some MoM this morning and maybe some miralax in the afternoon. No SOB, n/v. His appetite is at his baseline he says but he doesn't eat a lot. Ok with calorie counts to get a better objective idea of how much he's eating. Going  to work w PT/OT this afternoon.     Data reviewed today: I reviewed all medications, new labs and imaging results over the last 24 hours. I personally reviewed no images or EKG's today.    Physical Exam   Vital Signs: Temp: 98.3  F (36.8  C) Temp src: Axillary BP: (!) 146/83 Pulse: 68   Resp: 20 SpO2: 98 % O2 Device: None (Room air)    Weight: 156 lbs 11.95 oz     General: awake, alert, in no acute distress  HEENT: NCAT, sclera anicteric, no nasal discharge, MMM, reportedly has L visual field cut  CV: RRR, no murmurs noted  Resp: CTAB, no increased WOB  Abd: Soft, nontender, nondistended, +BS, no rebound or guarding  MSK: No peripheral edema, extremities warm and well perfused  Skin: warm, dry, no jaundice  Neuro: residual decreased sensation on L side of face, decreased strength in L arm and leg      Data     Results for orders placed or performed during the hospital encounter of 02/19/22 (from the past 24 hour(s))   CBC with platelets   Result Value Ref Range    WBC Count 6.4 4.0 - 11.0 10e3/uL    RBC Count 3.87 (L) 4.40 - 5.90 10e6/uL    Hemoglobin 11.8 (L) 13.3 - 17.7 g/dL    Hematocrit 35.3 (L) 40.0 - 53.0 %    MCV 91 78 - 100 fL    MCH 30.5 26.5 - 33.0 pg    MCHC 33.4 31.5 - 36.5 g/dL    RDW 13.8 10.0 - 15.0 %    Platelet Count 268 150 - 450 10e3/uL   Comprehensive metabolic panel   Result Value Ref Range    Sodium 143 133 - 144 mmol/L    Potassium 3.1 (L) 3.4 - 5.3 mmol/L    Chloride 104 94 - 109 mmol/L    Carbon Dioxide (CO2) 29 20 - 32 mmol/L    Anion Gap 10 3 - 14 mmol/L    Urea Nitrogen 25 7 - 30 mg/dL    Creatinine 1.42 (H) 0.66 - 1.25 mg/dL    Calcium 9.5 8.5 - 10.1 mg/dL    Glucose 130 (H) 70 - 99 mg/dL    Alkaline Phosphatase 69 40 - 150 U/L    AST 33 0 - 45 U/L    ALT 29 0 - 70 U/L    Protein Total 7.2 6.8 - 8.8 g/dL    Albumin 3.1 (L) 3.4 - 5.0 g/dL    Bilirubin Total 0.4 0.2 - 1.3 mg/dL    GFR Estimate 61 >60 mL/min/1.73m2

## 2022-03-02 NOTE — PLAN OF CARE
Goal Outcome Evaluation:          Overall Patient Progress: no change  Pt*  Outcome Evaluation: AxOx4, pat accepting of cares. Assist of 2 with transfers, transfers well. Patient up in  since 0430. No bm overnight. Milk of mag ordered for the am. C/o feeling constipated. Awaiting TCU placement.

## 2022-03-02 NOTE — PROGRESS NOTES
Care Management Follow Up    Length of Stay (days): 11    Expected Discharge Date: 03/02/2022     Concerns to be Addressed: Discharge planning, TCU placement       Patient plan of care discussed at interdisciplinary rounds: Yes    Anticipated Discharge Disposition: TCU     Anticipated Discharge Services:TCU      Anticipated Discharge DME:TBD      Patient/family educated on Medicare website which has current facility and service quality ratings:N/A      Education Provided on the Discharge Plan:Yes    Patient/Family in Agreement with the Plan:Yes      Referrals Placed by CM/SW:    MARLENA sent initial referral requests to:  MARLENA spoke with Marylou from [facility](pt's insurance) and got this list of TCU's that are in network with pt.  Pt consented to SW sending out referrals.  SW spoke with Taniya, Pt's friend, and she will look into this referrals for any preferences.    [facility]  [address]  P: [phone]  F: [phone]    [facility]  [address], [place] [address]  P: [phone]  F: [phone]    [facility]  [address] [place]  [address]  P: [phone]  P: [phone] - Admissions  F: [phone]    Estates at "[employer]  [address] [place] [address]  [phone]    [facility]  [address] [place]  [address]  P: [phone]  F: [phone]    [facility]  [address] [place]  [address]  P: [phone]  F: [phone]    Estates at [facility]  [address] [place] [address]  [phone]    [facility]  [address] [place]  [address]  P: [phone]  F: [phone]      Private pay costs discussed: Not applicable    Additional Information:  MARLENA received a message from [employer] to contact Marylou([phone]) from [facility].  MARLENA asked Marylou if they had a list of facilities that are in network for pt. Marylou suggested that the SW call the [employer]  "In Network Provider line ([phone]) to obtain which facilites would be covered.  Since the hold waiting time was long, Marylou offered to call back and give SW a list of facilities that are in-network with pt's insurance.  SW spoke with pt at bedside and received consent from pt to send initial referrals to facilities.  Pt also consented to SW giving list of facilities to Taniya, pt's very close friend.  MARLENA called Taniya and received her email addresses([email or web address] and [email or web address]) and sent the list to her to review and choose preferences if desired.     Taniya sent an email to MARLENA and reported: \"My first choice would be [facility], and my second choice is [facility]\". SW will continue follow up with referrals with preferences in mind.     will continue to follow for discharge planning, support, resources.    ML Rojas, SW  [address]   Office: [phone]   Pager: [phone]  [email or web address]        "

## 2022-03-02 NOTE — PROGRESS NOTES
[facility]    Medicine Progress Note - Hospitalist Service, GOLD TEAM 7    Date of Admission:  2/19/2022    Assessment & Plan      Miriam Hall is a 46 year old male with history of severe resistant HTN and longstanding DMII who was initially admitted to [facility] 1/8 - 1/28/2022 with R PCA stroke c/b refractory HTN and DISHA, and encephalopathy. Stabilized and transferred to ARU 1/28/2022 for ongoing rehabilitation. Patient developed new AMS, agitation, paranoia and worsening DISHA 2/18 prompting transfer to [place]. Now medically stable for discharge to ARU vs TCU.       Agitation, resolved  Paranoia and personality change  Toxic metabolic encephalopathy, resolved  Pt developed new waxing and waning confusion, agitation and paranoia starting 2/18, worsened 2/19 prompting transfer from ARU to [facility]. Received 10 mg olanzapine and patient became obtunded prompting stroke code, repeat CT with known and evolving stroke without new CVA or hemorrhage. Unclear etiology of decompensation, but neurology feels could be possible personality change as effect of stroke vs medication effect, less likely sepsis as afebrile, HDS, normal WBC, negative UA, CXR. DDx - favor behavior changes likely from resolving stroke and improvement vs baclofen toxicity in CKD.  - Neurology consulted   - Head CT: evolving stroke   - UA negative, CXR with streaky opacities c/w atelectasis  - Spot EEG, prolactin elevated  -MRI (2/21) - continued evolution of stroke  -MRA Head (2/22) - lack fo flow in right posterior cerebral artery and high grade narrowing of right MCA posterior division. Per neurology similar to previous study.  -Psych consulted, risperdal 0.5 mg BID  -Ativan 0.5 mg BID prn for break thru agitation or anxiety  -Decrease baclofen to 2.5 mg daily given tenous renal function and ability to cause behavior changes in CKD   => Please step out and give patient time to calm down if he is feeling  agitated or anxious. He is usually redirectable and consolable.      Recent PCA stroke - January 2022  Presented with L hemiplegia. Head CT 1/8 R parieto-occipital infarct. CTA noted occlusion on proximal R PCA and high grade stenosis of M2 branch of R MCA. Outside window for tPA. Repeat imagine 1/12 with hemorrhagic transformation. Source 2/2 high grade atherosclerosis vs ESUS. CARISA no LA thrombus. Residual deficits include L hemiplegia, L hemianopsia, mild dysarthria.   - Neuro stroke consulted during hospital stay, have signed off  -Code stroke called on 2/17 on repeat presentation  -CT Head (2/19) - evolution of PCA stroke, no hemorrhage  -MRI brain - (2/21) - evotion of right PCA stroke  -MRA brain (2/22) - similar narrowing of MCA and occlusion of right PCA  -Follow up of Zio patch     DISHA on CKD, DISHA resolved  Resistant HTN  Patient has long hx of resistant HTN while on multidrug regimen. Recent DISHA and new BL Cr around 1.5-1.7, was 2.2 on transfer and leno to 2.9 on 2/20 in the setting of ACEi use, now improving. Discharged from TCU on amiloride, Coreg, clonidine, lisinopril, and scheduled and prn hydralazine.    - carvedilol to 25 BID, cont clonidine   - Started amlodipine 5 mg   >Labetalol prn for elevated BP  - Hold ACEi and diuretics, amiloride  - Nephrology consulted, appreciate rec's   -Follow up in hypertension clinic (ordered)     Hypopnea secondary to olanzapine - resolved  Patient was noted to have low RR (as low as 6) and long pauses of respiration prompting RRTs and stoke code following 10 IM Zyprexa. Normal oxygenation and normal gases. Avoid zyprexa  Now resolved.     Concern for Seizures  In the setting of CVA. EEG 1/12 concerning for subclinical seizures. Loaded with Keppra, transitioned to Depakote. No e/o seizure noted in the setting of AMS  - Continue Depakote and seizure precautions     Troponin elevation  -elevated to 120s on 2/22 in setting of uncontrolled hypertension and CKD. Decreased  from 2 weeks prior to admission, likely elevated from CKD and recent ischemic insult. ECG with no ischemic changes  - will stop trending unless clinical changes    Dysphagia  Moderate malnutrition in context of acute illness  Noted on admission with thin liquids. Previously on regular diet  - SLP consulted, ok with regular diet with 1:1   - continue Ensure Enlive between meals  - consider calorie counts, family concerned he is not eating     DMII  A1c 7.8. PTA metformin on hold due to DISHA. Glucose stable  - MSSI, hypoglycemia protocol  -Sitagliptan 100 mg  -Stop sliding scale insulin and POC glucose checks      Hypokalemia - replaced, checking labs q48H        Diet: Regular Diet Adult Thin Liquids (level 0)  Snacks/Supplements Adult: Ensure Enlive; Between Meals  Room Service    DVT Prophylaxis: Pneumatic Compression Devices  Mendoza Catheter: Not present  Central Lines: None  Cardiac Monitoring: None  Code Status: Full Code      Disposition Plan   Expected Discharge: 03/02/2022  Anticipated discharge location:  Awaiting care coordination huddle  Delays:     Placement - LTAC/ARU       Medically ready for discharge, pending placement.     The patient's care was discussed with the Bedside Nurse, Patient and Patient's Family.    Aiden Merino MD (Sally)  Internal Medicine/Pediatrics  Hospitalist    Hospitalist Service, [address]  Securely message with the Vocera Web Console (learn more here)  Text page via [facility] Paging/Directory   Please see signed in provider for up to date coverage information      ______________________________________________________________________    Interval History   No acute events overnight. Was napping when I saw him. Denies pain or discomfort. He states he is eating OK but friend feels he isn't eating much. Stable L sided weakness.    Data reviewed today: I reviewed all medications, new labs and imaging results over the last 24  hours. I personally reviewed no images or EKG's today.    Physical Exam   Vital Signs: Temp: 98.7  F (37.1  C) Temp src: Oral BP: 133/77 Pulse: 73   Resp: 16 SpO2: 99 % O2 Device: None (Room air)    Weight: 156 lbs 8.43 oz    Physical Exam  General: awake, alert, in no acute distress  HEENT: NCAT, sclera anicteric, no nasal discharge, MMM, reportedly has L visual field cut  CV: RRR, no murmurs noted  Resp: CTAB, no increased WOB  Abd: Soft, nontender, nondistended, +BS, no rebound or guarding  MSK: No peripheral edema, extremities warm and well perfused  Skin: warm, dry, no jaundice  Neuro: residual decreased sensation on L side of face, decrease strength in L arm and leg      Data   No new labs.

## 2022-03-03 ENCOUNTER — DOCUMENTATION ONLY (OUTPATIENT)
Dept: ORTHOPEDICS | Facility: CLINIC | Age: 47
End: 2022-03-03
Payer: COMMERCIAL

## 2022-03-03 ENCOUNTER — APPOINTMENT (OUTPATIENT)
Dept: PHYSICAL THERAPY | Facility: CLINIC | Age: 47
End: 2022-03-03
Attending: STUDENT IN AN ORGANIZED HEALTH CARE EDUCATION/TRAINING PROGRAM
Payer: COMMERCIAL

## 2022-03-03 ENCOUNTER — APPOINTMENT (OUTPATIENT)
Dept: OCCUPATIONAL THERAPY | Facility: CLINIC | Age: 47
End: 2022-03-03
Attending: STUDENT IN AN ORGANIZED HEALTH CARE EDUCATION/TRAINING PROGRAM
Payer: COMMERCIAL

## 2022-03-03 LAB — POTASSIUM BLD-SCNC: 3.8 MMOL/L (ref 3.4–5.3)

## 2022-03-03 PROCEDURE — 250N000013 HC RX MED GY IP 250 OP 250 PS 637

## 2022-03-03 PROCEDURE — 250N000013 HC RX MED GY IP 250 OP 250 PS 637: Performed by: PHYSICIAN ASSISTANT

## 2022-03-03 PROCEDURE — 250N000013 HC RX MED GY IP 250 OP 250 PS 637: Performed by: STUDENT IN AN ORGANIZED HEALTH CARE EDUCATION/TRAINING PROGRAM

## 2022-03-03 PROCEDURE — L3670 SO ACRO/CLAV CAN WEB PRE OTS: HCPCS

## 2022-03-03 PROCEDURE — 84132 ASSAY OF SERUM POTASSIUM: CPT | Performed by: STUDENT IN AN ORGANIZED HEALTH CARE EDUCATION/TRAINING PROGRAM

## 2022-03-03 PROCEDURE — 250N000013 HC RX MED GY IP 250 OP 250 PS 637: Performed by: INTERNAL MEDICINE

## 2022-03-03 PROCEDURE — 36415 COLL VENOUS BLD VENIPUNCTURE: CPT | Performed by: STUDENT IN AN ORGANIZED HEALTH CARE EDUCATION/TRAINING PROGRAM

## 2022-03-03 PROCEDURE — 120N000002 HC R&B MED SURG/OB UMMC

## 2022-03-03 PROCEDURE — 250N000013 HC RX MED GY IP 250 OP 250 PS 637: Performed by: PSYCHIATRY & NEUROLOGY

## 2022-03-03 PROCEDURE — 97535 SELF CARE MNGMENT TRAINING: CPT | Mod: GO

## 2022-03-03 PROCEDURE — 97112 NEUROMUSCULAR REEDUCATION: CPT | Mod: GO

## 2022-03-03 PROCEDURE — 97116 GAIT TRAINING THERAPY: CPT | Mod: GP

## 2022-03-03 PROCEDURE — 99232 SBSQ HOSP IP/OBS MODERATE 35: CPT | Performed by: STUDENT IN AN ORGANIZED HEALTH CARE EDUCATION/TRAINING PROGRAM

## 2022-03-03 PROCEDURE — 250N000013 HC RX MED GY IP 250 OP 250 PS 637: Performed by: HOSPITALIST

## 2022-03-03 PROCEDURE — 97530 THERAPEUTIC ACTIVITIES: CPT | Mod: GP

## 2022-03-03 PROCEDURE — 250N000011 HC RX IP 250 OP 636: Performed by: STUDENT IN AN ORGANIZED HEALTH CARE EDUCATION/TRAINING PROGRAM

## 2022-03-03 RX ORDER — AMLODIPINE BESYLATE 10 MG/1
10 TABLET ORAL DAILY
Status: DISCONTINUED | OUTPATIENT
Start: 2022-03-03 | End: 2022-03-09 | Stop reason: HOSPADM

## 2022-03-03 RX ADMIN — CLONIDINE HYDROCHLORIDE 0.1 MG: 0.1 TABLET ORAL at 21:24

## 2022-03-03 RX ADMIN — Medication 2.5 MG: at 07:58

## 2022-03-03 RX ADMIN — Medication 3 MG: at 21:24

## 2022-03-03 RX ADMIN — CLONIDINE HYDROCHLORIDE 0.1 MG: 0.1 TABLET ORAL at 14:12

## 2022-03-03 RX ADMIN — SITAGLIPTIN 100 MG: 100 TABLET, FILM COATED ORAL at 07:58

## 2022-03-03 RX ADMIN — LABETALOL HYDROCHLORIDE 10 MG: 5 INJECTION, SOLUTION INTRAVENOUS at 06:19

## 2022-03-03 RX ADMIN — CLONIDINE HYDROCHLORIDE 0.1 MG: 0.1 TABLET ORAL at 07:58

## 2022-03-03 RX ADMIN — RISPERIDONE 0.5 MG: 0.5 TABLET, ORALLY DISINTEGRATING ORAL at 07:58

## 2022-03-03 RX ADMIN — AMLODIPINE BESYLATE 10 MG: 10 TABLET ORAL at 10:13

## 2022-03-03 RX ADMIN — RISPERIDONE 0.5 MG: 0.5 TABLET, ORALLY DISINTEGRATING ORAL at 21:24

## 2022-03-03 RX ADMIN — CARVEDILOL 25 MG: 25 TABLET, FILM COATED ORAL at 17:35

## 2022-03-03 RX ADMIN — LIDOCAINE 1 PATCH: 560 PATCH PERCUTANEOUS; TOPICAL; TRANSDERMAL at 07:57

## 2022-03-03 RX ADMIN — CARVEDILOL 25 MG: 25 TABLET, FILM COATED ORAL at 07:58

## 2022-03-03 RX ADMIN — ATORVASTATIN CALCIUM 40 MG: 40 TABLET, FILM COATED ORAL at 21:24

## 2022-03-03 RX ADMIN — DIVALPROEX SODIUM 1000 MG: 500 TABLET, FILM COATED, EXTENDED RELEASE ORAL at 07:58

## 2022-03-03 RX ADMIN — SENNOSIDES AND DOCUSATE SODIUM 1 TABLET: 8.6; 5 TABLET ORAL at 14:13

## 2022-03-03 RX ADMIN — ASPIRIN 81 MG: 81 TABLET, CHEWABLE ORAL at 07:58

## 2022-03-03 ASSESSMENT — ACTIVITIES OF DAILY LIVING (ADL)
ADLS_ACUITY_SCORE: 19
ADLS_ACUITY_SCORE: 19
ADLS_ACUITY_SCORE: 21
ADLS_ACUITY_SCORE: 19
ADLS_ACUITY_SCORE: 21
ADLS_ACUITY_SCORE: 19
ADLS_ACUITY_SCORE: 21
ADLS_ACUITY_SCORE: 19
ADLS_ACUITY_SCORE: 21
ADLS_ACUITY_SCORE: 19

## 2022-03-03 NOTE — PROGRESS NOTES
Care Management Follow Up    Length of Stay (days): 12    Expected Discharge Date:  TBD medically ready     Concerns to be Addressed:discharge planning     Patient plan of care discussed at interdisciplinary rounds: Yes    Anticipated Discharge Disposition: TCU     Anticipated Discharge Services: transporation     Anticipated Discharge DME: TBD    Patient/family educated on Medicare website which has current facility and service quality ratings: N/A     Education Provided on the Discharge Plan:Yes      Patient/Family in Agreement with the Plan:Yes      Referrals Placed by CM/SW:  3/3 SW followed up on referrals:     [facility]  [address]  P: [phone]  F: [phone]  *Taniya's, pt's close friend's, preference   3/3 SW called admissions     [facility]  [address], [place] [address]  P: [phone]  F: [phone]  3/3 SW left VM with admissions.    [facility]  [address] [place]  [address]  P: [phone]  P: [phone] - Admissions  F: [phone]  *Taniya, Pt's close friend's preference  3/3 SW left VM with admissions.     Estates at [facility]  [address] [place], [place] [address] [phone]  3/3 SW spoke with Kimmy in admissions and she reported that they did not get a chance to look at the referral yet.  She will call back SW after they are able to review pt.     [facility]  [address] [place]  [address]  P: [phone]  F: [phone]  3/3 SW left VM with admissions.     [facility]  [address] [place]  [address]  P: [phone]  F: [phone]  3/3 SW left VM with Seema Fried, admissions  Coordinator.    Estates at [facility]  [address] [place] [address]  [phone]  3/3 SW left VM with [facility] .    [facility]  [address] [place]  [address]  P: [phone]  F: [phone]  3/3  "SW received VM. Declined due to pt's orientation and no bed availability through end of week.      Private pay costs discussed: Not applicable    Additional Information:  SW followed up on referrals for pt. SW received a VM from Marylou, from [facility] who asked for any updates on pt's discharge placement.  MARLENA spoke with Marylou and stated that 1 facility has declined and that SW is still following up on the other facilities.  Marylou stated that she will escalate case(ie. Pt's barriers to placement) to receive more time for discharge placement.  MARLENA spoke with Marylou([phone]) from [facility] and she reported that pt's insurance approved for pt to have an additional 6 more days for discharge planning purposes(exp. 3/8)  Marylou reported that if there is a facility able to take pt, to immediately call her and she can expedite the insurance portion of the placement.     MARLENA received an email from Taniya, pt's close friend, which stated, \"I just wanted to reach out to you and let you know that we plan on telling Rohan about his mom, Molly, tomorrow evening during my visit.  His cousin, Edi, is going to be on the phone with me to help with support and other details pertaining to the  home.  I am hoping it will be possible to have someone on hand to help Rohan with his grief if needed.  I have no idea how he is going to take the news.  Based on conversations I have had with him, it could really go either way.  He could be calm initially, then react, or he could react strongly, then be calm. His relationship with his mom was not perfect, but I know he loves her very much, and this is going to hit him hard.  I am a bit concerned that he will react negatively toward the hospital, as all of his family have now passed either in a hospital, or shortly after being released from a hospital.  This is why I want to have someone on hand if possible. Please let me know if you have any insights or advice to give before I tell him tomorrow.\"    Taniya also " "informed MARLENA that, \"I will be telling him in person after I get done with work, so around 5:30-6:00pm.\"    MARLENA spoke with Giana from spiritual services and was informed that in the evening there is an on-call  who will need to be page tomorrow evening.  The on-call  needs a 30 minute travel window.  Giana suggested that the pt's evening nurse page(call the  to page  or order a STAT Epic Consult for the ) the on-call  who will be Shelly, when/if services are needed.     Taniya sent an email to MARLENA that stated, \"I am not sure if a  would be the way to go with Rohan.  He is not Spiritism. I am not saying no, I just am not sure how Rohan will react to a .  Does he have to come into the room while I tell Rohan, or can he just be on standby? I don t want Rohan to be overwhelmed with a stranger in the room while I tell him.\"  MARLENA explained to Taniya that chaplains are trained to be interfaith(no andreina or Restorationism is included) and that 's role is for emotional support.  MARLENA informed Taniya that the  can be paged if/when needed and she can choose not to use the spiritual services if not desired.    MARLENA informed pt's day shift nurse of Taniya's plan to tell pt about pt's mother passing away, the time this will occur(tomorrow evening 5:30-6:30), and the process to page on-call  tomorrow evening if needed.     will continue to follow for discharge planning, support, resources.     ML Rojas, [facility]  [address]   Office: [phone]   Pager: [phone]  [email or web address]  "

## 2022-03-03 NOTE — PLAN OF CARE
Goal Outcome Evaluation:     Plan of Care Reviewed With: patient      Overall Patient Progress: no change    Pt A&Ox4. VSS except for HTN, /98, prn labetalol given. Transfers to w/c with A2. Denies pain. L sided weakness. R PIV-SL. Reg diet, maria esther counts starts today. Last BM 2/28, active BS, passing flatus. Refused miralax. Voided w/ urinal. Waiting for TCU placement.

## 2022-03-03 NOTE — PLAN OF CARE
Goal Outcome Evaluation:    Plan of Care Reviewed With: patient     Time: 07:00-15:30     Reason for admit: Altered Mental Status  Vitals: Elevated BP  Activity: Activity as tolerated, AO2  Neuro: Alert and oriented x4  Mood/Behavior: Calm and cooperative  Lines/Drains: R PIV, SL  Cardiac: WNL  Resp: Lung sounds clear, but diminished at the base  Diet: Regular diet, tray set up with good appetite  GI/: Voids without difficulty. Feels constipated, advised to order prune juice with meals.  Skin: Skin is dry, edema noted on L wrist, ankle and feet.  Pain: No c/o pain this shift  Labs/Imaging: Lab results are unremarkable      New this shift: BP high, amlodipine increased to 10 mg. PRN senna-docusate given 1 tablet to help with constipation. Ambulated in the hallway with therapy using walker and gait belt, assist of 1. Advised to exercise the left side of the body.     Plan:  Continue with POC

## 2022-03-03 NOTE — PROGRESS NOTES
S: Miriam Hall was seen today at the [facility], Room 9129-01, to be fit with an Stephanie Neurexa Plus Acromio/Clavicular Shoulder Orthosis.      Dx:  Paraplegia of Left Upper Extremity    O: The patient was fit with a Left Medium Stephanie Neurexa Plus Acromio/Clavicular Shoulder Orthosis.    A:  I reviewed donning, doffing and how to adjust the orthosis with the patient.  I also provided the patient with written instructions from the .  The patient stated that the brace felt comfortable to wear.      P: The patient should use the orthosis as directed by the physician.  Please contact [facility] if there are any questions or concerns.    G: The goal of this orthosis is to elevate the left arm in order to reduce/prevent subluxation and pain the left upper extremity.     Electronically signed by Natalio Haynes CPO, JASONO

## 2022-03-03 NOTE — PROGRESS NOTES
[facility]    Medicine Progress Note - Hospitalist Service, GOLD TEAM 7    Date of Admission:  2/19/2022    Assessment & Plan       Miriam Hall is a 46 year old male with history of severe resistant HTN and longstanding DMII who was initially admitted to [facility] 1/8 - 1/28/2022 with R PCA stroke c/b refractory HTN and DISHA, and encephalopathy. Stabilized and transferred to ARU 1/28/2022 for ongoing rehabilitation. Patient developed new AMS, agitation, paranoia and worsening DISHA 2/18 prompting transfer to [place]. Now medically stable for discharge to ARU vs TCU.        Agitation, resolved  Paranoia and personality change  Toxic metabolic encephalopathy, resolved  Pt developed new waxing and waning confusion, agitation and paranoia starting 2/18, worsened 2/19 prompting transfer from ARU to [facility]. Received 10 mg olanzapine and patient became obtunded prompting stroke code, repeat CT with known and evolving stroke without new CVA or hemorrhage. Unclear etiology of decompensation, but neurology feels could be possible personality change as effect of stroke vs medication effect, less likely sepsis as afebrile, HDS, normal WBC, negative UA, CXR. DDx - favor behavior changes likely from resolving stroke and improvement vs baclofen toxicity in CKD.  - Neurology consulted   - Head CT: evolving stroke   - UA negative, CXR with streaky opacities c/w atelectasis  - Spot EEG, prolactin elevated  -MRI (2/21) - continued evolution of stroke  -MRA Head (2/22) - lack fo flow in right posterior cerebral artery and high grade narrowing of right MCA posterior division. Per neurology similar to previous study.  -Psych consulted, risperdal 0.5 mg BID  -Ativan 0.5 mg BID prn for break thru agitation or anxiety  -Decrease baclofen to 2.5 mg daily given tenous renal function and ability to cause behavior changes in CKD   => Please step out and give patient time to calm down if he is feeling  agitated or anxious. He is usually redirectable and consolable.      Recent PCA stroke - January 2022  Presented with L hemiplegia. Head CT 1/8 R parieto-occipital infarct. CTA noted occlusion on proximal R PCA and high grade stenosis of M2 branch of R MCA. Outside window for tPA. Repeat imagine 1/12 with hemorrhagic transformation. Source 2/2 high grade atherosclerosis vs ESUS. CARISA no LA thrombus. Residual deficits include L hemiplegia, L hemianopsia, mild dysarthria.   - Neuro stroke consulted during hospital stay, have signed off  -Code stroke called on 2/17 on repeat presentation  -CT Head (2/19) - evolution of PCA stroke, no hemorrhage  -MRI brain - (2/21) - evotion of right PCA stroke  -MRA brain (2/22) - similar narrowing of MCA and occlusion of right PCA  -Follow up of Zio patch     DISHA on CKD, DISHA resolved  Resistant HTN  Patient has long hx of resistant HTN while on multidrug regimen. Recent DISHA and new BL Cr around 1.5-1.7, was 2.2 on transfer and leno to 2.9 on 2/20 in the setting of ACEi use, now improving. Discharged from TCU on amiloride, Coreg, clonidine, lisinopril, and scheduled and prn hydralazine.    - carvedilol to 25 BID, cont clonidine   - increase amlodipine to 10 mg  Per renal's last note - permissive HTN given Cr has been sensitive to perfusion, SBPs 160s OK  - Labetalol prn for elevated BP  - Hold ACEi and diuretics, amiloride  - Nephrology consulted, appreciate recs, s/o  -Follow up in hypertension clinic (ordered)  Will eventually need genetics evaluation to consider Liddle's syndrome per last renal note     Hypopnea secondary to olanzapine - resolved  Patient was noted to have low RR (as low as 6) and long pauses of respiration prompting RRTs and stoke code following 10 IM Zyprexa. Normal oxygenation and normal gases. Avoid zyprexa  Now resolved.     Concern for Seizures  In the setting of CVA. EEG 1/12 concerning for subclinical seizures. Loaded with Keppra, transitioned to  Depakote. No e/o seizure noted in the setting of AMS  - Continue Depakote and seizure precautions     Troponin elevation  -elevated to 120s on 2/22 in setting of uncontrolled hypertension and CKD. Decreased from 2 weeks prior to admission, likely elevated from CKD and recent ischemic insult. ECG with no ischemic changes  - will stop trending unless clinical changes     Dysphagia  Moderate malnutrition in context of acute illness  Noted on admission with thin liquids. Previously on regular diet  - SLP consulted, ok with regular diet with 1:1   - continue Ensure Enlive between meals  - started calorie counts 3/3, family concerned he is not eating     DMII  A1c 7.8. PTA metformin on hold due to DISHA. Glucose stable  - MSSI, hypoglycemia protocol  -Sitagliptan 100 mg  -Stop sliding scale insulin and POC glucose checks      Hypokalemia - replacing, check PRN    Constipation  - bowel regimen    Diet: Regular Diet Adult Thin Liquids (level 0)  Snacks/Supplements Adult: Ensure Enlive; Between Meals  Room Service  Calorie Counts    DVT Prophylaxis: Pneumatic Compression Devices  Mendoza Catheter: Not present  Central Lines: None  Cardiac Monitoring: None  Code Status: Full Code      Disposition Plan   Expected Discharge: medically ready for TCU/ARU  Anticipated discharge location:  Awaiting care coordination huddle  Delays:     Placement - LTAC/ARU            The patient's care was discussed with the Bedside Nurse, Patient and Patient's Family. Will call friend this afternoon.     Aiden Merino MD (Sally)  Internal Medicine/Pediatrics  Hospitalist    Hospitalist Service, [facility] TEAM [address] [place] [address]  Securely message with the Vocera Web Console (learn more here)  Text page via [facility] Paging/Directory   Please see signed in provider for up to date coverage information    ______________________________________________________________________    Interval History   No acute events  overnight. No BM yet. No abd pain, no nausea, not yet feeling very full or constipated but getting there. Appetite remains low, just not really wanting to eat much. Mentioned he could have outside food but he couldn't really think of anything that was too appealing.     Data reviewed today: I reviewed all medications, new labs and imaging results over the last 24 hours. I personally reviewed no images or EKG's today.    Physical Exam   Vital Signs: Temp: 98.3  F (36.8  C) Temp src: Oral BP: 129/79 Pulse: 73   Resp: 18 SpO2: 97 % O2 Device: None (Room air)    Weight: 156 lbs 8.43 oz    General: awake, alert, in no acute distress  HEENT: NCAT, sclera anicteric, no nasal discharge, MMM, reportedly has L visual field cut  CV: RRR, no murmurs noted  Resp: CTAB, no increased WOB  Abd: Soft, nontender, nondistended, +BS, no rebound or guarding  MSK: No peripheral edema, extremities warm and well perfused  Skin: warm, dry, no jaundice  Neuro: residual decreased sensation on L side of face, decreased strength in L arm and leg    Data     Results for orders placed or performed during the hospital encounter of 02/19/22 (from the past 24 hour(s))   Potassium   Result Value Ref Range    Potassium 3.8 3.4 - 5.3 mmol/L

## 2022-03-04 ENCOUNTER — APPOINTMENT (OUTPATIENT)
Dept: PHYSICAL THERAPY | Facility: CLINIC | Age: 47
End: 2022-03-04
Attending: STUDENT IN AN ORGANIZED HEALTH CARE EDUCATION/TRAINING PROGRAM
Payer: COMMERCIAL

## 2022-03-04 ENCOUNTER — PATIENT OUTREACH (OUTPATIENT)
Dept: NEUROLOGY | Facility: CLINIC | Age: 47
End: 2022-03-04
Payer: COMMERCIAL

## 2022-03-04 ENCOUNTER — APPOINTMENT (OUTPATIENT)
Dept: OCCUPATIONAL THERAPY | Facility: CLINIC | Age: 47
End: 2022-03-04
Attending: STUDENT IN AN ORGANIZED HEALTH CARE EDUCATION/TRAINING PROGRAM
Payer: COMMERCIAL

## 2022-03-04 PROCEDURE — 250N000013 HC RX MED GY IP 250 OP 250 PS 637: Performed by: STUDENT IN AN ORGANIZED HEALTH CARE EDUCATION/TRAINING PROGRAM

## 2022-03-04 PROCEDURE — 250N000013 HC RX MED GY IP 250 OP 250 PS 637: Performed by: PHYSICIAN ASSISTANT

## 2022-03-04 PROCEDURE — 97542 WHEELCHAIR MNGMENT TRAINING: CPT | Mod: GP

## 2022-03-04 PROCEDURE — 250N000013 HC RX MED GY IP 250 OP 250 PS 637: Performed by: INTERNAL MEDICINE

## 2022-03-04 PROCEDURE — 250N000013 HC RX MED GY IP 250 OP 250 PS 637

## 2022-03-04 PROCEDURE — 97112 NEUROMUSCULAR REEDUCATION: CPT | Mod: GO

## 2022-03-04 PROCEDURE — 99232 SBSQ HOSP IP/OBS MODERATE 35: CPT | Performed by: STUDENT IN AN ORGANIZED HEALTH CARE EDUCATION/TRAINING PROGRAM

## 2022-03-04 PROCEDURE — 250N000013 HC RX MED GY IP 250 OP 250 PS 637: Performed by: PSYCHIATRY & NEUROLOGY

## 2022-03-04 PROCEDURE — 250N000013 HC RX MED GY IP 250 OP 250 PS 637: Performed by: HOSPITALIST

## 2022-03-04 PROCEDURE — 97116 GAIT TRAINING THERAPY: CPT | Mod: GP

## 2022-03-04 PROCEDURE — 97530 THERAPEUTIC ACTIVITIES: CPT | Mod: GO

## 2022-03-04 PROCEDURE — 97530 THERAPEUTIC ACTIVITIES: CPT | Mod: GP

## 2022-03-04 PROCEDURE — 120N000002 HC R&B MED SURG/OB UMMC

## 2022-03-04 RX ORDER — MAGNESIUM CARB/ALUMINUM HYDROX 105-160MG
296 TABLET,CHEWABLE ORAL
Status: DISCONTINUED | OUTPATIENT
Start: 2022-03-04 | End: 2022-03-09 | Stop reason: HOSPADM

## 2022-03-04 RX ADMIN — Medication 3 MG: at 22:01

## 2022-03-04 RX ADMIN — ATORVASTATIN CALCIUM 40 MG: 40 TABLET, FILM COATED ORAL at 22:01

## 2022-03-04 RX ADMIN — CLONIDINE HYDROCHLORIDE 0.1 MG: 0.1 TABLET ORAL at 14:06

## 2022-03-04 RX ADMIN — CARVEDILOL 25 MG: 25 TABLET, FILM COATED ORAL at 09:42

## 2022-03-04 RX ADMIN — SITAGLIPTIN 100 MG: 100 TABLET, FILM COATED ORAL at 09:42

## 2022-03-04 RX ADMIN — POLYETHYLENE GLYCOL 3350 17 G: 17 POWDER, FOR SOLUTION ORAL at 22:00

## 2022-03-04 RX ADMIN — CLONIDINE HYDROCHLORIDE 0.1 MG: 0.1 TABLET ORAL at 09:42

## 2022-03-04 RX ADMIN — RISPERIDONE 0.5 MG: 0.5 TABLET, ORALLY DISINTEGRATING ORAL at 09:42

## 2022-03-04 RX ADMIN — ACETAMINOPHEN 650 MG: 325 TABLET, FILM COATED ORAL at 06:30

## 2022-03-04 RX ADMIN — DIVALPROEX SODIUM 1000 MG: 500 TABLET, FILM COATED, EXTENDED RELEASE ORAL at 09:43

## 2022-03-04 RX ADMIN — ACETAMINOPHEN 650 MG: 325 TABLET, FILM COATED ORAL at 14:06

## 2022-03-04 RX ADMIN — CARVEDILOL 25 MG: 25 TABLET, FILM COATED ORAL at 17:35

## 2022-03-04 RX ADMIN — RISPERIDONE 0.5 MG: 0.5 TABLET, ORALLY DISINTEGRATING ORAL at 22:00

## 2022-03-04 RX ADMIN — ASPIRIN 81 MG: 81 TABLET, CHEWABLE ORAL at 09:43

## 2022-03-04 RX ADMIN — LIDOCAINE 1 PATCH: 560 PATCH PERCUTANEOUS; TOPICAL; TRANSDERMAL at 09:42

## 2022-03-04 RX ADMIN — Medication 2.5 MG: at 09:43

## 2022-03-04 RX ADMIN — AMLODIPINE BESYLATE 10 MG: 10 TABLET ORAL at 09:43

## 2022-03-04 RX ADMIN — CLONIDINE HYDROCHLORIDE 0.1 MG: 0.1 TABLET ORAL at 22:00

## 2022-03-04 ASSESSMENT — ACTIVITIES OF DAILY LIVING (ADL)
ADLS_ACUITY_SCORE: 21

## 2022-03-04 NOTE — PROGRESS NOTES
Care Management Follow Up    Length of Stay (days): 13    Expected Discharge Date: 3/9/22      Concerns to be Addressed: discharge planning  Patient plan of care discussed at interdisciplinary rounds: Yes    Anticipated Discharge Disposition:  [facility]  [address] [place]  [address]  P: [phone]  F: [phone]    Anticipated Discharge Services: Transportation     Anticipated Discharge DME: TBD    Patient/family educated on Medicare website which has current facility and service quality ratings:N/A    Education Provided on the Discharge Plan: Yes   Patient/Family in Agreement with the Plan: Yes     Referrals Placed by CM/SW:   3/4 SW followed up on referrals:    [facility]  [address] [place]  [address]  P: [phone]  F: [phone]  3/4 SW received a call from Aditya and was informed that a bed would be available for pt on Wednesday 3/9.  Aditya reported that a AM transport would be most ideal.  This is the most preferred choice for Pt.  Pt was previously going to be discharged to [facility].    [facility]  [address]  P: [phone]  F: [phone]  *Taniya's, pt's close friend's, preference        [facility]  [address], [place] [address]  P: [phone]  F: [phone]     [facility]  [address] [place]  [address]  P: [phone]  P: [phone] - Admissions  F: [phone]  *Taniya, Pt's close friend's preference     [facility]  [address] [place] [address]  [phone]    [facility]  [address] [place]  [address]  P: [phone]  F: [phone]    [facility]  [address] [place]  [address]  P: [phone]  F: [phone]  3/4    [facility]  [address] [place] [address]  [phone]  3/4    Private pay costs discussed: Not applicable    Additional  Information:  MARLENA received a call from Aditya(staff from [facility]: P: [phone]) and was informed that a bed would be available for pt on Wednesday 3/9.  Aditya reported that a AM transport would be most ideal.  This is the #1 preferred facility choice for Pt.  Pt was previously going to be discharged to [facility].  MARLENA paged Dr. Merino to let him know that [facility] a bed available on Wednesday 3/9.    MARLENA called Taniya to inform about [facility].  Taniya reported that it was the best news to receive today. MARLENA spoke with Taniya at length about  home process and concern about how pt will handle the news of his mother's passing tonight. Taniya reported having to gather belongings of pt's  mother Molly.  Taniya reported concern about pt's decision making capacity for  arrangements after learning of pt mother's passing.  Taniya reported that Will, pt's cousin who lives in [place], will make decisions if needed(but would rather not for potential legal reasons) but primary plan is for pt to make  arrangement decisions.  Taniya reported that it is frustrating because she is unable to make decisions for pt because she does not have power of .      will continue to follow for discharge planning, support, and resources.    ML Rojas, [facility]  [address]   Office: [phone]   Pager: [phone]  [email or web address]

## 2022-03-04 NOTE — PLAN OF CARE
Goal Outcome Evaluation:    Plan of Care Reviewed With: patient     Overall Patient Progress: improving       Pt alert and oriented, VSS on RA. Up with A2, walker, and gaitbelt. Voiding spontaneously, up to bedside commode. Regular diet and tolerating well. Lidocaine patch removed from left hip this evening. Right PIV saline locked. Left side hypotonic, flaccid. PRN Hydralazine and Labetalol available for hypertension, not needed this shift. TCU at discharge. Continue with POC.

## 2022-03-04 NOTE — PROGRESS NOTES
SPIRITUAL HEALTH SERVICES  SPIRITUAL ASSESSMENT Progress Note  [facility]A     REFERRAL SOURCE: Epic consult     called unit charge nurse to confirm 's presence will be needed this evening to provide support to pt and family.  Charge nurse was instructed to have  paged via STAT epic consult, rather than place a routine consult, so that on call  can see it.  Charge nurse verbalized understanding and said she would pass on information to pt's evening nurse.     PLAN: SHS will remain available for ongoing support     Giaan Fabian  Pager: [phone]

## 2022-03-04 NOTE — PLAN OF CARE
"Goal Outcome Evaluation:    Plan of Care Reviewed With: patient     Overall Patient Progress: improving    Outcome Evaluation: Pt intermitently forgetful, A&)Ox3, disoriented to time this shift, but redirectable, pt makes needs known, swallows ok, regular diet, minimal appetite this shift, pt having increased constipation and opted for prune juice, pt states they feel like they need to go but did not have a successful BM when on BS commode. Pt stated new pain in L wrist/hand, PRN tylenol given 1x. RN noted increased edema in L hand/wrist. Pt stated he slept on his L arm \"funny\" last night. RN elevated hand to alleviate edema. Pt worked with PT/OT today. Ax2 w/ GB and walker.     Plan for patient to go to [facility] this Wednesday.      "

## 2022-03-04 NOTE — PROGRESS NOTES
Clinic Care Coordination Contact    Situation: Patient chart reviewed by care coordinator.    Background: Specialty care coordination referral received from MARLENA following pt inpatient.     Assessment: Pt hospitalized at [facility] end of January for acute CVA. Was discharge to ARU, but then readmitted for AMS 2/19. Stroke neurology was consulted but no new stroke on MRI.     Plan/Recommendations: General neurology referral was placed on 1/28. Pt not yet scheduled. Stroke RNCC will await notification of hospital discharge for initial outreach. Per MARLEAN note today looks like anticipated discharge for next week on Wednesday.    Daniela Alba BS, RN, SCRN  RN Stroke Neurology Care Coordinator  [facility] Neuroscience Service Line

## 2022-03-04 NOTE — PLAN OF CARE
Goal Outcome Evaluation:    Plan of Care Reviewed With: patient     Overall Patient Progress: improving    Outcome Evaluation: AxOx4, pleasant, No BM overnight. C/o pain in the LUE 2/2 sleeping on it. Pt. repositioned, tylenol given with relief. VSS. Awaiting TCU placement.

## 2022-03-04 NOTE — PROGRESS NOTES
[facility]    Medicine Progress Note - Hospitalist Service, GOLD TEAM 7    Date of Admission:  2/19/2022    Assessment & Plan      Miriam Hall is a 46 year old male with history of severe resistant HTN and longstanding DMII who was initially admitted to [facility] 1/8 - 1/28/2022 with R PCA stroke c/b refractory HTN and DISHA, and encephalopathy. Stabilized and transferred to ARU 1/28/2022 for ongoing rehabilitation. Patient developed new AMS, agitation, paranoia and worsening DISHA 2/18 prompting transfer to [place]. Now medically stable for discharge to ARU vs TCU.     Agitation, resolved  Paranoia and personality change  Toxic metabolic encephalopathy, resolved  Pt developed new waxing and waning confusion, agitation and paranoia starting 2/18, worsened 2/19 prompting transfer from ARU to [facility]. Received 10 mg olanzapine and patient became obtunded prompting stroke code, repeat CT with known and evolving stroke without new CVA or hemorrhage. Unclear etiology of decompensation, but neurology feels could be possible personality change as effect of stroke vs medication effect, less likely sepsis as afebrile, HDS, normal WBC, negative UA, CXR. DDx - favor behavior changes likely from resolving stroke and improvement vs baclofen toxicity in CKD.  - Neurology consulted   - Head CT: evolving stroke   - UA negative, CXR with streaky opacities c/w atelectasis  - Spot EEG, prolactin elevated  - MRI (2/21) - continued evolution of stroke  - MRA Head (2/22) - lack fo flow in right posterior cerebral artery and high grade narrowing of right MCA posterior division. Per neurology similar to previous study.  - Psych consulted, risperdal 0.5 mg BID  - Ativan 0.5 mg BID prn for break thru agitation or anxiety  - Decrease baclofen to 2.5 mg daily given tenous renal function and ability to cause behavior changes in CKD   => Please step out and give patient time to calm down if he is feeling  agitated or anxious. He is usually redirectable and consolable.      Recent PCA stroke - January 2022  Residual L hemiplegia  Presented with L hemiplegia. Head CT 1/8 R parieto-occipital infarct. CTA noted occlusion on proximal R PCA and high grade stenosis of M2 branch of R MCA. Outside window for tPA. Repeat imagine 1/12 with hemorrhagic transformation. Source 2/2 high grade atherosclerosis vs ESUS. CARISA no LA thrombus. Residual deficits include L hemiplegia, L hemianopsia, mild dysarthria.   - Neuro stroke consulted during hospital stay, have signed off  -Code stroke called on 2/17 on repeat presentation  -CT Head (2/19) - evolution of PCA stroke, no hemorrhage  -MRI brain - (2/21) - evolution of right PCA stroke  -MRA brain (2/22) - similar narrowing of MCA and occlusion of right PCA  - Orthotics consulted for L shoulder jun neurexa  -Follow up of Zio patch     DISHA on CKD, DISHA resolved  Resistant HTN  Patient has long hx of resistant HTN while on multidrug regimen. Recent DISHA and new BL Cr around 1.5-1.7, was 2.2 on transfer and leno to 2.9 on 2/20 in the setting of ACEi use, now improving. Discharged from TCU on amiloride, Coreg, clonidine, lisinopril, and scheduled and prn hydralazine.    - carvedilol to 25 BID, cont clonidine   - increased amlodipine to 10 mg 3/3  Per renal's last note - permissive HTN given Cr has been sensitive to perfusion, SBPs 160s OK  - Labetalol prn for elevated BP  - Hold ACEi and diuretics, amiloride  - Nephrology consulted, appreciate recs, s/o  -Follow up in hypertension clinic (ordered)  Will eventually need genetics evaluation to consider Liddle's syndrome per last renal note     Hypopnea secondary to olanzapine - resolved  Patient was noted to have low RR (as low as 6) and long pauses of respiration prompting RRTs and stoke code following 10 IM Zyprexa. Normal oxygenation and normal gases. Avoid zyprexa  Now resolved.     Concern for Seizures  In the setting of CVA. EEG 1/12  concerning for subclinical seizures. Loaded with Keppra, transitioned to Depakote. No e/o seizure noted in the setting of AMS  - Continue Depakote and seizure precautions     Troponin elevation  -elevated to 120s on 2/22 in setting of uncontrolled hypertension and CKD. Decreased from 2 weeks prior to admission, likely elevated from CKD and recent ischemic insult. ECG with no ischemic changes  - will stop trending unless clinical changes     Dysphagia  Moderate malnutrition in context of acute illness  Noted on admission with thin liquids. Previously on regular diet  - SLP consulted, ok with regular diet with 1:1   - continue Ensure Enlive between meals  - started calorie counts 3/3, family concerned he is not eating     DMII  A1c 7.8. PTA metformin on hold due to DISHA. Glucose stable  - MSSI, hypoglycemia protocol  -Sitagliptan 100 mg  -Stop sliding scale insulin and POC glucose checks      Hypokalemia - replacing, check PRN     Constipation  - bowel regimen PRN, prune juice     Diet: Regular Diet Adult Thin Liquids (level 0)  Snacks/Supplements Adult: Ensure Enlive; Between Meals  Room Service  Calorie Counts    DVT Prophylaxis: Pneumatic Compression Devices  Mendoza Catheter: Not present  Central Lines: None  Cardiac Monitoring: None  Code Status: Full Code      Disposition Plan   Expected Discharge: Wed 3/9 [facility]  Anticipated discharge location:  Awaiting care coordination huddle  Delays:     Placement - LTAC/ARU            The patient's care was discussed with the Bedside Nurse, Care Coordinator/ and Patient.    Aiden Merino MD (Sally)  Internal Medicine/Pediatrics  Hospitalist    Hospitalist Service, [address]  Securely message with the Vocera Web Console (learn more here)  Text page via [employer] Paging/Directory   Please see signed in provider for up to date coverage  information    _________________________________________________________    Interval History   No acute events overnight. Still no BM, working on prune juice and miralax and senna. Doesn't feel constipated. Had salad last night, working on increasing food intake/caloric intake. In good spirits, doing well with therapy. Slept on his left arm funny and has some pain this afternoon in the wrist/arm. Will try to ice it and elevated it.     Data reviewed today: I reviewed all medications, new labs and imaging results over the last 24 hours. I personally reviewed no images or EKG's today.    Physical Exam   Vital Signs: Temp: 98.2  F (36.8  C) Temp src: Oral BP: (!) 154/85 Pulse: 72   Resp: 18 SpO2: 97 % O2 Device: None (Room air)    Weight: 156 lbs 8.43 oz    General: awake, alert, in no acute distress  HEENT: NCAT, sclera anicteric, no nasal discharge, MMM, reportedly has L visual field cut  CV: RRR, no murmurs noted  Resp: CTAB, no increased WOB  Abd: Soft, nontender, nondistended, +BS, no rebound or guarding  MSK: No peripheral edema, extremities warm and well perfused  Skin: warm, dry, no jaundice  Neuro: residual decreased sensation on L side of face, decreased strength in L arm and leg       Data   No new labs.

## 2022-03-04 NOTE — PROGRESS NOTES
Pt c/o of new pain in L wrist and L hand that radiates to elbow after sleeping on L arm last night. Tender to touch. Increased pain with pronation and supination of L hand. Pt describes it as a shooting pain. Increased edema in L hand/wrist noted. 2+. Resting hand splint in place. Medicine team paged.

## 2022-03-04 NOTE — PROGRESS NOTES
Calorie Count  Intake recorded for: 3/3  Total Kcals: 426 Total Protein: 27g  Kcals from Hospital Food: 426  Protein: 27g  Kcals from Outside Food (average):0 Protein: 0g  # Meals Ordered from Kitchen: 2 meals   # Meals Recorded: 1 meal - 50% ham omelet  # Supplements Recorded: 100% 1 Ensure Enlive

## 2022-03-05 ENCOUNTER — APPOINTMENT (OUTPATIENT)
Dept: OCCUPATIONAL THERAPY | Facility: CLINIC | Age: 47
End: 2022-03-05
Attending: STUDENT IN AN ORGANIZED HEALTH CARE EDUCATION/TRAINING PROGRAM
Payer: COMMERCIAL

## 2022-03-05 ENCOUNTER — APPOINTMENT (OUTPATIENT)
Dept: PHYSICAL THERAPY | Facility: CLINIC | Age: 47
End: 2022-03-05
Attending: STUDENT IN AN ORGANIZED HEALTH CARE EDUCATION/TRAINING PROGRAM
Payer: COMMERCIAL

## 2022-03-05 LAB
ALBUMIN SERPL-MCNC: 2.9 G/DL (ref 3.4–5)
ALP SERPL-CCNC: 63 U/L (ref 40–150)
ALT SERPL W P-5'-P-CCNC: 25 U/L (ref 0–70)
ANION GAP SERPL CALCULATED.3IONS-SCNC: 6 MMOL/L (ref 3–14)
AST SERPL W P-5'-P-CCNC: 22 U/L (ref 0–45)
BILIRUB SERPL-MCNC: 0.4 MG/DL (ref 0.2–1.3)
BUN SERPL-MCNC: 27 MG/DL (ref 7–30)
CALCIUM SERPL-MCNC: 9 MG/DL (ref 8.5–10.1)
CHLORIDE BLD-SCNC: 106 MMOL/L (ref 94–109)
CO2 SERPL-SCNC: 30 MMOL/L (ref 20–32)
CREAT SERPL-MCNC: 1.34 MG/DL (ref 0.66–1.25)
ERYTHROCYTE [DISTWIDTH] IN BLOOD BY AUTOMATED COUNT: 13.6 % (ref 10–15)
GFR SERPL CREATININE-BSD FRML MDRD: 66 ML/MIN/1.73M2
GLUCOSE BLD-MCNC: 101 MG/DL (ref 70–99)
HCT VFR BLD AUTO: 33.6 % (ref 40–53)
HGB BLD-MCNC: 11.3 G/DL (ref 13.3–17.7)
MCH RBC QN AUTO: 30.6 PG (ref 26.5–33)
MCHC RBC AUTO-ENTMCNC: 33.6 G/DL (ref 31.5–36.5)
MCV RBC AUTO: 91 FL (ref 78–100)
PLATELET # BLD AUTO: 255 10E3/UL (ref 150–450)
POTASSIUM BLD-SCNC: 3.3 MMOL/L (ref 3.4–5.3)
POTASSIUM BLD-SCNC: 3.6 MMOL/L (ref 3.4–5.3)
PROT SERPL-MCNC: 6.7 G/DL (ref 6.8–8.8)
RBC # BLD AUTO: 3.69 10E6/UL (ref 4.4–5.9)
SODIUM SERPL-SCNC: 142 MMOL/L (ref 133–144)
WBC # BLD AUTO: 7.7 10E3/UL (ref 4–11)

## 2022-03-05 PROCEDURE — 84132 ASSAY OF SERUM POTASSIUM: CPT | Performed by: STUDENT IN AN ORGANIZED HEALTH CARE EDUCATION/TRAINING PROGRAM

## 2022-03-05 PROCEDURE — 97542 WHEELCHAIR MNGMENT TRAINING: CPT | Mod: GP

## 2022-03-05 PROCEDURE — 250N000013 HC RX MED GY IP 250 OP 250 PS 637: Performed by: STUDENT IN AN ORGANIZED HEALTH CARE EDUCATION/TRAINING PROGRAM

## 2022-03-05 PROCEDURE — 99207 PR CDG-CUT & PASTE-POTENTIAL IMPACT ON LEVEL: CPT | Performed by: STUDENT IN AN ORGANIZED HEALTH CARE EDUCATION/TRAINING PROGRAM

## 2022-03-05 PROCEDURE — 250N000013 HC RX MED GY IP 250 OP 250 PS 637

## 2022-03-05 PROCEDURE — 97112 NEUROMUSCULAR REEDUCATION: CPT | Mod: GO

## 2022-03-05 PROCEDURE — 85027 COMPLETE CBC AUTOMATED: CPT | Performed by: STUDENT IN AN ORGANIZED HEALTH CARE EDUCATION/TRAINING PROGRAM

## 2022-03-05 PROCEDURE — 250N000013 HC RX MED GY IP 250 OP 250 PS 637: Performed by: PHYSICIAN ASSISTANT

## 2022-03-05 PROCEDURE — 120N000002 HC R&B MED SURG/OB UMMC

## 2022-03-05 PROCEDURE — 97530 THERAPEUTIC ACTIVITIES: CPT | Mod: GP

## 2022-03-05 PROCEDURE — 99232 SBSQ HOSP IP/OBS MODERATE 35: CPT | Performed by: STUDENT IN AN ORGANIZED HEALTH CARE EDUCATION/TRAINING PROGRAM

## 2022-03-05 PROCEDURE — 80053 COMPREHEN METABOLIC PANEL: CPT | Performed by: STUDENT IN AN ORGANIZED HEALTH CARE EDUCATION/TRAINING PROGRAM

## 2022-03-05 PROCEDURE — 250N000013 HC RX MED GY IP 250 OP 250 PS 637: Performed by: INTERNAL MEDICINE

## 2022-03-05 PROCEDURE — 97116 GAIT TRAINING THERAPY: CPT | Mod: GP

## 2022-03-05 PROCEDURE — 250N000013 HC RX MED GY IP 250 OP 250 PS 637: Performed by: HOSPITALIST

## 2022-03-05 PROCEDURE — 36415 COLL VENOUS BLD VENIPUNCTURE: CPT | Performed by: STUDENT IN AN ORGANIZED HEALTH CARE EDUCATION/TRAINING PROGRAM

## 2022-03-05 RX ORDER — POLYETHYLENE GLYCOL 3350 17 G/17G
17 POWDER, FOR SOLUTION ORAL 2 TIMES DAILY
Status: DISCONTINUED | OUTPATIENT
Start: 2022-03-05 | End: 2022-03-06

## 2022-03-05 RX ORDER — POTASSIUM CHLORIDE 750 MG/1
40 TABLET, EXTENDED RELEASE ORAL ONCE
Status: COMPLETED | OUTPATIENT
Start: 2022-03-05 | End: 2022-03-05

## 2022-03-05 RX ORDER — AMOXICILLIN 250 MG
1 CAPSULE ORAL 2 TIMES DAILY
Status: DISCONTINUED | OUTPATIENT
Start: 2022-03-05 | End: 2022-03-06

## 2022-03-05 RX ORDER — AMOXICILLIN 250 MG
2 CAPSULE ORAL 2 TIMES DAILY
Status: DISCONTINUED | OUTPATIENT
Start: 2022-03-05 | End: 2022-03-06

## 2022-03-05 RX ADMIN — AMLODIPINE BESYLATE 10 MG: 10 TABLET ORAL at 09:06

## 2022-03-05 RX ADMIN — LIDOCAINE 1 PATCH: 560 PATCH PERCUTANEOUS; TOPICAL; TRANSDERMAL at 09:08

## 2022-03-05 RX ADMIN — RISPERIDONE 0.5 MG: 0.5 TABLET, ORALLY DISINTEGRATING ORAL at 19:26

## 2022-03-05 RX ADMIN — CLONIDINE HYDROCHLORIDE 0.1 MG: 0.1 TABLET ORAL at 14:12

## 2022-03-05 RX ADMIN — ASPIRIN 81 MG: 81 TABLET, CHEWABLE ORAL at 09:06

## 2022-03-05 RX ADMIN — SITAGLIPTIN 100 MG: 100 TABLET, FILM COATED ORAL at 09:08

## 2022-03-05 RX ADMIN — CARVEDILOL 25 MG: 25 TABLET, FILM COATED ORAL at 17:09

## 2022-03-05 RX ADMIN — CLONIDINE HYDROCHLORIDE 0.1 MG: 0.1 TABLET ORAL at 09:08

## 2022-03-05 RX ADMIN — SENNOSIDES AND DOCUSATE SODIUM 2 TABLET: 50; 8.6 TABLET ORAL at 09:06

## 2022-03-05 RX ADMIN — DIVALPROEX SODIUM 1000 MG: 500 TABLET, FILM COATED, EXTENDED RELEASE ORAL at 09:07

## 2022-03-05 RX ADMIN — POLYETHYLENE GLYCOL 3350 17 G: 17 POWDER, FOR SOLUTION ORAL at 09:06

## 2022-03-05 RX ADMIN — Medication 2.5 MG: at 09:07

## 2022-03-05 RX ADMIN — POLYETHYLENE GLYCOL 3350 17 G: 17 POWDER, FOR SOLUTION ORAL at 19:27

## 2022-03-05 RX ADMIN — CARVEDILOL 25 MG: 25 TABLET, FILM COATED ORAL at 09:06

## 2022-03-05 RX ADMIN — ATORVASTATIN CALCIUM 40 MG: 40 TABLET, FILM COATED ORAL at 19:26

## 2022-03-05 RX ADMIN — POTASSIUM CHLORIDE 40 MEQ: 750 TABLET, EXTENDED RELEASE ORAL at 09:28

## 2022-03-05 RX ADMIN — CLONIDINE HYDROCHLORIDE 0.1 MG: 0.1 TABLET ORAL at 19:27

## 2022-03-05 RX ADMIN — RISPERIDONE 0.5 MG: 0.5 TABLET, ORALLY DISINTEGRATING ORAL at 09:06

## 2022-03-05 ASSESSMENT — ACTIVITIES OF DAILY LIVING (ADL)
ADLS_ACUITY_SCORE: 21
ADLS_ACUITY_SCORE: 19
ADLS_ACUITY_SCORE: 21
ADLS_ACUITY_SCORE: 19
ADLS_ACUITY_SCORE: 21
ADLS_ACUITY_SCORE: 19
ADLS_ACUITY_SCORE: 21
ADLS_ACUITY_SCORE: 19
ADLS_ACUITY_SCORE: 21
ADLS_ACUITY_SCORE: 19

## 2022-03-05 NOTE — PLAN OF CARE
Goal Outcome Evaluation:    Plan of Care Reviewed With: patient     Overall Patient Progress: improving    Outcome Evaluation: Pt oriented except to time. Denies pain. Voids in urinal. Demonstrating slight movement in L side.    No Bm for several days. Pt reluctant to take bowel meds late in the evening or overnight. Wore L wrist brace overnight and elevated on pillows.

## 2022-03-05 NOTE — PROGRESS NOTES
Patient is sitting up in the chair now, miralax and senna was given to patient this morning and was able to have BM today, appetite is poor and does not eat much, but is able to take ensure supplements-on calorie counts.  Seen by OT today. Left sided weakness, hemiparesis, left arm brace was out this morning and can be put back at sleeping time. Continue to monitor.

## 2022-03-05 NOTE — PROGRESS NOTES
SPIRITUAL HEALTH SERVICES  [facility]  Unit: 5A    Referral Source: Consult Request     Distress: Miriam received news of his mother death last night. Still processing this loss.    Coping: This author spoke with Miriam about the availability of [facility] availability throughout the day and on-call as needed.   Pt said he would let his nurse know if he wanted additional  support.    Plan: Spoke with pt's nurse and charge nurse about loss of pt's mother. Please page me the on-call (Duane - - till 8am Sunday, March 6)  Pager number: [phone]         Duane F Bauer  Chaplain Resident  Pager number: [phone]  * [facility] remains available 24/7 for emergent requests/referrals, either by having the switchboard page the on-call  or by entering an ASAP/STAT consult in Epic (this will also page the on-call ).*

## 2022-03-05 NOTE — PROGRESS NOTES
Calorie Count  Intake recorded for: 3/4  Total Kcals: 0 Total Protein: 0g  Kcals from Hospital Food: 0   Protein: 0g  Kcals from Outside Food (average):0 Protein: 0g  # Meals Ordered from Kitchen: 2 meals  # Meals Recorded: 0  # Supplements Recorded: 0

## 2022-03-05 NOTE — PROGRESS NOTES
[facility]    Medicine Progress Note - Hospitalist Service, GOLD TEAM 7    Date of Admission:  2/19/2022    Assessment & Plan      Miriam Hall is a 46 year old male with history of severe resistant HTN and longstanding DMII who was initially admitted to [facility] 1/8 - 1/28/2022 with R PCA stroke c/b refractory HTN and DISHA, and encephalopathy. Stabilized and transferred to ARU 1/28/2022 for ongoing rehabilitation. Patient developed new AMS, agitation, paranoia and worsening DISHA 2/18 prompting transfer to [place]. Now medically stable for discharge to ARU vs TCU.     Agitation, resolved  Paranoia and personality change  Toxic metabolic encephalopathy, resolved  Pt developed new waxing and waning confusion, agitation and paranoia starting 2/18, worsened 2/19 prompting transfer from ARU to [facility]. Received 10 mg olanzapine and patient became obtunded prompting stroke code, repeat CT with known and evolving stroke without new CVA or hemorrhage. Unclear etiology of decompensation, but neurology feels could be possible personality change as effect of stroke vs medication effect, less likely sepsis as afebrile, HDS, normal WBC, negative UA, CXR. DDx - favor behavior changes likely from resolving stroke and improvement vs baclofen toxicity in CKD.  - Neurology consulted   - Head CT: evolving stroke   - UA negative, CXR with streaky opacities c/w atelectasis  - Spot EEG, prolactin elevated  - MRI (2/21) - continued evolution of stroke  - MRA Head (2/22) - lack fo flow in right posterior cerebral artery and high grade narrowing of right MCA posterior division. Per neurology similar to previous study.  - Psych consulted, risperdal 0.5 mg BID  - Ativan 0.5 mg BID prn for break thru agitation or anxiety  - Decrease baclofen to 2.5 mg daily given tenous renal function and ability to cause behavior changes in CKD   => Please step out and give patient time to calm down if he is feeling  agitated or anxious. He is usually redirectable and consolable.      Recent PCA stroke - January 2022  Residual L hemiplegia  Presented with L hemiplegia. Head CT 1/8 R parieto-occipital infarct. CTA noted occlusion on proximal R PCA and high grade stenosis of M2 branch of R MCA. Outside window for tPA. Repeat imagine 1/12 with hemorrhagic transformation. Source 2/2 high grade atherosclerosis vs ESUS. CARISA no LA thrombus. Residual deficits include L hemiplegia, L hemianopsia, mild dysarthria.   - Neuro stroke consulted during hospital stay, have signed off  -Code stroke called on 2/17 on repeat presentation  -CT Head (2/19) - evolution of PCA stroke, no hemorrhage  -MRI brain - (2/21) - evolution of right PCA stroke  -MRA brain (2/22) - similar narrowing of MCA and occlusion of right PCA  - Orthotics consulted for L shoulder jun neurexa  -Follow up of Zio patch     DISHA on CKD, DISHA resolved  Resistant HTN  Patient has long hx of resistant HTN while on multidrug regimen. Recent DISHA and new BL Cr around 1.5-1.7, was 2.2 on transfer and leno to 2.9 on 2/20 in the setting of ACEi use, now improving. Discharged from TCU on amiloride, Coreg, clonidine, lisinopril, and scheduled and prn hydralazine.    - carvedilol to 25 BID, cont clonidine   - increased amlodipine to 10 mg 3/3  Per renal's last note - permissive HTN given Cr has been sensitive to perfusion, SBPs 160s OK  - Labetalol prn for elevated BP  - Hold ACEi and diuretics, amiloride  - Nephrology consulted, appreciate recs, s/o  -Follow up in hypertension clinic (ordered)  Will eventually need genetics evaluation to consider Liddle's syndrome per last renal note     Hypopnea secondary to olanzapine - resolved  Patient was noted to have low RR (as low as 6) and long pauses of respiration prompting RRTs and stoke code following 10 IM Zyprexa. Normal oxygenation and normal gases. Avoid zyprexa  Now resolved.     Concern for Seizures  In the setting of CVA. EEG 1/12  concerning for subclinical seizures. Loaded with Keppra, transitioned to Depakote. No e/o seizure noted in the setting of AMS  - Continue Depakote and seizure precautions     Troponin elevation  -elevated to 120s on 2/22 in setting of uncontrolled hypertension and CKD. Decreased from 2 weeks prior to admission, likely elevated from CKD and recent ischemic insult. ECG with no ischemic changes  - will stop trending unless clinical changes     Dysphagia  Moderate malnutrition in context of acute illness  Noted on admission with thin liquids. Previously on regular diet  - SLP consulted, ok with regular diet with 1:1   - continue Ensure Enlive between meals  - started calorie counts 3/3, family concerned he is not eating      DMII  A1c 7.8. PTA metformin on hold due to DISHA. Glucose stable  - MSSI, hypoglycemia protocol  -Sitagliptan 100 mg  -Stop sliding scale insulin and POC glucose checks      Hypokalemia - replacing, check PRN - KCl 40 mEQ today     Constipation  - bowel regimen scheduled miralax BID and senna BID, PRN MoM, PRN mag citrate, prune juice          Diet: Regular Diet Adult Thin Liquids (level 0)  Snacks/Supplements Adult: Ensure Enlive; Between Meals  Room Service  Calorie Counts    DVT Prophylaxis: Pneumatic Compression Devices  Mendoza Catheter: Not present  Central Lines: None  Cardiac Monitoring: None  Code Status: Full Code      Disposition Plan   Expected Discharge:   Anticipated discharge location:  Awaiting care coordination huddle  Delays:     Placement - LTAC/ARU          Marlon Farooq on Wednesday 3/9.     The patient's care was discussed with the Bedside Nurse and Patient.    Aiden Merino MD (Sally)  Internal Medicine/Pediatrics  Hospitalist    Hospitalist Service, [facility] TEAM [address] [place] [address]  Securely message with the Vocera Web Console (learn more here)  Text page via [facility] Paging/Directory   Please see signed in provider for up to date coverage  information    ______________________________________________________________________    Interval History   OFELIA. Got some difficult news last night that his mother had passed away. He is coping well.  stopped by and he was appreciative of visit. Will ask for SW or psychology if he feels he needs extra help with coping. Otherwise, appetite remains low but he is working on PO. Still no BM the last few days,     Data reviewed today: I reviewed all medications, new labs and imaging results over the last 24 hours. I personally reviewed no images or EKG's today.    Physical Exam   Vital Signs: Temp: 97.9  F (36.6  C) Temp src: Oral BP: (!) 150/80 Pulse: 61   Resp: 16 SpO2: 96 % O2 Device: None (Room air)    Weight: 155 lbs 1.6 oz     General: awake, alert, in no acute distress  HEENT: NCAT, sclera anicteric, no nasal discharge, MMM, reportedly has L visual field cut  CV: RRR, no murmurs noted  Resp: CTAB, no increased WOB  Abd: Soft, nontender, nondistended, +BS, no rebound or guarding  MSK: No peripheral edema, extremities warm and well perfused  Skin: warm, dry, no jaundice  Neuro: residual decreased sensation on L side of face, decreased strength in L arm and leg      Data     Results for orders placed or performed during the hospital encounter of 02/19/22 (from the past 24 hour(s))   CBC with platelets   Result Value Ref Range    WBC Count 7.7 4.0 - 11.0 10e3/uL    RBC Count 3.69 (L) 4.40 - 5.90 10e6/uL    Hemoglobin 11.3 (L) 13.3 - 17.7 g/dL    Hematocrit 33.6 (L) 40.0 - 53.0 %    MCV 91 78 - 100 fL    MCH 30.6 26.5 - 33.0 pg    MCHC 33.6 31.5 - 36.5 g/dL    RDW 13.6 10.0 - 15.0 %    Platelet Count 255 150 - 450 10e3/uL   Comprehensive metabolic panel   Result Value Ref Range    Sodium 142 133 - 144 mmol/L    Potassium 3.3 (L) 3.4 - 5.3 mmol/L    Chloride 106 94 - 109 mmol/L    Carbon Dioxide (CO2) 30 20 - 32 mmol/L    Anion Gap 6 3 - 14 mmol/L    Urea Nitrogen 27 7 - 30 mg/dL    Creatinine 1.34 (H) 0.66 -  1.25 mg/dL    Calcium 9.0 8.5 - 10.1 mg/dL    Glucose 101 (H) 70 - 99 mg/dL    Alkaline Phosphatase 63 40 - 150 U/L    AST 22 0 - 45 U/L    ALT 25 0 - 70 U/L    Protein Total 6.7 (L) 6.8 - 8.8 g/dL    Albumin 2.9 (L) 3.4 - 5.0 g/dL    Bilirubin Total 0.4 0.2 - 1.3 mg/dL    GFR Estimate 66 >60 mL/min/1.73m2

## 2022-03-05 NOTE — PLAN OF CARE
Goal Outcome Evaluation:    Plan of Care Reviewed With: patient     Overall Patient Progress: improving    Outcome Evaluation: VSS on room air    Vitals: BP (!) 148/78   Pulse 68   Temp 98.2  F (36.8  C) (Oral)   Resp 18   Wt 71 kg (156 lb 8.4 oz)   SpO2 97%   BMI 23.52 kg/m      Activity: assist x2, walker, gait belt  Pain: denies  Neuro: L sided weakness, hemiparesis  Cardiac: WDL  Respiratory: WDL, diminished in lower lobes  GI/: last BM 2/28, team aware  Diet: regular  Lines: R PIV, saline locked  Skin/Wounds: skin is CDI

## 2022-03-06 ENCOUNTER — APPOINTMENT (OUTPATIENT)
Dept: PHYSICAL THERAPY | Facility: CLINIC | Age: 47
End: 2022-03-06
Attending: STUDENT IN AN ORGANIZED HEALTH CARE EDUCATION/TRAINING PROGRAM
Payer: COMMERCIAL

## 2022-03-06 LAB — SARS-COV-2 RNA RESP QL NAA+PROBE: NEGATIVE

## 2022-03-06 PROCEDURE — 250N000013 HC RX MED GY IP 250 OP 250 PS 637: Performed by: INTERNAL MEDICINE

## 2022-03-06 PROCEDURE — 250N000013 HC RX MED GY IP 250 OP 250 PS 637: Performed by: PHYSICIAN ASSISTANT

## 2022-03-06 PROCEDURE — 120N000002 HC R&B MED SURG/OB UMMC

## 2022-03-06 PROCEDURE — 97116 GAIT TRAINING THERAPY: CPT | Mod: GP

## 2022-03-06 PROCEDURE — 97542 WHEELCHAIR MNGMENT TRAINING: CPT | Mod: GP

## 2022-03-06 PROCEDURE — 250N000013 HC RX MED GY IP 250 OP 250 PS 637: Performed by: STUDENT IN AN ORGANIZED HEALTH CARE EDUCATION/TRAINING PROGRAM

## 2022-03-06 PROCEDURE — 250N000013 HC RX MED GY IP 250 OP 250 PS 637

## 2022-03-06 PROCEDURE — 99232 SBSQ HOSP IP/OBS MODERATE 35: CPT | Performed by: STUDENT IN AN ORGANIZED HEALTH CARE EDUCATION/TRAINING PROGRAM

## 2022-03-06 PROCEDURE — 99207 PR CDG-CUT & PASTE-POTENTIAL IMPACT ON LEVEL: CPT | Performed by: STUDENT IN AN ORGANIZED HEALTH CARE EDUCATION/TRAINING PROGRAM

## 2022-03-06 PROCEDURE — 97530 THERAPEUTIC ACTIVITIES: CPT | Mod: GP

## 2022-03-06 PROCEDURE — 250N000013 HC RX MED GY IP 250 OP 250 PS 637: Performed by: HOSPITALIST

## 2022-03-06 PROCEDURE — 250N000013 HC RX MED GY IP 250 OP 250 PS 637: Performed by: PSYCHIATRY & NEUROLOGY

## 2022-03-06 PROCEDURE — U0003 INFECTIOUS AGENT DETECTION BY NUCLEIC ACID (DNA OR RNA); SEVERE ACUTE RESPIRATORY SYNDROME CORONAVIRUS 2 (SARS-COV-2) (CORONAVIRUS DISEASE [COVID-19]), AMPLIFIED PROBE TECHNIQUE, MAKING USE OF HIGH THROUGHPUT TECHNOLOGIES AS DESCRIBED BY CMS-2020-01-R: HCPCS | Performed by: STUDENT IN AN ORGANIZED HEALTH CARE EDUCATION/TRAINING PROGRAM

## 2022-03-06 RX ORDER — AMOXICILLIN 250 MG
2 CAPSULE ORAL 2 TIMES DAILY PRN
Status: DISCONTINUED | OUTPATIENT
Start: 2022-03-06 | End: 2022-03-09 | Stop reason: HOSPADM

## 2022-03-06 RX ORDER — POLYETHYLENE GLYCOL 3350 17 G/17G
17 POWDER, FOR SOLUTION ORAL 2 TIMES DAILY PRN
Status: DISCONTINUED | OUTPATIENT
Start: 2022-03-06 | End: 2022-03-09 | Stop reason: HOSPADM

## 2022-03-06 RX ORDER — AMOXICILLIN 250 MG
1 CAPSULE ORAL 2 TIMES DAILY PRN
Status: DISCONTINUED | OUTPATIENT
Start: 2022-03-06 | End: 2022-03-09 | Stop reason: HOSPADM

## 2022-03-06 RX ADMIN — LIDOCAINE 1 PATCH: 560 PATCH PERCUTANEOUS; TOPICAL; TRANSDERMAL at 08:45

## 2022-03-06 RX ADMIN — CARVEDILOL 25 MG: 25 TABLET, FILM COATED ORAL at 08:44

## 2022-03-06 RX ADMIN — ACETAMINOPHEN 650 MG: 325 TABLET, FILM COATED ORAL at 14:12

## 2022-03-06 RX ADMIN — RISPERIDONE 0.5 MG: 0.5 TABLET, ORALLY DISINTEGRATING ORAL at 19:26

## 2022-03-06 RX ADMIN — CLONIDINE HYDROCHLORIDE 0.1 MG: 0.1 TABLET ORAL at 19:26

## 2022-03-06 RX ADMIN — SITAGLIPTIN 100 MG: 100 TABLET, FILM COATED ORAL at 08:44

## 2022-03-06 RX ADMIN — CLONIDINE HYDROCHLORIDE 0.1 MG: 0.1 TABLET ORAL at 14:08

## 2022-03-06 RX ADMIN — DOCUSATE SODIUM 50 MG AND SENNOSIDES 8.6 MG 2 TABLET: 8.6; 5 TABLET, FILM COATED ORAL at 08:43

## 2022-03-06 RX ADMIN — Medication 2.5 MG: at 08:44

## 2022-03-06 RX ADMIN — ACETAMINOPHEN 650 MG: 325 TABLET, FILM COATED ORAL at 05:27

## 2022-03-06 RX ADMIN — AMLODIPINE BESYLATE 10 MG: 10 TABLET ORAL at 08:44

## 2022-03-06 RX ADMIN — POLYETHYLENE GLYCOL 3350 17 G: 17 POWDER, FOR SOLUTION ORAL at 08:43

## 2022-03-06 RX ADMIN — CARVEDILOL 25 MG: 25 TABLET, FILM COATED ORAL at 18:26

## 2022-03-06 RX ADMIN — ASPIRIN 81 MG: 81 TABLET, CHEWABLE ORAL at 08:44

## 2022-03-06 RX ADMIN — DIVALPROEX SODIUM 1000 MG: 500 TABLET, FILM COATED, EXTENDED RELEASE ORAL at 08:43

## 2022-03-06 RX ADMIN — CLONIDINE HYDROCHLORIDE 0.1 MG: 0.1 TABLET ORAL at 08:44

## 2022-03-06 RX ADMIN — ATORVASTATIN CALCIUM 40 MG: 40 TABLET, FILM COATED ORAL at 19:26

## 2022-03-06 RX ADMIN — Medication 3 MG: at 19:26

## 2022-03-06 RX ADMIN — RISPERIDONE 0.5 MG: 0.5 TABLET, ORALLY DISINTEGRATING ORAL at 08:44

## 2022-03-06 ASSESSMENT — ACTIVITIES OF DAILY LIVING (ADL)
ADLS_ACUITY_SCORE: 21

## 2022-03-06 NOTE — PLAN OF CARE
Goal Outcome Evaluation:    Plan of Care Reviewed With: patient     Overall Patient Progress: improving    Outcome Evaluation: A and O x4. Slight movement in L limbs. slept well    Pt oriented all shift. Voiding in urinal. Repositions self in bed. Brace on L arm and leg for overnight. L hand with 2/10 pain this am. Tylenol given and pt massaging.

## 2022-03-06 NOTE — PLAN OF CARE
Goal Outcome Evaluation:    Plan of Care Reviewed With: patient     Overall Patient Progress: improving    Outcome Evaluation: VSS, room air, A&O x4, denies pain. will transfer to [facility] on wednesday    Vitals: /73 (BP Location: Right arm)   Pulse 67   Temp 98.5  F (36.9  C) (Oral)   Resp 16   Wt 70.4 kg (155 lb 1.6 oz)   SpO2 99%   BMI 23.31 kg/m      Activity: assist x2, GB, walker  Pain: denies  Neuro: Wdl - thought he was dreaming during one interaction  Cardiac: WDL  Respiratory: WDL  GI/: last BM 3/5  Diet: regular  Lines: R PIV, saline locked  Skin/Wounds: skin is CDI  Labs/Imaging: none this shift

## 2022-03-06 NOTE — PROGRESS NOTES
Activity: assist x2, GB, walker, was in the hallway with PT in a wheelchair, sat up in the chair, later went to bed and slept.   Pain: Tylenol given after he woke up from the sleep.  Eating his lunch now, venkatesh appetite today.  Neuro: per baseline, Left arm numbness, unable to move it.  Cardiac: WDL  Respiratory: WDL, denies SOB  GI/: last BM 3/5  Diet: regular diet on calorie counts  Lines: R PIV, saline locked  Skin/Wounds: skin is CDI  Labs/Imaging: none this shift

## 2022-03-06 NOTE — PROGRESS NOTES
Calorie Count  Intake recorded for: 3/5  Total Kcals: 742 Total Protein: 43g  Kcals from Hospital Food: 742   Protein: 43g  Kcals from Outside Food (average):0 Protein: 0g  # Meals Ordered from Kitchen: 1 meal  # Meals Recorded: 1 meal (50% chicken noodle soup)  # Supplements Recorded: 100% 2 Ensure Enlive

## 2022-03-06 NOTE — PHARMACY-ADMISSION MEDICATION HISTORY
"  Admission Medication History Completed by Pharmacy    See [facility] Admission Navigator for allergy information, preferred outpatient pharmacy, prior to admission medications and immunization status.     Medication History Sources:     Patient    Dispense History    Changes made to PTA medication list (reason):    Added: None    Deleted: None    Changed:  o \"Patient Not Taking\":  - Amiloride tablet  - Aspirin tablet  - Atorvastatin tablet  - Baclofen tablet  - Carvedilol tablet  - Clonidine tablet  - Diclofenac gel  - Divalproex tablet  - Hydralazine tablets & injection  - Olanzapine injection    Additional Information:    Patient did not recognize the medications listed above likely because they were dispensed from [facility] for the first time on 2/18/22, the day prior to this current admission at [facility].    Pt recognized a few medication names (not strengths/regimens) that he recalled taking at home before his initial [facility] admission on 1/8/22:  o Tylenol  o Lisinopril  o Melatonin  o Sennosides      Prior to Admission medications    Medication Sig Last Dose Taking? Auth Provider   lisinopril (ZESTRIL) 5 MG tablet Take 1 tablet (5 mg) by mouth daily More than a month Yes Carolina Antunez NP   melatonin 5 MG tablet Take 1 tablet (5 mg) by mouth nightly as needed for sleep More than a month Yes Nelson Laura MD   sennosides (SENOKOT) 8.6 MG tablet Take 1 tablet by mouth 2 times daily as needed for constipation More than a month Yes Linda Perez PA-C   acetaminophen (TYLENOL) 325 MG tablet Take 2 tablets (650 mg) by mouth every 6 hours as needed for mild pain or fever  Patient not taking: Reported on 3/6/2022 Not Taking  Linda Perez PA-C   aMILoride (MIDAMOR) 5 MG tablet Take 2 tablets (10 mg) by mouth daily  Patient not taking: Reported on 3/6/2022 Not Taking  Linda Perez PA-C   aspirin (ASA) 81 MG chewable tablet Take 1 tablet (81 mg) by mouth daily  Patient not taking: Reported " on 3/6/2022 Not Taking  Nelson Laura MD   atorvastatin (LIPITOR) 40 MG tablet Take 1 tablet (40 mg) by mouth every evening  Patient not taking: Reported on 3/6/2022 Not Taking  Nelson Laura MD   Baclofen (LIORESAL) 5 MG tablet Take 1 tablet (5 mg) by mouth 3 times daily  Patient not taking: Reported on 3/6/2022 Not Taking  Linda Perez PA-C   carvedilol (COREG) 12.5 MG tablet Take 3 tablets (37.5 mg) by mouth 2 times daily (with meals)  Patient not taking: Reported on 3/6/2022 Not Taking  Linda Perez PA-C   cloNIDine (CATAPRES) 0.1 MG tablet Take 1 tablet (0.1 mg) by mouth 3 times daily  Patient not taking: Reported on 3/6/2022 Not Taking  Linda Perez PA-C   diclofenac (VOLTAREN) 1 % topical gel Apply 2 g topically 4 times daily as needed for moderate pain  Patient not taking: Reported on 3/6/2022 Not Taking  Linda Perez PA-C   divalproex sodium extended-release (DEPAKOTE ER) 500 MG 24 hr tablet Take 2 tablets (1,000 mg) by mouth daily  Patient not taking: Reported on 3/6/2022 Not Taking  Linda Perez PA-C   hydrALAZINE (APRESOLINE) 10 MG tablet Take 1.5 tablets (15 mg) by mouth 4 times daily as needed (SBP>160)  Patient not taking: Reported on 3/6/2022 Not Taking  Linda Perez PA-C   hydrALAZINE (APRESOLINE) 20 MG/ML injection Inject 0.5 mLs (10 mg) into the vein every 4 hours as needed for high blood pressure (SBP >170)  Patient not taking: Reported on 3/6/2022 Not Taking  Carolina Antunez NP   hydrALAZINE (APRESOLINE) 25 MG tablet Take 1 tablet (25 mg) by mouth every 8 hours  Patient not taking: Reported on 3/6/2022 Not Taking  Carolina Antunez NP   OLANZapine (ZYPREXA) injection Inject 5 mg into the muscle 3 times daily as needed for agitation  Patient not taking: Reported on 3/6/2022 Not Taking  Carolina Antunez NP         Date completed: 03/06/22    Medication history completed by: Nanda Parrish

## 2022-03-06 NOTE — PLAN OF CARE
Goal Outcome Evaluation:    Plan of Care Reviewed With: patient     Overall Patient Progress: improving    Outcome Evaluation: VSS on room air. Will be transferring to [facility] on Wednesday    Vitals: /82 (BP Location: Right arm)   Pulse 87   Temp 98  F (36.7  C) (Oral)   Resp 16   Wt 70.4 kg (155 lb 3.3 oz)   SpO2 97%   BMI 23.32 kg/m      Activity: assist x2, GB, walker  Pain: denies  Neuro: L sided weakness/hemiparesis  Cardiac: WDL  Respiratory: WDL  GI/: last BM 3/5  Diet: regular  Lines: R PIV, saline locked  Skin/Wounds: skin is CDI, wrist brace in place overnight  Labs/Imaging: none this shift

## 2022-03-06 NOTE — PROGRESS NOTES
[facility]    Medicine Progress Note - Hospitalist Service, GOLD TEAM 7    Date of Admission:  2/19/2022    Assessment & Plan      Miriam Hall is a 46 year old male with history of severe resistant HTN and longstanding DMII who was initially admitted to [facility] 1/8 - 1/28/2022 with R PCA stroke c/b refractory HTN and DISHA, and encephalopathy. Stabilized and transferred to ARU 1/28/2022 for ongoing rehabilitation. Patient developed new AMS, agitation, paranoia and worsening DISHA 2/18 prompting transfer to [place]. Now medically stable for discharge to TCU.     Agitation, resolved  Paranoia and personality change  Toxic metabolic encephalopathy, resolved  Pt developed new waxing and waning confusion, agitation and paranoia starting 2/18, worsened 2/19 prompting transfer from ARU to [facility]. Received 10 mg olanzapine and patient became obtunded prompting stroke code, repeat CT with known and evolving stroke without new CVA or hemorrhage. Unclear etiology of decompensation, but neurology feels could be possible personality change as effect of stroke vs medication effect, less likely sepsis as afebrile, HDS, normal WBC, negative UA, CXR. DDx - favor behavior changes likely from resolving stroke and improvement vs baclofen toxicity in CKD.  - Neurology consulted   - Head CT: evolving stroke   - UA negative, CXR with streaky opacities c/w atelectasis  - Spot EEG, prolactin elevated  - MRI (2/21) - continued evolution of stroke  - MRA Head (2/22) - lack fo flow in right posterior cerebral artery and high grade narrowing of right MCA posterior division. Per neurology similar to previous study.  - Psych consulted, risperdal 0.5 mg BID  - Ativan 0.5 mg BID prn for break thru agitation or anxiety  - Decrease baclofen to 2.5 mg daily given tenous renal function and ability to cause behavior changes in CKD      Recent PCA stroke - January 2022  Residual L hemiplegia  Presented with L  hemiplegia. Head CT 1/8 R parieto-occipital infarct. CTA noted occlusion on proximal R PCA and high grade stenosis of M2 branch of R MCA. Outside window for tPA. Repeat imagine 1/12 with hemorrhagic transformation. Source 2/2 high grade atherosclerosis vs ESUS. CARISA no LA thrombus. Residual deficits include L hemiplegia, L hemianopsia, mild dysarthria.   - Neuro stroke consulted during hospital stay, have signed off  -Code stroke called on 2/17 on repeat presentation  -CT Head (2/19) - evolution of PCA stroke, no hemorrhage  -MRI brain - (2/21) - evolution of right PCA stroke  -MRA brain (2/22) - similar narrowing of MCA and occlusion of right PCA  - Orthotics consulted for L shoulder jun neurexa  -Follow up of Zio patch     DISHA on CKD, DISHA resolved  Resistant HTN  Patient has long hx of resistant HTN while on multidrug regimen. Recent DISHA and new BL Cr around 1.5-1.7, was 2.2 on transfer and leno to 2.9 on 2/20 in the setting of ACEi use, now improving. Discharged from TCU on amiloride, Coreg, clonidine, lisinopril, and scheduled and prn hydralazine.    - carvedilol to 25 BID, cont clonidine   - increased amlodipine to 10 mg 3/3  Per renal's last note - permissive HTN given Cr has been sensitive to perfusion, SBPs 160s OK  - Labetalol prn for elevated BP  - Hold ACEi and diuretics, amiloride  - Nephrology consulted, appreciate recs, s/o  -Follow up in hypertension clinic (ordered)  Will eventually need genetics evaluation to consider Liddle's syndrome per last renal note     Hypopnea secondary to olanzapine - resolved  Patient was noted to have low RR (as low as 6) and long pauses of respiration prompting RRTs and stoke code following 10 IM Zyprexa. Normal oxygenation and normal gases. Avoid zyprexa  Now resolved.     Concern for Seizures  In the setting of CVA. EEG 1/12 concerning for subclinical seizures. Loaded with Keppra, transitioned to Depakote. No e/o seizure noted in the setting of AMS  - Continue Depakote  and seizure precautions     Troponin elevation  -elevated to 120s on 2/22 in setting of uncontrolled hypertension and CKD. Decreased from 2 weeks prior to admission, likely elevated from CKD and recent ischemic insult. ECG with no ischemic changes  - will stop trending unless clinical changes     Dysphagia  Moderate malnutrition in context of acute illness  Noted on admission with thin liquids. Previously on regular diet  - SLP consulted, ok with regular diet with 1:1   - continue Ensure Enlive between meals  - Calorie counts 3/3-3/5, caloric intake 400s-700s. Encourage supplements. Pt doing really well with Ensures     DMII  A1c 7.8. PTA metformin on hold due to DISHA. Glucose stable  - MSSI, hypoglycemia protocol  -Sitagliptan 100 mg  -Stop sliding scale insulin and POC glucose checks      Hypokalemia - replacing, check PRN      Constipation  - bowel regimen miralax and senna PRN, PRN MoM, PRN mag citrate, prune juice          Diet: Regular Diet Adult Thin Liquids (level 0)  Snacks/Supplements Adult: Ensure Enlive; Between Meals  Room Service    DVT Prophylaxis: Pneumatic Compression Devices  Mendoza Catheter: Not present  Central Lines: None  Cardiac Monitoring: None  Code Status: Full Code      Disposition Plan   Expected Discharge: [facility] on Wednesday 3/9  Anticipated discharge location:  Awaiting care coordination huddle  Delays:     Placement - LTAC/ARU            The patient's care was discussed with the Bedside Nurse and Patient.     Aiden Merino MD (Sally)  Internal Medicine/Pediatrics  Hospitalist    Hospitalist Service, GOLD TEAM [address] [place] [address]  Securely message with the Vocera Web Console (learn more here)  Text page via [employer] Paging/Directory   Please see signed in provider for up to date coverage information    ______________________________________________________________________    Interval History   No acute events overnight. Had BM finally.  Appetite improving slightly, does like the Ensures. Still processing news of his mother's passing - now has  arrangements to make. He is coping well. Some edema and tenderness in L wrist, better with massage and elevation.     Data reviewed today: I reviewed all medications, new labs and imaging results over the last 24 hours. I personally reviewed no images or EKG's today.    Physical Exam   Vital Signs: Temp: 97.9  F (36.6  C) Temp src: Oral BP: (!) 154/77 Pulse: 72   Resp: 16 SpO2: 98 % O2 Device: None (Room air)    Weight: 155 lbs 3.26 oz     General: awake, alert, in no acute distress  HEENT: NCAT, sclera anicteric, no nasal discharge, MMM, has L visual field cut  CV: RRR, no murmurs noted  Resp: CTAB, no increased WOB  Abd: Soft, nontender, nondistended, +BS, no rebound or guarding  MSK: No peripheral edema, mild edema in L hand/wrist, extremities warm and well perfused  Skin: warm, dry, no jaundice  Neuro: residual decreased sensation on L side of face, decreased strength in L arm and leg      Data   Results for orders placed or performed during the hospital encounter of 22 (from the past 24 hour(s))   Asymptomatic COVID-19 Virus (Coronavirus) by PCR Nasopharyngeal    Specimen: Nasopharyngeal; Swab   Result Value Ref Range    SARS CoV2 PCR Negative Negative, Testing sent to reference lab. Results will be returned via unsolicited result    Narrative    Testing was performed using the Xpert Xpress SARS-CoV-2 Assay on the  Cepheid Gene-Xpert Instrument Systems. Additional information about  this Emergency Use Authorization (EUA) assay can be found via the Lab  Guide. This test should be ordered for the detection of SARS-CoV-2 in  individuals who meet SARS-CoV-2 clinical and/or epidemiological  criteria. Test performance is unknown in asymptomatic patients. This  test is for in vitro diagnostic use under the FDA EUA for  laboratories certified under CLIA to perform high complexity testing.  This test  has not been FDA cleared or approved. A negative result  does not rule out the presence of PCR inhibitors in the specimen or  target RNA in concentration below the limit of detection for the  assay. The possibility of a false negative should be considered if  the patient's recent exposure or clinical presentation suggests  COVID-19. This test was validated by the [facility]. This laboratory is certified under  the Clinical Laboratory Improvement Amendments of 1988 (CLIA-88) as  qualified to perform high complexity laboratory testing.

## 2022-03-07 ENCOUNTER — APPOINTMENT (OUTPATIENT)
Dept: PHYSICAL THERAPY | Facility: CLINIC | Age: 47
End: 2022-03-07
Attending: STUDENT IN AN ORGANIZED HEALTH CARE EDUCATION/TRAINING PROGRAM
Payer: COMMERCIAL

## 2022-03-07 ENCOUNTER — APPOINTMENT (OUTPATIENT)
Dept: OCCUPATIONAL THERAPY | Facility: CLINIC | Age: 47
End: 2022-03-07
Attending: STUDENT IN AN ORGANIZED HEALTH CARE EDUCATION/TRAINING PROGRAM
Payer: COMMERCIAL

## 2022-03-07 LAB
ALBUMIN SERPL-MCNC: 3 G/DL (ref 3.4–5)
ALP SERPL-CCNC: 62 U/L (ref 40–150)
ALT SERPL W P-5'-P-CCNC: 23 U/L (ref 0–70)
ANION GAP SERPL CALCULATED.3IONS-SCNC: 5 MMOL/L (ref 3–14)
AST SERPL W P-5'-P-CCNC: 20 U/L (ref 0–45)
BILIRUB SERPL-MCNC: 0.3 MG/DL (ref 0.2–1.3)
BUN SERPL-MCNC: 22 MG/DL (ref 7–30)
CALCIUM SERPL-MCNC: 9.2 MG/DL (ref 8.5–10.1)
CHLORIDE BLD-SCNC: 105 MMOL/L (ref 94–109)
CO2 SERPL-SCNC: 32 MMOL/L (ref 20–32)
CREAT SERPL-MCNC: 1.27 MG/DL (ref 0.66–1.25)
ERYTHROCYTE [DISTWIDTH] IN BLOOD BY AUTOMATED COUNT: 13.9 % (ref 10–15)
GFR SERPL CREATININE-BSD FRML MDRD: 70 ML/MIN/1.73M2
GLUCOSE BLD-MCNC: 108 MG/DL (ref 70–99)
HCT VFR BLD AUTO: 35.6 % (ref 40–53)
HGB BLD-MCNC: 11.8 G/DL (ref 13.3–17.7)
MCH RBC QN AUTO: 30.3 PG (ref 26.5–33)
MCHC RBC AUTO-ENTMCNC: 33.1 G/DL (ref 31.5–36.5)
MCV RBC AUTO: 92 FL (ref 78–100)
PLATELET # BLD AUTO: 252 10E3/UL (ref 150–450)
POTASSIUM BLD-SCNC: 3.5 MMOL/L (ref 3.4–5.3)
PROT SERPL-MCNC: 6.9 G/DL (ref 6.8–8.8)
RBC # BLD AUTO: 3.89 10E6/UL (ref 4.4–5.9)
SODIUM SERPL-SCNC: 142 MMOL/L (ref 133–144)
WBC # BLD AUTO: 8 10E3/UL (ref 4–11)

## 2022-03-07 PROCEDURE — 250N000013 HC RX MED GY IP 250 OP 250 PS 637: Performed by: PHYSICIAN ASSISTANT

## 2022-03-07 PROCEDURE — 36415 COLL VENOUS BLD VENIPUNCTURE: CPT | Performed by: STUDENT IN AN ORGANIZED HEALTH CARE EDUCATION/TRAINING PROGRAM

## 2022-03-07 PROCEDURE — 85027 COMPLETE CBC AUTOMATED: CPT | Performed by: STUDENT IN AN ORGANIZED HEALTH CARE EDUCATION/TRAINING PROGRAM

## 2022-03-07 PROCEDURE — 250N000013 HC RX MED GY IP 250 OP 250 PS 637

## 2022-03-07 PROCEDURE — 97112 NEUROMUSCULAR REEDUCATION: CPT | Mod: GO

## 2022-03-07 PROCEDURE — 250N000013 HC RX MED GY IP 250 OP 250 PS 637: Performed by: STUDENT IN AN ORGANIZED HEALTH CARE EDUCATION/TRAINING PROGRAM

## 2022-03-07 PROCEDURE — 99232 SBSQ HOSP IP/OBS MODERATE 35: CPT | Performed by: STUDENT IN AN ORGANIZED HEALTH CARE EDUCATION/TRAINING PROGRAM

## 2022-03-07 PROCEDURE — 97535 SELF CARE MNGMENT TRAINING: CPT | Mod: GO

## 2022-03-07 PROCEDURE — 97530 THERAPEUTIC ACTIVITIES: CPT | Mod: GP

## 2022-03-07 PROCEDURE — 97110 THERAPEUTIC EXERCISES: CPT | Mod: GO

## 2022-03-07 PROCEDURE — 80053 COMPREHEN METABOLIC PANEL: CPT | Performed by: STUDENT IN AN ORGANIZED HEALTH CARE EDUCATION/TRAINING PROGRAM

## 2022-03-07 PROCEDURE — 250N000013 HC RX MED GY IP 250 OP 250 PS 637: Performed by: HOSPITALIST

## 2022-03-07 PROCEDURE — 99207 PR CDG-CUT & PASTE-POTENTIAL IMPACT ON LEVEL: CPT | Performed by: STUDENT IN AN ORGANIZED HEALTH CARE EDUCATION/TRAINING PROGRAM

## 2022-03-07 PROCEDURE — 120N000002 HC R&B MED SURG/OB UMMC

## 2022-03-07 PROCEDURE — 250N000013 HC RX MED GY IP 250 OP 250 PS 637: Performed by: INTERNAL MEDICINE

## 2022-03-07 PROCEDURE — 97116 GAIT TRAINING THERAPY: CPT | Mod: GP

## 2022-03-07 PROCEDURE — 97542 WHEELCHAIR MNGMENT TRAINING: CPT | Mod: GP

## 2022-03-07 RX ADMIN — DIVALPROEX SODIUM 1000 MG: 500 TABLET, FILM COATED, EXTENDED RELEASE ORAL at 09:20

## 2022-03-07 RX ADMIN — CLONIDINE HYDROCHLORIDE 0.1 MG: 0.1 TABLET ORAL at 14:23

## 2022-03-07 RX ADMIN — CARVEDILOL 25 MG: 25 TABLET, FILM COATED ORAL at 17:11

## 2022-03-07 RX ADMIN — CLONIDINE HYDROCHLORIDE 0.1 MG: 0.1 TABLET ORAL at 19:47

## 2022-03-07 RX ADMIN — ATORVASTATIN CALCIUM 40 MG: 40 TABLET, FILM COATED ORAL at 19:47

## 2022-03-07 RX ADMIN — CARVEDILOL 25 MG: 25 TABLET, FILM COATED ORAL at 09:20

## 2022-03-07 RX ADMIN — RISPERIDONE 0.5 MG: 0.5 TABLET, ORALLY DISINTEGRATING ORAL at 19:47

## 2022-03-07 RX ADMIN — RISPERIDONE 0.5 MG: 0.5 TABLET, ORALLY DISINTEGRATING ORAL at 09:20

## 2022-03-07 RX ADMIN — ASPIRIN 81 MG: 81 TABLET, CHEWABLE ORAL at 09:20

## 2022-03-07 RX ADMIN — AMLODIPINE BESYLATE 10 MG: 10 TABLET ORAL at 09:20

## 2022-03-07 RX ADMIN — SITAGLIPTIN 100 MG: 100 TABLET, FILM COATED ORAL at 09:20

## 2022-03-07 RX ADMIN — CLONIDINE HYDROCHLORIDE 0.1 MG: 0.1 TABLET ORAL at 09:20

## 2022-03-07 RX ADMIN — Medication 2.5 MG: at 09:20

## 2022-03-07 RX ADMIN — LIDOCAINE 1 PATCH: 560 PATCH PERCUTANEOUS; TOPICAL; TRANSDERMAL at 09:20

## 2022-03-07 ASSESSMENT — ACTIVITIES OF DAILY LIVING (ADL)
ADLS_ACUITY_SCORE: 17
ADLS_ACUITY_SCORE: 19
ADLS_ACUITY_SCORE: 17
ADLS_ACUITY_SCORE: 19
ADLS_ACUITY_SCORE: 19
ADLS_ACUITY_SCORE: 17
ADLS_ACUITY_SCORE: 21
ADLS_ACUITY_SCORE: 17
ADLS_ACUITY_SCORE: 19
ADLS_ACUITY_SCORE: 17
ADLS_ACUITY_SCORE: 17
ADLS_ACUITY_SCORE: 19
ADLS_ACUITY_SCORE: 17
ADLS_ACUITY_SCORE: 21
ADLS_ACUITY_SCORE: 19
ADLS_ACUITY_SCORE: 21
ADLS_ACUITY_SCORE: 17

## 2022-03-07 NOTE — PROGRESS NOTES
[facility]    Medicine Progress Note - Hospitalist Service, GOLD TEAM 7    Date of Admission:  2/19/2022    Assessment & Plan     Miriam Hall is a 46 year old male with history of severe resistant HTN and longstanding DMII who was initially admitted to [facility] 1/8 - 1/28/2022 with R PCA stroke c/b refractory HTN and DISHA, and encephalopathy. Stabilized and transferred to ARU 1/28/2022 for ongoing rehabilitation. Patient developed new AMS, agitation, paranoia and worsening DISHA 2/18 prompting transfer to [place]. Now medically stable for discharge to TCU.     Agitation, resolved  Paranoia and personality change  Toxic metabolic encephalopathy, resolved  Pt developed new waxing and waning confusion, agitation and paranoia starting 2/18, worsened 2/19 prompting transfer from ARU to [facility]. Received 10 mg olanzapine and patient became obtunded prompting stroke code, repeat CT with known and evolving stroke without new CVA or hemorrhage. Unclear etiology of decompensation, but neurology feels could be possible personality change as effect of stroke vs medication effect, less likely sepsis as afebrile, HDS, normal WBC, negative UA, CXR. DDx - favor behavior changes likely from resolving stroke and improvement vs baclofen toxicity in CKD.  - Neurology consulted   - Head CT: evolving stroke   - UA negative, CXR with streaky opacities c/w atelectasis  - Spot EEG, prolactin elevated  - MRI (2/21) - continued evolution of stroke  - MRA Head (2/22) - lack fo flow in right posterior cerebral artery and high grade narrowing of right MCA posterior division. Per neurology similar to previous study.  - Psych consulted, risperdal 0.5 mg BID  - Ativan 0.5 mg BID prn for break thru agitation or anxiety  - Decrease baclofen to 2.5 mg daily given tenous renal function and ability to cause behavior changes in CKD      Recent PCA stroke - January 2022  Residual L hemiplegia  Presented with L  hemiplegia. Head CT 1/8 R parieto-occipital infarct. CTA noted occlusion on proximal R PCA and high grade stenosis of M2 branch of R MCA. Outside window for tPA. Repeat imagine 1/12 with hemorrhagic transformation. Source 2/2 high grade atherosclerosis vs ESUS. CARISA no LA thrombus. Residual deficits include L hemiplegia, L hemianopsia, mild dysarthria.   - Neuro stroke consulted during hospital stay, have signed off  -Code stroke called on 2/17 on repeat presentation  -CT Head (2/19) - evolution of PCA stroke, no hemorrhage  -MRI brain - (2/21) - evolution of right PCA stroke  -MRA brain (2/22) - similar narrowing of MCA and occlusion of right PCA  - Orthotics consulted for L shoulder jun neurexa  -Follow up of Zio patch     DISHA on CKD, DISHA resolved  Resistant HTN  Patient has long hx of resistant HTN while on multidrug regimen. Recent DISHA and new BL Cr around 1.5-1.7, was 2.2 on transfer and leno to 2.9 on 2/20 in the setting of ACEi use, now improving. Discharged from TCU on amiloride, Coreg, clonidine, lisinopril, and scheduled and prn hydralazine.    - carvedilol to 25 BID, cont clonidine   - increased amlodipine to 10 mg 3/3  Per renal's last note - permissive HTN given Cr has been sensitive to perfusion, SBPs 160s OK  - Labetalol prn for elevated BP  - Hold ACEi and diuretics, amiloride  - Nephrology consulted, appreciate recs, s/o  -Follow up in hypertension clinic (ordered)  Will eventually need genetics evaluation to consider Liddle's syndrome per last renal note     Hypopnea secondary to olanzapine - resolved  Patient was noted to have low RR (as low as 6) and long pauses of respiration prompting RRTs and stoke code following 10 IM Zyprexa. Normal oxygenation and normal gases. Avoid zyprexa  Now resolved.     Concern for Seizures  In the setting of CVA. EEG 1/12 concerning for subclinical seizures. Loaded with Keppra, transitioned to Depakote. No e/o seizure noted in the setting of AMS  - Continue Depakote  and seizure precautions     Troponin elevation  -elevated to 120s on 2/22 in setting of uncontrolled hypertension and CKD. Decreased from 2 weeks prior to admission, likely elevated from CKD and recent ischemic insult. ECG with no ischemic changes  - will stop trending unless clinical changes     Dysphagia  Moderate malnutrition in context of acute illness  Noted on admission with thin liquids. Previously on regular diet  - SLP consulted, ok with regular diet with 1:1   - continue Ensure Enlive between meals  - Calorie counts 3/3-3/5, caloric intake 400s-700s. Encourage supplements. Pt doing really well with Ensures     DMII  A1c 7.8. PTA metformin on hold due to DISHA. Glucose stable  - MSSI, hypoglycemia protocol  -Sitagliptan 100 mg  -Stop sliding scale insulin and POC glucose checks      Hypokalemia - replacing, check PRN      Constipation  - bowel regimen miralax and senna PRN, PRN MoM, PRN mag citrate, prune juice        Diet: Regular Diet Adult Thin Liquids (level 0)  Snacks/Supplements Adult: Ensure Enlive; Between Meals  Room Service    DVT Prophylaxis: Pneumatic Compression Devices  Mendoza Catheter: Not present  Central Lines: None  Cardiac Monitoring: None  Code Status: Full Code        Disposition Plan   Expected Discharge: 03/09/2022 Wednesday March 9th at 10AM, wheelchair ride set to [employer]  Anticipated discharge location:  Awaiting care coordination huddle  Delays:     Placement - LTAC/ARU            The patient's care was discussed with the Bedside Nurse and Patient.    Aiden Merino MD (Sally)  Internal Medicine/Pediatrics  Hospitalist    Hospitalist Service, [facility] TEAM [address] [place] [address]  Securely message with the Vocera Web Console (learn more here)  Text page via [employer] Paging/Directory   Please see signed in provider for up to date coverage information  ______________________________________________________________________    Interval History   No  acute events overnight. He's doing well. Had two Ensures already today! L hand pain/edema better. No major concerns today.     Data reviewed today: I reviewed all medications, new labs and imaging results over the last 24 hours. I personally reviewed no images or EKG's today.    Physical Exam   Vital Signs: Temp: 98.8  F (37.1  C) Temp src: Oral BP: 119/70 Pulse: 79   Resp: 16 SpO2: 99 % O2 Device: None (Room air)    Weight: 155 lbs 3.26 oz    General: awake, alert, in no acute distress  HEENT: NCAT, sclera anicteric, no nasal discharge, MMM, has L visual field cut  CV: RRR, no murmurs noted  Resp: CTAB, no increased WOB  Abd: Soft, nontender, nondistended, +BS, no rebound or guarding  MSK: No peripheral edema, extremities warm and well perfused  Skin: warm, dry, no jaundice  Neuro: residual decreased sensation on L side of face, decreased strength in L arm and leg      Data   Results for orders placed or performed during the hospital encounter of 02/19/22 (from the past 24 hour(s))   CBC with platelets   Result Value Ref Range    WBC Count 8.0 4.0 - 11.0 10e3/uL    RBC Count 3.89 (L) 4.40 - 5.90 10e6/uL    Hemoglobin 11.8 (L) 13.3 - 17.7 g/dL    Hematocrit 35.6 (L) 40.0 - 53.0 %    MCV 92 78 - 100 fL    MCH 30.3 26.5 - 33.0 pg    MCHC 33.1 31.5 - 36.5 g/dL    RDW 13.9 10.0 - 15.0 %    Platelet Count 252 150 - 450 10e3/uL   Comprehensive metabolic panel   Result Value Ref Range    Sodium 142 133 - 144 mmol/L    Potassium 3.5 3.4 - 5.3 mmol/L    Chloride 105 94 - 109 mmol/L    Carbon Dioxide (CO2) 32 20 - 32 mmol/L    Anion Gap 5 3 - 14 mmol/L    Urea Nitrogen 22 7 - 30 mg/dL    Creatinine 1.27 (H) 0.66 - 1.25 mg/dL    Calcium 9.2 8.5 - 10.1 mg/dL    Glucose 108 (H) 70 - 99 mg/dL    Alkaline Phosphatase 62 40 - 150 U/L    AST 20 0 - 45 U/L    ALT 23 0 - 70 U/L    Protein Total 6.9 6.8 - 8.8 g/dL    Albumin 3.0 (L) 3.4 - 5.0 g/dL    Bilirubin Total 0.3 0.2 - 1.3 mg/dL    GFR Estimate 70 >60 mL/min/1.73m2

## 2022-03-07 NOTE — PLAN OF CARE
Goal Outcome Evaluation:    Plan of Care Reviewed With: patient     Overall Patient Progress: improving    Outcome Evaluation: Sleeping well between cares. VSS. Med ready for [facility]    Denies pain. Repositions self in bed. Oriented x 4. Calls appropriately.

## 2022-03-07 NOTE — PLAN OF CARE
Goal Outcome Evaluation:    Plan of Care Reviewed With: patient       Outcome Evaluation: VSS. pt had BM this shift. possible d/c 3/9 to [facility] .      Neuro: A/Ox4  Cardiac: WDL  Resp: WDL. RA   GI/: voids spontaneously. BM x1 this shift.   Diet: reg   Skin: WDL  Activity: Ax2   Lines: R PIV. SL  Pain: denies       New this shift: no acute changes this shift       Plan: possible discharge 3/9.

## 2022-03-07 NOTE — PROGRESS NOTES
Care Management Follow Up    Length of Stay (days): 16    Expected Discharge Date: 03/09/2022     Concerns to be Addressed: discharge planning  Patient plan of care discussed at interdisciplinary rounds: Yes    Anticipated Discharge Disposition: [facility]     Anticipated Discharge Services: Transportation    Anticipated Discharge DME: TBD     Patient/family educated on Medicare website which has current facility and service quality ratings: Yes     Education Provided on the Discharge Plan:Yes      Patient/Family in Agreement with the Plan: Yes     Referrals Placed by CM/SW:  Pt has been accepted to [facility]     Private pay costs discussed: Not applicable    Additional Information:  MARLENA arranged wheelchair transportation with [facility] for pt on  Wednesday March 9th at 10AM.    MARLENA paged Dr. Merino to update her on pt's transportation information.     will continue to follow for discharge planning, support, and resources.    ML Rojas, [facility]  [address]   Office: [phone]   Pager: [phone]  [email or web address]

## 2022-03-08 ENCOUNTER — APPOINTMENT (OUTPATIENT)
Dept: OCCUPATIONAL THERAPY | Facility: CLINIC | Age: 47
End: 2022-03-08
Attending: STUDENT IN AN ORGANIZED HEALTH CARE EDUCATION/TRAINING PROGRAM
Payer: COMMERCIAL

## 2022-03-08 ENCOUNTER — APPOINTMENT (OUTPATIENT)
Dept: PHYSICAL THERAPY | Facility: CLINIC | Age: 47
End: 2022-03-08
Attending: STUDENT IN AN ORGANIZED HEALTH CARE EDUCATION/TRAINING PROGRAM
Payer: COMMERCIAL

## 2022-03-08 PROCEDURE — 97110 THERAPEUTIC EXERCISES: CPT | Mod: GO | Performed by: OCCUPATIONAL THERAPIST

## 2022-03-08 PROCEDURE — 250N000013 HC RX MED GY IP 250 OP 250 PS 637: Performed by: STUDENT IN AN ORGANIZED HEALTH CARE EDUCATION/TRAINING PROGRAM

## 2022-03-08 PROCEDURE — 97530 THERAPEUTIC ACTIVITIES: CPT | Mod: GO | Performed by: OCCUPATIONAL THERAPIST

## 2022-03-08 PROCEDURE — 99232 SBSQ HOSP IP/OBS MODERATE 35: CPT | Performed by: STUDENT IN AN ORGANIZED HEALTH CARE EDUCATION/TRAINING PROGRAM

## 2022-03-08 PROCEDURE — 97116 GAIT TRAINING THERAPY: CPT | Mod: GP

## 2022-03-08 PROCEDURE — 97542 WHEELCHAIR MNGMENT TRAINING: CPT | Mod: GP

## 2022-03-08 PROCEDURE — 250N000013 HC RX MED GY IP 250 OP 250 PS 637: Performed by: PHYSICIAN ASSISTANT

## 2022-03-08 PROCEDURE — 250N000013 HC RX MED GY IP 250 OP 250 PS 637: Performed by: INTERNAL MEDICINE

## 2022-03-08 PROCEDURE — 99207 PR CDG-MDM COMPONENT: MEETS MODERATE - UP CODED: CPT | Performed by: STUDENT IN AN ORGANIZED HEALTH CARE EDUCATION/TRAINING PROGRAM

## 2022-03-08 PROCEDURE — 250N000013 HC RX MED GY IP 250 OP 250 PS 637

## 2022-03-08 PROCEDURE — 250N000013 HC RX MED GY IP 250 OP 250 PS 637: Performed by: HOSPITALIST

## 2022-03-08 PROCEDURE — 120N000002 HC R&B MED SURG/OB UMMC

## 2022-03-08 PROCEDURE — 97535 SELF CARE MNGMENT TRAINING: CPT | Mod: GO | Performed by: OCCUPATIONAL THERAPIST

## 2022-03-08 RX ADMIN — CLONIDINE HYDROCHLORIDE 0.1 MG: 0.1 TABLET ORAL at 20:09

## 2022-03-08 RX ADMIN — LIDOCAINE 1 PATCH: 560 PATCH PERCUTANEOUS; TOPICAL; TRANSDERMAL at 13:20

## 2022-03-08 RX ADMIN — SITAGLIPTIN 100 MG: 100 TABLET, FILM COATED ORAL at 09:23

## 2022-03-08 RX ADMIN — DIVALPROEX SODIUM 1000 MG: 500 TABLET, FILM COATED, EXTENDED RELEASE ORAL at 09:23

## 2022-03-08 RX ADMIN — ASPIRIN 81 MG: 81 TABLET, CHEWABLE ORAL at 09:23

## 2022-03-08 RX ADMIN — CARVEDILOL 25 MG: 25 TABLET, FILM COATED ORAL at 09:23

## 2022-03-08 RX ADMIN — RISPERIDONE 0.5 MG: 0.5 TABLET, ORALLY DISINTEGRATING ORAL at 20:09

## 2022-03-08 RX ADMIN — RISPERIDONE 0.5 MG: 0.5 TABLET, ORALLY DISINTEGRATING ORAL at 09:23

## 2022-03-08 RX ADMIN — ATORVASTATIN CALCIUM 40 MG: 40 TABLET, FILM COATED ORAL at 20:09

## 2022-03-08 RX ADMIN — CLONIDINE HYDROCHLORIDE 0.1 MG: 0.1 TABLET ORAL at 09:23

## 2022-03-08 RX ADMIN — AMLODIPINE BESYLATE 10 MG: 10 TABLET ORAL at 09:24

## 2022-03-08 RX ADMIN — CLONIDINE HYDROCHLORIDE 0.1 MG: 0.1 TABLET ORAL at 14:00

## 2022-03-08 RX ADMIN — Medication 2.5 MG: at 09:23

## 2022-03-08 RX ADMIN — CARVEDILOL 25 MG: 25 TABLET, FILM COATED ORAL at 18:22

## 2022-03-08 ASSESSMENT — ACTIVITIES OF DAILY LIVING (ADL)
ADLS_ACUITY_SCORE: 15
ADLS_ACUITY_SCORE: 17
ADLS_ACUITY_SCORE: 15
ADLS_ACUITY_SCORE: 17
ADLS_ACUITY_SCORE: 15
ADLS_ACUITY_SCORE: 17
ADLS_ACUITY_SCORE: 15
ADLS_ACUITY_SCORE: 17
ADLS_ACUITY_SCORE: 15

## 2022-03-08 NOTE — PROGRESS NOTES
SPIRITUAL HEALTH SERVICES  [facility]  Unit: 5A    Referral Source: Following     Illness Narrative: Pt shared that he has experienced a stroke and is in the process of recovering from the stroke.    Distress: Pt is struggling over not being able to be more present to his mother prior to her recent death.     Coping: Miriam mentioned a [place] of friends and a previous girlfriend, Taniya that has really stepped up in helping him as well as assisting in making arrangements relating to the death of his mother.     Visit was interrupted by a phone call from pt's friend Taniya. After asking the pt I made plans to [place] back and do a follow up visit with the pt.    Plan: Will try to return to visit pt either this afternoon or tomorrow morning.         Duane F Bauer  Chaplain Resident  Pager number: [phone]  * [facility] remains available 24/7 for emergent requests/referrals, either by having the switchboard page the on-call  or by entering an ASAP/STAT consult in Epic (this will also page the on-call ).*

## 2022-03-08 NOTE — PLAN OF CARE
Goal Outcome Evaluation:    Plan of Care Reviewed With: patient     Overall Patient Progress: improving    Outcome Evaluation: VSS.  Possible d/c on 3/9.  Transportation to TCU arranged by social work. No acute changes overnight.

## 2022-03-08 NOTE — PLAN OF CARE
Goal Outcome Evaluation:    Plan of Care Reviewed With: patient     Overall Patient Progress: no change    Outcome Evaluation: A&Ox4, very pleasant and kind. C/o L hip pain, lidocaine patch placed. Worked w/ OT in AM. Shaved independently w/ personal electric razor. Worked with PT in afternoon. Up Ax2 in hallway, walking short distances. Hypertensive, 163/81, scheduled BP meds given, did not meet parameters for IV labetalol. Friends visiting at bedside. Plan to discharge tomorrow to [facility] via [facility] transport at 1000.

## 2022-03-08 NOTE — PROGRESS NOTES
CLINICAL NUTRITION SERVICES - REASSESSMENT NOTE     Nutrition Prescription    RECOMMENDATIONS FOR MDs/PROVIDERS TO ORDER:  None at this time     Malnutrition Status:    Moderate malnutrition in the context of acute illness    Recommendations already ordered by Registered Dietitian (RD):  Continue Ensure Enlive between meals and Room Service with Assist    Future/Additional Recommendations:  Monitor PO intakes, wt trends vs need for more aggressive nutrition intervention     EVALUATION OF THE PROGRESS TOWARD GOALS   Diet: Regular + Ensure Enlive between meals + Room Service with Assist    Intake: Appetite improving the past few days per progress notes, pt drinking and likes Ensures.     Calorie Counts  3/5: 742 kcal and 43 g protein (42% kcal needs and 61% protein needs)  3/4: no food intake recorded  3/3: 426 kcal and 27 g protein    NEW FINDINGS   Weight: mostly 70-71 kg since admit on 2/19  Dispo: per chart review, likely discharge tomorrow to [facility]    MALNUTRITION  % Intake: < 75% for > 7 days (moderate)  % Weight Loss: > 5% in 1 month (severe)  Subcutaneous Fat Loss: Facial region: mild observed per RD note on 3/1  Muscle Loss: Temporal: mild observed per RD note on 3/1  Fluid Accumulation/Edema: mild L foot and ankle per flowsheets  Malnutrition Diagnosis: Moderate malnutrition in the context of acute illness    Previous Goals   Patient to consume % of nutritionally adequate meal trays TID, or the equivalent with supplements/snacks.  Evaluation: Not met    Previous Nutrition Diagnosis  Inadequate oral intake related to variable appetite as evidenced by eating mostly 50% of meals documented the past 10 days but ranging 25-75%    Evaluation: Improving    CURRENT NUTRITION DIAGNOSIS  Inadequate oral intake related to variable appetite as evidenced by mostly fair-good appetite the past week but sometimes poor (overall improving per chart review)    INTERVENTIONS  Implementation  Chart review, pt with other  cares or on phone during attempts to visit    Goals  Patient to consume % of nutritionally adequate meal trays TID, or the equivalent with supplements/snacks.    Monitoring/Evaluation  Progress toward goals will be monitored and evaluated per protocol.     Brittney Tello RD, , LD  Weekday Pager: [phone]  Weekday Units covered: 7C (all beds) and 5A (beds 5201 through 5211-2)  Weekend/Holiday RD Pager: [phone]

## 2022-03-08 NOTE — PROGRESS NOTES
[facility]    Medicine Progress Note - Hospitalist Service, GOLD TEAM 7    Date of Admission:  2/19/2022    Assessment & Plan                Miriam Hall is a 46 year old male with history of severe resistant HTN and longstanding DMII who was initially admitted to [facility] 1/8 - 1/28/2022 with R PCA stroke c/b refractory HTN and DISHA, and encephalopathy. Stabilized and transferred to ARU 1/28/2022 for ongoing rehabilitation. Patient developed new AMS, agitation, paranoia and worsening DISHA 2/18 prompting transfer to [place]. Now medically stable for discharge to TCU.    Interval Changes:  -Stable for transfer to TCU     Agitation, resolved  Paranoia and personality change  Toxic metabolic encephalopathy, resolved  Pt developed new waxing and waning confusion, agitation and paranoia starting 2/18, worsened 2/19 prompting transfer from ARU to [facility]. Received 10 mg olanzapine and patient became obtunded prompting stroke code, repeat CT with known and evolving stroke without new CVA or hemorrhage. Unclear etiology of decompensation, but neurology feels could be possible personality change as effect of stroke vs medication effect, less likely sepsis as afebrile, HDS, normal WBC, negative UA, CXR. DDx - favor behavior changes likely from resolving stroke and improvement vs baclofen toxicity in CKD.  - Neurology consulted   - Head CT: evolving stroke   - UA negative, CXR with streaky opacities c/w atelectasis  - Spot EEG, prolactin elevated  - MRI (2/21) - continued evolution of stroke  - MRA Head (2/22) - lack fo flow in right posterior cerebral artery and high grade narrowing of right MCA posterior division. Per neurology similar to previous study.  - Psych consulted, risperdal 0.5 mg BID  - Ativan 0.5 mg BID prn for break thru agitation or anxiety  - Decrease baclofen to 2.5 mg daily given tenous renal function and ability to cause behavior changes in CKD      Recent PCA  stroke - January 2022  Residual L hemiplegia  Presented with L hemiplegia. Head CT 1/8 R parieto-occipital infarct. CTA noted occlusion on proximal R PCA and high grade stenosis of M2 branch of R MCA. Outside window for tPA. Repeat imagine 1/12 with hemorrhagic transformation. Source 2/2 high grade atherosclerosis vs ESUS. CARISA no LA thrombus. Residual deficits include L hemiplegia, L hemianopsia, mild dysarthria.   - Neuro stroke consulted during hospital stay, have signed off  -Code stroke called on 2/17 on repeat presentation  -CT Head (2/19) - evolution of PCA stroke, no hemorrhage  -MRI brain - (2/21) - evolution of right PCA stroke  -MRA brain (2/22) - similar narrowing of MCA and occlusion of right PCA  - Orthotics consulted for L shoulder jun neurexa  -Follow up of Zio patch     DISHA on CKD, DISHA resolved  Resistant HTN  Patient has long hx of resistant HTN while on multidrug regimen. Recent DISHA and new BL Cr around 1.5-1.7, was 2.2 on transfer and leno to 2.9 on 2/20 in the setting of ACEi use, now improving. Discharged from TCU on amiloride, Coreg, clonidine, lisinopril, and scheduled and prn hydralazine.    - carvedilol to 25 BID, cont clonidine   - increased amlodipine to 10 mg 3/3  Per renal's last note - permissive HTN given Cr has been sensitive to perfusion, SBPs 160s OK  - Labetalol prn for elevated BP  - Hold ACEi and diuretics, amiloride  - Nephrology consulted, appreciate recs, s/o  -Follow up in hypertension clinic (ordered)  Will eventually need genetics evaluation to consider Liddle's syndrome per last renal note     Hypopnea secondary to olanzapine - resolved  Patient was noted to have low RR (as low as 6) and long pauses of respiration prompting RRTs and stoke code following 10 IM Zyprexa. Normal oxygenation and normal gases. Avoid zyprexa  Now resolved.     Concern for Seizures  In the setting of CVA. EEG 1/12 concerning for subclinical seizures. Loaded with Keppra, transitioned to  Depakote. No e/o seizure noted in the setting of AMS  - Continue Depakote and seizure precautions     Troponin elevation  -elevated to 120s on 2/22 in setting of uncontrolled hypertension and CKD. Decreased from 2 weeks prior to admission, likely elevated from CKD and recent ischemic insult. ECG with no ischemic changes  - will stop trending unless clinical changes     Dysphagia  Moderate malnutrition in context of acute illness  Noted on admission with thin liquids. Previously on regular diet  - SLP consulted, ok with regular diet with 1:1   - continue Ensure Enlive between meals  - Calorie counts 3/3-3/5, caloric intake 400s-700s. Encourage supplements. Pt doing really well with Ensures     DMII  A1c 7.8. PTA metformin on hold due to DISHA. Glucose stable  - MSSI, hypoglycemia protocol  -Sitagliptan 100 mg  -Stop sliding scale insulin and POC glucose checks      Hypokalemia - replacing, check PRN      Constipation  - bowel regimen miralax and senna PRN, PRN MoM, PRN mag citrate, prune juice        Diet: Regular Diet Adult Thin Liquids (level 0)  Snacks/Supplements Adult: Ensure Enlive; Between Meals  Room Service    DVT Prophylaxis: Pneumatic Compression Devices  Mendoza Catheter: Not present  Central Lines: None  Cardiac Monitoring: None  Code Status: Full Code             Diet: Regular Diet Adult Thin Liquids (level 0)  Snacks/Supplements Adult: Ensure Enlive; Between Meals  Room Service    DVT Prophylaxis: Pneumatic Compression Devices  Mendoza Catheter: Not present  Central Lines: None  Cardiac Monitoring: None  Code Status: Full Code      Disposition Plan   Expected Discharge: 03/09/2022     Anticipated discharge location:  Awaiting care coordination huddle  Delays:     Placement - LTAC/ARU            The patient's care was discussed with the Bedside Nurse, Care Coordinator/ and Patient.    Christiano Tapia MD  Hospitalist Service, [facility] TEAM 7  [facility]  Securely message with the Vocera Web Console (learn more here)  Text page via [facility] Paging/Directory   Please see signed in provider for up to date coverage information      Clinically Significant Risk Factors Present on Admission                    ______________________________________________________________________    Interval History   No acute events overnight. States anxiety is under control. He is eating well and doing supplements. No fever or chills. No chest pain. No shortness of breath.     Data reviewed today: I reviewed all medications, new labs and imaging results over the last 24 hours. I personally reviewed no images or EKG's today.    Physical Exam   Vital Signs: Temp: 98.5  F (36.9  C) Temp src: Oral BP: (!) 147/81 Pulse: 70   Resp: 16 SpO2: 98 % O2 Device: None (Room air)    Weight: 155 lbs 3.26 oz  Constitutional: alert, oriented, no acute distress  Eyes: left visual filed cut  ENT: no elevated JVD  Respiratory: no tachypnea or wheezing  Cardiovascular: RRR  GI: soft, non distended  Musculoskeletal: left upper extremity and lower extremity weakness  Neurologic: decreased senation on left face, left facial droop    Data   Recent Labs   Lab 03/07/22  0714 03/05/22  1328 03/05/22  0733 03/03/22  0618 03/02/22  0605   WBC 8.0  --  7.7  --  6.4   HGB 11.8*  --  11.3*  --  11.8*   MCV 92  --  91  --  91     --  255  --  268     --  142  --  143   POTASSIUM 3.5 3.6 3.3*   < > 3.1*   CHLORIDE 105  --  106  --  104   CO2 32  --  30  --  29   BUN 22  --  27  --  25   CR 1.27*  --  1.34*  --  1.42*   ANIONGAP 5  --  6  --  10   VALERIE 9.2  --  9.0  --  9.5   *  --  101*  --  130*   ALBUMIN 3.0*  --  2.9*  --  3.1*   PROTTOTAL 6.9  --  6.7*  --  7.2   BILITOTAL 0.3  --  0.4  --  0.4   ALKPHOS 62  --  63  --  69   ALT 23  --  25  --  29   AST 20  --  22  --  33    < > = values in this interval not displayed.

## 2022-03-09 VITALS
TEMPERATURE: 98.6 F | WEIGHT: 160.05 LBS | SYSTOLIC BLOOD PRESSURE: 147 MMHG | BODY MASS INDEX: 24.05 KG/M2 | RESPIRATION RATE: 14 BRPM | OXYGEN SATURATION: 96 % | HEART RATE: 66 BPM | DIASTOLIC BLOOD PRESSURE: 79 MMHG

## 2022-03-09 PROCEDURE — 250N000013 HC RX MED GY IP 250 OP 250 PS 637: Performed by: STUDENT IN AN ORGANIZED HEALTH CARE EDUCATION/TRAINING PROGRAM

## 2022-03-09 PROCEDURE — 99238 HOSP IP/OBS DSCHRG MGMT 30/<: CPT | Performed by: STUDENT IN AN ORGANIZED HEALTH CARE EDUCATION/TRAINING PROGRAM

## 2022-03-09 PROCEDURE — 250N000013 HC RX MED GY IP 250 OP 250 PS 637: Performed by: PHYSICIAN ASSISTANT

## 2022-03-09 PROCEDURE — 250N000013 HC RX MED GY IP 250 OP 250 PS 637

## 2022-03-09 PROCEDURE — 250N000013 HC RX MED GY IP 250 OP 250 PS 637: Performed by: INTERNAL MEDICINE

## 2022-03-09 RX ORDER — LORAZEPAM 0.5 MG/1
0.5 TABLET ORAL 2 TIMES DAILY PRN
Qty: 30 TABLET | Refills: 0 | Status: SHIPPED | OUTPATIENT
Start: 2022-03-09 | End: 2022-03-09

## 2022-03-09 RX ORDER — LORAZEPAM 0.5 MG/1
0.5 TABLET ORAL 2 TIMES DAILY PRN
Qty: 30 TABLET | Refills: 0 | Status: SHIPPED | OUTPATIENT
Start: 2022-03-09

## 2022-03-09 RX ORDER — BACLOFEN 5 MG/1
2.5 TABLET ORAL DAILY
Qty: 60 TABLET | Refills: 0 | Status: SHIPPED | OUTPATIENT
Start: 2022-03-09

## 2022-03-09 RX ORDER — LANOLIN ALCOHOL/MO/W.PET/CERES
3 CREAM (GRAM) TOPICAL DAILY
Qty: 60 TABLET | Refills: 0 | Status: SHIPPED | OUTPATIENT
Start: 2022-03-09 | End: 2023-05-11

## 2022-03-09 RX ORDER — AMLODIPINE BESYLATE 10 MG/1
10 TABLET ORAL DAILY
Qty: 60 TABLET | Refills: 0 | Status: SHIPPED | OUTPATIENT
Start: 2022-03-10

## 2022-03-09 RX ORDER — RISPERIDONE 0.5 MG/1
0.5 TABLET, ORALLY DISINTEGRATING ORAL 2 TIMES DAILY
Qty: 60 TABLET | Refills: 0 | Status: SHIPPED | OUTPATIENT
Start: 2022-03-09 | End: 2023-05-11

## 2022-03-09 RX ORDER — CARVEDILOL 12.5 MG/1
25 TABLET ORAL 2 TIMES DAILY WITH MEALS
Qty: 180 TABLET | Refills: 1 | Status: SHIPPED | OUTPATIENT
Start: 2022-03-09

## 2022-03-09 RX ADMIN — CARVEDILOL 25 MG: 25 TABLET, FILM COATED ORAL at 08:06

## 2022-03-09 RX ADMIN — RISPERIDONE 0.5 MG: 0.5 TABLET, ORALLY DISINTEGRATING ORAL at 08:06

## 2022-03-09 RX ADMIN — METFORMIN HYDROCHLORIDE 500 MG: 500 TABLET, FILM COATED ORAL at 08:06

## 2022-03-09 RX ADMIN — AMLODIPINE BESYLATE 10 MG: 10 TABLET ORAL at 08:06

## 2022-03-09 RX ADMIN — Medication 2.5 MG: at 08:06

## 2022-03-09 RX ADMIN — DIVALPROEX SODIUM 1000 MG: 500 TABLET, FILM COATED, EXTENDED RELEASE ORAL at 08:05

## 2022-03-09 RX ADMIN — ASPIRIN 81 MG: 81 TABLET, CHEWABLE ORAL at 08:06

## 2022-03-09 RX ADMIN — CLONIDINE HYDROCHLORIDE 0.1 MG: 0.1 TABLET ORAL at 08:06

## 2022-03-09 ASSESSMENT — ACTIVITIES OF DAILY LIVING (ADL)
ADLS_ACUITY_SCORE: 15

## 2022-03-09 NOTE — PLAN OF CARE
Occupational Therapy Discharge Summary    Reason for therapy discharge:    Discharged to [facility]     Progress towards therapy goal(s). See goals on Care Plan in [facility] electronic health record for goal details.  Goals not met.  Barriers to achieving goals:   discharge from facility.    Therapy recommendation(s):    Continued therapy is recommended.  Rationale/Recommendations:   . Pt would benefit from continued skilled OT services to improve independence and safety with ADL's and functional transfers as pt is not at baseline.

## 2022-03-09 NOTE — PLAN OF CARE
Physical Therapy Discharge Summary    Reason for therapy discharge:    Discharged to [facility]    Progress towards therapy goal(s). See goals on Care Plan in [facility] electronic health record for goal details.  Goals partially met.  Barriers to achieving goals:   discharge from facility.    Therapy recommendation(s):    Continued therapy is recommended.  Rationale/Recommendations:  to improve functional independence, strength, gait, balance and overall mobility status.

## 2022-03-09 NOTE — PLAN OF CARE
Goal Outcome Evaluation:    Plan of Care Reviewed With: patient     Overall Patient Progress: no change    Outcome Evaluation: AOx4, pleasant and making needs known.  No acute changes this shift.  Ready for discharge at 1000 on 3/9

## 2022-03-09 NOTE — DISCHARGE SUMMARY
[facility]  Hospitalist Discharge Summary      Date of Admission:  2/19/2022  Date of Discharge:  3/9/2022  Discharging Provider: Christiano Tapia MD  Discharge Service: Hospitalist Service, GOLD TEAM 7    Discharge Diagnoses   Agitation  Paranoia  Metabolic encephalopathy  Recent PCA stroke  Residual left hemiplegia  Hypertension  Seizures  Dysphagia  Moderate malnutrition  DISHA on CKD stage III    Follow-ups Needed After Discharge   Follow-up Appointments     Follow Up and recommended labs and tests      Follow up with specialist, hypertension clinic , in 3/10 at [facility] No   follow up labs or test are needed.             Unresulted Labs Ordered in the Past 30 Days of this Admission     No orders found from 1/20/2022 to 2/20/2022.      These results will be followed up by     Discharge Disposition   Discharged to short-term care facility  Condition at discharge: Stable      Hospital Course   Miriam Hall is a 46 year old male with history of severe resistant HTN and longstanding DMII who was initially admitted to [facility] 1/8 - 1/28/2022 with R PCA stroke c/b refractory HTN and DISHA, and encephalopathy. Stabilized and transferred to ARU 1/28/2022 for ongoing rehabilitation. Patient developed new AMS, agitation, paranoia and worsening DISHA 2/18 prompting transfer to [facility].  Altered mental status and encephalopathy was felt secondary to DISHA, medications, and resolving stroke. Neurology was consulted and psychiatry was consulted who assisted with medications.     Hospital stay complicated by Disha which resolved with holding medications, IV fluids. Nephrology was consulted and assisted with management with plans for outpatient follow up for resistant hypertension and concern for possible liddle syndrome.     PT, OT, SLP consulted and plan for TCU with rehab therapies on discharge.     Consultations This Hospital Stay   PHYSICAL THERAPY ADULT IP CONSULT  OCCUPATIONAL  THERAPY ADULT IP CONSULT  VASCULAR ACCESS CARE ADULT IP CONSULT  VASCULAR ACCESS CARE ADULT IP CONSULT  SPEECH LANGUAGE PATH ADULT IP CONSULT  NEPHROLOGY GENERAL ADULT IP CONSULT  CARE MANAGEMENT / SOCIAL WORK IP CONSULT  PSYCHIATRY IP CONSULT  NEUROLOGY GENERAL ADULT IP CONSULT  VASCULAR ACCESS CARE ADULT IP CONSULT  VASCULAR ACCESS CARE ADULT IP CONSULT  NEUROLOGY GENERAL ADULT IP CONSULT  PSYCHIATRY IP CONSULT  SPIRITUAL HEALTH SERVICES IP CONSULT  ORTHOSIS EXTREMITY UPPER REFERRAL IP CONSULT  SPIRITUAL HEALTH SERVICES IP CONSULT  SPIRITUAL HEALTH SERVICES IP CONSULT  PHYSICAL THERAPY ADULT IP CONSULT  OCCUPATIONAL THERAPY ADULT IP CONSULT  SPEECH LANGUAGE PATH ADULT IP CONSULT  VASCULAR ACCESS CARE ADULT IP CONSULT    Code Status   Full Code    Time Spent on this Encounter   I, Christiano Tapia MD, personally saw the patient today and spent less than 30 minutes discharging this patient.       Christiano Tapia MD  [facility] [address] [address]  Phone: [phone]  ______________________________________________________________________    Physical Exam   Vital Signs: Temp: 98.6  F (37  C) Temp src: Oral BP: (!) 147/79 Pulse: 66   Resp: 14 SpO2: 96 % O2 Device: None (Room air)    Weight: 160 lbs .86 oz  Constitutional: alert, oriented, no acute distress  Eyes: left visual field cut  ENT: left facial droop  Respiratory: CTAB, no wheezing or crackles  Cardiovascular: normal s1, s2, s4 present, systolic murmur 1/6, RRR  GI: soft, non-tender, non-distended  Musculoskeletal: left upper and lower extremity strength 1-2/5  Neurologic: decreased sensation on left face       Primary Care Physician   Physician No Ref-Primary    Discharge Orders      Adult Nephrology  Referral      Specialty Care Coordination Referral      General info for SNF    Length of Stay Estimate: Short Term Care: Estimated # of Days <30  Condition at Discharge: Improving  Level of care:skilled    Rehabilitation Potential: Excellent  Admission H&P remains valid and up-to-date: Yes  Recent Chemotherapy: N/A  Use Nursing Home Standing Orders: Yes     Mantoux instructions    Give two-step Mantoux (PPD) Per Facility Policy Yes     Follow Up and recommended labs and tests    Follow up with specialist, hypertension clinic , in 3/10 at [facility] No follow up labs or test are needed.     Reason for your hospital stay    Agitation  Recent CVA  Hypertension     Daily weights    Call Provider for weight gain of more than 2 pounds per day or 5 pounds per week.     Activity - Up with assistive device     Full Code     Physical Therapy Adult Consult    Evaluate and treat as clinically indicated.    Reason:  recent CVA     Occupational Therapy Adult Consult    Evaluate and treat as clinically indicated.    Reason:  Recent CVA     Speech Language Path Adult Consult    Evaluate and treat as clinically indicated.    Reason:  Recent CVA     Diet    Follow this diet upon discharge: Orders Placed This Encounter      Snacks/Supplements Adult: Ensure Enlive; Between Meals      Room Service      Calorie Counts      Regular Diet Adult Thin Liquids (level 0)       Significant Results and Procedures   Most Recent 3 CBC's:Recent Labs   Lab Test 03/07/22  0714 03/05/22  0733 03/02/22  0605   WBC 8.0 7.7 6.4   HGB 11.8* 11.3* 11.8*   MCV 92 91 91    255 268     Most Recent 3 BMP's:Recent Labs   Lab Test 03/07/22 0714 03/05/22  1328 03/05/22  0733 03/03/22  0618 03/02/22  0605     --  142  --  143   POTASSIUM 3.5 3.6 3.3*   < > 3.1*   CHLORIDE 105  --  106  --  104   CO2 32  --  30  --  29   BUN 22  --  27  --  25   CR 1.27*  --  1.34*  --  1.42*   ANIONGAP 5  --  6  --  10   VALERIE 9.2  --  9.0  --  9.5   *  --  101*  --  130*    < > = values in this interval not displayed.     Most Recent 2 LFT's:Recent Labs   Lab Test 03/07/22 0714 03/05/22 0733   AST 20 22   ALT 23 25   ALKPHOS 62 63   BILITOTAL 0.3 0.4       Discharge  Medications   Current Discharge Medication List      START taking these medications    Details   amLODIPine (NORVASC) 10 MG tablet Take 1 tablet (10 mg) by mouth daily  Qty: 60 tablet, Refills: 0    Associated Diagnoses: Seizure (H); Secondary hypertension; Cerebrovascular accident (CVA) due to thrombosis of other precerebral artery (H); Transient alteration of awareness      LORazepam (ATIVAN) 0.5 MG tablet Take 1 tablet (0.5 mg) by mouth 2 times daily as needed for agitation or anxiety (aggresion)  Qty: 30 tablet, Refills: 0    Associated Diagnoses: Seizure (H); Secondary hypertension; Cerebrovascular accident (CVA) due to thrombosis of other precerebral artery (H); Transient alteration of awareness      magnesium hydroxide (MILK OF MAGNESIA) 400 MG/5ML suspension Take 30 mLs by mouth daily as needed for constipation  Qty: 769 mL, Refills: 0    Associated Diagnoses: Seizure (H); Secondary hypertension; Cerebrovascular accident (CVA) due to thrombosis of other precerebral artery (H); Transient alteration of awareness      metFORMIN (GLUCOPHAGE) 500 MG tablet Take 1 tablet (500 mg) by mouth 2 times daily (with meals)  Qty: 60 tablet, Refills: 0    Associated Diagnoses: Seizure (H); Secondary hypertension; Cerebrovascular accident (CVA) due to thrombosis of other precerebral artery (H); Transient alteration of awareness      risperiDONE (RISPERDAL M-TABS) 0.5 MG ODT Place 1 tablet (0.5 mg) under the tongue 2 times daily  Qty: 60 tablet, Refills: 0    Associated Diagnoses: Seizure (H); Secondary hypertension; Cerebrovascular accident (CVA) due to thrombosis of other precerebral artery (H); Transient alteration of awareness         CONTINUE these medications which have CHANGED    Details   Baclofen (LIORESAL) 5 MG tablet Take 0.5 tablets (2.5 mg) by mouth daily  Qty: 60 tablet, Refills: 0    Associated Diagnoses: Cerebrovascular accident (CVA) due to thrombosis of other precerebral artery (H)      carvedilol (COREG)  12.5 MG tablet Take 2 tablets (25 mg) by mouth 2 times daily (with meals)  Qty: 180 tablet, Refills: 1    Associated Diagnoses: Hypertension, unspecified type         CONTINUE these medications which have NOT CHANGED    Details   !! melatonin 3 MG tablet Take 1 tablet (3 mg) by mouth daily  Qty: 60 tablet, Refills: 0    Associated Diagnoses: Seizure (H); Secondary hypertension; Cerebrovascular accident (CVA) due to thrombosis of other precerebral artery (H); Transient alteration of awareness      !! melatonin 5 MG tablet Take 1 tablet (5 mg) by mouth nightly as needed for sleep    Associated Diagnoses: Acute ischemic right posterior cerebral artery (PCA) stroke (H)      acetaminophen (TYLENOL) 325 MG tablet Take 2 tablets (650 mg) by mouth every 6 hours as needed for mild pain or fever    Associated Diagnoses: Cerebrovascular accident (CVA) due to thrombosis of other precerebral artery (H)      aspirin (ASA) 81 MG chewable tablet Take 1 tablet (81 mg) by mouth daily    Associated Diagnoses: Acute ischemic right posterior cerebral artery (PCA) stroke (H)      atorvastatin (LIPITOR) 40 MG tablet Take 1 tablet (40 mg) by mouth every evening    Associated Diagnoses: Acute ischemic right posterior cerebral artery (PCA) stroke (H)      cloNIDine (CATAPRES) 0.1 MG tablet Take 1 tablet (0.1 mg) by mouth 3 times daily    Associated Diagnoses: Hypertension, unspecified type      diclofenac (VOLTAREN) 1 % topical gel Apply 2 g topically 4 times daily as needed for moderate pain    Associated Diagnoses: Cerebrovascular accident (CVA) due to thrombosis of other precerebral artery (H)      divalproex sodium extended-release (DEPAKOTE ER) 500 MG 24 hr tablet Take 2 tablets (1,000 mg) by mouth daily    Associated Diagnoses: Cerebrovascular accident (CVA) due to thrombosis of other precerebral artery (H)       !! - Potential duplicate medications found. Please discuss with provider.      STOP taking these medications       aMILoride  (MIDAMOR) 5 MG tablet Comments:   Reason for Stopping:         hydrALAZINE (APRESOLINE) 10 MG tablet Comments:   Reason for Stopping:         hydrALAZINE (APRESOLINE) 20 MG/ML injection Comments:   Reason for Stopping:         hydrALAZINE (APRESOLINE) 25 MG tablet Comments:   Reason for Stopping:         lisinopril (ZESTRIL) 5 MG tablet Comments:   Reason for Stopping:         OLANZapine (ZYPREXA) injection Comments:   Reason for Stopping:         sennosides (SENOKOT) 8.6 MG tablet Comments:   Reason for Stopping:             Allergies   Allergies   Allergen Reactions     Pcn [Penicillin G] Hives and Itching

## 2022-03-09 NOTE — PROGRESS NOTES
Care Management Discharge Note    Discharge Date: 2022       Discharge Disposition: Home    Discharge Services: transportation    Discharge DME:none      Discharge Transportation: [facility]    Private pay costs discussed: Not applicable    PAS Confirmation Code: [id number]   Patient/family educated on Medicare website which has current facility and service quality ratings: Yes     Education Provided on the Discharge Plan: Yes   Persons Notified of Discharge Plans: Pt, Pt's friend DR Taniya, Nurse, [facility]  Patient/Family in Agreement with the Plan:Yes      Handoff Referral Completed:  External Hand off to [facility]    Additional Information:    PAS: [id number]    800A SW called [employer] to confirm ride for pt @ 1000A. SW also called Dorian twice and left VM with [facility] ([phone]) to follow up on a message left by the facility.  SW received a message from [facility] on [address] that the  home left a message with Alysha regarding pt.  SW also called and left VM for Taniya, pts friend, to inform her of pts discharge in the morning and to let her know that SW has called and left a VM for the  home.    815A SW spoke with pt at bedside to offer any support, question, and concerns before pt is discharged.  SW offered condolences to pt on the passing of pt's mother.  Pt was alert/oriented and expressed gratitude and understanding for pt's care and concern by staff.    830A SW received a call from Aditya([facility]) and confirmed transportation ride for pt.  Aditya asked MARLENA to fax over DR discharge summer and orders for pt at (f) [phone].  [facility] faxed documents.      ML Rojas, [facility]  [address]   Office: [phone]   Pager: [phone]  [email or web address]

## 2022-03-09 NOTE — PROGRESS NOTES
SPIRITUAL HEALTH SERVICES  [facility]  Unit: [address]    Referral Source: Follow up     Illness Narrative: Rohan suffered a stroke and is begining the process of doing the work of rehabilitation to regain mobility     Distress: Recent loss of his mother while he was in the hospital.  Rohan shared that the financial details of the cost of the  arrangement s has been stressful for him.    He received some good news yesterday regarding a source of the funding for the cost of his mothers  and this has lifted his spirits.    Coping: Rohan has been very involved with  planning for his mother's  while we.  Rohan shared that he considers himself agnostic but recent events in his life a making him to do some reevaluation of the current position that he has as person and his relationship with God.  Pt has great interest in his family tree and the connection that he has to other in history.    Plan: No plan to follow. Pt shared that he is discharging today.         Duane F Bauer  Chaplain Resident  Pager number: [phone]  * [facility] remains available  for emergent requests/referrals, either by having the switchboard page the on-call  or by entering an ASAP/STAT consult in Epic (this will also page the on-call ).*

## 2022-03-09 NOTE — PLAN OF CARE
Goal Outcome Evaluation:    Plan of Care Reviewed With: patient       Outcome Evaluation: A/Ox4. L sided weakness.  pt on room air. No BM this shift. voids spontaneously. PIV removed by RN.  Pt d/c to TCU via EMS. pt left with all personal belonging.  Juan Daniel RN gave report to TCU nurse.

## 2022-03-10 ENCOUNTER — PATIENT OUTREACH (OUTPATIENT)
Dept: CARE COORDINATION | Facility: CLINIC | Age: 47
End: 2022-03-10
Payer: COMMERCIAL

## 2022-03-10 DIAGNOSIS — Z71.89 OTHER SPECIFIED COUNSELING: ICD-10-CM

## 2022-03-10 NOTE — PROGRESS NOTES
Patient went to a TCU, Skilled Nursing Facility.     Clinic Care Coordination Contact    Background: Care Coordination referral placed from [facility] discharge report for reason of patient meeting criteria for a TCM outreach call by [facility] team.    Assessment: Upon chart review, CCR Team member will cancel/close the referral for TCM outreach due to reason below:    Patient has discharged to a Group home, Memory Care or Nursing Home.    Plan: Care Coordination referral for TCM outreach canceled.    ELISABET Garcia  [phone]  [facility] Resource [facility]

## 2022-03-14 ENCOUNTER — PATIENT OUTREACH (OUTPATIENT)
Dept: NEUROLOGY | Facility: CLINIC | Age: 47
End: 2022-03-14
Payer: COMMERCIAL

## 2022-03-14 NOTE — PROGRESS NOTES
Clinic Care Coordination Contact    Situation: Patient chart reviewed by care coordinator.    Background: Specialty care coordination referral received on pt for post stroke follow up and education purposes.    Assessment: Pt discharged from the hospital to [facility].    Plan/Recommendations: RNCC will chart review in 1 month to evaluate if pt has discharged to home.    Daniela CEDENO, RN, SCRN  RN Stroke Neurology Care Coordinator  [facility] Neuroscience Service Line

## 2022-03-17 ENCOUNTER — VIRTUAL VISIT (OUTPATIENT)
Dept: NEPHROLOGY | Facility: CLINIC | Age: 47
End: 2022-03-17
Attending: STUDENT IN AN ORGANIZED HEALTH CARE EDUCATION/TRAINING PROGRAM
Payer: COMMERCIAL

## 2022-03-17 DIAGNOSIS — I15.9 SECONDARY HYPERTENSION: ICD-10-CM

## 2022-03-17 DIAGNOSIS — E87.6 HYPOKALEMIA: ICD-10-CM

## 2022-03-17 DIAGNOSIS — N17.9 AKI (ACUTE KIDNEY INJURY) (H): Primary | ICD-10-CM

## 2022-03-17 DIAGNOSIS — E87.0 HYPERNATREMIA: ICD-10-CM

## 2022-03-17 PROCEDURE — 99215 OFFICE O/P EST HI 40 MIN: CPT | Mod: 95 | Performed by: PHYSICIAN ASSISTANT

## 2022-03-17 NOTE — LETTER
3/17/2022       RE: Miriam Hall  [address]  [place] [address]     Dear Colleague,    Thank you for referring your patient, Miriam Hall, to the [facility] NEPHROLOGY CLINIC [place] at [facility]. Please see a copy of my visit note below.    Nephrology Progress Note  03/17/2022         Assessment & Recommendations:   Miriam Hall is a 46 year old year old male with h/o HTN who was admitted in January 2022 with CVA, found to have hypokalemia and elevated bicarb, raising c/f primary aldosteronism.     1. 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    - repeat  SPEP and immunofixation.        2. HTN - creatinine hemodynamic sensitive, allowing for slightly higher BP's okay   - primary hyperaldosteronism suspected   - discharge meds: amlodipine 10 mg daily, carvedilol 25 mg BID, clonidine 0.1 mg TID   - hydralazine, amiloride, chlorthalidone were stopped     - he is unable to verify correct doses. He feels his BP's are doing better but does not recall specific readings        3. Volume status -     - history of hypovolemia while inpatient, received IVF which helped  - discharge wt 160 lbs, wt today ~ 165 lbs  - he does not swelling in lower extremities, advised to use compression socks, has not started yet  - consider low dose spironolactone       4. Electrolytes - K 3.2, Na 146   - hypokalemia, hyperkalemia  - consider spironolactone vs potassium 20 mEq daily    5. Anemia   - Hgb 10.3, check iron panel    6. Bone/mineral  - calcium 8.7,  phos 3.9    #Disposition: follow up with me in 2 months and with Dr. Baxter in 4 months.      History of Present Illness:   Patient is a 46-year-old male with history of chronic uncontrolled hypertension and hypokalemia who was admitted at [facility] on 1/8/2022 for left hemiparesis.  The patient noticed left-sided weakness after he woke up from the nap around 2 PM of the admission day.  Upon presentation he was noted to have blood pressure of 243/145.  His creatinine was 1.37 from baseline of 1-1.2.  He has CT scan done which showed well-established right parieto-occipital stroke.  The patient did not get tPA due to outside window..  No trump ectomy was performed due to location of the thrombus.  He was started on high-dose aspirin and was admitted to stroke service.  Initially he was allowed permissive hypertension and given recent stroke.  On admission, his potassium was only 2.7.  He was being repleted but never reached normal level.  He was taking lisinopril 20 - hydrochlorothiazide 25, 1 tab daily but that does not seem to help.  Previously the patient would like to seek second opinion but since COVID came he was unable to get a referral.  His blood pressure medication was resumed on 1/11 with amlodipine 5 mg which was increased to 10 but was stopped on 1/18.  Subsequently other medication was added. At time of initial nephrology consultation in the hospital he was on Coreg 25 mg twice daily, Critelli on 25 mg daily, clonidine 0.1 mg 3 times daily (started today) and lisinopril 20 mg daily.  He has been given labetalol IV as needed as well as hydralazine IV as needed as well.  Aldosterone and renin was checked and aldosterone was 11.4 but there was checked when the patient has hypokalemia.  repeat marychuy elevated at 68.6, renin 7.7, metanephrines negative. He had CT abdomen and pelvis which does not show any abnormal adrenal glands.      On speaking with him today, he is feeling much better. Per patient, BP is  better controlled. He does note lower extremity edema, wt is up about 5 lbs since discharge. He has no shortness of breath, chest pain. Denies headaches, dizziness. No fevers or chills.     Review of Systems:   A comprehensive ROS completed, pertinent positives and negatives noted in HPI.     Physical Exam:   Vitals: none  General: awake, alert, conversing      Labs:   All labs reviewed by me  Electrolytes/Renal - Recent Labs   Lab Test 03/07/22  0714 03/05/22  1328 03/05/22  0733 03/03/22  0618 03/02/22  0605 02/23/22  1059 02/23/22  0716 02/22/22  0916 02/22/22  0323 02/21/22  1210 02/21/22  0954     --  142  --  143   < > 143  --  138  --  139   POTASSIUM 3.5 3.6 3.3*   < > 3.1*   < > 3.3*  --  3.4  --  3.8   CHLORIDE 105  --  106  --  104   < > 107  --  106  --  108   CO2 32  --  30  --  29   < > 30  --  25  --  26   BUN 22  --  27  --  25   < > 38*  --  48*  --  65*   CR 1.27*  --  1.34*  --  1.42*   < > 1.51*  --  1.56*  --  2.23*   *  --  101*  --  130*   < > 116*   < > 157*   < > 151*   VALERIE 9.2  --  9.0  --  9.5   < > 9.0  --  9.5  --  9.0   MAG  --   --   --   --   --   --  2.1  --  1.9  --  2.1   PHOS  --   --   --   --   --   --  3.9  --  2.6  --  4.2    < > = values in this interval not displayed.       CBC -   Recent Labs   Lab Test 03/07/22 0714 03/05/22 0733 03/02/22 0605   WBC 8.0 7.7 6.4   HGB 11.8* 11.3* 11.8*    255 268       LFTs -   Recent Labs   Lab Test 03/07/22 0714 03/05/22  0733 03/02/22  0605   ALKPHOS 62 63 69   BILITOTAL 0.3 0.4 0.4   ALT 23 25 29   AST 20 22 33   PROTTOTAL 6.9 6.7* 7.2   ALBUMIN 3.0* 2.9* 3.1*       Iron Panel - No lab results found.      Imaging:  Reviewed       FABIANO NOLEN PA-C     Visit length 16 minutes. An additional 25 minutes was spent on date of service in chart review, documentation, and other activities as noted.       Miriam is a 46 year old who is being evaluated via a billable video visit.      How would you like to obtain  your AVS? MyChart  If the video visit is dropped, the invitation should be resent by: Send to e-mail at: [email or web address]  Will anyone else be joining your video visit? No      Video Start Time: 11:44 am  Video-Visit Details    Type of service:  Video Visit    Video End Time: 12:00 pm    Originating Location (pt. Location): [facility]    Distant Location (provider location):  [facility] NEPHROLOGY [facility]     Platform used for Video Visit: [employer]        Again, thank you for allowing me to participate in the care of your patient.      Sincerely,    FABIANO NOLEN PA-C

## 2022-03-17 NOTE — PROGRESS NOTES
Nephrology Progress Note  03/17/2022         Assessment & Recommendations:   Miriam Hall is a 46 year old year old male with h/o HTN who was admitted in January 2022 with CVA, found to have hypokalemia and elevated bicarb, raising c/f primary aldosteronism.     1. 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    - repeat  SPEP and immunofixation.        2. HTN - creatinine hemodynamic sensitive, allowing for slightly higher BP's okay   - primary hyperaldosteronism suspected   - discharge meds: amlodipine 10 mg daily, carvedilol 25 mg BID, clonidine 0.1 mg TID   - hydralazine, amiloride, chlorthalidone were stopped     - he is unable to verify correct doses. He feels his BP's are doing better but does not recall specific readings        3. Volume status -     - history of hypovolemia while inpatient, received IVF which helped  - discharge wt 160 lbs, wt today ~ 165 lbs  - he does not swelling in lower extremities, advised to use compression socks, has not started yet  - consider low dose spironolactone       4. Electrolytes - K 3.2, Na 146   - hypokalemia, hyperkalemia  - consider spironolactone vs potassium 20 mEq daily    5. Anemia   - Hgb 10.3, check iron panel    6. Bone/mineral  - calcium 8.7, phos 3.9    #Disposition: follow up with me in 2 months and with Dr. Baxter in 4 months.      History of Present Illness:   Patient is a 46-year-old male with history of chronic uncontrolled hypertension and hypokalemia who was admitted at [facility] on 1/8/2022 for left hemiparesis.  The patient noticed left-sided weakness  after he woke up from the nap around 2 PM of the admission day.  Upon presentation he was noted to have blood pressure of 243/145.  His creatinine was 1.37 from baseline of 1-1.2.  He has CT scan done which showed well-established right parieto-occipital stroke.  The patient did not get tPA due to outside window..  No trump ectomy was performed due to location of the thrombus.  He was started on high-dose aspirin and was admitted to stroke service.  Initially he was allowed permissive hypertension and given recent stroke.  On admission, his potassium was only 2.7.  He was being repleted but never reached normal level.  He was taking lisinopril 20 - hydrochlorothiazide 25, 1 tab daily but that does not seem to help.  Previously the patient would like to seek second opinion but since COVID came he was unable to get a referral.  His blood pressure medication was resumed on 1/11 with amlodipine 5 mg which was increased to 10 but was stopped on 1/18.  Subsequently other medication was added. At time of initial nephrology consultation in the hospital he was on Coreg 25 mg twice daily, Critelli on 25 mg daily, clonidine 0.1 mg 3 times daily (started today) and lisinopril 20 mg daily.  He has been given labetalol IV as needed as well as hydralazine IV as needed as well.  Aldosterone and renin was checked and aldosterone was 11.4 but there was checked when the patient has hypokalemia.  repeat marychuy elevated at 68.6, renin 7.7, metanephrines negative. He had CT abdomen and pelvis which does not show any abnormal adrenal glands.      On speaking with him today, he is feeling much better. Per patient, BP is better controlled. He does note lower extremity edema, wt is up about 5 lbs since discharge. He has no shortness of breath, chest pain. Denies headaches, dizziness. No fevers or chills.     Review of Systems:   A comprehensive ROS completed, pertinent positives and negatives noted in HPI.     Physical Exam:   Vitals:  none  General: awake, alert, conversing      Labs:   All labs reviewed by me  Electrolytes/Renal - Recent Labs   Lab Test 03/07/22  0714 03/05/22  1328 03/05/22  0733 03/03/22  0618 03/02/22  0605 02/23/22  1059 02/23/22  0716 02/22/22  0916 02/22/22  0323 02/21/22  1210 02/21/22  0954     --  142  --  143   < > 143  --  138  --  139   POTASSIUM 3.5 3.6 3.3*   < > 3.1*   < > 3.3*  --  3.4  --  3.8   CHLORIDE 105  --  106  --  104   < > 107  --  106  --  108   CO2 32  --  30  --  29   < > 30  --  25  --  26   BUN 22  --  27  --  25   < > 38*  --  48*  --  65*   CR 1.27*  --  1.34*  --  1.42*   < > 1.51*  --  1.56*  --  2.23*   *  --  101*  --  130*   < > 116*   < > 157*   < > 151*   VALERIE 9.2  --  9.0  --  9.5   < > 9.0  --  9.5  --  9.0   MAG  --   --   --   --   --   --  2.1  --  1.9  --  2.1   PHOS  --   --   --   --   --   --  3.9  --  2.6  --  4.2    < > = values in this interval not displayed.       CBC -   Recent Labs   Lab Test 03/07/22  0714 03/05/22  0733 03/02/22  0605   WBC 8.0 7.7 6.4   HGB 11.8* 11.3* 11.8*    255 268       LFTs -   Recent Labs   Lab Test 03/07/22  0714 03/05/22  0733 03/02/22  0605   ALKPHOS 62 63 69   BILITOTAL 0.3 0.4 0.4   ALT 23 25 29   AST 20 22 33   PROTTOTAL 6.9 6.7* 7.2   ALBUMIN 3.0* 2.9* 3.1*       Iron Panel - No lab results found.      Imaging:  Reviewed       MARJORIE REEDC     Visit length 16 minutes. An additional 25 minutes was spent on date of service in chart review, documentation, and other activities as noted.

## 2022-03-17 NOTE — PROGRESS NOTES
Miriam is a 46 year old who is being evaluated via a billable video visit.      How would you like to obtain your AVS? MyChart  If the video visit is dropped, the invitation should be resent by: Send to e-mail at: [email or web address]  Will anyone else be joining your video visit? No      Video Start Time: 11:44 am  Video-Visit Details    Type of service:  Video Visit    Video End Time: 12:00 pm    Originating Location (pt. Location): [facility]    Distant Location (provider location):  [facility] NEPHROLOGY CLINIC [place]     Platform used for Video Visit: "[employer]"

## 2022-03-17 NOTE — PATIENT INSTRUCTIONS
1. Will need to start either potassium supplement or spironolactone, depending on how your blood pressures are doing. Will have nursing staff contact j[facility] to get blood pressure readings.   2. Increase hydration, follow low sodium diet, no more than 2 grams per day.   3. Follow up with me in 2 months and with Dr. Baxter in 4 months.   4. Call sooner with any questions or concerns.

## 2022-03-17 NOTE — LETTER
Date:March 17, 2022      Patient was self referred, no letter generated. Do not send.        [facility] Health Information

## 2022-03-23 ENCOUNTER — TELEPHONE (OUTPATIENT)
Dept: NEUROLOGY | Facility: CLINIC | Age: 47
End: 2022-03-23

## 2022-03-23 ENCOUNTER — MEDICAL CORRESPONDENCE (OUTPATIENT)
Dept: HEALTH INFORMATION MANAGEMENT | Facility: CLINIC | Age: 47
End: 2022-03-23
Payer: COMMERCIAL

## 2022-03-23 NOTE — TELEPHONE ENCOUNTER
What is the concern that needs to be addressed by a nurse? New mincep intake, referral on the way     May a detailed message be left on voicemail? yes    Date of last office visit: na    Message routed to: [facility] new referrals

## 2022-03-24 ENCOUNTER — TELEPHONE (OUTPATIENT)
Dept: NEPHROLOGY | Facility: CLINIC | Age: 47
End: 2022-03-24
Payer: COMMERCIAL

## 2022-03-24 NOTE — TELEPHONE ENCOUNTER
Referral received for new seizure patient .      Referall : [facility].    Phone: [phone]  Fax: [phone]    Korinne Novak,NP      Dx : Seizure medication management.    EEG on 01/12

## 2022-03-24 NOTE — TELEPHONE ENCOUNTER
[facility] Call Center    Phone Message    May a detailed message be left on voicemail: yes     Reason for Call: [facility] called stating they faxed results yesterday. She was looking for review and recommendations. Korinne or the Charge Nurse can be reached at [phone]. Thank you.    Action Taken: Message routed to:  [facility] Nephrology    Travel Screening: Not Applicable

## 2022-04-22 ENCOUNTER — PATIENT OUTREACH (OUTPATIENT)
Dept: NEUROLOGY | Facility: CLINIC | Age: 47
End: 2022-04-22
Payer: COMMERCIAL

## 2022-04-22 NOTE — TELEPHONE ENCOUNTER
Clinic Care Coordination Contact    Situation: Patient chart reviewed by care coordinator.    Background: Miriam Hall is a 46 year old male with history of severe resistant HTN and longstanding DMII who was initially admitted to [facility] 1/8 - 1/28/2022 with R PCA stroke c/b refractory HTN and DISHA, and encephalopathy. Stabilized and transferred to ARU 1/28/2022 for ongoing rehabilitation. Patient developed new AMS, agitation, paranoia and worsening DISHA 2/18 prompting transfer to [place].  Altered mental status and encephalopathy was felt secondary to DISHA, medications, and resolving stroke. Neurology was consulted and psychiatry was consulted who assisted with medications.      Hospital stay complicated by Disha which resolved with holding medications, IV fluids. Nephrology was consulted and assisted with management with plans for outpatient follow up for resistant hypertension and concern for possible liddle syndrome.      PT, OT, SLP consulted and plan for TCU with rehab therapies on discharge.     Assessment: RNCC outreached to TCU and pt is still under their care. Anticipated discharge on or around 5/12. Pt will require 24hr supervision so they are working to coordinate if he can discharge to an apartment with a friend of his or if he needs to go to a facility. Doris, who I spoke with noted that the pt is scheduled for neurology follow up through [facility] on 5/11 and that their facility does provider stroke care coordination support for their patients.     Plan/Recommendations: We came to the decision it would be more appropriate for their team to follow him as they are more familiar with him and he appears to get majority of his care through the [facility] system. I provided Doris with my contact information if for some reason discharge plans change and they would like me to follow the pt they are welcome to contact me. No further planned outreaches at this time though.      Daniela Alba BS, RN, SCRN  RN Stroke  Neurology Care Coordinator  [facility] Neuroscience Service Line

## 2022-04-27 NOTE — TELEPHONE ENCOUNTER
Call to Nurse line regarding message in addition to this what patients Blood pressures are. Left voice message. Provided number for call back  Rosa Navas LPN  Nephrology  [phone]

## 2022-05-11 ENCOUNTER — TELEPHONE (OUTPATIENT)
Dept: PHYSICAL MEDICINE AND REHAB | Facility: CLINIC | Age: 47
End: 2022-05-11
Payer: COMMERCIAL

## 2022-05-14 ENCOUNTER — HEALTH MAINTENANCE LETTER (OUTPATIENT)
Age: 47
End: 2022-05-14

## 2022-08-17 NOTE — PROGRESS NOTES
[facility]   Internal Medicine Progress Note    Rehab Day # 12    Assessment & Plan: Miriam Hall is a 46 year old man with a history of HTN who was admitted to [facility] 1/8-1/28/22 for right PCA stroke c/b refractory HTN w/ concern for secondary HTN, DISHA, new dx of DM II, hypokalemia, and encephalopathy. Transferred to ARU for ongoing rehabilitation.     #Acute Right PCA Stroke. Presented w/ left hemiplegia and head CT showing right parieto-occipital infarct and CTA showing occlusion of proximal right PCA and high grade stenosis of M2 branch of right MCA. Was outside window for tPA. No thrombectomy performed d/t location of LVO in PCA. Repeat imaging 1/12 w/ hemorrhagic transformation. Source 2/2 possible intracranial atherosclerosis vs. ESUS. CARISA neg for LA thrombus. Residual deficits include L hemiplegia, L hemianopsia, mild dysarthria.  - Continue ASA, statin.   - Continue empiric Depakote (and seizure precautions) until neuro clinic f/u (had multiple vEEG neg for seizure activity but elevated risk d/t stroke burden and prior prolonged period of encephalopathy).   - BP management as below; goal < 130/80.   - Has Ziopatch through 2/10.     #HTN. Required 6 antihypertensives while inpatient to maintain SBP goal < 180. No clear etiology despite extensive workup and consultations with Cardiology and Nephrology. Discharged on amiloride, carvedilol, chlorthalidone, clonidine, lisinopril, hydralazine, but hydralazine was stopped 2/1 and clonidine 2/4. BP then began to rise and clonidine was restarted 2/6. BPs most recently within goal range (outlier last night taken when he urgently needed to use the bathroom).   - Continue amiloride 10 mg daily, carvedilol 37.5 mg BID, chlorthalidone 25 mg daily, lisinopril 40 mg daily, and clonidine 0.1 mg daily.   - Has OP nephrology f/u but will need to reschedule. Also needs OP endocrinology and genetics follow ups.     #DISHA. Cr 1.3 on hospital  Nurse's Notes                                                                                     

 [facility]                                                                 

Name: Elvira Juarez                                                                           

Age: 9 yrs                                                                                       

Sex: Female                                                                                       

: 2012                                                                                   

MRN: [id number]                                                                                   

Arrival Date: 2022                                                                          

Time: 18:33                                                                                       

Account#: [id number]                                                                            

Bed 10                                                                                            

Private MD: Claudia Woods                                                                     

Diagnosis: Sprain of foot                                                                         

                                                                                                  

Presentation:                                                                                     

                                                                                             

18:44 Chief complaint: Parent and/or Guardian states: excited to see mom after school and was 5 

      running to mom; fell and trip / twisted ankle. Coronavirus screen: Vaccine status:          

      Patient reports being unvaccinated. Client denies travel out of the [place] in the last 14     

      days. Ebola Screen: Patient negative for fever greater than or equal to 101.5 degrees       

      Fahrenheit, and additional compatible Ebola Virus Disease symptoms Patient denies           

      exposure to infectious person. Patient denies travel to an Ebola-affected area in the       

      21 days before illness onset. Onset of symptoms was 2022.                        

18:44 Method Of Arrival: Ambulatory                                                           [facility] 

18:44 Acuity: ALBER 4                                                                           [facility] 

                                                                                                  

Triage Assessment:                                                                                

18:47 General: Appears in no apparent distress. uncomfortable, well groomed, well developed,  [facility] 

      Behavior is calm, cooperative, appropriate for age. Pain: Complains of pain in left         

      ankle. Musculoskeletal: No deficits noted.                                                  

                                                                                                  

Historical:                                                                                       

- Allergies:                                                                                      

18:47 Bees;                                                                                   [facility] 

- Home Meds:                                                                                      

18:47 None [Active];                                                                          5 

- PMHx:                                                                                           

18:47 None;                                                                                   [facility] 

                                                                                                  

- Immunization history:: Childhood immunizations are up to date.                                  

                                                                                                  

                                                                                                  

Screenin:48 Abuse screen: Denies threats or abuse. Denies injuries from another. Nutritional        [facility] 

      screening: No deficits noted. Tuberculosis screening: No symptoms or risk factors           

      identified.                                                                                 

18:48 Pedi Fall Risk Total Score: 0-1 Points : Low Risk for Falls.                            5 

                                                                                                  

      Fall Risk Scale Score:                                                                      

18:48 Mobility: Ambulatory with no gait disturbance (0); Mentation: Developmentally           [facility] 

      appropriate and alert (0); Elimination: Independent (0); Hx of Falls: No (0); Current       

      Meds: No (0); Total Score: 0                                                                

Vital Signs:                                                                                      

18:44  / 58; Pulse 97; Resp 18; Temp 98.8; Pulse Ox 100% ; Weight 38.1 kg;              jh5 

20:24  / 66; Pulse 94; Resp 18; Temp 98.6; Pulse Ox 100% ;                              jh5 

                                                                                                  

ED Course:                                                                                        

18:33 Patient arrived in ED.                                                                  mr  

18:34 Claudia Woods MD is Private Physician.                                             mr  

18:37 [facility]                                                              jmm 

18:37 Rajesh Roberts DO is Attending Physician.                                                jmm 

18:47 Triage completed.                                                                       jh5 

18:47 Arm band placed on right wrist.                                                         jh5 

18:48 Patient has correct armband on for positive identification.                             jh5 

18:48 No provider procedures requiring assistance completed.                                  jh5 

19:16 Ankle Left 3 View XRAY In Process Unspecified.                                          EDMS

19:16 Foot Left 3 View XRAY In Process Unspecified.                                           EDMS

                                                                                                  

Administered Medications:                                                                         

No medications were administered                                                                  

                                                                                                  

                                                                                                  

Medication:                                                                                       

20:25 VIS not applicable for this client.                                                     [facility] 

                                                                                                  

Outcome:                                                                                          

20:25 Condition: good                                                                         5 

20:36 Discharge ordered by MD.                                                                [facility] 

20:44 Patient left the ED.                                                                    [facility] 

                                                                                                  

Signatures:                                                                                       

Dispatcher [employer]                           EDMS                                                 

Maximino Hodges PA                       PA   Bindu Beltre                                 mr                                                   

Bushra Bates, RN                       RN   5                                                  

                                                                                                  

************************************************************************************************** "admit, best of 0.94 on 1/20, bump on 1/23 back to 1.3, and peak of 1.56 on 1/27 --> 1.3 w/ IV fluid bolus. Here has been 1.7 since 2/1. Was discussed w/ nephrology x2 around that time and attributed to hypotension as patient had some normal but lower than his usual BP readings. No concerning BPs since but Cr hasn't improved. Renal US 1/31 was unrevealing. No e/o retention on prior bladder scans. Does not look fluid responsive w/ U Na 79 (last on 1/31). Also likely contributing is diuretics x 2 and ACE-I. Discussed w/ nephrology last on 2/7 and this may just be his current baseline, acceptable w/ GFR 48 and perceived benefit from current med regimen.   - Continue to monitor 2x weekly for now. Will need clinic f/u after discharge.     #DM II. New dx during hospital stay w/ HgbA1c 7.8%. Endocrinology saw IP and recommended metformin, which is currently on hold d/t DISHA. BG well controlled.   - Needs diabetic education and meter/supplies before discharge (added to sticky note). Will resume metformin when appropriate.     IM will follow for BP management and DISHA.     Tena Arzate PA-C  Hospitalist Service  Pager: [phone]  ________________________________________________________________    Subjective & Interval History:  No concerns today. Participated in therapy. Notes BP was high last night taken when he urgently had to use the bathroom. Better after.      Last 24 hour care team notes reviewed.   ROS: 4 point ROS (including Respiratory, CV, GI and ) was performed and negative unless otherwise noted in HPI.     Medications: Reviewed in EPIC.    Physical Exam:    Blood pressure 129/76, pulse 78, temperature (!) 96.2  F (35.7  C), temperature source Oral, resp. rate 18, height 1.737 m (5' 8.4\"), weight 76.3 kg (168 lb 3.2 oz), SpO2 98 %.    GENERAL: Alert and oriented x 3. Lying in bed, appears comfortable. Pleasant and conversant.   HEENT: Anicteric sclera. Mucous membranes moist.   NEUROLOGICAL: Left sided " hemiplegia.    EXTREMITIES: No peripheral edema.   SKIN: No jaundice. No rashes.     Lines/Tubes/Drains:  none

## 2022-08-26 ENCOUNTER — TELEPHONE (OUTPATIENT)
Dept: NEPHROLOGY | Facility: CLINIC | Age: 47
End: 2022-08-26

## 2022-08-26 NOTE — TELEPHONE ENCOUNTER
Received call from Dr Ramos, patients [facility] provider. Patient has recent rise in creatinine. From 1.27 in March to 1.62 today.  Writer attempted to call the patient to see if he could do telephone or video visit next Tuesday with Payton SALAS as she has an opening at 850. Unable to reach patient via telephone. My chart sent. Writer placed a hold on Tuesday if the patient is able to make that appointment  Rosa Navas LPN  Nephrology  [phone]

## 2022-09-03 ENCOUNTER — HEALTH MAINTENANCE LETTER (OUTPATIENT)
Age: 47
End: 2022-09-03

## 2023-05-10 NOTE — PROGRESS NOTES
Virtual Visit Details    Type of service:  Video Visit     Switched to phone visit due to technical difficulties.      Nephrology Progress Note  05/11/2023         Assessment & Recommendations:   Miriam Hall is a 48 year old year old male with h/o HTN who was admitted in January 2022 with CVA, found to have hypokalemia and elevated bicarb, raising c/f primary aldosteronism.     Last appt in 7/2022    1. 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    - repeat  SPEP and immunofixation in setting of hypercalcemia       2. HTN - creatinine hemodynamic sensitive, allowing for slightly higher BP's okay   - primary hyperaldosteronism suspected   - current regimen: amlodipine 10 mg daily, carvedilol 25 mg BID, clonidine 0.1 mg TID, spironolactone 25 mg BID   - hydralazine, amiloride, chlorthalidone were stopped during previous hospitalization     - his BP at home are well controlled 120/70-80 usually       3. Volume status -     - history of hypovolemia while inpatient, received IVF which helped  - weight is stable ~ 165 lbs  - he denies swelling in lower extremities unless he sits for too long      4. Electrolytes - K 4.4, Na 140, bicarb 25   - history of hypokalemia, now resolved  - okay to continue spironolactone       5. Anemia   - Hgb 12.9    6. Bone/mineral  - calcium 10.1, recently up to 10.7, ionized Ca1.27, phos 3.9 PTH 56.7, normal, Vit D 54.6,   - denies OTC calcium supplements or vitamin D 25  - recheck SPEP and urine IF    #Disposition: follow up with me in 2 months and with Dr. Baxter  in 4 months.      History of Present Illness:   Patient is a 48-year-old male with history of chronic uncontrolled hypertension and hypokalemia who was admitted at [facility] on 1/8/2022 for left hemiparesis.  The patient noticed left-sided weakness after he woke up from the nap around 2 PM of the admission day.  Upon presentation he was noted to have blood pressure of 243/145.  His creatinine was 1.37 from baseline of 1-1.2.  He has CT scan done which showed well-established right parieto-occipital stroke.  The patient did not get tPA due to outside window..  No trump ectomy was performed due to location of the thrombus.  He was started on high-dose aspirin and was admitted to stroke service.  Initially he was allowed permissive hypertension and given recent stroke.  On admission, his potassium was only 2.7.  He was being repleted but never reached normal level.  He was taking lisinopril 20 - hydrochlorothiazide 25, 1 tab daily but that did not seem to help. Previously the patient would like to seek second opinion but since COVID came he was unable to get a referral.  His blood pressure medication was resumed on 1/11 with amlodipine 5 mg which was increased to 10 but was stopped on 1/18.  Subsequently other medication was added. At time of initial nephrology consultation in the hospital he was on Coreg 25 mg twice daily, Critelli on 25 mg daily, clonidine 0.1 mg 3 times daily (started today) and lisinopril 20 mg daily.  He has been given labetalol IV as needed as well as hydralazine IV as needed as well.  Aldosterone and renin were checked and aldosterone was 11.4 but this was checked when the patient had hypokalemia.  repeat marychuy elevated at 68.6, renin 7.7, metanephrines negative. He had CT abdomen and pelvis which does not show any abnormal adrenal glands.      On speaking with him today, he is feeling well. Per patient, BP is well controlled. He denies lower extremity edema. He has no shortness of breath, chest pain.  Denies headaches, dizziness. No fevers or chills. He has a good appetite, no n/v/d.     Review of Systems:   A comprehensive ROS completed, pertinent positives and negatives noted in HPI.     Physical Exam:   Vitals: none      Labs:   Outside labs reviewed by me  Electrolytes/Renal -   Recent Labs   Lab Test 03/07/22 0714 03/05/22  1328 03/05/22  0733 03/03/22  0618 03/02/22  0605 02/23/22  1059 02/23/22  0716 02/22/22  0916 02/22/22  0323 02/21/22  1210 02/21/22  0954     --  142  --  143   < > 143  --  138  --  139   POTASSIUM 3.5 3.6 3.3*   < > 3.1*   < > 3.3*  --  3.4  --  3.8   CHLORIDE 105  --  106  --  104   < > 107  --  106  --  108   CO2 32  --  30  --  29   < > 30  --  25  --  26   BUN 22  --  27  --  25   < > 38*  --  48*  --  65*   CR 1.27*  --  1.34*  --  1.42*   < > 1.51*  --  1.56*  --  2.23*   *  --  101*  --  130*   < > 116*   < > 157*   < > 151*   VALERIE 9.2  --  9.0  --  9.5   < > 9.0  --  9.5  --  9.0   MAG  --   --   --   --   --   --  2.1  --  1.9  --  2.1   PHOS  --   --   --   --   --   --  3.9  --  2.6  --  4.2    < > = values in this interval not displayed.       CBC -   Recent Labs   Lab Test 03/07/22 0714 03/05/22  0733 03/02/22  0605   WBC 8.0 7.7 6.4   HGB 11.8* 11.3* 11.8*    255 268       LFTs -   Recent Labs   Lab Test 03/07/22 0714 03/05/22  0733 03/02/22  0605   ALKPHOS 62 63 69   BILITOTAL 0.3 0.4 0.4   ALT 23 25 29   AST 20 22 33   PROTTOTAL 6.9 6.7* 7.2   ALBUMIN 3.0* 2.9* 3.1*       Iron Panel - No lab results found.      Imaging:  Reviewed       FABIANO NOLEN PA-C     Visit length 16 minutes. An additional 25 minutes was spent on date of service in chart review, documentation, and other activities as noted.

## 2023-05-11 ENCOUNTER — VIRTUAL VISIT (OUTPATIENT)
Dept: NEPHROLOGY | Facility: CLINIC | Age: 48
End: 2023-05-11
Attending: PHYSICIAN ASSISTANT
Payer: COMMERCIAL

## 2023-05-11 DIAGNOSIS — N18.32 ANEMIA IN STAGE 3B CHRONIC KIDNEY DISEASE (H): ICD-10-CM

## 2023-05-11 DIAGNOSIS — I10 HYPERTENSION, ESSENTIAL: ICD-10-CM

## 2023-05-11 DIAGNOSIS — N18.32 STAGE 3B CHRONIC KIDNEY DISEASE (H): Primary | ICD-10-CM

## 2023-05-11 DIAGNOSIS — E83.52 HYPERCALCEMIA: ICD-10-CM

## 2023-05-11 DIAGNOSIS — D63.1 ANEMIA IN STAGE 3B CHRONIC KIDNEY DISEASE (H): ICD-10-CM

## 2023-05-11 PROCEDURE — 99215 OFFICE O/P EST HI 40 MIN: CPT | Mod: 95 | Performed by: PHYSICIAN ASSISTANT

## 2023-05-11 RX ORDER — SPIRONOLACTONE 25 MG/1
25 TABLET ORAL 2 TIMES DAILY
COMMUNITY

## 2023-05-11 NOTE — PATIENT INSTRUCTIONS
Check labs. Will fax orders to [facility] in [place]  Continue good hydration, low sodium diet.   Avoid ibuprofen/aleve.   Follow up in 2 months with me and in 4 months with Dr. Bernabe.

## 2023-05-11 NOTE — LETTER
5/11/2023       RE: Miriam Hall  [address]  [facility] [address]     Dear Colleague,    Thank you for referring your patient, Miriam Hall, to the [facility] [place] at [facility]. Please see a copy of my visit note below.    Virtual Visit Details    Type of service:  Video Visit     Switched to phone visit due to technical difficulties.      Nephrology Progress Note  05/11/2023         Assessment & Recommendations:   Miriam Hall is a 48 year old year old male with h/o HTN who was admitted in January 2022 with CVA, found to have hypokalemia and elevated bicarb, raising c/f primary aldosteronism.     Last appt in 7/2022    1. 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    - repeat  SPEP and immunofixation in setting of hypercalcemia       2. HTN - creatinine hemodynamic sensitive, allowing for slightly higher BP's okay   - primary hyperaldosteronism suspected   - current regimen: amlodipine 10 mg daily, carvedilol 25 mg BID, clonidine 0.1 mg TID, spironolactone 25 mg BID   - hydralazine, amiloride, chlorthalidone were stopped during previous hospitalization     - his BP at home are well controlled 120/70-80 usually       3. Volume status -     - history of hypovolemia while inpatient, received IVF which helped  - weight is stable ~ 165 lbs  - he denies swelling in lower extremities unless he sits for too long      4. Electrolytes - K 4.4, Na 140, bicarb 25   - history of hypokalemia,  now resolved  - okay to continue spironolactone       5. Anemia   - Hgb 12.9    6. Bone/mineral  - calcium 10.1, recently up to 10.7, ionized Ca1.27, phos 3.9 PTH 56.7, normal, Vit D 54.6,   - denies OTC calcium supplements or vitamin D 25  - recheck SPEP and urine IF    #Disposition: follow up with me in 2 months and with Dr. Baxter in 4 months.      History of Present Illness:   Patient is a 48-year-old male with history of chronic uncontrolled hypertension and hypokalemia who was admitted at [facility] on 1/8/2022 for left hemiparesis.  The patient noticed left-sided weakness after he woke up from the nap around 2 PM of the admission day.  Upon presentation he was noted to have blood pressure of 243/145.  His creatinine was 1.37 from baseline of 1-1.2.  He has CT scan done which showed well-established right parieto-occipital stroke.  The patient did not get tPA due to outside window..  No trump ectomy was performed due to location of the thrombus.  He was started on high-dose aspirin and was admitted to stroke service.  Initially he was allowed permissive hypertension and given recent stroke.  On admission, his potassium was only 2.7.  He was being repleted but never reached normal level.  He was taking lisinopril 20 - hydrochlorothiazide 25, 1 tab daily but that did not seem to help. Previously the patient would like to seek second opinion but since COVID came he was unable to get a referral.  His blood pressure medication was resumed on 1/11 with amlodipine 5 mg which was increased to 10 but was stopped on 1/18.  Subsequently other medication was added. At time of initial nephrology consultation in the hospital he was on Coreg 25 mg twice daily, Critelli on 25 mg daily, clonidine 0.1 mg 3 times daily (started today) and lisinopril 20 mg daily.  He has been given labetalol IV as needed as well as hydralazine IV as needed as well.  Aldosterone and renin were checked and aldosterone was 11.4 but this was checked when  the patient had hypokalemia.  repeat marychuy elevated at 68.6, renin 7.7, metanephrines negative. He had CT abdomen and pelvis which does not show any abnormal adrenal glands.      On speaking with him today, he is feeling well. Per patient, BP is well controlled. He denies lower extremity edema. He has no shortness of breath, chest pain. Denies headaches, dizziness. No fevers or chills. He has a good appetite, no n/v/d.     Review of Systems:   A comprehensive ROS completed, pertinent positives and negatives noted in HPI.     Physical Exam:   Vitals: none      Labs:   Outside labs reviewed by me  Electrolytes/Renal -   Recent Labs   Lab Test 03/07/22  0714 03/05/22  1328 03/05/22  0733 03/03/22  0618 03/02/22  0605 02/23/22  1059 02/23/22  0716 02/22/22  0916 02/22/22  0323 02/21/22  1210 02/21/22  0954     --  142  --  143   < > 143  --  138  --  139   POTASSIUM 3.5 3.6 3.3*   < > 3.1*   < > 3.3*  --  3.4  --  3.8   CHLORIDE 105  --  106  --  104   < > 107  --  106  --  108   CO2 32  --  30  --  29   < > 30  --  25  --  26   BUN 22  --  27  --  25   < > 38*  --  48*  --  65*   CR 1.27*  --  1.34*  --  1.42*   < > 1.51*  --  1.56*  --  2.23*   *  --  101*  --  130*   < > 116*   < > 157*   < > 151*   VALERIE 9.2  --  9.0  --  9.5   < > 9.0  --  9.5  --  9.0   MAG  --   --   --   --   --   --  2.1  --  1.9  --  2.1   PHOS  --   --   --   --   --   --  3.9  --  2.6  --  4.2    < > = values in this interval not displayed.       CBC -   Recent Labs   Lab Test 03/07/22  0714 03/05/22  0733 03/02/22  0605   WBC 8.0 7.7 6.4   HGB 11.8* 11.3* 11.8*    255 268       LFTs -   Recent Labs   Lab Test 03/07/22  0714 03/05/22  0733 03/02/22  0605   ALKPHOS 62 63 69   BILITOTAL 0.3 0.4 0.4   ALT 23 25 29   AST 20 22 33   PROTTOTAL 6.9 6.7* 7.2   ALBUMIN 3.0* 2.9* 3.1*       Iron Panel - No lab results found.      Imaging:  Reviewed       FABIANO NOLEN PA-C     Visit length 16 minutes. An additional 25 minutes was  spent on date of service in chart review, documentation, and other activities as noted.

## 2023-05-26 NOTE — PROGRESS NOTES
Care Management Follow Up    Length of Stay (days): 4    Expected Discharge Date: 01/14/2022     Concerns to be Addressed:  Disposition planning     Patient plan of care discussed at interdisciplinary rounds: Yes    Anticipated Discharge Disposition:  Rehab placement      Anticipated Discharge Services:  Rehab placement  Anticipated Discharge DME:  Not applicable    Patient/family educated on Medicare website which has current facility and service quality ratings:  yes  Education Provided on the Discharge Plan:  yes  Patient/Family in Agreement with the Plan:  yes    Referrals Placed by CM/SW:  Post Acute Care Facility's  Private pay costs discussed: Not applicable    Additional Information:  SW is following pt for discharge planning.   Rehab placement is anticipated upon discharge from acute hospitalization.  MARLENA spoke with Dr. Laura who anticipates that pt will remain hospitalized through the weekend.  Per Dr. Laura pt will be on overnight continuous VEEG monitoring.  Per Dr. Laura pt will have a MRI today and pt's mental status is waxing and waning.  MARLENA phoned Admissions (Octavia) at [facility] and updated.  SW suggested that a PMR eval be considered when pt is once again actively participating in therapys.    MARLENA will continue to follow for discharge planning.    KELLIE Finnegan  Social Work, 6A  Phone:  [phone]  Pager:  [phone]  1/12/2022          YUMIKO Laurent       [place]

## 2023-06-02 ENCOUNTER — HEALTH MAINTENANCE LETTER (OUTPATIENT)
Age: 48
End: 2023-06-02

## 2023-09-30 ENCOUNTER — HEALTH MAINTENANCE LETTER (OUTPATIENT)
Age: 48
End: 2023-09-30

## 2024-04-05 NOTE — PLAN OF CARE
Results are normal  Please notify patient Status Note  [id number]    Patient is A&Ox4, using call light appropriately, able to make needs known.  Slept throughout shift, denies pain or SOB.  Continue with POC.

## 2024-04-20 NOTE — PLAN OF CARE
Time: 0700 - 1930    Goal Outcome Evaluation:    Plan of Care Reviewed With: patient     Overall Patient Progress: no change    Outcome Evaluation: Pt passing gas, but still no BM; milk of magnesia given this AM with no effect; pt refused miralax this evening d/t visiting with friends, but will offer again later. A&Ox4, pleasant. Regular diet, pt had very poor appetite this shift; calorie counts ordered starting tomorrow. HTN managed well with scheduled antihypertensives again today. Pt worked with PT this afternoon. Up in wheelchair for much of shift. Awaiting TCU placement, SW following.     Pt lives alone ambulates with RW, stair lift for stairs to basement, children assist at home

## 2024-07-06 ENCOUNTER — HEALTH MAINTENANCE LETTER (OUTPATIENT)
Age: 49
End: 2024-07-06

## 2024-07-29 NOTE — PLAN OF CARE
Date of last visit:  6/26/2024  Date of next visit:  7/30/2024    Requested Prescriptions     Pending Prescriptions Disp Refills    nitroGLYCERIN (NITROSTAT) 0.4 MG SL tablet [Pharmacy Med Name: NITROGLYC 0.4MG 25CT SL TAB 0.4 Tablet] 25 tablet 10     Sig: DISSOLVE 1 TABLET UNDER THE TONGUE AS NEEDED FOR CHEST PAIN EVERY 5 MINUTES UP TO 3 TIMES. IF NO RELIEF CALL 911.      Pt. A/O x4, able to make needs known, uses call light appropriately. Transfers A2 using ghada steady. Continent of bowel and bladder, LBM 2/4, prn's administered and awaiting results. Denies pain. Regular diet/thin liquids, takes medications whole. Encouraged po fluid intake. AM . No acute concerns, alarms active, continue POC.

## 2025-07-13 ENCOUNTER — HEALTH MAINTENANCE LETTER (OUTPATIENT)
Age: 50
End: 2025-07-13

## (undated) RX ORDER — LIDOCAINE HYDROCHLORIDE 20 MG/ML
SOLUTION OROPHARYNGEAL
Status: DISPENSED
Start: 2022-01-21

## (undated) RX ORDER — FENTANYL CITRATE 50 UG/ML
INJECTION, SOLUTION INTRAMUSCULAR; INTRAVENOUS
Status: DISPENSED
Start: 2022-01-21